# Patient Record
Sex: FEMALE | Race: WHITE | NOT HISPANIC OR LATINO | Employment: OTHER
[De-identification: names, ages, dates, MRNs, and addresses within clinical notes are randomized per-mention and may not be internally consistent; named-entity substitution may affect disease eponyms.]

---

## 2017-09-27 ENCOUNTER — MYAURORA ACCOUNT LINK (OUTPATIENT)
Dept: OTHER | Age: 72
End: 2017-09-27

## 2018-05-02 ENCOUNTER — CHARTING TRANS (OUTPATIENT)
Dept: OTHER | Age: 73
End: 2018-05-02

## 2018-12-02 VITALS
DIASTOLIC BLOOD PRESSURE: 56 MMHG | SYSTOLIC BLOOD PRESSURE: 102 MMHG | HEART RATE: 80 BPM | HEIGHT: 62 IN | BODY MASS INDEX: 27.6 KG/M2 | WEIGHT: 150 LBS

## 2019-03-19 RX ORDER — ATORVASTATIN CALCIUM 10 MG/1
TABLET, FILM COATED ORAL
Qty: 90 TABLET | Refills: 2 | OUTPATIENT
Start: 2019-03-19

## 2019-03-19 RX ORDER — ATORVASTATIN CALCIUM 20 MG/1
TABLET, FILM COATED ORAL DAILY
COMMUNITY
Start: 2018-05-02 | End: 2019-03-19 | Stop reason: SDUPTHER

## 2019-03-19 RX ORDER — ATORVASTATIN CALCIUM 20 MG/1
20 TABLET, FILM COATED ORAL DAILY
Qty: 90 TABLET | Refills: 0 | Status: SHIPPED | OUTPATIENT
Start: 2019-03-19 | End: 2019-08-05 | Stop reason: SDUPTHER

## 2019-05-13 RX ORDER — LISINOPRIL 10 MG/1
TABLET ORAL
Qty: 30 TABLET | Refills: 1 | Status: SHIPPED | OUTPATIENT
Start: 2019-05-13 | End: 2020-07-20 | Stop reason: ALTCHOICE

## 2019-06-17 RX ORDER — ATORVASTATIN CALCIUM 20 MG/1
TABLET, FILM COATED ORAL
Qty: 90 TABLET | Refills: 0 | OUTPATIENT
Start: 2019-06-17

## 2019-07-22 ENCOUNTER — OFFICE VISIT (OUTPATIENT)
Dept: CARDIOLOGY | Age: 74
End: 2019-07-22

## 2019-07-22 ENCOUNTER — ANCILLARY PROCEDURE (OUTPATIENT)
Dept: CARDIOLOGY | Age: 74
End: 2019-07-22
Attending: INTERNAL MEDICINE

## 2019-07-22 VITALS
HEIGHT: 62 IN | DIASTOLIC BLOOD PRESSURE: 56 MMHG | HEART RATE: 64 BPM | SYSTOLIC BLOOD PRESSURE: 130 MMHG | BODY MASS INDEX: 28.52 KG/M2 | WEIGHT: 155 LBS

## 2019-07-22 DIAGNOSIS — I35.1 NONRHEUMATIC AORTIC VALVE INSUFFICIENCY: ICD-10-CM

## 2019-07-22 DIAGNOSIS — I50.32 HYPERTENSIVE HEART DISEASE WITH CHRONIC DIASTOLIC CONGESTIVE HEART FAILURE (CMD): ICD-10-CM

## 2019-07-22 DIAGNOSIS — I83.10 VARICOSE VEINS WITH INFLAMMATION: ICD-10-CM

## 2019-07-22 DIAGNOSIS — I35.0 NONRHEUMATIC AORTIC VALVE STENOSIS: ICD-10-CM

## 2019-07-22 DIAGNOSIS — R00.2 PALPITATIONS: ICD-10-CM

## 2019-07-22 DIAGNOSIS — I11.0 HYPERTENSIVE HEART DISEASE WITH CHRONIC DIASTOLIC CONGESTIVE HEART FAILURE (CMD): ICD-10-CM

## 2019-07-22 DIAGNOSIS — I50.32 CHRONIC DIASTOLIC (CONGESTIVE) HEART FAILURE (CMD): Primary | ICD-10-CM

## 2019-07-22 PROCEDURE — 99214 OFFICE O/P EST MOD 30 MIN: CPT | Performed by: INTERNAL MEDICINE

## 2019-07-22 PROCEDURE — 93306 TTE W/DOPPLER COMPLETE: CPT | Performed by: INTERNAL MEDICINE

## 2019-07-22 RX ORDER — PREDNISONE 2.5 MG/1
2.5 TABLET ORAL DAILY
COMMUNITY
End: 2021-03-18 | Stop reason: DRUGHIGH

## 2019-07-22 RX ORDER — MULTIVITAMIN,THER AND MINERALS
1 TABLET ORAL DAILY
COMMUNITY

## 2019-07-22 RX ORDER — ACETAMINOPHEN 325 MG/1
650 TABLET ORAL EVERY 4 HOURS PRN
COMMUNITY
End: 2020-11-23 | Stop reason: ALTCHOICE

## 2019-07-22 RX ORDER — FOLIC ACID 1 MG/1
1 TABLET ORAL DAILY
COMMUNITY
End: 2020-11-23 | Stop reason: SDUPTHER

## 2019-07-22 RX ORDER — OMEPRAZOLE 20 MG/1
20 CAPSULE, DELAYED RELEASE ORAL DAILY
COMMUNITY

## 2019-07-22 RX ORDER — METOPROLOL SUCCINATE 50 MG/1
50 TABLET, EXTENDED RELEASE ORAL DAILY
COMMUNITY
End: 2019-08-27 | Stop reason: SDUPTHER

## 2019-07-26 ENCOUNTER — TELEPHONE (OUTPATIENT)
Dept: CARDIOLOGY | Age: 74
End: 2019-07-26

## 2019-08-05 DIAGNOSIS — I50.32 CHRONIC DIASTOLIC (CONGESTIVE) HEART FAILURE (CMD): Primary | ICD-10-CM

## 2019-08-06 RX ORDER — ATORVASTATIN CALCIUM 20 MG/1
TABLET, FILM COATED ORAL
Qty: 90 TABLET | Refills: 3 | Status: SHIPPED | OUTPATIENT
Start: 2019-08-06 | End: 2020-10-08

## 2019-08-27 RX ORDER — METOPROLOL SUCCINATE 50 MG/1
TABLET, EXTENDED RELEASE ORAL
Qty: 90 TABLET | Refills: 3 | Status: SHIPPED | OUTPATIENT
Start: 2019-08-27 | End: 2020-07-20 | Stop reason: SDUPTHER

## 2019-09-04 ENCOUNTER — ANCILLARY PROCEDURE (OUTPATIENT)
Dept: CARDIOLOGY | Age: 74
End: 2019-09-04
Attending: INTERNAL MEDICINE

## 2019-09-04 DIAGNOSIS — I83.10 VARICOSE VEINS WITH INFLAMMATION: ICD-10-CM

## 2019-09-04 PROCEDURE — 93970 EXTREMITY STUDY: CPT | Performed by: INTERNAL MEDICINE

## 2019-09-11 ENCOUNTER — HOSPITAL (OUTPATIENT)
Dept: OTHER | Age: 74
End: 2019-09-11

## 2019-09-11 ENCOUNTER — HOSPITAL (OUTPATIENT)
Dept: OTHER | Age: 74
End: 2019-09-11
Attending: SPECIALIST

## 2019-09-11 LAB — SURG SPECIMEN: NORMAL

## 2019-09-27 ENCOUNTER — TELEPHONE (OUTPATIENT)
Dept: CARDIOLOGY | Age: 74
End: 2019-09-27

## 2020-03-20 ENCOUNTER — TELEPHONE (OUTPATIENT)
Dept: CARDIOLOGY | Age: 75
End: 2020-03-20

## 2020-05-29 ENCOUNTER — TELEPHONE (OUTPATIENT)
Dept: CARDIOLOGY | Age: 75
End: 2020-05-29

## 2020-06-01 ENCOUNTER — APPOINTMENT (OUTPATIENT)
Dept: CARDIOLOGY | Age: 75
End: 2020-06-01

## 2020-07-20 ENCOUNTER — TELEPHONE (OUTPATIENT)
Dept: CARDIOLOGY | Age: 75
End: 2020-07-20

## 2020-07-20 ENCOUNTER — OFFICE VISIT (OUTPATIENT)
Dept: CARDIOLOGY | Age: 75
End: 2020-07-20

## 2020-07-20 ENCOUNTER — ANCILLARY PROCEDURE (OUTPATIENT)
Dept: CARDIOLOGY | Age: 75
End: 2020-07-20
Attending: INTERNAL MEDICINE

## 2020-07-20 VITALS
BODY MASS INDEX: 28.52 KG/M2 | HEIGHT: 62 IN | WEIGHT: 155 LBS | DIASTOLIC BLOOD PRESSURE: 60 MMHG | SYSTOLIC BLOOD PRESSURE: 130 MMHG | HEART RATE: 60 BPM

## 2020-07-20 DIAGNOSIS — I83.10 VARICOSE VEINS WITH INFLAMMATION: Primary | ICD-10-CM

## 2020-07-20 DIAGNOSIS — E78.2 MIXED HYPERLIPIDEMIA: ICD-10-CM

## 2020-07-20 DIAGNOSIS — I83.10 VARICOSE VEINS WITH INFLAMMATION: ICD-10-CM

## 2020-07-20 DIAGNOSIS — I50.32 CHRONIC DIASTOLIC (CONGESTIVE) HEART FAILURE (CMD): ICD-10-CM

## 2020-07-20 DIAGNOSIS — I35.1 NONRHEUMATIC AORTIC VALVE INSUFFICIENCY: ICD-10-CM

## 2020-07-20 DIAGNOSIS — I50.32 HYPERTENSIVE HEART DISEASE WITH CHRONIC DIASTOLIC CONGESTIVE HEART FAILURE (CMD): ICD-10-CM

## 2020-07-20 DIAGNOSIS — I35.0 NONRHEUMATIC AORTIC VALVE STENOSIS: ICD-10-CM

## 2020-07-20 DIAGNOSIS — I11.0 HYPERTENSIVE HEART DISEASE WITH CHRONIC DIASTOLIC CONGESTIVE HEART FAILURE (CMD): ICD-10-CM

## 2020-07-20 DIAGNOSIS — I35.0 NONRHEUMATIC AORTIC VALVE STENOSIS: Primary | ICD-10-CM

## 2020-07-20 PROCEDURE — 93306 TTE W/DOPPLER COMPLETE: CPT | Performed by: INTERNAL MEDICINE

## 2020-07-20 PROCEDURE — 99214 OFFICE O/P EST MOD 30 MIN: CPT | Performed by: INTERNAL MEDICINE

## 2020-07-20 RX ORDER — LISINOPRIL AND HYDROCHLOROTHIAZIDE 25; 20 MG/1; MG/1
1 TABLET ORAL DAILY
Qty: 90 TABLET | Refills: 3 | Status: SHIPPED | OUTPATIENT
Start: 2020-07-20 | End: 2021-09-13 | Stop reason: ALTCHOICE

## 2020-07-20 RX ORDER — METOPROLOL SUCCINATE 50 MG/1
50 TABLET, EXTENDED RELEASE ORAL DAILY
Qty: 90 TABLET | Refills: 3 | Status: SHIPPED | OUTPATIENT
Start: 2020-07-20 | End: 2021-03-18 | Stop reason: DRUGHIGH

## 2020-07-20 ASSESSMENT — PATIENT HEALTH QUESTIONNAIRE - PHQ9
SUM OF ALL RESPONSES TO PHQ9 QUESTIONS 1 AND 2: 0
CLINICAL INTERPRETATION OF PHQ2 SCORE: NO FURTHER SCREENING NEEDED
1. LITTLE INTEREST OR PLEASURE IN DOING THINGS: NOT AT ALL
2. FEELING DOWN, DEPRESSED OR HOPELESS: NOT AT ALL
CLINICAL INTERPRETATION OF PHQ9 SCORE: NO FURTHER SCREENING NEEDED
SUM OF ALL RESPONSES TO PHQ9 QUESTIONS 1 AND 2: 0

## 2020-07-22 ENCOUNTER — TELEPHONE (OUTPATIENT)
Dept: CARDIOLOGY | Age: 75
End: 2020-07-22

## 2020-07-27 ENCOUNTER — TELEPHONE (OUTPATIENT)
Dept: CARDIOLOGY | Age: 75
End: 2020-07-27

## 2020-07-27 RX ORDER — TRAMADOL HYDROCHLORIDE 50 MG/1
TABLET ORAL
Qty: 10 TABLET | Refills: 0 | Status: SHIPPED | OUTPATIENT
Start: 2020-07-27 | End: 2020-07-27 | Stop reason: SDUPTHER

## 2020-07-27 RX ORDER — TRAMADOL HYDROCHLORIDE 50 MG/1
TABLET ORAL
Qty: 10 TABLET | Refills: 0 | Status: SHIPPED | OUTPATIENT
Start: 2020-07-27 | End: 2020-11-23 | Stop reason: ALTCHOICE

## 2020-07-28 ENCOUNTER — ANCILLARY PROCEDURE (OUTPATIENT)
Dept: CARDIOLOGY | Age: 75
End: 2020-07-28
Attending: INTERNAL MEDICINE

## 2020-07-28 VITALS — HEART RATE: 80 BPM

## 2020-07-28 DIAGNOSIS — I83.10 VARICOSE VEINS WITH INFLAMMATION: ICD-10-CM

## 2020-07-28 PROCEDURE — 36475 ENDOVENOUS RF 1ST VEIN: CPT | Performed by: INTERNAL MEDICINE

## 2020-07-28 RX ORDER — TRAMADOL HYDROCHLORIDE 50 MG/1
TABLET ORAL
Qty: 10 TABLET | Refills: 0 | OUTPATIENT
Start: 2020-07-28

## 2020-08-03 ENCOUNTER — TELEPHONE (OUTPATIENT)
Dept: CARDIOLOGY | Age: 75
End: 2020-08-03

## 2020-09-15 ENCOUNTER — ANCILLARY PROCEDURE (OUTPATIENT)
Dept: CARDIOLOGY | Age: 75
End: 2020-09-15
Attending: INTERNAL MEDICINE

## 2020-09-15 ENCOUNTER — TELEPHONE (OUTPATIENT)
Dept: CARDIOLOGY | Age: 75
End: 2020-09-15

## 2020-09-15 VITALS — HEART RATE: 82 BPM

## 2020-09-15 DIAGNOSIS — I83.10 VARICOSE VEINS WITH INFLAMMATION: ICD-10-CM

## 2020-09-15 PROCEDURE — 36471 NJX SCLRSNT MLT INCMPTNT VN: CPT | Performed by: INTERNAL MEDICINE

## 2020-10-07 ENCOUNTER — TELEPHONE (OUTPATIENT)
Dept: CARDIOLOGY | Age: 75
End: 2020-10-07

## 2020-10-07 DIAGNOSIS — I50.32 CHRONIC DIASTOLIC (CONGESTIVE) HEART FAILURE (CMD): ICD-10-CM

## 2020-10-08 RX ORDER — ATORVASTATIN CALCIUM 20 MG/1
TABLET, FILM COATED ORAL
Qty: 90 TABLET | Refills: 0 | Status: SHIPPED | OUTPATIENT
Start: 2020-10-08 | End: 2020-11-23 | Stop reason: SDUPTHER

## 2020-10-16 ENCOUNTER — TELEPHONE (OUTPATIENT)
Dept: CARDIOLOGY | Age: 75
End: 2020-10-16

## 2020-10-27 ENCOUNTER — APPOINTMENT (OUTPATIENT)
Dept: CARDIOLOGY | Age: 75
End: 2020-10-27
Attending: INTERNAL MEDICINE

## 2020-10-29 ENCOUNTER — ANCILLARY PROCEDURE (OUTPATIENT)
Dept: CARDIOLOGY | Age: 75
End: 2020-10-29
Attending: INTERNAL MEDICINE

## 2020-10-29 VITALS — HEART RATE: 78 BPM

## 2020-10-29 DIAGNOSIS — I83.10 VARICOSE VEINS WITH INFLAMMATION: ICD-10-CM

## 2020-10-29 PROCEDURE — 36475 ENDOVENOUS RF 1ST VEIN: CPT | Performed by: INTERNAL MEDICINE

## 2020-11-09 ENCOUNTER — TELEPHONE (OUTPATIENT)
Dept: CARDIOLOGY | Age: 75
End: 2020-11-09

## 2020-11-10 ENCOUNTER — ANCILLARY PROCEDURE (OUTPATIENT)
Dept: CARDIOLOGY | Age: 75
End: 2020-11-10
Attending: INTERNAL MEDICINE

## 2020-11-10 VITALS — SYSTOLIC BLOOD PRESSURE: 152 MMHG | DIASTOLIC BLOOD PRESSURE: 66 MMHG

## 2020-11-10 DIAGNOSIS — I83.10 VARICOSE VEINS WITH INFLAMMATION: ICD-10-CM

## 2020-11-10 PROCEDURE — 36475 ENDOVENOUS RF 1ST VEIN: CPT | Performed by: INTERNAL MEDICINE

## 2020-11-23 ENCOUNTER — OFFICE VISIT (OUTPATIENT)
Dept: CARDIOLOGY | Age: 75
End: 2020-11-23

## 2020-11-23 ENCOUNTER — ANCILLARY PROCEDURE (OUTPATIENT)
Dept: CARDIOLOGY | Age: 75
End: 2020-11-23
Attending: INTERNAL MEDICINE

## 2020-11-23 VITALS
HEIGHT: 62 IN | SYSTOLIC BLOOD PRESSURE: 166 MMHG | DIASTOLIC BLOOD PRESSURE: 58 MMHG | RESPIRATION RATE: 16 BRPM | WEIGHT: 150 LBS | HEART RATE: 72 BPM | BODY MASS INDEX: 27.6 KG/M2

## 2020-11-23 DIAGNOSIS — I10 ESSENTIAL HYPERTENSION: ICD-10-CM

## 2020-11-23 DIAGNOSIS — E78.2 MIXED HYPERLIPIDEMIA: ICD-10-CM

## 2020-11-23 DIAGNOSIS — I35.0 NONRHEUMATIC AORTIC VALVE STENOSIS: ICD-10-CM

## 2020-11-23 DIAGNOSIS — R06.09 DYSPNEA ON EXERTION: Primary | ICD-10-CM

## 2020-11-23 DIAGNOSIS — R09.89 BILATERAL CAROTID BRUITS: ICD-10-CM

## 2020-11-23 DIAGNOSIS — I50.32 CHRONIC DIASTOLIC (CONGESTIVE) HEART FAILURE (CMD): ICD-10-CM

## 2020-11-23 DIAGNOSIS — I11.0 HYPERTENSIVE HEART DISEASE WITH CHRONIC DIASTOLIC CONGESTIVE HEART FAILURE (CMD): ICD-10-CM

## 2020-11-23 DIAGNOSIS — I83.10 VARICOSE VEINS WITH INFLAMMATION: ICD-10-CM

## 2020-11-23 DIAGNOSIS — I50.32 HYPERTENSIVE HEART DISEASE WITH CHRONIC DIASTOLIC CONGESTIVE HEART FAILURE (CMD): ICD-10-CM

## 2020-11-23 PROCEDURE — 93880 EXTRACRANIAL BILAT STUDY: CPT | Performed by: INTERNAL MEDICINE

## 2020-11-23 PROCEDURE — 99214 OFFICE O/P EST MOD 30 MIN: CPT | Performed by: INTERNAL MEDICINE

## 2020-11-23 RX ORDER — FOLIC ACID 1 MG/1
1 TABLET ORAL DAILY
Qty: 90 TABLET | Refills: 3 | Status: SHIPPED | OUTPATIENT
Start: 2020-11-23 | End: 2021-11-22

## 2020-11-23 RX ORDER — ATORVASTATIN CALCIUM 20 MG/1
20 TABLET, FILM COATED ORAL DAILY
Qty: 90 TABLET | Refills: 3 | Status: SHIPPED | OUTPATIENT
Start: 2020-11-23 | End: 2021-04-05 | Stop reason: DRUGHIGH

## 2020-11-23 RX ORDER — AMLODIPINE BESYLATE 2.5 MG/1
2.5 TABLET ORAL DAILY
Qty: 90 TABLET | Refills: 3 | Status: SHIPPED | OUTPATIENT
Start: 2020-11-23 | End: 2021-04-05 | Stop reason: ALTCHOICE

## 2020-11-23 RX ORDER — IBUPROFEN 400 MG/1
400 TABLET ORAL EVERY 6 HOURS PRN
COMMUNITY

## 2020-11-23 ASSESSMENT — PATIENT HEALTH QUESTIONNAIRE - PHQ9
CLINICAL INTERPRETATION OF PHQ9 SCORE: NO FURTHER SCREENING NEEDED
CLINICAL INTERPRETATION OF PHQ2 SCORE: NO FURTHER SCREENING NEEDED
2. FEELING DOWN, DEPRESSED OR HOPELESS: NOT AT ALL
SUM OF ALL RESPONSES TO PHQ9 QUESTIONS 1 AND 2: 0
1. LITTLE INTEREST OR PLEASURE IN DOING THINGS: NOT AT ALL
SUM OF ALL RESPONSES TO PHQ9 QUESTIONS 1 AND 2: 0

## 2020-11-24 ENCOUNTER — TELEPHONE (OUTPATIENT)
Dept: CARDIOLOGY | Age: 75
End: 2020-11-24

## 2020-11-25 ENCOUNTER — OFFICE VISIT (OUTPATIENT)
Dept: FAMILY MEDICINE CLINIC | Facility: CLINIC | Age: 75
End: 2020-11-25
Payer: MEDICARE

## 2020-11-25 VITALS
DIASTOLIC BLOOD PRESSURE: 78 MMHG | HEIGHT: 62 IN | SYSTOLIC BLOOD PRESSURE: 145 MMHG | HEART RATE: 77 BPM | BODY MASS INDEX: 28.89 KG/M2 | WEIGHT: 157 LBS

## 2020-11-25 DIAGNOSIS — H91.90 HEARING LOSS, UNSPECIFIED HEARING LOSS TYPE, UNSPECIFIED LATERALITY: ICD-10-CM

## 2020-11-25 DIAGNOSIS — I35.0 NONRHEUMATIC AORTIC VALVE STENOSIS: Primary | ICD-10-CM

## 2020-11-25 DIAGNOSIS — I10 ESSENTIAL HYPERTENSION: ICD-10-CM

## 2020-11-25 DIAGNOSIS — G89.29 CHRONIC RIGHT SHOULDER PAIN: ICD-10-CM

## 2020-11-25 DIAGNOSIS — Z12.31 SCREENING MAMMOGRAM, ENCOUNTER FOR: ICD-10-CM

## 2020-11-25 DIAGNOSIS — R00.2 PALPITATIONS: ICD-10-CM

## 2020-11-25 DIAGNOSIS — M25.511 CHRONIC RIGHT SHOULDER PAIN: ICD-10-CM

## 2020-11-25 DIAGNOSIS — I50.9 CONGESTIVE HEART FAILURE, UNSPECIFIED HF CHRONICITY, UNSPECIFIED HEART FAILURE TYPE (HCC): ICD-10-CM

## 2020-11-25 DIAGNOSIS — Z23 INFLUENZA VACCINE NEEDED: ICD-10-CM

## 2020-11-25 DIAGNOSIS — I83.10 VARICOSE VEINS WITH INFLAMMATION: ICD-10-CM

## 2020-11-25 DIAGNOSIS — Z00.00 WELL ADULT EXAM: ICD-10-CM

## 2020-11-25 DIAGNOSIS — M81.0 AGE-RELATED OSTEOPOROSIS WITHOUT CURRENT PATHOLOGICAL FRACTURE: ICD-10-CM

## 2020-11-25 DIAGNOSIS — E78.2 MIXED HYPERLIPIDEMIA: ICD-10-CM

## 2020-11-25 DIAGNOSIS — K21.9 GASTROESOPHAGEAL REFLUX DISEASE WITHOUT ESOPHAGITIS: ICD-10-CM

## 2020-11-25 DIAGNOSIS — M25.562 ACUTE PAIN OF LEFT KNEE: ICD-10-CM

## 2020-11-25 PROBLEM — I11.0 HYPERTENSIVE HEART DISEASE WITH CHRONIC DIASTOLIC CONGESTIVE HEART FAILURE (HCC): Status: ACTIVE | Noted: 2020-11-25

## 2020-11-25 PROBLEM — I50.32 HYPERTENSIVE HEART DISEASE WITH CHRONIC DIASTOLIC CONGESTIVE HEART FAILURE (HCC): Status: ACTIVE | Noted: 2020-11-25

## 2020-11-25 PROCEDURE — G0463 HOSPITAL OUTPT CLINIC VISIT: HCPCS | Performed by: NURSE PRACTITIONER

## 2020-11-25 PROCEDURE — 99205 OFFICE O/P NEW HI 60 MIN: CPT | Performed by: NURSE PRACTITIONER

## 2020-11-25 RX ORDER — DENOSUMAB 60 MG/ML
60 INJECTION SUBCUTANEOUS ONCE
Qty: 1 ML | Refills: 0 | Status: SHIPPED | OUTPATIENT
Start: 2020-11-25 | End: 2020-11-25

## 2020-11-25 RX ORDER — LISINOPRIL AND HYDROCHLOROTHIAZIDE 25; 20 MG/1; MG/1
1 TABLET ORAL DAILY
COMMUNITY
Start: 2020-07-20 | End: 2020-11-25

## 2020-11-25 RX ORDER — LISINOPRIL AND HYDROCHLOROTHIAZIDE 25; 20 MG/1; MG/1
1 TABLET ORAL DAILY
Qty: 30 TABLET | Refills: 0 | Status: SHIPPED | OUTPATIENT
Start: 2020-11-25 | End: 2020-12-28

## 2020-11-25 RX ORDER — OMEPRAZOLE 20 MG/1
20 CAPSULE, DELAYED RELEASE ORAL
Qty: 30 CAPSULE | Refills: 0 | COMMUNITY
Start: 2020-11-25 | End: 2022-01-27

## 2020-11-25 RX ORDER — DENOSUMAB 60 MG/ML
60 INJECTION SUBCUTANEOUS ONCE
COMMUNITY
End: 2020-11-25

## 2020-11-25 RX ORDER — METOPROLOL SUCCINATE 50 MG/1
50 TABLET, EXTENDED RELEASE ORAL DAILY
Qty: 30 TABLET | Refills: 0 | Status: SHIPPED | OUTPATIENT
Start: 2020-11-25 | End: 2020-12-28

## 2020-11-25 RX ORDER — FOLIC ACID 1 MG/1
TABLET ORAL DAILY
COMMUNITY
End: 2020-11-25

## 2020-11-25 RX ORDER — TRAMADOL HYDROCHLORIDE 50 MG/1
TABLET ORAL
COMMUNITY
Start: 2020-07-28 | End: 2020-11-25

## 2020-11-25 RX ORDER — MELATONIN
1000 DAILY
COMMUNITY
End: 2020-11-25

## 2020-11-25 RX ORDER — MELATONIN
1000 DAILY
Qty: 30 TABLET | Refills: 0 | COMMUNITY
Start: 2020-11-25

## 2020-11-25 RX ORDER — FOLIC ACID 1 MG/1
1 TABLET ORAL DAILY
Qty: 30 TABLET | Refills: 0 | COMMUNITY
Start: 2020-11-25 | End: 2020-12-02

## 2020-11-25 RX ORDER — ATORVASTATIN CALCIUM 20 MG/1
20 TABLET, FILM COATED ORAL NIGHTLY
Qty: 30 TABLET | Refills: 0 | COMMUNITY
Start: 2020-11-25 | End: 2020-12-02

## 2020-11-25 RX ORDER — ATORVASTATIN CALCIUM 20 MG/1
20 TABLET, FILM COATED ORAL NIGHTLY
COMMUNITY
End: 2020-11-25

## 2020-11-25 RX ORDER — CHLORAL HYDRATE 500 MG
1000 CAPSULE ORAL DAILY
Qty: 30 CAPSULE | Refills: 0 | COMMUNITY
Start: 2020-11-25

## 2020-11-25 RX ORDER — OMEPRAZOLE 20 MG/1
20 CAPSULE, DELAYED RELEASE ORAL
COMMUNITY
End: 2020-11-25

## 2020-11-25 RX ORDER — METOPROLOL SUCCINATE 50 MG/1
50 TABLET, EXTENDED RELEASE ORAL DAILY
COMMUNITY
Start: 2020-07-20 | End: 2020-11-25

## 2020-11-25 NOTE — PROGRESS NOTES
HPI  Pt presents to establish care. Recently moved from [place]; she presents to office today with her daughter. Pt has numerous health conditions and daughter would like to establish all care at [place] for continuity.    REquested medical records but Pneumococcal Vaccine: 65+ Years(1 of 1 - PPSV23) due on 12/22/2008  Influenza Vaccine(1) due on 10/01/2020    No LMP recorded. Past Medical History:   Diagnosis Date   • Rheumatic arteritis        . History reviewed. No pertinent surgical history.     His • Denosumab (PROLIA) 60 MG/ML Subcutaneous Solution Prefilled Syringe Inject 1 mL (60 mg total) into the skin one time for 1 dose. 1 mL 0   • Diclofenac Sodium 1 % External Gel Apply 2 g topically 4 (four) times daily.  339 g 3   • folic acid 1 MG Oral Tab Cardiovascular    Varicose veins with inflammation    Palpitations    Mixed hyperlipidemia    Relevant Medications    atorvastatin 20 MG Oral Tab    Omega-3 1000 MG Oral Cap    Congestive heart failure (CHF) ([address]    Relevant Orders    CARDIO - INTERNAL Discussed plan of care with pt and pt is in agreement. All questions answered. Pt to call with questions or concerns. Encouraged to sign up for My Chart if not already registered.

## 2020-11-26 NOTE — ASSESSMENT & PLAN NOTE
Refer ortho  Diclofenac gel qid-due to GERD does not tolerate oral nsaids  Pt does not tolerate tramadol  Advised to ice 20 min 4-6 times per day  Tylenol arthritis

## 2020-12-02 ENCOUNTER — HOSPITAL ENCOUNTER (OUTPATIENT)
Dept: GENERAL RADIOLOGY | Age: 75
Discharge: HOME | End: 2020-12-02
Attending: PHYSICIAN ASSISTANT
Payer: MEDICARE

## 2020-12-02 ENCOUNTER — OFFICE VISIT (OUTPATIENT)
Dept: ORTHOPEDICS CLINIC | Facility: CLINIC | Age: 75
End: 2020-12-02
Payer: MEDICARE

## 2020-12-02 DIAGNOSIS — M17.12 PRIMARY OSTEOARTHRITIS OF LEFT KNEE: ICD-10-CM

## 2020-12-02 DIAGNOSIS — M25.552 LEFT HIP PAIN: ICD-10-CM

## 2020-12-02 DIAGNOSIS — M70.62 TROCHANTERIC BURSITIS, LEFT HIP: ICD-10-CM

## 2020-12-02 DIAGNOSIS — M25.562 LEFT KNEE PAIN, UNSPECIFIED CHRONICITY: Primary | ICD-10-CM

## 2020-12-02 DIAGNOSIS — M25.562 LEFT KNEE PAIN, UNSPECIFIED CHRONICITY: ICD-10-CM

## 2020-12-02 PROCEDURE — 73564 X-RAY EXAM KNEE 4 OR MORE: CPT | Performed by: PHYSICIAN ASSISTANT

## 2020-12-02 PROCEDURE — 20610 DRAIN/INJ JOINT/BURSA W/O US: CPT | Performed by: PHYSICIAN ASSISTANT

## 2020-12-02 PROCEDURE — 99203 OFFICE O/P NEW LOW 30 MIN: CPT | Performed by: PHYSICIAN ASSISTANT

## 2020-12-02 PROCEDURE — 73502 X-RAY EXAM HIP UNI 2-3 VIEWS: CPT | Performed by: PHYSICIAN ASSISTANT

## 2020-12-02 RX ORDER — MULTIVITAMIN,THER AND MINERALS
1 TABLET ORAL DAILY
COMMUNITY

## 2020-12-02 RX ORDER — AMLODIPINE BESYLATE 2.5 MG/1
2.5 TABLET ORAL DAILY
COMMUNITY
Start: 2020-11-23 | End: 2021-06-24

## 2020-12-02 RX ORDER — TRIAMCINOLONE ACETONIDE 40 MG/ML
40 INJECTION, SUSPENSION INTRA-ARTICULAR; INTRAMUSCULAR ONCE
Status: COMPLETED | OUTPATIENT
Start: 2020-12-02 | End: 2020-12-02

## 2020-12-02 RX ORDER — DENOSUMAB 60 MG/ML
60 INJECTION SUBCUTANEOUS ONCE
COMMUNITY
End: 2021-06-24

## 2020-12-02 RX ORDER — PROPRANOLOL/HYDROCHLOROTHIAZID 40 MG-25MG
TABLET ORAL
COMMUNITY

## 2020-12-02 RX ORDER — ATORVASTATIN CALCIUM 20 MG/1
20 TABLET, FILM COATED ORAL DAILY
COMMUNITY
Start: 2020-11-23 | End: 2021-04-22 | Stop reason: ALTCHOICE

## 2020-12-02 RX ADMIN — TRIAMCINOLONE ACETONIDE 40 MG: 40 INJECTION, SUSPENSION INTRA-ARTICULAR; INTRAMUSCULAR at 17:29:00

## 2020-12-02 NOTE — PROGRESS NOTES
[facility] New Patient Note    CC:   1. Left knee pain  2. Left leg pain    HPI: This 66year old female presents today with her daughter with complaints of left knee and left leg pain.   Onset of symptoms occurred 3 weeks ago which time she was going Apply 2 g topically 4 (four) times daily. 100 g 3   • Lisinopril-hydroCHLOROthiazide 20-25 MG Oral Tab Take 1 tablet by mouth daily. 30 tablet 0   • Metoprolol Succinate ER 50 MG Oral Tablet 24 Hr Take 1 tablet (50 mg total) by mouth daily.  30 tablet 0   • sign.        Imagin views of the left knee and 3 views of the left hip were personally viewed, independently interpreted and radiology report read. Moderate medial and patellofemoral joint space narrowing with marginal osteophytes.   The left hip shows benefits, and alternatives to the cortisone injection including but limited to risk of needle infection, steroid flareup, or failure to improve along with increase in blood sugars were discussed.   She would like to proceed and under meticulous sterile tech

## 2020-12-09 ENCOUNTER — OFFICE VISIT (OUTPATIENT)
Dept: RHEUMATOLOGY | Facility: CLINIC | Age: 75
End: 2020-12-09
Payer: MEDICARE

## 2020-12-09 VITALS
HEIGHT: 62 IN | BODY MASS INDEX: 28.89 KG/M2 | DIASTOLIC BLOOD PRESSURE: 72 MMHG | WEIGHT: 157 LBS | RESPIRATION RATE: 16 BRPM | SYSTOLIC BLOOD PRESSURE: 138 MMHG | HEART RATE: 66 BPM

## 2020-12-09 DIAGNOSIS — M79.10 MYALGIA: ICD-10-CM

## 2020-12-09 DIAGNOSIS — M15.9 PRIMARY OSTEOARTHRITIS INVOLVING MULTIPLE JOINTS: ICD-10-CM

## 2020-12-09 DIAGNOSIS — M13.0 POLYARTHRITIS: Primary | ICD-10-CM

## 2020-12-09 PROCEDURE — 99204 OFFICE O/P NEW MOD 45 MIN: CPT | Performed by: INTERNAL MEDICINE

## 2020-12-09 PROCEDURE — G0463 HOSPITAL OUTPT CLINIC VISIT: HCPCS | Performed by: INTERNAL MEDICINE

## 2020-12-09 NOTE — PROGRESS NOTES
Sheba Berg is a 66year old female who presents for Patient presents with:  Consult: RA, previous doctor Jag Jacobs MD    Hip Pain: Left, Trochantroric bursitis  Leg Pain: Left, onset x3 weeks  Knee Pain: Left  .    HPI:     I had the pleasure of skin one time. • Diclofenac Sodium 1 % External Gel Apply 2 g topically 4 (four) times daily. 100 g 3   • Lisinopril-hydroCHLOROthiazide 20-25 MG Oral Tab Take 1 tablet by mouth daily.  30 tablet 0   • Metoprolol Succinate ER 50 MG Oral Tablet 24 Hr Elder HPI,   All other ROS are negative.      EXAM:   /72   Pulse 66   Resp 16   Ht 5' 2\" (1.575 m)   Wt 157 lb (71.2 kg)   BMI 28.72 kg/m²   HEENT: Clear oropharynx, no oral ulcers, EOM intact, clear sclear, PERRLA, pleasant, no acute distress, no CAD, no Alize Zhou MD  12/9/2020  9:38 AM

## 2020-12-11 ENCOUNTER — LAB ENCOUNTER (OUTPATIENT)
Dept: LAB | Age: 75
End: 2020-12-11
Attending: INTERNAL MEDICINE
Payer: MEDICARE

## 2020-12-11 DIAGNOSIS — M79.10 MYALGIA: ICD-10-CM

## 2020-12-11 DIAGNOSIS — M13.0 POLYARTHRITIS: ICD-10-CM

## 2020-12-11 DIAGNOSIS — Z00.00 WELL ADULT EXAM: ICD-10-CM

## 2020-12-11 PROCEDURE — 36415 COLL VENOUS BLD VENIPUNCTURE: CPT

## 2020-12-11 PROCEDURE — 83036 HEMOGLOBIN GLYCOSYLATED A1C: CPT

## 2020-12-11 PROCEDURE — 80053 COMPREHEN METABOLIC PANEL: CPT

## 2020-12-11 PROCEDURE — 86200 CCP ANTIBODY: CPT

## 2020-12-11 PROCEDURE — 82607 VITAMIN B-12: CPT

## 2020-12-11 PROCEDURE — 80061 LIPID PANEL: CPT

## 2020-12-11 PROCEDURE — 85652 RBC SED RATE AUTOMATED: CPT

## 2020-12-11 PROCEDURE — 82085 ASSAY OF ALDOLASE: CPT

## 2020-12-11 PROCEDURE — 85027 COMPLETE CBC AUTOMATED: CPT

## 2020-12-11 PROCEDURE — 84550 ASSAY OF BLOOD/URIC ACID: CPT

## 2020-12-11 PROCEDURE — 86431 RHEUMATOID FACTOR QUANT: CPT

## 2020-12-11 PROCEDURE — 86140 C-REACTIVE PROTEIN: CPT

## 2020-12-11 PROCEDURE — 84443 ASSAY THYROID STIM HORMONE: CPT

## 2020-12-11 PROCEDURE — 86038 ANTINUCLEAR ANTIBODIES: CPT

## 2020-12-17 DIAGNOSIS — D72.829 LEUKOCYTOSIS, UNSPECIFIED TYPE: Primary | ICD-10-CM

## 2020-12-23 ENCOUNTER — OFFICE VISIT (OUTPATIENT)
Dept: RHEUMATOLOGY | Facility: CLINIC | Age: 75
End: 2020-12-23
Payer: MEDICARE

## 2020-12-23 VITALS
BODY MASS INDEX: 28.89 KG/M2 | HEART RATE: 81 BPM | HEIGHT: 62 IN | RESPIRATION RATE: 16 BRPM | SYSTOLIC BLOOD PRESSURE: 158 MMHG | DIASTOLIC BLOOD PRESSURE: 77 MMHG | WEIGHT: 157 LBS

## 2020-12-23 DIAGNOSIS — M05.80 POLYARTHRITIS WITH POSITIVE RHEUMATOID FACTOR (HCC): Primary | ICD-10-CM

## 2020-12-23 DIAGNOSIS — M81.0 OSTEOPOROSIS, UNSPECIFIED OSTEOPOROSIS TYPE, UNSPECIFIED PATHOLOGICAL FRACTURE PRESENCE: ICD-10-CM

## 2020-12-23 PROCEDURE — G0463 HOSPITAL OUTPT CLINIC VISIT: HCPCS | Performed by: INTERNAL MEDICINE

## 2020-12-23 PROCEDURE — 99214 OFFICE O/P EST MOD 30 MIN: CPT | Performed by: INTERNAL MEDICINE

## 2020-12-23 RX ORDER — HYDROXYCHLOROQUINE SULFATE 200 MG/1
200 TABLET, FILM COATED ORAL 2 TIMES DAILY
Qty: 60 TABLET | Refills: 1 | Status: SHIPPED | OUTPATIENT
Start: 2020-12-23 | End: 2021-06-24

## 2020-12-23 NOTE — PROGRESS NOTES
Hazel Machado is a 66year old female who presents for Patient presents with:  Lab Results  Ankle Pain: B/L  Toe Pain: B/L  .   HPI:     I had the pleasure of seeing Hazel Machado on 12/9/2020 for evaluation. She just moved her in the last year.  She off   Better in he rknees about 50% better. She still has left knee . She had the injectiosn 4 weekss ago. About 2 weeks later she had so much pain and she couldn't bend her knee. She has onoging ankle pain at times.      Wt Readings from Last 2 Encou weight or appetitie  Derm: No rashes, no oral ulcers, no alopecia, no photosensitivity, no psoriasis  HEENT: No dry eyes, no dry mouth, no Raynaud's, no nasal ulcers, no parotid swelling, no neck pain, no jaw pain, no temple pain  Eyes: No visual changes, mg/dL  10.2 (H)   CALCULATED OSMOLALITY      275 - 295 mOsm/kg  307 (H)   eGFR NON-AFR.  AMERICAN      >=60  61   eGFR       >=60  70   ALT (SGPT)      13 - 56 U/L  21   AST (SGOT)      15 - 37 U/L  13 (L)   ALKALINE PHOSPHATASE      55 - 14 gm/cm2   T-SCORE  %REF   Z-SCORE %AGE MATCH  BODY PART  10/15/2018      0.724     -1.10 85.0%     +0.90     117.0%  left femur neck  08/16/2016      0.720     -1.20 85.0%     +0.80       0.0%  left femur neck  02/10/2014      0.724     -1.10 85.0%     +0. 7 her orthopedics     2. Chondrocalcinosis  - slightly elevated calcium - check PTH   3. osteoporsosis - was on prolia - due for DEXA in 10/2020 - repeat DEXA   4. Used to get numbness - had emg in the past taht was ok    Summary:  1.  Try hydroxychlroquine 2

## 2020-12-23 NOTE — PATIENT INSTRUCTIONS
1. Try hydroxychlroquine 200mg twice a day    2. Cont. Tylenol 650mg and diclofenac gel 1 %   3. Return to clinic in 6-8 weeks   4. Will get records -   5. DEXA scan -   Hydroxychloroquine tablets  Brand Names: Plaquenil, Quineprox  What is this medicine? pain; unusually weak or tired; yellowing of the eyes or skin  · signs and symptoms of low blood sugar such as feeling anxious; confusion; dizziness; increased hunger; unusually weak or tired; sweating; shakiness; cold; irritable; headache; blurred vision; disease  · porphyria  · psoriasis  · an unusual or allergic reaction to chloroquine, hydroxychloroquine, other medicines, foods, dyes, or preservatives  · pregnant or trying to get pregnant  · breast-feeding  What should I watch for while using this medici

## 2020-12-25 DIAGNOSIS — I10 ESSENTIAL HYPERTENSION: ICD-10-CM

## 2020-12-28 ENCOUNTER — MED REC SCAN ONLY (OUTPATIENT)
Dept: RHEUMATOLOGY | Facility: CLINIC | Age: 75
End: 2020-12-28

## 2020-12-28 RX ORDER — LISINOPRIL AND HYDROCHLOROTHIAZIDE 25; 20 MG/1; MG/1
TABLET ORAL
Qty: 30 TABLET | Refills: 0 | Status: SHIPPED | OUTPATIENT
Start: 2020-12-28 | End: 2021-04-18

## 2020-12-28 RX ORDER — METOPROLOL SUCCINATE 50 MG/1
50 TABLET, EXTENDED RELEASE ORAL DAILY
Qty: 30 TABLET | Refills: 0 | Status: SHIPPED | OUTPATIENT
Start: 2020-12-28 | End: 2021-03-03

## 2021-02-12 ENCOUNTER — TELEMEDICINE (OUTPATIENT)
Dept: FAMILY MEDICINE CLINIC | Facility: CLINIC | Age: 76
End: 2021-02-12
Payer: MEDICARE

## 2021-02-12 ENCOUNTER — TELEPHONE (OUTPATIENT)
Dept: FAMILY MEDICINE CLINIC | Facility: CLINIC | Age: 76
End: 2021-02-12

## 2021-02-12 DIAGNOSIS — I10 ESSENTIAL HYPERTENSION: ICD-10-CM

## 2021-02-12 DIAGNOSIS — R00.2 PALPITATIONS: ICD-10-CM

## 2021-02-12 DIAGNOSIS — I35.0 NONRHEUMATIC AORTIC VALVE STENOSIS: ICD-10-CM

## 2021-02-12 DIAGNOSIS — R07.89 OTHER CHEST PAIN: Primary | ICD-10-CM

## 2021-02-12 DIAGNOSIS — I50.9 CONGESTIVE HEART FAILURE, UNSPECIFIED HF CHRONICITY, UNSPECIFIED HEART FAILURE TYPE (HCC): ICD-10-CM

## 2021-02-12 PROCEDURE — 99213 OFFICE O/P EST LOW 20 MIN: CPT | Performed by: NURSE PRACTITIONER

## 2021-02-12 NOTE — TELEPHONE ENCOUNTER
DGN#[phone] no answer with mailbox full and unable to leave a message. Pts' appt in [place] needs to be triaged further. Tel#[phone] no answer with no voice mailbox to leave a message. Message left at pts' [employer] encounter.       Please reply

## 2021-02-12 NOTE — ASSESSMENT & PLAN NOTE
Discussed w pt's h/o cardiac issues and that women can present different with heart problems then men and pt should be evaluated for symptoms that occurred 3 times last night. Pt states that she really does not want to go to ER if she does not have to.  Ag

## 2021-02-12 NOTE — PROGRESS NOTES
HPI  Pt presents for video visit w daughter Gianluca Alaniz. Had gel injection in knees yesterday. Had shortness of breath, nausea,sweating, elevated bp and upper back pain about 8 pm last night. [place] like her heart was racing.   Symptoms happened again at 10 pm Smokeless tobacco: Never Used    Substance and Sexual Activity      Alcohol use: Not Currently      Drug use: Not Currently      Sexual activity: Not on file    Lifestyle      Physical activity        Days per week: Not on file        Minutes per session: mouth every morning before breakfast. 30 capsule 0   • Vitamin D3 25 MCG (1000 UT) Oral Tab Take 1 tablet (1,000 Units total) by mouth daily. 30 tablet 0   • folic acid (FOLVITE) 1 MG Oral Tab Take by mouth daily.          Allergies:    Codeine of care with pt and pt is in agreement. All questions answered. Pt to call with questions or concerns. Encouraged to sign up for My Chart if not already registered.

## 2021-02-12 NOTE — TELEPHONE ENCOUNTER
Patient reports chest pain.     Appointment For: Jorge A Lee ([id number])   Visit Type: [facility] SERVICES VIDEO VISIT ([id number])      2/12/2021    2:30 PM  15 mins. SANIA Looney [facility] MED      Patient Comments:   Chest pains

## 2021-02-13 DIAGNOSIS — Z23 NEED FOR VACCINATION: ICD-10-CM

## 2021-02-15 ENCOUNTER — HOSPITAL ENCOUNTER (OUTPATIENT)
Dept: BONE DENSITY | Age: 76
Discharge: HOME | End: 2021-02-15
Attending: INTERNAL MEDICINE
Payer: MEDICARE

## 2021-02-15 DIAGNOSIS — M81.0 OSTEOPOROSIS, UNSPECIFIED OSTEOPOROSIS TYPE, UNSPECIFIED PATHOLOGICAL FRACTURE PRESENCE: ICD-10-CM

## 2021-02-15 PROCEDURE — 77080 DXA BONE DENSITY AXIAL: CPT | Performed by: INTERNAL MEDICINE

## 2021-02-17 ENCOUNTER — PATIENT MESSAGE (OUTPATIENT)
Dept: FAMILY MEDICINE CLINIC | Facility: CLINIC | Age: 76
End: 2021-02-17

## 2021-02-17 DIAGNOSIS — M79.673 PAIN OF FOOT, UNSPECIFIED LATERALITY: Primary | ICD-10-CM

## 2021-02-18 ENCOUNTER — OFFICE VISIT (OUTPATIENT)
Dept: RHEUMATOLOGY | Facility: CLINIC | Age: 76
End: 2021-02-18
Payer: MEDICARE

## 2021-02-18 VITALS
SYSTOLIC BLOOD PRESSURE: 132 MMHG | DIASTOLIC BLOOD PRESSURE: 70 MMHG | WEIGHT: 159 LBS | RESPIRATION RATE: 16 BRPM | HEIGHT: 62 IN | HEART RATE: 76 BPM | BODY MASS INDEX: 29.26 KG/M2

## 2021-02-18 DIAGNOSIS — M05.80 POLYARTHRITIS WITH POSITIVE RHEUMATOID FACTOR (HCC): Primary | ICD-10-CM

## 2021-02-18 DIAGNOSIS — M15.9 PRIMARY OSTEOARTHRITIS INVOLVING MULTIPLE JOINTS: ICD-10-CM

## 2021-02-18 DIAGNOSIS — Z51.81 THERAPEUTIC DRUG MONITORING: ICD-10-CM

## 2021-02-18 PROCEDURE — 99214 OFFICE O/P EST MOD 30 MIN: CPT | Performed by: INTERNAL MEDICINE

## 2021-02-18 RX ORDER — PREDNISONE 1 MG/1
5 TABLET ORAL DAILY
Qty: 30 TABLET | Refills: 3 | Status: SHIPPED | OUTPATIENT
Start: 2021-02-18 | End: 2021-06-22

## 2021-02-18 RX ORDER — LEFLUNOMIDE 10 MG/1
10 TABLET ORAL DAILY
Qty: 30 TABLET | Refills: 0 | Status: SHIPPED | OUTPATIENT
Start: 2021-02-18 | End: 2021-03-20

## 2021-02-18 NOTE — PROGRESS NOTES
Valeria Merritt is a 66year old female who presents for Patient presents with:  Medication Follow-Up  Results: Dexa  . HPI:     I had the pleasure of seeing Valeria Merritt on 12/9/2020 for evaluation. She just moved her in the last year.  She was move Better in he rknees about 50% better. She still has left knee . She had the injectiosn 4 weekss ago. About 2 weeks later she had so much pain and she couldn't bend her knee. She has onoging ankle pain at times.      2/18/2021  Some days every part o Cap Take 1,000 mg by mouth daily.  30 capsule 0   • omeprazole 20 MG Oral Capsule Delayed Release Take 1 capsule (20 mg total) by mouth every morning before breakfast. 30 capsule 0   • Vitamin D3 25 MCG (1000 UT) Oral Tab Take 1 tablet (1,000 Units total) b tender in shoulders.    Mild Tender dips in 1-5th fingers o hands - heberdone nodes   Can close her hands   Tender in b/l trochanteirc bursitis right more than left   Tender in left knee with +1=2 swleling -   Tender in left ankle   Not tender in 1-5th mtps 0.358 - 3.740 mIU/mL  0.979   Vitamin B12      193 - 986 pg/mL  468   ALDOLASE, SERUM      1.5 - 8.1 U/L  3.1   C-REACTIVE PROTEIN      <0.30 mg/dL  <0.29   SED RATE      0 - 30 mm/Hr  15   JAMES SCREEN WITH REFLEX (S)      Negative  Negative   C-Citrullinat SCORE:         -1.1       Z SCORE:         1.1     LEFT TOTAL HIP               BMD:    0.891 gm/sq. cm.            T SCORE:         -0.4       Z SCORE:         1.5     PA LUMBAR SPINE (L1 - L4)               BMD:    0.946 gm/sq.  cm.            T SCORE: ok    Summary:  1. Try prednisone 5mg ad ay -   2. Try leflunomide 10mg a day  3. Follow up with getting hyaluronic acid injections -   4. Cont. Tylenol 650mg and diclofenac gel 1 %  5. Return to clinic in 3-4 weeks. 6. Ok to stop hydroxychlroquine  7.

## 2021-02-18 NOTE — TELEPHONE ENCOUNTER
Pastor Espinal, see patient message below regarding the cyst on the foot  Please reply to pool: ANKUR RN TRIAGE      From: Prem Guzman  To: SANIA Ortiz  Sent: 2/17/2021  8:55 PM CST  Subject: Visit Follow-up Question    Hi Pastor Espinal, can you please provide

## 2021-02-18 NOTE — PATIENT INSTRUCTIONS
1. Try prednisone 5mg ad ay -   2. Try leflunomide 10mg a day  3. Follow up with getting hyaluronic acid injections -   4. Cont. Tylenol 650mg and diclofenac gel 1 %  5. Return to clinic in 3-4 weeks. 6. Ok to stop hydroxychlroquine  7.  Schedule covid v flu-like symptoms; light-colored stools; loss of appetite; nausea; right upper belly pain; unusually weak or tired; yellowing of the eyes or skin  · trouble passing urine or change in the amount of urine  · vomiting  Side effects that usually do not requir scheduled to receive a vaccine  · receiving treatment for cancer  · skin conditions or sensitivity  · tingling of the fingers or toes, or other nerve disorder  · tuberculosis  · an unusual or allergic reaction to leflunomide, teriflunomide, other medicines

## 2021-03-01 ENCOUNTER — TELEPHONE (OUTPATIENT)
Dept: RHEUMATOLOGY | Facility: CLINIC | Age: 76
End: 2021-03-01

## 2021-03-01 DIAGNOSIS — I10 ESSENTIAL HYPERTENSION: ICD-10-CM

## 2021-03-03 RX ORDER — METOPROLOL SUCCINATE 50 MG/1
50 TABLET, EXTENDED RELEASE ORAL DAILY
Qty: 90 TABLET | Refills: 0 | Status: SHIPPED | OUTPATIENT
Start: 2021-03-03 | End: 2021-10-05

## 2021-03-18 ENCOUNTER — OFFICE VISIT (OUTPATIENT)
Dept: CARDIOLOGY | Age: 76
End: 2021-03-18

## 2021-03-18 ENCOUNTER — TELEPHONE (OUTPATIENT)
Dept: CARDIOLOGY | Age: 76
End: 2021-03-18

## 2021-03-18 ENCOUNTER — ANCILLARY PROCEDURE (OUTPATIENT)
Dept: CARDIOLOGY | Age: 76
End: 2021-03-18
Attending: INTERNAL MEDICINE

## 2021-03-18 ENCOUNTER — PATIENT MESSAGE (OUTPATIENT)
Dept: CARDIOLOGY CLINIC | Facility: CLINIC | Age: 76
End: 2021-03-18

## 2021-03-18 VITALS
SYSTOLIC BLOOD PRESSURE: 156 MMHG | HEART RATE: 88 BPM | BODY MASS INDEX: 28.52 KG/M2 | RESPIRATION RATE: 16 BRPM | DIASTOLIC BLOOD PRESSURE: 56 MMHG | WEIGHT: 155 LBS | HEIGHT: 62 IN

## 2021-03-18 DIAGNOSIS — I35.1 NONRHEUMATIC AORTIC VALVE INSUFFICIENCY: ICD-10-CM

## 2021-03-18 DIAGNOSIS — I50.32 HYPERTENSIVE HEART DISEASE WITH CHRONIC DIASTOLIC CONGESTIVE HEART FAILURE (CMD): ICD-10-CM

## 2021-03-18 DIAGNOSIS — R06.09 DYSPNEA ON EXERTION: Primary | ICD-10-CM

## 2021-03-18 DIAGNOSIS — R00.2 PALPITATIONS: ICD-10-CM

## 2021-03-18 DIAGNOSIS — I50.33 ACUTE ON CHRONIC DIASTOLIC CHF (CONGESTIVE HEART FAILURE) (CMD): ICD-10-CM

## 2021-03-18 DIAGNOSIS — I11.0 HYPERTENSIVE HEART DISEASE WITH CHRONIC DIASTOLIC CONGESTIVE HEART FAILURE (CMD): ICD-10-CM

## 2021-03-18 DIAGNOSIS — I35.0 NONRHEUMATIC AORTIC VALVE STENOSIS: ICD-10-CM

## 2021-03-18 PROCEDURE — 99215 OFFICE O/P EST HI 40 MIN: CPT | Performed by: INTERNAL MEDICINE

## 2021-03-18 PROCEDURE — 93000 ELECTROCARDIOGRAM COMPLETE: CPT | Performed by: INTERNAL MEDICINE

## 2021-03-18 RX ORDER — LEFLUNOMIDE 10 MG/1
10 TABLET ORAL DAILY
COMMUNITY
Start: 2021-02-18 | End: 2021-09-13

## 2021-03-18 RX ORDER — METOPROLOL SUCCINATE 50 MG/1
50 TABLET, EXTENDED RELEASE ORAL 2 TIMES DAILY
Qty: 90 TABLET | Refills: 3 | Status: SHIPPED | OUTPATIENT
Start: 2021-03-18 | End: 2021-11-11

## 2021-03-18 RX ORDER — FUROSEMIDE 20 MG/1
20 TABLET ORAL 2 TIMES DAILY
Qty: 30 TABLET | Refills: 0 | Status: SHIPPED | OUTPATIENT
Start: 2021-03-18 | End: 2021-04-01

## 2021-03-18 RX ORDER — PREDNISONE 5 MG/1
5 TABLET ORAL DAILY
COMMUNITY
Start: 2021-02-18

## 2021-03-18 ASSESSMENT — PATIENT HEALTH QUESTIONNAIRE - PHQ9
1. LITTLE INTEREST OR PLEASURE IN DOING THINGS: NOT AT ALL
SUM OF ALL RESPONSES TO PHQ9 QUESTIONS 1 AND 2: 0
CLINICAL INTERPRETATION OF PHQ2 SCORE: NO FURTHER SCREENING NEEDED
2. FEELING DOWN, DEPRESSED OR HOPELESS: NOT AT ALL
CLINICAL INTERPRETATION OF PHQ9 SCORE: NO FURTHER SCREENING NEEDED
SUM OF ALL RESPONSES TO PHQ9 QUESTIONS 1 AND 2: 0

## 2021-03-18 NOTE — TELEPHONE ENCOUNTER
From: Karena Le  To: Светлана Nguyen MD  Sent: 3/18/2021 10:34 AM CDT  Subject: Other    Good morning - Karena Dockeryosman has an appointment with Dr. Dash Pyle on Monday March 22nd. This will be her first visit with him.     However she is having shortness of br

## 2021-03-18 NOTE — TELEPHONE ENCOUNTER
S/w her daughter, and she states she wrote message. That her Mom had some chest pains last night, short period of time, none now. Discussed that if she has active chest pains she should go to the ER.  Otherwise I am able to find echo in care everywhere, but

## 2021-03-19 ENCOUNTER — APPOINTMENT (OUTPATIENT)
Dept: CARDIOLOGY | Age: 76
End: 2021-03-19
Attending: INTERNAL MEDICINE

## 2021-03-19 ENCOUNTER — TELEPHONE (OUTPATIENT)
Dept: CARDIOLOGY | Age: 76
End: 2021-03-19

## 2021-03-19 DIAGNOSIS — M05.80 POLYARTHRITIS WITH POSITIVE RHEUMATOID FACTOR (HCC): Primary | ICD-10-CM

## 2021-03-19 DIAGNOSIS — Z51.81 THERAPEUTIC DRUG MONITORING: ICD-10-CM

## 2021-03-20 RX ORDER — LEFLUNOMIDE 10 MG/1
10 TABLET ORAL DAILY
Qty: 30 TABLET | Refills: 0 | Status: SHIPPED | OUTPATIENT
Start: 2021-03-20 | End: 2021-04-19

## 2021-03-20 NOTE — TELEPHONE ENCOUNTER
Requested Prescriptions     Pending Prescriptions Disp Refills   • LEFLUNOMIDE 10 MG Oral Tab [Pharmacy Med Name: Leflunomide 10 Mg Tab Lupi] 30 tablet 0     Sig: Take 1 tablet (10 mg total) by mouth daily.      LF: 2/18/21 #30 TAB W/ 0 RF  LOV: 2/18/21   N    Jamal Faye MD  2/18/2021   2:40 PM  - [place] [facility] [id number]

## 2021-03-22 NOTE — TELEPHONE ENCOUNTER
Follow up scheduled for 4/1/21 patient aware to have labs completed prior to appointment. Standing lab order generated in Epic.

## 2021-03-23 ENCOUNTER — TELEPHONE (OUTPATIENT)
Dept: FAMILY MEDICINE CLINIC | Facility: CLINIC | Age: 76
End: 2021-03-23

## 2021-03-23 RX ORDER — FUROSEMIDE 20 MG/1
20 TABLET ORAL 2 TIMES DAILY
COMMUNITY
Start: 2021-03-18 | End: 2021-07-26 | Stop reason: ALTCHOICE

## 2021-03-23 NOTE — TELEPHONE ENCOUNTER
Patient wants to know if she has enough potassium to be able to take Furosemide. Patient states was prescribed this medication, but pharmacist advised patient to make sure potassium levels are good because medication could cause severe leg cramps.  Please a

## 2021-03-23 NOTE — TELEPHONE ENCOUNTER
Advised patient of [place]'s note. Patient verbalized understanding. She states that she saw a heart specialist, Dr. Kevin Mckinney. She was prescribed Furosemide 20 mg BID. Medication list was updated to reflect Furosemide.

## 2021-03-23 NOTE — TELEPHONE ENCOUNTER
There is no listing of lasix on pt's medications list. Who prescribed? Last potassium was 4.4 so safe to take lasix.

## 2021-03-29 PROCEDURE — 93227 XTRNL ECG REC<48 HR R&I: CPT | Performed by: INTERNAL MEDICINE

## 2021-03-30 ENCOUNTER — TELEPHONE (OUTPATIENT)
Dept: CARDIOLOGY | Age: 76
End: 2021-03-30

## 2021-03-31 ENCOUNTER — TELEPHONE (OUTPATIENT)
Dept: CARDIOLOGY | Age: 76
End: 2021-03-31

## 2021-04-05 ENCOUNTER — OFFICE VISIT (OUTPATIENT)
Dept: CARDIOLOGY | Age: 76
End: 2021-04-05

## 2021-04-05 VITALS
BODY MASS INDEX: 29.27 KG/M2 | HEIGHT: 61 IN | SYSTOLIC BLOOD PRESSURE: 144 MMHG | WEIGHT: 155 LBS | HEART RATE: 86 BPM | DIASTOLIC BLOOD PRESSURE: 60 MMHG

## 2021-04-05 DIAGNOSIS — I50.32 CHRONIC DIASTOLIC (CONGESTIVE) HEART FAILURE (CMD): Primary | ICD-10-CM

## 2021-04-05 DIAGNOSIS — I83.10 VARICOSE VEINS WITH INFLAMMATION: ICD-10-CM

## 2021-04-05 DIAGNOSIS — I35.1 NONRHEUMATIC AORTIC VALVE INSUFFICIENCY: ICD-10-CM

## 2021-04-05 DIAGNOSIS — I35.0 NONRHEUMATIC AORTIC VALVE STENOSIS: ICD-10-CM

## 2021-04-05 DIAGNOSIS — R25.2 LEG CRAMPS: ICD-10-CM

## 2021-04-05 DIAGNOSIS — R09.89 BILATERAL CAROTID BRUITS: ICD-10-CM

## 2021-04-05 DIAGNOSIS — R00.2 PALPITATIONS: ICD-10-CM

## 2021-04-05 DIAGNOSIS — I10 ESSENTIAL HYPERTENSION: ICD-10-CM

## 2021-04-05 DIAGNOSIS — E78.2 MIXED HYPERLIPIDEMIA: ICD-10-CM

## 2021-04-05 DIAGNOSIS — I50.32 HYPERTENSIVE HEART DISEASE WITH CHRONIC DIASTOLIC CONGESTIVE HEART FAILURE (CMD): ICD-10-CM

## 2021-04-05 DIAGNOSIS — I11.0 HYPERTENSIVE HEART DISEASE WITH CHRONIC DIASTOLIC CONGESTIVE HEART FAILURE (CMD): ICD-10-CM

## 2021-04-05 PROCEDURE — 99214 OFFICE O/P EST MOD 30 MIN: CPT | Performed by: INTERNAL MEDICINE

## 2021-04-05 RX ORDER — PRAVASTATIN SODIUM 40 MG
40 TABLET ORAL DAILY
Qty: 90 TABLET | Refills: 3 | Status: SHIPPED | OUTPATIENT
Start: 2021-04-05

## 2021-04-05 RX ORDER — HYALURONATE SODIUM 10 MG/ML
25 SYRINGE (ML) INTRAARTICULAR ONCE
COMMUNITY

## 2021-04-05 RX ORDER — VIT C/B6/B5/MAGNESIUM/HERB 173 50-5-6-5MG
CAPSULE ORAL
COMMUNITY

## 2021-04-05 RX ORDER — DICLOFENAC SODIUM 1 MG/ML
1 SOLUTION/ DROPS OPHTHALMIC 4 TIMES DAILY
COMMUNITY

## 2021-04-05 ASSESSMENT — PATIENT HEALTH QUESTIONNAIRE - PHQ9
CLINICAL INTERPRETATION OF PHQ2 SCORE: NO FURTHER SCREENING NEEDED
CLINICAL INTERPRETATION OF PHQ9 SCORE: NO FURTHER SCREENING NEEDED
2. FEELING DOWN, DEPRESSED OR HOPELESS: NOT AT ALL
SUM OF ALL RESPONSES TO PHQ9 QUESTIONS 1 AND 2: 0
1. LITTLE INTEREST OR PLEASURE IN DOING THINGS: NOT AT ALL
SUM OF ALL RESPONSES TO PHQ9 QUESTIONS 1 AND 2: 0

## 2021-04-12 ENCOUNTER — APPOINTMENT (OUTPATIENT)
Dept: CARDIOLOGY | Age: 76
End: 2021-04-12

## 2021-04-18 DIAGNOSIS — I10 ESSENTIAL HYPERTENSION: ICD-10-CM

## 2021-04-18 RX ORDER — LISINOPRIL AND HYDROCHLOROTHIAZIDE 25; 20 MG/1; MG/1
TABLET ORAL
Qty: 30 TABLET | Refills: 0 | Status: SHIPPED | OUTPATIENT
Start: 2021-04-18 | End: 2021-05-19

## 2021-04-19 RX ORDER — LEFLUNOMIDE 10 MG/1
10 TABLET ORAL DAILY
Qty: 30 TABLET | Refills: 0 | Status: SHIPPED | OUTPATIENT
Start: 2021-04-19 | End: 2021-04-22

## 2021-04-19 NOTE — TELEPHONE ENCOUNTER
Last filled: 3/20/21 #30 tab with 0 refills   LOV: 2/18/21  Future Appointments   Date Time Provider César Estrada   4/22/2021  4:10 PM Marlon Stein MD [facility] [place]     Labs:  12/11/20     Please advise.

## 2021-04-20 NOTE — TELEPHONE ENCOUNTER
Records received from [address] [place] [address]. Purpose for information was patient moved and need doctor closer to home. Below noted appointment with Dr. Julito Valentino on 3/22 had been canceled.  Mantara message to patient advising receipt of records and [facility]

## 2021-04-21 RX ORDER — LEFLUNOMIDE 10 MG/1
10 TABLET ORAL DAILY
Qty: 30 TABLET | Refills: 0 | OUTPATIENT
Start: 2021-04-21

## 2021-04-21 NOTE — TELEPHONE ENCOUNTER
LOV 2/18/21  F/U scheduled tomorrow. Last fill 4/19/21 to [facility] [place] x 30 day supply  Duplicate request.   I called the pharmacy and confirmed this is a duplicate request and they do not need I re ordered. OK to refuse as duplicate.

## 2021-04-22 ENCOUNTER — OFFICE VISIT (OUTPATIENT)
Dept: RHEUMATOLOGY | Facility: CLINIC | Age: 76
End: 2021-04-22
Payer: MEDICARE

## 2021-04-22 VITALS
BODY MASS INDEX: 29.26 KG/M2 | DIASTOLIC BLOOD PRESSURE: 65 MMHG | SYSTOLIC BLOOD PRESSURE: 147 MMHG | WEIGHT: 159 LBS | RESPIRATION RATE: 16 BRPM | HEART RATE: 83 BPM | HEIGHT: 62 IN

## 2021-04-22 DIAGNOSIS — M05.80 POLYARTHRITIS WITH POSITIVE RHEUMATOID FACTOR (HCC): Primary | ICD-10-CM

## 2021-04-22 DIAGNOSIS — Z51.81 THERAPEUTIC DRUG MONITORING: ICD-10-CM

## 2021-04-22 PROCEDURE — 99214 OFFICE O/P EST MOD 30 MIN: CPT | Performed by: INTERNAL MEDICINE

## 2021-04-22 RX ORDER — PRAVASTATIN SODIUM 40 MG
40 TABLET ORAL DAILY
COMMUNITY
Start: 2021-04-05 | End: 2022-01-27

## 2021-04-22 RX ORDER — LEFLUNOMIDE 20 MG/1
20 TABLET ORAL DAILY
Qty: 30 TABLET | Refills: 1 | Status: SHIPPED | OUTPATIENT
Start: 2021-04-22 | End: 2021-06-17

## 2021-04-22 NOTE — PROGRESS NOTES
Micha Calix is a 66year old female who presents for Patient presents with: Follow - Up  Medication Follow-Up  . HPI:     I had the pleasure of seeing Micha Calix on 12/9/2020 for evaluation. She just moved her in the last year.  She was moved in he rknees about 50% better. She still has left knee . She had the injectiosn 4 weekss ago. About 2 weeks later she had so much pain and she couldn't bend her knee. She has onoging ankle pain at times.      2/18/2021  Some days every part of her bod tablet (5 mg total) by mouth daily. 30 tablet 3   • Hydroxychloroquine Sulfate 200 MG Oral Tab Take 1 tablet (200 mg total) by mouth 2 (two) times daily. 60 tablet 1   • Vitamins/Minerals Oral Tab Take 1 tablet by mouth daily.      • Turmeric (QC TUMERIC CO swelling, no neck pain, no jaw pain, no temple pain  Eyes: No visual changes,   CVS: No chest pain, no heart disease  RS: No SOB, no Cough, No Pleurtic pain,   GI: No nausea, no vomiiting, no abominal pain, no hx of ulcer, no gastritis, no heartburn, no dy 56   Total Bilirubin      0.1 - 2.0 mg/dL  0.5   TOTAL PROTEIN      6.4 - 8.2 g/dL  7.5   Albumin      3.4 - 5.0 g/dL  3.7   Globulin      2.8 - 4.4 g/dL  3.8   A/G Ratio      1.0 - 2.0  1.0   Patient Fasting?        Yes Yes   WBC      4.0 - 11.0 x10(3) uL 0.0%  left femur neck  4.7 years      0.0% change in BMD for left femur neck    10/15/2018      0.862     -0.70 92.0%     +1.10     119.0%  left hip  08/16/2016      0.834     -0.90 89.0%     +0.80       0.0%  left hip  02/10/2014      0.851     -0.70 90. 0 get covid vaccine       In the past:  Hx of RA but not on medications for this   Chondrocalcinosis on left knee xray -   Reviewed records from dr. Tia Umaña-    2. Osteoarthritis - generalized - samara in knees.    - will see why she was not on nsaids - was told

## 2021-04-22 NOTE — PATIENT INSTRUCTIONS
1. Cont. prednisone 5mg ad ay -   2. increase leflunomide from 10mg to 20mg a day  3. Ok to take glucosamine /chondroitin   4. Cont. Tylenol 650mg and diclofenac gel 1 %  5. Return to clinic in 4-6 weeks. 6. Info on cimzia   7.  Check labs today     [facility] tingling in any part of your body  · signs and symptoms of infection like fever or chills; cough; sore throat; pain or trouble passing urine  · signs and symptoms of liver injury like dark yellow or brown urine; general ill feeling or flu-like symptoms; li these conditions:  · cancer  · diabetes  · Guillian-Teague syndrome  · heart failure  · hepatitis B or history of hepatitis B infection  · immune system problems  · infection or history of infections  · low blood counts, like low white cell, platelet, or re

## 2021-04-23 ENCOUNTER — HOSPITAL ENCOUNTER (OUTPATIENT)
Dept: GENERAL RADIOLOGY | Facility: HOSPITAL | Age: 76
Discharge: HOME | End: 2021-04-23
Attending: INTERNAL MEDICINE
Payer: MEDICARE

## 2021-04-23 DIAGNOSIS — R07.9 CHEST PAIN, UNSPECIFIED TYPE: ICD-10-CM

## 2021-04-23 DIAGNOSIS — M54.9 BACK PAIN, UNSPECIFIED BACK LOCATION, UNSPECIFIED BACK PAIN LATERALITY, UNSPECIFIED CHRONICITY: ICD-10-CM

## 2021-04-23 PROCEDURE — 71046 X-RAY EXAM CHEST 2 VIEWS: CPT | Performed by: INTERNAL MEDICINE

## 2021-05-19 DIAGNOSIS — I10 ESSENTIAL HYPERTENSION: ICD-10-CM

## 2021-05-19 RX ORDER — LISINOPRIL AND HYDROCHLOROTHIAZIDE 25; 20 MG/1; MG/1
TABLET ORAL
Qty: 30 TABLET | Refills: 0 | Status: SHIPPED | OUTPATIENT
Start: 2021-05-19 | End: 2021-05-20

## 2021-05-20 RX ORDER — LISINOPRIL AND HYDROCHLOROTHIAZIDE 25; 20 MG/1; MG/1
TABLET ORAL
Qty: 30 TABLET | Refills: 0 | Status: SHIPPED | OUTPATIENT
Start: 2021-05-20 | End: 2021-06-27

## 2021-06-16 ENCOUNTER — OFFICE VISIT (OUTPATIENT)
Dept: ORTHOPEDICS CLINIC | Facility: CLINIC | Age: 76
End: 2021-06-16
Payer: MEDICARE

## 2021-06-16 ENCOUNTER — HOSPITAL ENCOUNTER (OUTPATIENT)
Dept: GENERAL RADIOLOGY | Age: 76
Discharge: HOME | End: 2021-06-16
Attending: PHYSICIAN ASSISTANT
Payer: MEDICARE

## 2021-06-16 VITALS — BODY MASS INDEX: 28.17 KG/M2 | HEIGHT: 64 IN | WEIGHT: 165 LBS

## 2021-06-16 DIAGNOSIS — M79.672 LEFT FOOT PAIN: Primary | ICD-10-CM

## 2021-06-16 DIAGNOSIS — M79.672 LEFT FOOT PAIN: ICD-10-CM

## 2021-06-16 DIAGNOSIS — M25.472 BILATERAL SWELLING OF FEET AND ANKLES: ICD-10-CM

## 2021-06-16 DIAGNOSIS — M25.475 BILATERAL SWELLING OF FEET AND ANKLES: ICD-10-CM

## 2021-06-16 DIAGNOSIS — M25.474 BILATERAL SWELLING OF FEET AND ANKLES: ICD-10-CM

## 2021-06-16 DIAGNOSIS — M25.471 BILATERAL SWELLING OF FEET AND ANKLES: ICD-10-CM

## 2021-06-16 PROCEDURE — 99213 OFFICE O/P EST LOW 20 MIN: CPT | Performed by: PHYSICIAN ASSISTANT

## 2021-06-16 PROCEDURE — 3008F BODY MASS INDEX DOCD: CPT | Performed by: PHYSICIAN ASSISTANT

## 2021-06-16 PROCEDURE — 73630 X-RAY EXAM OF FOOT: CPT | Performed by: PHYSICIAN ASSISTANT

## 2021-06-16 NOTE — PROGRESS NOTES
[facility] New Problem Note    CC:   1. Left toe injury  2. Bilateral ankle pain and swelling     HPI: This 66year old female presents today with complaints of left toe injury and bilateral ankle pain and swelling.   She states that 6 weeks ago she mg by mouth daily. • leflunomide 20 MG Oral Tab Take 1 tablet (20 mg total) by mouth daily. 30 tablet 1   • furosemide 20 MG Oral Tab Take 20 mg by mouth 2 (two) times daily.        • Metoprolol Succinate ER 50 MG Oral Tablet 24 Hr Take 1 tablet (50 mg feet and ankles reveals that the overlying skin is intact. She has moderate swelling of both ankles and pain with dorsiflexion and plantarflexion. No significant restriction in motion.   Exam of the left second and third toe which are currently pratik wrap

## 2021-06-17 NOTE — TELEPHONE ENCOUNTER
LOV: 4/22/21  Future Appointments   Date Time Provider César Estrada   7/1/2021  8:40 AM Lynae Rover, MD [facility] [place]    LABS:  Component      Latest Ref Rng & Units 12/11/2020   ALDOLASE, SERUM      1.5 - 8.1 U/L 3.1   C-REACTIVE PROTEIN      <0.30 mg/dL <0.29   SED RATE      0 - 30 mm/Hr 15   JAMES SCREEN WITH REFLEX (S)      Negative Negative   C-Citrullinated Peptide IgG AB      0.0 - 6.9 U/mL 0.5   RHEUMATOID FACTOR      <15 IU/mL 54 (H)   URIC ACID      2.6 - 6.0 mg/dL 7.6 (H)     \"Summary:  1. Cont. prednisone 5mg ad ay -   2. increase leflunomide from 10mg to 20mg a day  3.ok to take glucosamine /chondroitin  4. Cont. Tylenol 650mg and diclofenac gel 1 %  5. Return to clinic in 4-6 weeks. 6. Info on cimzia   7.  Check labs today\"

## 2021-06-19 RX ORDER — LEFLUNOMIDE 20 MG/1
20 TABLET ORAL DAILY
Qty: 30 TABLET | Refills: 0 | Status: SHIPPED | OUTPATIENT
Start: 2021-06-19 | End: 2021-06-21

## 2021-06-21 RX ORDER — LEFLUNOMIDE 20 MG/1
20 TABLET ORAL DAILY
Qty: 30 TABLET | Refills: 0 | Status: SHIPPED | OUTPATIENT
Start: 2021-06-21 | End: 2021-09-03

## 2021-06-21 NOTE — TELEPHONE ENCOUNTER
LOV: 04/22/2021  Future Appointments   Date Time Provider César Estrada   6/24/2021  9:45 AM Deborah Phillips DPM [facility] [place]   [facility]2021  [facility]   7/1/2021  8:40 AM [facility]

## 2021-06-22 ENCOUNTER — TELEPHONE (OUTPATIENT)
Dept: RHEUMATOLOGY | Facility: CLINIC | Age: 76
End: 2021-06-22

## 2021-06-22 RX ORDER — PREDNISONE 1 MG/1
5 TABLET ORAL DAILY
Qty: 30 TABLET | Refills: 3 | Status: SHIPPED | OUTPATIENT
Start: 2021-06-22 | End: 2021-11-11

## 2021-06-22 NOTE — TELEPHONE ENCOUNTER
Called and spoke with patient, name and  was verified. Informed per Dr. Keating Or she is due for labs and complete before appointment. Patient verbalized understanding and will get them done.

## 2021-06-23 ENCOUNTER — LAB ENCOUNTER (OUTPATIENT)
Dept: LAB | Age: 76
End: 2021-06-23
Attending: INTERNAL MEDICINE
Payer: MEDICARE

## 2021-06-23 DIAGNOSIS — Z51.81 THERAPEUTIC DRUG MONITORING: ICD-10-CM

## 2021-06-23 DIAGNOSIS — M05.80 POLYARTHRITIS WITH POSITIVE RHEUMATOID FACTOR (HCC): ICD-10-CM

## 2021-06-23 PROCEDURE — 85025 COMPLETE CBC W/AUTO DIFF WBC: CPT

## 2021-06-23 PROCEDURE — 82565 ASSAY OF CREATININE: CPT

## 2021-06-23 PROCEDURE — 82040 ASSAY OF SERUM ALBUMIN: CPT

## 2021-06-23 PROCEDURE — 36415 COLL VENOUS BLD VENIPUNCTURE: CPT

## 2021-06-23 PROCEDURE — 86140 C-REACTIVE PROTEIN: CPT

## 2021-06-23 PROCEDURE — 84460 ALANINE AMINO (ALT) (SGPT): CPT

## 2021-06-23 PROCEDURE — 85652 RBC SED RATE AUTOMATED: CPT

## 2021-06-23 PROCEDURE — 84450 TRANSFERASE (AST) (SGOT): CPT

## 2021-06-24 ENCOUNTER — HOSPITAL ENCOUNTER (OUTPATIENT)
Dept: GENERAL RADIOLOGY | Age: 76
Discharge: HOME | End: 2021-06-24
Attending: PODIATRIST
Payer: MEDICARE

## 2021-06-24 ENCOUNTER — OFFICE VISIT (OUTPATIENT)
Dept: FAMILY MEDICINE CLINIC | Facility: CLINIC | Age: 76
End: 2021-06-24
Payer: MEDICARE

## 2021-06-24 ENCOUNTER — OFFICE VISIT (OUTPATIENT)
Dept: ORTHOPEDICS CLINIC | Facility: CLINIC | Age: 76
End: 2021-06-24
Payer: MEDICARE

## 2021-06-24 VITALS
DIASTOLIC BLOOD PRESSURE: 60 MMHG | WEIGHT: 158 LBS | BODY MASS INDEX: 31.02 KG/M2 | HEIGHT: 60 IN | HEART RATE: 79 BPM | SYSTOLIC BLOOD PRESSURE: 130 MMHG

## 2021-06-24 VITALS — WEIGHT: 155 LBS | HEIGHT: 60 IN | BODY MASS INDEX: 30.43 KG/M2

## 2021-06-24 DIAGNOSIS — M25.572 CHRONIC PAIN OF BOTH ANKLES: Primary | ICD-10-CM

## 2021-06-24 DIAGNOSIS — M06.9 RHEUMATOID ARTHRITIS, INVOLVING UNSPECIFIED SITE, UNSPECIFIED WHETHER RHEUMATOID FACTOR PRESENT (HCC): ICD-10-CM

## 2021-06-24 DIAGNOSIS — M25.572 CHRONIC PAIN OF BOTH ANKLES: ICD-10-CM

## 2021-06-24 DIAGNOSIS — S92.535D CLOSED NONDISPLACED FRACTURE OF DISTAL PHALANX OF LESSER TOE OF LEFT FOOT WITH ROUTINE HEALING, SUBSEQUENT ENCOUNTER: ICD-10-CM

## 2021-06-24 DIAGNOSIS — G89.29 CHRONIC PAIN OF BOTH ANKLES: ICD-10-CM

## 2021-06-24 DIAGNOSIS — G89.29 CHRONIC PAIN OF BOTH ANKLES: Primary | ICD-10-CM

## 2021-06-24 DIAGNOSIS — M54.32 LEFT SIDED SCIATICA: ICD-10-CM

## 2021-06-24 DIAGNOSIS — M25.571 CHRONIC PAIN OF BOTH ANKLES: ICD-10-CM

## 2021-06-24 DIAGNOSIS — D64.9 ANEMIA, UNSPECIFIED TYPE: Primary | ICD-10-CM

## 2021-06-24 DIAGNOSIS — M25.571 CHRONIC PAIN OF BOTH ANKLES: Primary | ICD-10-CM

## 2021-06-24 PROCEDURE — 73610 X-RAY EXAM OF ANKLE: CPT | Performed by: PODIATRIST

## 2021-06-24 PROCEDURE — 3008F BODY MASS INDEX DOCD: CPT | Performed by: PODIATRIST

## 2021-06-24 PROCEDURE — 99214 OFFICE O/P EST MOD 30 MIN: CPT | Performed by: NURSE PRACTITIONER

## 2021-06-24 PROCEDURE — 99213 OFFICE O/P EST LOW 20 MIN: CPT | Performed by: PODIATRIST

## 2021-06-24 NOTE — PATIENT INSTRUCTIONS
ICE PACK    In large ziplock bag, combine:    4 cups tap water  1 cup isopropyl (rubbing) alcohol    Seal bag and place in another ziplock bag. Freeze until slushy.   Do not apply to bare skin-use a cold, wet wash cloth to injured area and then place ic are.  Home care  Follow these tips when caring for yourself at home:  · As soon as possible, start sitting up or walking. This will help you prevent problems that come from staying in bed for long periods.   · When in bed, try to find a position that is com educational content on 9/1/2019  © 8264-5592 [employer]. All rights reserved. This information is not intended as a substitute for professional medical care. Always follow your healthcare professional's instructions.

## 2021-06-24 NOTE — PROGRESS NOTES
[facility] Progress Note    Subjective: Patient is here with her daughter for fracture of the second digit left foot, bed post injury. That has been about 3 weeks ago and is pretty nontender.   Secondly she had vascular sx for veins about a month

## 2021-06-24 NOTE — PROGRESS NOTES
HPI  Pt here for lab results as ordered by Dr David Purdy. Was started on leflunomide-increased to 20 mg. Has helped pain mildly.      Had palpitations in April and was seen by cardiology- cont toprol but switched atorvastatin for pravastatin due to leg cram Merged History Encounter **           Social Determinants of Health  Financial Resource Strain:       Difficulty of Paying Living Expenses:   Food Insecurity:       Worried About Running Out of Food in the Last Year:       Ran Out of Food in the Last Year: total) by mouth every morning before breakfast. 30 capsule 0   • Vitamin D3 25 MCG (1000 UT) Oral Tab Take 1 tablet (1,000 Units total) by mouth daily. 30 tablet 0   • folic acid (FOLVITE) 1 MG Oral Tab Take by mouth daily.      • Albuterol Sulfate  Basophils %      % 0.8   Immature Granulocyte %      % 0.2   CREATININE      0.55 - 1.02 mg/dL 1.03 (H)   eGFR NON-AFR. AMERICAN      >=60 53 (L)   eGFR       >=60 61   SED RATE      0 - 30 mm/Hr 15   C-REACTIVE PROTEIN      <0.30 mg/dL <0.

## 2021-06-26 DIAGNOSIS — I10 ESSENTIAL HYPERTENSION: ICD-10-CM

## 2021-06-27 RX ORDER — LISINOPRIL AND HYDROCHLOROTHIAZIDE 25; 20 MG/1; MG/1
TABLET ORAL
Qty: 30 TABLET | Refills: 0 | Status: SHIPPED | OUTPATIENT
Start: 2021-06-27 | End: 2021-07-26

## 2021-06-27 NOTE — ASSESSMENT & PLAN NOTE
Ice 20 min 4-6 times per day  may need physiatry referral  Please call if symptoms worsen or are not resolving.

## 2021-07-01 ENCOUNTER — OFFICE VISIT (OUTPATIENT)
Dept: RHEUMATOLOGY | Facility: CLINIC | Age: 76
End: 2021-07-01
Payer: MEDICARE

## 2021-07-01 VITALS
HEART RATE: 91 BPM | HEIGHT: 60 IN | SYSTOLIC BLOOD PRESSURE: 146 MMHG | DIASTOLIC BLOOD PRESSURE: 65 MMHG | BODY MASS INDEX: 31.02 KG/M2 | WEIGHT: 158 LBS | RESPIRATION RATE: 16 BRPM

## 2021-07-01 DIAGNOSIS — M06.9 RHEUMATOID ARTHRITIS, INVOLVING UNSPECIFIED SITE, UNSPECIFIED WHETHER RHEUMATOID FACTOR PRESENT (HCC): Primary | ICD-10-CM

## 2021-07-01 DIAGNOSIS — Z51.81 THERAPEUTIC DRUG MONITORING: ICD-10-CM

## 2021-07-01 DIAGNOSIS — M54.31 SCIATICA OF RIGHT SIDE: ICD-10-CM

## 2021-07-01 PROCEDURE — 99214 OFFICE O/P EST MOD 30 MIN: CPT | Performed by: INTERNAL MEDICINE

## 2021-07-01 RX ORDER — TRAMADOL HYDROCHLORIDE 50 MG/1
50 TABLET ORAL DAILY PRN
Qty: 30 TABLET | Refills: 0 | Status: ON HOLD | OUTPATIENT
Start: 2021-07-01 | End: 2022-02-05

## 2021-07-01 RX ORDER — METHYLPREDNISOLONE 4 MG/1
TABLET ORAL
Qty: 1 EACH | Refills: 0 | Status: SHIPPED | OUTPATIENT
Start: 2021-07-01 | End: 2022-01-27

## 2021-07-01 NOTE — PROGRESS NOTES
Madeleine Ruiz is a 66year old female who presents for Patient presents with: Follow - Up  Lab Results: and xrays  Medication Follow-Up  Back Pain: sciatica Left leg, onset x1 week plus  .    HPI:     I had the pleasure of seeing Madeleine Ruiz on 12/9/20 hand pain. Now it's her hips pain. Her knees are on and off   Better in he rknees about 50% better. She still has left knee . She had the injectiosn 4 weekss ago. About 2 weeks later she had so much pain and she couldn't bend her knee.      She has only helps a little bit. She bumped her toe on a post - had a fragility fracture - her DEXA in 2/2021 and is osteopenia -  fragility fracture on 4/2021 /   Reviewed the xray of foot on 6/16/ and it showed no fracture.    She feels she has plantar fascit Take by mouth daily. Past Medical History:   Diagnosis Date   • Rheumatic arteritis       History reviewed. No pertinent surgical history. History reviewed. No pertinent family history.    Mom had back pain     Social History:  Social History    To edema    Component      Latest Ref Rng & Units 12/11/2020 12/11/2020          11:17 AM 11:17 AM   Glucose      70 - 99 mg/dL  98   Sodium      136 - 145 mmol/L  143   Potassium      3.5 - 5.1 mmol/L  4.4   Chloride      98 - 112 mmol/L  111   Carbon Dioxid Peptide IgG AB      0.0 - 6.9 U/mL  0.5   RHEUMATOID FACTOR      <15 IU/mL  54 (H)   URIC ACID      2.6 - 6.0 mg/dL  7.6 (H)     Component      Latest Ref Rng & Units 6/23/2021   WBC      4.0 - 11.0 x10(3) uL 9.2   RBC      3.80 - 5.30 x10(6)uL 3.58 (L)   0.0%  left femur neck  02/10/2014      0.724     -1.10 85.0%     +0.70       0.0%  left femur neck  4.7 years      0.0% change in BMD for left femur neck    10/15/2018      0.862     -0.70 92.0%     +1.10     119.0%  left hip  08/16/2016      0.834     - that is what will help now.  - will revaluation on there next visit   Will try sciatica first     didn't feel better on hcq 200mg bid - ok to stop   seh remberes taking methotrexate - and getting sick   - rtc in 3-4 weeks   - ok to get covid vaccine       I Labs in 2-3 months.        Buzz Garcia MD  4/22/2021   4:31 PM  - [place]ed [place]  through Epic

## 2021-07-01 NOTE — PATIENT INSTRUCTIONS
1. Cont. prednisone 5mg ad ay -   2. Cont. leflunomide 20mg a day  3.ok to take glucosamine /chondroitin  4. Cont. Tylenol 650mg and diclofenac gel 1 %  5. Try tramadol 50mg a day for pain   6. Physical therapy for lower back and right leg   7.  Lumbar xr

## 2021-07-25 DIAGNOSIS — I10 ESSENTIAL HYPERTENSION: ICD-10-CM

## 2021-07-26 ENCOUNTER — OFFICE VISIT (OUTPATIENT)
Dept: FAMILY MEDICINE CLINIC | Facility: CLINIC | Age: 76
End: 2021-07-26
Payer: MEDICARE

## 2021-07-26 ENCOUNTER — LAB ENCOUNTER (OUTPATIENT)
Dept: LAB | Age: 76
End: 2021-07-26
Attending: NURSE PRACTITIONER
Payer: MEDICARE

## 2021-07-26 VITALS
BODY MASS INDEX: 29.45 KG/M2 | HEIGHT: 60 IN | SYSTOLIC BLOOD PRESSURE: 163 MMHG | DIASTOLIC BLOOD PRESSURE: 71 MMHG | HEART RATE: 93 BPM | WEIGHT: 150 LBS

## 2021-07-26 DIAGNOSIS — K64.3 GRADE IV HEMORRHOIDS: Primary | ICD-10-CM

## 2021-07-26 DIAGNOSIS — R19.7 DIARRHEA, UNSPECIFIED TYPE: ICD-10-CM

## 2021-07-26 DIAGNOSIS — R19.7 DIARRHEA: Primary | ICD-10-CM

## 2021-07-26 PROCEDURE — 87427 SHIGA-LIKE TOXIN AG IA: CPT

## 2021-07-26 PROCEDURE — 99214 OFFICE O/P EST MOD 30 MIN: CPT | Performed by: NURSE PRACTITIONER

## 2021-07-26 PROCEDURE — 89055 LEUKOCYTE ASSESSMENT FECAL: CPT

## 2021-07-26 PROCEDURE — 87046 STOOL CULTR AEROBIC BACT EA: CPT

## 2021-07-26 PROCEDURE — 87493 C DIFF AMPLIFIED PROBE: CPT

## 2021-07-26 PROCEDURE — 87045 FECES CULTURE AEROBIC BACT: CPT

## 2021-07-26 RX ORDER — LISINOPRIL AND HYDROCHLOROTHIAZIDE 25; 20 MG/1; MG/1
TABLET ORAL
Qty: 30 TABLET | Refills: 0 | Status: SHIPPED | OUTPATIENT
Start: 2021-07-26 | End: 2021-08-21

## 2021-07-26 NOTE — PROGRESS NOTES
HPI  Pt here for diarrhea for the past week. Started in the middle of the night after being at a wedding. Diarrhea has improved but still having liquid stools samara after eating. Has triggered hemorrhoids-bleeding when wiping only; No blood in stools. Social Determinants of Health  Financial Resource Strain:       Difficulty of Paying Living Expenses:   Food Insecurity:       Worried About Running Out of Food in the Last Year:       Ran Out of Food in the Last Year:   Transportation Needs:       [place] [address]   • Omega-3 1000 MG Oral Cap Take 1,000 mg by mouth daily.  30 capsule 0   • omeprazole 20 MG Oral Capsule Delayed Release Take 1 capsule (20 mg total) by mouth every morning before breakfast. 30 capsule 0   • Vitamin D3 25 MCG (1000 UT) Oral Tab Take 1 t plan of care with pt and pt is in agreement. All questions answered. Pt to call with questions or concerns. Encouraged to sign up for My Chart if not already registered.

## 2021-07-26 NOTE — PATIENT INSTRUCTIONS
Diarrhea with Uncertain Cause (Adult)    Diarrhea is when stools are loose and watery.  This can be caused by:  · Viral infections  · Bacterial infections  · Food poisoning  · Parasites  · Irritable bowel syndrome (IBS)  · Inflammatory bowel diseases such disease, or ever had a stomach ulcer or gastrointestinal bleeding. Don't use NSAID medicines if you are already taking one for another condition (like arthritis) or are on daily aspirin therapy (such as for heart disease or after a stroke).  Talk with your or drink milk if you have diarrhea. These can make diarrhea worse. During the first 24 hours you can try:  · Oral rehydration solutions.  Sports drinks may be used if you are not too dehydrated and are otherwise healthy.   · Soft drinks without caffeine  · weakness, or dizziness  · Drowsiness  · New rash  · You don’t get better in 2 to 3 days  · Fever of 100.4°F (38°C) or higher, or as directed by your healthcare provider  Ayaz last reviewed this educational content on 6/1/2018  © 0945-4774 The Ayaz applied to the hemorrhoid. Use these exactly as directed. Tips to help prevent hemorrhoids   · Eat more fiber. Fiber adds bulk to stool and absorbs water as it moves through your colon. This makes stool softer and easier to pass.   ? Increase the fiber in instructions. Treating Hemorrhoids: Self-Care  Follow your healthcare provider’s advice about caring for your hemorrhoids at home. Some treatments help relieve symptoms right away. Others involve making changes in your diet and exercise habits.  Thes fiber:   · Insoluble fiber is the main ingredient in bulking agents. It’s also found in foods such as wheat bran, whole-grain breads, fresh fruits, and vegetables. · Soluble fiber is found in foods such as oat bran.  Although soluble fiber is good for you, whole-grain breads instead of white bread for sandwiches. · Eat fruits for treats. Try an apple and some raisins instead of a candy bar. Ayaz last reviewed this educational content on 6/1/2019 © 2000-2021 The [employer].  All rights reserv

## 2021-07-27 PROBLEM — R19.7 DIARRHEA: Status: ACTIVE | Noted: 2021-07-27

## 2021-07-27 PROBLEM — K64.3 GRADE IV HEMORRHOIDS: Status: ACTIVE | Noted: 2021-07-27

## 2021-07-28 LAB — C DIFF TOX B STL QL: NEGATIVE

## 2021-07-29 ENCOUNTER — TELEPHONE (OUTPATIENT)
Dept: FAMILY MEDICINE CLINIC | Facility: CLINIC | Age: 76
End: 2021-07-29

## 2021-07-29 NOTE — TELEPHONE ENCOUNTER
Preethi Liu, APRN   7/29/2021  8:44 AM CDT Back to Top      Stool culture negative. Is pt still having diarrhea? Have hemorrhoids improved at all?      Patient with daughter Sandrine Meléndez on line state patient's hemorrhoids have not improved, is still bl

## 2021-07-30 NOTE — TELEPHONE ENCOUNTER
Patient was left a message to call back. Transfer to triage dept [id number]  See also result note comments.  NII Curry spoke to patient yesterday 5:10pm      SANIA Bishop   7/30/2021 12:18 PM CDT Back to Top      If having significant abd pain

## 2021-07-30 NOTE — TELEPHONE ENCOUNTER
Pt was also given surgical consult order for further evaluation of hemorrhoids-pt should make appointment for that as well

## 2021-07-31 NOTE — TELEPHONE ENCOUNTER
Advised patient's daughter (Cayden Soriano) of SANIA Rivera  note. Patient's daughter verbalized understanding  Daughter  given contact information for .    Patient also scheduled to see SANIA Rivera on 8/3/21

## 2021-08-02 ENCOUNTER — OFFICE VISIT (OUTPATIENT)
Dept: SURGERY | Facility: CLINIC | Age: 76
End: 2021-08-02
Payer: MEDICARE

## 2021-08-02 VITALS — BODY MASS INDEX: 29.45 KG/M2 | WEIGHT: 150 LBS | HEIGHT: 60 IN

## 2021-08-02 DIAGNOSIS — K64.8 HEMORRHOIDS, COMPLICATED: Primary | ICD-10-CM

## 2021-08-02 PROCEDURE — 99204 OFFICE O/P NEW MOD 45 MIN: CPT | Performed by: SURGERY

## 2021-08-03 ENCOUNTER — OFFICE VISIT (OUTPATIENT)
Dept: FAMILY MEDICINE CLINIC | Facility: CLINIC | Age: 76
End: 2021-08-03
Payer: MEDICARE

## 2021-08-03 VITALS
DIASTOLIC BLOOD PRESSURE: 62 MMHG | HEIGHT: 60 IN | HEART RATE: 73 BPM | WEIGHT: 149.81 LBS | TEMPERATURE: 97 F | BODY MASS INDEX: 29.41 KG/M2 | SYSTOLIC BLOOD PRESSURE: 134 MMHG

## 2021-08-03 DIAGNOSIS — I83.813 VARICOSE VEINS OF BOTH LOWER EXTREMITIES WITH PAIN: ICD-10-CM

## 2021-08-03 DIAGNOSIS — K64.3 GRADE IV HEMORRHOIDS: ICD-10-CM

## 2021-08-03 DIAGNOSIS — I83.10 VARICOSE VEINS WITH INFLAMMATION: ICD-10-CM

## 2021-08-03 DIAGNOSIS — K21.9 GASTROESOPHAGEAL REFLUX DISEASE, UNSPECIFIED WHETHER ESOPHAGITIS PRESENT: ICD-10-CM

## 2021-08-03 DIAGNOSIS — M06.9 RHEUMATOID ARTHRITIS, INVOLVING UNSPECIFIED SITE, UNSPECIFIED WHETHER RHEUMATOID FACTOR PRESENT (HCC): ICD-10-CM

## 2021-08-03 DIAGNOSIS — K64.2 GRADE III HEMORRHOIDS: Primary | ICD-10-CM

## 2021-08-03 PROCEDURE — 99214 OFFICE O/P EST MOD 30 MIN: CPT | Performed by: NURSE PRACTITIONER

## 2021-08-03 NOTE — PROGRESS NOTES
HPI  Pt here for follow up hemorrhoids-seen by gen surgery yesterday. Surgery not recommended  Was offered option to offered tying off with or without anesthesia. Has follow up appointment with gen surg in 2 weeks.      Has been doing sitz baths, tyelnol an Number of children: Not on file      Years of education: Not on file      Highest education level: Not on file    Occupational History      Not on file    Tobacco Use      Smoking status: Never Smoker      Smokeless tobacco: Never Used    Vaping Use      V rectally 2 (two) times daily as needed for Hemorrhoids. 24 suppository 2   • predniSONE 5 MG Oral Tab Take 1 tablet (5 mg total) by mouth daily. 30 tablet 3   • leflunomide 20 MG Oral Tab Take 1 tablet (20 mg total) by mouth daily.  30 tablet 0   • Albutero sounds: Normal heart sounds. Pulmonary:      Effort: Pulmonary effort is normal. No respiratory distress. Breath sounds: Normal breath sounds. Abdominal:      General: Abdomen is flat. Bowel sounds are normal.      Palpations: Abdomen is soft.

## 2021-08-04 NOTE — ASSESSMENT & PLAN NOTE
May add lidocaine4% to volteran gel  Refer rheum for follow up  Elevate right leg when sitting or recumbent

## 2021-08-05 NOTE — H&P
Chief complaint: Patient presents with:  Hemorrhoids: Referred by SANIA Navarrete for grade IV hemorrhoids. HPI: Chela Sesay has had prolapsing hemorrhoids for years, they have been worsening.  She has some pain, irritation and itching, occasional ble by mouth daily. 90 tablet 0   • Vitamins/Minerals Oral Tab Take 1 tablet by mouth daily. • Turmeric (QC TUMERIC COMPLEX) 500 MG Oral Cap Take by mouth. • Diclofenac Sodium 1 % External Gel Apply 2 g topically 4 (four) times daily.  100 g 3   • Omega responsive no acute distress noted  Head/Face: normocephalic, atraumatic.   Eyes: Anicteric, PERRL  Nose/Mouth/Throat: nose and throat are clear,  no discharge, mucous membranes are moist, no oral lesions are noted  Neck/Thyroid: neck is supple without alanna

## 2021-08-21 DIAGNOSIS — I10 ESSENTIAL HYPERTENSION: ICD-10-CM

## 2021-08-21 RX ORDER — LISINOPRIL AND HYDROCHLOROTHIAZIDE 25; 20 MG/1; MG/1
TABLET ORAL
Qty: 30 TABLET | Refills: 0 | Status: SHIPPED | OUTPATIENT
Start: 2021-08-21 | End: 2021-10-05

## 2021-09-03 NOTE — TELEPHONE ENCOUNTER
LOV 7/1/21  No future appt  LABS:  Component      Latest Ref Rng & Units 6/23/2021   WBC      4.0 - 11.0 x10(3) uL 9.2   RBC      3.80 - 5.30 x10(6)uL 3.58 (L)   Hemoglobin      12.0 - 16.0 g/dL 10.9 (L)   Hematocrit      35.0 - 48.0 % 33.4 (L)   MCV

## 2021-09-04 RX ORDER — LEFLUNOMIDE 20 MG/1
20 TABLET ORAL DAILY
Qty: 30 TABLET | Refills: 2 | Status: SHIPPED | OUTPATIENT
Start: 2021-09-04 | End: 2021-10-05 | Stop reason: SINTOL

## 2021-09-13 ENCOUNTER — OFFICE VISIT (OUTPATIENT)
Dept: CARDIOLOGY | Age: 76
End: 2021-09-13

## 2021-09-13 VITALS
HEART RATE: 84 BPM | DIASTOLIC BLOOD PRESSURE: 64 MMHG | SYSTOLIC BLOOD PRESSURE: 118 MMHG | BODY MASS INDEX: 27.59 KG/M2 | WEIGHT: 146 LBS

## 2021-09-13 DIAGNOSIS — I35.1 NONRHEUMATIC AORTIC VALVE INSUFFICIENCY: ICD-10-CM

## 2021-09-13 DIAGNOSIS — R60.0 BILATERAL LOWER EXTREMITY EDEMA: ICD-10-CM

## 2021-09-13 DIAGNOSIS — I50.32 HYPERTENSIVE HEART DISEASE WITH CHRONIC DIASTOLIC CONGESTIVE HEART FAILURE (CMD): ICD-10-CM

## 2021-09-13 DIAGNOSIS — I50.43 ACUTE ON CHRONIC COMBINED SYSTOLIC AND DIASTOLIC CONGESTIVE HEART FAILURE (CMD): Primary | ICD-10-CM

## 2021-09-13 DIAGNOSIS — I83.10 VARICOSE VEINS WITH INFLAMMATION: ICD-10-CM

## 2021-09-13 DIAGNOSIS — I11.0 HYPERTENSIVE HEART DISEASE WITH CHRONIC DIASTOLIC CONGESTIVE HEART FAILURE (CMD): ICD-10-CM

## 2021-09-13 DIAGNOSIS — E78.2 MIXED HYPERLIPIDEMIA: ICD-10-CM

## 2021-09-13 DIAGNOSIS — I35.0 NONRHEUMATIC AORTIC VALVE STENOSIS: ICD-10-CM

## 2021-09-13 DIAGNOSIS — I10 ESSENTIAL HYPERTENSION: ICD-10-CM

## 2021-09-13 PROCEDURE — 99215 OFFICE O/P EST HI 40 MIN: CPT | Performed by: INTERNAL MEDICINE

## 2021-09-13 RX ORDER — LOSARTAN POTASSIUM 50 MG/1
50 TABLET ORAL DAILY
Qty: 90 TABLET | Refills: 3 | Status: SHIPPED | OUTPATIENT
Start: 2021-09-13

## 2021-09-13 RX ORDER — FUROSEMIDE 40 MG/1
40 TABLET ORAL DAILY
Qty: 60 TABLET | Refills: 3 | Status: SHIPPED | OUTPATIENT
Start: 2021-09-13

## 2021-09-13 ASSESSMENT — PATIENT HEALTH QUESTIONNAIRE - PHQ9
1. LITTLE INTEREST OR PLEASURE IN DOING THINGS: NOT AT ALL
CLINICAL INTERPRETATION OF PHQ9 SCORE: NO FURTHER SCREENING NEEDED
SUM OF ALL RESPONSES TO PHQ9 QUESTIONS 1 AND 2: 0
CLINICAL INTERPRETATION OF PHQ2 SCORE: NO FURTHER SCREENING NEEDED
SUM OF ALL RESPONSES TO PHQ9 QUESTIONS 1 AND 2: 0
2. FEELING DOWN, DEPRESSED OR HOPELESS: NOT AT ALL

## 2021-09-20 NOTE — H&P
[facility] - Gastroenterology                                                                                                               Reason for consult: diarrhea, hemorrhoids    Requesting physician or provider: Nj Mortensen comparison.     NSAIDS: no  Tobacco: no  Alcohol: no  Illicit drugs: no    Brother had colon ca  No fhx ibd, celiac    No history of adverse reaction to sedation  No JENNYFER  No anticoagulants  No pacemaker/defibrillator  No pain medications and/or sleep aides 1 Inhaler 3   • Pravastatin Sodium 40 MG Oral Tab Take 40 mg by mouth daily. • Metoprolol Succinate ER 50 MG Oral Tablet 24 Hr Take 1 tablet (50 mg total) by mouth daily. 90 tablet 0   • Vitamins/Minerals Oral Tab Take 1 tablet by mouth daily.      • Tu trachea midline  CV: RRR, the extremities are warm and well perfused   LUNGS: No increased work of breathing  ABDOMEN: No scars, normal bowel sounds, soft, non-tender, non-distended no rebound or guarding, no masses, no hepatomegaly  MSK: No redness, no wa trilyte)    3. Hold lisinopril day of procedure    4. Read all bowel prep instructions carefully    5. AVOID seeds, nuts, popcorn, raw fruits and vegetables (cooked is okay) for 2-3 days before procedure    6.  You will need to be tested for COVID within 72

## 2021-09-21 ENCOUNTER — ANCILLARY PROCEDURE (OUTPATIENT)
Dept: CARDIOLOGY | Age: 76
End: 2021-09-21
Attending: INTERNAL MEDICINE

## 2021-09-21 DIAGNOSIS — I35.0 NONRHEUMATIC AORTIC VALVE STENOSIS: ICD-10-CM

## 2021-09-21 LAB
AORTIC VALVE AREA: 1.07
AV MEAN GRADIENT (AVMG): 33
AV MEAN VELOCITY (AVMV): 2.74
AV PEAK GRADIENT (AVPG): 56
AV PEAK VELOCITY (AVPV): 3.75
AV STENOSIS SEVERITY TEXT: NORMAL
LV EF: NORMAL %

## 2021-09-21 PROCEDURE — 93306 TTE W/DOPPLER COMPLETE: CPT | Performed by: INTERNAL MEDICINE

## 2021-09-22 ENCOUNTER — TELEPHONE (OUTPATIENT)
Dept: GASTROENTEROLOGY | Facility: CLINIC | Age: 76
End: 2021-09-22

## 2021-09-22 ENCOUNTER — OFFICE VISIT (OUTPATIENT)
Dept: GASTROENTEROLOGY | Facility: CLINIC | Age: 76
End: 2021-09-22
Payer: MEDICARE

## 2021-09-22 VITALS
WEIGHT: 150.38 LBS | SYSTOLIC BLOOD PRESSURE: 162 MMHG | DIASTOLIC BLOOD PRESSURE: 80 MMHG | BODY MASS INDEX: 29.52 KG/M2 | HEIGHT: 60 IN | HEART RATE: 80 BPM

## 2021-09-22 DIAGNOSIS — Z80.0 FAMILY HISTORY OF COLON CANCER: ICD-10-CM

## 2021-09-22 DIAGNOSIS — R19.7 DIARRHEA, UNSPECIFIED TYPE: Primary | ICD-10-CM

## 2021-09-22 DIAGNOSIS — R10.9 ABDOMINAL CRAMPING: ICD-10-CM

## 2021-09-22 DIAGNOSIS — K92.1 HEMATOCHEZIA: ICD-10-CM

## 2021-09-22 PROCEDURE — 99214 OFFICE O/P EST MOD 30 MIN: CPT | Performed by: NURSE PRACTITIONER

## 2021-09-22 RX ORDER — POLYETHYLENE GLYCOL 3350, SODIUM CHLORIDE, SODIUM BICARBONATE, POTASSIUM CHLORIDE 420; 11.2; 5.72; 1.48 G/4L; G/4L; G/4L; G/4L
POWDER, FOR SOLUTION ORAL
Qty: 4000 ML | Refills: 0 | Status: SHIPPED | OUTPATIENT
Start: 2021-09-22 | End: 2022-01-27

## 2021-09-22 RX ORDER — FUROSEMIDE 40 MG/1
40 TABLET ORAL DAILY
COMMUNITY
Start: 2021-09-13 | End: 2021-10-05 | Stop reason: SDUPTHER

## 2021-09-22 RX ORDER — FUROSEMIDE 40 MG/1
40 TABLET ORAL DAILY
COMMUNITY
Start: 2021-09-13 | End: 2022-01-27

## 2021-09-22 NOTE — TELEPHONE ENCOUNTER
Scheduled for:  Colonoscopy 59375   Provider Name:  Gerardo Hendricks  Date:  10/12/21  Location:  [facility]  Sedation:  Mac   Time:  11:00 Am (pt is aware that [facility] will call the day before to confirm arrival time)      Prep:  Trilyte prep instructions were given to pt

## 2021-09-22 NOTE — PATIENT INSTRUCTIONS
-sign release for previous cln  -labs  -avoid dairy,wheat  -ibgard over the counter according to packaging  -er if condition decline    1. Schedule colonoscopy with MARTHA w/ Dr. Kathie Scott [Diagnosis: diarrhea, weight loss, hematochezia, fhx colon ca]    2.  China Daniels

## 2021-09-23 ENCOUNTER — LAB ENCOUNTER (OUTPATIENT)
Dept: LAB | Age: 76
End: 2021-09-23
Attending: NURSE PRACTITIONER
Payer: MEDICARE

## 2021-09-23 DIAGNOSIS — R19.7 DIARRHEA, UNSPECIFIED TYPE: ICD-10-CM

## 2021-09-23 DIAGNOSIS — R10.9 ABDOMINAL CRAMPING: ICD-10-CM

## 2021-09-23 DIAGNOSIS — K92.1 HEMATOCHEZIA: ICD-10-CM

## 2021-09-23 DIAGNOSIS — Z80.0 FAMILY HISTORY OF COLON CANCER: ICD-10-CM

## 2021-09-23 LAB
ALBUMIN SERPL-MCNC: 3.1 G/DL (ref 3.4–5)
ALBUMIN/GLOB SERPL: 0.9 {RATIO} (ref 1–2)
ALP LIVER SERPL-CCNC: 50 U/L
ALT SERPL-CCNC: 16 U/L
ANION GAP SERPL CALC-SCNC: 7 MMOL/L (ref 0–18)
AST SERPL-CCNC: 16 U/L (ref 15–37)
BASOPHILS # BLD AUTO: 0.06 X10(3) UL (ref 0–0.2)
BASOPHILS NFR BLD AUTO: 0.9 %
BILIRUB SERPL-MCNC: 0.6 MG/DL (ref 0.1–2)
BUN BLD-MCNC: 25 MG/DL (ref 7–18)
BUN/CREAT SERPL: 25.5 (ref 10–20)
CALCIUM BLD-MCNC: 9.5 MG/DL (ref 8.5–10.1)
CHLORIDE SERPL-SCNC: 112 MMOL/L (ref 98–112)
CO2 SERPL-SCNC: 24 MMOL/L (ref 21–32)
CREAT BLD-MCNC: 0.98 MG/DL
CRP SERPL-MCNC: 0.41 MG/DL (ref ?–0.3)
DEPRECATED RDW RBC AUTO: 41.9 FL (ref 35.1–46.3)
EOSINOPHIL # BLD AUTO: 0.4 X10(3) UL (ref 0–0.7)
EOSINOPHIL NFR BLD AUTO: 5.8 %
ERYTHROCYTE [DISTWIDTH] IN BLOOD BY AUTOMATED COUNT: 12.8 % (ref 11–15)
GLOBULIN PLAS-MCNC: 3.3 G/DL (ref 2.8–4.4)
GLUCOSE BLD-MCNC: 107 MG/DL (ref 70–99)
HCT VFR BLD AUTO: 29.2 %
HGB BLD-MCNC: 9.5 G/DL
IGA SERPL-MCNC: 90 MG/DL (ref 70–312)
IMM GRANULOCYTES # BLD AUTO: 0.02 X10(3) UL (ref 0–1)
IMM GRANULOCYTES NFR BLD: 0.3 %
LYMPHOCYTES # BLD AUTO: 2.23 X10(3) UL (ref 1–4)
LYMPHOCYTES NFR BLD AUTO: 32.4 %
MCH RBC QN AUTO: 29.5 PG (ref 26–34)
MCHC RBC AUTO-ENTMCNC: 32.5 G/DL (ref 31–37)
MCV RBC AUTO: 90.7 FL
MONOCYTES # BLD AUTO: 0.83 X10(3) UL (ref 0.1–1)
MONOCYTES NFR BLD AUTO: 12.1 %
NEUTROPHILS # BLD AUTO: 3.34 X10 (3) UL (ref 1.5–7.7)
NEUTROPHILS # BLD AUTO: 3.34 X10(3) UL (ref 1.5–7.7)
NEUTROPHILS NFR BLD AUTO: 48.5 %
OSMOLALITY SERPL CALC.SUM OF ELEC: 301 MOSM/KG (ref 275–295)
PATIENT FASTING Y/N/NP: YES
PLATELET # BLD AUTO: 189 10(3)UL (ref 150–450)
POTASSIUM SERPL-SCNC: 4 MMOL/L (ref 3.5–5.1)
PROT SERPL-MCNC: 6.4 G/DL (ref 6.4–8.2)
RBC # BLD AUTO: 3.22 X10(6)UL
SODIUM SERPL-SCNC: 143 MMOL/L (ref 136–145)
TSI SER-ACNC: 0.76 MIU/ML (ref 0.36–3.74)
VIT B12 SERPL-MCNC: 477 PG/ML (ref 193–986)
WBC # BLD AUTO: 6.9 X10(3) UL (ref 4–11)

## 2021-09-23 PROCEDURE — 83516 IMMUNOASSAY NONANTIBODY: CPT

## 2021-09-23 PROCEDURE — 84443 ASSAY THYROID STIM HORMONE: CPT

## 2021-09-23 PROCEDURE — 86140 C-REACTIVE PROTEIN: CPT

## 2021-09-23 PROCEDURE — 85025 COMPLETE CBC W/AUTO DIFF WBC: CPT

## 2021-09-23 PROCEDURE — 80053 COMPREHEN METABOLIC PANEL: CPT

## 2021-09-23 PROCEDURE — 36415 COLL VENOUS BLD VENIPUNCTURE: CPT

## 2021-09-23 PROCEDURE — 82784 ASSAY IGA/IGD/IGG/IGM EACH: CPT

## 2021-09-23 PROCEDURE — 82607 VITAMIN B-12: CPT

## 2021-09-24 LAB — TTG IGA SER-ACNC: 0.2 U/ML (ref ?–7)

## 2021-09-27 ENCOUNTER — TELEPHONE (OUTPATIENT)
Dept: GASTROENTEROLOGY | Facility: CLINIC | Age: 76
End: 2021-09-27

## 2021-09-27 NOTE — TELEPHONE ENCOUNTER
Labs notable for mild elevation in crp-h/o ra    Celiac testing, b12, tsh unremarkable  Elevated glucose-was fasting. Consider if further w/u/intervention needed with pcp  hgb down from comparison 3 mos ago.  She has hematochezia on toilet paper mary bran

## 2021-10-02 ENCOUNTER — TELEPHONE (OUTPATIENT)
Dept: FAMILY MEDICINE CLINIC | Facility: CLINIC | Age: 76
End: 2021-10-02

## 2021-10-02 NOTE — TELEPHONE ENCOUNTER
Action Requested: Summary for Provider     []  Critical Lab, Recommendations Needed  [] Need Additional Advice  []   FYI    []   Need Orders  [] Need Medications Sent to Pharmacy  []  Other     SUMMARY:   Daughter Yari Do contacted office, on FYI.   Daughter

## 2021-10-04 ENCOUNTER — APPOINTMENT (OUTPATIENT)
Dept: CARDIOLOGY | Age: 76
End: 2021-10-04

## 2021-10-05 ENCOUNTER — OFFICE VISIT (OUTPATIENT)
Dept: FAMILY MEDICINE CLINIC | Facility: CLINIC | Age: 76
End: 2021-10-05
Payer: MEDICARE

## 2021-10-05 ENCOUNTER — LAB ENCOUNTER (OUTPATIENT)
Dept: LAB | Age: 76
End: 2021-10-05
Attending: NURSE PRACTITIONER
Payer: MEDICARE

## 2021-10-05 VITALS
WEIGHT: 150 LBS | DIASTOLIC BLOOD PRESSURE: 68 MMHG | HEART RATE: 89 BPM | HEIGHT: 60 IN | SYSTOLIC BLOOD PRESSURE: 158 MMHG | BODY MASS INDEX: 29.45 KG/M2

## 2021-10-05 DIAGNOSIS — D64.9 ANEMIA, UNSPECIFIED TYPE: ICD-10-CM

## 2021-10-05 DIAGNOSIS — I10 ESSENTIAL HYPERTENSION: ICD-10-CM

## 2021-10-05 DIAGNOSIS — M06.9 RHEUMATOID ARTHRITIS, INVOLVING UNSPECIFIED SITE, UNSPECIFIED WHETHER RHEUMATOID FACTOR PRESENT (HCC): ICD-10-CM

## 2021-10-05 DIAGNOSIS — H61.21 IMPACTED CERUMEN OF RIGHT EAR: ICD-10-CM

## 2021-10-05 DIAGNOSIS — L65.9 HAIR LOSS: Primary | ICD-10-CM

## 2021-10-05 DIAGNOSIS — J30.1 SEASONAL ALLERGIC RHINITIS DUE TO POLLEN: ICD-10-CM

## 2021-10-05 DIAGNOSIS — Z79.899 OTHER LONG TERM (CURRENT) DRUG THERAPY: ICD-10-CM

## 2021-10-05 PROBLEM — J06.0 ACUTE LARYNGOPHARYNGITIS: Status: ACTIVE | Noted: 2021-10-05

## 2021-10-05 PROCEDURE — 85027 COMPLETE CBC AUTOMATED: CPT

## 2021-10-05 PROCEDURE — 99214 OFFICE O/P EST MOD 30 MIN: CPT | Performed by: NURSE PRACTITIONER

## 2021-10-05 PROCEDURE — 36415 COLL VENOUS BLD VENIPUNCTURE: CPT

## 2021-10-05 RX ORDER — METOPROLOL SUCCINATE 50 MG/1
50 TABLET, EXTENDED RELEASE ORAL 2 TIMES DAILY
Qty: 90 TABLET | Refills: 0 | COMMUNITY
Start: 2021-10-05 | End: 2022-01-27

## 2021-10-05 RX ORDER — LOSARTAN POTASSIUM 50 MG/1
TABLET ORAL DAILY
COMMUNITY
End: 2022-01-27

## 2021-10-05 RX ORDER — FLUTICASONE PROPIONATE 50 MCG
2 SPRAY, SUSPENSION (ML) NASAL DAILY
Qty: 3 EACH | Refills: 1 | Status: SHIPPED | OUTPATIENT
Start: 2021-10-05 | End: 2022-01-27

## 2021-10-05 RX ORDER — LEVOCETIRIZINE DIHYDROCHLORIDE 5 MG/1
5 TABLET, FILM COATED ORAL EVERY EVENING
Qty: 90 TABLET | Refills: 1 | Status: SHIPPED | OUTPATIENT
Start: 2021-10-05

## 2021-10-05 NOTE — PROGRESS NOTES
HPI  Pt here for continue cough. Has colonoscopy next week. Has some chest congestion. Nose running a lot, eyes watery. Cough is productive for light yellow. Nasal discharge is on one side of nose only-yellow colored. Sneezing a lot.  Symptoms started last Alcohol use: Never      Drug use: Never      Sexual activity: Not on file    Other Topics      Concerns:        Caffeine Concern: No        Exercise: Not Asked        Seat Belt: Yes        Special Diet: No        Stress Concern: Not Asked        Weight Con • metoprolol succinate 50 MG Oral Tablet 24 Hr Take 1 tablet (50 mg total) by mouth 2 (two) times a day. 90 tablet 0   • PEG 3350-KCl-Na Bicarb-NaCl (TRILYTE) 420 g Oral Recon Soln Take prep as directed by gastro office.  May substitute with Trilyte/gener 3/31/21    Physical Exam  Vitals and nursing note reviewed. Constitutional:       Appearance: Normal appearance. HENT:      Head: Normocephalic. Right Ear: External ear normal. There is impacted cerumen.       Left Ear: Tympanic membrane, ear canal Discussed plan of care with pt and pt is in agreement. All questions answered. Pt to call with questions or concerns. Encouraged to sign up for My Chart if not already registered.

## 2021-10-06 ENCOUNTER — TELEPHONE (OUTPATIENT)
Dept: CARDIOLOGY | Age: 76
End: 2021-10-06

## 2021-10-06 PROBLEM — J06.0 ACUTE LARYNGOPHARYNGITIS: Status: RESOLVED | Noted: 2021-10-05 | Resolved: 2021-10-06

## 2021-10-06 PROBLEM — Z79.899 OTHER LONG TERM (CURRENT) DRUG THERAPY: Status: ACTIVE | Noted: 2021-10-06

## 2021-10-06 PROBLEM — H61.21 IMPACTED CERUMEN OF RIGHT EAR: Status: ACTIVE | Noted: 2021-10-06

## 2021-10-06 NOTE — ASSESSMENT & PLAN NOTE
Pt advised to check bp bid for one week and call in results  Follow up 2 weeks for bp check after colonoscopy

## 2021-10-07 ENCOUNTER — TELEPHONE (OUTPATIENT)
Dept: CARDIOLOGY | Age: 76
End: 2021-10-07

## 2021-10-09 ENCOUNTER — LAB REQUISITION (OUTPATIENT)
Dept: SURGERY | Age: 76
End: 2021-10-09
Payer: MEDICARE

## 2021-10-09 DIAGNOSIS — Z20.822 ENCOUNTER FOR PREPROCEDURE SCREENING LABORATORY TESTING FOR COVID-19: ICD-10-CM

## 2021-10-09 DIAGNOSIS — Z01.812 ENCOUNTER FOR PREPROCEDURE SCREENING LABORATORY TESTING FOR COVID-19: ICD-10-CM

## 2021-10-12 ENCOUNTER — LAB REQUISITION (OUTPATIENT)
Dept: SURGERY | Age: 76
End: 2021-10-12
Payer: MEDICARE

## 2021-10-12 ENCOUNTER — SURGERY CENTER DOCUMENTATION (OUTPATIENT)
Dept: SURGERY | Age: 76
End: 2021-10-12

## 2021-10-12 DIAGNOSIS — R10.9 ABDOMINAL PAIN OF UNKNOWN ETIOLOGY: ICD-10-CM

## 2021-10-12 DIAGNOSIS — K92.1 COMPLAINT OF MELENA: ICD-10-CM

## 2021-10-12 DIAGNOSIS — Z80.0 FAMILY HISTORY OF COLON CANCER: ICD-10-CM

## 2021-10-12 PROCEDURE — 45380 COLONOSCOPY AND BIOPSY: CPT | Performed by: INTERNAL MEDICINE

## 2021-10-12 PROCEDURE — 88305 TISSUE EXAM BY PATHOLOGIST: CPT | Performed by: INTERNAL MEDICINE

## 2021-10-12 NOTE — PROCEDURES
COLONOSCOPY REPORT    Sheba Morena     1945 Age 66year old   PCP SANIA Martin Endoscopist Haley Gross MD     Date of procedure: 10/12/21    Procedure: Colonoscopy w/ cold biopsy    Pre-operative diagnosis: diarrhea    Post-operative nonbleeding    6. SAMUEL: normal rectal tone, no masses palpated. Impression:   · No obvious colitis but biopsies taken    Recommend:  · Await pathology. The interval for the next colonoscopy will be determined after reviewing pathology.  If new signs or s

## 2021-10-25 ENCOUNTER — APPOINTMENT (OUTPATIENT)
Dept: CARDIOLOGY | Age: 76
End: 2021-10-25

## 2021-11-03 ENCOUNTER — TELEPHONE (OUTPATIENT)
Dept: GASTROENTEROLOGY | Facility: CLINIC | Age: 76
End: 2021-11-03

## 2021-11-03 NOTE — TELEPHONE ENCOUNTER
----- Message from Ryanne Linton MD sent at 11/3/2021 12:42 PM CDT -----  GI staff: please place recall in for colonoscopy in 10 years

## 2021-11-03 NOTE — TELEPHONE ENCOUNTER
Entered into Epic. Recall CLN in 10 years per Dr. Mavis Najera. Last CLN done 10/12/2021. Recall entered into Patient Outreach for 10/12/2031. HM updated.

## 2021-11-11 DIAGNOSIS — I50.32 HYPERTENSIVE HEART DISEASE WITH CHRONIC DIASTOLIC CONGESTIVE HEART FAILURE (CMD): ICD-10-CM

## 2021-11-11 DIAGNOSIS — I11.0 HYPERTENSIVE HEART DISEASE WITH CHRONIC DIASTOLIC CONGESTIVE HEART FAILURE (CMD): ICD-10-CM

## 2021-11-11 DIAGNOSIS — R00.2 PALPITATIONS: ICD-10-CM

## 2021-11-11 RX ORDER — METOPROLOL SUCCINATE 50 MG/1
50 TABLET, EXTENDED RELEASE ORAL 2 TIMES DAILY
Qty: 180 TABLET | Refills: 1 | Status: SHIPPED | OUTPATIENT
Start: 2021-11-11

## 2021-11-11 RX ORDER — PREDNISONE 5 MG/1
5 TABLET ORAL DAILY
Qty: 30 TABLET | Refills: 3 | Status: SHIPPED | OUTPATIENT
Start: 2021-11-11 | End: 2022-03-28

## 2021-11-11 NOTE — TELEPHONE ENCOUNTER
LOV: 7/1/21  No future appointments. LABS:  Component      Latest Ref Rng & Units 6/23/2021   WBC      4.0 - 11.0 x10(3) uL 9.2   RBC      3.80 - 5.30 x10(6)uL 3.58 (L)   Hemoglobin      12.0 - 16.0 g/dL 10.9 (L)   Hematocrit      35.0 - 48.0 % 33.4 (L)   MCV      80.0 - 100.0 fL 93.3   MCH      26.0 - 34.0 pg 30.4   MCHC      31.0 - 37.0 g/dL 32.6   RDW-SD      35.1 - 46.3 fL 42.7   RDW      11.0 - 15.0 % 12.5   Platelet Count      084.1 - 450.0 10(3)uL 209.0   Prelim Neutrophil Abs      1.50 - 7.70 x10 (3) uL 4.76   Neutrophils Absolute      1.50 - 7.70 x10(3) uL 4.76   Lymphocytes Absolute      1.00 - 4.00 x10(3) uL 2.98   Monocytes Absolute      0.10 - 1.00 x10(3) uL 0.89   Eosinophils Absolute      0.00 - 0.70 x10(3) uL 0.44   Basophils Absolute      0.00 - 0.20 x10(3) uL 0.07   Immature Granulocyte Absolute      0.00 - 1.00 x10(3) uL 0.02   Neutrophils %      % 52.0   Lymphocytes %      % 32.5   Monocytes %      % 9.7   Eosinophils %      % 4.8   Basophils %      % 0.8   Immature Granulocyte %      % 0.2   CREATININE      0.55 - 1.02 mg/dL 1.03 (H)   eGFR NON-AFR. AMERICAN      >=60 53 (L)   eGFR       >=60 61   SED RATE      0 - 30 mm/Hr 15   C-REACTIVE PROTEIN      <0.30 mg/dL <0.29   AST (SGOT)      15 - 37 U/L 19   ALT (SGPT)      13 - 56 U/L 24   Albumin      3.4 - 5.0 g/dL 3.3 (L)     Summary:  1. Cont. prednisone 5mg ad ay -   2. Cont. leflunomide 20mg a day  3.ok to take glucosamine /chondroitin  4. Cont. Tylenol 650mg and diclofenac gel 1 %  5. Try tramadol 50mg a day for pain   6. Physical therapy for lower back and right leg   7. Lumbar xray   8. Can try medrol phylicia for pain as well  9 Return to clinic in 2-3 months. 10. Labs in 2-3 months.

## 2021-11-15 ENCOUNTER — OFFICE VISIT (OUTPATIENT)
Dept: CARDIOLOGY | Age: 76
End: 2021-11-15

## 2021-11-15 VITALS
DIASTOLIC BLOOD PRESSURE: 58 MMHG | HEART RATE: 76 BPM | SYSTOLIC BLOOD PRESSURE: 138 MMHG | WEIGHT: 148 LBS | BODY MASS INDEX: 27.96 KG/M2

## 2021-11-15 DIAGNOSIS — R06.09 DYSPNEA ON EXERTION: ICD-10-CM

## 2021-11-15 DIAGNOSIS — I50.42 CHRONIC COMBINED SYSTOLIC AND DIASTOLIC CONGESTIVE HEART FAILURE (CMD): ICD-10-CM

## 2021-11-15 DIAGNOSIS — I11.0 HYPERTENSIVE HEART DISEASE WITH CHRONIC DIASTOLIC CONGESTIVE HEART FAILURE (CMD): ICD-10-CM

## 2021-11-15 DIAGNOSIS — I50.32 HYPERTENSIVE HEART DISEASE WITH CHRONIC DIASTOLIC CONGESTIVE HEART FAILURE (CMD): ICD-10-CM

## 2021-11-15 DIAGNOSIS — I10 ESSENTIAL HYPERTENSION: ICD-10-CM

## 2021-11-15 DIAGNOSIS — I35.0 NONRHEUMATIC AORTIC VALVE STENOSIS: Primary | ICD-10-CM

## 2021-11-15 DIAGNOSIS — E78.2 MIXED HYPERLIPIDEMIA: ICD-10-CM

## 2021-11-15 PROBLEM — M05.771 RHEUMATOID ARTHRITIS INVOLVING BOTH ANKLES WITH POSITIVE RHEUMATOID FACTOR (CMD): Status: ACTIVE | Noted: 2021-11-15

## 2021-11-15 PROBLEM — M05.772 RHEUMATOID ARTHRITIS INVOLVING BOTH ANKLES WITH POSITIVE RHEUMATOID FACTOR (CMD): Status: ACTIVE | Noted: 2021-11-15

## 2021-11-15 PROCEDURE — 99214 OFFICE O/P EST MOD 30 MIN: CPT | Performed by: INTERNAL MEDICINE

## 2021-11-22 RX ORDER — FOLIC ACID 1 MG/1
1 TABLET ORAL DAILY
Qty: 90 TABLET | Refills: 0 | Status: SHIPPED | OUTPATIENT
Start: 2021-11-22 | End: 2023-03-02

## 2021-11-26 ENCOUNTER — TELEPHONE (OUTPATIENT)
Dept: GASTROENTEROLOGY | Facility: CLINIC | Age: 76
End: 2021-11-26

## 2021-11-29 ENCOUNTER — TELEPHONE (OUTPATIENT)
Dept: GASTROENTEROLOGY | Facility: CLINIC | Age: 76
End: 2021-11-29

## 2021-11-29 DIAGNOSIS — A04.8 HELICOBACTER PYLORI (H. PYLORI) INFECTION: Primary | ICD-10-CM

## 2021-11-29 NOTE — TELEPHONE ENCOUNTER
Path from cln/egd 9/2019 reviewed. Gastric bx (+) for h,.pylori. no eradication testing on file. ta in distal transverse cln. Recommendation for eradication testing-recommendations reviewed with pts daughter who verbalizes understanding.     Order

## 2021-12-10 ENCOUNTER — TELEPHONE (OUTPATIENT)
Dept: CARDIOLOGY | Age: 76
End: 2021-12-10

## 2021-12-22 ENCOUNTER — OFFICE VISIT (OUTPATIENT)
Dept: FAMILY MEDICINE CLINIC | Facility: CLINIC | Age: 76
End: 2021-12-22
Payer: MEDICARE

## 2021-12-22 VITALS
HEIGHT: 60 IN | WEIGHT: 148 LBS | DIASTOLIC BLOOD PRESSURE: 73 MMHG | SYSTOLIC BLOOD PRESSURE: 184 MMHG | BODY MASS INDEX: 29.06 KG/M2 | HEART RATE: 89 BPM

## 2021-12-22 DIAGNOSIS — B34.9 VIRAL SYNDROME: Primary | ICD-10-CM

## 2021-12-22 DIAGNOSIS — M25.511 ACUTE PAIN OF RIGHT SHOULDER: ICD-10-CM

## 2021-12-22 DIAGNOSIS — Z91.81 STATUS POST FALL: ICD-10-CM

## 2021-12-22 DIAGNOSIS — M79.642 LEFT HAND PAIN: ICD-10-CM

## 2021-12-22 PROCEDURE — 99214 OFFICE O/P EST MOD 30 MIN: CPT | Performed by: NURSE PRACTITIONER

## 2021-12-22 NOTE — PROGRESS NOTES
HPI    Patient presents for sore throat, nasal congestion and left ear ringing x 2 days. Daughter was exposed to covid 23. Also with a recent fall. With right shoulder pain and left hand pain. Has been taking ibuprofen as needed.       Review of Sys Insecurity: Not on file  Transportation Needs: Not on file  Physical Activity: Not on file  Stress: Not on file  Social Connections: Not on file  Intimate Partner Violence: Not on file  Housing Stability: Not on file    Current Outpatient Medications   Med 1 MG Oral Tab Take by mouth daily. • methylPREDNISolone 4 MG Oral Tablet Therapy Pack Take as directed on package. (Patient not taking: No sig reported) 1 each 0   • Chondroitin Sulfate A (CHONDROITIN SULFATE OR) Take by mouth.  (Patient not taking: No (CPT=73030)    Left hand pain        Relevant Orders    XR HAND (MIN 3 VIEWS), LEFT (CPT=73130)    Acute pain of right shoulder        Relevant Orders    XR SHOULDER, COMPLETE (MIN 2 VIEWS), RIGHT (CPT=73030)         Covid 19 testing, xray today.   Patient

## 2021-12-23 ENCOUNTER — LAB ENCOUNTER (OUTPATIENT)
Dept: LAB | Age: 76
End: 2021-12-23
Attending: NURSE PRACTITIONER
Payer: MEDICARE

## 2021-12-23 ENCOUNTER — HOSPITAL ENCOUNTER (OUTPATIENT)
Dept: GENERAL RADIOLOGY | Age: 76
Discharge: HOME | End: 2021-12-23
Attending: NURSE PRACTITIONER
Payer: MEDICARE

## 2021-12-23 DIAGNOSIS — B34.9 VIRAL SYNDROME: ICD-10-CM

## 2021-12-23 DIAGNOSIS — M79.642 LEFT HAND PAIN: ICD-10-CM

## 2021-12-23 DIAGNOSIS — Z91.81 STATUS POST FALL: ICD-10-CM

## 2021-12-23 DIAGNOSIS — M25.511 ACUTE PAIN OF RIGHT SHOULDER: ICD-10-CM

## 2021-12-23 PROCEDURE — 73130 X-RAY EXAM OF HAND: CPT | Performed by: NURSE PRACTITIONER

## 2021-12-23 PROCEDURE — 73030 X-RAY EXAM OF SHOULDER: CPT | Performed by: NURSE PRACTITIONER

## 2021-12-30 ENCOUNTER — TELEPHONE (OUTPATIENT)
Dept: GASTROENTEROLOGY | Facility: CLINIC | Age: 76
End: 2021-12-30

## 2022-01-17 ENCOUNTER — LAB ENCOUNTER (OUTPATIENT)
Dept: LAB | Age: 77
End: 2022-01-17
Attending: NURSE PRACTITIONER
Payer: MEDICARE

## 2022-01-17 DIAGNOSIS — A04.8 HELICOBACTER PYLORI (H. PYLORI) INFECTION: ICD-10-CM

## 2022-01-17 PROCEDURE — 87338 HPYLORI STOOL AG IA: CPT

## 2022-01-19 LAB — HELICOBACTER PYLORI AG, FECAL: NEGATIVE

## 2022-01-27 ENCOUNTER — OFFICE VISIT (OUTPATIENT)
Dept: INTERNAL MEDICINE CLINIC | Facility: CLINIC | Age: 77
End: 2022-01-27
Payer: MEDICARE

## 2022-01-27 ENCOUNTER — LAB ENCOUNTER (OUTPATIENT)
Dept: LAB | Age: 77
End: 2022-01-27
Attending: INTERNAL MEDICINE
Payer: MEDICARE

## 2022-01-27 VITALS
BODY MASS INDEX: 27.19 KG/M2 | SYSTOLIC BLOOD PRESSURE: 146 MMHG | HEART RATE: 80 BPM | WEIGHT: 144 LBS | DIASTOLIC BLOOD PRESSURE: 65 MMHG | HEIGHT: 61 IN

## 2022-01-27 DIAGNOSIS — K21.9 GASTROESOPHAGEAL REFLUX DISEASE WITHOUT ESOPHAGITIS: ICD-10-CM

## 2022-01-27 DIAGNOSIS — E78.2 MIXED HYPERLIPIDEMIA: ICD-10-CM

## 2022-01-27 DIAGNOSIS — I10 ESSENTIAL HYPERTENSION: ICD-10-CM

## 2022-01-27 DIAGNOSIS — J30.1 SEASONAL ALLERGIC RHINITIS DUE TO POLLEN: ICD-10-CM

## 2022-01-27 DIAGNOSIS — I35.0 NONRHEUMATIC AORTIC VALVE STENOSIS: ICD-10-CM

## 2022-01-27 DIAGNOSIS — Z23 NEED FOR SHINGLES VACCINE: ICD-10-CM

## 2022-01-27 DIAGNOSIS — Z00.00 ENCOUNTER FOR ANNUAL HEALTH EXAMINATION: ICD-10-CM

## 2022-01-27 DIAGNOSIS — M06.9 RHEUMATOID ARTHRITIS, INVOLVING UNSPECIFIED SITE, UNSPECIFIED WHETHER RHEUMATOID FACTOR PRESENT (HCC): ICD-10-CM

## 2022-01-27 DIAGNOSIS — Z00.00 ENCOUNTER FOR ANNUAL HEALTH EXAMINATION: Primary | ICD-10-CM

## 2022-01-27 DIAGNOSIS — I83.813 VARICOSE VEINS OF BOTH LOWER EXTREMITIES WITH PAIN: ICD-10-CM

## 2022-01-27 DIAGNOSIS — Z79.899 OTHER LONG TERM (CURRENT) DRUG THERAPY: ICD-10-CM

## 2022-01-27 DIAGNOSIS — M25.511 CHRONIC RIGHT SHOULDER PAIN: ICD-10-CM

## 2022-01-27 DIAGNOSIS — I50.9 CONGESTIVE HEART FAILURE, UNSPECIFIED HF CHRONICITY, UNSPECIFIED HEART FAILURE TYPE (HCC): ICD-10-CM

## 2022-01-27 DIAGNOSIS — H91.90 HEARING LOSS, UNSPECIFIED HEARING LOSS TYPE, UNSPECIFIED LATERALITY: ICD-10-CM

## 2022-01-27 DIAGNOSIS — M81.0 AGE-RELATED OSTEOPOROSIS WITHOUT CURRENT PATHOLOGICAL FRACTURE: ICD-10-CM

## 2022-01-27 DIAGNOSIS — I35.0 AORTIC STENOSIS, SEVERE: ICD-10-CM

## 2022-01-27 DIAGNOSIS — G89.29 CHRONIC RIGHT SHOULDER PAIN: ICD-10-CM

## 2022-01-27 PROBLEM — Z12.31 SCREENING MAMMOGRAM, ENCOUNTER FOR: Status: RESOLVED | Noted: 2020-11-25 | Resolved: 2022-01-27

## 2022-01-27 PROBLEM — D64.9 ANEMIA: Status: RESOLVED | Noted: 2021-06-24 | Resolved: 2022-01-27

## 2022-01-27 PROBLEM — L65.9 HAIR LOSS: Status: RESOLVED | Noted: 2021-10-05 | Resolved: 2022-01-27

## 2022-01-27 PROBLEM — M54.32 LEFT SIDED SCIATICA: Status: RESOLVED | Noted: 2021-06-24 | Resolved: 2022-01-27

## 2022-01-27 PROBLEM — I83.10 VARICOSE VEINS WITH INFLAMMATION: Status: RESOLVED | Noted: 2020-11-25 | Resolved: 2022-01-27

## 2022-01-27 PROBLEM — K64.3 GRADE IV HEMORRHOIDS: Status: RESOLVED | Noted: 2021-07-27 | Resolved: 2022-01-27

## 2022-01-27 PROBLEM — R07.89 OTHER CHEST PAIN: Status: RESOLVED | Noted: 2021-02-12 | Resolved: 2022-01-27

## 2022-01-27 PROBLEM — M25.562 ACUTE PAIN OF LEFT KNEE: Status: RESOLVED | Noted: 2020-11-25 | Resolved: 2022-01-27

## 2022-01-27 PROBLEM — D72.829 ELEVATED WBC COUNT: Status: RESOLVED | Noted: 2020-12-17 | Resolved: 2022-01-27

## 2022-01-27 PROBLEM — R00.2 PALPITATIONS: Status: RESOLVED | Noted: 2020-11-25 | Resolved: 2022-01-27

## 2022-01-27 PROBLEM — R19.7 DIARRHEA: Status: RESOLVED | Noted: 2021-07-27 | Resolved: 2022-01-27

## 2022-01-27 PROBLEM — H61.21 IMPACTED CERUMEN OF RIGHT EAR: Status: RESOLVED | Noted: 2021-10-06 | Resolved: 2022-01-27

## 2022-01-27 LAB
ALBUMIN SERPL-MCNC: 3.6 G/DL (ref 3.4–5)
ALBUMIN/GLOB SERPL: 1.2 {RATIO} (ref 1–2)
ALP LIVER SERPL-CCNC: 58 U/L
ALT SERPL-CCNC: 21 U/L
ANION GAP SERPL CALC-SCNC: 3 MMOL/L (ref 0–18)
AST SERPL-CCNC: 15 U/L (ref 15–37)
BILIRUB SERPL-MCNC: 0.6 MG/DL (ref 0.1–2)
BUN BLD-MCNC: 29 MG/DL (ref 7–18)
BUN/CREAT SERPL: 26.4 (ref 10–20)
CALCIUM BLD-MCNC: 10.1 MG/DL (ref 8.5–10.1)
CHLORIDE SERPL-SCNC: 101 MMOL/L (ref 98–112)
CHOLEST SERPL-MCNC: 293 MG/DL (ref ?–200)
CO2 SERPL-SCNC: 32 MMOL/L (ref 21–32)
CREAT BLD-MCNC: 1.1 MG/DL
DEPRECATED RDW RBC AUTO: 42.6 FL (ref 35.1–46.3)
ERYTHROCYTE [DISTWIDTH] IN BLOOD BY AUTOMATED COUNT: 13.1 % (ref 11–15)
EST. AVERAGE GLUCOSE BLD GHB EST-MCNC: 134 MG/DL (ref 68–126)
FASTING PATIENT LIPID ANSWER: NO
FASTING STATUS PATIENT QL REPORTED: NO
GLOBULIN PLAS-MCNC: 3.1 G/DL (ref 2.8–4.4)
GLUCOSE BLD-MCNC: 108 MG/DL (ref 70–99)
HBA1C MFR BLD: 6.3 % (ref ?–5.7)
HCT VFR BLD AUTO: 35.2 %
HDLC SERPL-MCNC: 47 MG/DL (ref 40–59)
HGB BLD-MCNC: 11.4 G/DL
LDLC SERPL CALC-MCNC: 165 MG/DL (ref ?–100)
MCH RBC QN AUTO: 28.8 PG (ref 26–34)
MCHC RBC AUTO-ENTMCNC: 32.4 G/DL (ref 31–37)
MCV RBC AUTO: 88.9 FL
NONHDLC SERPL-MCNC: 246 MG/DL (ref ?–130)
OSMOLALITY SERPL CALC.SUM OF ELEC: 288 MOSM/KG (ref 275–295)
PLATELET # BLD AUTO: 253 10(3)UL (ref 150–450)
POTASSIUM SERPL-SCNC: 4.1 MMOL/L (ref 3.5–5.1)
PROT SERPL-MCNC: 6.7 G/DL (ref 6.4–8.2)
RBC # BLD AUTO: 3.96 X10(6)UL
SODIUM SERPL-SCNC: 136 MMOL/L (ref 136–145)
TRIGL SERPL-MCNC: 417 MG/DL (ref 30–149)
TSI SER-ACNC: 0.77 MIU/ML (ref 0.36–3.74)
VLDLC SERPL CALC-MCNC: 86 MG/DL (ref 0–30)
WBC # BLD AUTO: 12.9 X10(3) UL (ref 4–11)

## 2022-01-27 PROCEDURE — 36415 COLL VENOUS BLD VENIPUNCTURE: CPT

## 2022-01-27 PROCEDURE — 84443 ASSAY THYROID STIM HORMONE: CPT

## 2022-01-27 PROCEDURE — 83036 HEMOGLOBIN GLYCOSYLATED A1C: CPT

## 2022-01-27 PROCEDURE — 85027 COMPLETE CBC AUTOMATED: CPT

## 2022-01-27 PROCEDURE — 80053 COMPREHEN METABOLIC PANEL: CPT

## 2022-01-27 PROCEDURE — 80061 LIPID PANEL: CPT

## 2022-01-27 PROCEDURE — G0439 PPPS, SUBSEQ VISIT: HCPCS | Performed by: INTERNAL MEDICINE

## 2022-01-27 RX ORDER — PRAVASTATIN SODIUM 40 MG
40 TABLET ORAL NIGHTLY
Qty: 90 TABLET | Refills: 3 | Status: SHIPPED | OUTPATIENT
Start: 2022-01-27 | End: 2022-02-05

## 2022-01-27 RX ORDER — LOSARTAN POTASSIUM 50 MG/1
50 TABLET ORAL 2 TIMES DAILY
Qty: 180 TABLET | Refills: 3 | Status: SHIPPED | OUTPATIENT
Start: 2022-01-27 | End: 2022-02-05

## 2022-01-27 RX ORDER — METOPROLOL SUCCINATE 50 MG/1
50 TABLET, EXTENDED RELEASE ORAL 2 TIMES DAILY
Qty: 180 TABLET | Refills: 3 | Status: SHIPPED | OUTPATIENT
Start: 2022-01-27 | End: 2022-02-05

## 2022-01-27 RX ORDER — OMEPRAZOLE 20 MG/1
20 CAPSULE, DELAYED RELEASE ORAL
Qty: 90 CAPSULE | Refills: 3 | Status: SHIPPED | OUTPATIENT
Start: 2022-01-27

## 2022-01-27 RX ORDER — FUROSEMIDE 40 MG/1
40 TABLET ORAL EVERY OTHER DAY
Qty: 90 TABLET | Refills: 3 | Status: SHIPPED | OUTPATIENT
Start: 2022-01-27 | End: 2022-02-05

## 2022-01-27 RX ORDER — PREGABALIN 25 MG/1
25 CAPSULE ORAL 2 TIMES DAILY
Qty: 180 CAPSULE | Refills: 1 | Status: SHIPPED | OUTPATIENT
Start: 2022-01-27 | End: 2022-04-27

## 2022-01-27 RX ORDER — FLUTICASONE PROPIONATE 50 MCG
2 SPRAY, SUSPENSION (ML) NASAL DAILY
Qty: 3 EACH | Refills: 3 | Status: SHIPPED | OUTPATIENT
Start: 2022-01-27 | End: 2023-01-22

## 2022-01-27 NOTE — PATIENT INSTRUCTIONS
Armida Hobbs's SCREENING SCHEDULE   Tests on this list are recommended by your physician but may not be covered, or covered at this frequency, by your insurer. Please check with your insurance carrier before scheduling to verify coverage.    KOBE 02/16/2021      No recommendations at this time   Pap and Pelvic    Pap   Covered every 2 years for women at normal risk;  Annually if at high risk -  No recommendations at this time    Chlamydia Annually if high risk -  No recommendations at this time   Sc Suly. This site has a lot of good information including definitions of the different types of Advance Directives.  It also has the State forms available on it's website for anyone to review and print using their home computer and p

## 2022-01-27 NOTE — PROGRESS NOTES
HPI:   Pauly Robles is a 64year old female who presents for a Medicare Subsequent Annual Wellness visit (Pt already had Initial Annual Wellness).     Her last annual assessment has been over 1 year: Annual Physical Never done    Here with daughter who as ([facility] [address]     Varicose veins of both lower extremities with pain     Essential hypertension     Aortic stenosis, severe    Wt Readings from Last 3 Encounters:  01/27/22 : 144 lb (65.3 kg)  12/22/21 : 148 lb (67.1 kg)  10/05/21 : 150 lb (68 kg)     Last Cholest total) by mouth daily as needed for Pain. Albuterol Sulfate  (90 Base) MCG/ACT Inhalation Aero Soln, Inhale 2 puffs into the lungs every 4 (four) hours as needed for Wheezing. Vitamins/Minerals Oral Tab, Take 1 tablet by mouth daily.   Turmeric 500 Uncorrected Right Eye Chart Acuity: 20/30   Left Eye Visual Acuity: Uncorrected Left Eye Chart Acuity: 20/30   Both Eyes Visual Acuity: Uncorrected Both Eyes Chart Acuity: 20/25   Able To Tolerate Visual Acuity: Yes      Physical Exam  Vitals and nursing n CHOLESTEROL. Plan  Chronic, well controlled on current medications, denies adverse effects, continue with present management.      Congestive heart failure, unspecified HF chronicity, unspecified heart failure type (HCC)  -     metoprolol succinate 50 MG O pain  -     pregabalin 25 MG Oral Cap; Take 1 capsule (25 mg total) by mouth 2 (two) times daily. FOR PAIN. Rheumatoid arthritis, involving unspecified site, unspecified whether rheumatoid factor present (HCC)  -     pregabalin 25 MG Oral Cap;  Take 1 caps your insurer. Please check with your insurance carrier before scheduling to verify coverage.    PREVENTATIVE SERVICES FREQUENCY &  COVERAGE DETAILS LAST COMPLETION DATE   Diabetes Screening    Fasting Blood Sugar /  Glucose    One screening every 12 month recommendations at this time    Chlamydia Annually if high risk -  No recommendations at this time   Screening Mammogram    Mammogram     Recommend annually for all female patients aged 36 and older    One baseline mammogram covered for patients aged 32-38

## 2022-02-01 ENCOUNTER — APPOINTMENT (OUTPATIENT)
Dept: GENERAL RADIOLOGY | Facility: HOSPITAL | Age: 77
DRG: 246 | End: 2022-02-01
Attending: EMERGENCY MEDICINE
Payer: MEDICARE

## 2022-02-01 ENCOUNTER — HOSPITAL ENCOUNTER (INPATIENT)
Facility: HOSPITAL | Age: 77
LOS: 4 days | Discharge: HOME | DRG: 246 | End: 2022-02-05
Attending: EMERGENCY MEDICINE | Admitting: HOSPITALIST
Payer: MEDICARE

## 2022-02-01 ENCOUNTER — HOSPITAL ENCOUNTER (OUTPATIENT)
Age: 77
Discharge: ANOTHER HOSPITAL | DRG: 246 | End: 2022-02-01
Payer: MEDICARE

## 2022-02-01 VITALS
RESPIRATION RATE: 17 BRPM | DIASTOLIC BLOOD PRESSURE: 79 MMHG | SYSTOLIC BLOOD PRESSURE: 148 MMHG | HEART RATE: 124 BPM | OXYGEN SATURATION: 96 % | TEMPERATURE: 99 F

## 2022-02-01 DIAGNOSIS — R03.0 ELEVATED BLOOD PRESSURE READING: ICD-10-CM

## 2022-02-01 DIAGNOSIS — I35.0 AORTIC VALVE STENOSIS, ETIOLOGY OF CARDIAC VALVE DISEASE UNSPECIFIED: ICD-10-CM

## 2022-02-01 DIAGNOSIS — I48.91 ATRIAL FIBRILLATION WITH RAPID VENTRICULAR RESPONSE (HCC): Primary | ICD-10-CM

## 2022-02-01 DIAGNOSIS — R94.31 ABNORMAL ECG: ICD-10-CM

## 2022-02-01 DIAGNOSIS — R07.9 CHEST PAIN OF UNCERTAIN ETIOLOGY: Primary | ICD-10-CM

## 2022-02-01 DIAGNOSIS — I49.9 IRREGULAR HEART RATE: ICD-10-CM

## 2022-02-01 DIAGNOSIS — R77.8 ELEVATED TROPONIN: ICD-10-CM

## 2022-02-01 DIAGNOSIS — I50.9 ACUTE ON CHRONIC CONGESTIVE HEART FAILURE, UNSPECIFIED HEART FAILURE TYPE (HCC): ICD-10-CM

## 2022-02-01 PROBLEM — R79.89 ELEVATED TROPONIN: Status: ACTIVE | Noted: 2022-02-01

## 2022-02-01 LAB
ALBUMIN SERPL-MCNC: 3.6 G/DL (ref 3.4–5)
ALP LIVER SERPL-CCNC: 60 U/L
ALT SERPL-CCNC: 44 U/L
ANION GAP SERPL CALC-SCNC: 9 MMOL/L (ref 0–18)
APTT PPP: 20.9 SECONDS (ref 23.3–35.6)
APTT PPP: 55.4 SECONDS (ref 23.3–35.6)
AST SERPL-CCNC: 48 U/L (ref 15–37)
BASOPHILS # BLD AUTO: 0.03 X10(3) UL (ref 0–0.2)
BASOPHILS NFR BLD AUTO: 0.2 %
BILIRUB DIRECT SERPL-MCNC: 0.1 MG/DL (ref 0–0.2)
BILIRUB SERPL-MCNC: 0.4 MG/DL (ref 0.1–2)
BUN BLD-MCNC: 33 MG/DL (ref 7–18)
BUN/CREAT SERPL: 30.6 (ref 10–20)
CHLORIDE SERPL-SCNC: 105 MMOL/L (ref 98–112)
CO2 SERPL-SCNC: 23 MMOL/L (ref 21–32)
CREAT BLD-MCNC: 1.08 MG/DL
D DIMER PPP FEU-MCNC: 0.71 UG/ML FEU (ref ?–0.78)
DEPRECATED RDW RBC AUTO: 41.9 FL (ref 35.1–46.3)
EOSINOPHIL # BLD AUTO: 0 X10(3) UL (ref 0–0.7)
EOSINOPHIL NFR BLD AUTO: 0 %
ERYTHROCYTE [DISTWIDTH] IN BLOOD BY AUTOMATED COUNT: 13.3 % (ref 11–15)
GLUCOSE BLD-MCNC: 138 MG/DL (ref 70–99)
HCT VFR BLD AUTO: 33.1 %
HGB BLD-MCNC: 11.1 G/DL
IMM GRANULOCYTES # BLD AUTO: 0.08 X10(3) UL (ref 0–1)
IMM GRANULOCYTES NFR BLD: 0.5 %
LIPASE SERPL-CCNC: 66 U/L (ref 73–393)
LYMPHOCYTES # BLD AUTO: 2.44 X10(3) UL (ref 1–4)
LYMPHOCYTES NFR BLD AUTO: 15 %
MCH RBC QN AUTO: 28.9 PG (ref 26–34)
MCHC RBC AUTO-ENTMCNC: 33.5 G/DL (ref 31–37)
MCV RBC AUTO: 86.2 FL
MONOCYTES # BLD AUTO: 1.11 X10(3) UL (ref 0.1–1)
MONOCYTES NFR BLD AUTO: 6.8 %
NEUTROPHILS # BLD AUTO: 12.61 X10 (3) UL (ref 1.5–7.7)
NEUTROPHILS # BLD AUTO: 12.61 X10(3) UL (ref 1.5–7.7)
NEUTROPHILS NFR BLD AUTO: 77.5 %
NT-PROBNP SERPL-MCNC: 3685 PG/ML (ref ?–450)
OSMOLALITY SERPL CALC.SUM OF ELEC: 293 MOSM/KG (ref 275–295)
PLATELET # BLD AUTO: 240 10(3)UL (ref 150–450)
POTASSIUM SERPL-SCNC: 3.5 MMOL/L (ref 3.5–5.1)
PROT SERPL-MCNC: 7.8 G/DL (ref 6.4–8.2)
RBC # BLD AUTO: 3.84 X10(6)UL
SARS-COV-2 RNA RESP QL NAA+PROBE: NOT DETECTED
SODIUM SERPL-SCNC: 137 MMOL/L (ref 136–145)
TROPONIN I HIGH SENSITIVITY: 1919 NG/L
TROPONIN I HIGH SENSITIVITY: 47 NG/L
TROPONIN I HIGH SENSITIVITY: 591 NG/L
WBC # BLD AUTO: 16.3 X10(3) UL (ref 4–11)

## 2022-02-01 PROCEDURE — 99223 1ST HOSP IP/OBS HIGH 75: CPT | Performed by: HOSPITALIST

## 2022-02-01 PROCEDURE — 93000 ELECTROCARDIOGRAM COMPLETE: CPT | Performed by: EMERGENCY MEDICINE

## 2022-02-01 PROCEDURE — 71045 X-RAY EXAM CHEST 1 VIEW: CPT | Performed by: EMERGENCY MEDICINE

## 2022-02-01 PROCEDURE — 99214 OFFICE O/P EST MOD 30 MIN: CPT | Performed by: PHYSICIAN ASSISTANT

## 2022-02-01 RX ORDER — DILTIAZEM HYDROCHLORIDE 5 MG/ML
10 INJECTION INTRAVENOUS ONCE
Status: DISCONTINUED | OUTPATIENT
Start: 2022-02-01 | End: 2022-02-02

## 2022-02-01 RX ORDER — HEPARIN SODIUM 1000 [USP'U]/ML
60 INJECTION, SOLUTION INTRAVENOUS; SUBCUTANEOUS ONCE
Status: COMPLETED | OUTPATIENT
Start: 2022-02-01 | End: 2022-02-01

## 2022-02-01 RX ORDER — HEPARIN SODIUM AND DEXTROSE 10000; 5 [USP'U]/100ML; G/100ML
12 INJECTION INTRAVENOUS ONCE
Status: COMPLETED | OUTPATIENT
Start: 2022-02-01 | End: 2022-02-01

## 2022-02-01 RX ORDER — METOCLOPRAMIDE HYDROCHLORIDE 5 MG/ML
5 INJECTION INTRAMUSCULAR; INTRAVENOUS EVERY 8 HOURS PRN
Status: DISCONTINUED | OUTPATIENT
Start: 2022-02-01 | End: 2022-02-05

## 2022-02-01 RX ORDER — FUROSEMIDE 10 MG/ML
20 INJECTION INTRAMUSCULAR; INTRAVENOUS
Status: CANCELLED | OUTPATIENT
Start: 2022-02-01

## 2022-02-01 RX ORDER — ACETAMINOPHEN 325 MG/1
650 TABLET ORAL EVERY 6 HOURS PRN
Status: DISCONTINUED | OUTPATIENT
Start: 2022-02-01 | End: 2022-02-05

## 2022-02-01 RX ORDER — NITROGLYCERIN 0.4 MG/1
0.4 TABLET SUBLINGUAL EVERY 5 MIN PRN
Status: CANCELLED | OUTPATIENT
Start: 2022-02-01

## 2022-02-01 RX ORDER — ONDANSETRON 2 MG/ML
4 INJECTION INTRAMUSCULAR; INTRAVENOUS EVERY 6 HOURS PRN
Status: DISCONTINUED | OUTPATIENT
Start: 2022-02-01 | End: 2022-02-05

## 2022-02-01 RX ORDER — DILTIAZEM HYDROCHLORIDE 5 MG/ML
20 INJECTION INTRAVENOUS ONCE
Status: COMPLETED | OUTPATIENT
Start: 2022-02-01 | End: 2022-02-01

## 2022-02-01 RX ORDER — HEPARIN SODIUM 5000 [USP'U]/ML
5000 INJECTION, SOLUTION INTRAVENOUS; SUBCUTANEOUS EVERY 12 HOURS SCHEDULED
Status: DISCONTINUED | OUTPATIENT
Start: 2022-02-01 | End: 2022-02-01

## 2022-02-01 RX ORDER — HEPARIN SODIUM AND DEXTROSE 10000; 5 [USP'U]/100ML; G/100ML
INJECTION INTRAVENOUS CONTINUOUS
Status: DISCONTINUED | OUTPATIENT
Start: 2022-02-01 | End: 2022-02-03

## 2022-02-01 RX ORDER — ASPIRIN 81 MG/1
324 TABLET, CHEWABLE ORAL ONCE
Status: COMPLETED | OUTPATIENT
Start: 2022-02-01 | End: 2022-02-01

## 2022-02-01 RX ORDER — HEPARIN SODIUM AND DEXTROSE 10000; 5 [USP'U]/100ML; G/100ML
INJECTION INTRAVENOUS CONTINUOUS
Status: DISCONTINUED | OUTPATIENT
Start: 2022-02-01 | End: 2022-02-01

## 2022-02-01 RX ORDER — HEPARIN SODIUM AND DEXTROSE 10000; 5 [USP'U]/100ML; G/100ML
12 INJECTION INTRAVENOUS ONCE
Status: DISCONTINUED | OUTPATIENT
Start: 2022-02-01 | End: 2022-02-05

## 2022-02-01 RX ORDER — TEMAZEPAM 15 MG/1
15 CAPSULE ORAL NIGHTLY PRN
Status: DISCONTINUED | OUTPATIENT
Start: 2022-02-01 | End: 2022-02-05

## 2022-02-01 RX ORDER — HEPARIN SODIUM 1000 [USP'U]/ML
60 INJECTION, SOLUTION INTRAVENOUS; SUBCUTANEOUS ONCE
Status: DISCONTINUED | OUTPATIENT
Start: 2022-02-01 | End: 2022-02-01

## 2022-02-01 RX ORDER — FUROSEMIDE 10 MG/ML
20 INJECTION INTRAMUSCULAR; INTRAVENOUS
Status: DISCONTINUED | OUTPATIENT
Start: 2022-02-01 | End: 2022-02-05

## 2022-02-01 NOTE — ED INITIAL ASSESSMENT (HPI)
Pt came in for chest pain for the past 2 days. Pt has easy non labored respirations. Pt is fully verbal and ambulatory. Pt had ekg done, and resulted abnormal, pt examined by provider and will be sending pt to ER via ems. Pt informed and understood.

## 2022-02-01 NOTE — ED QUICK NOTES
Orders for admission, patient is aware of plan and ready to go upstairs.  Any questions, please call ED RN Michelle Franco at extension [phone]    Patient Covid vaccination status: Fully vaccinated     COVID Test Ordered in ED: Rapid SARS-CoV-2 by PCR    COVID Suspicion at Admission: Low clinical suspicion for COVID    Running Infusions:  None    Mental Status/LOC at time of transport: aox4    Other pertinent information: Pt from home with , on room air, independent with care  CIWA score: N/A   NIH score:  N/A

## 2022-02-01 NOTE — ED INITIAL ASSESSMENT (HPI)
Pt arrive in ER per [facility] with c/o CP started last night, pt was seen at [address] [place] [address] and noted with new onset afib, pt then transferred to ER for evaluation

## 2022-02-02 ENCOUNTER — APPOINTMENT (OUTPATIENT)
Dept: CV DIAGNOSTICS | Facility: HOSPITAL | Age: 77
DRG: 246 | End: 2022-02-02
Attending: INTERNAL MEDICINE
Payer: MEDICARE

## 2022-02-02 ENCOUNTER — APPOINTMENT (OUTPATIENT)
Dept: INTERVENTIONAL RADIOLOGY/VASCULAR | Facility: HOSPITAL | Age: 77
DRG: 246 | End: 2022-02-02
Payer: MEDICARE

## 2022-02-02 LAB
ANION GAP SERPL CALC-SCNC: 7 MMOL/L (ref 0–18)
APTT PPP: 29.5 SECONDS (ref 23.3–35.6)
APTT PPP: 60 SECONDS (ref 23.3–35.6)
BASOPHILS # BLD AUTO: 0.05 X10(3) UL (ref 0–0.2)
BASOPHILS NFR BLD AUTO: 0.4 %
BUN BLD-MCNC: 38 MG/DL (ref 7–18)
BUN/CREAT SERPL: 35.5 (ref 10–20)
CALCIUM BLD-MCNC: 9.3 MG/DL (ref 8.5–10.1)
CHLORIDE SERPL-SCNC: 108 MMOL/L (ref 98–112)
CO2 SERPL-SCNC: 28 MMOL/L (ref 21–32)
CREAT BLD-MCNC: 1.07 MG/DL
DEPRECATED RDW RBC AUTO: 44.6 FL (ref 35.1–46.3)
EOSINOPHIL # BLD AUTO: 0.09 X10(3) UL (ref 0–0.7)
EOSINOPHIL NFR BLD AUTO: 0.8 %
ERYTHROCYTE [DISTWIDTH] IN BLOOD BY AUTOMATED COUNT: 14 % (ref 11–15)
GLUCOSE BLD-MCNC: 92 MG/DL (ref 70–99)
HCT VFR BLD AUTO: 30.5 %
HGB BLD-MCNC: 9.9 G/DL
IMM GRANULOCYTES # BLD AUTO: 0.04 X10(3) UL (ref 0–1)
IMM GRANULOCYTES NFR BLD: 0.4 %
IRON SATN MFR SERPL: 47 %
IRON SERPL-MCNC: 129 UG/DL
LYMPHOCYTES # BLD AUTO: 4.17 X10(3) UL (ref 1–4)
LYMPHOCYTES NFR BLD AUTO: 37.4 %
MCH RBC QN AUTO: 28.5 PG (ref 26–34)
MCV RBC AUTO: 87.9 FL
MONOCYTES # BLD AUTO: 0.95 X10(3) UL (ref 0.1–1)
MONOCYTES NFR BLD AUTO: 8.5 %
NEUTROPHILS # BLD AUTO: 5.84 X10 (3) UL (ref 1.5–7.7)
NEUTROPHILS # BLD AUTO: 5.84 X10(3) UL (ref 1.5–7.7)
NEUTROPHILS NFR BLD AUTO: 52.5 %
OSMOLALITY SERPL CALC.SUM OF ELEC: 305 MOSM/KG (ref 275–295)
PLATELET # BLD AUTO: 223 10(3)UL (ref 150–450)
POTASSIUM SERPL-SCNC: 3.2 MMOL/L (ref 3.5–5.1)
RBC # BLD AUTO: 3.47 X10(6)UL
SODIUM SERPL-SCNC: 143 MMOL/L (ref 136–145)
TIBC SERPL-MCNC: 274 UG/DL (ref 240–450)
TRANSFERRIN SERPL-MCNC: 184 MG/DL (ref 200–360)
TROPONIN I HIGH SENSITIVITY: 3006 NG/L
WBC # BLD AUTO: 11.1 X10(3) UL (ref 4–11)

## 2022-02-02 PROCEDURE — 99233 SBSQ HOSP IP/OBS HIGH 50: CPT | Performed by: HOSPITALIST

## 2022-02-02 PROCEDURE — 4A023N8 MEASUREMENT OF CARDIAC SAMPLING AND PRESSURE, BILATERAL, PERCUTANEOUS APPROACH: ICD-10-PCS | Performed by: INTERNAL MEDICINE

## 2022-02-02 PROCEDURE — B2111ZZ FLUOROSCOPY OF MULTIPLE CORONARY ARTERIES USING LOW OSMOLAR CONTRAST: ICD-10-PCS | Performed by: INTERNAL MEDICINE

## 2022-02-02 PROCEDURE — 93306 TTE W/DOPPLER COMPLETE: CPT | Performed by: INTERNAL MEDICINE

## 2022-02-02 RX ORDER — ONDANSETRON 2 MG/ML
INJECTION INTRAMUSCULAR; INTRAVENOUS
Status: COMPLETED
Start: 2022-02-02 | End: 2022-02-02

## 2022-02-02 RX ORDER — ASPIRIN 81 MG/1
81 TABLET, CHEWABLE ORAL DAILY
Status: DISCONTINUED | OUTPATIENT
Start: 2022-02-03 | End: 2022-02-03

## 2022-02-02 RX ORDER — HYDRALAZINE HYDROCHLORIDE 20 MG/ML
INJECTION INTRAMUSCULAR; INTRAVENOUS
Status: COMPLETED
Start: 2022-02-02 | End: 2022-02-02

## 2022-02-02 RX ORDER — SODIUM CHLORIDE 9 MG/ML
INJECTION, SOLUTION INTRAVENOUS
Status: COMPLETED | OUTPATIENT
Start: 2022-02-03 | End: 2022-02-03

## 2022-02-02 RX ORDER — CLOPIDOGREL BISULFATE 75 MG/1
300 TABLET ORAL ONCE
Status: COMPLETED | OUTPATIENT
Start: 2022-02-02 | End: 2022-02-02

## 2022-02-02 RX ORDER — ASPIRIN 81 MG/1
324 TABLET, CHEWABLE ORAL ONCE
Status: COMPLETED | OUTPATIENT
Start: 2022-02-03 | End: 2022-02-03

## 2022-02-02 RX ORDER — SODIUM CHLORIDE 9 MG/ML
INJECTION, SOLUTION INTRAVENOUS
Status: ACTIVE | OUTPATIENT
Start: 2022-02-03 | End: 2022-02-03

## 2022-02-02 RX ORDER — HEPARIN SODIUM 1000 [USP'U]/ML
30 INJECTION, SOLUTION INTRAVENOUS; SUBCUTANEOUS ONCE
Status: COMPLETED | OUTPATIENT
Start: 2022-02-02 | End: 2022-02-02

## 2022-02-02 RX ORDER — POTASSIUM CHLORIDE 20 MEQ/1
40 TABLET, EXTENDED RELEASE ORAL ONCE
Status: COMPLETED | OUTPATIENT
Start: 2022-02-02 | End: 2022-02-02

## 2022-02-02 RX ORDER — NITROGLYCERIN 20 MG/100ML
INJECTION INTRAVENOUS
Status: DISCONTINUED
Start: 2022-02-02 | End: 2022-02-02 | Stop reason: WASHOUT

## 2022-02-02 RX ORDER — ASPIRIN 81 MG/1
81 TABLET, CHEWABLE ORAL DAILY
Status: DISCONTINUED | OUTPATIENT
Start: 2022-02-02 | End: 2022-02-02

## 2022-02-02 RX ORDER — POTASSIUM CHLORIDE 20 MEQ/1
40 TABLET, EXTENDED RELEASE ORAL ONCE
Status: CANCELLED | OUTPATIENT
Start: 2022-02-02 | End: 2022-02-02

## 2022-02-02 RX ORDER — ASPIRIN 325 MG
325 TABLET, DELAYED RELEASE (ENTERIC COATED) ORAL ONCE
Status: COMPLETED | OUTPATIENT
Start: 2022-02-02 | End: 2022-02-02

## 2022-02-02 RX ORDER — NITROGLYCERIN 20 MG/100ML
INJECTION INTRAVENOUS
Status: COMPLETED
Start: 2022-02-02 | End: 2022-02-02

## 2022-02-02 RX ORDER — HEPARIN SODIUM 1000 [USP'U]/ML
INJECTION, SOLUTION INTRAVENOUS; SUBCUTANEOUS
Status: COMPLETED
Start: 2022-02-02 | End: 2022-02-02

## 2022-02-02 RX ORDER — CLOPIDOGREL BISULFATE 75 MG/1
75 TABLET ORAL DAILY
Status: DISCONTINUED | OUTPATIENT
Start: 2022-02-03 | End: 2022-02-03

## 2022-02-02 RX ORDER — CHLORHEXIDINE GLUCONATE 4 G/100ML
30 SOLUTION TOPICAL
Status: COMPLETED | OUTPATIENT
Start: 2022-02-03 | End: 2022-02-03

## 2022-02-02 RX ORDER — LIDOCAINE HYDROCHLORIDE 20 MG/ML
INJECTION, SOLUTION EPIDURAL; INFILTRATION; INTRACAUDAL; PERINEURAL
Status: COMPLETED
Start: 2022-02-02 | End: 2022-02-02

## 2022-02-02 RX ORDER — MIDAZOLAM HYDROCHLORIDE 1 MG/ML
INJECTION INTRAMUSCULAR; INTRAVENOUS
Status: COMPLETED
Start: 2022-02-02 | End: 2022-02-02

## 2022-02-02 RX ORDER — HEPARIN SODIUM AND DEXTROSE 10000; 5 [USP'U]/100ML; G/100ML
INJECTION INTRAVENOUS
Status: DISPENSED
Start: 2022-02-02 | End: 2022-02-03

## 2022-02-02 RX ORDER — NITROGLYCERIN 20 MG/100ML
10 INJECTION INTRAVENOUS CONTINUOUS
Status: DISCONTINUED | OUTPATIENT
Start: 2022-02-02 | End: 2022-02-03

## 2022-02-02 RX ORDER — ATORVASTATIN CALCIUM 40 MG/1
40 TABLET, FILM COATED ORAL NIGHTLY
Status: DISCONTINUED | OUTPATIENT
Start: 2022-02-02 | End: 2022-02-04

## 2022-02-02 NOTE — PROCEDURES
Preop: Non-STEMI, moderate aortic stenosis. Postop: Same. CAD. Procedure: Right and left heart cath; coronary angiogram.  Celt closure to RFA. Findings: Mean RA pressure 8, PA 39/15, mean wedge 16, LVEDP 19. Coronaries: Tandem 60 and 80% stenoses mid LAD. 80% ostial in diagonal 1 and diagonal 2  90% focal eccentric stenosis mid circumflex  Mild ostial right coronary stenosis    Sedation: Versed and fentanyl, monitored for 47 minutes  EBL: 5 mL  Specimen: None  Complication: None  Condition: Stable    Full report to follow    Plan: CV surgery consult. Will discuss options of CABG and AVR now, versus multivessel PCI now and TAVR in future. Addendum:  The patient developed left anterior chest pain described as severe while still in the Cath Lab. EKG showed ST depression in the anterolateral leads. The patient was reprepped and a restudy angiogram was performed using the right femoral artery. There was no change in the coronary anatomy, and nothing to explain acute ischemia. Nothing further was done at this time. Her pain is minimal at this time and may be related to her nausea and vomiting earlier in the procedure.

## 2022-02-02 NOTE — PLAN OF CARE
Plan for cardiac cath today Skin prepared. Asprin given per order. Potassium replaced. Pt transferred for ccu for closer monitoring due to report of nausea, vomiting, and chest pain. Mahi Snow RN called Antonio Botello RN  for report. Antonio Botello stated she would call when able to take report and extension given. No call received.

## 2022-02-02 NOTE — PROCEDURES
[facility]    PATIENT'S NAME: Merrill Duran   ATTENDING PHYSICIAN: Giovana Ramos MD   OPERATING PHYSICIAN: Juan Luis Falcon. Simon Osorio MD   PATIENT ACCOUNT#:   [id number]    LOCATION:  [address]  MEDICAL RECORD #:   [id number]       YOB: 1945  ADMISSION DATE:       02/01/2022      OPERATION DATE:  02/02/2022    CARDIAC PROCEDURE TRANSCRIPTION    (Corrected report, 02/02/2022, addendum added)    CARDIAC CATHETERIZATION  PREOPERATIVE DIAGNOSIS:    POSTOPERATIVE DIAGNOSIS:    PROCEDURE PERFORMED:      CLINICAL HISTORY:  A 68-year-old female with known moderate aortic stenosis who presented with chest pain and a non-STEMI secondary to rapid atrial fibrillation. Recommended for cardiac catheterization. DESCRIPTION OF PROCEDURE:  Right and left heart catheterization from the right femoral approach. The right femoral artery was entered percutaneously, and a 6-Brazilian sheath was placed for arterial access. The right femoral vein was entered percutaneously, and a 7-Brazilian sheath was placed for venous access. Right heart pressures were measured using a 7-Brazilian monitoring [place]-Amisha catheter. Left heart pressures were measured using a 6-Brazilian pigtail catheter. Coronary angiogram was performed using 4 cm 6-Brazilian right and left Augustin catheters. At the end of the case, a limited right iliofemoral angiogram was performed followed by placement of a Celt closure. Manual pressure was applied to the vein. Hemostasis was achieved. There were no complications and the patient tolerated the procedure well. HEMODYNAMICS:  Mean RA 8, RV 39/0, PA 39/15, mean wedge 16, LVEDP 19. No mitral valve gradient. Peak-to-peak aortic valve gradient is about 20. FLUOROSCOPY:  Calcification is seen in the coronary vessels and the cardiac valves. Aorta is also calcified. LEFT CORONARY ANGIOGRAPHY:  There are adjacent separate ostia for the LAD and circumflex.     The left anterior descending artery has mild narrowing in its proximal portion. There is a tubular area of moderate stenosis in the midvessel. There is a hazy area focally with an 80% stenosis in the interventricular segment. The LAD reaches and wraps around the cardiac apex. It is a large distribution vessel. The LAD gives off 2 small to moderate-sized diagonal branches from its proximal portion. Both of these have 80% to 90% ostial stenoses. The circumflex artery is anatomically nondominant. It gives off a small first obtuse marginal and then there is an eccentric 90% stenosis. There is mild disease in the midvessel beyond the tight stenosis. The circumflex terminates in a moderate-sized posterolateral branch which is free of disease. RIGHT CORONARY ANGIOGRAPHY:  The right coronary artery is anatomically dominant. There is mild ostial tapering, but the vessel is otherwise free of disease. Distally it gives off a moderate-sized PDA and 2 small LV branches which are free of disease. Injection via the right femoral sheath shows patent vessels, good catheter position. SUMMARY:    1.   Two-vessel coronary artery disease. A 90% stenosis in the mid circumflex, 60% and 80% stenoses in the mid LAD, 90% stenoses in the first two diagonal branches. Also, noted is mild ostial right coronary disease. 2.   Mild pulmonary hypertension. 3.   By history, moderate aortic stenosis. ADDENDUM (Job [id number]): While still in the catheterization lab and on the table, the patient reported left anterior chest pain rated at 8 or 9 on a scale of 1 to 10. She initially said that she did not have this upstairs before coming down but then reported that she did, but it was not this bad. Of note, during the case, the patient had nausea and vomiting on several occasions and required Zofran. The patient was re-prepped, and we re-entered the right femoral artery with a 6-Solomon Islander sheath percutaneously.   Restudy angiogram revealed no change in the coronary anatomy compared to the just completed cardiac cath. There was no source for acute ischemia. At this time, the sheath was removed, and manual pressure was applied. Hemostasis was achieved. There were no complications. Patient's chest pain was minimal at the end of the case. Dictated By Iliana Collier.  Katherine Junior MD  d: 02/02/2022 12:49:31  t: 02/02/2022 13:24:33  Job [id number]  NRV/    cc: Emilia Dhillon MD

## 2022-02-02 NOTE — IVS NOTE
Bedside handoff to Christiano Fisher RN  Pt denies any pain or discomfort  Right groin procedural sites dry and intact, no bleeding or swelling noted. Dressing in place.   Pt denies any pain or discomfort at this time  Activity restriction and bedrest status reviewed  Pt transferred with family and belongings   Nitro gtt infusing at 10mcg/min  Heparin gtt to restart at 1530; repeat PTT at 1730

## 2022-02-02 NOTE — PROGRESS NOTES
Primary RN paged regarding cardizem. States pt's HR is 67 at this time. Advised primary RN to obtain EKG. Pt now in SR with HR of 67. No acute ischemic changes noted. Advised primary RN to hold cardizem at this time. 2 hour troponin elevated at 591 up from 47 earlier today likely related to rapid afib. Primary RN states pt still reporting CP but states pain has improved from earlier. Nitroglycerin paste ordered. Pt currently on heparin gtt. Troponin ordered for am. Advised primary RN to repage if pt's chest pain changes or worsens.

## 2022-02-02 NOTE — PLAN OF CARE
Pt received from ED. Says she has slight pain in her chest and pressure in her shoulder and states \"it was not bad compared to this morning\". APN aware. Pt converted to sinus and HR controlled. APN Aware and EKG ordered. IV lasix given. Call light within reach. Fall precautions in place. Problem: Patient Centered Care  Goal: Patient preferences are identified and integrated in the patient's plan of care  Description: Interventions:  - What would you like us to know as we care for you?  I have family that cares for me   - Provide timely, complete, and accurate information to patient/family  - Incorporate patient and family knowledge, values, beliefs, and cultural backgrounds into the planning and delivery of care  - Encourage patient/family to participate in care and decision-making at the level they choose  - Honor patient and family perspectives and choices  Outcome: Progressing

## 2022-02-02 NOTE — H&P
[facility]    PATIENT'S NAME: KAYLI GARZA   ATTENDING PHYSICIAN: Pipe De La Garza MD   PATIENT ACCOUNT#:   [id number]    LOCATION:  [place] [address]  MEDICAL RECORD #:   [id number]       YOB: 1945  ADMISSION DATE:       02/01/2022    HISTORY AND PHYSICAL EXAMINATION    CHIEF COMPLAINT:  Atrial fibrillation, rapid ventricular response, chest pain, and possible acute on chronic diastolic heart failure. HISTORY OF PRESENT ILLNESS:  The patient is a 33-year-old  female with history of moderate to severe aortic stenosis, came in today to the emergency department for evaluation of increased leg swelling and chest discomfort with palpitations. Upon arrival to the emergency room, noted to be in atrial fibrillation and rapid ventricular response, rate of 130. CBC shows white blood cell count of 16.3 with left shift, hemoglobin 11.1 which is at baseline, liver function tests and chemistry unremarkable. GFR is 49 at baseline. ProBNP 3700. Lipase and troponin were negative. Chest x-rays still pending. The patient was started on IV Cardizem drip. D-dimer still pending to rule out PE. She had a recent TSH which was negative. Patient will be admitted to the hospital for further management. PAST MEDICAL HISTORY:  Left ventricular diastolic dysfunction. Moderate aortic stenosis, according to an echocardiogram done in September 2021; her mean gradient is 33 mmHg. Hypertension, hyperlipidemia, chronic kidney disease stage 2 to 3, generalized osteoarthritis, rheumatoid arthritis, gastroesophageal reflux disease. PAST SURGICAL HISTORY:  None. MEDICATIONS:  Please see medication reconciliation list.     ALLERGIES:  Sulfa. FAMILY HISTORY:  Positive for hypertension. SOCIAL HISTORY:  No tobacco, alcohol, or drug use. Lives with her family. Independent for basic activities of daily living.     REVIEW OF SYSTEMS:  The patient overall is a poor historian but she noted lately that she has been having on and off chest discomfort described as \"sharp pain\" that wraps around from precordial area to the back. Also, she has been feeling heart is racing on and off. She has been feeling more tired and fatigued than usual.  Denies any specific dyspnea on exertion; but when I asked her more, she feels low energy. No fever or chills. No cough. Other 12-point review of systems is negative. PHYSICAL EXAMINATION:  GENERAL:  Alert, oriented to time, place, and person. Anxious. Mild to moderate distress. VITAL SIGNS:  Temperature 98.4, pulse 137, respiratory rate 27, blood pressure 119/94, pulse ox 96% on room air. HEENT:  Atraumatic. Oropharynx clear. Dry mucous membranes. Ears, nose:  Normal.  Eyes:  Anicteric sclerae. NECK:  Supple. No lymphadenopathy. Trachea midline. Full range of motion. Positive jugular venous distension. LUNGS:  Diminished breathing sounds both lung bases. HEART:  Irregularly irregular rhythm. S1 and S2 auscultated. Systolic murmur best heard at the apex and right second intercostal space. ABDOMEN:  Soft, nondistended. No tenderness. Positive bowel sounds. EXTREMITIES:  Nonpitting edema noted on both legs. NEUROLOGIC:  Motor and sensory intact. ASSESSMENT AND PLAN:  1. Atrial fibrillation with rapid ventricular response. 2.   Chest pain, rule out pulmonary embolism, rule out acute coronary syndrome. 3.   Moderate to severe aortic stenosis. 4.   Chronic kidney disease stage 2 to 3. The patient will admitted to telemetry floor. Followup on D-dimer results. Start her on IV heparin, IV Cardizem. Obtain 2D echocardiogram with Doppler, also use IV Lasix. Monitor hemodynamic status closely. Hold if blood pressure drops below 100. Cardiology consult was notified. Further recommendations to follow.     Dictated By Luigi Mcdonnell MD  d: 02/01/2022 14:42:19  t: 02/01/2022 17:10:13  Job [id number]  FB/

## 2022-02-02 NOTE — PLAN OF CARE
Pt is aox4. Pt states no chest pain; sore Left shoulder, states would not consider it as pain. Pt remains on heparin gtt at 8ml, next lab draw with morning labs. Critical troponin 1,919; cards APRN aware. Plan for ECHO and cath today. Pt NPO after midnight. Fall precautions in place. Call light and belongings within reach. Problem: Patient Centered Care  Goal: Patient preferences are identified and integrated in the patient's plan of care  Description: Interventions:  - What would you like us to know as we care for you?  I have family that cares for me   - Provide timely, complete, and accurate information to patient/family  - Incorporate patient and family knowledge, values, beliefs, and cultural backgrounds into the planning and delivery of care  - Encourage patient/family to participate in care and decision-making at the level they choose  - Honor patient and family perspectives and choices  Outcome: Progressing     Problem: Patient/Family Goals  Goal: Patient/Family Long Term Goal  Description: Patient's Long Term Goal: go home    Interventions:  - follow MD's orders  - meds per mar  - See additional Care Plan goals for specific interventions  Outcome: Progressing  Goal: Patient/Family Short Term Goal  Description: Patient's Short Term Goal: relieve pain    Interventions:   - meds per mar  - heparin gtt  - See additional Care Plan goals for specific interventions  Outcome: Progressing

## 2022-02-03 ENCOUNTER — APPOINTMENT (OUTPATIENT)
Dept: PICC SERVICES | Facility: HOSPITAL | Age: 77
DRG: 246 | End: 2022-02-03
Attending: HOSPITALIST
Payer: MEDICARE

## 2022-02-03 ENCOUNTER — APPOINTMENT (OUTPATIENT)
Dept: INTERVENTIONAL RADIOLOGY/VASCULAR | Facility: HOSPITAL | Age: 77
DRG: 246 | End: 2022-02-03
Attending: INTERNAL MEDICINE
Payer: MEDICARE

## 2022-02-03 LAB
ALBUMIN SERPL-MCNC: 3 G/DL (ref 3.4–5)
ANION GAP SERPL CALC-SCNC: 7 MMOL/L (ref 0–18)
APTT PPP: 58 SECONDS (ref 23.3–35.6)
BASOPHILS # BLD AUTO: 0.04 X10(3) UL (ref 0–0.2)
BASOPHILS NFR BLD AUTO: 0.3 %
BUN BLD-MCNC: 35 MG/DL (ref 7–18)
BUN/CREAT SERPL: 30.7 (ref 10–20)
CALCIUM BLD-MCNC: 9.1 MG/DL (ref 8.5–10.1)
CHLORIDE SERPL-SCNC: 106 MMOL/L (ref 98–112)
CO2 SERPL-SCNC: 28 MMOL/L (ref 21–32)
CREAT BLD-MCNC: 1.14 MG/DL
DEPRECATED RDW RBC AUTO: 45 FL (ref 35.1–46.3)
EOSINOPHIL # BLD AUTO: 0.17 X10(3) UL (ref 0–0.7)
EOSINOPHIL NFR BLD AUTO: 1.5 %
ERYTHROCYTE [DISTWIDTH] IN BLOOD BY AUTOMATED COUNT: 14 % (ref 11–15)
GLUCOSE BLD-MCNC: 89 MG/DL (ref 70–99)
HCT VFR BLD AUTO: 31 %
HGB BLD-MCNC: 10.1 G/DL
IMM GRANULOCYTES # BLD AUTO: 0.04 X10(3) UL (ref 0–1)
IMM GRANULOCYTES NFR BLD: 0.3 %
ISTAT ACTIVATED CLOTTING TIME: 219 SECONDS (ref 125–137)
LYMPHOCYTES # BLD AUTO: 3.18 X10(3) UL (ref 1–4)
LYMPHOCYTES NFR BLD AUTO: 27.6 %
MAGNESIUM SERPL-MCNC: 2 MG/DL (ref 1.7–2.8)
MCH RBC QN AUTO: 28.6 PG (ref 26–34)
MCHC RBC AUTO-ENTMCNC: 32.6 G/DL (ref 31–37)
MCV RBC AUTO: 87.8 FL
MONOCYTES # BLD AUTO: 1.03 X10(3) UL (ref 0.1–1)
MONOCYTES NFR BLD AUTO: 8.9 %
NEUTROPHILS # BLD AUTO: 7.08 X10 (3) UL (ref 1.5–7.7)
NEUTROPHILS # BLD AUTO: 7.08 X10(3) UL (ref 1.5–7.7)
NEUTROPHILS NFR BLD AUTO: 61.4 %
OSMOLALITY SERPL CALC.SUM OF ELEC: 299 MOSM/KG (ref 275–295)
PHOSPHATE SERPL-MCNC: 3.9 MG/DL (ref 2.5–4.9)
PLATELET # BLD AUTO: 226 10(3)UL (ref 150–450)
POTASSIUM SERPL-SCNC: 3.5 MMOL/L (ref 3.5–5.1)
POTASSIUM SERPL-SCNC: 3.5 MMOL/L (ref 3.5–5.1)
POTASSIUM SERPL-SCNC: 4.4 MMOL/L (ref 3.5–5.1)
RBC # BLD AUTO: 3.53 X10(6)UL
SODIUM SERPL-SCNC: 141 MMOL/L (ref 136–145)
WBC # BLD AUTO: 11.5 X10(3) UL (ref 4–11)

## 2022-02-03 PROCEDURE — 027237Z DILATION OF CORONARY ARTERY, THREE ARTERIES WITH FOUR OR MORE DRUG-ELUTING INTRALUMINAL DEVICES, PERCUTANEOUS APPROACH: ICD-10-PCS | Performed by: INTERNAL MEDICINE

## 2022-02-03 PROCEDURE — 99233 SBSQ HOSP IP/OBS HIGH 50: CPT | Performed by: HOSPITALIST

## 2022-02-03 RX ORDER — SODIUM CHLORIDE 9 MG/ML
INJECTION, SOLUTION INTRAVENOUS CONTINUOUS
Status: ACTIVE | OUTPATIENT
Start: 2022-02-03 | End: 2022-02-03

## 2022-02-03 RX ORDER — ASPIRIN 81 MG/1
81 TABLET ORAL DAILY
Status: DISCONTINUED | OUTPATIENT
Start: 2022-02-04 | End: 2022-02-05

## 2022-02-03 RX ORDER — HEPARIN SODIUM 1000 [USP'U]/ML
INJECTION, SOLUTION INTRAVENOUS; SUBCUTANEOUS
Status: COMPLETED
Start: 2022-02-03 | End: 2022-02-03

## 2022-02-03 RX ORDER — DIPHENHYDRAMINE HYDROCHLORIDE 50 MG/ML
INJECTION INTRAMUSCULAR; INTRAVENOUS
Status: COMPLETED
Start: 2022-02-03 | End: 2022-02-03

## 2022-02-03 RX ORDER — LIDOCAINE HYDROCHLORIDE 20 MG/ML
INJECTION, SOLUTION EPIDURAL; INFILTRATION; INTRACAUDAL; PERINEURAL
Status: COMPLETED
Start: 2022-02-03 | End: 2022-02-03

## 2022-02-03 RX ORDER — LABETALOL HYDROCHLORIDE 5 MG/ML
INJECTION, SOLUTION INTRAVENOUS
Status: COMPLETED
Start: 2022-02-03 | End: 2022-02-03

## 2022-02-03 RX ORDER — MIDAZOLAM HYDROCHLORIDE 1 MG/ML
INJECTION INTRAMUSCULAR; INTRAVENOUS
Status: DISCONTINUED
Start: 2022-02-03 | End: 2022-02-03 | Stop reason: WASHOUT

## 2022-02-03 RX ORDER — NITROGLYCERIN 20 MG/100ML
INJECTION INTRAVENOUS
Status: COMPLETED
Start: 2022-02-03 | End: 2022-02-03

## 2022-02-03 RX ORDER — CLOPIDOGREL BISULFATE 75 MG/1
75 TABLET ORAL DAILY
Status: DISCONTINUED | OUTPATIENT
Start: 2022-02-04 | End: 2022-02-05

## 2022-02-03 RX ORDER — POTASSIUM CHLORIDE 20 MEQ/1
40 TABLET, EXTENDED RELEASE ORAL EVERY 4 HOURS
Status: COMPLETED | OUTPATIENT
Start: 2022-02-03 | End: 2022-02-03

## 2022-02-04 LAB
ANION GAP SERPL CALC-SCNC: 6 MMOL/L (ref 0–18)
APTT PPP: 24.6 SECONDS (ref 23.3–35.6)
BILIRUB UR QL: NEGATIVE
BUN BLD-MCNC: 25 MG/DL (ref 7–18)
BUN/CREAT SERPL: 23.8 (ref 10–20)
CALCIUM BLD-MCNC: 8.7 MG/DL (ref 8.5–10.1)
CHLORIDE SERPL-SCNC: 109 MMOL/L (ref 98–112)
CLARITY UR: CLEAR
CO2 SERPL-SCNC: 25 MMOL/L (ref 21–32)
COLOR UR: YELLOW
CREAT BLD-MCNC: 1.05 MG/DL
DEPRECATED RDW RBC AUTO: 46.6 FL (ref 35.1–46.3)
ERYTHROCYTE [DISTWIDTH] IN BLOOD BY AUTOMATED COUNT: 14.2 % (ref 11–15)
GLUCOSE BLD-MCNC: 98 MG/DL (ref 70–99)
GLUCOSE UR-MCNC: NEGATIVE MG/DL
HCT VFR BLD AUTO: 30.2 %
HGB BLD-MCNC: 9.6 G/DL
KETONES UR-MCNC: NEGATIVE MG/DL
MCH RBC QN AUTO: 28.7 PG (ref 26–34)
MCHC RBC AUTO-ENTMCNC: 31.8 G/DL (ref 31–37)
NITRITE UR QL STRIP.AUTO: NEGATIVE
OSMOLALITY SERPL CALC.SUM OF ELEC: 294 MOSM/KG (ref 275–295)
PH UR: 6 [PH] (ref 5–8)
PLATELET # BLD AUTO: 188 10(3)UL (ref 150–450)
POTASSIUM SERPL-SCNC: 4.3 MMOL/L (ref 3.5–5.1)
PROT UR-MCNC: NEGATIVE MG/DL
RBC # BLD AUTO: 3.34 X10(6)UL
SODIUM SERPL-SCNC: 140 MMOL/L (ref 136–145)
SP GR UR STRIP: 1.01 (ref 1–1.03)
UROBILINOGEN UR STRIP-ACNC: <2
WBC # BLD AUTO: 10.6 X10(3) UL (ref 4–11)

## 2022-02-04 PROCEDURE — 99233 SBSQ HOSP IP/OBS HIGH 50: CPT | Performed by: HOSPITALIST

## 2022-02-04 RX ORDER — LISINOPRIL 5 MG/1
5 TABLET ORAL DAILY
Status: DISCONTINUED | OUTPATIENT
Start: 2022-02-04 | End: 2022-02-05

## 2022-02-04 RX ORDER — ATORVASTATIN CALCIUM 40 MG/1
80 TABLET, FILM COATED ORAL NIGHTLY
Status: DISCONTINUED | OUTPATIENT
Start: 2022-02-04 | End: 2022-02-05

## 2022-02-04 NOTE — CARDIAC REHAB
Cardiac Rehab Phase I    Activity:  Distance   Assistance needed   Patient tolerated activity . Education:  Handouts provided and reviewed: [address] Diet: Healthy Cardiac diet reviewed. Disease Process: Disease process reviewed. Reviewed the following:       RISK FACTORS: Reviewed      SMOKING CESSATION: Reviewed      HOME EXERCISE ACTIVITY: Reviewed      OUTPATIENT CARDIAC REHAB: Referred to Cardiac Rehabilitation Phase 2.

## 2022-02-04 NOTE — DIETARY NOTE
NUTRITION EDUCATION NOTE    Received consult for nutrition education per cardiac rehab order set. Appropriate education and handout(s) provided. See education section of Epic for specifics.     Starr Cheung RDN, LDN  Clinical Nutrition  Ext [phone]

## 2022-02-04 NOTE — CM/SW NOTE
BPCI-Advanced Medicare Program Note:  Plan of care reviewed for care coordination and discharge planning. Noted patient falls under  BPCI/Medicare program, with  for acute MI. GRANT tool was used to help determine next care setting. Thus, [address] recommendations is for home pending patient progress. Case Management team is following for care coordination and discharge planning needs.

## 2022-02-05 ENCOUNTER — APPOINTMENT (OUTPATIENT)
Dept: GENERAL RADIOLOGY | Facility: HOSPITAL | Age: 77
DRG: 246 | End: 2022-02-05
Attending: HOSPITALIST
Payer: MEDICARE

## 2022-02-05 VITALS
RESPIRATION RATE: 18 BRPM | HEART RATE: 87 BPM | BODY MASS INDEX: 27.34 KG/M2 | WEIGHT: 144.81 LBS | OXYGEN SATURATION: 97 % | HEIGHT: 61 IN | SYSTOLIC BLOOD PRESSURE: 129 MMHG | TEMPERATURE: 98 F | DIASTOLIC BLOOD PRESSURE: 59 MMHG

## 2022-02-05 LAB
ANION GAP SERPL CALC-SCNC: 7 MMOL/L (ref 0–18)
BUN BLD-MCNC: 16 MG/DL (ref 7–18)
BUN/CREAT SERPL: 16.8 (ref 10–20)
CALCIUM BLD-MCNC: 8.8 MG/DL (ref 8.5–10.1)
CHLORIDE SERPL-SCNC: 107 MMOL/L (ref 98–112)
CO2 SERPL-SCNC: 26 MMOL/L (ref 21–32)
CREAT BLD-MCNC: 0.95 MG/DL
GLUCOSE BLD-MCNC: 109 MG/DL (ref 70–99)
MAGNESIUM SERPL-MCNC: 1.9 MG/DL (ref 1.7–2.8)
OSMOLALITY SERPL CALC.SUM OF ELEC: 292 MOSM/KG (ref 275–295)
POTASSIUM SERPL-SCNC: 4 MMOL/L (ref 3.5–5.1)
SODIUM SERPL-SCNC: 140 MMOL/L (ref 136–145)

## 2022-02-05 PROCEDURE — 73610 X-RAY EXAM OF ANKLE: CPT | Performed by: HOSPITALIST

## 2022-02-05 PROCEDURE — 99239 HOSP IP/OBS DSCHRG MGMT >30: CPT | Performed by: HOSPITALIST

## 2022-02-05 RX ORDER — TRAMADOL HYDROCHLORIDE 50 MG/1
50 TABLET ORAL DAILY PRN
Qty: 15 TABLET | Refills: 0 | Status: SHIPPED | OUTPATIENT
Start: 2022-02-05 | End: 2022-02-05

## 2022-02-05 RX ORDER — CLOPIDOGREL BISULFATE 75 MG/1
75 TABLET ORAL DAILY
Qty: 90 TABLET | Refills: 0 | Status: SHIPPED | OUTPATIENT
Start: 2022-02-06 | End: 2022-02-10

## 2022-02-05 RX ORDER — PREDNISONE 10 MG/1
5 TABLET ORAL
Status: DISCONTINUED | OUTPATIENT
Start: 2022-02-05 | End: 2022-02-05

## 2022-02-05 RX ORDER — LISINOPRIL 5 MG/1
5 TABLET ORAL DAILY
Qty: 30 TABLET | Refills: 0 | Status: SHIPPED | OUTPATIENT
Start: 2022-02-06 | End: 2022-02-10

## 2022-02-05 RX ORDER — ASPIRIN 81 MG/1
81 TABLET ORAL DAILY
Qty: 30 TABLET | Refills: 0 | Status: SHIPPED | OUTPATIENT
Start: 2022-02-06 | End: 2022-02-10

## 2022-02-05 RX ORDER — ATORVASTATIN CALCIUM 80 MG/1
80 TABLET, FILM COATED ORAL NIGHTLY
Qty: 30 TABLET | Refills: 0 | Status: SHIPPED | OUTPATIENT
Start: 2022-02-05 | End: 2022-02-10

## 2022-02-05 RX ORDER — TRAMADOL HYDROCHLORIDE 50 MG/1
50 TABLET ORAL EVERY 6 HOURS PRN
Status: DISCONTINUED | OUTPATIENT
Start: 2022-02-05 | End: 2022-02-05

## 2022-02-05 NOTE — PLAN OF CARE
Double RN skin check done prior to transfer off Unit. Skin check performed by this RN and Sally RN. Wounds are as follows: Rt groin - small bruise ; incision site. No hematoma. Will remain available for any further questions or concerns. Report given to receiving RN. Pt transferred to [facility] [place] [place] [facility] wo issues.

## 2022-02-07 ENCOUNTER — PATIENT OUTREACH (OUTPATIENT)
Dept: CASE MANAGEMENT | Age: 77
End: 2022-02-07

## 2022-02-07 NOTE — PROGRESS NOTES
Attempted to reach the patient to complete a [facility]Hospital FU call. Left a message for the pt to call the [facility] back at, [phone].

## 2022-02-08 ENCOUNTER — TELEPHONE (OUTPATIENT)
Dept: FAMILY MEDICINE CLINIC | Facility: CLINIC | Age: 77
End: 2022-02-08

## 2022-02-08 NOTE — TELEPHONE ENCOUNTER
Pt discharged 2-5-22, atrial fibrillation with rapid ventricular response. Pt has appt with PCP on 2-28-22 which is outside TCM timeframe. NCM called twice and left 2 messages. TCM/HFU appt recommended by 2-12-22 as pt is a high risk for readmission. Please discuss with PCP and contact pt accordingly for sooner appt. Thank you!     BOOK BY DATE (last date for TCM): 2-19-22

## 2022-02-08 NOTE — PROGRESS NOTES
LMTCB for post hospital follow up. NCM contact information provided. NCM sent TE to PCP office re: assistance in scheduling sooner TCM HFU appt due to high risk for readmission.

## 2022-02-08 NOTE — CDS QUERY
Dear Doctor: To answer this query:  1st Click \"Edit\" button on the toolbar. 2nd Type an \"X\" in the bracket for diagnosis(s) that apply. (You may also add additional clinical details as you feel necessary to substantiate your response.)  3. Click on \"SIGN\" TAB  to complete response. Thank you. Dolores Brand    Dear Doctor Juan Luis Maxwell,  02/01/22 Admit 64 yr old female history of moderate to severe aortic stenosis, left ventricular diastolic dysfunction, HTN, hyperlipemia, CKD stage 2-3, generalized OA, RA, GERD came in today to the ED  for evaluation of increased leg swelling and chest discomfort with palpitations. Upon arrival to the emergency room, noted to be in atrial fibrillation and rapid ventricular response, rate of 130. Troponin were negative  02/02/22 Cardiologist NP Progress note NSTEMI admitted with dyspnea/chest pain initial troponin negative, topoisomerase overnight peak today 3006. New on IV Heparin and NTP now pain free no dyspnea. On ASA/BB. Afib RVR on admit converted to SR/SB. No record of Afib care everywhere that see and not on [facility] so presumed new onset duration unknown,  HFpEF euvolemic after IV Lasix  Moderate-severe aortic stenosis follows with Dr. Buddy Hager - patient was anticipating [facility] near future  02/02/22 Cath Procedure note:  presented with chest pain and a non-STEMI secondary to rapid atrial fibrillation. Recommended for cardiac catheterization. SUMMARY:  1.Two-vessel coronary artery disease. A 90% stenosis in the mid circumflex, 60% and 80% stenoses in the mid LAD, 90% stenoses in the first two diagonal branches. Also, noted is mild ostial right coronary disease. 2. Mild pulmonary hypertension. 3. By history, moderate aortic stenosis.(please see full Cath Procedure report for 2/2/22)   02/022022: [facility] Consult: Underwent cath today which demonstrated multivessel coronary artery disease.   02/03/22 2nd Procedure Note:   Indication: Non-ST relation MI, turndown for CABG . Procedure Performed:    PCI of mid circumflex/PCI of diagonal 1/PCI of mid LAD/PCI of mid to distal LAD (Please see full Procedure report for 2/3/22)   02/04/22 Cardiologist Progress Note Assessment:  NSTEMI turned down for bypass. Underwent multivessel PCI diagonal, LAD and left circumflex with great results. Continue combination of aspirin and Plavix  75 mg daily, Afib RVR on admit converted to SR/SB. No record of Afib care everywhere that  I see and not on AC so presumed new onset duration unknown, HFpEF euvolemic on exam today. Moderate-severe aortic stenosis   02/05/22 Cardiologist Progress Note Assessment:  Severe coronary artery disease status post multivessel PCI  Moderate aortic stenosis Atrial fibrillation Normal LVEF  02/04/22 Your Progress Note A&P: Chest pain, NEGRON P/w afib w/rvr, HFpEF With hx of mod aortic stenosis, , Type II NSTEMI Cardiology consulted,  Underwent cardiac cath, please see report for details, Found to have multivessel CAD, CABG deferred, Subsequently underwent cardiac cath with PCI x 4 yesterday with excellent Results, cont ASA, plavix, statin. 02/05/22 Your [address] Discharge Diagnoses:CAD s/p PCI. In Hospital Course Section of DCS:  Chest pain, NEGRON P/w afib w/rvr, HFpEF With hx of mod aortic stenosis, Type II NSTEMI,  Cardiology consulted, Underwent cardiac cath, please see report for details, Found to have multivessel CAD, CABG deferred Subsequently underwent cardiac cath with PCI x 4 yesterday with excellent Results, cont ASA, plavix, statin.       PLEASE CLARIFY THE APPROPRIATE DIAGNOSIS(S)  by Placing an \"X\" in the appropriate bracket(s)     (  X)  Coronary Artery Disease     (  )  NSTEMI    (  ) Type 2 NSTEMI and please specify the underlying cause of Type 2  NSTEMI:_________    (  )  Other Explanation,  (please specify): ________________________________    (  )  Unable to Determine (please comment):______________________________        :   If you have any questions, please contact Clinical :  Marychuy Christianson RN at [phone]. Thank You.     THIS FORM IS A PERMANENT PART OF THE MEDICAL RECORD

## 2022-02-10 ENCOUNTER — OFFICE VISIT (OUTPATIENT)
Dept: OTOLARYNGOLOGY | Facility: CLINIC | Age: 77
End: 2022-02-10
Payer: MEDICARE

## 2022-02-10 ENCOUNTER — OFFICE VISIT (OUTPATIENT)
Dept: INTERNAL MEDICINE CLINIC | Facility: CLINIC | Age: 77
End: 2022-02-10
Payer: MEDICARE

## 2022-02-10 VITALS
BODY MASS INDEX: 27.56 KG/M2 | WEIGHT: 146 LBS | HEIGHT: 61 IN | HEART RATE: 81 BPM | SYSTOLIC BLOOD PRESSURE: 134 MMHG | DIASTOLIC BLOOD PRESSURE: 64 MMHG

## 2022-02-10 VITALS — HEIGHT: 61 IN | WEIGHT: 146 LBS | BODY MASS INDEX: 27.56 KG/M2

## 2022-02-10 DIAGNOSIS — E78.2 MIXED HYPERLIPIDEMIA: ICD-10-CM

## 2022-02-10 DIAGNOSIS — I35.0 MODERATE AORTIC STENOSIS BY PRIOR ECHOCARDIOGRAM: ICD-10-CM

## 2022-02-10 DIAGNOSIS — I10 HYPERTENSION GOAL BP (BLOOD PRESSURE) < 130/80: ICD-10-CM

## 2022-02-10 DIAGNOSIS — Z95.5 S/P DRUG ELUTING CORONARY STENT PLACEMENT: ICD-10-CM

## 2022-02-10 DIAGNOSIS — Z09 HOSPITAL DISCHARGE FOLLOW-UP: Primary | ICD-10-CM

## 2022-02-10 DIAGNOSIS — N18.31 STAGE 3A CHRONIC KIDNEY DISEASE (HCC): ICD-10-CM

## 2022-02-10 DIAGNOSIS — I25.10 CORONARY ARTERY DISEASE INVOLVING NATIVE CORONARY ARTERY OF NATIVE HEART WITHOUT ANGINA PECTORIS: ICD-10-CM

## 2022-02-10 DIAGNOSIS — M25.511 CHRONIC RIGHT SHOULDER PAIN: ICD-10-CM

## 2022-02-10 DIAGNOSIS — M06.9 RHEUMATOID ARTHRITIS, INVOLVING UNSPECIFIED SITE, UNSPECIFIED WHETHER RHEUMATOID FACTOR PRESENT (HCC): ICD-10-CM

## 2022-02-10 DIAGNOSIS — G89.29 CHRONIC RIGHT SHOULDER PAIN: ICD-10-CM

## 2022-02-10 DIAGNOSIS — I50.33 ACUTE ON CHRONIC DIASTOLIC (CONGESTIVE) HEART FAILURE (HCC): ICD-10-CM

## 2022-02-10 DIAGNOSIS — I48.91 ATRIAL FIBRILLATION WITH RVR (HCC): ICD-10-CM

## 2022-02-10 DIAGNOSIS — H91.93 DECREASED HEARING OF BOTH EARS: Primary | ICD-10-CM

## 2022-02-10 PROBLEM — I50.9 CONGESTIVE HEART FAILURE (CHF) (HCC): Status: RESOLVED | Noted: 2020-11-25 | Resolved: 2022-02-10

## 2022-02-10 PROBLEM — I50.9 ACUTE ON CHRONIC CONGESTIVE HEART FAILURE, UNSPECIFIED HEART FAILURE TYPE (HCC): Status: RESOLVED | Noted: 2022-02-01 | Resolved: 2022-02-10

## 2022-02-10 PROBLEM — R77.8 ELEVATED TROPONIN: Status: RESOLVED | Noted: 2022-02-01 | Resolved: 2022-02-10

## 2022-02-10 PROBLEM — R79.89 ELEVATED TROPONIN: Status: RESOLVED | Noted: 2022-02-01 | Resolved: 2022-02-10

## 2022-02-10 PROCEDURE — 99496 TRANSJ CARE MGMT HIGH F2F 7D: CPT | Performed by: INTERNAL MEDICINE

## 2022-02-10 PROCEDURE — 1111F DSCHRG MED/CURRENT MED MERGE: CPT | Performed by: SPECIALIST

## 2022-02-10 PROCEDURE — 1111F DSCHRG MED/CURRENT MED MERGE: CPT | Performed by: INTERNAL MEDICINE

## 2022-02-10 PROCEDURE — 99213 OFFICE O/P EST LOW 20 MIN: CPT | Performed by: SPECIALIST

## 2022-02-10 RX ORDER — PREGABALIN 25 MG/1
25 CAPSULE ORAL 2 TIMES DAILY
Qty: 180 CAPSULE | Refills: 3 | Status: SHIPPED | OUTPATIENT
Start: 2022-02-10 | End: 2022-05-11

## 2022-02-10 RX ORDER — ASPIRIN 81 MG/1
81 TABLET ORAL DAILY
Qty: 90 TABLET | Refills: 3 | Status: SHIPPED | OUTPATIENT
Start: 2022-02-10

## 2022-02-10 RX ORDER — CLOPIDOGREL BISULFATE 75 MG/1
75 TABLET ORAL DAILY
Qty: 90 TABLET | Refills: 3 | Status: SHIPPED | OUTPATIENT
Start: 2022-02-10

## 2022-02-10 RX ORDER — ATORVASTATIN CALCIUM 80 MG/1
80 TABLET, FILM COATED ORAL NIGHTLY
Qty: 90 TABLET | Refills: 3 | Status: SHIPPED | OUTPATIENT
Start: 2022-02-10

## 2022-02-10 RX ORDER — LISINOPRIL 10 MG/1
10 TABLET ORAL DAILY
Qty: 90 TABLET | Refills: 3 | Status: SHIPPED | OUTPATIENT
Start: 2022-02-10

## 2022-02-10 NOTE — PATIENT INSTRUCTIONS
You have decreased hearing with a normal otologic exam.  I ordered an audiogram Huan Rojas with word discrimination.

## 2022-02-21 NOTE — PROGRESS NOTES
Multiple attempts to reach pt and messages left with no return call. Pt went in for HFU appt with PCP on 2/10/22. Encounter closing.

## 2022-03-07 DIAGNOSIS — I25.10 CORONARY ARTERY DISEASE INVOLVING NATIVE CORONARY ARTERY OF NATIVE HEART WITHOUT ANGINA PECTORIS: ICD-10-CM

## 2022-03-07 DIAGNOSIS — I50.33 ACUTE ON CHRONIC DIASTOLIC (CONGESTIVE) HEART FAILURE (HCC): ICD-10-CM

## 2022-03-07 DIAGNOSIS — I48.91 ATRIAL FIBRILLATION WITH RVR (HCC): ICD-10-CM

## 2022-03-07 DIAGNOSIS — I35.0 MODERATE AORTIC STENOSIS BY PRIOR ECHOCARDIOGRAM: ICD-10-CM

## 2022-03-07 DIAGNOSIS — Z95.5 S/P DRUG ELUTING CORONARY STENT PLACEMENT: ICD-10-CM

## 2022-03-23 ENCOUNTER — LAB SERVICES (OUTPATIENT)
Dept: LAB | Age: 77
End: 2022-03-23

## 2022-03-23 DIAGNOSIS — I25.118 ATHEROSCLEROTIC HEART DISEASE OF NATIVE CORONARY ARTERY WITH OTHER FORMS OF ANGINA PECTORIS (CMD): ICD-10-CM

## 2022-03-23 DIAGNOSIS — R07.89 ATYPICAL CHEST PAIN: Primary | ICD-10-CM

## 2022-03-23 DIAGNOSIS — R07.89 OTHER CHEST PAIN: Primary | ICD-10-CM

## 2022-03-23 DIAGNOSIS — R07.89 OTHER CHEST PAIN: ICD-10-CM

## 2022-03-23 PROCEDURE — 84484 ASSAY OF TROPONIN QUANT: CPT | Performed by: CLINICAL MEDICAL LABORATORY

## 2022-03-23 PROCEDURE — 36415 COLL VENOUS BLD VENIPUNCTURE: CPT | Performed by: CLINICAL MEDICAL LABORATORY

## 2022-03-24 LAB — TROPONIN I SERPL DL<=0.01 NG/ML-MCNC: 12 NG/L

## 2022-03-28 NOTE — TELEPHONE ENCOUNTER
LOV: 7/1/21  Last Refilled:#30, 3rfs 11/11/21    Future Appointments   Date Time Provider César Estrada   3/29/2022  1:40 PM Livan Cota MD [facility] ADO           Summary:  1. Cont. prednisone 5mg ad ay -   2. Cont. leflunomide 20mg a day  3.ok to take glucosamine /chondroitin  4. Cont. Tylenol 650mg and diclofenac gel 1 %  5. Try tramadol 50mg a day for pain   6. Physical therapy for lower back and right leg   7. Lumbar xray   8. Can try medrol phylicia for pain as well  9 Return to clinic in 2-3 months. 10. Labs in 2-3 months. Angelica Berrios MD  4/22/2021         Please advise.

## 2022-03-29 ENCOUNTER — OFFICE VISIT (OUTPATIENT)
Dept: FAMILY MEDICINE CLINIC | Facility: CLINIC | Age: 77
End: 2022-03-29
Payer: MEDICARE

## 2022-03-29 VITALS
HEART RATE: 76 BPM | DIASTOLIC BLOOD PRESSURE: 71 MMHG | SYSTOLIC BLOOD PRESSURE: 170 MMHG | WEIGHT: 149 LBS | HEIGHT: 61 IN | BODY MASS INDEX: 28.13 KG/M2

## 2022-03-29 DIAGNOSIS — I35.0 MODERATE AORTIC STENOSIS BY PRIOR ECHOCARDIOGRAM: ICD-10-CM

## 2022-03-29 DIAGNOSIS — M25.511 CHRONIC RIGHT SHOULDER PAIN: ICD-10-CM

## 2022-03-29 DIAGNOSIS — G89.29 CHRONIC RIGHT SHOULDER PAIN: ICD-10-CM

## 2022-03-29 DIAGNOSIS — M54.2 NECK PAIN: ICD-10-CM

## 2022-03-29 DIAGNOSIS — J30.1 SEASONAL ALLERGIC RHINITIS DUE TO POLLEN: Primary | ICD-10-CM

## 2022-03-29 PROCEDURE — 99214 OFFICE O/P EST MOD 30 MIN: CPT | Performed by: NURSE PRACTITIONER

## 2022-03-29 RX ORDER — MONTELUKAST SODIUM 10 MG/1
10 TABLET ORAL DAILY
Qty: 30 TABLET | Refills: 3 | Status: SHIPPED | OUTPATIENT
Start: 2022-03-29 | End: 2023-03-24

## 2022-03-29 NOTE — ASSESSMENT & PLAN NOTE
-otc non-drowsy antihistamine (generic claritin, zyrtec or allegra)  -add steroidal nasal spray (flonase, rhinocort -generic works well)  -add singulair 10 mg at hs  -supportive care discussed  -Please call if symptoms worsen or are not resolving.

## 2022-03-29 NOTE — ASSESSMENT & PLAN NOTE
Ice 20 min 4-6 times per day  May take tylenol.   Please call if symptoms worsen or are not resolving-may need PT referral

## 2022-03-31 ENCOUNTER — APPOINTMENT (OUTPATIENT)
Dept: CARDIOLOGY | Age: 77
End: 2022-03-31
Attending: INTERNAL MEDICINE

## 2022-03-31 ENCOUNTER — HOSPITAL ENCOUNTER (OUTPATIENT)
Dept: CARDIOLOGY | Age: 77
Discharge: HOME | End: 2022-03-31
Attending: INTERNAL MEDICINE

## 2022-03-31 ENCOUNTER — HOSPITAL ENCOUNTER (OUTPATIENT)
Dept: NUCLEAR MEDICINE | Age: 77
Discharge: HOME | End: 2022-03-31
Attending: INTERNAL MEDICINE

## 2022-03-31 ENCOUNTER — APPOINTMENT (OUTPATIENT)
Dept: NUCLEAR MEDICINE | Age: 77
End: 2022-03-31
Attending: INTERNAL MEDICINE

## 2022-03-31 VITALS — HEART RATE: 64 BPM | SYSTOLIC BLOOD PRESSURE: 192 MMHG | DIASTOLIC BLOOD PRESSURE: 62 MMHG

## 2022-03-31 DIAGNOSIS — R07.89 ATYPICAL CHEST PAIN: ICD-10-CM

## 2022-03-31 PROCEDURE — 78452 HT MUSCLE IMAGE SPECT MULT: CPT

## 2022-03-31 PROCEDURE — A9502 TC99M TETROFOSMIN: HCPCS | Performed by: INTERNAL MEDICINE

## 2022-03-31 PROCEDURE — 10002800 HB RX 250 W HCPCS: Performed by: INTERNAL MEDICINE

## 2022-03-31 PROCEDURE — 10006150 HB RX 343: Performed by: INTERNAL MEDICINE

## 2022-03-31 PROCEDURE — 93017 CV STRESS TEST TRACING ONLY: CPT

## 2022-03-31 RX ORDER — REGADENOSON 0.08 MG/ML
0.4 INJECTION, SOLUTION INTRAVENOUS ONCE
Status: COMPLETED | OUTPATIENT
Start: 2022-03-31 | End: 2022-03-31

## 2022-03-31 RX ADMIN — TETROFOSMIN 21.2 MILLICURIE: 1.38 INJECTION, POWDER, LYOPHILIZED, FOR SOLUTION INTRAVENOUS at 14:20

## 2022-03-31 RX ADMIN — REGADENOSON 0.4 MG: 0.08 INJECTION, SOLUTION INTRAVENOUS at 14:20

## 2022-03-31 RX ADMIN — TETROFOSMIN 7.1 MILLICURIE: 1.38 INJECTION, POWDER, LYOPHILIZED, FOR SOLUTION INTRAVENOUS at 13:15

## 2022-04-01 LAB — STRESS TARGET HR: 142 BPM

## 2022-04-01 RX ORDER — PREDNISONE 1 MG/1
TABLET ORAL
Qty: 30 TABLET | Refills: 3 | Status: SHIPPED | OUTPATIENT
Start: 2022-04-01

## 2022-04-01 NOTE — TELEPHONE ENCOUNTER
Olimpia with [address] calling in regards to check on the status for the following medication.        predniSONE 5MG

## 2022-04-06 ENCOUNTER — HOSPITAL ENCOUNTER (EMERGENCY)
Facility: HOSPITAL | Age: 77
Discharge: HOME | End: 2022-04-06
Attending: EMERGENCY MEDICINE
Payer: MEDICARE

## 2022-04-06 ENCOUNTER — OFFICE VISIT (OUTPATIENT)
Dept: INTERNAL MEDICINE CLINIC | Facility: CLINIC | Age: 77
End: 2022-04-06
Payer: MEDICARE

## 2022-04-06 ENCOUNTER — APPOINTMENT (OUTPATIENT)
Dept: MRI IMAGING | Facility: HOSPITAL | Age: 77
End: 2022-04-06
Attending: EMERGENCY MEDICINE
Payer: MEDICARE

## 2022-04-06 ENCOUNTER — APPOINTMENT (OUTPATIENT)
Dept: ULTRASOUND IMAGING | Facility: HOSPITAL | Age: 77
End: 2022-04-06
Attending: EMERGENCY MEDICINE
Payer: MEDICARE

## 2022-04-06 ENCOUNTER — TELEPHONE (OUTPATIENT)
Dept: INTERNAL MEDICINE CLINIC | Facility: CLINIC | Age: 77
End: 2022-04-06

## 2022-04-06 ENCOUNTER — APPOINTMENT (OUTPATIENT)
Dept: GENERAL RADIOLOGY | Facility: HOSPITAL | Age: 77
End: 2022-04-06
Attending: EMERGENCY MEDICINE
Payer: MEDICARE

## 2022-04-06 VITALS
HEIGHT: 64 IN | OXYGEN SATURATION: 96 % | BODY MASS INDEX: 26.12 KG/M2 | WEIGHT: 153 LBS | HEART RATE: 72 BPM | SYSTOLIC BLOOD PRESSURE: 155 MMHG | DIASTOLIC BLOOD PRESSURE: 68 MMHG | TEMPERATURE: 100 F | RESPIRATION RATE: 16 BRPM

## 2022-04-06 VITALS
WEIGHT: 153 LBS | HEIGHT: 61 IN | DIASTOLIC BLOOD PRESSURE: 64 MMHG | SYSTOLIC BLOOD PRESSURE: 196 MMHG | BODY MASS INDEX: 28.89 KG/M2 | HEART RATE: 70 BPM

## 2022-04-06 DIAGNOSIS — R20.0 LEFT LEG NUMBNESS: ICD-10-CM

## 2022-04-06 DIAGNOSIS — M25.552 LEFT HIP PAIN: ICD-10-CM

## 2022-04-06 DIAGNOSIS — M79.89 LEFT LEG SWELLING: ICD-10-CM

## 2022-04-06 DIAGNOSIS — I25.10 CORONARY ARTERY DISEASE INVOLVING NATIVE CORONARY ARTERY OF NATIVE HEART WITHOUT ANGINA PECTORIS: ICD-10-CM

## 2022-04-06 DIAGNOSIS — R53.1 LEFT-SIDED WEAKNESS: Primary | ICD-10-CM

## 2022-04-06 DIAGNOSIS — I48.0 PAROXYSMAL ATRIAL FIBRILLATION (HCC): ICD-10-CM

## 2022-04-06 LAB
ALBUMIN SERPL-MCNC: 3.5 G/DL (ref 3.4–5)
ALP LIVER SERPL-CCNC: 56 U/L
ALT SERPL-CCNC: 22 U/L
ANION GAP SERPL CALC-SCNC: 5 MMOL/L (ref 0–18)
AST SERPL-CCNC: 19 U/L (ref 15–37)
BASOPHILS # BLD AUTO: 0.05 X10(3) UL (ref 0–0.2)
BASOPHILS NFR BLD AUTO: 0.5 %
BILIRUB DIRECT SERPL-MCNC: 0.1 MG/DL (ref 0–0.2)
BILIRUB SERPL-MCNC: 0.4 MG/DL (ref 0.1–2)
BUN BLD-MCNC: 24 MG/DL (ref 7–18)
BUN/CREAT SERPL: 22.4 (ref 10–20)
CALCIUM BLD-MCNC: 9.6 MG/DL (ref 8.5–10.1)
CHLORIDE SERPL-SCNC: 104 MMOL/L (ref 98–112)
CO2 SERPL-SCNC: 27 MMOL/L (ref 21–32)
CREAT BLD-MCNC: 1.07 MG/DL
DEPRECATED RDW RBC AUTO: 46.1 FL (ref 35.1–46.3)
EOSINOPHIL # BLD AUTO: 0.23 X10(3) UL (ref 0–0.7)
EOSINOPHIL NFR BLD AUTO: 2.2 %
ERYTHROCYTE [DISTWIDTH] IN BLOOD BY AUTOMATED COUNT: 14 % (ref 11–15)
GLUCOSE BLD-MCNC: 113 MG/DL (ref 70–99)
HCT VFR BLD AUTO: 35.1 %
HGB BLD-MCNC: 11.3 G/DL
IMM GRANULOCYTES # BLD AUTO: 0.04 X10(3) UL (ref 0–1)
IMM GRANULOCYTES NFR BLD: 0.4 %
LYMPHOCYTES # BLD AUTO: 2.14 X10(3) UL (ref 1–4)
LYMPHOCYTES NFR BLD AUTO: 20.9 %
MCH RBC QN AUTO: 28.8 PG (ref 26–34)
MCHC RBC AUTO-ENTMCNC: 32.2 G/DL (ref 31–37)
MCV RBC AUTO: 89.3 FL
MONOCYTES # BLD AUTO: 0.71 X10(3) UL (ref 0.1–1)
MONOCYTES NFR BLD AUTO: 6.9 %
NEUTROPHILS # BLD AUTO: 7.06 X10 (3) UL (ref 1.5–7.7)
NEUTROPHILS # BLD AUTO: 7.06 X10(3) UL (ref 1.5–7.7)
NEUTROPHILS NFR BLD AUTO: 69.1 %
OSMOLALITY SERPL CALC.SUM OF ELEC: 287 MOSM/KG (ref 275–295)
PLATELET # BLD AUTO: 233 10(3)UL (ref 150–450)
POTASSIUM SERPL-SCNC: 3.7 MMOL/L (ref 3.5–5.1)
PROT SERPL-MCNC: 6.9 G/DL (ref 6.4–8.2)
RBC # BLD AUTO: 3.93 X10(6)UL
SODIUM SERPL-SCNC: 136 MMOL/L (ref 136–145)
WBC # BLD AUTO: 10.2 X10(3) UL (ref 4–11)

## 2022-04-06 PROCEDURE — 99214 OFFICE O/P EST MOD 30 MIN: CPT | Performed by: INTERNAL MEDICINE

## 2022-04-06 PROCEDURE — 36415 COLL VENOUS BLD VENIPUNCTURE: CPT

## 2022-04-06 PROCEDURE — 99284 EMERGENCY DEPT VISIT MOD MDM: CPT

## 2022-04-06 PROCEDURE — 80048 BASIC METABOLIC PNL TOTAL CA: CPT | Performed by: EMERGENCY MEDICINE

## 2022-04-06 PROCEDURE — 70551 MRI BRAIN STEM W/O DYE: CPT | Performed by: EMERGENCY MEDICINE

## 2022-04-06 PROCEDURE — 93971 EXTREMITY STUDY: CPT | Performed by: EMERGENCY MEDICINE

## 2022-04-06 PROCEDURE — 80076 HEPATIC FUNCTION PANEL: CPT | Performed by: EMERGENCY MEDICINE

## 2022-04-06 PROCEDURE — 73502 X-RAY EXAM HIP UNI 2-3 VIEWS: CPT | Performed by: EMERGENCY MEDICINE

## 2022-04-06 PROCEDURE — 85025 COMPLETE CBC W/AUTO DIFF WBC: CPT | Performed by: EMERGENCY MEDICINE

## 2022-04-06 NOTE — CM/SW NOTE
Spoke to the pt's daughter and explained that if her mom needs HHC after being at home to contact the pt's PCP to order [address] for the pt. Voices understanding.

## 2022-04-06 NOTE — ED INITIAL ASSESSMENT (HPI)
Pt to ED with family from PCP office with c/o left leg pain/numbness that started upon awakening this am. Pt denies fall or trauma. Pt s/o right groin cardiac cath 2 months ago. Pt states unable to ambulate due to pain. Family concerned for possible stroke. Pt denies headache or visual changes. Pt upper extremities strong and equal. Speech clear. Face symmetrical. Tongue midline. Pt denies fever.

## 2022-04-06 NOTE — PATIENT INSTRUCTIONS
Please go to [facility] for evaluation. Patient initially had left sided neck pain attributed to MSK, now with complaints of left leg numbness/sensation changes, neck pain, hip pain, possibly right facial droop but hard to say. Just had cardiac cath 2 months ago via right groin. Pt advised this may be related to arthritis but given the new neurological changes would like to r/o stroke/TIA.    -jared

## 2022-04-06 NOTE — TELEPHONE ENCOUNTER
Patient's daughter, Tata Todd, states left ankle swelling with numbness for 4 days. Also states pain is coming from hip to groin, down to ankle. Takes Tylenol for pain which she states does not help. Denies problems with urination, walking, or inability to wear a shoe on left foot.     Scheduled with Dr. Rose Marie Avila today at 10:40 am.

## 2022-04-06 NOTE — CM/SW NOTE
Spoke to the pt's family at the bedside. They want to see how the pt is at home for the next week before deciding on [facility] at home. Provided them the [facility] [employer] number to call for any questions.

## 2022-04-07 PROBLEM — I48.0 PAROXYSMAL ATRIAL FIBRILLATION (HCC): Status: ACTIVE | Noted: 2022-02-01

## 2022-04-09 NOTE — TELEPHONE ENCOUNTER
Patients daughter requesting refill of:  pregabalin 25 MG Oral Cap  Patient is out of medication.  Please send to [facility] [address], [place]

## 2022-04-11 RX ORDER — PREGABALIN 25 MG/1
25 CAPSULE ORAL 2 TIMES DAILY
Qty: 180 CAPSULE | Refills: 2 | Status: SHIPPED | OUTPATIENT
Start: 2022-04-11 | End: 2022-07-10

## 2022-04-28 ENCOUNTER — OFFICE VISIT (OUTPATIENT)
Dept: ORTHOPEDICS CLINIC | Facility: CLINIC | Age: 77
End: 2022-04-28
Payer: MEDICARE

## 2022-04-28 DIAGNOSIS — M54.16 LUMBAR RADICULOPATHY: Primary | ICD-10-CM

## 2022-04-28 DIAGNOSIS — M06.9 RHEUMATOID ARTHRITIS, INVOLVING UNSPECIFIED SITE, UNSPECIFIED WHETHER RHEUMATOID FACTOR PRESENT (HCC): ICD-10-CM

## 2022-04-28 PROCEDURE — 99204 OFFICE O/P NEW MOD 45 MIN: CPT | Performed by: ORTHOPAEDIC SURGERY

## 2022-05-11 ENCOUNTER — TELEPHONE (OUTPATIENT)
Dept: NEUROLOGY | Facility: CLINIC | Age: 77
End: 2022-05-11

## 2022-05-11 ENCOUNTER — OFFICE VISIT (OUTPATIENT)
Dept: NEUROLOGY | Facility: CLINIC | Age: 77
End: 2022-05-11
Payer: MEDICARE

## 2022-05-11 VITALS
HEIGHT: 64 IN | SYSTOLIC BLOOD PRESSURE: 146 MMHG | DIASTOLIC BLOOD PRESSURE: 60 MMHG | HEART RATE: 70 BPM | WEIGHT: 153 LBS | BODY MASS INDEX: 26.12 KG/M2

## 2022-05-11 DIAGNOSIS — M54.16 LUMBAR RADICULOPATHY: Primary | ICD-10-CM

## 2022-05-11 PROCEDURE — 99204 OFFICE O/P NEW MOD 45 MIN: CPT | Performed by: OTHER

## 2022-05-11 NOTE — TELEPHONE ENCOUNTER
Per Medicare guidelines authorization Is not required for MRI SPINE LUMBAR (CPT=72148). L/m on Olivia's v/m advising of above.

## 2022-05-12 ENCOUNTER — OFFICE VISIT (OUTPATIENT)
Dept: INTERNAL MEDICINE CLINIC | Facility: CLINIC | Age: 77
End: 2022-05-12
Payer: MEDICARE

## 2022-05-12 ENCOUNTER — TELEPHONE (OUTPATIENT)
Dept: INTERNAL MEDICINE CLINIC | Facility: CLINIC | Age: 77
End: 2022-05-12

## 2022-05-12 ENCOUNTER — APPOINTMENT (OUTPATIENT)
Dept: GENERAL RADIOLOGY | Facility: HOSPITAL | Age: 77
End: 2022-05-12
Attending: EMERGENCY MEDICINE
Payer: MEDICARE

## 2022-05-12 ENCOUNTER — HOSPITAL ENCOUNTER (EMERGENCY)
Facility: HOSPITAL | Age: 77
Discharge: HOME | End: 2022-05-12
Attending: EMERGENCY MEDICINE
Payer: MEDICARE

## 2022-05-12 VITALS
OXYGEN SATURATION: 96 % | WEIGHT: 150 LBS | RESPIRATION RATE: 18 BRPM | DIASTOLIC BLOOD PRESSURE: 64 MMHG | SYSTOLIC BLOOD PRESSURE: 155 MMHG | BODY MASS INDEX: 26 KG/M2 | HEART RATE: 64 BPM | TEMPERATURE: 99 F

## 2022-05-12 VITALS
HEART RATE: 71 BPM | HEIGHT: 64 IN | WEIGHT: 153 LBS | DIASTOLIC BLOOD PRESSURE: 65 MMHG | SYSTOLIC BLOOD PRESSURE: 172 MMHG | BODY MASS INDEX: 26.12 KG/M2

## 2022-05-12 DIAGNOSIS — R60.0 BILATERAL LEG EDEMA: Primary | ICD-10-CM

## 2022-05-12 DIAGNOSIS — M79.604 PAIN IN BOTH LOWER EXTREMITIES: ICD-10-CM

## 2022-05-12 DIAGNOSIS — Z95.5 S/P DRUG ELUTING CORONARY STENT PLACEMENT: ICD-10-CM

## 2022-05-12 DIAGNOSIS — I50.9 CONGESTIVE HEART FAILURE, UNSPECIFIED HF CHRONICITY, UNSPECIFIED HEART FAILURE TYPE (HCC): ICD-10-CM

## 2022-05-12 DIAGNOSIS — R09.89 DECREASED DORSALIS PEDIS PULSE: ICD-10-CM

## 2022-05-12 DIAGNOSIS — M79.605 PAIN IN BOTH LOWER EXTREMITIES: ICD-10-CM

## 2022-05-12 DIAGNOSIS — I50.33 ACUTE ON CHRONIC DIASTOLIC (CONGESTIVE) HEART FAILURE (HCC): Primary | ICD-10-CM

## 2022-05-12 LAB
ANION GAP SERPL CALC-SCNC: 6 MMOL/L (ref 0–18)
BASOPHILS # BLD AUTO: 0.04 X10(3) UL (ref 0–0.2)
BASOPHILS NFR BLD AUTO: 0.4 %
BUN BLD-MCNC: 27 MG/DL (ref 7–18)
BUN/CREAT SERPL: 22.5 (ref 10–20)
CALCIUM BLD-MCNC: 9.4 MG/DL (ref 8.5–10.1)
CHLORIDE SERPL-SCNC: 105 MMOL/L (ref 98–112)
CO2 SERPL-SCNC: 27 MMOL/L (ref 21–32)
CREAT BLD-MCNC: 1.2 MG/DL
DEPRECATED RDW RBC AUTO: 45.9 FL (ref 35.1–46.3)
EOSINOPHIL # BLD AUTO: 0.26 X10(3) UL (ref 0–0.7)
EOSINOPHIL NFR BLD AUTO: 2.7 %
ERYTHROCYTE [DISTWIDTH] IN BLOOD BY AUTOMATED COUNT: 14.1 % (ref 11–15)
GLUCOSE BLD-MCNC: 103 MG/DL (ref 70–99)
HCT VFR BLD AUTO: 34.5 %
HGB BLD-MCNC: 11.3 G/DL
IMM GRANULOCYTES # BLD AUTO: 0.04 X10(3) UL (ref 0–1)
IMM GRANULOCYTES NFR BLD: 0.4 %
LYMPHOCYTES # BLD AUTO: 2.53 X10(3) UL (ref 1–4)
LYMPHOCYTES NFR BLD AUTO: 26.6 %
MCH RBC QN AUTO: 29.2 PG (ref 26–34)
MCHC RBC AUTO-ENTMCNC: 32.8 G/DL (ref 31–37)
MCV RBC AUTO: 89.1 FL
MONOCYTES # BLD AUTO: 0.68 X10(3) UL (ref 0.1–1)
MONOCYTES NFR BLD AUTO: 7.2 %
NEUTROPHILS # BLD AUTO: 5.96 X10 (3) UL (ref 1.5–7.7)
NEUTROPHILS # BLD AUTO: 5.96 X10(3) UL (ref 1.5–7.7)
NEUTROPHILS NFR BLD AUTO: 62.7 %
OSMOLALITY SERPL CALC.SUM OF ELEC: 291 MOSM/KG (ref 275–295)
PLATELET # BLD AUTO: 209 10(3)UL (ref 150–450)
POTASSIUM SERPL-SCNC: 3.8 MMOL/L (ref 3.5–5.1)
RBC # BLD AUTO: 3.87 X10(6)UL
SODIUM SERPL-SCNC: 138 MMOL/L (ref 136–145)
TROPONIN I HIGH SENSITIVITY: 13 NG/L
WBC # BLD AUTO: 9.5 X10(3) UL (ref 4–11)

## 2022-05-12 PROCEDURE — 85025 COMPLETE CBC W/AUTO DIFF WBC: CPT

## 2022-05-12 PROCEDURE — 71046 X-RAY EXAM CHEST 2 VIEWS: CPT | Performed by: EMERGENCY MEDICINE

## 2022-05-12 PROCEDURE — 99214 OFFICE O/P EST MOD 30 MIN: CPT | Performed by: INTERNAL MEDICINE

## 2022-05-12 PROCEDURE — 99284 EMERGENCY DEPT VISIT MOD MDM: CPT

## 2022-05-12 PROCEDURE — 84484 ASSAY OF TROPONIN QUANT: CPT | Performed by: EMERGENCY MEDICINE

## 2022-05-12 PROCEDURE — 84484 ASSAY OF TROPONIN QUANT: CPT

## 2022-05-12 PROCEDURE — 85025 COMPLETE CBC W/AUTO DIFF WBC: CPT | Performed by: EMERGENCY MEDICINE

## 2022-05-12 PROCEDURE — 80048 BASIC METABOLIC PNL TOTAL CA: CPT

## 2022-05-12 PROCEDURE — 93005 ELECTROCARDIOGRAM TRACING: CPT

## 2022-05-12 PROCEDURE — 93010 ELECTROCARDIOGRAM REPORT: CPT | Performed by: EMERGENCY MEDICINE

## 2022-05-12 PROCEDURE — 80048 BASIC METABOLIC PNL TOTAL CA: CPT | Performed by: EMERGENCY MEDICINE

## 2022-05-12 PROCEDURE — 96374 THER/PROPH/DIAG INJ IV PUSH: CPT

## 2022-05-12 RX ORDER — FUROSEMIDE 40 MG/1
40 TABLET ORAL DAILY
Qty: 5 TABLET | Refills: 0 | Status: SHIPPED | OUTPATIENT
Start: 2022-05-12 | End: 2022-05-17

## 2022-05-12 RX ORDER — FUROSEMIDE 10 MG/ML
40 INJECTION INTRAMUSCULAR; INTRAVENOUS ONCE
Status: COMPLETED | OUTPATIENT
Start: 2022-05-12 | End: 2022-05-12

## 2022-05-12 RX ORDER — FUROSEMIDE 20 MG/1
20 TABLET ORAL DAILY
Qty: 21 TABLET | Refills: 0 | Status: SHIPPED | OUTPATIENT
Start: 2022-05-12 | End: 2022-06-02

## 2022-05-12 NOTE — TELEPHONE ENCOUNTER
Got call from grace from [address], pt has not shown up yet. Advised reading report dr Shon Churchill at [facility] [place] [facility]. Patient would need to show up at 3pm today since the test takes about an hour long. Called daughter Mars Gunter on LUCIANO. Josh Sandoval no answer left detailed message.  Explained if unable to get there we will have to get done tomorrow/another day

## 2022-05-12 NOTE — TELEPHONE ENCOUNTER
Both ankles pain ,but left is worse, both legs swollen, saw neurology yesterday and was instructed to call PCP ,states that it is getting worse, worse than last office visit on 4/9/22, bone both sides ankle swollen, painful to walk, no weight gain, no sob, no wheezes, taking furosemide and ASA at home. Pain from left leg towards her left knee. Advised ED for worsening symptoms like shortness of breath, severe pain, cannot walk, chest pain, stated understanding. Patient is not sure about the medications that she is taking at home, instructed to bring all her medication bottles and we will check it in  the office. Last office visit on 4/9/22;   Follow Up: As needed/if symptoms worsen       Future Appointments   Date Time Provider César Estrada   5/12/2022  1:00 PM MD ANDREW [place]

## 2022-05-12 NOTE — ED INITIAL ASSESSMENT (HPI)
Patient arrives with daughter following visit with pcp who referred her to ED for worsening bilateral lower leg swelling x 1 week associated with exertional shortness of breath. Hx CHF. Denies chest pain. Stating PCP wanted to rule out blood clots.

## 2022-05-12 NOTE — PATIENT INSTRUCTIONS
1.  Please get your labs done today before you leave, you do not need to fast.  2.  I would like for you to take furosemide which is your water pill 40 mg daily for the next 5 days then let me know how you feel. You can use the tablets that you currently have but I have represcribed it to your pharmacy. If you notice an improvement in your pain and leg swelling please let your cardiologist know as they will need to readjust your medications. 3.  If you do not notice any improvement with the water pill and if your blood work shows that this might be a flare of rheumatoid arthritis then I will send you additional steroids. You should notice an improvement with the water pill within a couple hours or within a day. I am trying to avoid steroids 1 because of your heart and to because it can cause further leg swelling. If steroids are needed we will have the rheumatologist discuss given that you just had heart stents placed. 4.  Please call your cardiologist today and let them know that you have had some leg swelling, you are not having any shortness of breath or weight gain, your PCP Dr. Mariel De Souza has started you on furosemide 40 mg daily for the next 5 days under the assumption this is due to edema. If your cardiologist has other recommendations please follow those recommendations. Please also let them know I have ordered an arterial ultrasound to evaluate your pulses as I was unable to assess them in the office with Doppler. Please let me know how your symptoms are doing over the next few days, if it does not improve or worsens then I recommend ER evaluation so they can do this imaging right away.

## 2022-05-13 ENCOUNTER — TELEPHONE (OUTPATIENT)
Dept: INTERNAL MEDICINE CLINIC | Facility: CLINIC | Age: 77
End: 2022-05-13

## 2022-05-13 NOTE — TELEPHONE ENCOUNTER
She should take what I prescribed, 40mg daily x 5 days, as this is the dose given by her cardiologist,. fyi she was supposed to get a stat arterial ultrasound not ER visit, ok to cancel ultrasound given ER visit.

## 2022-05-13 NOTE — TELEPHONE ENCOUNTER
Patient states she was seen in ER yesterday and prescribed lasix 20mg in addition to 40mg already prescribed but patient doesn't know if she should take both at the same time or space them out?

## 2022-05-13 NOTE — TELEPHONE ENCOUNTER
Patient's daughter, Heather Mac calling (name and , LUCIANO verified) informed of provider message below regarding lasix and stat ultrasound. Patient did not understand the PCP and went to the ER instead. Patient did not have an ultrasound in the ER per family and chart review. Family provided with central scheduling number to find locations that will do the ultrasound.

## 2022-05-26 ENCOUNTER — TELEPHONE (OUTPATIENT)
Dept: NEUROLOGY | Facility: CLINIC | Age: 77
End: 2022-05-26

## 2022-05-26 ENCOUNTER — HOSPITAL ENCOUNTER (OUTPATIENT)
Dept: MRI IMAGING | Age: 77
Discharge: HOME | End: 2022-05-26
Attending: Other
Payer: MEDICARE

## 2022-05-26 DIAGNOSIS — M54.16 LUMBAR RADICULOPATHY: ICD-10-CM

## 2022-05-26 PROCEDURE — 72148 MRI LUMBAR SPINE W/O DYE: CPT | Performed by: OTHER

## 2022-05-26 NOTE — TELEPHONE ENCOUNTER
Please call the patient tell her I reviewed the MRI of the lumbar spine. No abnormality to account for her symptoms. Please asked her to follow-up with orthopedic physician regarding her left hip, left knee pain. After seeing orthopedic physician please have her call the office.

## 2022-06-07 ENCOUNTER — OFFICE VISIT (OUTPATIENT)
Dept: INTERNAL MEDICINE CLINIC | Facility: CLINIC | Age: 77
End: 2022-06-07
Payer: MEDICARE

## 2022-06-07 ENCOUNTER — LAB ENCOUNTER (OUTPATIENT)
Dept: LAB | Age: 77
End: 2022-06-07
Attending: INTERNAL MEDICINE
Payer: MEDICARE

## 2022-06-07 VITALS
HEIGHT: 64 IN | HEART RATE: 72 BPM | SYSTOLIC BLOOD PRESSURE: 191 MMHG | BODY MASS INDEX: 25.27 KG/M2 | WEIGHT: 148 LBS | DIASTOLIC BLOOD PRESSURE: 70 MMHG

## 2022-06-07 DIAGNOSIS — Z79.02 ANTIPLATELET OR ANTITHROMBOTIC LONG-TERM USE: ICD-10-CM

## 2022-06-07 DIAGNOSIS — K21.00 GASTROESOPHAGEAL REFLUX DISEASE WITH ESOPHAGITIS WITHOUT HEMORRHAGE: ICD-10-CM

## 2022-06-07 DIAGNOSIS — D69.2 SENILE PURPURA (HCC): ICD-10-CM

## 2022-06-07 DIAGNOSIS — D69.2 SENILE PURPURA (HCC): Primary | ICD-10-CM

## 2022-06-07 DIAGNOSIS — M99.79 FORAMINAL STENOSIS DUE TO INTERVERTEBRAL DISC DISEASE: ICD-10-CM

## 2022-06-07 DIAGNOSIS — M51.9 FORAMINAL STENOSIS DUE TO INTERVERTEBRAL DISC DISEASE: ICD-10-CM

## 2022-06-07 PROBLEM — D70.9 NEUTROPENIA, UNSPECIFIED (HCC): Status: ACTIVE | Noted: 2022-06-07

## 2022-06-07 PROBLEM — D70.9 NEUTROPENIA, UNSPECIFIED: Status: ACTIVE | Noted: 2022-06-07

## 2022-06-07 LAB
BASOPHILS # BLD AUTO: 0.04 X10(3) UL (ref 0–0.2)
BASOPHILS NFR BLD AUTO: 0.3 %
DEPRECATED RDW RBC AUTO: 46.3 FL (ref 35.1–46.3)
EOSINOPHIL # BLD AUTO: 0.34 X10(3) UL (ref 0–0.7)
EOSINOPHIL NFR BLD AUTO: 2.7 %
ERYTHROCYTE [DISTWIDTH] IN BLOOD BY AUTOMATED COUNT: 14.2 % (ref 11–15)
HCT VFR BLD AUTO: 36.9 %
HGB BLD-MCNC: 11.9 G/DL
IMM GRANULOCYTES # BLD AUTO: 0.03 X10(3) UL (ref 0–1)
IMM GRANULOCYTES NFR BLD: 0.2 %
LYMPHOCYTES # BLD AUTO: 3.47 X10(3) UL (ref 1–4)
LYMPHOCYTES NFR BLD AUTO: 27.8 %
MCH RBC QN AUTO: 29.1 PG (ref 26–34)
MCHC RBC AUTO-ENTMCNC: 32.2 G/DL (ref 31–37)
MCV RBC AUTO: 90.2 FL
MONOCYTES # BLD AUTO: 0.73 X10(3) UL (ref 0.1–1)
MONOCYTES NFR BLD AUTO: 5.8 %
NEUTROPHILS # BLD AUTO: 7.88 X10 (3) UL (ref 1.5–7.7)
NEUTROPHILS # BLD AUTO: 7.88 X10(3) UL (ref 1.5–7.7)
NEUTROPHILS NFR BLD AUTO: 63.2 %
PLATELET # BLD AUTO: 250 10(3)UL (ref 150–450)
RBC # BLD AUTO: 4.09 X10(6)UL
WBC # BLD AUTO: 12.5 X10(3) UL (ref 4–11)

## 2022-06-07 PROCEDURE — 36415 COLL VENOUS BLD VENIPUNCTURE: CPT

## 2022-06-07 PROCEDURE — 85025 COMPLETE CBC W/AUTO DIFF WBC: CPT

## 2022-06-07 PROCEDURE — 99214 OFFICE O/P EST MOD 30 MIN: CPT | Performed by: INTERNAL MEDICINE

## 2022-06-07 RX ORDER — PANTOPRAZOLE SODIUM 20 MG/1
20 TABLET, DELAYED RELEASE ORAL
Qty: 90 TABLET | Refills: 3 | Status: SHIPPED | OUTPATIENT
Start: 2022-06-07

## 2022-06-23 ENCOUNTER — TELEMEDICINE (OUTPATIENT)
Dept: INTERNAL MEDICINE CLINIC | Facility: CLINIC | Age: 77
End: 2022-06-23

## 2022-06-23 DIAGNOSIS — Z20.822 EXPOSURE TO CONFIRMED CASE OF COVID-19: Primary | ICD-10-CM

## 2022-06-23 PROCEDURE — 99213 OFFICE O/P EST LOW 20 MIN: CPT | Performed by: NURSE PRACTITIONER

## 2022-06-29 ENCOUNTER — NURSE TRIAGE (OUTPATIENT)
Dept: INTERNAL MEDICINE CLINIC | Facility: CLINIC | Age: 77
End: 2022-06-29

## 2022-06-29 ENCOUNTER — HOSPITAL ENCOUNTER (OUTPATIENT)
Age: 77
Discharge: HOME | End: 2022-06-29
Payer: MEDICARE

## 2022-06-29 ENCOUNTER — APPOINTMENT (OUTPATIENT)
Dept: GENERAL RADIOLOGY | Age: 77
End: 2022-06-29
Attending: NURSE PRACTITIONER
Payer: MEDICARE

## 2022-06-29 VITALS
BODY MASS INDEX: 27.23 KG/M2 | TEMPERATURE: 98 F | HEART RATE: 62 BPM | SYSTOLIC BLOOD PRESSURE: 132 MMHG | RESPIRATION RATE: 18 BRPM | HEIGHT: 62 IN | OXYGEN SATURATION: 99 % | WEIGHT: 148 LBS | DIASTOLIC BLOOD PRESSURE: 56 MMHG

## 2022-06-29 DIAGNOSIS — U07.1 COVID: ICD-10-CM

## 2022-06-29 DIAGNOSIS — J84.10 LUNG GRANULOMA (HCC): ICD-10-CM

## 2022-06-29 DIAGNOSIS — R05.9 COUGH: Primary | ICD-10-CM

## 2022-06-29 LAB
#MXD IC: 1 X10ˆ3/UL (ref 0.1–1)
BILIRUB UR QL STRIP: NEGATIVE
BUN BLD-MCNC: 36 MG/DL (ref 7–18)
CHLORIDE BLD-SCNC: 104 MMOL/L (ref 98–112)
CLARITY UR: CLEAR
CO2 BLD-SCNC: 24 MMOL/L (ref 21–32)
COLOR UR: YELLOW
CREAT BLD-MCNC: 1.4 MG/DL
GLUCOSE BLD-MCNC: 127 MG/DL (ref 70–99)
GLUCOSE UR STRIP-MCNC: NEGATIVE MG/DL
HCT VFR BLD AUTO: 35 %
HCT VFR BLD CALC: 37 %
HGB BLD-MCNC: 11.9 G/DL
HGB UR QL STRIP: NEGATIVE
ISTAT IONIZED CALCIUM FOR CHEM 8: 1.33 MMOL/L (ref 1.12–1.32)
KETONES UR STRIP-MCNC: NEGATIVE MG/DL
LYMPHOCYTES # BLD AUTO: 2.8 X10ˆ3/UL (ref 1–4)
LYMPHOCYTES NFR BLD AUTO: 32 %
MCH RBC QN AUTO: 29.3 PG (ref 26–34)
MCHC RBC AUTO-ENTMCNC: 34 G/DL (ref 31–37)
MCV RBC AUTO: 86.2 FL (ref 80–100)
MIXED CELL %: 11.9 %
NEUTROPHILS # BLD AUTO: 4.8 X10ˆ3/UL (ref 1.5–7.7)
NEUTROPHILS NFR BLD AUTO: 56.1 %
NITRITE UR QL STRIP: NEGATIVE
PH UR STRIP: 5 [PH]
PLATELET # BLD AUTO: 223 X10ˆ3/UL (ref 150–450)
POTASSIUM BLD-SCNC: 3.7 MMOL/L (ref 3.6–5.1)
PROT UR STRIP-MCNC: NEGATIVE MG/DL
RBC # BLD AUTO: 4.06 X10ˆ6/UL
SARS-COV-2 RNA RESP QL NAA+PROBE: DETECTED
SODIUM BLD-SCNC: 138 MMOL/L (ref 136–145)
SP GR UR STRIP: 1.01
TROPONIN I BLD-MCNC: <0.02 NG/ML
UROBILINOGEN UR STRIP-ACNC: <2 MG/DL
WBC # BLD AUTO: 8.6 X10ˆ3/UL (ref 4–11)

## 2022-06-29 PROCEDURE — 93000 ELECTROCARDIOGRAM COMPLETE: CPT | Performed by: NURSE PRACTITIONER

## 2022-06-29 PROCEDURE — 84484 ASSAY OF TROPONIN QUANT: CPT | Performed by: NURSE PRACTITIONER

## 2022-06-29 PROCEDURE — U0002 COVID-19 LAB TEST NON-CDC: HCPCS | Performed by: NURSE PRACTITIONER

## 2022-06-29 PROCEDURE — 85025 COMPLETE CBC W/AUTO DIFF WBC: CPT | Performed by: NURSE PRACTITIONER

## 2022-06-29 PROCEDURE — 81002 URINALYSIS NONAUTO W/O SCOPE: CPT | Performed by: NURSE PRACTITIONER

## 2022-06-29 PROCEDURE — 99214 OFFICE O/P EST MOD 30 MIN: CPT | Performed by: NURSE PRACTITIONER

## 2022-06-29 PROCEDURE — M0222 INTRAVENOUS INJECTION, BEBTELOVIMAB, INCLUDES INJECTION AND POST ADMINISTRATIVE MONITORING: HCPCS | Performed by: NURSE PRACTITIONER

## 2022-06-29 PROCEDURE — 71046 X-RAY EXAM CHEST 2 VIEWS: CPT | Performed by: NURSE PRACTITIONER

## 2022-06-29 PROCEDURE — 80047 BASIC METABLC PNL IONIZED CA: CPT | Performed by: NURSE PRACTITIONER

## 2022-06-29 RX ORDER — BEBTELOVIMAB 87.5 MG/ML
175 INJECTION, SOLUTION INTRAVENOUS ONCE
Status: COMPLETED | OUTPATIENT
Start: 2022-06-29 | End: 2022-06-29

## 2022-06-29 RX ORDER — BENZONATATE 100 MG/1
100 CAPSULE ORAL 3 TIMES DAILY PRN
Qty: 10 CAPSULE | Refills: 0 | Status: SHIPPED | OUTPATIENT
Start: 2022-06-29

## 2022-06-29 NOTE — ED INITIAL ASSESSMENT (HPI)
Pt states she did a covid test on 6/23/22 and it was negative. Pt has weakness, dizziness, cough for the last 3 days.

## 2022-06-30 ENCOUNTER — TELEPHONE (OUTPATIENT)
Dept: CASE MANAGEMENT | Age: 77
End: 2022-06-30

## 2022-06-30 NOTE — TELEPHONE ENCOUNTER
Pt received MAB infusion at [facility] on 6/29/22 for COVID-19. Please follow-up with pt for post-infusion assessment and home monitoring if needed. Thank you.

## 2022-07-06 ENCOUNTER — APPOINTMENT (OUTPATIENT)
Dept: GENERAL RADIOLOGY | Facility: HOSPITAL | Age: 77
End: 2022-07-06
Payer: MEDICARE

## 2022-07-06 ENCOUNTER — HOSPITAL ENCOUNTER (OUTPATIENT)
Age: 77
Discharge: ANOTHER HOSPITAL | End: 2022-07-06
Payer: MEDICARE

## 2022-07-06 ENCOUNTER — NURSE TRIAGE (OUTPATIENT)
Dept: INTERNAL MEDICINE CLINIC | Facility: CLINIC | Age: 77
End: 2022-07-06

## 2022-07-06 ENCOUNTER — HOSPITAL ENCOUNTER (INPATIENT)
Facility: HOSPITAL | Age: 77
LOS: 4 days | Discharge: HOME | End: 2022-07-10
Attending: EMERGENCY MEDICINE | Admitting: INTERNAL MEDICINE
Payer: MEDICARE

## 2022-07-06 VITALS
HEIGHT: 64 IN | SYSTOLIC BLOOD PRESSURE: 164 MMHG | HEART RATE: 67 BPM | RESPIRATION RATE: 18 BRPM | WEIGHT: 152 LBS | TEMPERATURE: 96 F | BODY MASS INDEX: 25.95 KG/M2 | DIASTOLIC BLOOD PRESSURE: 52 MMHG | OXYGEN SATURATION: 100 %

## 2022-07-06 DIAGNOSIS — I50.9 ACUTE ON CHRONIC CONGESTIVE HEART FAILURE, UNSPECIFIED HEART FAILURE TYPE (HCC): Primary | ICD-10-CM

## 2022-07-06 DIAGNOSIS — R03.0 ELEVATED BLOOD PRESSURE READING: ICD-10-CM

## 2022-07-06 DIAGNOSIS — R06.00 DYSPNEA, UNSPECIFIED TYPE: ICD-10-CM

## 2022-07-06 DIAGNOSIS — R00.2 PALPITATIONS: Primary | ICD-10-CM

## 2022-07-06 LAB
ANION GAP SERPL CALC-SCNC: 9 MMOL/L (ref 0–18)
BASOPHILS # BLD AUTO: 0.02 X10(3) UL (ref 0–0.2)
BASOPHILS NFR BLD AUTO: 0.2 %
BUN BLD-MCNC: 26 MG/DL (ref 7–18)
BUN/CREAT SERPL: 25.5 (ref 10–20)
CALCIUM BLD-MCNC: 9.6 MG/DL (ref 8.5–10.1)
CHLORIDE SERPL-SCNC: 103 MMOL/L (ref 98–112)
CO2 SERPL-SCNC: 24 MMOL/L (ref 21–32)
CREAT BLD-MCNC: 1.02 MG/DL
D DIMER PPP FEU-MCNC: 0.45 UG/ML FEU (ref ?–0.78)
DEPRECATED RDW RBC AUTO: 42.1 FL (ref 35.1–46.3)
EOSINOPHIL # BLD AUTO: 0.21 X10(3) UL (ref 0–0.7)
EOSINOPHIL NFR BLD AUTO: 2.3 %
ERYTHROCYTE [DISTWIDTH] IN BLOOD BY AUTOMATED COUNT: 13.2 % (ref 11–15)
GLUCOSE BLD-MCNC: 104 MG/DL (ref 70–99)
GLUCOSE BLDC GLUCOMTR-MCNC: 126 MG/DL (ref 70–99)
HCT VFR BLD AUTO: 30.8 %
HGB BLD-MCNC: 10.4 G/DL
IMM GRANULOCYTES # BLD AUTO: 0.03 X10(3) UL (ref 0–1)
IMM GRANULOCYTES NFR BLD: 0.3 %
LYMPHOCYTES # BLD AUTO: 2.6 X10(3) UL (ref 1–4)
LYMPHOCYTES NFR BLD AUTO: 28.4 %
MAGNESIUM SERPL-MCNC: 1.8 MG/DL (ref 1.6–2.6)
MCH RBC QN AUTO: 29.2 PG (ref 26–34)
MCHC RBC AUTO-ENTMCNC: 33.8 G/DL (ref 31–37)
MCV RBC AUTO: 86.5 FL
MONOCYTES # BLD AUTO: 0.8 X10(3) UL (ref 0.1–1)
MONOCYTES NFR BLD AUTO: 8.8 %
NEUTROPHILS # BLD AUTO: 5.48 X10 (3) UL (ref 1.5–7.7)
NEUTROPHILS # BLD AUTO: 5.48 X10(3) UL (ref 1.5–7.7)
NEUTROPHILS NFR BLD AUTO: 60 %
NT-PROBNP SERPL-MCNC: 737 PG/ML (ref ?–450)
OSMOLALITY SERPL CALC.SUM OF ELEC: 287 MOSM/KG (ref 275–295)
PLATELET # BLD AUTO: 205 10(3)UL (ref 150–450)
POTASSIUM SERPL-SCNC: 3.8 MMOL/L (ref 3.5–5.1)
RBC # BLD AUTO: 3.56 X10(6)UL
SODIUM SERPL-SCNC: 136 MMOL/L (ref 136–145)
TROPONIN I HIGH SENSITIVITY: 21 NG/L
WBC # BLD AUTO: 9.1 X10(3) UL (ref 4–11)

## 2022-07-06 PROCEDURE — 93000 ELECTROCARDIOGRAM COMPLETE: CPT | Performed by: PHYSICIAN ASSISTANT

## 2022-07-06 PROCEDURE — 71045 X-RAY EXAM CHEST 1 VIEW: CPT | Performed by: EMERGENCY MEDICINE

## 2022-07-06 PROCEDURE — 99223 1ST HOSP IP/OBS HIGH 75: CPT | Performed by: INTERNAL MEDICINE

## 2022-07-06 PROCEDURE — 99205 OFFICE O/P NEW HI 60 MIN: CPT | Performed by: PHYSICIAN ASSISTANT

## 2022-07-06 RX ORDER — FOLIC ACID 1 MG/1
1 TABLET ORAL DAILY
Status: DISCONTINUED | OUTPATIENT
Start: 2022-07-07 | End: 2022-07-10

## 2022-07-06 RX ORDER — PANTOPRAZOLE SODIUM 20 MG/1
20 TABLET, DELAYED RELEASE ORAL
Status: DISCONTINUED | OUTPATIENT
Start: 2022-07-07 | End: 2022-07-10

## 2022-07-06 RX ORDER — ATORVASTATIN CALCIUM 80 MG/1
80 TABLET, FILM COATED ORAL NIGHTLY
Status: DISCONTINUED | OUTPATIENT
Start: 2022-07-06 | End: 2022-07-08

## 2022-07-06 RX ORDER — FUROSEMIDE 10 MG/ML
40 INJECTION INTRAMUSCULAR; INTRAVENOUS
Status: DISCONTINUED | OUTPATIENT
Start: 2022-07-06 | End: 2022-07-09

## 2022-07-06 RX ORDER — ACETAMINOPHEN 500 MG
500 TABLET ORAL EVERY 4 HOURS PRN
Status: DISCONTINUED | OUTPATIENT
Start: 2022-07-06 | End: 2022-07-10

## 2022-07-06 RX ORDER — FLUTICASONE PROPIONATE 50 MCG
2 SPRAY, SUSPENSION (ML) NASAL DAILY
Status: DISCONTINUED | OUTPATIENT
Start: 2022-07-06 | End: 2022-07-10

## 2022-07-06 RX ORDER — CLOPIDOGREL BISULFATE 75 MG/1
75 TABLET ORAL DAILY
Status: DISCONTINUED | OUTPATIENT
Start: 2022-07-07 | End: 2022-07-10

## 2022-07-06 RX ORDER — POTASSIUM CHLORIDE 20 MEQ/1
40 TABLET, EXTENDED RELEASE ORAL ONCE
Status: COMPLETED | OUTPATIENT
Start: 2022-07-06 | End: 2022-07-06

## 2022-07-06 RX ORDER — NITROGLYCERIN 0.4 MG/1
0.4 TABLET SUBLINGUAL EVERY 5 MIN PRN
Status: DISCONTINUED | OUTPATIENT
Start: 2022-07-06 | End: 2022-07-10

## 2022-07-06 RX ORDER — FUROSEMIDE 10 MG/ML
40 INJECTION INTRAMUSCULAR; INTRAVENOUS ONCE
Status: COMPLETED | OUTPATIENT
Start: 2022-07-06 | End: 2022-07-06

## 2022-07-06 RX ORDER — ASPIRIN 81 MG/1
81 TABLET ORAL DAILY
Status: DISCONTINUED | OUTPATIENT
Start: 2022-07-07 | End: 2022-07-10

## 2022-07-06 RX ORDER — PREDNISONE 1 MG/1
5 TABLET ORAL DAILY
Status: DISCONTINUED | OUTPATIENT
Start: 2022-07-06 | End: 2022-07-10

## 2022-07-06 RX ORDER — MAGNESIUM OXIDE 400 MG/1
400 TABLET ORAL ONCE
Status: COMPLETED | OUTPATIENT
Start: 2022-07-06 | End: 2022-07-06

## 2022-07-06 RX ORDER — MELATONIN
1000 DAILY
Status: DISCONTINUED | OUTPATIENT
Start: 2022-07-07 | End: 2022-07-10

## 2022-07-06 RX ORDER — ALBUTEROL SULFATE 90 UG/1
2 AEROSOL, METERED RESPIRATORY (INHALATION) EVERY 4 HOURS PRN
Status: DISCONTINUED | OUTPATIENT
Start: 2022-07-06 | End: 2022-07-10

## 2022-07-06 RX ORDER — LISINOPRIL 10 MG/1
10 TABLET ORAL DAILY
Status: DISCONTINUED | OUTPATIENT
Start: 2022-07-07 | End: 2022-07-08

## 2022-07-06 RX ORDER — HEPARIN SODIUM 5000 [USP'U]/ML
5000 INJECTION, SOLUTION INTRAVENOUS; SUBCUTANEOUS EVERY 8 HOURS SCHEDULED
Status: DISCONTINUED | OUTPATIENT
Start: 2022-07-06 | End: 2022-07-10

## 2022-07-06 RX ORDER — MONTELUKAST SODIUM 10 MG/1
10 TABLET ORAL DAILY
Status: DISCONTINUED | OUTPATIENT
Start: 2022-07-07 | End: 2022-07-10

## 2022-07-06 RX ORDER — FOLIC ACID 1 MG/1
1 TABLET ORAL DAILY
COMMUNITY
Start: 2022-06-02

## 2022-07-06 NOTE — ED INITIAL ASSESSMENT (HPI)
Pt presents to the IC with c/o shortness of breath and \"feeling shaky\". States this began last night. Pt speaking in complete sentences and in no apparent distress.

## 2022-07-06 NOTE — ED INITIAL ASSESSMENT (HPI)
Pt c/o of palpitations that started yesterday. Denies chest pain. Pt also c/o bilateral leg swelling. Hx of CHF.

## 2022-07-06 NOTE — ED QUICK NOTES
Orders for admission, patient is aware of plan and ready to go upstairs. Any questions, please call ED RN [place] at extension [phone].      Patient Covid vaccination status: Fully vaccinated     COVID Test Ordered in ED: None  COVID + on 6/29/2022  COVID Suspicion at Admission: N/A  COVID + on 6/29/2022  Running Infusions:  None    Mental Status/LOC at time of transport: ax4    Other pertinent information:   CIWA score: N/A   NIH score:  N/A

## 2022-07-07 ENCOUNTER — APPOINTMENT (OUTPATIENT)
Dept: ULTRASOUND IMAGING | Facility: HOSPITAL | Age: 77
End: 2022-07-07
Attending: INTERNAL MEDICINE
Payer: MEDICARE

## 2022-07-07 ENCOUNTER — APPOINTMENT (OUTPATIENT)
Dept: CV DIAGNOSTICS | Facility: HOSPITAL | Age: 77
End: 2022-07-07
Attending: INTERNAL MEDICINE
Payer: MEDICARE

## 2022-07-07 LAB
ALBUMIN SERPL-MCNC: 3.1 G/DL (ref 3.4–5)
ALBUMIN/GLOB SERPL: 1.1 {RATIO} (ref 1–2)
ALP LIVER SERPL-CCNC: 53 U/L
ALT SERPL-CCNC: 18 U/L
ANION GAP SERPL CALC-SCNC: 10 MMOL/L (ref 0–18)
AST SERPL-CCNC: 16 U/L (ref 15–37)
BASOPHILS # BLD AUTO: 0.02 X10(3) UL (ref 0–0.2)
BASOPHILS NFR BLD AUTO: 0.3 %
BILIRUB SERPL-MCNC: 1.1 MG/DL (ref 0.1–2)
BUN BLD-MCNC: 28 MG/DL (ref 7–18)
BUN/CREAT SERPL: 24.3 (ref 10–20)
CALCIUM BLD-MCNC: 9.4 MG/DL (ref 8.5–10.1)
CHLORIDE SERPL-SCNC: 107 MMOL/L (ref 98–112)
CHOLEST SERPL-MCNC: 211 MG/DL (ref ?–200)
CO2 SERPL-SCNC: 23 MMOL/L (ref 21–32)
CREAT BLD-MCNC: 1.15 MG/DL
DEPRECATED RDW RBC AUTO: 42.6 FL (ref 35.1–46.3)
EOSINOPHIL # BLD AUTO: 0.26 X10(3) UL (ref 0–0.7)
EOSINOPHIL NFR BLD AUTO: 3.9 %
ERYTHROCYTE [DISTWIDTH] IN BLOOD BY AUTOMATED COUNT: 13.3 % (ref 11–15)
GLOBULIN PLAS-MCNC: 2.9 G/DL (ref 2.8–4.4)
GLUCOSE BLD-MCNC: 96 MG/DL (ref 70–99)
GLUCOSE BLDC GLUCOMTR-MCNC: 107 MG/DL (ref 70–99)
GLUCOSE BLDC GLUCOMTR-MCNC: 129 MG/DL (ref 70–99)
GLUCOSE BLDC GLUCOMTR-MCNC: 144 MG/DL (ref 70–99)
GLUCOSE BLDC GLUCOMTR-MCNC: 96 MG/DL (ref 70–99)
HCT VFR BLD AUTO: 28.6 %
HDLC SERPL-MCNC: 34 MG/DL (ref 40–59)
HGB BLD-MCNC: 9.9 G/DL
IMM GRANULOCYTES # BLD AUTO: 0.02 X10(3) UL (ref 0–1)
IMM GRANULOCYTES NFR BLD: 0.3 %
LDLC SERPL CALC-MCNC: 106 MG/DL (ref ?–100)
LYMPHOCYTES # BLD AUTO: 2.24 X10(3) UL (ref 1–4)
LYMPHOCYTES NFR BLD AUTO: 33.4 %
MAGNESIUM SERPL-MCNC: 2 MG/DL (ref 1.6–2.6)
MCH RBC QN AUTO: 30.3 PG (ref 26–34)
MCHC RBC AUTO-ENTMCNC: 34.6 G/DL (ref 31–37)
MCV RBC AUTO: 87.5 FL
MONOCYTES # BLD AUTO: 0.69 X10(3) UL (ref 0.1–1)
MONOCYTES NFR BLD AUTO: 10.3 %
NEUTROPHILS # BLD AUTO: 3.47 X10 (3) UL (ref 1.5–7.7)
NEUTROPHILS # BLD AUTO: 3.47 X10(3) UL (ref 1.5–7.7)
NEUTROPHILS NFR BLD AUTO: 51.8 %
NONHDLC SERPL-MCNC: 177 MG/DL (ref ?–130)
OSMOLALITY SERPL CALC.SUM OF ELEC: 295 MOSM/KG (ref 275–295)
PLATELET # BLD AUTO: 189 10(3)UL (ref 150–450)
POTASSIUM SERPL-SCNC: 4.2 MMOL/L (ref 3.5–5.1)
POTASSIUM SERPL-SCNC: 4.2 MMOL/L (ref 3.5–5.1)
PROT SERPL-MCNC: 6 G/DL (ref 6.4–8.2)
RBC # BLD AUTO: 3.27 X10(6)UL
SODIUM SERPL-SCNC: 140 MMOL/L (ref 136–145)
TRIGL SERPL-MCNC: 412 MG/DL (ref 30–149)
VLDLC SERPL CALC-MCNC: 72 MG/DL (ref 0–30)
WBC # BLD AUTO: 6.7 X10(3) UL (ref 4–11)

## 2022-07-07 PROCEDURE — 93306 TTE W/DOPPLER COMPLETE: CPT | Performed by: INTERNAL MEDICINE

## 2022-07-07 PROCEDURE — 93971 EXTREMITY STUDY: CPT | Performed by: INTERNAL MEDICINE

## 2022-07-07 PROCEDURE — 99233 SBSQ HOSP IP/OBS HIGH 50: CPT | Performed by: HOSPITALIST

## 2022-07-07 NOTE — PLAN OF CARE
Problem: Patient Centered Care  Goal: Patient preferences are identified and integrated in the patient's plan of care  Description: Interventions:  - What would you like us to know as we care for you? I was just here in June. - Provide timely, complete, and accurate information to patient/family  - Incorporate patient and family knowledge, values, beliefs, and cultural backgrounds into the planning and delivery of care  - Encourage patient/family to participate in care and decision-making at the level they choose  - Honor patient and family perspectives and choices  Outcome: Progressing     Problem: Patient/Family Goals  Goal: Patient/Family Long Term Goal  Description: Patient's Long Term Goal: Go home    Interventions:  - Take medications as directed  - Education on medications and what they are for  - Develop routine  - F/U with PCP  - See additional Care Plan goals for specific interventions  Outcome: Progressing  Goal: Patient/Family Short Term Goal  Description: Patient's Short Term Goal: Breathe better    Interventions:   - IV Lasix  - Ambulate TID  - Restrict fluid intake  - Daily weights  - See additional Care Plan goals for specific interventions  Outcome: Progressing     Problem: CARDIOVASCULAR - ADULT  Goal: Maintains optimal cardiac output and hemodynamic stability  Description: INTERVENTIONS:  - Monitor vital signs, rhythm, and trends  - Monitor for bleeding, hypotension and signs of decreased cardiac output  - Evaluate effectiveness of vasoactive medications to optimize hemodynamic stability  - Monitor arterial and/or venous puncture sites for bleeding and/or hematoma  - Assess quality of pulses, skin color and temperature  - Assess for signs of decreased coronary artery perfusion - ex.  Angina  - Evaluate fluid balance, assess for edema, trend weights  Outcome: Progressing  Goal: Absence of cardiac arrhythmias or at baseline  Description: INTERVENTIONS:  - Continuous cardiac monitoring, monitor vital signs, obtain 12 lead EKG if indicated  - Evaluate effectiveness of antiarrhythmic and heart rate control medications as ordered  - Initiate emergency measures for life threatening arrhythmias  - Monitor electrolytes and administer replacement therapy as ordered  Outcome: Progressing     Problem: METABOLIC/FLUID AND ELECTROLYTES - ADULT  Goal: Electrolytes maintained within normal limits  Description: INTERVENTIONS:  - Monitor labs and rhythm and assess patient for signs and symptoms of electrolyte imbalances  - Administer electrolyte replacement as ordered  - Monitor response to electrolyte replacements, including rhythm and repeat lab results as appropriate  - Fluid restriction as ordered  - Instruct patient on fluid and nutrition restrictions as appropriate  Outcome: Progressing  Goal: Hemodynamic stability and optimal renal function maintained  Description: INTERVENTIONS:  - Monitor labs and assess for signs and symptoms of volume excess or deficit  - Monitor intake, output and patient weight  - Monitor urine specific gravity, serum osmolarity and serum sodium as indicated or ordered  - Monitor response to interventions for patient's volume status, including labs, urine output, blood pressure (other measures as available)  - Encourage oral intake as appropriate  - Instruct patient on fluid and nutrition restrictions as appropriate  Outcome: Progressing   Pt on RA. Isolation maintained. Plan for 2de and us LE today. Iv lasix as ordered. Plan for dc in couple days.

## 2022-07-07 NOTE — PLAN OF CARE
Blaise Talbot is alert and oriented, but forgetful. On RA. Admission completed, med rec ordered. No pain complaints overnight. Voiding freely. Potassium and Magnesium replaced per protocol. IV Lasix BID. Plan for 2D echo. Discharge home once medically cleared. Problem: Patient Centered Care  Goal: Patient preferences are identified and integrated in the patient's plan of care  Description: Interventions:  - What would you like us to know as we care for you? I was just here in June.   - Provide timely, complete, and accurate information to patient/family  - Incorporate patient and family knowledge, values, beliefs, and cultural backgrounds into the planning and delivery of care  - Encourage patient/family to participate in care and decision-making at the level they choose  - Honor patient and family perspectives and choices  Outcome: Progressing     Problem: Patient/Family Goals  Goal: Patient/Family Long Term Goal  Description: Patient's Long Term Goal: Go home    Interventions:  - Take medications as directed  - Education on medications and what they are for  - Develop routine  - F/U with PCP  - See additional Care Plan goals for specific interventions  Outcome: Progressing  Goal: Patient/Family Short Term Goal  Description: Patient's Short Term Goal: Breathe better    Interventions:   - IV Lasix  - Ambulate TID  - Restrict fluid intake  - Daily weights  - See additional Care Plan goals for specific interventions  Outcome: Progressing     Problem: CARDIOVASCULAR - ADULT  Goal: Maintains optimal cardiac output and hemodynamic stability  Description: INTERVENTIONS:  - Monitor vital signs, rhythm, and trends  - Monitor for bleeding, hypotension and signs of decreased cardiac output  - Evaluate effectiveness of vasoactive medications to optimize hemodynamic stability  - Monitor arterial and/or venous puncture sites for bleeding and/or hematoma  - Assess quality of pulses, skin color and temperature  - Assess for signs of decreased coronary artery perfusion - ex.  Angina  - Evaluate fluid balance, assess for edema, trend weights  Outcome: Progressing  Goal: Absence of cardiac arrhythmias or at baseline  Description: INTERVENTIONS:  - Continuous cardiac monitoring, monitor vital signs, obtain 12 lead EKG if indicated  - Evaluate effectiveness of antiarrhythmic and heart rate control medications as ordered  - Initiate emergency measures for life threatening arrhythmias  - Monitor electrolytes and administer replacement therapy as ordered  Outcome: Progressing     Problem: METABOLIC/FLUID AND ELECTROLYTES - ADULT  Goal: Electrolytes maintained within normal limits  Description: INTERVENTIONS:  - Monitor labs and rhythm and assess patient for signs and symptoms of electrolyte imbalances  - Administer electrolyte replacement as ordered  - Monitor response to electrolyte replacements, including rhythm and repeat lab results as appropriate  - Fluid restriction as ordered  - Instruct patient on fluid and nutrition restrictions as appropriate  Outcome: Progressing  Goal: Hemodynamic stability and optimal renal function maintained  Description: INTERVENTIONS:  - Monitor labs and assess for signs and symptoms of volume excess or deficit  - Monitor intake, output and patient weight  - Monitor urine specific gravity, serum osmolarity and serum sodium as indicated or ordered  - Monitor response to interventions for patient's volume status, including labs, urine output, blood pressure (other measures as available)  - Encourage oral intake as appropriate  - Instruct patient on fluid and nutrition restrictions as appropriate  Outcome: Progressing

## 2022-07-07 NOTE — OCCUPATIONAL THERAPY NOTE
Orders received, chart reviewed for occupational therapy evaluation. Pt awaiting US venous doppler of left lower extremity to r/o DVT. Spoke with RN --will hold therapy pending results.

## 2022-07-07 NOTE — TELEPHONE ENCOUNTER
Patient was seen in [address] [place] [address] then sent to ED where she was admitted.       Acute on chronic congestive heart failure (HCC)     Acute on chronic congestive heart failure, unspecified heart failure type ([facility] [address]

## 2022-07-07 NOTE — CDS QUERY
How to Answer this Query    1.) Click \"Edit Button\" on the toolbar  2.) Type an \"X\" in the bracket for the diagnosis that applies. (You may also add additional clinical details as you feel necessary to substantiate your response). 3.) Finally click \"Sign\" to complete response. Thank you     Covid-19 Status Clarification  CLINICAL DOCUMENTATION CLARIFICATION FORM    Dear Nicky John:  Clinical information (provided below) includes documentation of a Covid-19 Infection. For accurate ICD-10-CM code assignment to reflect severity of illness and risk of mortality,  BASED ON YOUR CLINICAL JUDGEMENT, PLEASE SELECT   THE MOST APPROPRIATE DIAGNOSIS:     (    ) Covid-19 is a current & active infection     (   x ) Covid-19 is not a current infection, but the ( Acute on chronic heart failure with preserved ejection fraction ) are a residual effects/sequelae of the Covid-19       (    ) Covid-19 is not an active infection (personal history of Covid-19 infection only), and the current condition is unrelated to the prior Covid-19 infection     (    ) Clinically unable to determine  ____________________________________________________________________________                                                    Documentation from the Medical Record:  ED provider:  77-year-old female presents for evaluation for shortness of breath racing heart and palpitations. Symptoms have been present for the past several days. She does have a history of CHF and is currently recovering from [facility]. She does have a cough with shortness of breath worse with lying down.     Clinical Impression:  Acute on chronic congestive heart failure    H&P:   Acute on chronic heart failure with preserved ejection fraction  Generalized fatigue  -Likely related to recent COVID-19 infection    6-   Rapid SARS-CoV-2 by PCR    Detected Abnormal      Meds include Lasix 40mg IVP BID, Heparin 5,000 units q 8 hours sq    For questions regarding this query, please contact Clinical Documentation  John Zayas  [address]      [phone]                Thank you!                                                            THIS FORM IS A PERMANENT PART OF THE MEDICAL RECORD

## 2022-07-08 ENCOUNTER — APPOINTMENT (OUTPATIENT)
Dept: GENERAL RADIOLOGY | Facility: HOSPITAL | Age: 77
End: 2022-07-08
Attending: HOSPITALIST
Payer: MEDICARE

## 2022-07-08 LAB
ALBUMIN SERPL-MCNC: 3.3 G/DL (ref 3.4–5)
ANION GAP SERPL CALC-SCNC: 10 MMOL/L (ref 0–18)
BASOPHILS # BLD AUTO: 0.02 X10(3) UL (ref 0–0.2)
BASOPHILS NFR BLD AUTO: 0.2 %
BUN BLD-MCNC: 31 MG/DL (ref 7–18)
BUN/CREAT SERPL: 24.8 (ref 10–20)
CALCIUM BLD-MCNC: 9.3 MG/DL (ref 8.5–10.1)
CHLORIDE SERPL-SCNC: 104 MMOL/L (ref 98–112)
CO2 SERPL-SCNC: 24 MMOL/L (ref 21–32)
CREAT BLD-MCNC: 1.25 MG/DL
DEPRECATED RDW RBC AUTO: 43.2 FL (ref 35.1–46.3)
EOSINOPHIL # BLD AUTO: 0.18 X10(3) UL (ref 0–0.7)
EOSINOPHIL NFR BLD AUTO: 1.9 %
ERYTHROCYTE [DISTWIDTH] IN BLOOD BY AUTOMATED COUNT: 13.4 % (ref 11–15)
GLUCOSE BLD-MCNC: 100 MG/DL (ref 70–99)
GLUCOSE BLDC GLUCOMTR-MCNC: 104 MG/DL (ref 70–99)
GLUCOSE BLDC GLUCOMTR-MCNC: 105 MG/DL (ref 70–99)
GLUCOSE BLDC GLUCOMTR-MCNC: 111 MG/DL (ref 70–99)
GLUCOSE BLDC GLUCOMTR-MCNC: 123 MG/DL (ref 70–99)
HCT VFR BLD AUTO: 30.9 %
HGB BLD-MCNC: 10.4 G/DL
IMM GRANULOCYTES # BLD AUTO: 0.04 X10(3) UL (ref 0–1)
IMM GRANULOCYTES NFR BLD: 0.4 %
LYMPHOCYTES # BLD AUTO: 2.69 X10(3) UL (ref 1–4)
LYMPHOCYTES NFR BLD AUTO: 28 %
MCH RBC QN AUTO: 29.6 PG (ref 26–34)
MCHC RBC AUTO-ENTMCNC: 33.7 G/DL (ref 31–37)
MCV RBC AUTO: 88 FL
MONOCYTES # BLD AUTO: 0.89 X10(3) UL (ref 0.1–1)
MONOCYTES NFR BLD AUTO: 9.3 %
NEUTROPHILS # BLD AUTO: 5.78 X10 (3) UL (ref 1.5–7.7)
NEUTROPHILS # BLD AUTO: 5.78 X10(3) UL (ref 1.5–7.7)
NEUTROPHILS NFR BLD AUTO: 60.2 %
OSMOLALITY SERPL CALC.SUM OF ELEC: 293 MOSM/KG (ref 275–295)
PHOSPHATE SERPL-MCNC: 3.1 MG/DL (ref 2.5–4.9)
PLATELET # BLD AUTO: 207 10(3)UL (ref 150–450)
POTASSIUM SERPL-SCNC: 3.7 MMOL/L (ref 3.5–5.1)
RBC # BLD AUTO: 3.51 X10(6)UL
SODIUM SERPL-SCNC: 138 MMOL/L (ref 136–145)
WBC # BLD AUTO: 9.6 X10(3) UL (ref 4–11)

## 2022-07-08 PROCEDURE — 73610 X-RAY EXAM OF ANKLE: CPT | Performed by: HOSPITALIST

## 2022-07-08 PROCEDURE — 99232 SBSQ HOSP IP/OBS MODERATE 35: CPT | Performed by: HOSPITALIST

## 2022-07-08 RX ORDER — POTASSIUM CHLORIDE 20 MEQ/1
40 TABLET, EXTENDED RELEASE ORAL ONCE
Status: DISCONTINUED | OUTPATIENT
Start: 2022-07-08 | End: 2022-07-10

## 2022-07-08 RX ORDER — LISINOPRIL 20 MG/1
20 TABLET ORAL DAILY
Status: DISCONTINUED | OUTPATIENT
Start: 2022-07-09 | End: 2022-07-10

## 2022-07-08 RX ORDER — ROSUVASTATIN CALCIUM 20 MG/1
20 TABLET, COATED ORAL NIGHTLY
Status: DISCONTINUED | OUTPATIENT
Start: 2022-07-08 | End: 2022-07-10

## 2022-07-08 RX ORDER — ROSUVASTATIN CALCIUM 20 MG/1
40 TABLET, COATED ORAL NIGHTLY
Status: DISCONTINUED | OUTPATIENT
Start: 2022-07-08 | End: 2022-07-08

## 2022-07-08 RX ORDER — METOPROLOL SUCCINATE 50 MG/1
50 TABLET, EXTENDED RELEASE ORAL
Status: DISCONTINUED | OUTPATIENT
Start: 2022-07-09 | End: 2022-07-08

## 2022-07-08 NOTE — PLAN OF CARE
Problem: CARDIOVASCULAR - ADULT  Goal: Maintains optimal cardiac output and hemodynamic stability  Description: INTERVENTIONS:  - Monitor vital signs, rhythm, and trends  - Monitor for bleeding, hypotension and signs of decreased cardiac output  - Evaluate effectiveness of vasoactive medications to optimize hemodynamic stability  - Monitor arterial and/or venous puncture sites for bleeding and/or hematoma  - Assess quality of pulses, skin color and temperature  - Assess for signs of decreased coronary artery perfusion - ex. Angina  - Evaluate fluid balance, assess for edema, trend weights  Outcome: Progressing     Problem: PAIN - ADULT  Goal: Verbalizes/displays adequate comfort level or patient's stated pain goal  Description: INTERVENTIONS:  - Encourage pt to monitor pain and request assistance  - Assess pain using appropriate pain scale  - Administer analgesics based on type and severity of pain and evaluate response  - Implement non-pharmacological measures as appropriate and evaluate response  - Consider cultural and social influences on pain and pain management  - Manage/alleviate anxiety  - Utilize distraction and/or relaxation techniques  - Monitor for opioid side effects  - Notify MD/LIP if interventions unsuccessful or patient reports new pain  - Anticipate increased pain with activity and pre-medicate as appropriate  Outcome: Progressing     Problem: SAFETY ADULT - FALL  Goal: Free from fall injury  Description: INTERVENTIONS:  - Assess pt frequently for physical needs  - Identify cognitive and physical deficits and behaviors that affect risk of falls.   - [place] fall precautions as indicated by assessment.  - Educate pt/family on patient safety including physical limitations  - Instruct pt to call for assistance with activity based on assessment  - Modify environment to reduce risk of injury  - Provide assistive devices as appropriate  - Consider OT/PT consult to assist with strengthening/mobility  Outcome: Progressing     Problem: SKIN/TISSUE INTEGRITY - ADULT  Goal: Skin integrity remains intact  Description: INTERVENTIONS  - Assess and document risk factors for pressure ulcer development  - Assess and document skin integrity  - Monitor for areas of redness and/or skin breakdown  - Initiate interventions, skin care algorithm/standards of care as needed  Outcome: Progressing   Pt c/o coughing and congestion, robitussin and albuterol inhaler given. Sx's improved.

## 2022-07-08 NOTE — CM/SW NOTE
BPCI Bundled Advanced Medicare Program    Plan of care reviewed for care coordination and discharge planning. Noted pt falls under  BPCI/Medicare program, with  for Congestive Heart Failure     NSOC GRANT Proposal:  Home pending progress    / to remain available for support and/or discharge planning.      Esther Buckley MBA BSN RN [address]  RN Case Manager  [phone]

## 2022-07-08 NOTE — PLAN OF CARE
Problem: Patient Centered Care  Goal: Patient preferences are identified and integrated in the patient's plan of care  Description: Interventions:  - What would you like us to know as we care for you? I was just here in June. - Provide timely, complete, and accurate information to patient/family  - Incorporate patient and family knowledge, values, beliefs, and cultural backgrounds into the planning and delivery of care  - Encourage patient/family to participate in care and decision-making at the level they choose  - Honor patient and family perspectives and choices  Outcome: Progressing     Problem: Patient/Family Goals  Goal: Patient/Family Long Term Goal  Description: Patient's Long Term Goal: Go home    Interventions:  - Take medications as directed  - Education on medications and what they are for  - Develop routine  - F/U with PCP  - See additional Care Plan goals for specific interventions  Outcome: Progressing  Goal: Patient/Family Short Term Goal  Description: Patient's Short Term Goal: Breathe better    Interventions:   - IV Lasix  - Ambulate TID  - Restrict fluid intake  - Daily weights  - See additional Care Plan goals for specific interventions  Outcome: Progressing     Problem: CARDIOVASCULAR - ADULT  Goal: Maintains optimal cardiac output and hemodynamic stability  Description: INTERVENTIONS:  - Monitor vital signs, rhythm, and trends  - Monitor for bleeding, hypotension and signs of decreased cardiac output  - Evaluate effectiveness of vasoactive medications to optimize hemodynamic stability  - Monitor arterial and/or venous puncture sites for bleeding and/or hematoma  - Assess quality of pulses, skin color and temperature  - Assess for signs of decreased coronary artery perfusion - ex.  Angina  - Evaluate fluid balance, assess for edema, trend weights  Outcome: Progressing  Goal: Absence of cardiac arrhythmias or at baseline  Description: INTERVENTIONS:  - Continuous cardiac monitoring, monitor vital signs, obtain 12 lead EKG if indicated  - Evaluate effectiveness of antiarrhythmic and heart rate control medications as ordered  - Initiate emergency measures for life threatening arrhythmias  - Monitor electrolytes and administer replacement therapy as ordered  Outcome: Progressing     Problem: METABOLIC/FLUID AND ELECTROLYTES - ADULT  Goal: Electrolytes maintained within normal limits  Description: INTERVENTIONS:  - Monitor labs and rhythm and assess patient for signs and symptoms of electrolyte imbalances  - Administer electrolyte replacement as ordered  - Monitor response to electrolyte replacements, including rhythm and repeat lab results as appropriate  - Fluid restriction as ordered  - Instruct patient on fluid and nutrition restrictions as appropriate  Outcome: Progressing  Goal: Hemodynamic stability and optimal renal function maintained  Description: INTERVENTIONS:  - Monitor labs and assess for signs and symptoms of volume excess or deficit  - Monitor intake, output and patient weight  - Monitor urine specific gravity, serum osmolarity and serum sodium as indicated or ordered  - Monitor response to interventions for patient's volume status, including labs, urine output, blood pressure (other measures as available)  - Encourage oral intake as appropriate  - Instruct patient on fluid and nutrition restrictions as appropriate  Outcome: Progressing     Problem: SKIN/TISSUE INTEGRITY - ADULT  Goal: Skin integrity remains intact  Description: INTERVENTIONS  - Assess and document risk factors for pressure ulcer development  - Assess and document skin integrity  - Monitor for areas of redness and/or skin breakdown  - Initiate interventions, skin care algorithm/standards of care as needed  Outcome: Progressing     Problem: PAIN - ADULT  Goal: Verbalizes/displays adequate comfort level or patient's stated pain goal  Description: INTERVENTIONS:  - Encourage pt to monitor pain and request assistance  - Assess pain using appropriate pain scale  - Administer analgesics based on type and severity of pain and evaluate response  - Implement non-pharmacological measures as appropriate and evaluate response  - Consider cultural and social influences on pain and pain management  - Manage/alleviate anxiety  - Utilize distraction and/or relaxation techniques  - Monitor for opioid side effects  - Notify MD/LIP if interventions unsuccessful or patient reports new pain  - Anticipate increased pain with activity and pre-medicate as appropriate  Outcome: Progressing     Problem: SAFETY ADULT - FALL  Goal: Free from fall injury  Description: INTERVENTIONS:  - Assess pt frequently for physical needs  - Identify cognitive and physical deficits and behaviors that affect risk of falls. - [place] fall precautions as indicated by assessment.  - Educate pt/family on patient safety including physical limitations  - Instruct pt to call for assistance with activity based on assessment  - Modify environment to reduce risk of injury  - Provide assistive devices as appropriate  - Consider OT/PT consult to assist with strengthening/mobility  - Encourage toileting schedule  Outcome: Progressing   PT on RA. IV lasix BID. Isolation maintained for covid. Medication adjustments. Left ankle pain, testing negative. Plan for dc in 1-2 days.

## 2022-07-09 LAB
ALBUMIN SERPL-MCNC: 3.6 G/DL (ref 3.4–5)
ANION GAP SERPL CALC-SCNC: 11 MMOL/L (ref 0–18)
BASOPHILS # BLD AUTO: 0.02 X10(3) UL (ref 0–0.2)
BASOPHILS NFR BLD AUTO: 0.2 %
BUN BLD-MCNC: 43 MG/DL (ref 7–18)
BUN/CREAT SERPL: 30.1 (ref 10–20)
CALCIUM BLD-MCNC: 9.9 MG/DL (ref 8.5–10.1)
CHLORIDE SERPL-SCNC: 101 MMOL/L (ref 98–112)
CO2 SERPL-SCNC: 25 MMOL/L (ref 21–32)
CREAT BLD-MCNC: 1.43 MG/DL
DEPRECATED RDW RBC AUTO: 42.2 FL (ref 35.1–46.3)
EOSINOPHIL # BLD AUTO: 0.21 X10(3) UL (ref 0–0.7)
EOSINOPHIL NFR BLD AUTO: 2.4 %
ERYTHROCYTE [DISTWIDTH] IN BLOOD BY AUTOMATED COUNT: 13.3 % (ref 11–15)
GLUCOSE BLD-MCNC: 92 MG/DL (ref 70–99)
GLUCOSE BLDC GLUCOMTR-MCNC: 121 MG/DL (ref 70–99)
GLUCOSE BLDC GLUCOMTR-MCNC: 130 MG/DL (ref 70–99)
GLUCOSE BLDC GLUCOMTR-MCNC: 136 MG/DL (ref 70–99)
GLUCOSE BLDC GLUCOMTR-MCNC: 93 MG/DL (ref 70–99)
HCT VFR BLD AUTO: 30.8 %
HGB BLD-MCNC: 10.5 G/DL
IMM GRANULOCYTES # BLD AUTO: 0.03 X10(3) UL (ref 0–1)
IMM GRANULOCYTES NFR BLD: 0.3 %
LYMPHOCYTES # BLD AUTO: 2.89 X10(3) UL (ref 1–4)
LYMPHOCYTES NFR BLD AUTO: 33.2 %
MCH RBC QN AUTO: 29.9 PG (ref 26–34)
MCHC RBC AUTO-ENTMCNC: 34.1 G/DL (ref 31–37)
MCV RBC AUTO: 87.7 FL
MONOCYTES # BLD AUTO: 0.77 X10(3) UL (ref 0.1–1)
MONOCYTES NFR BLD AUTO: 8.8 %
NEUTROPHILS # BLD AUTO: 4.79 X10 (3) UL (ref 1.5–7.7)
NEUTROPHILS # BLD AUTO: 4.79 X10(3) UL (ref 1.5–7.7)
NEUTROPHILS NFR BLD AUTO: 55.1 %
OSMOLALITY SERPL CALC.SUM OF ELEC: 294 MOSM/KG (ref 275–295)
PHOSPHATE SERPL-MCNC: 3.5 MG/DL (ref 2.5–4.9)
PLATELET # BLD AUTO: 218 10(3)UL (ref 150–450)
POTASSIUM SERPL-SCNC: 3.8 MMOL/L (ref 3.5–5.1)
POTASSIUM SERPL-SCNC: 3.8 MMOL/L (ref 3.5–5.1)
RBC # BLD AUTO: 3.51 X10(6)UL
SODIUM SERPL-SCNC: 137 MMOL/L (ref 136–145)
WBC # BLD AUTO: 8.7 X10(3) UL (ref 4–11)

## 2022-07-09 PROCEDURE — 99232 SBSQ HOSP IP/OBS MODERATE 35: CPT | Performed by: HOSPITALIST

## 2022-07-09 RX ORDER — CALCIUM CARBONATE 200(500)MG
500 TABLET,CHEWABLE ORAL 3 TIMES DAILY PRN
Status: DISCONTINUED | OUTPATIENT
Start: 2022-07-09 | End: 2022-07-10

## 2022-07-09 RX ORDER — ROSUVASTATIN CALCIUM 20 MG/1
20 TABLET, COATED ORAL NIGHTLY
Qty: 30 TABLET | Refills: 0 | Status: SHIPPED | OUTPATIENT
Start: 2022-07-09 | End: 2022-07-12

## 2022-07-09 RX ORDER — TRAMADOL HYDROCHLORIDE 50 MG/1
50 TABLET ORAL EVERY 12 HOURS PRN
Status: DISCONTINUED | OUTPATIENT
Start: 2022-07-09 | End: 2022-07-10

## 2022-07-09 RX ORDER — LISINOPRIL 20 MG/1
20 TABLET ORAL DAILY
Qty: 30 TABLET | Refills: 0 | Status: SHIPPED | OUTPATIENT
Start: 2022-07-10 | End: 2022-07-12

## 2022-07-09 NOTE — PLAN OF CARE
A&Ox4, RA. Patient complains of GI upset, dizziness, and shakiness. Tums ordered TID PRN. MD notified and bolus started. Plan to DC tomorrow, if pt. Feels better. Orthostatic BP negative. Problem: Patient Centered Care  Goal: Patient preferences are identified and integrated in the patient's plan of care  Description: Interventions:  - What would you like us to know as we care for you? I was just here in June. - Provide timely, complete, and accurate information to patient/family  - Incorporate patient and family knowledge, values, beliefs, and cultural backgrounds into the planning and delivery of care  - Encourage patient/family to participate in care and decision-making at the level they choose  - Honor patient and family perspectives and choices  Outcome: Progressing     Problem: Patient/Family Goals  Goal: Patient/Family Long Term Goal  Description: Patient's Long Term Goal: Go home    Interventions:  - Take medications as directed  - Education on medications and what they are for  - Develop routine  - F/U with PCP  - See additional Care Plan goals for specific interventions  Outcome: Progressing  Goal: Patient/Family Short Term Goal  Description: Patient's Short Term Goal: Breathe better    Interventions:   - IV Lasix  - Ambulate TID  - Restrict fluid intake  - Daily weights  - See additional Care Plan goals for specific interventions  Outcome: Progressing     Problem: CARDIOVASCULAR - ADULT  Goal: Maintains optimal cardiac output and hemodynamic stability  Description: INTERVENTIONS:  - Monitor vital signs, rhythm, and trends  - Monitor for bleeding, hypotension and signs of decreased cardiac output  - Evaluate effectiveness of vasoactive medications to optimize hemodynamic stability  - Monitor arterial and/or venous puncture sites for bleeding and/or hematoma  - Assess quality of pulses, skin color and temperature  - Assess for signs of decreased coronary artery perfusion - ex.  Angina  - Evaluate fluid balance, assess for edema, trend weights  Outcome: Progressing  Goal: Absence of cardiac arrhythmias or at baseline  Description: INTERVENTIONS:  - Continuous cardiac monitoring, monitor vital signs, obtain 12 lead EKG if indicated  - Evaluate effectiveness of antiarrhythmic and heart rate control medications as ordered  - Initiate emergency measures for life threatening arrhythmias  - Monitor electrolytes and administer replacement therapy as ordered  Outcome: Progressing     Problem: METABOLIC/FLUID AND ELECTROLYTES - ADULT  Goal: Electrolytes maintained within normal limits  Description: INTERVENTIONS:  - Monitor labs and rhythm and assess patient for signs and symptoms of electrolyte imbalances  - Administer electrolyte replacement as ordered  - Monitor response to electrolyte replacements, including rhythm and repeat lab results as appropriate  - Fluid restriction as ordered  - Instruct patient on fluid and nutrition restrictions as appropriate  Outcome: Progressing  Goal: Hemodynamic stability and optimal renal function maintained  Description: INTERVENTIONS:  - Monitor labs and assess for signs and symptoms of volume excess or deficit  - Monitor intake, output and patient weight  - Monitor urine specific gravity, serum osmolarity and serum sodium as indicated or ordered  - Monitor response to interventions for patient's volume status, including labs, urine output, blood pressure (other measures as available)  - Encourage oral intake as appropriate  - Instruct patient on fluid and nutrition restrictions as appropriate  Outcome: Progressing     Problem: PAIN - ADULT  Goal: Verbalizes/displays adequate comfort level or patient's stated pain goal  Description: INTERVENTIONS:  - Encourage pt to monitor pain and request assistance  - Assess pain using appropriate pain scale  - Administer analgesics based on type and severity of pain and evaluate response  - Implement non-pharmacological measures as appropriate and evaluate response  - Consider cultural and social influences on pain and pain management  - Manage/alleviate anxiety  - Utilize distraction and/or relaxation techniques  - Monitor for opioid side effects  - Notify MD/LIP if interventions unsuccessful or patient reports new pain  - Anticipate increased pain with activity and pre-medicate as appropriate  Outcome: Progressing     Problem: SAFETY ADULT - FALL  Goal: Free from fall injury  Description: INTERVENTIONS:  - Assess pt frequently for physical needs  - Identify cognitive and physical deficits and behaviors that affect risk of falls.   - [place] fall precautions as indicated by assessment.  - Educate pt/family on patient safety including physical limitations  - Instruct pt to call for assistance with activity based on assessment  - Modify environment to reduce risk of injury  - Provide assistive devices as appropriate  - Consider OT/PT consult to assist with strengthening/mobility  - Encourage toileting schedule  Outcome: Progressing     Problem: SKIN/TISSUE INTEGRITY - ADULT  Goal: Skin integrity remains intact  Description: INTERVENTIONS  - Assess and document risk factors for pressure ulcer development  - Assess and document skin integrity  - Monitor for areas of redness and/or skin breakdown  - Initiate interventions, skin care algorithm/standards of care as needed  Outcome: Progressing

## 2022-07-09 NOTE — PLAN OF CARE
Problem: Patient Centered Care  Goal: Patient preferences are identified and integrated in the patient's plan of care  Description: Interventions:  - What would you like us to know as we care for you? I was just here in June. - Provide timely, complete, and accurate information to patient/family  - Incorporate patient and family knowledge, values, beliefs, and cultural backgrounds into the planning and delivery of care  - Encourage patient/family to participate in care and decision-making at the level they choose  - Honor patient and family perspectives and choices  Outcome: Progressing     Problem: Patient/Family Goals  Goal: Patient/Family Long Term Goal  Description: Patient's Long Term Goal: Go home    Interventions:  - Take medications as directed  - Education on medications and what they are for  - Develop routine  - F/U with PCP  - See additional Care Plan goals for specific interventions  Outcome: Progressing  Goal: Patient/Family Short Term Goal  Description: Patient's Short Term Goal: Breathe better    Interventions:   - IV Lasix  - Ambulate TID  - Restrict fluid intake  - Daily weights  - See additional Care Plan goals for specific interventions  Outcome: Progressing     Problem: CARDIOVASCULAR - ADULT  Goal: Maintains optimal cardiac output and hemodynamic stability  Description: INTERVENTIONS:  - Monitor vital signs, rhythm, and trends  - Monitor for bleeding, hypotension and signs of decreased cardiac output  - Evaluate effectiveness of vasoactive medications to optimize hemodynamic stability  - Monitor arterial and/or venous puncture sites for bleeding and/or hematoma  - Assess quality of pulses, skin color and temperature  - Assess for signs of decreased coronary artery perfusion - ex.  Angina  - Evaluate fluid balance, assess for edema, trend weights  Outcome: Progressing  Goal: Absence of cardiac arrhythmias or at baseline  Description: INTERVENTIONS:  - Continuous cardiac monitoring, monitor vital signs, obtain 12 lead EKG if indicated  - Evaluate effectiveness of antiarrhythmic and heart rate control medications as ordered  - Initiate emergency measures for life threatening arrhythmias  - Monitor electrolytes and administer replacement therapy as ordered  Outcome: Progressing     Problem: METABOLIC/FLUID AND ELECTROLYTES - ADULT  Goal: Electrolytes maintained within normal limits  Description: INTERVENTIONS:  - Monitor labs and rhythm and assess patient for signs and symptoms of electrolyte imbalances  - Administer electrolyte replacement as ordered  - Monitor response to electrolyte replacements, including rhythm and repeat lab results as appropriate  - Fluid restriction as ordered  - Instruct patient on fluid and nutrition restrictions as appropriate  Outcome: Progressing  Goal: Hemodynamic stability and optimal renal function maintained  Description: INTERVENTIONS:  - Monitor labs and assess for signs and symptoms of volume excess or deficit  - Monitor intake, output and patient weight  - Monitor urine specific gravity, serum osmolarity and serum sodium as indicated or ordered  - Monitor response to interventions for patient's volume status, including labs, urine output, blood pressure (other measures as available)  - Encourage oral intake as appropriate  - Instruct patient on fluid and nutrition restrictions as appropriate  Outcome: Progressing     Problem: PAIN - ADULT  Goal: Verbalizes/displays adequate comfort level or patient's stated pain goal  Description: INTERVENTIONS:  - Encourage pt to monitor pain and request assistance  - Assess pain using appropriate pain scale  - Administer analgesics based on type and severity of pain and evaluate response  - Implement non-pharmacological measures as appropriate and evaluate response  - Consider cultural and social influences on pain and pain management  - Manage/alleviate anxiety  - Utilize distraction and/or relaxation techniques  - Monitor for opioid side effects  - Notify MD/LIP if interventions unsuccessful or patient reports new pain  - Anticipate increased pain with activity and pre-medicate as appropriate  Outcome: Progressing     Problem: SAFETY ADULT - FALL  Goal: Free from fall injury  Description: INTERVENTIONS:  - Assess pt frequently for physical needs  - Identify cognitive and physical deficits and behaviors that affect risk of falls. - [place] fall precautions as indicated by assessment.  - Educate pt/family on patient safety including physical limitations  - Instruct pt to call for assistance with activity based on assessment  - Modify environment to reduce risk of injury  - Provide assistive devices as appropriate  - Consider OT/PT consult to assist with strengthening/mobility  - Encourage toileting schedule  Outcome: Progressing     Problem: SKIN/TISSUE INTEGRITY - ADULT  Goal: Skin integrity remains intact  Description: INTERVENTIONS  - Assess and document risk factors for pressure ulcer development  - Assess and document skin integrity  - Monitor for areas of redness and/or skin breakdown  - Initiate interventions, skin care algorithm/standards of care as needed  Outcome: Progressing   Patient alert and oriented x4. Fluid restriction followed. Tylenol given for back pain. Plan to continue diuresis.

## 2022-07-10 VITALS
OXYGEN SATURATION: 96 % | BODY MASS INDEX: 26.39 KG/M2 | DIASTOLIC BLOOD PRESSURE: 63 MMHG | HEART RATE: 68 BPM | RESPIRATION RATE: 16 BRPM | TEMPERATURE: 98 F | WEIGHT: 143.38 LBS | HEIGHT: 62 IN | SYSTOLIC BLOOD PRESSURE: 104 MMHG

## 2022-07-10 LAB — GLUCOSE BLDC GLUCOMTR-MCNC: 92 MG/DL (ref 70–99)

## 2022-07-10 PROCEDURE — 99239 HOSP IP/OBS DSCHRG MGMT >30: CPT | Performed by: HOSPITALIST

## 2022-07-10 RX ORDER — FUROSEMIDE 40 MG/1
40 TABLET ORAL DAILY
Qty: 30 TABLET | Refills: 0 | Status: SHIPPED | OUTPATIENT
Start: 2022-07-10 | End: 2022-07-12

## 2022-07-10 RX ORDER — TRAMADOL HYDROCHLORIDE 50 MG/1
50 TABLET ORAL EVERY 12 HOURS PRN
Qty: 20 TABLET | Refills: 0 | Status: SHIPPED | OUTPATIENT
Start: 2022-07-10 | End: 2022-07-10

## 2022-07-10 NOTE — PLAN OF CARE
Stable vital signs. No more shakiness nor dizziness noted. Plan: home today. Problem: Patient Centered Care  Goal: Patient preferences are identified and integrated in the patient's plan of care  Description: Interventions:  - What would you like us to know as we care for you? I was just here in June. - Provide timely, complete, and accurate information to patient/family  - Incorporate patient and family knowledge, values, beliefs, and cultural backgrounds into the planning and delivery of care  - Encourage patient/family to participate in care and decision-making at the level they choose  - Honor patient and family perspectives and choices  Outcome: Progressing     Problem: Patient/Family Goals  Goal: Patient/Family Long Term Goal  Description: Patient's Long Term Goal: Go home    Interventions:  - Take medications as directed  - Education on medications and what they are for  - Develop routine  - F/U with PCP  - See additional Care Plan goals for specific interventions  Outcome: Progressing  Goal: Patient/Family Short Term Goal  Description: Patient's Short Term Goal: Breathe better    Interventions:   - IV Lasix  - Ambulate TID  - Restrict fluid intake  - Daily weights  - See additional Care Plan goals for specific interventions  Outcome: Progressing     Problem: CARDIOVASCULAR - ADULT  Goal: Maintains optimal cardiac output and hemodynamic stability  Description: INTERVENTIONS:  - Monitor vital signs, rhythm, and trends  - Monitor for bleeding, hypotension and signs of decreased cardiac output  - Evaluate effectiveness of vasoactive medications to optimize hemodynamic stability  - Monitor arterial and/or venous puncture sites for bleeding and/or hematoma  - Assess quality of pulses, skin color and temperature  - Assess for signs of decreased coronary artery perfusion - ex.  Angina  - Evaluate fluid balance, assess for edema, trend weights  Outcome: Progressing  Goal: Absence of cardiac arrhythmias or at baseline  Description: INTERVENTIONS:  - Continuous cardiac monitoring, monitor vital signs, obtain 12 lead EKG if indicated  - Evaluate effectiveness of antiarrhythmic and heart rate control medications as ordered  - Initiate emergency measures for life threatening arrhythmias  - Monitor electrolytes and administer replacement therapy as ordered  Outcome: Progressing     Problem: METABOLIC/FLUID AND ELECTROLYTES - ADULT  Goal: Electrolytes maintained within normal limits  Description: INTERVENTIONS:  - Monitor labs and rhythm and assess patient for signs and symptoms of electrolyte imbalances  - Administer electrolyte replacement as ordered  - Monitor response to electrolyte replacements, including rhythm and repeat lab results as appropriate  - Fluid restriction as ordered  - Instruct patient on fluid and nutrition restrictions as appropriate  Outcome: Progressing  Goal: Hemodynamic stability and optimal renal function maintained  Description: INTERVENTIONS:  - Monitor labs and assess for signs and symptoms of volume excess or deficit  - Monitor intake, output and patient weight  - Monitor urine specific gravity, serum osmolarity and serum sodium as indicated or ordered  - Monitor response to interventions for patient's volume status, including labs, urine output, blood pressure (other measures as available)  - Encourage oral intake as appropriate  - Instruct patient on fluid and nutrition restrictions as appropriate  Outcome: Progressing     Problem: PAIN - ADULT  Goal: Verbalizes/displays adequate comfort level or patient's stated pain goal  Description: INTERVENTIONS:  - Encourage pt to monitor pain and request assistance  - Assess pain using appropriate pain scale  - Administer analgesics based on type and severity of pain and evaluate response  - Implement non-pharmacological measures as appropriate and evaluate response  - Consider cultural and social influences on pain and pain management  - Manage/alleviate anxiety  - Utilize distraction and/or relaxation techniques  - Monitor for opioid side effects  - Notify MD/LIP if interventions unsuccessful or patient reports new pain  - Anticipate increased pain with activity and pre-medicate as appropriate  Outcome: Progressing     Problem: SAFETY ADULT - FALL  Goal: Free from fall injury  Description: INTERVENTIONS:  - Assess pt frequently for physical needs  - Identify cognitive and physical deficits and behaviors that affect risk of falls.   - [place] fall precautions as indicated by assessment.  - Educate pt/family on patient safety including physical limitations  - Instruct pt to call for assistance with activity based on assessment  - Modify environment to reduce risk of injury  - Provide assistive devices as appropriate  - Consider OT/PT consult to assist with strengthening/mobility  - Encourage toileting schedule  Outcome: Progressing     Problem: SKIN/TISSUE INTEGRITY - ADULT  Goal: Skin integrity remains intact  Description: INTERVENTIONS  - Assess and document risk factors for pressure ulcer development  - Assess and document skin integrity  - Monitor for areas of redness and/or skin breakdown  - Initiate interventions, skin care algorithm/standards of care as needed  Outcome: Progressing

## 2022-07-10 NOTE — PLAN OF CARE
Patient is alert and oriented. No complaints of SOB. Patient was educated on discharge instructions. All questions answered. IV removed. Tele removed. Patient packed personal belongings. Problem: Patient Centered Care  Goal: Patient preferences are identified and integrated in the patient's plan of care  Description: Interventions:  - What would you like us to know as we care for you? I was just here in June. - Provide timely, complete, and accurate information to patient/family  - Incorporate patient and family knowledge, values, beliefs, and cultural backgrounds into the planning and delivery of care  - Encourage patient/family to participate in care and decision-making at the level they choose  - Honor patient and family perspectives and choices  Outcome: Completed     Problem: Patient/Family Goals  Goal: Patient/Family Long Term Goal  Description: Patient's Long Term Goal: Go home    Interventions:  - Take medications as directed  - Education on medications and what they are for  - Develop routine  - F/U with PCP  - See additional Care Plan goals for specific interventions  Outcome: Completed  Goal: Patient/Family Short Term Goal  Description: Patient's Short Term Goal: Breathe better    Interventions:   - IV Lasix  - Ambulate TID  - Restrict fluid intake  - Daily weights  - See additional Care Plan goals for specific interventions  Outcome: Completed     Problem: CARDIOVASCULAR - ADULT  Goal: Maintains optimal cardiac output and hemodynamic stability  Description: INTERVENTIONS:  - Monitor vital signs, rhythm, and trends  - Monitor for bleeding, hypotension and signs of decreased cardiac output  - Evaluate effectiveness of vasoactive medications to optimize hemodynamic stability  - Monitor arterial and/or venous puncture sites for bleeding and/or hematoma  - Assess quality of pulses, skin color and temperature  - Assess for signs of decreased coronary artery perfusion - ex.  Angina  - Evaluate fluid balance, assess for edema, trend weights  Outcome: Completed  Goal: Absence of cardiac arrhythmias or at baseline  Description: INTERVENTIONS:  - Continuous cardiac monitoring, monitor vital signs, obtain 12 lead EKG if indicated  - Evaluate effectiveness of antiarrhythmic and heart rate control medications as ordered  - Initiate emergency measures for life threatening arrhythmias  - Monitor electrolytes and administer replacement therapy as ordered  Outcome: Completed     Problem: METABOLIC/FLUID AND ELECTROLYTES - ADULT  Goal: Electrolytes maintained within normal limits  Description: INTERVENTIONS:  - Monitor labs and rhythm and assess patient for signs and symptoms of electrolyte imbalances  - Administer electrolyte replacement as ordered  - Monitor response to electrolyte replacements, including rhythm and repeat lab results as appropriate  - Fluid restriction as ordered  - Instruct patient on fluid and nutrition restrictions as appropriate  Outcome: Completed  Goal: Hemodynamic stability and optimal renal function maintained  Description: INTERVENTIONS:  - Monitor labs and assess for signs and symptoms of volume excess or deficit  - Monitor intake, output and patient weight  - Monitor urine specific gravity, serum osmolarity and serum sodium as indicated or ordered  - Monitor response to interventions for patient's volume status, including labs, urine output, blood pressure (other measures as available)  - Encourage oral intake as appropriate  - Instruct patient on fluid and nutrition restrictions as appropriate  Outcome: Completed     Problem: PAIN - ADULT  Goal: Verbalizes/displays adequate comfort level or patient's stated pain goal  Description: INTERVENTIONS:  - Encourage pt to monitor pain and request assistance  - Assess pain using appropriate pain scale  - Administer analgesics based on type and severity of pain and evaluate response  - Implement non-pharmacological measures as appropriate and evaluate response  - Consider cultural and social influences on pain and pain management  - Manage/alleviate anxiety  - Utilize distraction and/or relaxation techniques  - Monitor for opioid side effects  - Notify MD/LIP if interventions unsuccessful or patient reports new pain  - Anticipate increased pain with activity and pre-medicate as appropriate  Outcome: Completed     Problem: SAFETY ADULT - FALL  Goal: Free from fall injury  Description: INTERVENTIONS:  - Assess pt frequently for physical needs  - Identify cognitive and physical deficits and behaviors that affect risk of falls.   - [place] fall precautions as indicated by assessment.  - Educate pt/family on patient safety including physical limitations  - Instruct pt to call for assistance with activity based on assessment  - Modify environment to reduce risk of injury  - Provide assistive devices as appropriate  - Consider OT/PT consult to assist with strengthening/mobility  - Encourage toileting schedule  Outcome: Completed     Problem: SKIN/TISSUE INTEGRITY - ADULT  Goal: Skin integrity remains intact  Description: INTERVENTIONS  - Assess and document risk factors for pressure ulcer development  - Assess and document skin integrity  - Monitor for areas of redness and/or skin breakdown  - Initiate interventions, skin care algorithm/standards of care as needed  Outcome: Completed

## 2022-07-12 ENCOUNTER — PATIENT OUTREACH (OUTPATIENT)
Dept: CASE MANAGEMENT | Age: 77
End: 2022-07-12

## 2022-07-12 ENCOUNTER — OFFICE VISIT (OUTPATIENT)
Dept: PHYSICAL MEDICINE AND REHAB | Facility: CLINIC | Age: 77
End: 2022-07-12
Payer: MEDICARE

## 2022-07-12 ENCOUNTER — TELEMEDICINE (OUTPATIENT)
Dept: INTERNAL MEDICINE CLINIC | Facility: CLINIC | Age: 77
End: 2022-07-12

## 2022-07-12 ENCOUNTER — HOSPITAL ENCOUNTER (OUTPATIENT)
Dept: GENERAL RADIOLOGY | Age: 77
Discharge: HOME | End: 2022-07-12
Attending: PHYSICAL MEDICINE & REHABILITATION
Payer: MEDICARE

## 2022-07-12 VITALS
DIASTOLIC BLOOD PRESSURE: 62 MMHG | BODY MASS INDEX: 27.03 KG/M2 | WEIGHT: 146.88 LBS | HEIGHT: 62 IN | HEART RATE: 73 BPM | SYSTOLIC BLOOD PRESSURE: 128 MMHG | OXYGEN SATURATION: 99 %

## 2022-07-12 DIAGNOSIS — G89.29 CHRONIC PAIN OF BOTH SHOULDERS: ICD-10-CM

## 2022-07-12 DIAGNOSIS — M54.2 NECK PAIN: ICD-10-CM

## 2022-07-12 DIAGNOSIS — M06.9 RHEUMATOID ARTHRITIS, INVOLVING UNSPECIFIED SITE, UNSPECIFIED WHETHER RHEUMATOID FACTOR PRESENT (HCC): ICD-10-CM

## 2022-07-12 DIAGNOSIS — I50.33 ACUTE ON CHRONIC DIASTOLIC (CONGESTIVE) HEART FAILURE (HCC): ICD-10-CM

## 2022-07-12 DIAGNOSIS — I10 HYPERTENSION GOAL BP (BLOOD PRESSURE) < 130/80: ICD-10-CM

## 2022-07-12 DIAGNOSIS — I25.10 CORONARY ARTERY DISEASE INVOLVING NATIVE CORONARY ARTERY OF NATIVE HEART WITHOUT ANGINA PECTORIS: ICD-10-CM

## 2022-07-12 DIAGNOSIS — I35.0 MODERATE AORTIC STENOSIS BY PRIOR ECHOCARDIOGRAM: ICD-10-CM

## 2022-07-12 DIAGNOSIS — M25.78 DEGENERATION OF INTERVERTEBRAL DISC OF CERVICAL REGION WITH OSTEOPHYTE OF CERVICAL VERTEBRA: ICD-10-CM

## 2022-07-12 DIAGNOSIS — M54.2 TRIGGER POINT OF NECK: ICD-10-CM

## 2022-07-12 DIAGNOSIS — I48.0 PAROXYSMAL ATRIAL FIBRILLATION (HCC): ICD-10-CM

## 2022-07-12 DIAGNOSIS — M47.812 CERVICAL FACET SYNDROME: ICD-10-CM

## 2022-07-12 DIAGNOSIS — Z95.5 S/P DRUG ELUTING CORONARY STENT PLACEMENT: ICD-10-CM

## 2022-07-12 DIAGNOSIS — M54.12 CERVICAL RADICULOPATHY: ICD-10-CM

## 2022-07-12 DIAGNOSIS — M25.512 CHRONIC PAIN OF BOTH SHOULDERS: ICD-10-CM

## 2022-07-12 DIAGNOSIS — M25.562 ACUTE PAIN OF LEFT KNEE: ICD-10-CM

## 2022-07-12 DIAGNOSIS — M25.511 CHRONIC PAIN OF BOTH SHOULDERS: ICD-10-CM

## 2022-07-12 DIAGNOSIS — M48.02 FORAMINAL STENOSIS OF CERVICAL REGION: ICD-10-CM

## 2022-07-12 DIAGNOSIS — E78.2 MIXED HYPERLIPIDEMIA: ICD-10-CM

## 2022-07-12 DIAGNOSIS — M50.30 DEGENERATION OF INTERVERTEBRAL DISC OF CERVICAL REGION WITH OSTEOPHYTE OF CERVICAL VERTEBRA: ICD-10-CM

## 2022-07-12 DIAGNOSIS — M50.30 DDD (DEGENERATIVE DISC DISEASE), CERVICAL: Primary | ICD-10-CM

## 2022-07-12 DIAGNOSIS — Z09 HOSPITAL DISCHARGE FOLLOW-UP: Primary | ICD-10-CM

## 2022-07-12 DIAGNOSIS — M50.30 DDD (DEGENERATIVE DISC DISEASE), CERVICAL: ICD-10-CM

## 2022-07-12 PROBLEM — I50.9 ACUTE ON CHRONIC CONGESTIVE HEART FAILURE, UNSPECIFIED HEART FAILURE TYPE (HCC): Status: RESOLVED | Noted: 2022-07-06 | Resolved: 2022-07-12

## 2022-07-12 PROBLEM — I50.9 ACUTE ON CHRONIC CONGESTIVE HEART FAILURE (HCC): Status: RESOLVED | Noted: 2022-07-06 | Resolved: 2022-07-12

## 2022-07-12 PROCEDURE — 72050 X-RAY EXAM NECK SPINE 4/5VWS: CPT | Performed by: PHYSICAL MEDICINE & REHABILITATION

## 2022-07-12 PROCEDURE — 99204 OFFICE O/P NEW MOD 45 MIN: CPT | Performed by: PHYSICAL MEDICINE & REHABILITATION

## 2022-07-12 PROCEDURE — 99496 TRANSJ CARE MGMT HIGH F2F 7D: CPT | Performed by: INTERNAL MEDICINE

## 2022-07-12 RX ORDER — FUROSEMIDE 40 MG/1
40 TABLET ORAL DAILY
Qty: 90 TABLET | Refills: 0 | Status: SHIPPED | OUTPATIENT
Start: 2022-07-12

## 2022-07-12 RX ORDER — ROSUVASTATIN CALCIUM 20 MG/1
20 TABLET, COATED ORAL NIGHTLY
Qty: 90 TABLET | Refills: 3 | Status: SHIPPED | OUTPATIENT
Start: 2022-07-12

## 2022-07-12 RX ORDER — ASPIRIN 81 MG/1
81 TABLET ORAL DAILY
Qty: 90 TABLET | Refills: 3 | Status: SHIPPED | OUTPATIENT
Start: 2022-07-12

## 2022-07-12 RX ORDER — LISINOPRIL 20 MG/1
20 TABLET ORAL DAILY
Qty: 90 TABLET | Refills: 3 | Status: SHIPPED | OUTPATIENT
Start: 2022-07-12

## 2022-07-12 NOTE — PATIENT INSTRUCTIONS
1) Please get X-rays of the cervical spine today on your way out. 2) Please begin physical therapy as soon as possible. 3) Tylenol 500-1000 mg every 6-8 hours as needed for pain. No more than 3000 mg daily. 4) No NSAIDS as you are on Plavix  5) Please check and see if you are on Pregabalin (Lyrica)  6) Follow up with me in about 8 weeks for the neck. If no improvement,allie guy would recommend MRI cervical spine. 7) I want to see you sooner for the low back and leg/hip issues. Please schedule a separate appointment for this.

## 2022-07-12 NOTE — PROGRESS NOTES
Patient has a HFU video visit scheduled for today 7/12/2022, NCM changed visit type to TCM video visit.

## 2022-07-19 NOTE — TELEPHONE ENCOUNTER
1st attempt - [employer] message sent for patient to contact the office to schedule an appointment; see notes below.

## 2022-07-25 ENCOUNTER — OFFICE VISIT (OUTPATIENT)
Dept: INTERNAL MEDICINE CLINIC | Facility: CLINIC | Age: 77
End: 2022-07-25
Payer: MEDICARE

## 2022-07-25 VITALS
HEIGHT: 62 IN | WEIGHT: 146 LBS | HEART RATE: 76 BPM | SYSTOLIC BLOOD PRESSURE: 136 MMHG | BODY MASS INDEX: 26.87 KG/M2 | DIASTOLIC BLOOD PRESSURE: 61 MMHG

## 2022-07-25 DIAGNOSIS — I50.32 CHRONIC DIASTOLIC CONGESTIVE HEART FAILURE (HCC): ICD-10-CM

## 2022-07-25 DIAGNOSIS — M79.89 LEG SWELLING: ICD-10-CM

## 2022-07-25 DIAGNOSIS — H91.93 BILATERAL HEARING LOSS, UNSPECIFIED HEARING LOSS TYPE: Primary | ICD-10-CM

## 2022-07-25 DIAGNOSIS — I10 HYPERTENSION GOAL BP (BLOOD PRESSURE) < 130/80: ICD-10-CM

## 2022-07-25 DIAGNOSIS — I83.813 VARICOSE VEINS OF BOTH LOWER EXTREMITIES WITH PAIN: ICD-10-CM

## 2022-07-25 PROCEDURE — 1111F DSCHRG MED/CURRENT MED MERGE: CPT | Performed by: INTERNAL MEDICINE

## 2022-07-25 PROCEDURE — 99214 OFFICE O/P EST MOD 30 MIN: CPT | Performed by: INTERNAL MEDICINE

## 2022-07-25 RX ORDER — PREDNISONE 1 MG/1
5 TABLET ORAL DAILY
Qty: 30 TABLET | Refills: 3 | OUTPATIENT
Start: 2022-07-25

## 2022-07-25 RX ORDER — BENZONATATE 100 MG/1
100 CAPSULE ORAL 3 TIMES DAILY PRN
Qty: 60 CAPSULE | Refills: 1 | Status: SHIPPED | OUTPATIENT
Start: 2022-07-25

## 2022-07-25 RX ORDER — BUMETANIDE 1 MG/1
1 TABLET ORAL DAILY
Qty: 90 TABLET | Refills: 3 | Status: SHIPPED | OUTPATIENT
Start: 2022-07-25

## 2022-07-25 RX ORDER — PREDNISONE 1 MG/1
5 TABLET ORAL DAILY
Qty: 30 TABLET | Refills: 0 | Status: SHIPPED | OUTPATIENT
Start: 2022-07-25 | End: 2022-07-26

## 2022-07-25 NOTE — TELEPHONE ENCOUNTER
Future Appointments   Date Time Provider César Estrada   7/26/2022  1:00 PM Dana Zamarripa MD [facility]

## 2022-07-25 NOTE — TELEPHONE ENCOUNTER
Called and spoke with daughter Coleen Mcfarlane, name and  was verified of pt. Kathye Phalen Dr. Stacie Naegeli declined to fill medication since LOV was 21 and need appointment. Coleen Mcfarlane verbalized understanding but request to be seen sooner than  with Dr. Shanice Lerner. She states Yakelin Pandey is in a lot of pain and can't wait that long for prednisone medication to be refilled. Patient was schedule with Dr. Anabel Johansen for 22 at 1pm. Please advise.

## 2022-07-26 ENCOUNTER — OFFICE VISIT (OUTPATIENT)
Dept: RHEUMATOLOGY | Facility: CLINIC | Age: 77
End: 2022-07-26
Payer: MEDICARE

## 2022-07-26 VITALS
HEART RATE: 76 BPM | WEIGHT: 146 LBS | SYSTOLIC BLOOD PRESSURE: 123 MMHG | HEIGHT: 62 IN | DIASTOLIC BLOOD PRESSURE: 54 MMHG | BODY MASS INDEX: 26.87 KG/M2

## 2022-07-26 DIAGNOSIS — Z51.81 THERAPEUTIC DRUG MONITORING: ICD-10-CM

## 2022-07-26 DIAGNOSIS — M06.9 RHEUMATOID ARTHRITIS, INVOLVING UNSPECIFIED SITE, UNSPECIFIED WHETHER RHEUMATOID FACTOR PRESENT (HCC): Primary | ICD-10-CM

## 2022-07-26 DIAGNOSIS — M15.9 PRIMARY OSTEOARTHRITIS INVOLVING MULTIPLE JOINTS: ICD-10-CM

## 2022-07-26 PROCEDURE — 1111F DSCHRG MED/CURRENT MED MERGE: CPT | Performed by: INTERNAL MEDICINE

## 2022-07-26 PROCEDURE — 99214 OFFICE O/P EST MOD 30 MIN: CPT | Performed by: INTERNAL MEDICINE

## 2022-07-26 RX ORDER — PREDNISONE 1 MG/1
5 TABLET ORAL DAILY
Qty: 90 TABLET | Refills: 0 | Status: SHIPPED | OUTPATIENT
Start: 2022-07-26

## 2022-07-26 NOTE — PATIENT INSTRUCTIONS
You were seen today for rheumatoid arthritis  Continue prednisone 5 mg daily  See Dr. Tracie Edward in November

## 2022-08-04 ENCOUNTER — HOSPITAL ENCOUNTER (OUTPATIENT)
Age: 77
Discharge: ANOTHER HOSPITAL | End: 2022-08-04
Payer: MEDICARE

## 2022-08-04 VITALS
SYSTOLIC BLOOD PRESSURE: 133 MMHG | TEMPERATURE: 98 F | RESPIRATION RATE: 18 BRPM | DIASTOLIC BLOOD PRESSURE: 47 MMHG | OXYGEN SATURATION: 100 % | HEART RATE: 81 BPM | WEIGHT: 145 LBS | HEIGHT: 60 IN | BODY MASS INDEX: 28.47 KG/M2

## 2022-08-04 DIAGNOSIS — R53.1 WEAKNESS: Primary | ICD-10-CM

## 2022-08-04 DIAGNOSIS — R42 DIZZINESS: ICD-10-CM

## 2022-08-04 LAB
BILIRUB UR QL STRIP: NEGATIVE
COLOR UR: YELLOW
GLUCOSE UR STRIP-MCNC: NEGATIVE MG/DL
KETONES UR STRIP-MCNC: NEGATIVE MG/DL
LEUKOCYTE ESTERASE UR QL STRIP: NEGATIVE
NITRITE UR QL STRIP: NEGATIVE
PH UR STRIP: 5.5 [PH]
PROT UR STRIP-MCNC: NEGATIVE MG/DL
SP GR UR STRIP: <=1.005
UROBILINOGEN UR STRIP-ACNC: <2 MG/DL

## 2022-08-04 PROCEDURE — 81002 URINALYSIS NONAUTO W/O SCOPE: CPT

## 2022-08-04 PROCEDURE — 99214 OFFICE O/P EST MOD 30 MIN: CPT

## 2022-08-04 NOTE — ED INITIAL ASSESSMENT (HPI)
Reports bilateral hip pain and pain to the shoulders and neck for the last week. Used tylenol for pain, denies fall or significant incident. Urinary frequency beginning early this week despite not using her prescribed diuretic medication.

## 2022-08-19 ENCOUNTER — TELEPHONE (OUTPATIENT)
Dept: INTERNAL MEDICINE CLINIC | Facility: CLINIC | Age: 77
End: 2022-08-19

## 2022-08-19 DIAGNOSIS — M25.562 ARTHRALGIA OF BOTH KNEES: Primary | ICD-10-CM

## 2022-08-19 DIAGNOSIS — M25.561 ARTHRALGIA OF BOTH KNEES: Primary | ICD-10-CM

## 2022-08-19 NOTE — TELEPHONE ENCOUNTER
Daughter requesting an open referral for physical therapy for patient. Daughter states patient has been having a lot of pain on joint and this was addressed on her last office visit. Please addvise.

## 2022-08-22 NOTE — TELEPHONE ENCOUNTER
Spoke with daughter ranulfo elias. Requesting referrral for outside source Dr. Lizzy Worthington. Paz for physical therapy at [address] [place] [address].  Referral pended please add dx as needed

## 2022-08-22 NOTE — TELEPHONE ENCOUNTER
Pt already has physical therapy referral from dr. Sarthak Mathias from 7/2022. Does patient need another referral for her joint pain?

## 2022-08-22 NOTE — TELEPHONE ENCOUNTER
Referral faxed to Dr Nae Doyle at [phone]  [facility] [address] [address] [place] [address]  [phone]  [email or web address]

## 2022-10-03 ENCOUNTER — TELEPHONE (OUTPATIENT)
Dept: CARDIOLOGY CLINIC | Facility: HOSPITAL | Age: 77
End: 2022-10-03

## 2022-10-13 ENCOUNTER — HOSPITAL ENCOUNTER (OUTPATIENT)
Dept: CARDIOLOGY CLINIC | Facility: HOSPITAL | Age: 77
Discharge: HOME | End: 2022-10-13
Attending: INTERNAL MEDICINE
Payer: MEDICARE

## 2022-10-13 ENCOUNTER — HOSPITAL ENCOUNTER (OUTPATIENT)
Dept: CV DIAGNOSTICS | Facility: HOSPITAL | Age: 77
Discharge: HOME | End: 2022-10-13
Attending: INTERNAL MEDICINE
Payer: MEDICARE

## 2022-10-13 ENCOUNTER — HOSPITAL ENCOUNTER (OUTPATIENT)
Dept: ULTRASOUND IMAGING | Facility: HOSPITAL | Age: 77
Discharge: HOME | End: 2022-10-13
Attending: INTERNAL MEDICINE
Payer: MEDICARE

## 2022-10-13 ENCOUNTER — HOSPITAL ENCOUNTER (OUTPATIENT)
Dept: CT IMAGING | Facility: HOSPITAL | Age: 77
Discharge: HOME | End: 2022-10-13
Attending: INTERNAL MEDICINE
Payer: MEDICARE

## 2022-10-13 ENCOUNTER — HOSPITAL ENCOUNTER (OUTPATIENT)
Dept: LAB | Facility: HOSPITAL | Age: 77
Discharge: HOME | End: 2022-10-13
Attending: INTERNAL MEDICINE
Payer: MEDICARE

## 2022-10-13 VITALS — HEART RATE: 70 BPM | SYSTOLIC BLOOD PRESSURE: 141 MMHG | DIASTOLIC BLOOD PRESSURE: 70 MMHG

## 2022-10-13 VITALS
RESPIRATION RATE: 16 BRPM | OXYGEN SATURATION: 99 % | SYSTOLIC BLOOD PRESSURE: 119 MMHG | DIASTOLIC BLOOD PRESSURE: 66 MMHG | HEART RATE: 70 BPM

## 2022-10-13 DIAGNOSIS — I10 HYPERTENSION: ICD-10-CM

## 2022-10-13 DIAGNOSIS — I50.9 CHF (CONGESTIVE HEART FAILURE) (HCC): ICD-10-CM

## 2022-10-13 DIAGNOSIS — I48.0 PAF (PAROXYSMAL ATRIAL FIBRILLATION) (HCC): ICD-10-CM

## 2022-10-13 DIAGNOSIS — I25.10 CAD (CORONARY ARTERY DISEASE): ICD-10-CM

## 2022-10-13 DIAGNOSIS — E78.5 HYPERLIPIDEMIA: ICD-10-CM

## 2022-10-13 DIAGNOSIS — I35.0 AORTIC STENOSIS: ICD-10-CM

## 2022-10-13 LAB
ANION GAP SERPL CALC-SCNC: 6 MMOL/L (ref 0–18)
BUN BLD-MCNC: 48 MG/DL (ref 7–18)
CALCIUM BLD-MCNC: 9.4 MG/DL (ref 8.5–10.1)
CHLORIDE SERPL-SCNC: 109 MMOL/L (ref 98–112)
CO2 SERPL-SCNC: 26 MMOL/L (ref 21–32)
CREAT BLD-MCNC: 1.44 MG/DL
FASTING STATUS PATIENT QL REPORTED: NO
GFR SERPLBLD BASED ON 1.73 SQ M-ARVRAT: 37 ML/MIN/1.73M2 (ref 60–?)
GLUCOSE BLD-MCNC: 106 MG/DL (ref 70–99)
OSMOLALITY SERPL CALC.SUM OF ELEC: 305 MOSM/KG (ref 275–295)
POTASSIUM SERPL-SCNC: 3.8 MMOL/L (ref 3.5–5.1)
SODIUM SERPL-SCNC: 141 MMOL/L (ref 136–145)

## 2022-10-13 PROCEDURE — 80048 BASIC METABOLIC PNL TOTAL CA: CPT | Performed by: INTERNAL MEDICINE

## 2022-10-13 PROCEDURE — 71275 CT ANGIOGRAPHY CHEST: CPT | Performed by: INTERNAL MEDICINE

## 2022-10-13 PROCEDURE — 93880 EXTRACRANIAL BILAT STUDY: CPT | Performed by: INTERNAL MEDICINE

## 2022-10-13 PROCEDURE — 36415 COLL VENOUS BLD VENIPUNCTURE: CPT | Performed by: INTERNAL MEDICINE

## 2022-10-13 PROCEDURE — 99212 OFFICE O/P EST SF 10 MIN: CPT

## 2022-10-13 PROCEDURE — 74174 CTA ABD&PLVS W/CONTRAST: CPT | Performed by: INTERNAL MEDICINE

## 2022-10-13 NOTE — CONSULTS
[facility]    PATIENT'S NAME: Aidan Marlow   ATTENDING PHYSICIAN: Roxana Cruz M.D.   CONSULTING PHYSICIAN: Roxana Cruz M.D. PATIENT ACCOUNT#:   [de-identified]    LOCATION:  [facility]  MEDICAL RECORD #:   [id number]       YOB: 1945  ADMISSION DATE:       10/13/2022      CONSULT DATE:  10/13/2022    REPORT OF CONSULTATION    HISTORY OF PRESENT ILLNESS:  This is the first office visit for the patient with me. I am seeing her today in the [address] for consideration of percutaneous transcatheter aortic valve replacement. The patient originally had lived in [place] and not long ago moved to this area. She has known about a murmur for many years, yet this has been followed clinically and she has done well. In February 2022, she was admitted to [facility] with difficulty breathing. She was in rapid atrial fibrillation, and a subsequent evaluation revealed normal LV function with moderate to severe aortic stenosis and multivessel coronary disease. Surgical consultation was performed by Dr. Sam Kamara. The different options were reviewed, and the patient opted against surgical intervention. Dr. Marisol Betancourt moved forward at that time with multivessel coronary intervention. She had angioplasty and stenting of the left anterior descending coronary artery, diagonal vessel, and the left circumflex coronary artery. She did feel that this initially helped her feel a bit better, yet she was admitted to [facility] again in July with progressive shortness of breath that was felt to represent congestive heart failure. She was treated medically and has now been referred to the [address] for consideration of a percutaneous approach to her valve disease. Her last echocardiogram in July revealed preserved LV function. Peak velocity across the aortic valve was 4.1 m/second. Mean gradient 36 mmHg.     In general, she is feeling okay at the moment at rest, but she has noticed a definite falloff in endurance. It does not appear that she has had recurrent sustained atrial fibrillation, but she does on occasion feel that she has some intermittent irregularity to her heartbeat. PHYSICAL EXAMINATION:  Resting today, she looks quite comfortable. Her lungs are clear. She is in sinus rhythm with an obvious aortic stenotic murmur. No significant aortic insufficiency. The abdomen is soft, and she has no significant edema. Electrocardiogram reveals normal sinus rhythm with a nonspecific interventricular conduction delay. ASSESSMENT AND PLAN:  I had a nice discussion with the patient and her daughter regarding her aortic valve disease. Her STS score is 3.0% and certainly, if technically feasible, we would consider proceeding with an attempt at percutaneous valve therapy. She will move forward with her testing today, and I will present her at large to the [place] [id number].. We reviewed the risks, benefits, and alternatives (including no intervention) with this approach. I think she and her daughter have a good understanding and seem comfortable with the above approach.     Dictated By Parmjit Santa M.D.  d: 10/13/2022 12:19:21  t: 10/13/2022 12:58:37  [facility] [id number]  WS/

## 2022-10-13 NOTE — IMAGING NOTE
Received Kely Carmona to [address] working with [address]. IV access with 20G angiocath to right antecubital area. O2 applied via nasal cannula @ 2LPM.  Positioned pt on table. Procedure explained and questions answered. Vital signs monitored and noted in Flowsheet. GFR = 37  Contrast = 95ml  0.9 NS flush = 68ml  Average HR = 79 BPM  Contrast injected followed by saline flush at 11:47am    Patient tolerated the procedure without complication. Denies any contrast reaction. Discontinued IV saline lock.  Escorted pt to [facility] and discharged in stable condition accompanied by Efrem Valladares

## 2022-10-13 NOTE — PROGRESS NOTES
TAVR procedure and process explained to patient and daughter. Educational folder provided. Verbalized understanding.

## 2022-10-20 ENCOUNTER — TELEPHONE (OUTPATIENT)
Dept: CARDIOLOGY CLINIC | Facility: HOSPITAL | Age: 77
End: 2022-10-20

## 2022-10-20 NOTE — TELEPHONE ENCOUNTER
Testing reviewed with MD's from 10/13 TAVR clinic, will move forward with TAVR via femoral artery. Date of 11/3 given. Will call 1 week prior with further instructions.

## 2022-10-27 ENCOUNTER — TELEPHONE (OUTPATIENT)
Dept: CARDIOLOGY CLINIC | Facility: HOSPITAL | Age: 77
End: 2022-10-27

## 2022-10-27 DIAGNOSIS — I35.0 AORTIC STENOSIS: Primary | ICD-10-CM

## 2022-10-31 NOTE — DISCHARGE INSTRUCTIONS
Home Care Instructions following  Transcatheter Aortic Valve Replacement (TAVR)    ACTIVITY:   Do not drive until your physician gives his/her clearance. Do not lift anything heavier than 5lbs for 3 weeks. Walk at least 3 times per day for 5-10 minutes at a time. Increase your activity gradually. Keep legs elevated while sitting. No tight fitting underwear. NO strenuous exercise or activity for 30 days after TAVR procedure. HYGIENE/WOUND CARE:  Wash incisions with mild soap and water daily. Showering is allowed. No tub baths until incisions are completely healed. DO NOT apply lotions, ointments or creams to the incisions. NO tight fitting underwear. Avoid constipation or straining to have a bowel movement. If necessary use Milk of Magnesia, Metamucil or other supplements to relieve straining. WOUND HEALING:  If you notice minor bleeding from the groin puncture wound, please do the following things, (f it does not stop with these measures, please notify your doctor immediately):  Immediately lie flat  Apply firm pressure just above the puncture site and hold firm pressure for 15 minutes. You may use a clean cloth to apply pressure. If possible, have another person apply the pressure. After 15 minutes remove pressure. The wound should be dry and flat, without bleeding. You should continue to lie flat for about 1 hour before getting up and walking. Cover the wound with a Band-Aid. NOTIFY YOUR PHYSICIAN IF:  Temperature is greater than 101 degrees, have chills, sweating or fever for more than one day. Drainage, tenderness, redness, swelling, persistent pain from the incisions, or new numbness in the legs or feet. Persistent chest discomfort or pain (angina) that radiates to the neck, jaw, or arm. Nausea or profuse sweating. Increasing shortness of breath with activity or at rest.  If your pain medication is not relieving your pain.     IMPORTANT:   Always inform other doctors about your heart valve replacement before any medical or dental procedure. Before undergoing MRI (magnetic resonance imaging), always notify the doctor (or medical technician) that you have an implanted heart valve. Failure to do so may result in damage to the valve that could result in death. FOLLOW-UP CARE:  You will need a follow up visit to check your incisions and remove any staples if used. You should see your cardiologist 2 weeks after discharge. You will be seen in the Valve Clinic 1 month after discharge; if not given an appointment please call [phone] to make an appointment. You will need an echocardiogram at 1 month, 6 months, 12 months, and then yearly. CONTACT NUMBERS FOR YOUR REFERENCE:  [facility]):  [address] [facility] [phone]    Get order for Cardiac rehab from cardiologist :call desired location to schedule an initial appointment   [facility] [phone]  [facility] [phone]     Sometimes managing your health at home requires assistance. The [facility] team has recognized your preference to use [facility]. They can be reached by phone at [phone]. The fax number for your reference is [phone]. A representative from the home health agency will contact you or your family to schedule your first visit.

## 2022-11-01 ENCOUNTER — ANESTHESIA EVENT (OUTPATIENT)
Dept: CARDIAC SURGERY | Facility: HOSPITAL | Age: 77
End: 2022-11-01
Payer: MEDICARE

## 2022-11-02 ENCOUNTER — HOSPITAL ENCOUNTER (INPATIENT)
Facility: HOSPITAL | Age: 77
LOS: 5 days | Discharge: HOME HEALTH | End: 2022-11-07
Attending: INTERNAL MEDICINE | Admitting: INTERNAL MEDICINE
Payer: MEDICARE

## 2022-11-02 ENCOUNTER — APPOINTMENT (OUTPATIENT)
Dept: GENERAL RADIOLOGY | Facility: HOSPITAL | Age: 77
End: 2022-11-02
Attending: NURSE PRACTITIONER
Payer: MEDICARE

## 2022-11-02 DIAGNOSIS — I44.7 LBBB (LEFT BUNDLE BRANCH BLOCK): Primary | ICD-10-CM

## 2022-11-02 PROBLEM — I35.0 SEVERE AORTIC STENOSIS: Status: ACTIVE | Noted: 2022-02-10

## 2022-11-02 LAB
ALBUMIN SERPL-MCNC: 3.5 G/DL (ref 3.4–5)
ALBUMIN/GLOB SERPL: 0.8 {RATIO} (ref 1–2)
ALP LIVER SERPL-CCNC: 51 U/L
ALT SERPL-CCNC: 20 U/L
ANION GAP SERPL CALC-SCNC: 8 MMOL/L (ref 0–18)
ANTIBODY SCREEN: NEGATIVE
AST SERPL-CCNC: 15 U/L (ref 15–37)
ATRIAL RATE: 72 BPM
BILIRUB SERPL-MCNC: 0.4 MG/DL (ref 0.1–2)
BUN BLD-MCNC: 37 MG/DL (ref 7–18)
CALCIUM BLD-MCNC: 9.8 MG/DL (ref 8.5–10.1)
CHLORIDE SERPL-SCNC: 105 MMOL/L (ref 98–112)
CO2 SERPL-SCNC: 24 MMOL/L (ref 21–32)
CREAT BLD-MCNC: 1.56 MG/DL
ERYTHROCYTE [DISTWIDTH] IN BLOOD BY AUTOMATED COUNT: 12.1 %
EST. AVERAGE GLUCOSE BLD GHB EST-MCNC: 128 MG/DL (ref 68–126)
GFR SERPLBLD BASED ON 1.73 SQ M-ARVRAT: 34 ML/MIN/1.73M2 (ref 60–?)
GLOBULIN PLAS-MCNC: 4.3 G/DL (ref 2.8–4.4)
GLUCOSE BLD-MCNC: 113 MG/DL (ref 70–99)
HBA1C MFR BLD: 6.1 % (ref ?–5.7)
HCT VFR BLD AUTO: 28.9 %
HGB BLD-MCNC: 9.3 G/DL
INR BLD: 1.23 (ref 0.85–1.16)
MAGNESIUM SERPL-MCNC: 2.2 MG/DL (ref 1.6–2.6)
MCH RBC QN AUTO: 30.4 PG (ref 26–34)
MCHC RBC AUTO-ENTMCNC: 32.2 G/DL (ref 31–37)
MCV RBC AUTO: 94.4 FL
NT-PROBNP SERPL-MCNC: 1197 PG/ML (ref ?–450)
OSMOLALITY SERPL CALC.SUM OF ELEC: 293 MOSM/KG (ref 275–295)
P AXIS: -29 DEGREES
P-R INTERVAL: 144 MS
PLATELET # BLD AUTO: 290 10(3)UL (ref 150–450)
POTASSIUM SERPL-SCNC: 4.1 MMOL/L (ref 3.5–5.1)
PROT SERPL-MCNC: 7.8 G/DL (ref 6.4–8.2)
PROTHROMBIN TIME: 15.4 SECONDS (ref 11.6–14.8)
Q-T INTERVAL: 408 MS
QRS DURATION: 84 MS
QTC CALCULATION (BEZET): 446 MS
R AXIS: 31 DEGREES
RBC # BLD AUTO: 3.06 X10(6)UL
RH BLOOD TYPE: POSITIVE
SARS-COV-2 RNA RESP QL NAA+PROBE: NOT DETECTED
SODIUM SERPL-SCNC: 137 MMOL/L (ref 136–145)
T AXIS: 13 DEGREES
VENTRICULAR RATE: 72 BPM
WBC # BLD AUTO: 13 X10(3) UL (ref 4–11)

## 2022-11-02 PROCEDURE — 93010 ELECTROCARDIOGRAM REPORT: CPT | Performed by: INTERNAL MEDICINE

## 2022-11-02 PROCEDURE — 85610 PROTHROMBIN TIME: CPT | Performed by: NURSE PRACTITIONER

## 2022-11-02 PROCEDURE — 85027 COMPLETE CBC AUTOMATED: CPT | Performed by: NURSE PRACTITIONER

## 2022-11-02 PROCEDURE — 86900 BLOOD TYPING SEROLOGIC ABO: CPT | Performed by: NURSE PRACTITIONER

## 2022-11-02 PROCEDURE — 86901 BLOOD TYPING SEROLOGIC RH(D): CPT | Performed by: NURSE PRACTITIONER

## 2022-11-02 PROCEDURE — 83880 ASSAY OF NATRIURETIC PEPTIDE: CPT | Performed by: NURSE PRACTITIONER

## 2022-11-02 PROCEDURE — 36410 VNPNXR 3YR/> PHY/QHP DX/THER: CPT

## 2022-11-02 PROCEDURE — 86920 COMPATIBILITY TEST SPIN: CPT

## 2022-11-02 PROCEDURE — 71045 X-RAY EXAM CHEST 1 VIEW: CPT | Performed by: NURSE PRACTITIONER

## 2022-11-02 PROCEDURE — 93005 ELECTROCARDIOGRAM TRACING: CPT

## 2022-11-02 PROCEDURE — 83735 ASSAY OF MAGNESIUM: CPT | Performed by: NURSE PRACTITIONER

## 2022-11-02 PROCEDURE — 83036 HEMOGLOBIN GLYCOSYLATED A1C: CPT | Performed by: NURSE PRACTITIONER

## 2022-11-02 PROCEDURE — 80053 COMPREHEN METABOLIC PANEL: CPT | Performed by: NURSE PRACTITIONER

## 2022-11-02 PROCEDURE — 86850 RBC ANTIBODY SCREEN: CPT | Performed by: NURSE PRACTITIONER

## 2022-11-02 RX ORDER — PANTOPRAZOLE SODIUM 20 MG/1
20 TABLET, DELAYED RELEASE ORAL
Status: DISCONTINUED | OUTPATIENT
Start: 2022-11-03 | End: 2022-11-03

## 2022-11-02 RX ORDER — FLUTICASONE PROPIONATE 50 MCG
2 SPRAY, SUSPENSION (ML) NASAL DAILY
Status: DISCONTINUED | OUTPATIENT
Start: 2022-11-03 | End: 2022-11-07

## 2022-11-02 RX ORDER — SODIUM CHLORIDE 9 MG/ML
INJECTION, SOLUTION INTRAVENOUS CONTINUOUS
Status: DISCONTINUED | OUTPATIENT
Start: 2022-11-02 | End: 2022-11-07

## 2022-11-02 RX ORDER — ROSUVASTATIN CALCIUM 20 MG/1
20 TABLET, COATED ORAL NIGHTLY
Status: DISCONTINUED | OUTPATIENT
Start: 2022-11-02 | End: 2022-11-07

## 2022-11-02 RX ORDER — ALBUTEROL SULFATE 90 UG/1
2 AEROSOL, METERED RESPIRATORY (INHALATION) EVERY 4 HOURS PRN
Status: DISCONTINUED | OUTPATIENT
Start: 2022-11-02 | End: 2022-11-07

## 2022-11-02 RX ORDER — MONTELUKAST SODIUM 10 MG/1
10 TABLET ORAL DAILY
Status: DISCONTINUED | OUTPATIENT
Start: 2022-11-03 | End: 2022-11-07

## 2022-11-02 RX ORDER — CHLORHEXIDINE GLUCONATE 4 G/100ML
30 SOLUTION TOPICAL ONCE
Status: COMPLETED | OUTPATIENT
Start: 2022-11-02 | End: 2022-11-02

## 2022-11-02 RX ORDER — PREDNISONE 1 MG/1
5 TABLET ORAL DAILY
Status: DISCONTINUED | OUTPATIENT
Start: 2022-11-03 | End: 2022-11-07

## 2022-11-02 NOTE — PLAN OF CARE
NURSING ADMISSION NOTE      Patient admitted via Cart  Oriented to room. Safety precautions initiated. Bed in low position. Call light in reach. Patient alert and oriented x4. On RA. Denies pain.  [place]. EKG completed. Cardiac diet. NPO at midnight. Admission navigator completed with daughter at bedside. 1600:  KCCQ and 5meter walk completed.

## 2022-11-03 ENCOUNTER — ANESTHESIA (OUTPATIENT)
Dept: CARDIAC SURGERY | Facility: HOSPITAL | Age: 77
End: 2022-11-03
Payer: MEDICARE

## 2022-11-03 ENCOUNTER — APPOINTMENT (OUTPATIENT)
Dept: CV DIAGNOSTICS | Facility: HOSPITAL | Age: 77
End: 2022-11-03
Attending: NURSE PRACTITIONER
Payer: MEDICARE

## 2022-11-03 ENCOUNTER — APPOINTMENT (OUTPATIENT)
Dept: GENERAL RADIOLOGY | Facility: HOSPITAL | Age: 77
End: 2022-11-03
Attending: INTERNAL MEDICINE
Payer: MEDICARE

## 2022-11-03 LAB
ALBUMIN SERPL-MCNC: 2.8 G/DL (ref 3.4–5)
ALBUMIN/GLOB SERPL: 0.8 {RATIO} (ref 1–2)
ALP LIVER SERPL-CCNC: 46 U/L
ALT SERPL-CCNC: 14 U/L
ANION GAP SERPL CALC-SCNC: 4 MMOL/L (ref 0–18)
APTT PPP: 65.3 SECONDS (ref 23.3–35.6)
AST SERPL-CCNC: 13 U/L (ref 15–37)
ATRIAL RATE: 80 BPM
ATRIAL RATE: 84 BPM
BASE EXCESS BLD CALC-SCNC: -1 MMOL/L
BILIRUB SERPL-MCNC: 0.3 MG/DL (ref 0.1–2)
BUN BLD-MCNC: 37 MG/DL (ref 7–18)
CA-I BLD-SCNC: 1.19 MMOL/L (ref 1.12–1.32)
CALCIUM BLD-MCNC: 8.3 MG/DL (ref 8.5–10.1)
CHLORIDE SERPL-SCNC: 113 MMOL/L (ref 98–112)
CO2 BLD-SCNC: 24 MMOL/L (ref 22–32)
CO2 SERPL-SCNC: 24 MMOL/L (ref 21–32)
CREAT BLD-MCNC: 1.47 MG/DL
ERYTHROCYTE [DISTWIDTH] IN BLOOD BY AUTOMATED COUNT: 12 %
GFR SERPLBLD BASED ON 1.73 SQ M-ARVRAT: 36 ML/MIN/1.73M2 (ref 60–?)
GLOBULIN PLAS-MCNC: 3.3 G/DL (ref 2.8–4.4)
GLUCOSE BLD-MCNC: 113 MG/DL (ref 70–99)
GLUCOSE BLD-MCNC: 119 MG/DL (ref 70–99)
GLUCOSE BLD-MCNC: 89 MG/DL (ref 70–99)
HCO3 BLD-SCNC: 23 MEQ/L
HCT VFR BLD AUTO: 25.3 %
HCT VFR BLD CALC: 21 %
HGB BLD-MCNC: 8.2 G/DL
INR BLD: 1.4 (ref 0.85–1.16)
ISTAT ACTIVATED CLOTTING TIME: 138 SECONDS (ref 74–137)
ISTAT ACTIVATED CLOTTING TIME: 271 SECONDS (ref 74–137)
MAGNESIUM SERPL-MCNC: 2 MG/DL (ref 1.6–2.6)
MCH RBC QN AUTO: 30.1 PG (ref 26–34)
MCHC RBC AUTO-ENTMCNC: 32.4 G/DL (ref 31–37)
MCV RBC AUTO: 93 FL
OSMOLALITY SERPL CALC.SUM OF ELEC: 302 MOSM/KG (ref 275–295)
P AXIS: 73 DEGREES
P AXIS: 76 DEGREES
P-R INTERVAL: 148 MS
P-R INTERVAL: 150 MS
PCO2 BLD: 32.4 MMHG
PH BLD: 7.46 [PH]
PLATELET # BLD AUTO: 237 10(3)UL (ref 150–450)
PO2 BLD: 398 MMHG
POTASSIUM BLD-SCNC: 3.4 MMOL/L (ref 3.6–5.1)
POTASSIUM SERPL-SCNC: 3.2 MMOL/L (ref 3.5–5.1)
POTASSIUM SERPL-SCNC: 4.3 MMOL/L (ref 3.5–5.1)
PROT SERPL-MCNC: 6.1 G/DL (ref 6.4–8.2)
PROTHROMBIN TIME: 17.1 SECONDS (ref 11.6–14.8)
Q-T INTERVAL: 414 MS
Q-T INTERVAL: 460 MS
QRS DURATION: 144 MS
QRS DURATION: 96 MS
QTC CALCULATION (BEZET): 477 MS
QTC CALCULATION (BEZET): 543 MS
R AXIS: -38 DEGREES
R AXIS: 11 DEGREES
RBC # BLD AUTO: 2.72 X10(6)UL
SAO2 % BLD: 100 %
SODIUM BLD-SCNC: 142 MMOL/L (ref 136–145)
SODIUM SERPL-SCNC: 141 MMOL/L (ref 136–145)
T AXIS: 87 DEGREES
T AXIS: 97 DEGREES
VENTRICULAR RATE: 80 BPM
VENTRICULAR RATE: 84 BPM
WBC # BLD AUTO: 9.9 X10(3) UL (ref 4–11)

## 2022-11-03 PROCEDURE — 85014 HEMATOCRIT: CPT

## 2022-11-03 PROCEDURE — 93010 ELECTROCARDIOGRAM REPORT: CPT | Performed by: INTERNAL MEDICINE

## 2022-11-03 PROCEDURE — 93355 ECHO TRANSESOPHAGEAL (TEE): CPT | Performed by: NURSE PRACTITIONER

## 2022-11-03 PROCEDURE — 93312 ECHO TRANSESOPHAGEAL: CPT | Performed by: ANESTHESIOLOGY

## 2022-11-03 PROCEDURE — 82962 GLUCOSE BLOOD TEST: CPT

## 2022-11-03 PROCEDURE — 84132 ASSAY OF SERUM POTASSIUM: CPT | Performed by: INTERNAL MEDICINE

## 2022-11-03 PROCEDURE — P9047 ALBUMIN (HUMAN), 25%, 50ML: HCPCS | Performed by: INTERNAL MEDICINE

## 2022-11-03 PROCEDURE — 93005 ELECTROCARDIOGRAM TRACING: CPT

## 2022-11-03 PROCEDURE — 83735 ASSAY OF MAGNESIUM: CPT | Performed by: INTERNAL MEDICINE

## 2022-11-03 PROCEDURE — 84295 ASSAY OF SERUM SODIUM: CPT

## 2022-11-03 PROCEDURE — 02RF38Z REPLACEMENT OF AORTIC VALVE WITH ZOOPLASTIC TISSUE, PERCUTANEOUS APPROACH: ICD-10-PCS | Performed by: INTERNAL MEDICINE

## 2022-11-03 PROCEDURE — P9047 ALBUMIN (HUMAN), 25%, 50ML: HCPCS

## 2022-11-03 PROCEDURE — 71045 X-RAY EXAM CHEST 1 VIEW: CPT | Performed by: INTERNAL MEDICINE

## 2022-11-03 PROCEDURE — 85610 PROTHROMBIN TIME: CPT | Performed by: INTERNAL MEDICINE

## 2022-11-03 PROCEDURE — 85730 THROMBOPLASTIN TIME PARTIAL: CPT | Performed by: INTERNAL MEDICINE

## 2022-11-03 PROCEDURE — 82330 ASSAY OF CALCIUM: CPT

## 2022-11-03 PROCEDURE — 85347 COAGULATION TIME ACTIVATED: CPT

## 2022-11-03 PROCEDURE — 80053 COMPREHEN METABOLIC PANEL: CPT | Performed by: INTERNAL MEDICINE

## 2022-11-03 PROCEDURE — 85027 COMPLETE CBC AUTOMATED: CPT | Performed by: INTERNAL MEDICINE

## 2022-11-03 PROCEDURE — B24BZZ4 ULTRASONOGRAPHY OF HEART WITH AORTA, TRANSESOPHAGEAL: ICD-10-PCS | Performed by: INTERNAL MEDICINE

## 2022-11-03 PROCEDURE — 84132 ASSAY OF SERUM POTASSIUM: CPT

## 2022-11-03 PROCEDURE — 82803 BLOOD GASES ANY COMBINATION: CPT

## 2022-11-03 PROCEDURE — P9045 ALBUMIN (HUMAN), 5%, 250 ML: HCPCS

## 2022-11-03 DEVICE — EDWARDS SAPIEN 3 ULTRA TRANSCATHETER HEART VALVE
Type: IMPLANTABLE DEVICE | Site: HEART | Status: FUNCTIONAL
Brand: EDWARDS SAPIEN 3 ULTRA TRANSCATHETER HEART VALVE

## 2022-11-03 RX ORDER — ONDANSETRON 2 MG/ML
4 INJECTION INTRAMUSCULAR; INTRAVENOUS EVERY 6 HOURS PRN
Status: DISCONTINUED | OUTPATIENT
Start: 2022-11-03 | End: 2022-11-07

## 2022-11-03 RX ORDER — ALBUMIN (HUMAN) 12.5 G/50ML
SOLUTION INTRAVENOUS
Status: DISPENSED
Start: 2022-11-03 | End: 2022-11-04

## 2022-11-03 RX ORDER — POTASSIUM CHLORIDE 20 MEQ/1
40 TABLET, EXTENDED RELEASE ORAL EVERY 4 HOURS
Status: COMPLETED | OUTPATIENT
Start: 2022-11-03 | End: 2022-11-03

## 2022-11-03 RX ORDER — SENNOSIDES 8.6 MG
17.2 TABLET ORAL NIGHTLY PRN
Status: DISCONTINUED | OUTPATIENT
Start: 2022-11-03 | End: 2022-11-07

## 2022-11-03 RX ORDER — HEPARIN SODIUM 1000 [USP'U]/ML
INJECTION, SOLUTION INTRAVENOUS; SUBCUTANEOUS AS NEEDED
Status: DISCONTINUED | OUTPATIENT
Start: 2022-11-03 | End: 2022-11-03 | Stop reason: HOSPADM

## 2022-11-03 RX ORDER — CEFAZOLIN SODIUM/WATER 2 G/20 ML
2 SYRINGE (ML) INTRAVENOUS EVERY 12 HOURS SCHEDULED
Status: COMPLETED | OUTPATIENT
Start: 2022-11-03 | End: 2022-11-04

## 2022-11-03 RX ORDER — IODIXANOL 320 MG/ML
100 INJECTION, SOLUTION INTRAVASCULAR
Status: COMPLETED | OUTPATIENT
Start: 2022-11-03 | End: 2022-11-03

## 2022-11-03 RX ORDER — ACETAMINOPHEN 500 MG
500 TABLET ORAL EVERY 6 HOURS PRN
Status: DISCONTINUED | OUTPATIENT
Start: 2022-11-03 | End: 2022-11-07

## 2022-11-03 RX ORDER — CEFAZOLIN SODIUM/WATER 2 G/20 ML
SYRINGE (ML) INTRAVENOUS AS NEEDED
Status: DISCONTINUED | OUTPATIENT
Start: 2022-11-03 | End: 2022-11-03 | Stop reason: HOSPADM

## 2022-11-03 RX ORDER — FAMOTIDINE 20 MG/1
20 TABLET, FILM COATED ORAL 2 TIMES DAILY
Status: DISCONTINUED | OUTPATIENT
Start: 2022-11-03 | End: 2022-11-03

## 2022-11-03 RX ORDER — POLYETHYLENE GLYCOL 3350 17 G/17G
17 POWDER, FOR SOLUTION ORAL DAILY PRN
Status: DISCONTINUED | OUTPATIENT
Start: 2022-11-03 | End: 2022-11-07

## 2022-11-03 RX ORDER — BISACODYL 10 MG
10 SUPPOSITORY, RECTAL RECTAL
Status: DISCONTINUED | OUTPATIENT
Start: 2022-11-03 | End: 2022-11-07

## 2022-11-03 RX ORDER — NEOSTIGMINE METHYLSULFATE 1 MG/ML
INJECTION, SOLUTION INTRAVENOUS AS NEEDED
Status: DISCONTINUED | OUTPATIENT
Start: 2022-11-03 | End: 2022-11-03 | Stop reason: SURG

## 2022-11-03 RX ORDER — NITROGLYCERIN 20 MG/100ML
INJECTION INTRAVENOUS CONTINUOUS PRN
Status: DISCONTINUED | OUTPATIENT
Start: 2022-11-03 | End: 2022-11-07

## 2022-11-03 RX ORDER — FAMOTIDINE 10 MG/ML
20 INJECTION, SOLUTION INTRAVENOUS 2 TIMES DAILY
Status: DISCONTINUED | OUTPATIENT
Start: 2022-11-03 | End: 2022-11-03

## 2022-11-03 RX ORDER — ROCURONIUM BROMIDE 10 MG/ML
INJECTION, SOLUTION INTRAVENOUS AS NEEDED
Status: DISCONTINUED | OUTPATIENT
Start: 2022-11-03 | End: 2022-11-03 | Stop reason: HOSPADM

## 2022-11-03 RX ORDER — IODIXANOL 320 MG/ML
100 INJECTION, SOLUTION INTRAVASCULAR
Status: DISCONTINUED | OUTPATIENT
Start: 2022-11-03 | End: 2022-11-03 | Stop reason: ALTCHOICE

## 2022-11-03 RX ORDER — ALBUMIN, HUMAN INJ 5% 5 %
SOLUTION INTRAVENOUS
Status: DISCONTINUED
Start: 2022-11-03 | End: 2022-11-03 | Stop reason: WASHOUT

## 2022-11-03 RX ORDER — PANTOPRAZOLE SODIUM 20 MG/1
20 TABLET, DELAYED RELEASE ORAL
Status: DISCONTINUED | OUTPATIENT
Start: 2022-11-04 | End: 2022-11-07

## 2022-11-03 RX ORDER — HEPARIN SODIUM 5000 [USP'U]/ML
INJECTION, SOLUTION INTRAVENOUS; SUBCUTANEOUS AS NEEDED
Status: DISCONTINUED | OUTPATIENT
Start: 2022-11-03 | End: 2022-11-03 | Stop reason: HOSPADM

## 2022-11-03 RX ORDER — ACETAMINOPHEN 500 MG
1000 TABLET ORAL EVERY 6 HOURS PRN
Status: DISCONTINUED | OUTPATIENT
Start: 2022-11-03 | End: 2022-11-07

## 2022-11-03 RX ORDER — GLYCOPYRROLATE 0.2 MG/ML
INJECTION, SOLUTION INTRAMUSCULAR; INTRAVENOUS AS NEEDED
Status: DISCONTINUED | OUTPATIENT
Start: 2022-11-03 | End: 2022-11-03 | Stop reason: SURG

## 2022-11-03 RX ORDER — METOCLOPRAMIDE HYDROCHLORIDE 5 MG/ML
5 INJECTION INTRAMUSCULAR; INTRAVENOUS EVERY 8 HOURS PRN
Status: DISCONTINUED | OUTPATIENT
Start: 2022-11-03 | End: 2022-11-07

## 2022-11-03 RX ORDER — HYDRALAZINE HYDROCHLORIDE 20 MG/ML
10 INJECTION INTRAMUSCULAR; INTRAVENOUS EVERY 2 HOUR PRN
Status: DISCONTINUED | OUTPATIENT
Start: 2022-11-03 | End: 2022-11-07

## 2022-11-03 RX ORDER — LIDOCAINE HYDROCHLORIDE 10 MG/ML
INJECTION, SOLUTION EPIDURAL; INFILTRATION; INTRACAUDAL; PERINEURAL AS NEEDED
Status: DISCONTINUED | OUTPATIENT
Start: 2022-11-03 | End: 2022-11-03 | Stop reason: HOSPADM

## 2022-11-03 RX ADMIN — GLYCOPYRROLATE 0.8 MG: 0.2 INJECTION, SOLUTION INTRAMUSCULAR; INTRAVENOUS at 10:37:00

## 2022-11-03 RX ADMIN — LIDOCAINE HYDROCHLORIDE 50 MG: 10 INJECTION, SOLUTION EPIDURAL; INFILTRATION; INTRACAUDAL; PERINEURAL at 09:27:00

## 2022-11-03 RX ADMIN — HEPARIN SODIUM 2000 UNITS: 5000 INJECTION, SOLUTION INTRAVENOUS; SUBCUTANEOUS at 10:29:00

## 2022-11-03 RX ADMIN — CEFAZOLIN SODIUM/WATER 2 G: 2 G/20 ML SYRINGE (ML) INTRAVENOUS at 09:44:00

## 2022-11-03 RX ADMIN — ROCURONIUM BROMIDE 50 MG: 10 INJECTION, SOLUTION INTRAVENOUS at 09:27:00

## 2022-11-03 RX ADMIN — NEOSTIGMINE METHYLSULFATE 5 MG: 1 INJECTION, SOLUTION INTRAVENOUS at 10:37:00

## 2022-11-03 RX ADMIN — HEPARIN SODIUM 14000 UNITS: 1000 INJECTION, SOLUTION INTRAVENOUS; SUBCUTANEOUS at 10:15:00

## 2022-11-03 NOTE — PROGRESS NOTES
[facility] CARDIOLOGY  SHAWNA Note    Mitchell Valdez Location:      MRN [id number]   Admission Date 11/2/2022 Operation Date 11/3/2022   Attending Physician Michael Walton MD Operating Physician Dasia Jasso MD     Pre-Operative Diagnosis: Severe AS, TF TAVR. Post-Operative Diagnosis: Same as above    Procedure Performed: SHAWNA intra-op with TAVR. Summary of Case: Calcified AV with severe AS and mild-moderate AI. 23 mm S3 TAVR, nominal balloon volume. Excellent result, trivial AI, mean AVG 44 mmHg -> 7 mmHg post-procedure. No immediate complications. Full report to follow.     Dasia Jasso MD  11/3/2022  10:52 AM

## 2022-11-03 NOTE — CM/SW NOTE
11/03/22 1600   CM/SW Referral Data   Referral Source Physician   Reason for Referral Discharge planning   Informant Daughter   Patient Info   Patient's Current Mental Status at Time of Assessment Alert;Oriented   Patient's [address]   Number of Levels in Home 3   Patient lives with Spouse/Significant other   Patient Status Prior to Admission   Services in place prior to admission DME/Supplies at home   Type of DME/Supplies Wheeled Walker   Discharge Needs   Anticipated D/C needs To be determined     Received order for sw to see due to TAVR. Sw met with pt and her daughter. Pt had TAVR today. Pt lives with her  in a [place] home. Pt has good family support. Daughter reports that pt had low activity endurance due to her heart issues. 2 daughters live close to them and one of their sons will be staying with them at [place]. Daughter states they purchased  a walker for pt  but she does no use it. Await input from PT/OT tomorrow.     Maikel Sandoval LCSW  /Discharge Planner

## 2022-11-03 NOTE — PROCEDURES
[facility]    PATIENT'S NAME: Janina Presley   ATTENDING PHYSICIAN: Marcus De Anda M.D. OPERATING PHYSICIAN: Arleth Crowe M.D. PATIENT ACCOUNT#:   [de-identified]    LOCATION:  [address] [place] [address]  MEDICAL RECORD #:   [id number]       YOB: 1945  ADMISSION DATE:       11/02/2022      OPERATION DATE:      CARDIAC PROCEDURE TRANSCRIPTION      PERCUTANEOUS TRANSCATHETER AORTIC VALVE REPLACEMENT:      PREOPERATIVE DIAGNOSIS:    POSTOPERATIVE DIAGNOSIS:    PROCEDURE PERFORMED:  Percutaneous transcatheter aortic valve replacement. HISTORY:  The patient is a 68-year-old woman referred for an attempt at percutaneous transcatheter aortic valve replacement due to severe symptomatic calcific aortic stenosis. PROCEDURE:  After obtaining informed consent, the patient was brought to the cardiac catheterization laboratory, and using ultrasound guidance and a micropuncture technique a 6-Bruneian sheath was placed in the right common femoral artery and a 6-Bruneian sheath in the left common femoral artery. A 5-Bruneian sheath was placed in the left common femoral vein. We upsized the left-sided sheath to the 14-Bruneian delivery sheath. A pigtail catheter was placed in the ascending aorta. This case was performed using the new Savvy pacing wire. A #23 Doug Ultra aortic valve was placed. The device sat beautifully. There was no evidence of aortic disruption or significant aortic insufficiency. Mean gradient across the valve at the end of the procedure was approximately 4 mmHg. The patient tolerated the procedure well and was without apparent acute complications. At the end of the procedure, hemostasis was achieved by placing Perclose devices in the left femoral vessels. An Angio-Seal was placed on the right. The patient had received heparin for systemic anticoagulation. I updated the patient's daughter, Ashleigh Douglas, on her mother's condition immediately after the procedure.     Dr. Tejinder Cherry and Dr. Kp Williamson were cooperators in this case, and Dr. Uriel Michel performed the transesophageal echocardiographic images. A separate dictation will detail those findings.     Dictated By Ari Merritt M.D.  d: 11/03/2022 11:08:35  t: 11/03/2022 16:32:53  [facility] [id number]  AN/

## 2022-11-03 NOTE — ANESTHESIA PROCEDURE NOTES
Procedure Performed: SHAWNA       Start Time:        End Time:      Preanesthesia Checklist:  Patient identified, IV assessed, risks and benefits discussed, monitors and equipment assessed, procedure being performed at surgeon's request and anesthesia consent obtained.     General Procedure Information  Diagnostic Indications for Echo:  assessment of ascending aorta  Physician Requesting Echo: Sim Vincent MD  Location performed:  OR  Intubated  Bite block placed  Heart visualized  Probe Insertion:  Easy  Probe Type:  Multiplane  Modalities:  2D only, color flow mapping, pulse wave Doppler and continuous wave Doppler        Anesthesia Information  Performed Personally  Anesthesiologist:  Claire Gonzalez MD      Post  Post Intervention Follow-up Study:See addtional report                       Complications:None

## 2022-11-03 NOTE — PROGRESS NOTES
Prelim    #23 Doug Ultra aortic valve via left CFA with use of Savvy wire    Excellent result    Perclose left femoral vessels - angioseal right    Updated patient's daughter post procedure    Cody/Liza/Vick

## 2022-11-03 NOTE — ANESTHESIA PROCEDURE NOTES
Airway  Date/Time: 11/3/2022 9:31 AM  Urgency: elective      General Information and Staff    Patient location during procedure: OR  Anesthesiologist: Elias Mason MD  Performed: anesthesiologist     Indications and Patient Condition  Indications for airway management: anesthesia  Sedation level: deep  Preoxygenated: yes  Patient position: sniffing  Mask difficulty assessment: 1 - vent by mask    Final Airway Details  Final airway type: endotracheal airway      Successful airway: ETT  Cuffed: yes   Successful intubation technique: direct laryngoscopy  Endotracheal tube insertion site: oral  Blade: Elizabet  Blade size: #3  ETT size (mm): 7.5    Cormack-Lehane Classification: grade I - full view of glottis  Placement verified by: chest auscultation and capnometry   Measured from: lips  ETT to lips (cm): 21  Number of attempts at approach: 1

## 2022-11-03 NOTE — PROGRESS NOTES
Pharmacy Note:  Renal Adjustment for cefazolin (ANCEF)         Antoinette Sanchez is a 64year old female who has been prescribed cefazolin 2g q8hr x2 doses postop. Est CrCl: ~22 mL/min    Est CrCl < 30mL/min, there fore the dose has been adjusted to cefazolin 2g q12hr x2 doses postop per hospital renal dose adjustment protocol.       Thank you,      Cheryl BuenrostroD, BCPS  11/3/2022  11:22 AM

## 2022-11-03 NOTE — PLAN OF CARE
Assumed care at [address]. Alert and oriented x4. IVF started and infusing. No acute events overnight. Plan for TAVR. Call light in reach at bedside. Will continue to monitor.

## 2022-11-03 NOTE — ANESTHESIA PROCEDURE NOTES
Arterial Line  Performed by: Voncile Pallas, MD  Authorized by: Voncile Pallas, MD     General Information and Staff    Procedure Start:   Anesthesiologist:  Voncile Pallas, MD  Performed By:  Anesthesiologist  Patient Location:  OR  Indication: continuous blood pressure monitoring and blood sampling needed    Site Identification: surface landmarks    Preanesthetic Checklist: 2 patient identifiers, IV checked, risks and benefits discussed, monitors and equipment checked, pre-op evaluation, timeout performed, anesthesia consent and sterile technique used    Procedure Details    Catheter Size:  20 G  Catheter Length:  1 and 3/4 inch  Catheter Type:  Arrow  Seldinger Technique?: Yes    Site:  Radial artery  Site Prep: chlorhexidine    Line Secured:  Tape and Tegaderm    Assessment    Events: patient tolerated procedure well with no complications      Medications      Additional Comments

## 2022-11-03 NOTE — OPERATIVE REPORT
Cardiovascular Surgery Operative Note  TAVR      DATE OF PROCEDURE: 11/3/2022     INDICATIONS:         64year old female with severe symptomatic Aortic Stenosis and moderate AI. The risks and benefits of TAVR vs SAVR were discussed with patient. The patient chose to proceed with TAVR. PRE-OPERATIVE DIAGNOSIS: Symptomatic severe aortic stenosis and moderate AI    POST-OPERATIVE DIAGNOSIS: same    PROCEDURE PERFORMED:   Transcatheter aortic valve replacement with 23 Doug S3   aortic valve  Left  transfemoral approach percutaneously    CARDIOLOGISTS: Keyur Hamilton and Melanie Castañeda  TRANSESOPHAGEAL ECHOCARDIOGRAPHER: Dr Brooklyn Blackburn:  The procedure was performed by Keyur Hamilton and Melanie Castañeda  See additional operative notes from the cardiology team.  The SHAWNA probe was placed by anesthesia  The pt was prepped and draped in the usual sterile fashion and a time out performed. The bilateral femoral arteries were accessed and the deployment side was perclosed. Heparin was given and the ACT was appropriate. The valve was then crossed and an exchange catheter used to place a wire in the left ventricle. A second time out was then performed. A Savvy wire was then advanced into the left ventricle and pacing tested. The 23 Doug S3 was then advanced and deployed with rapid ventricular pacing. The valve seated well. SHAWNA evaluation did not reveal any significant effusion, there was trivial paravalvular leak. There were no complications evident. There was minimal blood loss. The pt was then transferred to the CVICU in stable condition.       Bo Villa MD

## 2022-11-03 NOTE — H&P
[facility]    PATIENT'S NAME: Eryn Price   ATTENDING PHYSICIAN: Concepción Goldman M.D. PATIENT ACCOUNT#:   [de-identified]    LOCATION:  [address] [place] [address]  MEDICAL RECORD #:   [id number]       YOB: 1945  ADMISSION DATE:       11/02/2022    HISTORY AND PHYSICAL EXAMINATION    HISTORY OF PRESENT ILLNESS:  A 78-year-old female with severe aortic stenosis, peak velocity greater than 4. Patient has had prior stenting of her LAD, diagonal, and circumflex. Patient has had exertional dyspnea. No hypertension or dyslipidemia. PAST MEDICAL HISTORY:  Stenting of the LAD, diagonal, and circumflex; history of congestive heart failure; aortic stenosis; rheumatoid arthritis. MEDICATIONS:  As listed. SOCIAL HISTORY:  Patient never smoked. FAMILY HISTORY:  Noncontributory. REVIEW OF SYSTEMS:  Denies hemoptysis, denies hematemesis, denies hematuria. Rest of review of systems normal except as in HPI. PHYSICAL EXAMINATION:    VITAL SIGNS:  Blood pressure 130/70, pulse 70. HEENT:  Pupils equal and reactive to light and accommodation. NECK:  No JVD at 30 degrees. No thyromegaly. LUNGS:  Clear to auscultation and percussion. HEART:  PMI nondisplaced. S1, S2. No S3. There is a late peaking systolic ejection murmur. ABDOMEN:  Soft. EXTREMITIES:  No clubbing, cyanosis, or edema. BACK:  No kyphosis. IMPRESSION:    1. Severe aortic stenosis, for transcatheter aortic valve replacement in the morning. Patient has good left ventricular function. Risks and benefits were explained. 2.   Status post stenting to the left anterior descending, circumflex, and diagonal.  3.   Hypertension. 4.   Rheumatoid arthritis.     Dictated By Concepción Goldman M.D.  d: 11/02/2022 17:18:53  t: 11/02/2022 18:41:49  Job [id number]  JNY/

## 2022-11-03 NOTE — PLAN OF CARE
Patient care assumed at approximately 1100, when patient arrived from CVOR hybrid. Patient remained supine for 4hr recovery period. Patient recovered and assessments completed per protocol. A&Ox4. [place] in place. NSR on tele, confirmed by EKG. PRN hydralazine administered as indicated to keep SBP within goal range. Throat dry. Bilateral groin sites, soft, no hematoma, C/D/I. Pulses palpable. Lung sounds clear, on room air. Pt had two episodes of clear emesis, small amount. Purewick in place. K replacement initiated. Will recheck later this evening. Daughter at bedside. Will continue to monitor.

## 2022-11-04 ENCOUNTER — APPOINTMENT (OUTPATIENT)
Dept: GENERAL RADIOLOGY | Facility: HOSPITAL | Age: 77
End: 2022-11-04
Attending: INTERNAL MEDICINE
Payer: MEDICARE

## 2022-11-04 ENCOUNTER — APPOINTMENT (OUTPATIENT)
Dept: CV DIAGNOSTICS | Facility: HOSPITAL | Age: 77
End: 2022-11-04
Attending: INTERNAL MEDICINE
Payer: MEDICARE

## 2022-11-04 LAB
ANION GAP SERPL CALC-SCNC: 3 MMOL/L (ref 0–18)
ATRIAL RATE: 93 BPM
BILIRUB UR QL STRIP.AUTO: NEGATIVE
BUN BLD-MCNC: 26 MG/DL (ref 7–18)
CALCIUM BLD-MCNC: 9.1 MG/DL (ref 8.5–10.1)
CHLORIDE SERPL-SCNC: 113 MMOL/L (ref 98–112)
CLARITY UR REFRACT.AUTO: CLEAR
CO2 SERPL-SCNC: 25 MMOL/L (ref 21–32)
COLOR UR AUTO: COLORLESS
CREAT BLD-MCNC: 1.48 MG/DL
ERYTHROCYTE [DISTWIDTH] IN BLOOD BY AUTOMATED COUNT: 11.9 %
GFR SERPLBLD BASED ON 1.73 SQ M-ARVRAT: 36 ML/MIN/1.73M2 (ref 60–?)
GLUCOSE BLD-MCNC: 102 MG/DL (ref 70–99)
GLUCOSE UR STRIP.AUTO-MCNC: NEGATIVE MG/DL
HCT VFR BLD AUTO: 26.8 %
HGB BLD-MCNC: 8.7 G/DL
HYALINE CASTS #/AREA URNS AUTO: PRESENT /LPF
INR BLD: 1.35 (ref 0.85–1.16)
KETONES UR STRIP.AUTO-MCNC: NEGATIVE MG/DL
LEUKOCYTE ESTERASE UR QL STRIP.AUTO: NEGATIVE
MAGNESIUM SERPL-MCNC: 2.1 MG/DL (ref 1.6–2.6)
MCH RBC QN AUTO: 30.1 PG (ref 26–34)
MCHC RBC AUTO-ENTMCNC: 32.5 G/DL (ref 31–37)
MCV RBC AUTO: 92.7 FL
NITRITE UR QL STRIP.AUTO: NEGATIVE
OSMOLALITY SERPL CALC.SUM OF ELEC: 297 MOSM/KG (ref 275–295)
P AXIS: 76 DEGREES
P-R INTERVAL: 142 MS
PH UR STRIP.AUTO: 5 [PH] (ref 5–8)
PLATELET # BLD AUTO: 245 10(3)UL (ref 150–450)
POTASSIUM SERPL-SCNC: 4.1 MMOL/L (ref 3.5–5.1)
PROT UR STRIP.AUTO-MCNC: NEGATIVE MG/DL
PROTHROMBIN TIME: 16.6 SECONDS (ref 11.6–14.8)
Q-T INTERVAL: 398 MS
QRS DURATION: 136 MS
QTC CALCULATION (BEZET): 494 MS
R AXIS: -42 DEGREES
RBC # BLD AUTO: 2.89 X10(6)UL
SODIUM SERPL-SCNC: 141 MMOL/L (ref 136–145)
SP GR UR STRIP.AUTO: 1 (ref 1–1.03)
T AXIS: 86 DEGREES
UROBILINOGEN UR STRIP.AUTO-MCNC: <2 MG/DL
VENTRICULAR RATE: 93 BPM
WBC # BLD AUTO: 11.9 X10(3) UL (ref 4–11)

## 2022-11-04 PROCEDURE — 93306 TTE W/DOPPLER COMPLETE: CPT | Performed by: INTERNAL MEDICINE

## 2022-11-04 PROCEDURE — 97116 GAIT TRAINING THERAPY: CPT

## 2022-11-04 PROCEDURE — 80048 BASIC METABOLIC PNL TOTAL CA: CPT | Performed by: INTERNAL MEDICINE

## 2022-11-04 PROCEDURE — 97535 SELF CARE MNGMENT TRAINING: CPT

## 2022-11-04 PROCEDURE — 99211 OFF/OP EST MAY X REQ PHY/QHP: CPT

## 2022-11-04 PROCEDURE — 93005 ELECTROCARDIOGRAM TRACING: CPT

## 2022-11-04 PROCEDURE — 83735 ASSAY OF MAGNESIUM: CPT | Performed by: INTERNAL MEDICINE

## 2022-11-04 PROCEDURE — 87040 BLOOD CULTURE FOR BACTERIA: CPT | Performed by: NURSE PRACTITIONER

## 2022-11-04 PROCEDURE — 85610 PROTHROMBIN TIME: CPT | Performed by: INTERNAL MEDICINE

## 2022-11-04 PROCEDURE — 71045 X-RAY EXAM CHEST 1 VIEW: CPT | Performed by: INTERNAL MEDICINE

## 2022-11-04 PROCEDURE — 97161 PT EVAL LOW COMPLEX 20 MIN: CPT

## 2022-11-04 PROCEDURE — 97166 OT EVAL MOD COMPLEX 45 MIN: CPT

## 2022-11-04 PROCEDURE — 81001 URINALYSIS AUTO W/SCOPE: CPT | Performed by: NURSE PRACTITIONER

## 2022-11-04 PROCEDURE — 93010 ELECTROCARDIOGRAM REPORT: CPT | Performed by: INTERNAL MEDICINE

## 2022-11-04 PROCEDURE — 85027 COMPLETE CBC AUTOMATED: CPT | Performed by: INTERNAL MEDICINE

## 2022-11-04 RX ORDER — ASPIRIN 81 MG/1
81 TABLET ORAL DAILY
Status: DISCONTINUED | OUTPATIENT
Start: 2022-11-04 | End: 2022-11-07

## 2022-11-04 RX ORDER — CLOPIDOGREL BISULFATE 75 MG/1
75 TABLET ORAL DAILY
Status: DISCONTINUED | OUTPATIENT
Start: 2022-11-04 | End: 2022-11-07

## 2022-11-04 RX ORDER — BUMETANIDE 1 MG/1
1 TABLET ORAL DAILY
Status: DISCONTINUED | OUTPATIENT
Start: 2022-11-04 | End: 2022-11-07

## 2022-11-04 NOTE — CM/SW NOTE
PT recs C. Sw met with pt and daughter. Daughter in agreement- pt reluctantly agreeing to [facility]. Referral for [facility] in aidin- await responses.     Sai Reddy LCSW  /Discharge Planner

## 2022-11-04 NOTE — PLAN OF CARE
Up in chair, had echo done. Ambulating halls. Urine/blood cultures sent. SR with BBB on monitors. SBP stable. Groins soft without hematoma. Pulses palpable. Transfer orders received, awaiting bed placement.

## 2022-11-04 NOTE — PROCEDURES
[facility]    PATIENT'S NAME: Corky Bowen   ATTENDING PHYSICIAN: Marky Feldman M.D. OPERATING PHYSICIAN: Jacky Her M.D. PATIENT ACCOUNT#:   [de-identified]    LOCATION:  [address] [place] [address]  MEDICAL RECORD #:   [id number]       YOB: 1945  ADMISSION DATE:       11/02/2022      OPERATION DATE:  11/03/2022    CARDIAC PROCEDURE TRANSCRIPTION      TRANSESOPHAGEAL ECHOCARDIOGRAM INTRAOPERATIVE STRUCTURAL PROCEDURE    PREOPERATIVE DIAGNOSIS:  Severe symptomatic aortic stenosis. POSTOPERATIVE DIAGNOSIS:  Successful 23 mm transfemoral transcatheter aortic valve replacement. PROCEDURE PERFORMED:  Intraoperative transesophageal echocardiography with 2- and 3-dimensional imaging, Doppler and color flow mapping. INDICATION FOR PROCEDURE:   SHAWNA anatomic assessment of aortic valve anatomy and function. SHAWNA imaging assistance for transfemoral TAVR. PROCEDURAL COMMENTS:  Complete Omniplane transesophageal echocardiography with 2- and 3-dimensional imaging as well as Doppler and color flow mapping was performed in the hybrid operating room at [facility].  The SHAWNA was done in conjunction with a TAVR performed by Dr. Vidhya Marks, Dr. Gibran Mahmood and Dr. Bay Mckinney. The study was performed under general endotracheal tube anesthesia, the SHAWNA probe placed by Dr. Katty Pinon. Views were obtained from the standard imaging windows and were of excellent quality. FINDINGS:  The rhythm is normal sinus. The left atrium is large. There is concentric left ventricular hypertrophy. Left ventricular systolic function appears normal, with a visually estimated LVEF of greater than 50%. Right atrial size appears normal.  Right ventricular size and systolic function appear within normal limits. No pericardial effusion. The mitral annulus is densely calcified posteriorly. The mitral valve leaflets are nonspecifically thickened.   The tricuspid and pulmonic valve leaflets appear anatomically normal and have normal motion. The aortic valve is trileaflet. The leaflets are calcified and thickened, with diminished systolic separation noted. In addition, the leaflets do not coapt normally in diastole. The aortic valve annulus measures 2.2 to 2.3 cm in diameter. Subannular, there is a prominent left ventricular outflow tract septal knob. Supravalvular, calcification and mild narrowing of the sinotubular junction is noted, as well as calcifications on the anterior surface of the ascending thoracic aorta. From the deep gastric views, maximum continuous wave velocities approach 4.4 cm/sec, giving rise to a peak instantaneous gradient of 75 mmHg and a mean gradient of 44 mmHg. Mild to moderate (1 to 2+) aortic regurgitation is also noted. Based on the anatomic appearance of the aortic valve, the above echo derived measurements and preprocedural testing, the decision was made to proceed with attempted placement of a 23 mm Doug S3 valve via transfemoral approach, without balloon valvuloplasty. The aortic valve was crossed in a retrograde fashion and a marking pigtail placed with SHAWNA imaging assistance in the right coronary cusp. Over the guidewire, a 23 mm TAVR balloon delivery system was advanced and positioned with fluoroscopic and SHAWNA imaging guidance. Once appropriate positioning was achieved, the valve was deployed under rapid ventricular pacing. A single inflation was performed with nominal balloon volume. There appeared to be full and complete expansion of the valve stent, in excellent position, with approximately 30% to 40% of the valve stent on the aortic side of the annulus. Complete interrogation was then performed post deployment. Trivial perivalvular regurgitation was noted. From the deep gastric views, the mean transvalvular gradient was 7 mmHg.   Post valve deployment, there was no evidence for pericardial effusion, impingement of coronary blood flow, annular disruption, or aortic dissection. CONCLUSIONS:  Successful 23 mm Doug S3 TAVR for severe calcific aortic stenosis with mild regurgitation as described above.     Dictated By Dudley Jeter M.D.  d: 11/03/2022 16:35:06  t: 11/03/2022 20:49:10  [facility] [id number]  SX/

## 2022-11-04 NOTE — PLAN OF CARE
Pt with temps overnight. TMax 103.5. Cardiology APN notified and orders received for tylenol and confirm that CBC ordered for AM. IS to 750. Encouraged coughing and deep breathing. CXR done this am. Bilateral groins soft, no hematoma or bleeding noted. +2 PP. ECHO scheduled this am.Tita removed this am before getting up to chair. PT/OT to see. Daughter currently sleeping at bedside.

## 2022-11-04 NOTE — HOME CARE LIAISON
Received referral via [facility] for Home Health services. Spoke w/ patients daughter and provided with list of [facility] providers from [place], choice is [facility]. Agency reserved in [place] and contact information placed on AVS.Financial interest disclosure provided. Notified SLY.

## 2022-11-04 NOTE — CARDIAC REHAB
CAD teaching reinforced with Pt and daughter Pt had stent in Feb referred to Phase 2 outpatient Cardiac Rehab. Now Post TAVR Cardiaca Rehab offered and referred.  Wanted information on [facility] in addition to [facility] may be closer for them Numbers On AVS I

## 2022-11-05 LAB
ANION GAP SERPL CALC-SCNC: 9 MMOL/L (ref 0–18)
BASOPHILS # BLD AUTO: 0.03 X10(3) UL (ref 0–0.2)
BASOPHILS NFR BLD AUTO: 0.2 %
BLOOD TYPE BARCODE: 6200
BUN BLD-MCNC: 39 MG/DL (ref 7–18)
CALCIUM BLD-MCNC: 9.5 MG/DL (ref 8.5–10.1)
CHLORIDE SERPL-SCNC: 106 MMOL/L (ref 98–112)
CO2 SERPL-SCNC: 25 MMOL/L (ref 21–32)
CREAT BLD-MCNC: 1.44 MG/DL
EOSINOPHIL # BLD AUTO: 0.3 X10(3) UL (ref 0–0.7)
EOSINOPHIL NFR BLD AUTO: 2.4 %
ERYTHROCYTE [DISTWIDTH] IN BLOOD BY AUTOMATED COUNT: 11.9 %
GFR SERPLBLD BASED ON 1.73 SQ M-ARVRAT: 37 ML/MIN/1.73M2 (ref 60–?)
GLUCOSE BLD-MCNC: 137 MG/DL (ref 70–99)
HCT VFR BLD AUTO: 27.2 %
HGB BLD-MCNC: 8.9 G/DL
IMM GRANULOCYTES # BLD AUTO: 0.05 X10(3) UL (ref 0–1)
IMM GRANULOCYTES NFR BLD: 0.4 %
LYMPHOCYTES # BLD AUTO: 2.5 X10(3) UL (ref 1–4)
LYMPHOCYTES NFR BLD AUTO: 20 %
MCH RBC QN AUTO: 30.3 PG (ref 26–34)
MCHC RBC AUTO-ENTMCNC: 32.7 G/DL (ref 31–37)
MCV RBC AUTO: 92.5 FL
MONOCYTES # BLD AUTO: 1.18 X10(3) UL (ref 0.1–1)
MONOCYTES NFR BLD AUTO: 9.4 %
NEUTROPHILS # BLD AUTO: 8.44 X10 (3) UL (ref 1.5–7.7)
NEUTROPHILS # BLD AUTO: 8.44 X10(3) UL (ref 1.5–7.7)
NEUTROPHILS NFR BLD AUTO: 67.6 %
OSMOLALITY SERPL CALC.SUM OF ELEC: 302 MOSM/KG (ref 275–295)
PLATELET # BLD AUTO: 243 10(3)UL (ref 150–450)
POTASSIUM SERPL-SCNC: 4 MMOL/L (ref 3.5–5.1)
RBC # BLD AUTO: 2.94 X10(6)UL
SODIUM SERPL-SCNC: 140 MMOL/L (ref 136–145)
WBC # BLD AUTO: 12.5 X10(3) UL (ref 4–11)

## 2022-11-05 PROCEDURE — 85025 COMPLETE CBC W/AUTO DIFF WBC: CPT | Performed by: NURSE PRACTITIONER

## 2022-11-05 PROCEDURE — 97530 THERAPEUTIC ACTIVITIES: CPT

## 2022-11-05 PROCEDURE — 97116 GAIT TRAINING THERAPY: CPT

## 2022-11-05 PROCEDURE — 80048 BASIC METABOLIC PNL TOTAL CA: CPT | Performed by: NURSE PRACTITIONER

## 2022-11-05 NOTE — PLAN OF CARE
Patient received AOx4. NSR w/ LBBB on monitor. Groins remain soft and free of hematoma, pedal pulses palpable. CTU orders pending bed avaliability. Good urine output during shift.

## 2022-11-05 NOTE — PHYSICAL THERAPY NOTE
PHYSICAL THERAPY TREATMENT NOTE - INPATIENT    Room Number: 9975/5018-G     Session: 1     Number of Visits to Meet Established Goals: 2    History related to current admission: Patient is a 64year old female admitted on 11/2/2022 from home for elective TAVR due to moderate to severe aortic stenosis. Presenting Problem: severe aortic stenosis s/p TAVR  Co-Morbidities : HTN, RA, HLD, coronary intervention - PTCA/ELIZABETH of LAD, diagonal vessel and L circumflex  ASSESSMENT     Pt continues to progress toward functional goals and requires some encouragement to progress to stair training. Pt able to demonstrate stair negotiation c CGA/supervision via side step. Pt is somewhat limited by c/o pain in L foot, however, demos functional mobility c RW and supervision. The AM-PAC '6-Clicks' Inpatient Basic Mobility Short Form was completed and this patient is demonstrating a 29% degree of impairment in mobility. Research supports that patients with this level of impairment may benefit from home at d/c .      DISCHARGE RECOMMENDATIONS  PT Discharge Recommendations: Home with home health PT (pt. reports she does not feel she needs HHPT)     PLAN  PT Treatment Plan: Bed mobility; Endurance; Energy conservation;Patient education; Family education;Gait training;Strengthening;Stair training;Transfer training;Balance training  Rehab Potential : Good  Frequency (Obs): 3x/week    CURRENT GOALS        Goal #1 Patient is able to demonstrate supine - sit EOB @ level: independent      Goal #2 Patient is able to demonstrate transfers Sit to/from Stand at assistance level: independent      Goal #3 Patient is able to ambulate 150 feet with assist device: none at assistance level: supervision      Goal #4 Patient will be able to ascend/descend 6 steps with handrail and supervision.    Goal #5     Goal #6     Goal Comments: Goals established on 11/4/2022 11/5/2022 all goals ongoing      SUBJECTIVE  \"I like to cook, but I have to slow down. I'll be 78 this year around [place] time! \"    OBJECTIVE  Precautions: Bed/chair alarm    WEIGHT BEARING RESTRICTION  Weight Bearing Restriction: None                PAIN ASSESSMENT   Rating: Unable to rate  Location: pt states \"sore\" in groin  Management Techniques: Activity promotion; Body mechanics;Breathing techniques;Repositioning;Relaxation    BALANCE                                                                                                                       Static Sitting: Good  Dynamic Sitting: Good           Static Standing: Fair -  Dynamic Standing: Fair -    ACTIVITY TOLERANCE                         O2 WALK         AM-PAC '6-Clicks' INPATIENT SHORT FORM - BASIC MOBILITY  How much difficulty does the patient currently have. .. Patient Difficulty: Turning over in bed (including adjusting bedclothes, sheets and blankets)?: None   Patient Difficulty: Sitting down on and standing up from a chair with arms (e.g., wheelchair, bedside commode, etc.): A Little   Patient Difficulty: Moving from lying on back to sitting on the side of the bed?: None   How much help from another person does the patient currently need. .. Help from Another: Moving to and from a bed to a chair (including a wheelchair)?: None   Help from Another: Need to walk in hospital room?: A Little   Help from Another: Climbing 3-5 steps with a railing?: A Little       AM-PAC Score:  Raw Score: 21   Approx Degree of Impairment: 28.97%   Standardized Score (AM-PAC Scale): 50.25   CMS Modifier (G-Code): CJ    FUNCTIONAL ABILITY STATUS  Gait Assessment   Functional Mobility/Gait Assessment  Gait Assistance: Supervision  Distance (ft): 200  Assistive Device: Rolling walker  Pattern: Within Functional Limits (decreased julissa)  Stairs: Stairs  How Many Stairs: 5  Device: 1 Rail  Assist: Contact guard assist  Pattern: Ascend and Descend  Ascend and Descend : Step to    Skilled Therapy Provided  Pt presents seated in BS chair.  Daughter present. Pt educated on role of PT and goals for session. Toilet t/f supervision to mod I.supervision at sink for hand hygiene. Pt noted to reach for walls and furniture while gait training in room. Provided RW, pt progresses from CGA to supervision c AD. Pt performed stair training c encouragement as pt reports PTA stairs were difficult d/t \"palpitations\" and SOB. Educated pt on hospital course and valve replacement . Pt agreeable and negotiated stairs c CGA to supervision . Pt left in chair, needs met, and encouraged pt to amb c nsg staff . Bed Mobility:  Rolling: nt   Supine<>Sit: nt   Sit<>Supine: nt     Transfer Mobility:  Sit<>Stand: supervision    Stand<>Sit: supervision    Gait: supervision     Therapist's Comments: VSS throughout           Patient End of Session: Up in chair;Needs met;Call light within reach;RN aware of session/findings; Family present

## 2022-11-06 LAB
ATRIAL RATE: 88 BPM
P AXIS: 84 DEGREES
P-R INTERVAL: 210 MS
Q-T INTERVAL: 412 MS
QRS DURATION: 138 MS
QTC CALCULATION (BEZET): 498 MS
R AXIS: -47 DEGREES
T AXIS: 101 DEGREES
VENTRICULAR RATE: 88 BPM

## 2022-11-06 PROCEDURE — 93005 ELECTROCARDIOGRAM TRACING: CPT

## 2022-11-06 PROCEDURE — 93010 ELECTROCARDIOGRAM REPORT: CPT | Performed by: INTERNAL MEDICINE

## 2022-11-06 NOTE — PLAN OF CARE
Assumed care of pt @ 0730. Pt is A/Ox 4. On RA, VSS, SR/ST on tele. IV saline locked, flushed. Tolerating diet. PT/OT recommending-home  Pt states pain is mild to moderate to foot with ambulation otherwise has no pain. Up as tolerated with SBA and walker for long distance/to ambulate in the noel. Intake/outputs WNL. Pt with good UO, had large BM today after miralax,. Plan tx to tele floor today if bed available, dc home tomorrow per cards. Updated POC with patient and family. Will continue to monitor. Problem: PAIN - ADULT  Goal: Verbalizes/displays adequate comfort level or patient's stated pain goal  Description: INTERVENTIONS:  - Encourage pt to monitor pain and request assistance  - Assess pain using appropriate pain scale  - Administer analgesics based on type and severity of pain and evaluate response  - Implement non-pharmacological measures as appropriate and evaluate response  - Consider cultural and social influences on pain and pain management  - Manage/alleviate anxiety  - Utilize distraction and/or relaxation techniques  - Monitor for opioid side effects  - Notify MD/LIP if interventions unsuccessful or patient reports new pain  - Anticipate increased pain with activity and pre-medicate as appropriate  Outcome: Progressing     Problem: RISK FOR INFECTION - ADULT  Goal: Absence of fever/infection during anticipated neutropenic period  Description: INTERVENTIONS  - Monitor WBC  - Administer growth factors as ordered  - Implement neutropenic guidelines  Outcome: Progressing     Problem: SAFETY ADULT - FALL  Goal: Free from fall injury  Description: INTERVENTIONS:  - Assess pt frequently for physical needs  - Identify cognitive and physical deficits and behaviors that affect risk of falls.   - Denio fall precautions as indicated by assessment.  - Educate pt/family on patient safety including physical limitations  - Instruct pt to call for assistance with activity based on assessment  - Modify environment to reduce risk of injury  - Provide assistive devices as appropriate  - Consider OT/PT consult to assist with strengthening/mobility  - Encourage toileting schedule  Outcome: Progressing     Problem: DISCHARGE PLANNING  Goal: Discharge to home or other facility with appropriate resources  Description: INTERVENTIONS:  - Identify barriers to discharge w/pt and caregiver  - Include patient/family/discharge partner in discharge planning  - Arrange for needed discharge resources and transportation as appropriate  - Identify discharge learning needs (meds, wound care, etc)  - Arrange for interpreters to assist at discharge as needed  - Consider post-discharge preferences of patient/family/discharge partner  - Complete POLST form as appropriate  - Assess patient's ability to be responsible for managing their own health  - Refer to Case Management Department for coordinating discharge planning if the patient needs post-hospital services based on physician/LIP order or complex needs related to functional status, cognitive ability or social support system  Outcome: Progressing

## 2022-11-06 NOTE — PLAN OF CARE
Resumed care at 0730. Aox4, vitals stable. L finger and L toe pain/swelling/redness, notified cardiology. Low grade temp, will watch overnight. OOB to bathroom, hallway with walker.

## 2022-11-06 NOTE — PLAN OF CARE
Received care of pt at [facility]. Pt tele status. Tmax 99.9 overnight. Pt mildly confused but reorients easily. SR-ST with BBB and rate 90's-120's. -150's. +BM x 1 on nights. Incontinent x 1 overnight, otherwise voiding into toliet. Walked halls x 1 overnight. No family present.  Plan for discharge today per cardiology

## 2022-11-07 VITALS
RESPIRATION RATE: 16 BRPM | OXYGEN SATURATION: 98 % | SYSTOLIC BLOOD PRESSURE: 109 MMHG | HEART RATE: 99 BPM | TEMPERATURE: 98 F | WEIGHT: 148.56 LBS | DIASTOLIC BLOOD PRESSURE: 66 MMHG | BODY MASS INDEX: 29 KG/M2

## 2022-11-07 PROBLEM — I50.33 ACUTE ON CHRONIC DIASTOLIC HEART FAILURE (HCC): Status: ACTIVE | Noted: 2022-11-07

## 2022-11-07 LAB
ALBUMIN SERPL-MCNC: 3.2 G/DL (ref 3.4–5)
ALBUMIN/GLOB SERPL: 0.8 {RATIO} (ref 1–2)
ALP LIVER SERPL-CCNC: 56 U/L
ALT SERPL-CCNC: 16 U/L
ANION GAP SERPL CALC-SCNC: 9 MMOL/L (ref 0–18)
AST SERPL-CCNC: 13 U/L (ref 15–37)
BASOPHILS # BLD AUTO: 0.04 X10(3) UL (ref 0–0.2)
BASOPHILS NFR BLD AUTO: 0.2 %
BILIRUB SERPL-MCNC: 0.4 MG/DL (ref 0.1–2)
BUN BLD-MCNC: 45 MG/DL (ref 7–18)
CALCIUM BLD-MCNC: 9.2 MG/DL (ref 8.5–10.1)
CHLORIDE SERPL-SCNC: 100 MMOL/L (ref 98–112)
CO2 SERPL-SCNC: 26 MMOL/L (ref 21–32)
CREAT BLD-MCNC: 1.52 MG/DL
EOSINOPHIL # BLD AUTO: 0.39 X10(3) UL (ref 0–0.7)
EOSINOPHIL NFR BLD AUTO: 2.4 %
ERYTHROCYTE [DISTWIDTH] IN BLOOD BY AUTOMATED COUNT: 11.8 %
ERYTHROCYTE [SEDIMENTATION RATE] IN BLOOD: 76 MM/HR
GFR SERPLBLD BASED ON 1.73 SQ M-ARVRAT: 35 ML/MIN/1.73M2 (ref 60–?)
GLOBULIN PLAS-MCNC: 3.8 G/DL (ref 2.8–4.4)
GLUCOSE BLD-MCNC: 175 MG/DL (ref 70–99)
HCT VFR BLD AUTO: 30.5 %
HGB BLD-MCNC: 10.1 G/DL
IMM GRANULOCYTES # BLD AUTO: 0.08 X10(3) UL (ref 0–1)
IMM GRANULOCYTES NFR BLD: 0.5 %
LYMPHOCYTES # BLD AUTO: 1.5 X10(3) UL (ref 1–4)
LYMPHOCYTES NFR BLD AUTO: 9.3 %
MCH RBC QN AUTO: 30.3 PG (ref 26–34)
MCHC RBC AUTO-ENTMCNC: 33.1 G/DL (ref 31–37)
MCV RBC AUTO: 91.6 FL
MONOCYTES # BLD AUTO: 1.02 X10(3) UL (ref 0.1–1)
MONOCYTES NFR BLD AUTO: 6.3 %
NEUTROPHILS # BLD AUTO: 13.18 X10 (3) UL (ref 1.5–7.7)
NEUTROPHILS # BLD AUTO: 13.18 X10(3) UL (ref 1.5–7.7)
NEUTROPHILS NFR BLD AUTO: 81.3 %
OSMOLALITY SERPL CALC.SUM OF ELEC: 296 MOSM/KG (ref 275–295)
PLATELET # BLD AUTO: 273 10(3)UL (ref 150–450)
POTASSIUM SERPL-SCNC: 4 MMOL/L (ref 3.5–5.1)
PROCALCITONIN SERPL-MCNC: 0.14 NG/ML (ref ?–0.16)
PROT SERPL-MCNC: 7 G/DL (ref 6.4–8.2)
RBC # BLD AUTO: 3.33 X10(6)UL
SODIUM SERPL-SCNC: 135 MMOL/L (ref 136–145)
WBC # BLD AUTO: 16.2 X10(3) UL (ref 4–11)

## 2022-11-07 PROCEDURE — 84145 PROCALCITONIN (PCT): CPT | Performed by: NURSE PRACTITIONER

## 2022-11-07 PROCEDURE — 85025 COMPLETE CBC W/AUTO DIFF WBC: CPT | Performed by: INTERNAL MEDICINE

## 2022-11-07 PROCEDURE — 85652 RBC SED RATE AUTOMATED: CPT | Performed by: NURSE PRACTITIONER

## 2022-11-07 PROCEDURE — 90471 IMMUNIZATION ADMIN: CPT

## 2022-11-07 PROCEDURE — C9113 INJ PANTOPRAZOLE SODIUM, VIA: HCPCS | Performed by: NURSE PRACTITIONER

## 2022-11-07 PROCEDURE — 80053 COMPREHEN METABOLIC PANEL: CPT | Performed by: INTERNAL MEDICINE

## 2022-11-07 RX ORDER — PREDNISONE 1 MG/1
5 TABLET ORAL DAILY
Qty: 90 TABLET | Refills: 0 | Status: SHIPPED | COMMUNITY
Start: 2022-11-07

## 2022-11-07 RX ORDER — PREDNISONE 20 MG/1
TABLET ORAL
Qty: 15 TABLET | Refills: 0 | Status: SHIPPED
Start: 2022-11-07 | End: 2022-11-17

## 2022-11-07 NOTE — PLAN OF CARE
Pt tele status. Afebrile since 0000. VSS. IS to 750. Voiding in toilet. C/O pain to left foot and left finger. Walking halls with walker. PT/OT seeing pt. No family present during shift. Plan for discharge today.

## 2022-11-07 NOTE — PLAN OF CARE
Assumed care of the pt at 0730, neuro intact, pt very [place], does not wear hearing aids. Remains on room air, VSS, afebrile. See flowsheet for further assessment, no family present at this time, walked in halls, went over new meds, talked about ambulation at home, home health set up when pt is at home, lives alone. Groins intact, soft. No complaints of pain at this time, left ring finger at knuckle slightly swollen and red, pt states she does not know why, as well as reddened area on top of left foot. Pulses all intact, will have MD look at those areas when he comes by. Pt states she has not been diagnoses with gout but uses a heavy steriodal cream on her joints at home 4x a day for her RA that she has not used since she has been here and that may be why. PO intake adequate, ECG done in AM per order, washed up in bathroom by herself and had BM. After walking and using I/S, pt coughing up phlegm, stating if feels like I'm coughing up my food, even when I drink my water I feel like I want to cough up my water.   VSS, pt remains afebrile        Problem: PAIN - ADULT  Goal: Verbalizes/displays adequate comfort level or patient's stated pain goal  Description: INTERVENTIONS:  - Encourage pt to monitor pain and request assistance  - Assess pain using appropriate pain scale  - Administer analgesics based on type and severity of pain and evaluate response  - Implement non-pharmacological measures as appropriate and evaluate response  - Consider cultural and social influences on pain and pain management  - Manage/alleviate anxiety  - Utilize distraction and/or relaxation techniques  - Monitor for opioid side effects  - Notify MD/LIP if interventions unsuccessful or patient reports new pain  - Anticipate increased pain with activity and pre-medicate as appropriate  Outcome: Progressing     Problem: RISK FOR INFECTION - ADULT  Goal: Absence of fever/infection during anticipated neutropenic period  Description: INTERVENTIONS  - Monitor WBC  - Administer growth factors as ordered  - Implement neutropenic guidelines  Outcome: Progressing     Problem: SAFETY ADULT - FALL  Goal: Free from fall injury  Description: INTERVENTIONS:  - Assess pt frequently for physical needs  - Identify cognitive and physical deficits and behaviors that affect risk of falls.   - Ray fall precautions as indicated by assessment.  - Educate pt/family on patient safety including physical limitations  - Instruct pt to call for assistance with activity based on assessment  - Modify environment to reduce risk of injury  - Provide assistive devices as appropriate  - Consider OT/PT consult to assist with strengthening/mobility  - Encourage toileting schedule  Outcome: Progressing     Problem: DISCHARGE PLANNING  Goal: Discharge to home or other facility with appropriate resources  Description: INTERVENTIONS:  - Identify barriers to discharge w/pt and caregiver  - Include patient/family/discharge partner in discharge planning  - Arrange for needed discharge resources and transportation as appropriate  - Identify discharge learning needs (meds, wound care, etc)  - Arrange for interpreters to assist at discharge as needed  - Consider post-discharge preferences of patient/family/discharge partner  - Complete POLST form as appropriate  - Assess patient's ability to be responsible for managing their own health  - Refer to Case Management Department for coordinating discharge planning if the patient needs post-hospital services based on physician/LIP order or complex needs related to functional status, cognitive ability or social support system  Outcome: Progressing

## 2022-11-07 NOTE — PLAN OF CARE
Discharge instructions went over with daughter Gricel Timmons and clarified with her if mom, the patient, had had a flu shot, since it said on [facility] it was due. Pt was unsure, and daughter did clarify that she did not have one this year. It was given to her before she left, as well as the info sheet in the discharge paperwork. Discharge instructions gone over with patient as well, after helping her get dressed and talking out IV, etc. Temp stent card given with instructions, all in a folder for patient to take home. Stated she had questions about follow up appt, lifting restrictions, etc. Paper perscriptions given with discharge instructions for prednisone and metoprolol.

## 2022-11-08 ENCOUNTER — PATIENT OUTREACH (OUTPATIENT)
Dept: CASE MANAGEMENT | Age: 77
End: 2022-11-08

## 2022-11-08 ENCOUNTER — TELEPHONE (OUTPATIENT)
Dept: INTERNAL MEDICINE CLINIC | Facility: CLINIC | Age: 77
End: 2022-11-08

## 2022-11-08 DIAGNOSIS — Z02.9 ENCOUNTERS FOR UNSPECIFIED ADMINISTRATIVE PURPOSE: ICD-10-CM

## 2022-11-08 DIAGNOSIS — I35.0 SEVERE AORTIC STENOSIS: ICD-10-CM

## 2022-11-08 PROCEDURE — 1111F DSCHRG MED/CURRENT MED MERGE: CPT

## 2022-11-09 NOTE — OPERATIVE REPORT
[facility]    PATIENT'S NAME: Carolina Chaudhary   ATTENDING PHYSICIAN: Melanie Gabriel M.D. OPERATING PHYSICIAN: Parish Mckeon M.D. PATIENT ACCOUNT#:   [de-identified]    LOCATION:  [address] [place] [address]  MEDICAL RECORD #:   [id number]       YOB: 1945  ADMISSION DATE:       11/02/2022      OPERATION DATE:  11/03/2022    OPERATIVE REPORT    (Corrected report 11/09/2022:  Operation Date, Work type)    PREOPERATIVE DIAGNOSIS:   POSTOPERATIVE DIAGNOSIS:   PROCEDURE PERFORMED:  Percutaneous transcatheter aortic valve replacement. HISTORY:  The patient is a 73-year-old woman referred for an attempt at percutaneous transcatheter aortic valve replacement due to severe symptomatic calcific aortic stenosis. PROCEDURE:  After obtaining informed consent, the patient was brought to the cardiac catheterization laboratory, and using ultrasound guidance and a micropuncture technique a 6-Uzbek sheath was placed in the right common femoral artery and a 6-Uzbek sheath in the left common femoral artery. A 5-Uzbek sheath was placed in the left common femoral vein. We upsized the left-sided sheath to the 14-Uzbek delivery sheath. A pigtail catheter was placed in the ascending aorta. This case was performed using the new Savvy pacing wire. A #23 Doug Ultra aortic valve was placed. The device sat beautifully. There was no evidence of aortic disruption or significant aortic insufficiency. Mean gradient across the valve at the end of the procedure was approximately 4 mmHg. The patient tolerated the procedure well and was without apparent acute complications. At the end of the procedure, hemostasis was achieved by placing Perclose devices in the left femoral vessels. An Angio-Seal was placed on the right. The patient had received heparin for systemic anticoagulation. I updated the patient's daughter, Geni Dimas, on her mother's condition immediately after the procedure.     Dr. Lars Hamman and Dr. Norman Fuchs were cooperators in this case, and Dr. Patti Clark performed the transesophageal echocardiographic images. A separate dictation will detail those findings.     Dictated By Florencio Rodríguez M.D.  d: 11/03/2022 11:08:35  t: 11/09/2022 14:06:35  Job [id number]  WS/

## 2022-11-14 ENCOUNTER — OFFICE VISIT (OUTPATIENT)
Dept: RHEUMATOLOGY | Facility: CLINIC | Age: 77
End: 2022-11-14
Payer: MEDICARE

## 2022-11-14 VITALS — WEIGHT: 148 LBS | BODY MASS INDEX: 29 KG/M2

## 2022-11-14 DIAGNOSIS — M15.9 PRIMARY OSTEOARTHRITIS INVOLVING MULTIPLE JOINTS: ICD-10-CM

## 2022-11-14 DIAGNOSIS — M06.9 RHEUMATOID ARTHRITIS, INVOLVING UNSPECIFIED SITE, UNSPECIFIED WHETHER RHEUMATOID FACTOR PRESENT (HCC): Primary | ICD-10-CM

## 2022-11-14 PROCEDURE — 99214 OFFICE O/P EST MOD 30 MIN: CPT | Performed by: INTERNAL MEDICINE

## 2022-11-14 PROCEDURE — 1111F DSCHRG MED/CURRENT MED MERGE: CPT | Performed by: INTERNAL MEDICINE

## 2022-11-14 RX ORDER — LEFLUNOMIDE 10 MG/1
10 TABLET ORAL DAILY
Qty: 90 TABLET | Refills: 0 | Status: SHIPPED | OUTPATIENT
Start: 2022-11-14

## 2022-11-14 RX ORDER — PREDNISONE 10 MG/1
TABLET ORAL
Qty: 20 TABLET | Refills: 0 | Status: SHIPPED | OUTPATIENT
Start: 2022-11-14

## 2022-11-14 NOTE — PATIENT INSTRUCTIONS
You were seen today for rheumatoid arthritis  Plan to start prednisone 30 mg daily for 3 days then 20 mg daily for 3 days then 10 mg daily for 3 days and then stop  Start leflunomide 10 mg daily, this will be her treatment for rheumatoid arthritis.   Do not stop this medication  Seeing Dr. Day Andrade in 2 or 3 months

## 2022-11-15 ENCOUNTER — MED REC SCAN ONLY (OUTPATIENT)
Dept: INTERNAL MEDICINE CLINIC | Facility: CLINIC | Age: 77
End: 2022-11-15

## 2022-11-15 ENCOUNTER — TELEPHONE (OUTPATIENT)
Dept: INTERNAL MEDICINE CLINIC | Facility: CLINIC | Age: 77
End: 2022-11-15

## 2022-11-17 ENCOUNTER — TELEPHONE (OUTPATIENT)
Dept: INTERNAL MEDICINE CLINIC | Facility: CLINIC | Age: 77
End: 2022-11-17

## 2022-11-17 NOTE — TELEPHONE ENCOUNTER
Spoke with Govind Baez  PT  (  Name and  verified ) informed of 's   instructions below    Govind Baez is also trying to reach out to Cardiology, she will speak to patient's family

## 2022-11-17 NOTE — TELEPHONE ENCOUNTER
Spoke to Luis Knapp, Physical Therapist (PT) from [place] [id number] (name and  of patient verified). PT states patient is s/p percutaneous transcatheter aortic valve replacement 22. She is calling to report change in patient's weight:  22   151.6 lbs   (dry weight)  22   155.4 lbs   (small amount of food)  PT reports 1+ edema in BLE, with minimal/no change from yesterday and states patient is more winded today compared to her baseline. Vital signs today: BP-142/70; HR-87 SpO2-99% Temp-97.6    Luis Knapp PT states it ok to call her back, or patient's daughter Tata Todd who is staying with patient (phone: [phone]). Dr. Connor, please see weight change and vital signs and advise any recommendations. Thank you.

## 2022-11-17 NOTE — TELEPHONE ENCOUNTER
Recommend they discuss this with cardiology as I have not evaluated her after the procedure and her hospital stay. Overall if she is doing well then continue with current, if any acute changes may need to be seen in the ER.

## 2022-11-22 ENCOUNTER — TELEPHONE (OUTPATIENT)
Dept: INTERNAL MEDICINE CLINIC | Facility: CLINIC | Age: 77
End: 2022-11-22

## 2022-11-22 NOTE — TELEPHONE ENCOUNTER
Mary Flynn with [facility] called regarding a possible drug interaction between two medications, leflunomide 10 MG Oral Tab & rosuvastatin 20 MG Oral Tab

## 2022-11-25 NOTE — TELEPHONE ENCOUNTER
Spoke with pharmacist Maribel at [place], [facility] leflunomide Increases the effect of rosuvastatin    Watch for added aches or pains, increased chance of rhabdomyolysis

## 2022-11-27 NOTE — TELEPHONE ENCOUNTER
Ideally this should be addressed by physician that prescribed leflunomide, however given the interaction recommend she reduce her rosuvastatin by 50% while taking leflunomide.

## 2022-11-29 NOTE — TELEPHONE ENCOUNTER
Spoke to DAVID Donaldson and provided Dr Dana Maciel response.    She will also check with Dr Kaycee Plummer, Rheumatologist who prescribed leflunomide 10mg

## 2022-11-30 ENCOUNTER — TELEPHONE (OUTPATIENT)
Dept: RHEUMATOLOGY | Facility: CLINIC | Age: 77
End: 2022-11-30

## 2022-11-30 DIAGNOSIS — E78.2 MIXED HYPERLIPIDEMIA: ICD-10-CM

## 2022-11-30 NOTE — TELEPHONE ENCOUNTER
Message reviewed. If there is an interaction would recommend to decrease the cholesterol medication.   If the PCP can decrease it to an appropriate dose like rosuvastatin 10 mg daily

## 2022-11-30 NOTE — TELEPHONE ENCOUNTER
Irma Jacobson the nurse contacted and given provider's directions below. She will contact PCP office.

## 2022-12-01 RX ORDER — ROSUVASTATIN CALCIUM 10 MG/1
10 TABLET, COATED ORAL NIGHTLY
Qty: 90 TABLET | Refills: 9 | Status: SHIPPED | OUTPATIENT
Start: 2022-12-01

## 2022-12-06 ENCOUNTER — HOSPITAL ENCOUNTER (OUTPATIENT)
Dept: CARDIOLOGY CLINIC | Facility: HOSPITAL | Age: 77
Discharge: HOME | End: 2022-12-06
Attending: INTERNAL MEDICINE
Payer: MEDICARE

## 2022-12-06 ENCOUNTER — HOSPITAL ENCOUNTER (OUTPATIENT)
Dept: LAB | Facility: HOSPITAL | Age: 77
Discharge: HOME | End: 2022-12-06
Attending: INTERNAL MEDICINE
Payer: MEDICARE

## 2022-12-06 VITALS
HEART RATE: 86 BPM | OXYGEN SATURATION: 97 % | RESPIRATION RATE: 16 BRPM | SYSTOLIC BLOOD PRESSURE: 154 MMHG | DIASTOLIC BLOOD PRESSURE: 66 MMHG

## 2022-12-06 DIAGNOSIS — N18.30 STAGE 3 CHRONIC KIDNEY DISEASE, UNSPECIFIED WHETHER STAGE 3A OR 3B CKD (HCC): ICD-10-CM

## 2022-12-06 DIAGNOSIS — I50.32 CHRONIC DIASTOLIC CONGESTIVE HEART FAILURE (HCC): ICD-10-CM

## 2022-12-06 DIAGNOSIS — Z95.2 S/P TAVR (TRANSCATHETER AORTIC VALVE REPLACEMENT): ICD-10-CM

## 2022-12-06 DIAGNOSIS — N18.30 STAGE 3 CHRONIC KIDNEY DISEASE, UNSPECIFIED WHETHER STAGE 3A OR 3B CKD (HCC): Primary | ICD-10-CM

## 2022-12-06 LAB
ANION GAP SERPL CALC-SCNC: 4 MMOL/L (ref 0–18)
BUN BLD-MCNC: 15 MG/DL (ref 7–18)
CALCIUM BLD-MCNC: 9.7 MG/DL (ref 8.5–10.1)
CHLORIDE SERPL-SCNC: 99 MMOL/L (ref 98–112)
CO2 SERPL-SCNC: 33 MMOL/L (ref 21–32)
CREAT BLD-MCNC: 1.46 MG/DL
FASTING STATUS PATIENT QL REPORTED: NO
GFR SERPLBLD BASED ON 1.73 SQ M-ARVRAT: 37 ML/MIN/1.73M2 (ref 60–?)
GLUCOSE BLD-MCNC: 117 MG/DL (ref 70–99)
NT-PROBNP SERPL-MCNC: 1557 PG/ML (ref ?–450)
OSMOLALITY SERPL CALC.SUM OF ELEC: 284 MOSM/KG (ref 275–295)
POTASSIUM SERPL-SCNC: 2.5 MMOL/L (ref 3.5–5.1)
SODIUM SERPL-SCNC: 136 MMOL/L (ref 136–145)

## 2022-12-06 PROCEDURE — 99212 OFFICE O/P EST SF 10 MIN: CPT

## 2022-12-06 PROCEDURE — 99214 OFFICE O/P EST MOD 30 MIN: CPT | Performed by: NURSE PRACTITIONER

## 2022-12-06 PROCEDURE — 80048 BASIC METABOLIC PNL TOTAL CA: CPT | Performed by: NURSE PRACTITIONER

## 2022-12-06 NOTE — ADDENDUM NOTE
Encounter addended by:  SANIA Wolff on: 12/6/2022 4:05 PM   Actions taken: Results reviewed in IB, Clinical Note Signed

## 2022-12-09 ENCOUNTER — LAB ENCOUNTER (OUTPATIENT)
Dept: LAB | Age: 77
End: 2022-12-09
Attending: NURSE PRACTITIONER
Payer: MEDICARE

## 2022-12-09 DIAGNOSIS — M79.605 PAIN IN BOTH LOWER EXTREMITIES: ICD-10-CM

## 2022-12-09 DIAGNOSIS — E87.6 HYPOKALEMIA: ICD-10-CM

## 2022-12-09 DIAGNOSIS — Z00.00 ENCOUNTER FOR ANNUAL HEALTH EXAMINATION: ICD-10-CM

## 2022-12-09 DIAGNOSIS — M79.604 PAIN IN BOTH LOWER EXTREMITIES: ICD-10-CM

## 2022-12-09 DIAGNOSIS — R60.0 BILATERAL LEG EDEMA: ICD-10-CM

## 2022-12-09 LAB
ALBUMIN SERPL-MCNC: 3.5 G/DL (ref 3.4–5)
ALBUMIN/GLOB SERPL: 1.1 {RATIO} (ref 1–2)
ALP LIVER SERPL-CCNC: 77 U/L
ALT SERPL-CCNC: 15 U/L
ANION GAP SERPL CALC-SCNC: 10 MMOL/L (ref 0–18)
AST SERPL-CCNC: 16 U/L (ref 15–37)
BILIRUB SERPL-MCNC: 0.8 MG/DL (ref 0.1–2)
BUN BLD-MCNC: 18 MG/DL (ref 7–18)
BUN/CREAT SERPL: 12.8 (ref 10–20)
CALCIUM BLD-MCNC: 9.7 MG/DL (ref 8.5–10.1)
CHLORIDE SERPL-SCNC: 98 MMOL/L (ref 98–112)
CO2 SERPL-SCNC: 29 MMOL/L (ref 21–32)
CREAT BLD-MCNC: 1.41 MG/DL
CRP SERPL-MCNC: 10.3 MG/DL (ref ?–0.3)
EST. AVERAGE GLUCOSE BLD GHB EST-MCNC: 117 MG/DL (ref 68–126)
FASTING STATUS PATIENT QL REPORTED: YES
GFR SERPLBLD BASED ON 1.73 SQ M-ARVRAT: 38 ML/MIN/1.73M2 (ref 60–?)
GLOBULIN PLAS-MCNC: 3.1 G/DL (ref 2.8–4.4)
GLUCOSE BLD-MCNC: 156 MG/DL (ref 70–99)
HBA1C MFR BLD: 5.7 % (ref ?–5.7)
OSMOLALITY SERPL CALC.SUM OF ELEC: 289 MOSM/KG (ref 275–295)
POTASSIUM SERPL-SCNC: 3.3 MMOL/L (ref 3.5–5.1)
PROT SERPL-MCNC: 6.6 G/DL (ref 6.4–8.2)
SODIUM SERPL-SCNC: 137 MMOL/L (ref 136–145)
URATE SERPL-MCNC: 7.5 MG/DL

## 2022-12-09 PROCEDURE — 84550 ASSAY OF BLOOD/URIC ACID: CPT

## 2022-12-09 PROCEDURE — 83036 HEMOGLOBIN GLYCOSYLATED A1C: CPT

## 2022-12-09 PROCEDURE — 36415 COLL VENOUS BLD VENIPUNCTURE: CPT

## 2022-12-09 PROCEDURE — 80053 COMPREHEN METABOLIC PANEL: CPT

## 2022-12-09 PROCEDURE — 86140 C-REACTIVE PROTEIN: CPT

## 2022-12-11 ENCOUNTER — HOSPITAL ENCOUNTER (INPATIENT)
Facility: HOSPITAL | Age: 77
LOS: 9 days | Discharge: HOME HEALTH | End: 2022-12-20
Attending: EMERGENCY MEDICINE | Admitting: HOSPITALIST
Payer: MEDICARE

## 2022-12-11 ENCOUNTER — APPOINTMENT (OUTPATIENT)
Dept: GENERAL RADIOLOGY | Facility: HOSPITAL | Age: 77
End: 2022-12-11
Attending: EMERGENCY MEDICINE
Payer: MEDICARE

## 2022-12-11 DIAGNOSIS — J18.9 COMMUNITY ACQUIRED PNEUMONIA OF LEFT LOWER LOBE OF LUNG: Primary | ICD-10-CM

## 2022-12-11 DIAGNOSIS — E87.6 HYPOKALEMIA: ICD-10-CM

## 2022-12-11 DIAGNOSIS — I48.0 PAROXYSMAL ATRIAL FIBRILLATION (HCC): ICD-10-CM

## 2022-12-11 LAB
ANION GAP SERPL CALC-SCNC: 7 MMOL/L (ref 0–18)
ATRIAL RATE: 114 BPM
BASOPHILS # BLD AUTO: 0.05 X10(3) UL (ref 0–0.2)
BASOPHILS NFR BLD AUTO: 0.4 %
BILIRUB UR QL: NEGATIVE
BUN BLD-MCNC: 23 MG/DL (ref 7–18)
BUN/CREAT SERPL: 15.6 (ref 10–20)
CALCIUM BLD-MCNC: 9.3 MG/DL (ref 8.5–10.1)
CHLORIDE SERPL-SCNC: 96 MMOL/L (ref 98–112)
CLARITY UR: CLEAR
CO2 SERPL-SCNC: 29 MMOL/L (ref 21–32)
COLOR UR: YELLOW
CREAT BLD-MCNC: 1.47 MG/DL
DEPRECATED RDW RBC AUTO: 38.8 FL (ref 35.1–46.3)
EOSINOPHIL # BLD AUTO: 0.17 X10(3) UL (ref 0–0.7)
EOSINOPHIL NFR BLD AUTO: 1.3 %
ERYTHROCYTE [DISTWIDTH] IN BLOOD BY AUTOMATED COUNT: 12.8 % (ref 11–15)
FLUAV + FLUBV RNA SPEC NAA+PROBE: NEGATIVE
FLUAV + FLUBV RNA SPEC NAA+PROBE: NEGATIVE
GFR SERPLBLD BASED ON 1.73 SQ M-ARVRAT: 36 ML/MIN/1.73M2 (ref 60–?)
GLUCOSE BLD-MCNC: 176 MG/DL (ref 70–99)
GLUCOSE UR-MCNC: NEGATIVE MG/DL
HCT VFR BLD AUTO: 32.1 %
HGB BLD-MCNC: 10.7 G/DL
IMM GRANULOCYTES # BLD AUTO: 0.05 X10(3) UL (ref 0–1)
IMM GRANULOCYTES NFR BLD: 0.4 %
KETONES UR-MCNC: NEGATIVE MG/DL
LACTATE SERPL-SCNC: 1.3 MMOL/L (ref 0.4–2)
LYMPHOCYTES # BLD AUTO: 2.6 X10(3) UL (ref 1–4)
LYMPHOCYTES NFR BLD AUTO: 19.7 %
MCH RBC QN AUTO: 28.2 PG (ref 26–34)
MCHC RBC AUTO-ENTMCNC: 33.3 G/DL (ref 31–37)
MCV RBC AUTO: 84.5 FL
MONOCYTES # BLD AUTO: 1.01 X10(3) UL (ref 0.1–1)
MONOCYTES NFR BLD AUTO: 7.7 %
NEUTROPHILS # BLD AUTO: 9.29 X10 (3) UL (ref 1.5–7.7)
NEUTROPHILS # BLD AUTO: 9.29 X10(3) UL (ref 1.5–7.7)
NEUTROPHILS NFR BLD AUTO: 70.5 %
NITRITE UR QL STRIP.AUTO: NEGATIVE
OSMOLALITY SERPL CALC.SUM OF ELEC: 282 MOSM/KG (ref 275–295)
P AXIS: 62 DEGREES
P-R INTERVAL: 152 MS
PH UR: 5.5 [PH] (ref 5–8)
PLATELET # BLD AUTO: 263 10(3)UL (ref 150–450)
POTASSIUM SERPL-SCNC: 3 MMOL/L (ref 3.5–5.1)
PROCALCITONIN SERPL-MCNC: 0.82 NG/ML (ref ?–0.16)
PROT UR-MCNC: NEGATIVE MG/DL
Q-T INTERVAL: 374 MS
QRS DURATION: 138 MS
QTC CALCULATION (BEZET): 515 MS
R AXIS: -22 DEGREES
RBC # BLD AUTO: 3.8 X10(6)UL
RSV RNA SPEC NAA+PROBE: NEGATIVE
SARS-COV-2 RNA RESP QL NAA+PROBE: NOT DETECTED
SODIUM SERPL-SCNC: 132 MMOL/L (ref 136–145)
SP GR UR STRIP: <=1.005 (ref 1–1.03)
T AXIS: 102 DEGREES
TROPONIN I HIGH SENSITIVITY: 44 NG/L
UROBILINOGEN UR STRIP-ACNC: 0.2
VENTRICULAR RATE: 114 BPM
WBC # BLD AUTO: 13.2 X10(3) UL (ref 4–11)

## 2022-12-11 PROCEDURE — 99223 1ST HOSP IP/OBS HIGH 75: CPT | Performed by: HOSPITALIST

## 2022-12-11 PROCEDURE — 71045 X-RAY EXAM CHEST 1 VIEW: CPT | Performed by: EMERGENCY MEDICINE

## 2022-12-11 RX ORDER — BISACODYL 10 MG
10 SUPPOSITORY, RECTAL RECTAL
Status: DISCONTINUED | OUTPATIENT
Start: 2022-12-11 | End: 2022-12-20

## 2022-12-11 RX ORDER — CLOPIDOGREL BISULFATE 75 MG/1
75 TABLET ORAL DAILY
Status: DISCONTINUED | OUTPATIENT
Start: 2022-12-12 | End: 2022-12-20

## 2022-12-11 RX ORDER — PANTOPRAZOLE SODIUM 20 MG/1
20 TABLET, DELAYED RELEASE ORAL
Status: DISCONTINUED | OUTPATIENT
Start: 2022-12-12 | End: 2022-12-20

## 2022-12-11 RX ORDER — POTASSIUM CHLORIDE 1.5 G/1.77G
40 POWDER, FOR SOLUTION ORAL ONCE
Status: COMPLETED | OUTPATIENT
Start: 2022-12-11 | End: 2022-12-11

## 2022-12-11 RX ORDER — SODIUM CHLORIDE 9 MG/ML
INJECTION, SOLUTION INTRAVENOUS CONTINUOUS
Status: DISCONTINUED | OUTPATIENT
Start: 2022-12-11 | End: 2022-12-12

## 2022-12-11 RX ORDER — FOLIC ACID 1 MG/1
1 TABLET ORAL DAILY
Status: DISCONTINUED | OUTPATIENT
Start: 2022-12-12 | End: 2022-12-20

## 2022-12-11 RX ORDER — GUAIFENESIN 600 MG/1
600 TABLET, EXTENDED RELEASE ORAL 2 TIMES DAILY
Status: DISCONTINUED | OUTPATIENT
Start: 2022-12-11 | End: 2022-12-20

## 2022-12-11 RX ORDER — CETIRIZINE HYDROCHLORIDE 10 MG/1
10 TABLET ORAL DAILY
Status: DISCONTINUED | OUTPATIENT
Start: 2022-12-11 | End: 2022-12-16

## 2022-12-11 RX ORDER — POLYETHYLENE GLYCOL 3350 17 G/17G
17 POWDER, FOR SOLUTION ORAL DAILY PRN
Status: DISCONTINUED | OUTPATIENT
Start: 2022-12-11 | End: 2022-12-20

## 2022-12-11 RX ORDER — FLUTICASONE PROPIONATE 50 MCG
2 SPRAY, SUSPENSION (ML) NASAL DAILY
Status: DISCONTINUED | OUTPATIENT
Start: 2022-12-12 | End: 2022-12-20

## 2022-12-11 RX ORDER — ONDANSETRON 2 MG/ML
4 INJECTION INTRAMUSCULAR; INTRAVENOUS EVERY 6 HOURS PRN
Status: DISCONTINUED | OUTPATIENT
Start: 2022-12-11 | End: 2022-12-20

## 2022-12-11 RX ORDER — SENNOSIDES 8.6 MG
17.2 TABLET ORAL NIGHTLY PRN
Status: DISCONTINUED | OUTPATIENT
Start: 2022-12-11 | End: 2022-12-20

## 2022-12-11 RX ORDER — ACETAMINOPHEN 500 MG
500 TABLET ORAL EVERY 4 HOURS PRN
Status: DISCONTINUED | OUTPATIENT
Start: 2022-12-11 | End: 2022-12-13

## 2022-12-11 RX ORDER — HEPARIN SODIUM 5000 [USP'U]/ML
5000 INJECTION, SOLUTION INTRAVENOUS; SUBCUTANEOUS EVERY 8 HOURS SCHEDULED
Status: DISCONTINUED | OUTPATIENT
Start: 2022-12-11 | End: 2022-12-20

## 2022-12-11 RX ORDER — ROSUVASTATIN CALCIUM 10 MG/1
10 TABLET, COATED ORAL NIGHTLY
Status: DISCONTINUED | OUTPATIENT
Start: 2022-12-11 | End: 2022-12-20

## 2022-12-11 RX ORDER — AZITHROMYCIN 250 MG/1
500 TABLET, FILM COATED ORAL
Status: DISCONTINUED | OUTPATIENT
Start: 2022-12-12 | End: 2022-12-13

## 2022-12-11 RX ORDER — LEFLUNOMIDE 10 MG/1
10 TABLET ORAL DAILY
Status: DISCONTINUED | OUTPATIENT
Start: 2022-12-12 | End: 2022-12-20

## 2022-12-11 RX ORDER — ASPIRIN 81 MG/1
81 TABLET ORAL DAILY
Status: DISCONTINUED | OUTPATIENT
Start: 2022-12-12 | End: 2022-12-20

## 2022-12-11 RX ORDER — METOCLOPRAMIDE HYDROCHLORIDE 5 MG/ML
5 INJECTION INTRAMUSCULAR; INTRAVENOUS EVERY 8 HOURS PRN
Status: DISCONTINUED | OUTPATIENT
Start: 2022-12-11 | End: 2022-12-20

## 2022-12-11 RX ORDER — AZITHROMYCIN 250 MG/1
500 TABLET, FILM COATED ORAL
Status: DISCONTINUED | OUTPATIENT
Start: 2022-12-12 | End: 2022-12-11

## 2022-12-11 RX ORDER — AZITHROMYCIN 250 MG/1
500 TABLET, FILM COATED ORAL ONCE
Status: COMPLETED | OUTPATIENT
Start: 2022-12-11 | End: 2022-12-11

## 2022-12-11 RX ORDER — ACETAMINOPHEN 500 MG
1000 TABLET ORAL ONCE
Status: COMPLETED | OUTPATIENT
Start: 2022-12-11 | End: 2022-12-11

## 2022-12-11 NOTE — ED QUICK NOTES
Orders for admission, patient is aware of plan and ready to go upstairs. Any questions, please call ED RN [facility], [address] at [address] [place] [address].      Patient Covid vaccination status: Fully vaccinated     COVID Test Ordered in ED: SARS-CoV-2/Flu A and B/RSV by PCR (GeneXpert)    COVID Suspicion at Admission: N/A    Running Infusions:  None    Mental Status/LOC at time of transport: A&O*4    Other pertinent information:   CIWA score: N/A   NIH score:  N/A

## 2022-12-12 LAB
ANION GAP SERPL CALC-SCNC: 6 MMOL/L (ref 0–18)
BASOPHILS # BLD AUTO: 0.06 X10(3) UL (ref 0–0.2)
BASOPHILS NFR BLD AUTO: 0.6 %
BUN BLD-MCNC: 25 MG/DL (ref 7–18)
BUN/CREAT SERPL: 16.9 (ref 10–20)
CALCIUM BLD-MCNC: 9.5 MG/DL (ref 8.5–10.1)
CHLORIDE SERPL-SCNC: 100 MMOL/L (ref 98–112)
CO2 SERPL-SCNC: 28 MMOL/L (ref 21–32)
CREAT BLD-MCNC: 1.48 MG/DL
DEPRECATED RDW RBC AUTO: 42.4 FL (ref 35.1–46.3)
EOSINOPHIL # BLD AUTO: 0.74 X10(3) UL (ref 0–0.7)
EOSINOPHIL NFR BLD AUTO: 6.8 %
ERYTHROCYTE [DISTWIDTH] IN BLOOD BY AUTOMATED COUNT: 13.2 % (ref 11–15)
GFR SERPLBLD BASED ON 1.73 SQ M-ARVRAT: 36 ML/MIN/1.73M2 (ref 60–?)
GLUCOSE BLD-MCNC: 114 MG/DL (ref 70–99)
HCT VFR BLD AUTO: 31.2 %
HGB BLD-MCNC: 9.9 G/DL
IMM GRANULOCYTES # BLD AUTO: 0.04 X10(3) UL (ref 0–1)
IMM GRANULOCYTES NFR BLD: 0.4 %
L PNEUMO AG UR QL: NEGATIVE
LACTATE SERPL-SCNC: 1.1 MMOL/L (ref 0.4–2)
LYMPHOCYTES # BLD AUTO: 2.86 X10(3) UL (ref 1–4)
LYMPHOCYTES NFR BLD AUTO: 26.4 %
MCH RBC QN AUTO: 27.9 PG (ref 26–34)
MCHC RBC AUTO-ENTMCNC: 31.7 G/DL (ref 31–37)
MCV RBC AUTO: 87.9 FL
MONOCYTES # BLD AUTO: 1 X10(3) UL (ref 0.1–1)
MONOCYTES NFR BLD AUTO: 9.2 %
NEUTROPHILS # BLD AUTO: 6.13 X10 (3) UL (ref 1.5–7.7)
NEUTROPHILS # BLD AUTO: 6.13 X10(3) UL (ref 1.5–7.7)
NEUTROPHILS NFR BLD AUTO: 56.6 %
NT-PROBNP SERPL-MCNC: 2211 PG/ML (ref ?–450)
OSMOLALITY SERPL CALC.SUM OF ELEC: 283 MOSM/KG (ref 275–295)
PLATELET # BLD AUTO: 269 10(3)UL (ref 150–450)
POTASSIUM SERPL-SCNC: 3.6 MMOL/L (ref 3.5–5.1)
RBC # BLD AUTO: 3.55 X10(6)UL
SODIUM SERPL-SCNC: 134 MMOL/L (ref 136–145)
STREP PNEUMO ANTIGEN, URINE: NEGATIVE
WBC # BLD AUTO: 10.8 X10(3) UL (ref 4–11)

## 2022-12-12 PROCEDURE — 99233 SBSQ HOSP IP/OBS HIGH 50: CPT | Performed by: HOSPITALIST

## 2022-12-12 RX ORDER — BUMETANIDE 1 MG/1
1 TABLET ORAL DAILY
Status: DISCONTINUED | OUTPATIENT
Start: 2022-12-13 | End: 2022-12-12

## 2022-12-12 RX ORDER — BUMETANIDE 1 MG/1
1 TABLET ORAL DAILY
Status: DISCONTINUED | OUTPATIENT
Start: 2022-12-12 | End: 2022-12-20

## 2022-12-12 RX ORDER — IPRATROPIUM BROMIDE AND ALBUTEROL SULFATE 2.5; .5 MG/3ML; MG/3ML
3 SOLUTION RESPIRATORY (INHALATION)
Status: DISCONTINUED | OUTPATIENT
Start: 2022-12-12 | End: 2022-12-20

## 2022-12-12 NOTE — HOME CARE LIAISON
This patient is current with [facility]. Please enter STEF order for RN/PT services.

## 2022-12-12 NOTE — PLAN OF CARE
Patient alert and oriented x4. Patient on room air. Patient is resting on bed. Denies pain at this time. Patient uses walker for ambulation. Needs are met. Hourly rounding maintained. Call light within reach. BPA sepsis fired earlier, lactic acid/500cc bolus/tylenol orders via phone per attending. Strict I/O's, daily weight, bumex continued. Will continue to  monitor        Problem: Patient Centered Care  Goal: Patient preferences are identified and integrated in the patient's plan of care  Description: Interventions:  - What would you like us to know as we care for you?  I live at home with my .  - Provide timely, complete, and accurate information to patient/family  - Incorporate patient and family knowledge, values, beliefs, and cultural backgrounds into the planning and delivery of care  - Encourage patient/family to participate in care and decision-making at the level they choose  - Honor patient and family perspectives and choices  Outcome: Progressing     Problem: METABOLIC/FLUID AND ELECTROLYTES - ADULT  Goal: Electrolytes maintained within normal limits  Description: INTERVENTIONS:  - Monitor labs and rhythm and assess patient for signs and symptoms of electrolyte imbalances  - Administer electrolyte replacement as ordered  - Monitor response to electrolyte replacements, including rhythm and repeat lab results as appropriate  - Fluid restriction as ordered  - Instruct patient on fluid and nutrition restrictions as appropriate  Outcome: Progressing     Problem: SKIN/TISSUE INTEGRITY - ADULT  Goal: Skin integrity remains intact  Description: INTERVENTIONS  - Assess and document risk factors for pressure ulcer development  - Assess and document skin integrity  - Monitor for areas of redness and/or skin breakdown  - Initiate interventions, skin care algorithm/standards of care as needed  Outcome: Progressing     Problem: SAFETY ADULT - FALL  Goal: Free from fall injury  Description: INTERVENTIONS:  - Assess pt frequently for physical needs  - Identify cognitive and physical deficits and behaviors that affect risk of falls.   - Darragh fall precautions as indicated by assessment.  - Educate pt/family on patient safety including physical limitations  - Instruct pt to call for assistance with activity based on assessment  - Modify environment to reduce risk of injury  - Provide assistive devices as appropriate  - Consider OT/PT consult to assist with strengthening/mobility  - Encourage toileting schedule  Outcome: Progressing     Problem: PAIN - ADULT  Goal: Verbalizes/displays adequate comfort level or patient's stated pain goal  Description: INTERVENTIONS:  - Encourage pt to monitor pain and request assistance  - Assess pain using appropriate pain scale  - Administer analgesics based on type and severity of pain and evaluate response  - Implement non-pharmacological measures as appropriate and evaluate response  - Consider cultural and social influences on pain and pain management  - Manage/alleviate anxiety  - Utilize distraction and/or relaxation techniques  - Monitor for opioid side effects  - Notify MD/LIP if interventions unsuccessful or patient reports new pain  - Anticipate increased pain with activity and pre-medicate as appropriate  Outcome: Progressing     Problem: DISCHARGE PLANNING  Goal: Discharge to home or other facility with appropriate resources  Description: INTERVENTIONS:  - Identify barriers to discharge w/pt and caregiver  - Include patient/family/discharge partner in discharge planning  - Arrange for needed discharge resources and transportation as appropriate  - Identify discharge learning needs (meds, wound care, etc)  - Arrange for interpreters to assist at discharge as needed  - Consider post-discharge preferences of patient/family/discharge partner  - Complete POLST form as appropriate  - Assess patient's ability to be responsible for managing their own health  - Refer to Case Management Department for coordinating discharge planning if the patient needs post-hospital services based on physician/LIP order or complex needs related to functional status, cognitive ability or social support system  Outcome: Progressing     Problem: RESPIRATORY - ADULT  Goal: Achieves optimal ventilation and oxygenation  Description: INTERVENTIONS:  - Assess for changes in respiratory status  - Assess for changes in mentation and behavior  - Position to facilitate oxygenation and minimize respiratory effort  - Oxygen supplementation based on oxygen saturation or ABGs  - Provide Smoking Cessation handout, if applicable  - Encourage broncho-pulmonary hygiene including cough, deep breathe, Incentive Spirometry  - Assess the need for suctioning and perform as needed  - Assess and instruct to report SOB or any respiratory difficulty  - Respiratory Therapy support as indicated  - Manage/alleviate anxiety  - Monitor for signs/symptoms of CO2 retention  Outcome: Progressing

## 2022-12-12 NOTE — PLAN OF CARE
Received patient to the unit from the ER. She was accompanied by her daughter Vickie Garcia. Admission informations gathered by relief RN Delialevon Garcia from both the patient and her daughter . Dr. Joseph Franco came to the bedside shortly after the patient's arrival to the unit. Patient reports ribcage pain and discomfort d/t frequent coughing. Tylenol given and effective for pain. Patient has remained on room air, no c/o shortness of breath. Patient reports that she sleeps better in the hospital vs home when she is ill because she can actually sleep vs trying to maintain her home. Patient reports a recent aortic valve replacement surgery at [facility]. Daughter is concerned about the patient being diagnosed with PNA vs a CHF exacerbation. Patient has no prior history of falls, but maintained on bed alarm for SBA to and from the bathroom. Problem: Patient Centered Care  Goal: Patient preferences are identified and integrated in the patient's plan of care  Description: Interventions:  - What would you like us to know as we care for you?  I live at home with my .  - Provide timely, complete, and accurate information to patient/family  - Incorporate patient and family knowledge, values, beliefs, and cultural backgrounds into the planning and delivery of care  - Encourage patient/family to participate in care and decision-making at the level they choose  - Honor patient and family perspectives and choices  Outcome: Progressing     Problem: METABOLIC/FLUID AND ELECTROLYTES - ADULT  Goal: Electrolytes maintained within normal limits  Description: INTERVENTIONS:  - Monitor labs and rhythm and assess patient for signs and symptoms of electrolyte imbalances  - Administer electrolyte replacement as ordered  - Monitor response to electrolyte replacements, including rhythm and repeat lab results as appropriate  - Fluid restriction as ordered  - Instruct patient on fluid and nutrition restrictions as appropriate  Outcome: Progressing Problem: SKIN/TISSUE INTEGRITY - ADULT  Goal: Skin integrity remains intact  Description: INTERVENTIONS  - Assess and document risk factors for pressure ulcer development  - Assess and document skin integrity  - Monitor for areas of redness and/or skin breakdown  - Initiate interventions, skin care algorithm/standards of care as needed  Outcome: Progressing     Problem: SAFETY ADULT - FALL  Goal: Free from fall injury  Description: INTERVENTIONS:  - Assess pt frequently for physical needs  - Identify cognitive and physical deficits and behaviors that affect risk of falls.   - [place] fall precautions as indicated by assessment.  - Educate pt/family on patient safety including physical limitations  - Instruct pt to call for assistance with activity based on assessment  - Modify environment to reduce risk of injury  - Provide assistive devices as appropriate  - Consider OT/PT consult to assist with strengthening/mobility  - Encourage toileting schedule  Outcome: Progressing     Problem: PAIN - ADULT  Goal: Verbalizes/displays adequate comfort level or patient's stated pain goal  Description: INTERVENTIONS:  - Encourage pt to monitor pain and request assistance  - Assess pain using appropriate pain scale  - Administer analgesics based on type and severity of pain and evaluate response  - Implement non-pharmacological measures as appropriate and evaluate response  - Consider cultural and social influences on pain and pain management  - Manage/alleviate anxiety  - Utilize distraction and/or relaxation techniques  - Monitor for opioid side effects  - Notify MD/LIP if interventions unsuccessful or patient reports new pain  - Anticipate increased pain with activity and pre-medicate as appropriate  Outcome: Progressing     Problem: DISCHARGE PLANNING  Goal: Discharge to home or other facility with appropriate resources  Description: INTERVENTIONS:  - Identify barriers to discharge w/pt and caregiver  - Include patient/family/discharge partner in discharge planning  - Arrange for needed discharge resources and transportation as appropriate  - Identify discharge learning needs (meds, wound care, etc)  - Arrange for interpreters to assist at discharge as needed  - Consider post-discharge preferences of patient/family/discharge partner  - Complete POLST form as appropriate  - Assess patient's ability to be responsible for managing their own health  - Refer to Case Management Department for coordinating discharge planning if the patient needs post-hospital services based on physician/LIP order or complex needs related to functional status, cognitive ability or social support system  Outcome: Progressing     Problem: RESPIRATORY - ADULT  Goal: Achieves optimal ventilation and oxygenation  Description: INTERVENTIONS:  - Assess for changes in respiratory status  - Assess for changes in mentation and behavior  - Position to facilitate oxygenation and minimize respiratory effort  - Oxygen supplementation based on oxygen saturation or ABGs  - Provide Smoking Cessation handout, if applicable  - Encourage broncho-pulmonary hygiene including cough, deep breathe, Incentive Spirometry  - Assess the need for suctioning and perform as needed  - Assess and instruct to report SOB or any respiratory difficulty  - Respiratory Therapy support as indicated  - Manage/alleviate anxiety  - Monitor for signs/symptoms of CO2 retention  Outcome: Progressing

## 2022-12-12 NOTE — CM/SW NOTE
12/12/22 1300   CM/SW Referral Data   Referral Source Physician   Reason for Referral Discharge planning   Informant Patient   Pertinent Medical Hx   Does patient have an established PCP? Yes  Ced Álvarez)   Patient Info   Patient's Current Mental Status at Time of Assessment Alert;Oriented   Patient's [address]   Patient lives with Spouse/Significant other   Patient Status Prior to Admission   Independent with ADLs and Mobility Yes   Services in place prior to admission [facility] Provider [address]  (RN and PT)   Discharge Needs   Anticipated D/C needs Home health care   Choice of Post-Acute Provider   Informed patient of right to choose their preferred provider Yes   Patient/family choice [facility]  (RN and PT)     Pt discussed during nursing rounds. DX CAP. From home w/spouse, independent at baseline. Pt is current w/[facility] for RN and PT services. Pt is requesting to resume services at [facility], liakash Pérez notified, STEF entered per her request. PT eval is pending at this time. Plan: Home w/spouse with [facility] pending medical clearance. / to remain available for support and/or discharge planning.      RYAN Saucedo    [phone]

## 2022-12-13 ENCOUNTER — APPOINTMENT (OUTPATIENT)
Dept: GENERAL RADIOLOGY | Facility: HOSPITAL | Age: 77
End: 2022-12-13
Attending: INTERNAL MEDICINE
Payer: MEDICARE

## 2022-12-13 LAB
ANION GAP SERPL CALC-SCNC: 8 MMOL/L (ref 0–18)
BASOPHILS # BLD AUTO: 0.06 X10(3) UL (ref 0–0.2)
BASOPHILS NFR BLD AUTO: 0.5 %
BUN BLD-MCNC: 21 MG/DL (ref 7–18)
BUN/CREAT SERPL: 16.3 (ref 10–20)
CALCIUM BLD-MCNC: 9.2 MG/DL (ref 8.5–10.1)
CHLORIDE SERPL-SCNC: 99 MMOL/L (ref 98–112)
CO2 SERPL-SCNC: 28 MMOL/L (ref 21–32)
CREAT BLD-MCNC: 1.29 MG/DL
DEPRECATED RDW RBC AUTO: 42.5 FL (ref 35.1–46.3)
EOSINOPHIL # BLD AUTO: 0.32 X10(3) UL (ref 0–0.7)
EOSINOPHIL NFR BLD AUTO: 2.6 %
ERYTHROCYTE [DISTWIDTH] IN BLOOD BY AUTOMATED COUNT: 13.2 % (ref 11–15)
GFR SERPLBLD BASED ON 1.73 SQ M-ARVRAT: 42 ML/MIN/1.73M2 (ref 60–?)
GLUCOSE BLD-MCNC: 121 MG/DL (ref 70–99)
HCT VFR BLD AUTO: 28.9 %
HGB BLD-MCNC: 9.3 G/DL
IMM GRANULOCYTES # BLD AUTO: 0.04 X10(3) UL (ref 0–1)
IMM GRANULOCYTES NFR BLD: 0.3 %
LYMPHOCYTES # BLD AUTO: 2.95 X10(3) UL (ref 1–4)
LYMPHOCYTES NFR BLD AUTO: 24.1 %
MCH RBC QN AUTO: 28 PG (ref 26–34)
MCHC RBC AUTO-ENTMCNC: 32.2 G/DL (ref 31–37)
MCV RBC AUTO: 87 FL
MONOCYTES # BLD AUTO: 1.11 X10(3) UL (ref 0.1–1)
MONOCYTES NFR BLD AUTO: 9.1 %
NEUTROPHILS # BLD AUTO: 7.78 X10 (3) UL (ref 1.5–7.7)
NEUTROPHILS # BLD AUTO: 7.78 X10(3) UL (ref 1.5–7.7)
NEUTROPHILS NFR BLD AUTO: 63.4 %
OSMOLALITY SERPL CALC.SUM OF ELEC: 284 MOSM/KG (ref 275–295)
PLATELET # BLD AUTO: 286 10(3)UL (ref 150–450)
POTASSIUM SERPL-SCNC: 3.3 MMOL/L (ref 3.5–5.1)
POTASSIUM SERPL-SCNC: 3.3 MMOL/L (ref 3.5–5.1)
POTASSIUM SERPL-SCNC: 4.5 MMOL/L (ref 3.5–5.1)
RBC # BLD AUTO: 3.32 X10(6)UL
SODIUM SERPL-SCNC: 135 MMOL/L (ref 136–145)
WBC # BLD AUTO: 12.3 X10(3) UL (ref 4–11)

## 2022-12-13 PROCEDURE — 71045 X-RAY EXAM CHEST 1 VIEW: CPT | Performed by: INTERNAL MEDICINE

## 2022-12-13 PROCEDURE — 99233 SBSQ HOSP IP/OBS HIGH 50: CPT | Performed by: HOSPITALIST

## 2022-12-13 PROCEDURE — 99223 1ST HOSP IP/OBS HIGH 75: CPT | Performed by: INTERNAL MEDICINE

## 2022-12-13 RX ORDER — ACETAMINOPHEN 500 MG
500 TABLET ORAL ONCE
Status: COMPLETED | OUTPATIENT
Start: 2022-12-13 | End: 2022-12-13

## 2022-12-13 RX ORDER — POTASSIUM CHLORIDE 1.5 G/1.77G
40 POWDER, FOR SOLUTION ORAL EVERY 4 HOURS
Status: COMPLETED | OUTPATIENT
Start: 2022-12-13 | End: 2022-12-13

## 2022-12-13 RX ORDER — ACETAMINOPHEN 500 MG
1000 TABLET ORAL EVERY 8 HOURS PRN
Status: DISCONTINUED | OUTPATIENT
Start: 2022-12-13 | End: 2022-12-20

## 2022-12-13 NOTE — PLAN OF CARE
Problem: Patient Centered Care  Goal: Patient preferences are identified and integrated in the patient's plan of care  Description: Interventions:  - What would you like us to know as we care for you? I live at home with my .  - Provide timely, complete, and accurate information to patient/family  - Incorporate patient and family knowledge, values, beliefs, and cultural backgrounds into the planning and delivery of care  - Encourage patient/family to participate in care and decision-making at the level they choose  - Honor patient and family perspectives and choices  Outcome: Progressing     Problem: METABOLIC/FLUID AND ELECTROLYTES - ADULT  Goal: Electrolytes maintained within normal limits  Description: INTERVENTIONS:  - Monitor labs and rhythm and assess patient for signs and symptoms of electrolyte imbalances  - Administer electrolyte replacement as ordered  - Monitor response to electrolyte replacements, including rhythm and repeat lab results as appropriate  - Fluid restriction as ordered  - Instruct patient on fluid and nutrition restrictions as appropriate  Outcome: Progressing     Problem: SKIN/TISSUE INTEGRITY - ADULT  Goal: Skin integrity remains intact  Description: INTERVENTIONS  - Assess and document risk factors for pressure ulcer development  - Assess and document skin integrity  - Monitor for areas of redness and/or skin breakdown  - Initiate interventions, skin care algorithm/standards of care as needed  Outcome: Progressing     Problem: SAFETY ADULT - FALL  Goal: Free from fall injury  Description: INTERVENTIONS:  - Assess pt frequently for physical needs  - Identify cognitive and physical deficits and behaviors that affect risk of falls.   - [place] fall precautions as indicated by assessment.  - Educate pt/family on patient safety including physical limitations  - Instruct pt to call for assistance with activity based on assessment  - Modify environment to reduce risk of injury  - Provide assistive devices as appropriate  - Consider OT/PT consult to assist with strengthening/mobility  - Encourage toileting schedule  Outcome: Progressing     Problem: PAIN - ADULT  Goal: Verbalizes/displays adequate comfort level or patient's stated pain goal  Description: INTERVENTIONS:  - Encourage pt to monitor pain and request assistance  - Assess pain using appropriate pain scale  - Administer analgesics based on type and severity of pain and evaluate response  - Implement non-pharmacological measures as appropriate and evaluate response  - Consider cultural and social influences on pain and pain management  - Manage/alleviate anxiety  - Utilize distraction and/or relaxation techniques  - Monitor for opioid side effects  - Notify MD/LIP if interventions unsuccessful or patient reports new pain  - Anticipate increased pain with activity and pre-medicate as appropriate  Outcome: Progressing     Problem: DISCHARGE PLANNING  Goal: Discharge to home or other facility with appropriate resources  Description: INTERVENTIONS:  - Identify barriers to discharge w/pt and caregiver  - Include patient/family/discharge partner in discharge planning  - Arrange for needed discharge resources and transportation as appropriate  - Identify discharge learning needs (meds, wound care, etc)  - Arrange for interpreters to assist at discharge as needed  - Consider post-discharge preferences of patient/family/discharge partner  - Complete POLST form as appropriate  - Assess patient's ability to be responsible for managing their own health  - Refer to Case Management Department for coordinating discharge planning if the patient needs post-hospital services based on physician/LIP order or complex needs related to functional status, cognitive ability or social support system  Outcome: Progressing     Problem: RESPIRATORY - ADULT  Goal: Achieves optimal ventilation and oxygenation  Description: INTERVENTIONS:  - Assess for changes in respiratory status  - Assess for changes in mentation and behavior  - Position to facilitate oxygenation and minimize respiratory effort  - Oxygen supplementation based on oxygen saturation or ABGs  - Provide Smoking Cessation handout, if applicable  - Encourage broncho-pulmonary hygiene including cough, deep breathe, Incentive Spirometry  - Assess the need for suctioning and perform as needed  - Assess and instruct to report SOB or any respiratory difficulty  - Respiratory Therapy support as indicated  - Manage/alleviate anxiety  - Monitor for signs/symptoms of CO2 retention  Outcome: Progressing   Patient currently stable at this time. Vitals stable. Denies any pain or discomfort. Fall and safety precautions in place. Bed at lowest position, bed alarm in place. Call light and personal belongings within reach. Frequent nursing rounds completed.

## 2022-12-14 LAB
ANION GAP SERPL CALC-SCNC: 7 MMOL/L (ref 0–18)
BASOPHILS # BLD AUTO: 0.04 X10(3) UL (ref 0–0.2)
BASOPHILS NFR BLD AUTO: 0.4 %
BUN BLD-MCNC: 25 MG/DL (ref 7–18)
BUN/CREAT SERPL: 17 (ref 10–20)
CALCIUM BLD-MCNC: 9.4 MG/DL (ref 8.5–10.1)
CHLORIDE SERPL-SCNC: 98 MMOL/L (ref 98–112)
CO2 SERPL-SCNC: 29 MMOL/L (ref 21–32)
CREAT BLD-MCNC: 1.47 MG/DL
DEPRECATED RDW RBC AUTO: 43.6 FL (ref 35.1–46.3)
EOSINOPHIL # BLD AUTO: 0.6 X10(3) UL (ref 0–0.7)
EOSINOPHIL NFR BLD AUTO: 5.3 %
ERYTHROCYTE [DISTWIDTH] IN BLOOD BY AUTOMATED COUNT: 13.4 % (ref 11–15)
GFR SERPLBLD BASED ON 1.73 SQ M-ARVRAT: 36 ML/MIN/1.73M2 (ref 60–?)
GLUCOSE BLD-MCNC: 129 MG/DL (ref 70–99)
HCT VFR BLD AUTO: 28.1 %
HGB BLD-MCNC: 9 G/DL
IMM GRANULOCYTES # BLD AUTO: 0.05 X10(3) UL (ref 0–1)
IMM GRANULOCYTES NFR BLD: 0.4 %
LYMPHOCYTES # BLD AUTO: 2.11 X10(3) UL (ref 1–4)
LYMPHOCYTES NFR BLD AUTO: 18.5 %
MCH RBC QN AUTO: 28.1 PG (ref 26–34)
MCHC RBC AUTO-ENTMCNC: 32 G/DL (ref 31–37)
MCV RBC AUTO: 87.8 FL
MONOCYTES # BLD AUTO: 1.02 X10(3) UL (ref 0.1–1)
MONOCYTES NFR BLD AUTO: 8.9 %
NEUTROPHILS # BLD AUTO: 7.6 X10 (3) UL (ref 1.5–7.7)
NEUTROPHILS # BLD AUTO: 7.6 X10(3) UL (ref 1.5–7.7)
NEUTROPHILS NFR BLD AUTO: 66.5 %
OSMOLALITY SERPL CALC.SUM OF ELEC: 284 MOSM/KG (ref 275–295)
PLATELET # BLD AUTO: 290 10(3)UL (ref 150–450)
POTASSIUM SERPL-SCNC: 4.2 MMOL/L (ref 3.5–5.1)
RBC # BLD AUTO: 3.2 X10(6)UL
SODIUM SERPL-SCNC: 134 MMOL/L (ref 136–145)
WBC # BLD AUTO: 11.4 X10(3) UL (ref 4–11)

## 2022-12-14 PROCEDURE — 99232 SBSQ HOSP IP/OBS MODERATE 35: CPT | Performed by: INTERNAL MEDICINE

## 2022-12-14 PROCEDURE — 99233 SBSQ HOSP IP/OBS HIGH 50: CPT | Performed by: HOSPITALIST

## 2022-12-14 RX ORDER — VANCOMYCIN HYDROCHLORIDE 125 MG/1
125 CAPSULE ORAL DAILY
Status: DISCONTINUED | OUTPATIENT
Start: 2022-12-14 | End: 2022-12-20

## 2022-12-14 RX ORDER — BENZONATATE 100 MG/1
200 CAPSULE ORAL 3 TIMES DAILY PRN
Status: DISCONTINUED | OUTPATIENT
Start: 2022-12-14 | End: 2022-12-20

## 2022-12-14 NOTE — PLAN OF CARE
Pt is aox3, resting in bed, up with assistance. Pt receiving iv abx  Problem: Patient Centered Care  Goal: Patient preferences are identified and integrated in the patient's plan of care  Description: Interventions:  - What would you like us to know as we care for you? I live at home with my .  - Provide timely, complete, and accurate information to patient/family  - Incorporate patient and family knowledge, values, beliefs, and cultural backgrounds into the planning and delivery of care  - Encourage patient/family to participate in care and decision-making at the level they choose  - Honor patient and family perspectives and choices  Outcome: Progressing     Problem: METABOLIC/FLUID AND ELECTROLYTES - ADULT  Goal: Electrolytes maintained within normal limits  Description: INTERVENTIONS:  - Monitor labs and rhythm and assess patient for signs and symptoms of electrolyte imbalances  - Administer electrolyte replacement as ordered  - Monitor response to electrolyte replacements, including rhythm and repeat lab results as appropriate  - Fluid restriction as ordered  - Instruct patient on fluid and nutrition restrictions as appropriate  Outcome: Progressing     Problem: SKIN/TISSUE INTEGRITY - ADULT  Goal: Skin integrity remains intact  Description: INTERVENTIONS  - Assess and document risk factors for pressure ulcer development  - Assess and document skin integrity  - Monitor for areas of redness and/or skin breakdown  - Initiate interventions, skin care algorithm/standards of care as needed  Outcome: Progressing     Problem: SAFETY ADULT - FALL  Goal: Free from fall injury  Description: INTERVENTIONS:  - Assess pt frequently for physical needs  - Identify cognitive and physical deficits and behaviors that affect risk of falls.   - [place] fall precautions as indicated by assessment.  - Educate pt/family on patient safety including physical limitations  - Instruct pt to call for assistance with activity based on assessment  - Modify environment to reduce risk of injury  - Provide assistive devices as appropriate  - Consider OT/PT consult to assist with strengthening/mobility  - Encourage toileting schedule  Outcome: Progressing     Problem: PAIN - ADULT  Goal: Verbalizes/displays adequate comfort level or patient's stated pain goal  Description: INTERVENTIONS:  - Encourage pt to monitor pain and request assistance  - Assess pain using appropriate pain scale  - Administer analgesics based on type and severity of pain and evaluate response  - Implement non-pharmacological measures as appropriate and evaluate response  - Consider cultural and social influences on pain and pain management  - Manage/alleviate anxiety  - Utilize distraction and/or relaxation techniques  - Monitor for opioid side effects  - Notify MD/LIP if interventions unsuccessful or patient reports new pain  - Anticipate increased pain with activity and pre-medicate as appropriate  Outcome: Progressing     Problem: DISCHARGE PLANNING  Goal: Discharge to home or other facility with appropriate resources  Description: INTERVENTIONS:  - Identify barriers to discharge w/pt and caregiver  - Include patient/family/discharge partner in discharge planning  - Arrange for needed discharge resources and transportation as appropriate  - Identify discharge learning needs (meds, wound care, etc)  - Arrange for interpreters to assist at discharge as needed  - Consider post-discharge preferences of patient/family/discharge partner  - Complete POLST form as appropriate  - Assess patient's ability to be responsible for managing their own health  - Refer to Case Management Department for coordinating discharge planning if the patient needs post-hospital services based on physician/LIP order or complex needs related to functional status, cognitive ability or social support system  Outcome: Progressing     Problem: RESPIRATORY - ADULT  Goal: Achieves optimal ventilation and oxygenation  Description: INTERVENTIONS:  - Assess for changes in respiratory status  - Assess for changes in mentation and behavior  - Position to facilitate oxygenation and minimize respiratory effort  - Oxygen supplementation based on oxygen saturation or ABGs  - Provide Smoking Cessation handout, if applicable  - Encourage broncho-pulmonary hygiene including cough, deep breathe, Incentive Spirometry  - Assess the need for suctioning and perform as needed  - Assess and instruct to report SOB or any respiratory difficulty  - Respiratory Therapy support as indicated  - Manage/alleviate anxiety  - Monitor for signs/symptoms of CO2 retention  Outcome: Progressing

## 2022-12-14 NOTE — PLAN OF CARE
Problem: Patient Centered Care  Goal: Patient preferences are identified and integrated in the patient's plan of care  Description: Interventions:  - What would you like us to know as we care for you? I live at home with my .  - Provide timely, complete, and accurate information to patient/family  - Incorporate patient and family knowledge, values, beliefs, and cultural backgrounds into the planning and delivery of care  - Encourage patient/family to participate in care and decision-making at the level they choose  - Honor patient and family perspectives and choices  Outcome: Progressing     Problem: METABOLIC/FLUID AND ELECTROLYTES - ADULT  Goal: Electrolytes maintained within normal limits  Description: INTERVENTIONS:  - Monitor labs and rhythm and assess patient for signs and symptoms of electrolyte imbalances  - Administer electrolyte replacement as ordered  - Monitor response to electrolyte replacements, including rhythm and repeat lab results as appropriate  - Fluid restriction as ordered  - Instruct patient on fluid and nutrition restrictions as appropriate  Outcome: Progressing     Problem: SKIN/TISSUE INTEGRITY - ADULT  Goal: Skin integrity remains intact  Description: INTERVENTIONS  - Assess and document risk factors for pressure ulcer development  - Assess and document skin integrity  - Monitor for areas of redness and/or skin breakdown  - Initiate interventions, skin care algorithm/standards of care as needed  Outcome: Progressing     Problem: SAFETY ADULT - FALL  Goal: Free from fall injury  Description: INTERVENTIONS:  - Assess pt frequently for physical needs  - Identify cognitive and physical deficits and behaviors that affect risk of falls.   - [place] fall precautions as indicated by assessment.  - Educate pt/family on patient safety including physical limitations  - Instruct pt to call for assistance with activity based on assessment  - Modify environment to reduce risk of injury  - Provide assistive devices as appropriate  - Consider OT/PT consult to assist with strengthening/mobility  - Encourage toileting schedule  Outcome: Progressing     Problem: PAIN - ADULT  Goal: Verbalizes/displays adequate comfort level or patient's stated pain goal  Description: INTERVENTIONS:  - Encourage pt to monitor pain and request assistance  - Assess pain using appropriate pain scale  - Administer analgesics based on type and severity of pain and evaluate response  - Implement non-pharmacological measures as appropriate and evaluate response  - Consider cultural and social influences on pain and pain management  - Manage/alleviate anxiety  - Utilize distraction and/or relaxation techniques  - Monitor for opioid side effects  - Notify MD/LIP if interventions unsuccessful or patient reports new pain  - Anticipate increased pain with activity and pre-medicate as appropriate  Outcome: Progressing     Problem: DISCHARGE PLANNING  Goal: Discharge to home or other facility with appropriate resources  Description: INTERVENTIONS:  - Identify barriers to discharge w/pt and caregiver  - Include patient/family/discharge partner in discharge planning  - Arrange for needed discharge resources and transportation as appropriate  - Identify discharge learning needs (meds, wound care, etc)  - Arrange for interpreters to assist at discharge as needed  - Consider post-discharge preferences of patient/family/discharge partner  - Complete POLST form as appropriate  - Assess patient's ability to be responsible for managing their own health  - Refer to Case Management Department for coordinating discharge planning if the patient needs post-hospital services based on physician/LIP order or complex needs related to functional status, cognitive ability or social support system  Outcome: Progressing     Problem: RESPIRATORY - ADULT  Goal: Achieves optimal ventilation and oxygenation  Description: INTERVENTIONS:  - Assess for changes in respiratory status  - Assess for changes in mentation and behavior  - Position to facilitate oxygenation and minimize respiratory effort  - Oxygen supplementation based on oxygen saturation or ABGs  - Provide Smoking Cessation handout, if applicable  - Encourage broncho-pulmonary hygiene including cough, deep breathe, Incentive Spirometry  - Assess the need for suctioning and perform as needed  - Assess and instruct to report SOB or any respiratory difficulty  - Respiratory Therapy support as indicated  - Manage/alleviate anxiety  - Monitor for signs/symptoms of CO2 retention  Outcome: Progressing    Patient currently stable at this time. Vitals stable. Complains of pain, as needed ain medication given, tolerated well. Fall and safety precautions in place. Bed at lowest position, bed alarm in place. Call light and personal belongings within reach. Frequent nursing rounds completed.

## 2022-12-14 NOTE — PHYSICAL THERAPY NOTE
PHYSICAL THERAPY TREATMENT NOTE - INPATIENT     Room Number: 948/438-N       Presenting Problem: pain with inspiration, community acquired pneumonia of left lower lobe of lung    Problem List  Principal Problem:    Community acquired pneumonia of left lower lobe of lung      PHYSICAL THERAPY ASSESSMENT   Chart reviewed. RN approved participation in physical therapy. PPE worn by therapist: mask, gloves and goggles. Patient was wearing a mask during session. Patient presented in bed with 3/10 pain. Patient with fair  progress towards goals during this session. Education provided on Physical therapy plan of care, physiological benefits of out of bed mobility and activity pacing. Patient with fair carryover. Patient has good strength and balance but is limited with her endurance. Bed mobility: Supervision  Transfers: Supervision     ACTIVITY TOLERANCE  Pulse: 119  Oxygen Therapy  SPO2% Ambulation on Room Air: 93  Gait Assistance: Supervision  Distance (ft): 20 (pt limited gait distance secondary to chest pain) patient assisted to from bathroom. patient able to perform her own ADLs  Assistive Device: Rolling walker  Pattern: Shuffle (decreased julissa speed)  Patient has a chief complaint of chest pain with mobility. she feels that her heart is pounding when she gets up and ambulates in room. She reports it takes about 10 minutes to calm down after getting back in bed. Patient reports Tylenol does not help her pain. Patient was left in bed at end of session with all needs in reach. The patient's Approx Degree of Impairment: 46.58% has been calculated based on documentation in the [facility] '6 clicks' Inpatient Basic Mobility Short Form. Research supports that patients with this level of impairment may benefit from Home with home health PT. RN aware of patient status post session. DISCHARGE RECOMMENDATIONS  PT Discharge Recommendations: Home with home health PT     PLAN  PT Treatment Plan: Bed mobility; Family education;Gait training;Transfer training    SUBJECTIVE  \"I feel bad today\"    OBJECTIVE  Precautions: Bed/chair alarm    WEIGHT BEARING RESTRICTION  Weight Bearing Restriction: None                PAIN ASSESSMENT   Rating: Unable to rate  Location: left back of neck  Management Techniques: Activity promotion; Body mechanics;Repositioning    BALANCE                                                                                                                       Static Sitting: Good  Dynamic Sitting: Good           Static Standing: Fair -  Dynamic Standin [facility] '6-Clicks' INPATIENT SHORT FORM - BASIC MOBILITY  How much difficulty does the patient currently have. .. Patient Difficulty: Turning over in bed (including adjusting bedclothes, sheets and blankets)?: A Little   Patient Difficulty: Sitting down on and standing up from a chair with arms (e.g., wheelchair, bedside commode, etc.): A Little   Patient Difficulty: Moving from lying on back to sitting on the side of the bed?: A Little   How much help from another person does the patient currently need. ..    Help from Another: Moving to and from a bed to a chair (including a wheelchair)?: A Little   Help from Another: Need to walk in hospital room?: A Little   Help from Another: Climbing 3-5 steps with a railing?: A Little     AM-PAC Score:  Raw Score: 18   Approx Degree of Impairment: 46.58%   Standardized Score (AM-PAC Scale): 43.63   CMS Modifier (G-Code): CK    Patient End of Session: In bed    CURRENT GOALS   Goals to be met by: 22  Patient Goal Patient's self-stated goal is: go home   Goal #1 Patient is able to demonstrate supine - sit EOB @ level: modified independent     Goal #1   Current Status Goal met   Goal #2 Patient is able to demonstrate transfers Sit to/from Stand at assistance level: modified independent with LRAD     Goal #2  Current Status Goal met   Goal #3 Patient is able to ambulate 300 feet with assist device: LRAD at assistance level: modified independent   Goal #3   Current Status Goal in progress, not met   Goal #4 Patient will negotiate 6 stairs/one curb w/ assistive device and supervision   Goal #4   Current Status NT   Goal #5 Patient to demonstrate independence with home activity/exercise instructions provided to patient in preparation for discharge.    Goal #5   Current Status

## 2022-12-15 LAB
ANION GAP SERPL CALC-SCNC: 7 MMOL/L (ref 0–18)
BUN BLD-MCNC: 16 MG/DL (ref 7–18)
BUN/CREAT SERPL: 11.5 (ref 10–20)
CALCIUM BLD-MCNC: 9 MG/DL (ref 8.5–10.1)
CHLORIDE SERPL-SCNC: 98 MMOL/L (ref 98–112)
CO2 SERPL-SCNC: 27 MMOL/L (ref 21–32)
CREAT BLD-MCNC: 1.39 MG/DL
DEPRECATED RDW RBC AUTO: 43.8 FL (ref 35.1–46.3)
ERYTHROCYTE [DISTWIDTH] IN BLOOD BY AUTOMATED COUNT: 13.8 % (ref 11–15)
GFR SERPLBLD BASED ON 1.73 SQ M-ARVRAT: 39 ML/MIN/1.73M2 (ref 60–?)
GLUCOSE BLD-MCNC: 137 MG/DL (ref 70–99)
HCT VFR BLD AUTO: 27.3 %
HGB BLD-MCNC: 8.8 G/DL
MCH RBC QN AUTO: 28.1 PG (ref 26–34)
MCHC RBC AUTO-ENTMCNC: 32.2 G/DL (ref 31–37)
MCV RBC AUTO: 87.2 FL
OSMOLALITY SERPL CALC.SUM OF ELEC: 277 MOSM/KG (ref 275–295)
PLATELET # BLD AUTO: 299 10(3)UL (ref 150–450)
POTASSIUM SERPL-SCNC: 3.6 MMOL/L (ref 3.5–5.1)
RBC # BLD AUTO: 3.13 X10(6)UL
SODIUM SERPL-SCNC: 132 MMOL/L (ref 136–145)
WBC # BLD AUTO: 10.6 X10(3) UL (ref 4–11)

## 2022-12-15 PROCEDURE — 99233 SBSQ HOSP IP/OBS HIGH 50: CPT | Performed by: HOSPITALIST

## 2022-12-15 PROCEDURE — 99232 SBSQ HOSP IP/OBS MODERATE 35: CPT | Performed by: INTERNAL MEDICINE

## 2022-12-15 RX ORDER — CODEINE PHOSPHATE AND GUAIFENESIN 10; 100 MG/5ML; MG/5ML
5 SOLUTION ORAL EVERY 4 HOURS PRN
Status: DISCONTINUED | OUTPATIENT
Start: 2022-12-15 | End: 2022-12-20

## 2022-12-15 NOTE — PLAN OF CARE
Pt is aox3, resting in chair, up with assistance, p receiving iv abx  Problem: Patient Centered Care  Goal: Patient preferences are identified and integrated in the patient's plan of care  Description: Interventions:  - What would you like us to know as we care for you? I live at home with my .  - Provide timely, complete, and accurate information to patient/family  - Incorporate patient and family knowledge, values, beliefs, and cultural backgrounds into the planning and delivery of care  - Encourage patient/family to participate in care and decision-making at the level they choose  - Honor patient and family perspectives and choices  Outcome: Progressing     Problem: METABOLIC/FLUID AND ELECTROLYTES - ADULT  Goal: Electrolytes maintained within normal limits  Description: INTERVENTIONS:  - Monitor labs and rhythm and assess patient for signs and symptoms of electrolyte imbalances  - Administer electrolyte replacement as ordered  - Monitor response to electrolyte replacements, including rhythm and repeat lab results as appropriate  - Fluid restriction as ordered  - Instruct patient on fluid and nutrition restrictions as appropriate  Outcome: Progressing     Problem: SKIN/TISSUE INTEGRITY - ADULT  Goal: Skin integrity remains intact  Description: INTERVENTIONS  - Assess and document risk factors for pressure ulcer development  - Assess and document skin integrity  - Monitor for areas of redness and/or skin breakdown  - Initiate interventions, skin care algorithm/standards of care as needed  Outcome: Progressing     Problem: SAFETY ADULT - FALL  Goal: Free from fall injury  Description: INTERVENTIONS:  - Assess pt frequently for physical needs  - Identify cognitive and physical deficits and behaviors that affect risk of falls.   - [place] fall precautions as indicated by assessment.  - Educate pt/family on patient safety including physical limitations  - Instruct pt to call for assistance with activity based on assessment  - Modify environment to reduce risk of injury  - Provide assistive devices as appropriate  - Consider OT/PT consult to assist with strengthening/mobility  - Encourage toileting schedule  Outcome: Progressing     Problem: PAIN - ADULT  Goal: Verbalizes/displays adequate comfort level or patient's stated pain goal  Description: INTERVENTIONS:  - Encourage pt to monitor pain and request assistance  - Assess pain using appropriate pain scale  - Administer analgesics based on type and severity of pain and evaluate response  - Implement non-pharmacological measures as appropriate and evaluate response  - Consider cultural and social influences on pain and pain management  - Manage/alleviate anxiety  - Utilize distraction and/or relaxation techniques  - Monitor for opioid side effects  - Notify MD/LIP if interventions unsuccessful or patient reports new pain  - Anticipate increased pain with activity and pre-medicate as appropriate  Outcome: Progressing     Problem: DISCHARGE PLANNING  Goal: Discharge to home or other facility with appropriate resources  Description: INTERVENTIONS:  - Identify barriers to discharge w/pt and caregiver  - Include patient/family/discharge partner in discharge planning  - Arrange for needed discharge resources and transportation as appropriate  - Identify discharge learning needs (meds, wound care, etc)  - Arrange for interpreters to assist at discharge as needed  - Consider post-discharge preferences of patient/family/discharge partner  - Complete POLST form as appropriate  - Assess patient's ability to be responsible for managing their own health  - Refer to Case Management Department for coordinating discharge planning if the patient needs post-hospital services based on physician/LIP order or complex needs related to functional status, cognitive ability or social support system  Outcome: Progressing     Problem: RESPIRATORY - ADULT  Goal: Achieves optimal ventilation and oxygenation  Description: INTERVENTIONS:  - Assess for changes in respiratory status  - Assess for changes in mentation and behavior  - Position to facilitate oxygenation and minimize respiratory effort  - Oxygen supplementation based on oxygen saturation or ABGs  - Provide Smoking Cessation handout, if applicable  - Encourage broncho-pulmonary hygiene including cough, deep breathe, Incentive Spirometry  - Assess the need for suctioning and perform as needed  - Assess and instruct to report SOB or any respiratory difficulty  - Respiratory Therapy support as indicated  - Manage/alleviate anxiety  - Monitor for signs/symptoms of CO2 retention  Outcome: Progressing

## 2022-12-15 NOTE — PLAN OF CARE
Problem: Patient Centered Care  Goal: Patient preferences are identified and integrated in the patient's plan of care  Description: Interventions:  - What would you like us to know as we care for you? I live at home with my .  - Provide timely, complete, and accurate information to patient/family  - Incorporate patient and family knowledge, values, beliefs, and cultural backgrounds into the planning and delivery of care  - Encourage patient/family to participate in care and decision-making at the level they choose  - Honor patient and family perspectives and choices  Outcome: Progressing     Problem: METABOLIC/FLUID AND ELECTROLYTES - ADULT  Goal: Electrolytes maintained within normal limits  Description: INTERVENTIONS:  - Monitor labs and rhythm and assess patient for signs and symptoms of electrolyte imbalances  - Administer electrolyte replacement as ordered  - Monitor response to electrolyte replacements, including rhythm and repeat lab results as appropriate  - Fluid restriction as ordered  - Instruct patient on fluid and nutrition restrictions as appropriate  Outcome: Progressing     Problem: SKIN/TISSUE INTEGRITY - ADULT  Goal: Skin integrity remains intact  Description: INTERVENTIONS  - Assess and document risk factors for pressure ulcer development  - Assess and document skin integrity  - Monitor for areas of redness and/or skin breakdown  - Initiate interventions, skin care algorithm/standards of care as needed  Outcome: Progressing     Problem: SAFETY ADULT - FALL  Goal: Free from fall injury  Description: INTERVENTIONS:  - Assess pt frequently for physical needs  - Identify cognitive and physical deficits and behaviors that affect risk of falls.   - [employer] fall precautions as indicated by assessment.  - Educate pt/family on patient safety including physical limitations  - Instruct pt to call for assistance with activity based on assessment  - Modify environment to reduce risk of injury  - Provide assistive devices as appropriate  - Consider OT/PT consult to assist with strengthening/mobility  - Encourage toileting schedule  Outcome: Progressing     Problem: PAIN - ADULT  Goal: Verbalizes/displays adequate comfort level or patient's stated pain goal  Description: INTERVENTIONS:  - Encourage pt to monitor pain and request assistance  - Assess pain using appropriate pain scale  - Administer analgesics based on type and severity of pain and evaluate response  - Implement non-pharmacological measures as appropriate and evaluate response  - Consider cultural and social influences on pain and pain management  - Manage/alleviate anxiety  - Utilize distraction and/or relaxation techniques  - Monitor for opioid side effects  - Notify MD/LIP if interventions unsuccessful or patient reports new pain  - Anticipate increased pain with activity and pre-medicate as appropriate  Outcome: Progressing     Problem: DISCHARGE PLANNING  Goal: Discharge to home or other facility with appropriate resources  Description: INTERVENTIONS:  - Identify barriers to discharge w/pt and caregiver  - Include patient/family/discharge partner in discharge planning  - Arrange for needed discharge resources and transportation as appropriate  - Identify discharge learning needs (meds, wound care, etc)  - Arrange for interpreters to assist at discharge as needed  - Consider post-discharge preferences of patient/family/discharge partner  - Complete POLST form as appropriate  - Assess patient's ability to be responsible for managing their own health  - Refer to Case Management Department for coordinating discharge planning if the patient needs post-hospital services based on physician/LIP order or complex needs related to functional status, cognitive ability or social support system  Outcome: Progressing     Problem: RESPIRATORY - ADULT  Goal: Achieves optimal ventilation and oxygenation  Description: INTERVENTIONS:  - Assess for changes in respiratory status  - Assess for changes in mentation and behavior  - Position to facilitate oxygenation and minimize respiratory effort  - Oxygen supplementation based on oxygen saturation or ABGs  - Provide Smoking Cessation handout, if applicable  - Encourage broncho-pulmonary hygiene including cough, deep breathe, Incentive Spirometry  - Assess the need for suctioning and perform as needed  - Assess and instruct to report SOB or any respiratory difficulty  - Respiratory Therapy support as indicated  - Manage/alleviate anxiety  - Monitor for signs/symptoms of CO2 retention  Outcome: Progressing     Patient currently stable at this time. Vitals stable. Complains of pain, as needed Pain medication given, tolerated well. Cardiology aware of 4.43secPSVT, no new orders. Fall and safety precautions in place. Bed at lowest position, bed alarm in place. Call light and personal belongings within reach. Frequent nursing rounds completed.

## 2022-12-15 NOTE — PHYSICAL THERAPY NOTE
PHYSICAL THERAPY TREATMENT NOTE - INPATIENT     Room Number: 424/313-U       Presenting Problem: pain with inspiration, community acquired pneumonia of left lower lobe of lung    Problem List  Principal Problem:    Community acquired pneumonia of left lower lobe of lung      PHYSICAL THERAPY ASSESSMENT   Chart reviewed. RN approved participation in physical therapy. PPE worn by therapist: mask, gloves and goggles. Patient was wearing a mask during session. Patient presented in bed with 3/10 pain. Patient with fair  progress towards goals during this session. Education provided on Physical therapy plan of care and physiological benefits of out of bed mobility. Patient with fair carryover. Less chest pain. Improved activity tolerance. Bed mobility: Min assist  Transfers: Min assist  Gait Assistance: Contact guard assist  Distance (ft):  (250)  Assistive Device: Rolling walker  Pattern: Shuffle    Patient was left in bedside chair at end of session with all needs in reach. The patient's Approx Degree of Impairment: 46.58% has been calculated based on documentation in the [facility] '6 clicks' Inpatient Basic Mobility Short Form. Research supports that patients with this level of impairment may benefit from Subacute Rehab. RN aware of patient status post session. DISCHARGE RECOMMENDATIONS  PT Discharge Recommendations: Home with home health PT     PLAN  PT Treatment Plan: Bed mobility;Gait training;Transfer training    SUBJECTIVE  \"my shoulders hurt\"    OBJECTIVE  Precautions: Bed/chair alarm    WEIGHT BEARING RESTRICTION  Weight Bearing Restriction: None                PAIN ASSESSMENT   Rating: Unable to rate  Location: left back of neck  Management Techniques: Activity promotion; Body mechanics;Repositioning    BALANCE                                                                                                                       Static Sitting: Fair  Dynamic Sitting: Fair           Static Standin [address] -  Dynamic Standin [place] [address] '6-Clicks' INPATIENT SHORT FORM - BASIC MOBILITY  How much difficulty does the patient currently have. .. Patient Difficulty: Turning over in bed (including adjusting bedclothes, sheets and blankets)?: A Little   Patient Difficulty: Sitting down on and standing up from a chair with arms (e.g., wheelchair, bedside commode, etc.): A Little   Patient Difficulty: Moving from lying on back to sitting on the side of the bed?: A Little   How much help from another person does the patient currently need. .. Help from Another: Moving to and from a bed to a chair (including a wheelchair)?: A Little   Help from Another: Need to walk in hospital room?: A Little   Help from Another: Climbing 3-5 steps with a railing?: A Little     AM-PAC Score:  Raw Score: 18   Approx Degree of Impairment: 46.58%   Standardized Score (AM-PAC Scale): 43.63   CMS Modifier (G-Code): CK  Patient End of Session: Up in chair    CURRENT GOALS   Goals to be met by: 22  Patient Goal Patient's self-stated goal is: go home   Goal #1 Patient is able to demonstrate supine - sit EOB @ level: modified independent     Goal #1   Current Status Goal met   Goal #2 Patient is able to demonstrate transfers Sit to/from Stand at assistance level: modified independent with LRAD     Goal #2  Current Status Goal met   Goal #3 Patient is able to ambulate 300 feet with assist device: LRAD at assistance level: modified independent   Goal #3   Current Status Goal in progress, not met   Goal #4 Patient will negotiate 6 stairs/one curb w/ assistive device and supervision   Goal #4   Current Status NT   Goal #5 Patient to demonstrate independence with home activity/exercise instructions provided to patient in preparation for discharge.    Goal #5   Current Status

## 2022-12-15 NOTE — PLAN OF CARE
Vital signs stable, no acute changes overnight. Tylenol given for pain. Continues on IV antibiotics. Call light within reach, bed alarm on, non-skid socks on, frequent rounding done. Problem: Patient Centered Care  Goal: Patient preferences are identified and integrated in the patient's plan of care  Description: Interventions:  - What would you like us to know as we care for you? I live at home with my .  - Provide timely, complete, and accurate information to patient/family  - Incorporate patient and family knowledge, values, beliefs, and cultural backgrounds into the planning and delivery of care  - Encourage patient/family to participate in care and decision-making at the level they choose  - Honor patient and family perspectives and choices  Outcome: Progressing     Problem: METABOLIC/FLUID AND ELECTROLYTES - ADULT  Goal: Electrolytes maintained within normal limits  Description: INTERVENTIONS:  - Monitor labs and rhythm and assess patient for signs and symptoms of electrolyte imbalances  - Administer electrolyte replacement as ordered  - Monitor response to electrolyte replacements, including rhythm and repeat lab results as appropriate  - Fluid restriction as ordered  - Instruct patient on fluid and nutrition restrictions as appropriate  Outcome: Progressing     Problem: SKIN/TISSUE INTEGRITY - ADULT  Goal: Skin integrity remains intact  Description: INTERVENTIONS  - Assess and document risk factors for pressure ulcer development  - Assess and document skin integrity  - Monitor for areas of redness and/or skin breakdown  - Initiate interventions, skin care algorithm/standards of care as needed  Outcome: Progressing     Problem: SAFETY ADULT - FALL  Goal: Free from fall injury  Description: INTERVENTIONS:  - Assess pt frequently for physical needs  - Identify cognitive and physical deficits and behaviors that affect risk of falls.   - [place] fall precautions as indicated by assessment.  - Educate pt/family on patient safety including physical limitations  - Instruct pt to call for assistance with activity based on assessment  - Modify environment to reduce risk of injury  - Provide assistive devices as appropriate  - Consider OT/PT consult to assist with strengthening/mobility  - Encourage toileting schedule  Outcome: Progressing     Problem: PAIN - ADULT  Goal: Verbalizes/displays adequate comfort level or patient's stated pain goal  Description: INTERVENTIONS:  - Encourage pt to monitor pain and request assistance  - Assess pain using appropriate pain scale  - Administer analgesics based on type and severity of pain and evaluate response  - Implement non-pharmacological measures as appropriate and evaluate response  - Consider cultural and social influences on pain and pain management  - Manage/alleviate anxiety  - Utilize distraction and/or relaxation techniques  - Monitor for opioid side effects  - Notify MD/LIP if interventions unsuccessful or patient reports new pain  - Anticipate increased pain with activity and pre-medicate as appropriate  Outcome: Progressing     Problem: DISCHARGE PLANNING  Goal: Discharge to home or other facility with appropriate resources  Description: INTERVENTIONS:  - Identify barriers to discharge w/pt and caregiver  - Include patient/family/discharge partner in discharge planning  - Arrange for needed discharge resources and transportation as appropriate  - Identify discharge learning needs (meds, wound care, etc)  - Arrange for interpreters to assist at discharge as needed  - Consider post-discharge preferences of patient/family/discharge partner  - Complete POLST form as appropriate  - Assess patient's ability to be responsible for managing their own health  - Refer to Case Management Department for coordinating discharge planning if the patient needs post-hospital services based on physician/LIP order or complex needs related to functional status, cognitive ability or social support system  Outcome: Progressing     Problem: RESPIRATORY - ADULT  Goal: Achieves optimal ventilation and oxygenation  Description: INTERVENTIONS:  - Assess for changes in respiratory status  - Assess for changes in mentation and behavior  - Position to facilitate oxygenation and minimize respiratory effort  - Oxygen supplementation based on oxygen saturation or ABGs  - Provide Smoking Cessation handout, if applicable  - Encourage broncho-pulmonary hygiene including cough, deep breathe, Incentive Spirometry  - Assess the need for suctioning and perform as needed  - Assess and instruct to report SOB or any respiratory difficulty  - Respiratory Therapy support as indicated  - Manage/alleviate anxiety  - Monitor for signs/symptoms of CO2 retention  Outcome: Progressing

## 2022-12-15 NOTE — CDS QUERY
Dr. Daniela Jane: . To answer this query: 1st click on \"Edit\" button on toolbar. 2nd type an \"X\" in the bracket for the diagnosis(s) that apply. (You may add/type in any additional clinical details you feel necessary to substantiate your response). 3rd Click \"SIGN\" TAB to complete your response. Thank you. .Pneumonia Specificity  CLINICAL DOCUMENTATION CLARIFICATION FORM    Dear Doctor Daniela Jane:   Clinical information (provided below) indicates pneumonia. For accurate ICD-10-CM code assignment to reflect severity of illness and risk of mortality,   PLEASE (X)  DIAGNOSIS THAT APPLIES TO THE PNEUMONIA     SELECTION BY PROVIDER ONLY  Please specify the associated etiology:  (  ) Gram Positive Pneumonia  (  ) Gram Negative Pneumonia  (  ) Other microbe or cause, please specify: ____________________               (  ) Other  Explanation, please specify: _____________________              ( x) Unable to Determine      Documentation from the Medical Record     12/11/22 ED Admit: PCXR: Small left pleural effusion. Mild left lung base airspace opacities consistent with atelectasis or pneumonia. PROCALCITONIN 0.82     12/11/22 H&P: HPI: 64year old female who has a pmh of CHF, RA, who was admitted for acute shortness of breath, pleuritic chest pain and cough that has not been getting better for the past week. A/P: Community acquired pneumonia of left lower lobe of lung, Continue on IV ceftriaxone and azithromycin, Pulm consult, IV fluids, mucinex for cough, incentive spirometer, tylenol for fever    12/12/22:  LEGIONELLA ANTIGEN, URINE and STREP PNEUMO ANTIGEN, URINE both neg.     12/13/22 Pulmonary Consult: . Left lower lobe pneumonia. Fevers Continue broad-spectrum IV antibiotic therapy with Rocephin and azithromycin at this time, obtain a repeat cxr.     12/13/22 Repeat PCXR: No change from the prior. Small left pleural effusion and left lung base opacity due to atelectasis or pneumonia, unchanged.     12/14/22 & 12/15/22 Your Progress Note: Community acquired pneumonia of left lower lobe of lung, Fever - last fever early yesterday AM, Mild wheeze - resolved, Continued chest and neck pain with inspiration, ceftriaxone and azithromycin was -> zosyn IV, Pulm on consult,  nebs bid, mucinex for cough, incentive spirometer, tylenol for fever prn, CXRs reviewed    Treatment:  Rocephin and Zithromax were started, then dc'd on 12/14, 12/14 Zosyn IV Q8H started, Pulmonary Consulted, mucinex, incentive spirometer, nebs bid, CXR's reviewed, monitoring lung sounds, resp status, vital signs      ________________________________________________________________________________________________________      If you have any questions, please contact Clinical :  Selin Brandt RN at [phone]   Thank You    THIS Jasper Matos

## 2022-12-16 LAB
ANION GAP SERPL CALC-SCNC: 6 MMOL/L (ref 0–18)
BUN BLD-MCNC: 24 MG/DL (ref 7–18)
BUN/CREAT SERPL: 16.4 (ref 10–20)
CALCIUM BLD-MCNC: 9.1 MG/DL (ref 8.5–10.1)
CHLORIDE SERPL-SCNC: 98 MMOL/L (ref 98–112)
CO2 SERPL-SCNC: 29 MMOL/L (ref 21–32)
CREAT BLD-MCNC: 1.46 MG/DL
GFR SERPLBLD BASED ON 1.73 SQ M-ARVRAT: 37 ML/MIN/1.73M2 (ref 60–?)
GLUCOSE BLD-MCNC: 141 MG/DL (ref 70–99)
OSMOLALITY SERPL CALC.SUM OF ELEC: 282 MOSM/KG (ref 275–295)
POTASSIUM SERPL-SCNC: 2.9 MMOL/L (ref 3.5–5.1)
POTASSIUM SERPL-SCNC: 4.1 MMOL/L (ref 3.5–5.1)
SODIUM SERPL-SCNC: 133 MMOL/L (ref 136–145)

## 2022-12-16 PROCEDURE — 99232 SBSQ HOSP IP/OBS MODERATE 35: CPT | Performed by: PHYSICIAN ASSISTANT

## 2022-12-16 PROCEDURE — 99233 SBSQ HOSP IP/OBS HIGH 50: CPT | Performed by: HOSPITALIST

## 2022-12-16 RX ORDER — AMOXICILLIN AND CLAVULANATE POTASSIUM 500; 125 MG/1; MG/1
1 TABLET, FILM COATED ORAL 2 TIMES DAILY
Qty: 6 TABLET | Refills: 0 | Status: SHIPPED | OUTPATIENT
Start: 2022-12-16 | End: 2022-12-19

## 2022-12-16 RX ORDER — POTASSIUM CHLORIDE 20 MEQ/1
40 TABLET, EXTENDED RELEASE ORAL EVERY 4 HOURS
Status: COMPLETED | OUTPATIENT
Start: 2022-12-16 | End: 2022-12-16

## 2022-12-16 RX ORDER — CETIRIZINE HYDROCHLORIDE 5 MG/1
5 TABLET ORAL DAILY
Status: DISCONTINUED | OUTPATIENT
Start: 2022-12-17 | End: 2022-12-20

## 2022-12-16 RX ORDER — AMOXICILLIN AND CLAVULANATE POTASSIUM 875; 125 MG/1; MG/1
1 TABLET, FILM COATED ORAL 2 TIMES DAILY
Qty: 6 TABLET | Refills: 0 | Status: SHIPPED | OUTPATIENT
Start: 2022-12-16 | End: 2022-12-16

## 2022-12-16 NOTE — PROGRESS NOTES
[facility] Pharmacy Note:  Renal Dose Adjustment for Cetirizine (ZYRTEC)    Kely Carmona has been prescribed Cetirizine (Zyrtec) 10 mg orally daily. Estimated Creatinine Clearance: 22.8 mL/min (A) (based on SCr of 1.46 mg/dL (H)). The dose has been changed to 5 mg orally daily per P&T approved protocol. Pharmacy will follow and resume original order if renal function improves.       Thank you,  Axel Coreas, PharmD  12/16/2022  1:32 PM  [address]: [phone]

## 2022-12-16 NOTE — PLAN OF CARE
Problem: Patient Centered Care  Goal: Patient preferences are identified and integrated in the patient's plan of care  Description: Interventions:  - What would you like us to know as we care for you? I live at home with my .  - Provide timely, complete, and accurate information to patient/family  - Incorporate patient and family knowledge, values, beliefs, and cultural backgrounds into the planning and delivery of care  - Encourage patient/family to participate in care and decision-making at the level they choose  - Honor patient and family perspectives and choices  Outcome: Progressing     Problem: METABOLIC/FLUID AND ELECTROLYTES - ADULT  Goal: Electrolytes maintained within normal limits  Description: INTERVENTIONS:  - Monitor labs and rhythm and assess patient for signs and symptoms of electrolyte imbalances  - Administer electrolyte replacement as ordered  - Monitor response to electrolyte replacements, including rhythm and repeat lab results as appropriate  - Fluid restriction as ordered  - Instruct patient on fluid and nutrition restrictions as appropriate  Outcome: Progressing     Problem: SKIN/TISSUE INTEGRITY - ADULT  Goal: Skin integrity remains intact  Description: INTERVENTIONS  - Assess and document risk factors for pressure ulcer development  - Assess and document skin integrity  - Monitor for areas of redness and/or skin breakdown  - Initiate interventions, skin care algorithm/standards of care as needed  Outcome: Progressing     Problem: SAFETY ADULT - FALL  Goal: Free from fall injury  Description: INTERVENTIONS:  - Assess pt frequently for physical needs  - Identify cognitive and physical deficits and behaviors that affect risk of falls.   - [place] fall precautions as indicated by assessment.  - Educate pt/family on patient safety including physical limitations  - Instruct pt to call for assistance with activity based on assessment  - Modify environment to reduce risk of injury  - Provide assistive devices as appropriate  - Consider OT/PT consult to assist with strengthening/mobility  - Encourage toileting schedule  Outcome: Progressing     Problem: PAIN - ADULT  Goal: Verbalizes/displays adequate comfort level or patient's stated pain goal  Description: INTERVENTIONS:  - Encourage pt to monitor pain and request assistance  - Assess pain using appropriate pain scale  - Administer analgesics based on type and severity of pain and evaluate response  - Implement non-pharmacological measures as appropriate and evaluate response  - Consider cultural and social influences on pain and pain management  - Manage/alleviate anxiety  - Utilize distraction and/or relaxation techniques  - Monitor for opioid side effects  - Notify MD/LIP if interventions unsuccessful or patient reports new pain  - Anticipate increased pain with activity and pre-medicate as appropriate  Outcome: Progressing     Problem: DISCHARGE PLANNING  Goal: Discharge to home or other facility with appropriate resources  Description: INTERVENTIONS:  - Identify barriers to discharge w/pt and caregiver  - Include patient/family/discharge partner in discharge planning  - Arrange for needed discharge resources and transportation as appropriate  - Identify discharge learning needs (meds, wound care, etc)  - Arrange for interpreters to assist at discharge as needed  - Consider post-discharge preferences of patient/family/discharge partner  - Complete POLST form as appropriate  - Assess patient's ability to be responsible for managing their own health  - Refer to Case Management Department for coordinating discharge planning if the patient needs post-hospital services based on physician/LIP order or complex needs related to functional status, cognitive ability or social support system  Outcome: Progressing     Problem: RESPIRATORY - ADULT  Goal: Achieves optimal ventilation and oxygenation  Description: INTERVENTIONS:  - Assess for changes in respiratory status  - Assess for changes in mentation and behavior  - Position to facilitate oxygenation and minimize respiratory effort  - Oxygen supplementation based on oxygen saturation or ABGs  - Provide Smoking Cessation handout, if applicable  - Encourage broncho-pulmonary hygiene including cough, deep breathe, Incentive Spirometry  - Assess the need for suctioning and perform as needed  - Assess and instruct to report SOB or any respiratory difficulty  - Respiratory Therapy support as indicated  - Manage/alleviate anxiety  - Monitor for signs/symptoms of CO2 retention  Outcome: Progressing     Vital signs stable at this time. No acute changes noted at this moment. Patient experiencing increased cough before bed, medication administered. Fall precautions in place, bed locked in lowest position. Call light within reach. Frequent rounding by nursing staff.

## 2022-12-16 NOTE — DIETARY NOTE
Brief Nutrition Note    Pt screened for LOS. Pt noted with good PO intake, % since admit. No weight loss noted. Skin intact. Pt not currently at nutrition risk. Will re-screen per protocol.     Kandis Ward MS, [address] [place] [address], [address] [place] [address]  Clinical Nutrition  [phone]

## 2022-12-16 NOTE — PHYSICAL THERAPY NOTE
PHYSICAL THERAPY TREATMENT NOTE - INPATIENT     Room Number: 366/588-D       Presenting Problem: pain with inspiration, community acquired pneumonia of left lower lobe of lung       Problem List  Principal Problem:    Community acquired pneumonia of left lower lobe of lung      PHYSICAL THERAPY ASSESSMENT     PPE donned this session to include: gloves, face mask. Patient received sitting up in chair, agreeable to PT treatment. Pt demonstrates good tolerance for ambulation in room, declined to navigate into hallway at this time. She did c/o mild dyspnea at end of ambulation bout however SpO2 at 96% on RA. Encouraged frequent mobilization with staff and ankle pumps while seated as pt reports mild edema to LEs from sitting. She was able to complete toileting tasks with supervision. Needs occasional cues for safety with use of walker. Recommended use of walker upon DC vs cane to improve stability/safety. Bed Mobility - NT  Transfers - Sit <> stand from chair and toilet with RW and CGA - cues for hand placement. Gait - Pt ambulates multiple laps in room for a total of 150 ft with RW and CGA - cues to maintain safe distance to walker especially when turning. The patient's Approx Degree of Impairment: 46.58% has been calculated based on documentation in the [facility] '6 clicks' Inpatient Basic Mobility Short Form. Research supports that patients with this level of impairment may benefit [facility] - rec 24 hour supervision to insure safety. DISCHARGE RECOMMENDATIONS  PT Discharge Recommendations: Home with home health PT;24 hour care/supervision     PLAN  PT Treatment Plan: Body mechanics; Endurance; Energy conservation;Patient education; Family education;Gait training;Range of motion;Strengthening;Stair training;Transfer training;Balance training  Frequency (Obs): 5x/week    SUBJECTIVE  \"I think I'm getting out of here tomorrow, I'm just worried about this cough. \"    OBJECTIVE  Precautions: Bed/chair alarm    WEIGHT BEARING RESTRICTION                PAIN ASSESSMENT   Rating:  (Not rated)  Location: Upper back/neck/shoulders  Management Techniques: Activity promotion; Body mechanics; Relaxation;Repositioning    BALANCE  Static Sitting: Fair +  Dynamic Sitting: Fair  Static Standing: Fair -  Dynamic Standing: Fair -    ACTIVITY TOLERANCE                          O2 WALK  Oxygen Therapy  SPO2% Ambulation on Room Air: 96    AM-PAC '6-Clicks' INPATIENT SHORT FORM - BASIC MOBILITY  How much difficulty does the patient currently have. .. Patient Difficulty: Turning over in bed (including adjusting bedclothes, sheets and blankets)?: A Little   Patient Difficulty: Sitting down on and standing up from a chair with arms (e.g., wheelchair, bedside commode, etc.): A Little   Patient Difficulty: Moving from lying on back to sitting on the side of the bed?: A Little   How much help from another person does the patient currently need. .. Help from Another: Moving to and from a bed to a chair (including a wheelchair)?: A Little   Help from Another: Need to walk in hospital room?: A Little   Help from Another: Climbing 3-5 steps with a railing?: A Little     AM-PAC Score:  Raw Score: 18   Approx Degree of Impairment: 46.58%   Standardized Score (AM-PAC Scale): 43.63   CMS Modifier (G-Code): CK    FUNCTIONAL ABILITY STATUS  Functional Mobility/Gait Assessment  Gait Assistance: Contact guard assist  Distance (ft): 150 ft  Assistive Device: Rolling walker  Pattern:  (Slow julissa, forward flexed posture)        THERAPEUTIC EXERCISES  Lower Extremity Ankle pumps     Position Sitting and Supine       Patient End of Session: Up in chair;Needs met;Call light within reach;RN aware of session/findings; All patient questions and concerns addressed; Alarm set    CURRENT GOALS   Goals to be met by: 12/19/22  Patient Goal Patient's self-stated goal is: go home   Goal #1 Patient is able to demonstrate supine - sit EOB @ level: modified independent     Goal #1   Current Status Goal met (previous session)   Goal #2 Patient is able to demonstrate transfers Sit to/from Stand at assistance level: modified independent with LRAD     Goal #2  Current Status CGA with RW   Goal #3 Patient is able to ambulate 300 feet with assist device: LRAD at assistance level: modified independent   Goal #3   Current Status 150 ft with RW and CGA   Goal #4 Patient will negotiate 6 stairs/one curb w/ assistive device and supervision   Goal #4   Current Status NT   Goal #5 Patient to demonstrate independence with home activity/exercise instructions provided to patient in preparation for discharge.    Goal #5   Current Status Ongoing

## 2022-12-17 LAB
ALBUMIN SERPL-MCNC: 2 G/DL (ref 3.4–5)
ANION GAP SERPL CALC-SCNC: 6 MMOL/L (ref 0–18)
ANION GAP SERPL CALC-SCNC: 6 MMOL/L (ref 0–18)
BASOPHILS # BLD AUTO: 0.05 X10(3) UL (ref 0–0.2)
BASOPHILS NFR BLD AUTO: 0.5 %
BUN BLD-MCNC: 20 MG/DL (ref 7–18)
BUN BLD-MCNC: 20 MG/DL (ref 7–18)
BUN/CREAT SERPL: 13.5 (ref 10–20)
BUN/CREAT SERPL: 13.5 (ref 10–20)
CALCIUM BLD-MCNC: 9.3 MG/DL (ref 8.5–10.1)
CALCIUM BLD-MCNC: 9.3 MG/DL (ref 8.5–10.1)
CHLORIDE SERPL-SCNC: 103 MMOL/L (ref 98–112)
CHLORIDE SERPL-SCNC: 103 MMOL/L (ref 98–112)
CO2 SERPL-SCNC: 29 MMOL/L (ref 21–32)
CO2 SERPL-SCNC: 29 MMOL/L (ref 21–32)
CREAT BLD-MCNC: 1.48 MG/DL
CREAT BLD-MCNC: 1.48 MG/DL
DEPRECATED RDW RBC AUTO: 44 FL (ref 35.1–46.3)
EOSINOPHIL # BLD AUTO: 0.42 X10(3) UL (ref 0–0.7)
EOSINOPHIL NFR BLD AUTO: 4.2 %
ERYTHROCYTE [DISTWIDTH] IN BLOOD BY AUTOMATED COUNT: 14 % (ref 11–15)
GFR SERPLBLD BASED ON 1.73 SQ M-ARVRAT: 36 ML/MIN/1.73M2 (ref 60–?)
GFR SERPLBLD BASED ON 1.73 SQ M-ARVRAT: 36 ML/MIN/1.73M2 (ref 60–?)
GLUCOSE BLD-MCNC: 110 MG/DL (ref 70–99)
GLUCOSE BLD-MCNC: 110 MG/DL (ref 70–99)
HCT VFR BLD AUTO: 26.4 %
HGB BLD-MCNC: 8.5 G/DL
IMM GRANULOCYTES # BLD AUTO: 0.05 X10(3) UL (ref 0–1)
IMM GRANULOCYTES NFR BLD: 0.5 %
LYMPHOCYTES # BLD AUTO: 2.21 X10(3) UL (ref 1–4)
LYMPHOCYTES NFR BLD AUTO: 22.1 %
MCH RBC QN AUTO: 28 PG (ref 26–34)
MCHC RBC AUTO-ENTMCNC: 32.2 G/DL (ref 31–37)
MCV RBC AUTO: 86.8 FL
MONOCYTES # BLD AUTO: 0.88 X10(3) UL (ref 0.1–1)
MONOCYTES NFR BLD AUTO: 8.8 %
NEUTROPHILS # BLD AUTO: 6.38 X10 (3) UL (ref 1.5–7.7)
NEUTROPHILS # BLD AUTO: 6.38 X10(3) UL (ref 1.5–7.7)
NEUTROPHILS NFR BLD AUTO: 63.9 %
OSMOLALITY SERPL CALC.SUM OF ELEC: 289 MOSM/KG (ref 275–295)
OSMOLALITY SERPL CALC.SUM OF ELEC: 289 MOSM/KG (ref 275–295)
PHOSPHATE SERPL-MCNC: 2.9 MG/DL (ref 2.5–4.9)
PLATELET # BLD AUTO: 368 10(3)UL (ref 150–450)
POTASSIUM SERPL-SCNC: 3.8 MMOL/L (ref 3.5–5.1)
POTASSIUM SERPL-SCNC: 3.8 MMOL/L (ref 3.5–5.1)
RBC # BLD AUTO: 3.04 X10(6)UL
SODIUM SERPL-SCNC: 138 MMOL/L (ref 136–145)
SODIUM SERPL-SCNC: 138 MMOL/L (ref 136–145)
WBC # BLD AUTO: 10 X10(3) UL (ref 4–11)

## 2022-12-17 PROCEDURE — 99233 SBSQ HOSP IP/OBS HIGH 50: CPT | Performed by: HOSPITALIST

## 2022-12-17 PROCEDURE — 99233 SBSQ HOSP IP/OBS HIGH 50: CPT | Performed by: INTERNAL MEDICINE

## 2022-12-17 RX ORDER — POTASSIUM CHLORIDE 20 MEQ/1
40 TABLET, EXTENDED RELEASE ORAL ONCE
Status: COMPLETED | OUTPATIENT
Start: 2022-12-17 | End: 2022-12-17

## 2022-12-17 NOTE — PLAN OF CARE
Problem: Patient Centered Care  Goal: Patient preferences are identified and integrated in the patient's plan of care  Description: Interventions:  - What would you like us to know as we care for you? I live at home with my .  - Provide timely, complete, and accurate information to patient/family  - Incorporate patient and family knowledge, values, beliefs, and cultural backgrounds into the planning and delivery of care  - Encourage patient/family to participate in care and decision-making at the level they choose  - Honor patient and family perspectives and choices  Outcome: Progressing     Problem: METABOLIC/FLUID AND ELECTROLYTES - ADULT  Goal: Electrolytes maintained within normal limits  Description: INTERVENTIONS:  - Monitor labs and rhythm and assess patient for signs and symptoms of electrolyte imbalances  - Administer electrolyte replacement as ordered  - Monitor response to electrolyte replacements, including rhythm and repeat lab results as appropriate  - Fluid restriction as ordered  - Instruct patient on fluid and nutrition restrictions as appropriate  Outcome: Progressing     Problem: SKIN/TISSUE INTEGRITY - ADULT  Goal: Skin integrity remains intact  Description: INTERVENTIONS  - Assess and document risk factors for pressure ulcer development  - Assess and document skin integrity  - Monitor for areas of redness and/or skin breakdown  - Initiate interventions, skin care algorithm/standards of care as needed  Outcome: Progressing     Problem: SAFETY ADULT - FALL  Goal: Free from fall injury  Description: INTERVENTIONS:  - Assess pt frequently for physical needs  - Identify cognitive and physical deficits and behaviors that affect risk of falls.   - [place] fall precautions as indicated by assessment.  - Educate pt/family on patient safety including physical limitations  - Instruct pt to call for assistance with activity based on assessment  - Modify environment to reduce risk of injury  - Provide assistive devices as appropriate  - Consider OT/PT consult to assist with strengthening/mobility  - Encourage toileting schedule  Outcome: Progressing     Problem: PAIN - ADULT  Goal: Verbalizes/displays adequate comfort level or patient's stated pain goal  Description: INTERVENTIONS:  - Encourage pt to monitor pain and request assistance  - Assess pain using appropriate pain scale  - Administer analgesics based on type and severity of pain and evaluate response  - Implement non-pharmacological measures as appropriate and evaluate response  - Consider cultural and social influences on pain and pain management  - Manage/alleviate anxiety  - Utilize distraction and/or relaxation techniques  - Monitor for opioid side effects  - Notify MD/LIP if interventions unsuccessful or patient reports new pain  - Anticipate increased pain with activity and pre-medicate as appropriate  Outcome: Progressing     Problem: DISCHARGE PLANNING  Goal: Discharge to home or other facility with appropriate resources  Description: INTERVENTIONS:  - Identify barriers to discharge w/pt and caregiver  - Include patient/family/discharge partner in discharge planning  - Arrange for needed discharge resources and transportation as appropriate  - Identify discharge learning needs (meds, wound care, etc)  - Arrange for interpreters to assist at discharge as needed  - Consider post-discharge preferences of patient/family/discharge partner  - Complete POLST form as appropriate  - Assess patient's ability to be responsible for managing their own health  - Refer to Case Management Department for coordinating discharge planning if the patient needs post-hospital services based on physician/LIP order or complex needs related to functional status, cognitive ability or social support system  Outcome: Progressing     Problem: RESPIRATORY - ADULT  Goal: Achieves optimal ventilation and oxygenation  Description: INTERVENTIONS:  - Assess for changes in respiratory status  - Assess for changes in mentation and behavior  - Position to facilitate oxygenation and minimize respiratory effort  - Oxygen supplementation based on oxygen saturation or ABGs  - Provide Smoking Cessation handout, if applicable  - Encourage broncho-pulmonary hygiene including cough, deep breathe, Incentive Spirometry  - Assess the need for suctioning and perform as needed  - Assess and instruct to report SOB or any respiratory difficulty  - Respiratory Therapy support as indicated  - Manage/alleviate anxiety  - Monitor for signs/symptoms of CO2 retention  Outcome: Progressing 12/17/22 ambulated in [place] (150 ft) per P.T. note, karishma. Diet, spo2 r/a sats>90's, reports anle swelling left>r, md aware on water pills, I&o, up to br with walker.   O2 sats >90s'

## 2022-12-17 NOTE — PLAN OF CARE
Problem: Patient Centered Care  Goal: Patient preferences are identified and integrated in the patient's plan of care  Description: Interventions:  - What would you like us to know as we care for you? I live at home with my .  - Provide timely, complete, and accurate information to patient/family  - Incorporate patient and family knowledge, values, beliefs, and cultural backgrounds into the planning and delivery of care  - Encourage patient/family to participate in care and decision-making at the level they choose  - Honor patient and family perspectives and choices  Outcome: Adequate for Discharge     Problem: METABOLIC/FLUID AND ELECTROLYTES - ADULT  Goal: Electrolytes maintained within normal limits  Description: INTERVENTIONS:  - Monitor labs and rhythm and assess patient for signs and symptoms of electrolyte imbalances  - Administer electrolyte replacement as ordered  - Monitor response to electrolyte replacements, including rhythm and repeat lab results as appropriate  - Fluid restriction as ordered  - Instruct patient on fluid and nutrition restrictions as appropriate  Outcome: Adequate for Discharge     Problem: SKIN/TISSUE INTEGRITY - ADULT  Goal: Skin integrity remains intact  Description: INTERVENTIONS  - Assess and document risk factors for pressure ulcer development  - Assess and document skin integrity  - Monitor for areas of redness and/or skin breakdown  - Initiate interventions, skin care algorithm/standards of care as needed  Outcome: Adequate for Discharge     Problem: SAFETY ADULT - FALL  Goal: Free from fall injury  Description: INTERVENTIONS:  - Assess pt frequently for physical needs  - Identify cognitive and physical deficits and behaviors that affect risk of falls.   - [place] fall precautions as indicated by assessment.  - Educate pt/family on patient safety including physical limitations  - Instruct pt to call for assistance with activity based on assessment  - Modify environment to reduce risk of injury  - Provide assistive devices as appropriate  - Consider OT/PT consult to assist with strengthening/mobility  - Encourage toileting schedule  Outcome: Adequate for Discharge     Problem: PAIN - ADULT  Goal: Verbalizes/displays adequate comfort level or patient's stated pain goal  Description: INTERVENTIONS:  - Encourage pt to monitor pain and request assistance  - Assess pain using appropriate pain scale  - Administer analgesics based on type and severity of pain and evaluate response  - Implement non-pharmacological measures as appropriate and evaluate response  - Consider cultural and social influences on pain and pain management  - Manage/alleviate anxiety  - Utilize distraction and/or relaxation techniques  - Monitor for opioid side effects  - Notify MD/LIP if interventions unsuccessful or patient reports new pain  - Anticipate increased pain with activity and pre-medicate as appropriate  Outcome: Adequate for Discharge     Problem: DISCHARGE PLANNING  Goal: Discharge to home or other facility with appropriate resources  Description: INTERVENTIONS:  - Identify barriers to discharge w/pt and caregiver  - Include patient/family/discharge partner in discharge planning  - Arrange for needed discharge resources and transportation as appropriate  - Identify discharge learning needs (meds, wound care, etc)  - Arrange for interpreters to assist at discharge as needed  - Consider post-discharge preferences of patient/family/discharge partner  - Complete POLST form as appropriate  - Assess patient's ability to be responsible for managing their own health  - Refer to Case Management Department for coordinating discharge planning if the patient needs post-hospital services based on physician/LIP order or complex needs related to functional status, cognitive ability or social support system  Outcome: Adequate for Discharge     Problem: RESPIRATORY - ADULT  Goal: Achieves optimal ventilation and oxygenation  Description: INTERVENTIONS:  - Assess for changes in respiratory status  - Assess for changes in mentation and behavior  - Position to facilitate oxygenation and minimize respiratory effort  - Oxygen supplementation based on oxygen saturation or ABGs  - Provide Smoking Cessation handout, if applicable  - Encourage broncho-pulmonary hygiene including cough, deep breathe, Incentive Spirometry  - Assess the need for suctioning and perform as needed  - Assess and instruct to report SOB or any respiratory difficulty  - Respiratory Therapy support as indicated  - Manage/alleviate anxiety  - Monitor for signs/symptoms of CO2 retention  Outcome: Adequate for Discharge   Patient with probable discharge tomorrow. Patient up to the chair and ambulating in the room. IV abx continued. Robitussin for cough.

## 2022-12-17 NOTE — PLAN OF CARE
A/O x4, PRN Tylenol given for temp 100.7 and pain. No other changes overnight. Continues on IV Zosyn. Call light within reach, bed alarm on, non-skid socks on, frequent rounding done. Possible discharge today. Problem: Patient Centered Care  Goal: Patient preferences are identified and integrated in the patient's plan of care  Description: Interventions:  - What would you like us to know as we care for you? I live at home with my .  - Provide timely, complete, and accurate information to patient/family  - Incorporate patient and family knowledge, values, beliefs, and cultural backgrounds into the planning and delivery of care  - Encourage patient/family to participate in care and decision-making at the level they choose  - Honor patient and family perspectives and choices  Outcome: Progressing     Problem: METABOLIC/FLUID AND ELECTROLYTES - ADULT  Goal: Electrolytes maintained within normal limits  Description: INTERVENTIONS:  - Monitor labs and rhythm and assess patient for signs and symptoms of electrolyte imbalances  - Administer electrolyte replacement as ordered  - Monitor response to electrolyte replacements, including rhythm and repeat lab results as appropriate  - Fluid restriction as ordered  - Instruct patient on fluid and nutrition restrictions as appropriate  Outcome: Progressing     Problem: SKIN/TISSUE INTEGRITY - ADULT  Goal: Skin integrity remains intact  Description: INTERVENTIONS  - Assess and document risk factors for pressure ulcer development  - Assess and document skin integrity  - Monitor for areas of redness and/or skin breakdown  - Initiate interventions, skin care algorithm/standards of care as needed  Outcome: Progressing     Problem: SAFETY ADULT - FALL  Goal: Free from fall injury  Description: INTERVENTIONS:  - Assess pt frequently for physical needs  - Identify cognitive and physical deficits and behaviors that affect risk of falls.   - [place] fall precautions as indicated by assessment.  - Educate pt/family on patient safety including physical limitations  - Instruct pt to call for assistance with activity based on assessment  - Modify environment to reduce risk of injury  - Provide assistive devices as appropriate  - Consider OT/PT consult to assist with strengthening/mobility  - Encourage toileting schedule  Outcome: Progressing     Problem: PAIN - ADULT  Goal: Verbalizes/displays adequate comfort level or patient's stated pain goal  Description: INTERVENTIONS:  - Encourage pt to monitor pain and request assistance  - Assess pain using appropriate pain scale  - Administer analgesics based on type and severity of pain and evaluate response  - Implement non-pharmacological measures as appropriate and evaluate response  - Consider cultural and social influences on pain and pain management  - Manage/alleviate anxiety  - Utilize distraction and/or relaxation techniques  - Monitor for opioid side effects  - Notify MD/LIP if interventions unsuccessful or patient reports new pain  - Anticipate increased pain with activity and pre-medicate as appropriate  Outcome: Progressing     Problem: DISCHARGE PLANNING  Goal: Discharge to home or other facility with appropriate resources  Description: INTERVENTIONS:  - Identify barriers to discharge w/pt and caregiver  - Include patient/family/discharge partner in discharge planning  - Arrange for needed discharge resources and transportation as appropriate  - Identify discharge learning needs (meds, wound care, etc)  - Arrange for interpreters to assist at discharge as needed  - Consider post-discharge preferences of patient/family/discharge partner  - Complete POLST form as appropriate  - Assess patient's ability to be responsible for managing their own health  - Refer to Case Management Department for coordinating discharge planning if the patient needs post-hospital services based on physician/LIP order or complex needs related to functional status, cognitive ability or social support system  Outcome: Progressing     Problem: RESPIRATORY - ADULT  Goal: Achieves optimal ventilation and oxygenation  Description: INTERVENTIONS:  - Assess for changes in respiratory status  - Assess for changes in mentation and behavior  - Position to facilitate oxygenation and minimize respiratory effort  - Oxygen supplementation based on oxygen saturation or ABGs  - Provide Smoking Cessation handout, if applicable  - Encourage broncho-pulmonary hygiene including cough, deep breathe, Incentive Spirometry  - Assess the need for suctioning and perform as needed  - Assess and instruct to report SOB or any respiratory difficulty  - Respiratory Therapy support as indicated  - Manage/alleviate anxiety  - Monitor for signs/symptoms of CO2 retention  Outcome: Progressing

## 2022-12-18 ENCOUNTER — APPOINTMENT (OUTPATIENT)
Dept: GENERAL RADIOLOGY | Facility: HOSPITAL | Age: 77
End: 2022-12-18
Attending: HOSPITALIST
Payer: MEDICARE

## 2022-12-18 LAB
ANION GAP SERPL CALC-SCNC: 6 MMOL/L (ref 0–18)
BASOPHILS # BLD AUTO: 0.07 X10(3) UL (ref 0–0.2)
BASOPHILS NFR BLD AUTO: 0.6 %
BUN BLD-MCNC: 16 MG/DL (ref 7–18)
BUN/CREAT SERPL: 11.4 (ref 10–20)
CALCIUM BLD-MCNC: 9.4 MG/DL (ref 8.5–10.1)
CHLORIDE SERPL-SCNC: 102 MMOL/L (ref 98–112)
CO2 SERPL-SCNC: 29 MMOL/L (ref 21–32)
CREAT BLD-MCNC: 1.4 MG/DL
DEPRECATED RDW RBC AUTO: 44 FL (ref 35.1–46.3)
EOSINOPHIL # BLD AUTO: 0.47 X10(3) UL (ref 0–0.7)
EOSINOPHIL NFR BLD AUTO: 4.3 %
ERYTHROCYTE [DISTWIDTH] IN BLOOD BY AUTOMATED COUNT: 13.9 % (ref 11–15)
GFR SERPLBLD BASED ON 1.73 SQ M-ARVRAT: 39 ML/MIN/1.73M2 (ref 60–?)
GLUCOSE BLD-MCNC: 114 MG/DL (ref 70–99)
HCT VFR BLD AUTO: 26 %
HGB BLD-MCNC: 8.5 G/DL
IMM GRANULOCYTES # BLD AUTO: 0.07 X10(3) UL (ref 0–1)
IMM GRANULOCYTES NFR BLD: 0.6 %
LYMPHOCYTES # BLD AUTO: 2.36 X10(3) UL (ref 1–4)
LYMPHOCYTES NFR BLD AUTO: 21.4 %
MCH RBC QN AUTO: 28.2 PG (ref 26–34)
MCHC RBC AUTO-ENTMCNC: 32.7 G/DL (ref 31–37)
MCV RBC AUTO: 86.4 FL
MONOCYTES # BLD AUTO: 1.19 X10(3) UL (ref 0.1–1)
MONOCYTES NFR BLD AUTO: 10.8 %
NEUTROPHILS # BLD AUTO: 6.86 X10 (3) UL (ref 1.5–7.7)
NEUTROPHILS # BLD AUTO: 6.86 X10(3) UL (ref 1.5–7.7)
NEUTROPHILS NFR BLD AUTO: 62.3 %
OSMOLALITY SERPL CALC.SUM OF ELEC: 286 MOSM/KG (ref 275–295)
PLATELET # BLD AUTO: 369 10(3)UL (ref 150–450)
POTASSIUM SERPL-SCNC: 3.5 MMOL/L (ref 3.5–5.1)
POTASSIUM SERPL-SCNC: 3.5 MMOL/L (ref 3.5–5.1)
POTASSIUM SERPL-SCNC: 3.9 MMOL/L (ref 3.5–5.1)
RBC # BLD AUTO: 3.01 X10(6)UL
SODIUM SERPL-SCNC: 137 MMOL/L (ref 136–145)
WBC # BLD AUTO: 11 X10(3) UL (ref 4–11)

## 2022-12-18 PROCEDURE — 99233 SBSQ HOSP IP/OBS HIGH 50: CPT | Performed by: INTERNAL MEDICINE

## 2022-12-18 PROCEDURE — 71045 X-RAY EXAM CHEST 1 VIEW: CPT | Performed by: HOSPITALIST

## 2022-12-18 PROCEDURE — 99233 SBSQ HOSP IP/OBS HIGH 50: CPT | Performed by: HOSPITALIST

## 2022-12-18 RX ORDER — POTASSIUM CHLORIDE 20 MEQ/1
40 TABLET, EXTENDED RELEASE ORAL EVERY 4 HOURS
Status: COMPLETED | OUTPATIENT
Start: 2022-12-18 | End: 2022-12-18

## 2022-12-18 NOTE — PLAN OF CARE
Patient has safety precautions in place. Bed in lowest position, alarm activated and call light within reach at all times. Problem: Patient Centered Care  Goal: Patient preferences are identified and integrated in the patient's plan of care  Description: Interventions:  - What would you like us to know as we care for you? I live at home with my .  - Provide timely, complete, and accurate information to patient/family  - Incorporate patient and family knowledge, values, beliefs, and cultural backgrounds into the planning and delivery of care  - Encourage patient/family to participate in care and decision-making at the level they choose  - Honor patient and family perspectives and choices  Outcome: Progressing     Problem: METABOLIC/FLUID AND ELECTROLYTES - ADULT  Goal: Electrolytes maintained within normal limits  Description: INTERVENTIONS:  - Monitor labs and rhythm and assess patient for signs and symptoms of electrolyte imbalances  - Administer electrolyte replacement as ordered  - Monitor response to electrolyte replacements, including rhythm and repeat lab results as appropriate  - Fluid restriction as ordered  - Instruct patient on fluid and nutrition restrictions as appropriate  Outcome: Progressing     Problem: SKIN/TISSUE INTEGRITY - ADULT  Goal: Skin integrity remains intact  Description: INTERVENTIONS  - Assess and document risk factors for pressure ulcer development  - Assess and document skin integrity  - Monitor for areas of redness and/or skin breakdown  - Initiate interventions, skin care algorithm/standards of care as needed  Outcome: Progressing     Problem: SAFETY ADULT - FALL  Goal: Free from fall injury  Description: INTERVENTIONS:  - Assess pt frequently for physical needs  - Identify cognitive and physical deficits and behaviors that affect risk of falls.   - [place] fall precautions as indicated by assessment.  - Educate pt/family on patient safety including physical limitations  - Instruct pt to call for assistance with activity based on assessment  - Modify environment to reduce risk of injury  - Provide assistive devices as appropriate  - Consider OT/PT consult to assist with strengthening/mobility  - Encourage toileting schedule  Outcome: Progressing     Problem: PAIN - ADULT  Goal: Verbalizes/displays adequate comfort level or patient's stated pain goal  Description: INTERVENTIONS:  - Encourage pt to monitor pain and request assistance  - Assess pain using appropriate pain scale  - Administer analgesics based on type and severity of pain and evaluate response  - Implement non-pharmacological measures as appropriate and evaluate response  - Consider cultural and social influences on pain and pain management  - Manage/alleviate anxiety  - Utilize distraction and/or relaxation techniques  - Monitor for opioid side effects  - Notify MD/LIP if interventions unsuccessful or patient reports new pain  - Anticipate increased pain with activity and pre-medicate as appropriate  Outcome: Progressing     Problem: DISCHARGE PLANNING  Goal: Discharge to home or other facility with appropriate resources  Description: INTERVENTIONS:  - Identify barriers to discharge w/pt and caregiver  - Include patient/family/discharge partner in discharge planning  - Arrange for needed discharge resources and transportation as appropriate  - Identify discharge learning needs (meds, wound care, etc)  - Arrange for interpreters to assist at discharge as needed  - Consider post-discharge preferences of patient/family/discharge partner  - Complete POLST form as appropriate  - Assess patient's ability to be responsible for managing their own health  - Refer to Case Management Department for coordinating discharge planning if the patient needs post-hospital services based on physician/LIP order or complex needs related to functional status, cognitive ability or social support system  Outcome: Progressing     Problem: RESPIRATORY - ADULT  Goal: Achieves optimal ventilation and oxygenation  Description: INTERVENTIONS:  - Assess for changes in respiratory status  - Assess for changes in mentation and behavior  - Position to facilitate oxygenation and minimize respiratory effort  - Oxygen supplementation based on oxygen saturation or ABGs  - Provide Smoking Cessation handout, if applicable  - Encourage broncho-pulmonary hygiene including cough, deep breathe, Incentive Spirometry  - Assess the need for suctioning and perform as needed  - Assess and instruct to report SOB or any respiratory difficulty  - Respiratory Therapy support as indicated  - Manage/alleviate anxiety  - Monitor for signs/symptoms of CO2 retention  Outcome: Progressing

## 2022-12-19 ENCOUNTER — APPOINTMENT (OUTPATIENT)
Dept: CT IMAGING | Facility: HOSPITAL | Age: 77
End: 2022-12-19
Attending: INTERNAL MEDICINE
Payer: MEDICARE

## 2022-12-19 LAB
ANION GAP SERPL CALC-SCNC: 7 MMOL/L (ref 0–18)
BUN BLD-MCNC: 18 MG/DL (ref 7–18)
BUN/CREAT SERPL: 12.8 (ref 10–20)
CALCIUM BLD-MCNC: 9.6 MG/DL (ref 8.5–10.1)
CHLORIDE SERPL-SCNC: 103 MMOL/L (ref 98–112)
CO2 SERPL-SCNC: 27 MMOL/L (ref 21–32)
CREAT BLD-MCNC: 1.41 MG/DL
GFR SERPLBLD BASED ON 1.73 SQ M-ARVRAT: 38 ML/MIN/1.73M2 (ref 60–?)
GLUCOSE BLD-MCNC: 121 MG/DL (ref 70–99)
OSMOLALITY SERPL CALC.SUM OF ELEC: 287 MOSM/KG (ref 275–295)
POTASSIUM SERPL-SCNC: 4 MMOL/L (ref 3.5–5.1)
PROCALCITONIN SERPL-MCNC: 0.27 NG/ML (ref ?–0.16)
SODIUM SERPL-SCNC: 137 MMOL/L (ref 136–145)

## 2022-12-19 PROCEDURE — 99232 SBSQ HOSP IP/OBS MODERATE 35: CPT | Performed by: PHYSICIAN ASSISTANT

## 2022-12-19 PROCEDURE — 71250 CT THORAX DX C-: CPT | Performed by: INTERNAL MEDICINE

## 2022-12-19 PROCEDURE — 99233 SBSQ HOSP IP/OBS HIGH 50: CPT | Performed by: HOSPITALIST

## 2022-12-19 RX ORDER — METHYLPREDNISOLONE SODIUM SUCCINATE 125 MG/2ML
60 INJECTION, POWDER, LYOPHILIZED, FOR SOLUTION INTRAMUSCULAR; INTRAVENOUS ONCE
Status: COMPLETED | OUTPATIENT
Start: 2022-12-19 | End: 2022-12-19

## 2022-12-19 NOTE — PLAN OF CARE
Patient has safety precautions in place. Bed in lowest position, bed alarm activated, and call light within reach at all times. Problem: Patient Centered Care  Goal: Patient preferences are identified and integrated in the patient's plan of care  Description: Interventions:  - What would you like us to know as we care for you? I live at home with my .  - Provide timely, complete, and accurate information to patient/family  - Incorporate patient and family knowledge, values, beliefs, and cultural backgrounds into the planning and delivery of care  - Encourage patient/family to participate in care and decision-making at the level they choose  - Honor patient and family perspectives and choices  Outcome: Progressing     Problem: METABOLIC/FLUID AND ELECTROLYTES - ADULT  Goal: Electrolytes maintained within normal limits  Description: INTERVENTIONS:  - Monitor labs and rhythm and assess patient for signs and symptoms of electrolyte imbalances  - Administer electrolyte replacement as ordered  - Monitor response to electrolyte replacements, including rhythm and repeat lab results as appropriate  - Fluid restriction as ordered  - Instruct patient on fluid and nutrition restrictions as appropriate  Outcome: Progressing     Problem: SKIN/TISSUE INTEGRITY - ADULT  Goal: Skin integrity remains intact  Description: INTERVENTIONS  - Assess and document risk factors for pressure ulcer development  - Assess and document skin integrity  - Monitor for areas of redness and/or skin breakdown  - Initiate interventions, skin care algorithm/standards of care as needed  Outcome: Progressing     Problem: SAFETY ADULT - FALL  Goal: Free from fall injury  Description: INTERVENTIONS:  - Assess pt frequently for physical needs  - Identify cognitive and physical deficits and behaviors that affect risk of falls.   - [place] fall precautions as indicated by assessment.  - Educate pt/family on patient safety including physical limitations  - Instruct pt to call for assistance with activity based on assessment  - Modify environment to reduce risk of injury  - Provide assistive devices as appropriate  - Consider OT/PT consult to assist with strengthening/mobility  - Encourage toileting schedule  Outcome: Progressing     Problem: PAIN - ADULT  Goal: Verbalizes/displays adequate comfort level or patient's stated pain goal  Description: INTERVENTIONS:  - Encourage pt to monitor pain and request assistance  - Assess pain using appropriate pain scale  - Administer analgesics based on type and severity of pain and evaluate response  - Implement non-pharmacological measures as appropriate and evaluate response  - Consider cultural and social influences on pain and pain management  - Manage/alleviate anxiety  - Utilize distraction and/or relaxation techniques  - Monitor for opioid side effects  - Notify MD/LIP if interventions unsuccessful or patient reports new pain  - Anticipate increased pain with activity and pre-medicate as appropriate  Outcome: Progressing     Problem: DISCHARGE PLANNING  Goal: Discharge to home or other facility with appropriate resources  Description: INTERVENTIONS:  - Identify barriers to discharge w/pt and caregiver  - Include patient/family/discharge partner in discharge planning  - Arrange for needed discharge resources and transportation as appropriate  - Identify discharge learning needs (meds, wound care, etc)  - Arrange for interpreters to assist at discharge as needed  - Consider post-discharge preferences of patient/family/discharge partner  - Complete POLST form as appropriate  - Assess patient's ability to be responsible for managing their own health  - Refer to Case Management Department for coordinating discharge planning if the patient needs post-hospital services based on physician/LIP order or complex needs related to functional status, cognitive ability or social support system  Outcome: Progressing Problem: RESPIRATORY - ADULT  Goal: Achieves optimal ventilation and oxygenation  Description: INTERVENTIONS:  - Assess for changes in respiratory status  - Assess for changes in mentation and behavior  - Position to facilitate oxygenation and minimize respiratory effort  - Oxygen supplementation based on oxygen saturation or ABGs  - Provide Smoking Cessation handout, if applicable  - Encourage broncho-pulmonary hygiene including cough, deep breathe, Incentive Spirometry  - Assess the need for suctioning and perform as needed  - Assess and instruct to report SOB or any respiratory difficulty  - Respiratory Therapy support as indicated  - Manage/alleviate anxiety  - Monitor for signs/symptoms of CO2 retention  Outcome: Progressing

## 2022-12-19 NOTE — PLAN OF CARE
Problem: Patient Centered Care  Goal: Patient preferences are identified and integrated in the patient's plan of care  Description: Interventions:  - What would you like us to know as we care for you? I live at home with my .  - Provide timely, complete, and accurate information to patient/family  - Incorporate patient and family knowledge, values, beliefs, and cultural backgrounds into the planning and delivery of care  - Encourage patient/family to participate in care and decision-making at the level they choose  - Honor patient and family perspectives and choices  Outcome: Progressing     Problem: METABOLIC/FLUID AND ELECTROLYTES - ADULT  Goal: Electrolytes maintained within normal limits  Description: INTERVENTIONS:  - Monitor labs and rhythm and assess patient for signs and symptoms of electrolyte imbalances  - Administer electrolyte replacement as ordered  - Monitor response to electrolyte replacements, including rhythm and repeat lab results as appropriate  - Fluid restriction as ordered  - Instruct patient on fluid and nutrition restrictions as appropriate  Outcome: Progressing     Problem: SKIN/TISSUE INTEGRITY - ADULT  Goal: Skin integrity remains intact  Description: INTERVENTIONS  - Assess and document risk factors for pressure ulcer development  - Assess and document skin integrity  - Monitor for areas of redness and/or skin breakdown  - Initiate interventions, skin care algorithm/standards of care as needed  Outcome: Progressing     Problem: SAFETY ADULT - FALL  Goal: Free from fall injury  Description: INTERVENTIONS:  - Assess pt frequently for physical needs  - Identify cognitive and physical deficits and behaviors that affect risk of falls.   - [place] fall precautions as indicated by assessment.  - Educate pt/family on patient safety including physical limitations  - Instruct pt to call for assistance with activity based on assessment  - Modify environment to reduce risk of injury  - Provide assistive devices as appropriate  - Consider OT/PT consult to assist with strengthening/mobility  - Encourage toileting schedule  Outcome: Progressing     Problem: PAIN - ADULT  Goal: Verbalizes/displays adequate comfort level or patient's stated pain goal  Description: INTERVENTIONS:  - Encourage pt to monitor pain and request assistance  - Assess pain using appropriate pain scale  - Administer analgesics based on type and severity of pain and evaluate response  - Implement non-pharmacological measures as appropriate and evaluate response  - Consider cultural and social influences on pain and pain management  - Manage/alleviate anxiety  - Utilize distraction and/or relaxation techniques  - Monitor for opioid side effects  - Notify MD/LIP if interventions unsuccessful or patient reports new pain  - Anticipate increased pain with activity and pre-medicate as appropriate  Outcome: Progressing     Problem: DISCHARGE PLANNING  Goal: Discharge to home or other facility with appropriate resources  Description: INTERVENTIONS:  - Identify barriers to discharge w/pt and caregiver  - Include patient/family/discharge partner in discharge planning  - Arrange for needed discharge resources and transportation as appropriate  - Identify discharge learning needs (meds, wound care, etc)  - Arrange for interpreters to assist at discharge as needed  - Consider post-discharge preferences of patient/family/discharge partner  - Complete POLST form as appropriate  - Assess patient's ability to be responsible for managing their own health  - Refer to Case Management Department for coordinating discharge planning if the patient needs post-hospital services based on physician/LIP order or complex needs related to functional status, cognitive ability or social support system  Outcome: Progressing     Problem: RESPIRATORY - ADULT  Goal: Achieves optimal ventilation and oxygenation  Description: INTERVENTIONS:  - Assess for changes in respiratory status  - Assess for changes in mentation and behavior  - Position to facilitate oxygenation and minimize respiratory effort  - Oxygen supplementation based on oxygen saturation or ABGs  - Provide Smoking Cessation handout, if applicable  - Encourage broncho-pulmonary hygiene including cough, deep breathe, Incentive Spirometry  - Assess the need for suctioning and perform as needed  - Assess and instruct to report SOB or any respiratory difficulty  - Respiratory Therapy support as indicated  - Manage/alleviate anxiety  - Monitor for signs/symptoms of CO2 retention  Outcome: Progressing  Patient a/ox4; denies pain; VSS; maintaining >90% on room air; had repeat CXR today; up to chair for meals; bed/chair alarms active/audible; call light within reach.

## 2022-12-20 VITALS
DIASTOLIC BLOOD PRESSURE: 62 MMHG | OXYGEN SATURATION: 97 % | SYSTOLIC BLOOD PRESSURE: 139 MMHG | BODY MASS INDEX: 28.43 KG/M2 | RESPIRATION RATE: 18 BRPM | HEART RATE: 85 BPM | WEIGHT: 144.81 LBS | HEIGHT: 60 IN | TEMPERATURE: 98 F

## 2022-12-20 LAB
ANION GAP SERPL CALC-SCNC: 11 MMOL/L (ref 0–18)
ANION GAP SERPL CALC-SCNC: 13 MMOL/L (ref 0–18)
ATRIAL RATE: 89 BPM
BASOPHILS # BLD AUTO: 0.02 X10(3) UL (ref 0–0.2)
BASOPHILS NFR BLD AUTO: 0.1 %
BUN BLD-MCNC: 21 MG/DL (ref 7–18)
BUN BLD-MCNC: 21 MG/DL (ref 7–18)
BUN/CREAT SERPL: 12.8 (ref 10–20)
BUN/CREAT SERPL: 15.1 (ref 10–20)
CALCIUM BLD-MCNC: 9.4 MG/DL (ref 8.5–10.1)
CALCIUM BLD-MCNC: 9.7 MG/DL (ref 8.5–10.1)
CHLORIDE SERPL-SCNC: 100 MMOL/L (ref 98–112)
CHLORIDE SERPL-SCNC: 98 MMOL/L (ref 98–112)
CO2 SERPL-SCNC: 24 MMOL/L (ref 21–32)
CO2 SERPL-SCNC: 24 MMOL/L (ref 21–32)
CREAT BLD-MCNC: 1.39 MG/DL
CREAT BLD-MCNC: 1.64 MG/DL
DEPRECATED RDW RBC AUTO: 43.2 FL (ref 35.1–46.3)
EOSINOPHIL # BLD AUTO: 0 X10(3) UL (ref 0–0.7)
EOSINOPHIL NFR BLD AUTO: 0 %
ERYTHROCYTE [DISTWIDTH] IN BLOOD BY AUTOMATED COUNT: 13.8 % (ref 11–15)
GFR SERPLBLD BASED ON 1.73 SQ M-ARVRAT: 32 ML/MIN/1.73M2 (ref 60–?)
GFR SERPLBLD BASED ON 1.73 SQ M-ARVRAT: 39 ML/MIN/1.73M2 (ref 60–?)
GLUCOSE BLD-MCNC: 205 MG/DL (ref 70–99)
GLUCOSE BLD-MCNC: 343 MG/DL (ref 70–99)
HCT VFR BLD AUTO: 28.1 %
HGB BLD-MCNC: 9 G/DL
IMM GRANULOCYTES # BLD AUTO: 0.13 X10(3) UL (ref 0–1)
IMM GRANULOCYTES NFR BLD: 0.7 %
LYMPHOCYTES # BLD AUTO: 1.67 X10(3) UL (ref 1–4)
LYMPHOCYTES NFR BLD AUTO: 9.3 %
MAGNESIUM SERPL-MCNC: 2.3 MG/DL (ref 1.6–2.6)
MCH RBC QN AUTO: 27.3 PG (ref 26–34)
MCHC RBC AUTO-ENTMCNC: 32 G/DL (ref 31–37)
MCV RBC AUTO: 85.2 FL
MONOCYTES # BLD AUTO: 0.83 X10(3) UL (ref 0.1–1)
MONOCYTES NFR BLD AUTO: 4.6 %
NEUTROPHILS # BLD AUTO: 15.39 X10 (3) UL (ref 1.5–7.7)
NEUTROPHILS # BLD AUTO: 15.39 X10(3) UL (ref 1.5–7.7)
NEUTROPHILS NFR BLD AUTO: 85.3 %
OSMOLALITY SERPL CALC.SUM OF ELEC: 289 MOSM/KG (ref 275–295)
OSMOLALITY SERPL CALC.SUM OF ELEC: 297 MOSM/KG (ref 275–295)
P AXIS: 65 DEGREES
P-R INTERVAL: 168 MS
PLATELET # BLD AUTO: 472 10(3)UL (ref 150–450)
POTASSIUM SERPL-SCNC: 3.5 MMOL/L (ref 3.5–5.1)
POTASSIUM SERPL-SCNC: 3.6 MMOL/L (ref 3.5–5.1)
POTASSIUM SERPL-SCNC: 3.9 MMOL/L (ref 3.5–5.1)
Q-T INTERVAL: 448 MS
QRS DURATION: 152 MS
QTC CALCULATION (BEZET): 545 MS
R AXIS: -38 DEGREES
RBC # BLD AUTO: 3.3 X10(6)UL
SODIUM SERPL-SCNC: 135 MMOL/L (ref 136–145)
SODIUM SERPL-SCNC: 135 MMOL/L (ref 136–145)
T AXIS: 122 DEGREES
TROPONIN I HIGH SENSITIVITY: 16 NG/L
TROPONIN I HIGH SENSITIVITY: 17 NG/L
VENTRICULAR RATE: 89 BPM
WBC # BLD AUTO: 18 X10(3) UL (ref 4–11)

## 2022-12-20 PROCEDURE — 99239 HOSP IP/OBS DSCHRG MGMT >30: CPT | Performed by: HOSPITALIST

## 2022-12-20 PROCEDURE — 99232 SBSQ HOSP IP/OBS MODERATE 35: CPT | Performed by: INTERNAL MEDICINE

## 2022-12-20 RX ORDER — POTASSIUM CHLORIDE 1500 MG/1
20 TABLET, FILM COATED, EXTENDED RELEASE ORAL 2 TIMES DAILY
Status: ON HOLD | COMMUNITY
Start: 2022-12-20

## 2022-12-20 RX ORDER — BENZONATATE 200 MG/1
200 CAPSULE ORAL 3 TIMES DAILY PRN
Qty: 30 CAPSULE | Refills: 0 | Status: ON HOLD | OUTPATIENT
Start: 2022-12-20

## 2022-12-20 RX ORDER — PREDNISONE 10 MG/1
TABLET ORAL
Qty: 21 TABLET | Refills: 0 | Status: ON HOLD | OUTPATIENT
Start: 2022-12-20

## 2022-12-20 RX ORDER — POTASSIUM CHLORIDE 20 MEQ/1
40 TABLET, EXTENDED RELEASE ORAL EVERY 4 HOURS
Status: COMPLETED | OUTPATIENT
Start: 2022-12-20 | End: 2022-12-20

## 2022-12-20 RX ORDER — METHYLPREDNISOLONE SODIUM SUCCINATE 125 MG/2ML
60 INJECTION, POWDER, LYOPHILIZED, FOR SOLUTION INTRAMUSCULAR; INTRAVENOUS ONCE
Status: COMPLETED | OUTPATIENT
Start: 2022-12-20 | End: 2022-12-20

## 2022-12-20 RX ORDER — METOPROLOL TARTRATE 37.5 MG/1
37.5 TABLET, FILM COATED ORAL
Qty: 90 TABLET | Refills: 0 | Status: ON HOLD | OUTPATIENT
Start: 2022-12-20

## 2022-12-20 NOTE — PLAN OF CARE
Problem: Patient Centered Care  Goal: Patient preferences are identified and integrated in the patient's plan of care  Description: Interventions:  - What would you like us to know as we care for you? I live at home with my .  - Provide timely, complete, and accurate information to patient/family  - Incorporate patient and family knowledge, values, beliefs, and cultural backgrounds into the planning and delivery of care  - Encourage patient/family to participate in care and decision-making at the level they choose  - Honor patient and family perspectives and choices  Outcome: Progressing     Problem: METABOLIC/FLUID AND ELECTROLYTES - ADULT  Goal: Electrolytes maintained within normal limits  Description: INTERVENTIONS:  - Monitor labs and rhythm and assess patient for signs and symptoms of electrolyte imbalances  - Administer electrolyte replacement as ordered  - Monitor response to electrolyte replacements, including rhythm and repeat lab results as appropriate  - Fluid restriction as ordered  - Instruct patient on fluid and nutrition restrictions as appropriate  Outcome: Progressing     Problem: SKIN/TISSUE INTEGRITY - ADULT  Goal: Skin integrity remains intact  Description: INTERVENTIONS  - Assess and document risk factors for pressure ulcer development  - Assess and document skin integrity  - Monitor for areas of redness and/or skin breakdown  - Initiate interventions, skin care algorithm/standards of care as needed  Outcome: Progressing     Problem: SAFETY ADULT - FALL  Goal: Free from fall injury  Description: INTERVENTIONS:  - Assess pt frequently for physical needs  - Identify cognitive and physical deficits and behaviors that affect risk of falls.   - [place] fall precautions as indicated by assessment.  - Educate pt/family on patient safety including physical limitations  - Instruct pt to call for assistance with activity based on assessment  - Modify environment to reduce risk of injury  - Provide assistive devices as appropriate  - Consider OT/PT consult to assist with strengthening/mobility  - Encourage toileting schedule  Outcome: Progressing     Problem: PAIN - ADULT  Goal: Verbalizes/displays adequate comfort level or patient's stated pain goal  Description: INTERVENTIONS:  - Encourage pt to monitor pain and request assistance  - Assess pain using appropriate pain scale  - Administer analgesics based on type and severity of pain and evaluate response  - Implement non-pharmacological measures as appropriate and evaluate response  - Consider cultural and social influences on pain and pain management  - Manage/alleviate anxiety  - Utilize distraction and/or relaxation techniques  - Monitor for opioid side effects  - Notify MD/LIP if interventions unsuccessful or patient reports new pain  - Anticipate increased pain with activity and pre-medicate as appropriate  Outcome: Progressing     Problem: DISCHARGE PLANNING  Goal: Discharge to home or other facility with appropriate resources  Description: INTERVENTIONS:  - Identify barriers to discharge w/pt and caregiver  - Include patient/family/discharge partner in discharge planning  - Arrange for needed discharge resources and transportation as appropriate  - Identify discharge learning needs (meds, wound care, etc)  - Arrange for interpreters to assist at discharge as needed  - Consider post-discharge preferences of patient/family/discharge partner  - Complete POLST form as appropriate  - Assess patient's ability to be responsible for managing their own health  - Refer to Case Management Department for coordinating discharge planning if the patient needs post-hospital services based on physician/LIP order or complex needs related to functional status, cognitive ability or social support system  Outcome: Progressing     Problem: RESPIRATORY - ADULT  Goal: Achieves optimal ventilation and oxygenation  Description: INTERVENTIONS:  - Assess for changes in respiratory status  - Assess for changes in mentation and behavior  - Position to facilitate oxygenation and minimize respiratory effort  - Oxygen supplementation based on oxygen saturation or ABGs  - Provide Smoking Cessation handout, if applicable  - Encourage broncho-pulmonary hygiene including cough, deep breathe, Incentive Spirometry  - Assess the need for suctioning and perform as needed  - Assess and instruct to report SOB or any respiratory difficulty  - Respiratory Therapy support as indicated  - Manage/alleviate anxiety  - Monitor for signs/symptoms of CO2 retention  Outcome: Progressing     Ran 21 beats of v-tach, MD was paged, cardio put on consult, vital signs being monitored, frequent rounding by nursing staff, safety precautions in place, call light within reach

## 2022-12-20 NOTE — PLAN OF CARE
Patient discharged home with daughter at the bedside. PIV removed, discharge instructions provided and all questions answered. Problem: Patient Centered Care  Goal: Patient preferences are identified and integrated in the patient's plan of care  Description: Interventions:  - What would you like us to know as we care for you? I live at home with my .  - Provide timely, complete, and accurate information to patient/family  - Incorporate patient and family knowledge, values, beliefs, and cultural backgrounds into the planning and delivery of care  - Encourage patient/family to participate in care and decision-making at the level they choose  - Honor patient and family perspectives and choices  Outcome: Adequate for Discharge     Problem: METABOLIC/FLUID AND ELECTROLYTES - ADULT  Goal: Electrolytes maintained within normal limits  Description: INTERVENTIONS:  - Monitor labs and rhythm and assess patient for signs and symptoms of electrolyte imbalances  - Administer electrolyte replacement as ordered  - Monitor response to electrolyte replacements, including rhythm and repeat lab results as appropriate  - Fluid restriction as ordered  - Instruct patient on fluid and nutrition restrictions as appropriate  Outcome: Adequate for Discharge     Problem: SKIN/TISSUE INTEGRITY - ADULT  Goal: Skin integrity remains intact  Description: INTERVENTIONS  - Assess and document risk factors for pressure ulcer development  - Assess and document skin integrity  - Monitor for areas of redness and/or skin breakdown  - Initiate interventions, skin care algorithm/standards of care as needed  Outcome: Adequate for Discharge     Problem: SAFETY ADULT - FALL  Goal: Free from fall injury  Description: INTERVENTIONS:  - Assess pt frequently for physical needs  - Identify cognitive and physical deficits and behaviors that affect risk of falls.   - [place] fall precautions as indicated by assessment.  - Educate pt/family on patient safety including physical limitations  - Instruct pt to call for assistance with activity based on assessment  - Modify environment to reduce risk of injury  - Provide assistive devices as appropriate  - Consider OT/PT consult to assist with strengthening/mobility  - Encourage toileting schedule  Outcome: Adequate for Discharge     Problem: PAIN - ADULT  Goal: Verbalizes/displays adequate comfort level or patient's stated pain goal  Description: INTERVENTIONS:  - Encourage pt to monitor pain and request assistance  - Assess pain using appropriate pain scale  - Administer analgesics based on type and severity of pain and evaluate response  - Implement non-pharmacological measures as appropriate and evaluate response  - Consider cultural and social influences on pain and pain management  - Manage/alleviate anxiety  - Utilize distraction and/or relaxation techniques  - Monitor for opioid side effects  - Notify MD/LIP if interventions unsuccessful or patient reports new pain  - Anticipate increased pain with activity and pre-medicate as appropriate  Outcome: Adequate for Discharge     Problem: DISCHARGE PLANNING  Goal: Discharge to home or other facility with appropriate resources  Description: INTERVENTIONS:  - Identify barriers to discharge w/pt and caregiver  - Include patient/family/discharge partner in discharge planning  - Arrange for needed discharge resources and transportation as appropriate  - Identify discharge learning needs (meds, wound care, etc)  - Arrange for interpreters to assist at discharge as needed  - Consider post-discharge preferences of patient/family/discharge partner  - Complete POLST form as appropriate  - Assess patient's ability to be responsible for managing their own health  - Refer to Case Management Department for coordinating discharge planning if the patient needs post-hospital services based on physician/LIP order or complex needs related to functional status, cognitive ability or social support system  Outcome: Adequate for Discharge     Problem: RESPIRATORY - ADULT  Goal: Achieves optimal ventilation and oxygenation  Description: INTERVENTIONS:  - Assess for changes in respiratory status  - Assess for changes in mentation and behavior  - Position to facilitate oxygenation and minimize respiratory effort  - Oxygen supplementation based on oxygen saturation or ABGs  - Provide Smoking Cessation handout, if applicable  - Encourage broncho-pulmonary hygiene including cough, deep breathe, Incentive Spirometry  - Assess the need for suctioning and perform as needed  - Assess and instruct to report SOB or any respiratory difficulty  - Respiratory Therapy support as indicated  - Manage/alleviate anxiety  - Monitor for signs/symptoms of CO2 retention  Outcome: Adequate for Discharge

## 2022-12-20 NOTE — DISCHARGE PLANNING
MDO for discharge today. Patient chart reviewed for discharge: Medication Reconciliation completed, Specialist/PCP follow up listed, and disease specific Instructions/Education included in After Visit Summary. Discharge RN notified patient's RN of AVS completion and verified all consultants have signed off. Patient's RN to notify DC RN if discharge status changes.       Gunjan Mendoza RN, Discharge Leader

## 2022-12-20 NOTE — CM/SW NOTE
12/20/22 1300   Discharge disposition   Expected discharge disposition Home-Health   Post Acute Care Provider [facility]   Discharge transportation Private car     Pt discussed during nursing rounds. Pt is stable for dc today. MD dc order entered. [facility] will resume RN and PT services at [place], liaison [place] notified of patient's dc home today. Pt's daughter will provide transport at [place]. Plan: Home w/spouse with [facility] today. / to remain available for support and/or discharge planning.      RYAN Saucedo    [phone]

## 2022-12-21 ENCOUNTER — PATIENT OUTREACH (OUTPATIENT)
Dept: CASE MANAGEMENT | Age: 77
End: 2022-12-21

## 2022-12-21 ENCOUNTER — TELEPHONE (OUTPATIENT)
Dept: PULMONOLOGY | Facility: CLINIC | Age: 77
End: 2022-12-21

## 2022-12-21 ENCOUNTER — TELEPHONE (OUTPATIENT)
Dept: CARDIOLOGY CLINIC | Facility: HOSPITAL | Age: 77
End: 2022-12-21

## 2022-12-21 NOTE — PROGRESS NOTES
NCM placed TCM call with the assistance of an [facility] : Doreen Galdamez # [id number], ENDY requesting a call back to [phone] with a condition update.

## 2022-12-21 NOTE — PROGRESS NOTES
Spk to pts daughter Vanessa Doing who sts all HFU appts have been scheduled for cardiology, pulmonology, PCP and [facility]    Closing encounter

## 2022-12-21 NOTE — PROGRESS NOTES
NCM placed TCM call with the assistance of an [facility]  [place] # [id number], LM requesting a call back to [phone] with a condition update.

## 2022-12-21 NOTE — TELEPHONE ENCOUNTER
Brenda Bowen requesting patient be seen sooner than next avail for hospital follow up regarding pneumonia. Please call. Thank you.

## 2022-12-21 NOTE — PROGRESS NOTES
VM received; pt daughter, Karina Ty requesting assistance w/scheduling apt (dc 12/20)    [facility]  Cardiology  [facility]   Follow up 1 week (if they want to schedule, Allison or Ivette can schedule)    Dr Shalonda Delvalle  [address] [place] [address] [place] [facility]  [phone]  Follow up 1 week    Dr Parmjit Pérez, Cardiology, CARDIOLOGY  [facility]  [place] [address] [place] [address] [address]  [address] [phone]  Follow up 2 weeks    Dr Barak [place]ike [address] [place] [address]  [phone]  Follow up 3 weeks

## 2022-12-21 NOTE — TELEPHONE ENCOUNTER
Daughter called in that her mom had been in the hospital and was calling for an appt. Assisted to schedule for next Tuesday.

## 2022-12-22 NOTE — TELEPHONE ENCOUNTER
Spoke with patient's daughter, does not have patient's schedule with her. She will call pulmo office back.

## 2022-12-27 ENCOUNTER — OFFICE VISIT (OUTPATIENT)
Dept: CARDIOLOGY CLINIC | Facility: HOSPITAL | Age: 77
End: 2022-12-27
Attending: NURSE PRACTITIONER
Payer: MEDICARE

## 2022-12-27 VITALS
BODY MASS INDEX: 28 KG/M2 | WEIGHT: 143.88 LBS | HEART RATE: 86 BPM | TEMPERATURE: 98 F | DIASTOLIC BLOOD PRESSURE: 73 MMHG | OXYGEN SATURATION: 98 % | SYSTOLIC BLOOD PRESSURE: 130 MMHG

## 2022-12-27 DIAGNOSIS — I48.0 PAROXYSMAL ATRIAL FIBRILLATION (HCC): ICD-10-CM

## 2022-12-27 DIAGNOSIS — I25.10 CORONARY ARTERY DISEASE INVOLVING NATIVE CORONARY ARTERY OF NATIVE HEART WITHOUT ANGINA PECTORIS: ICD-10-CM

## 2022-12-27 DIAGNOSIS — J18.9 COMMUNITY ACQUIRED PNEUMONIA OF LEFT LOWER LOBE OF LUNG: Primary | ICD-10-CM

## 2022-12-27 DIAGNOSIS — M06.9 RHEUMATOID ARTHRITIS, INVOLVING UNSPECIFIED SITE, UNSPECIFIED WHETHER RHEUMATOID FACTOR PRESENT (HCC): ICD-10-CM

## 2022-12-27 DIAGNOSIS — I50.32 CHRONIC DIASTOLIC CONGESTIVE HEART FAILURE (HCC): ICD-10-CM

## 2022-12-27 PROCEDURE — 99213 OFFICE O/P EST LOW 20 MIN: CPT | Performed by: NURSE PRACTITIONER

## 2022-12-27 NOTE — PATIENT INSTRUCTIONS
Use your incentive spirometer 4 sets of 10 daily    Have influenza vaccine if appropriate     Continue all your same medications     Call if having any dizziness, lightheadedness, heart racing, palpitations, chest pain, shortness of breath, coughing, swelling, weight gain, fever, chills or worsening symptoms. Weigh yourself daily in the morning before breakfast, call if gaining 3 lbs or more overnight or more than 5 lbs in one week.      Follow 2000 mg sodium restricted , heart healthy diet, limit daily fluids to 48-64 ounces per day      Follow up with the specialty care clinic as needed or as directed

## 2022-12-27 NOTE — PROGRESS NOTES
Torin Hennessy is here with her daughter, Denies lightheadedness or dizziness, she reports very little shortness of breath with stairs otherwise no shortness of breath. She feels occasional palpitations when going up stairs. She denies chest pain, reports some swelling in toes. Unable to do 6 minute walk. Ambulated without mathew and standby assist approximately 150 feed. Dry cough after. Highest heart rate 103, lowest oxygen 95%. Concerned about inflammation returning after prednisone completed.

## 2022-12-30 ENCOUNTER — TELEPHONE (OUTPATIENT)
Dept: INTERNAL MEDICINE CLINIC | Facility: CLINIC | Age: 77
End: 2022-12-30

## 2022-12-30 NOTE — TELEPHONE ENCOUNTER
Gibran Lyons, physical therapy as [facility] calling to request verbal order for re-certification of extension of physical therapy 1x a week for 3 weeks.      PH: [phone], ok to leave voicemail

## 2022-12-30 NOTE — TELEPHONE ENCOUNTER
Unable to reach patient's daughter to schedule follow up appointment via phone. [email or web address] message sent.

## 2023-01-03 ENCOUNTER — TELEPHONE (OUTPATIENT)
Dept: INTERNAL MEDICINE CLINIC | Facility: CLINIC | Age: 78
End: 2023-01-03

## 2023-01-03 NOTE — TELEPHONE ENCOUNTER
Home health RN reports that she visited the pt yesterday. States that vital signs are good. O2 sats are 95%. .  There are some diminished breath sounds (pt is getting HH) after her hospitalization for pneumonia). Pt is confused now but is complaining of chest pain and back pain. I then called the pt's daughter. She states that she is monitoring the pt, pt has a PT appt tomorrow and [facility] Friday and then a f/u appt with Dr. Vasile Stinson on 01/09. Does not want to make an additional appt at this time. Will call back if pt's O2 sats drop.

## 2023-01-04 ENCOUNTER — TELEPHONE (OUTPATIENT)
Dept: INTERNAL MEDICINE CLINIC | Facility: CLINIC | Age: 78
End: 2023-01-04

## 2023-01-05 NOTE — TELEPHONE ENCOUNTER
Left voicemail for daughter to call office back if needed, updated that meds can be discussed at upcoming appt      Future Appointments   Date Time Provider César Estrada   1/9/2023  2:40 PM MD ANDREW ShermanO

## 2023-01-05 NOTE — TELEPHONE ENCOUNTER
I have not seen patient in 6 months/no hospital follow up, needs some sort of follow up/med reconcilliation to know what medications to stop/reduce. BP is low and likely cause of symptoms.

## 2023-01-06 ENCOUNTER — APPOINTMENT (OUTPATIENT)
Dept: GENERAL RADIOLOGY | Facility: HOSPITAL | Age: 78
End: 2023-01-06
Attending: EMERGENCY MEDICINE
Payer: MEDICARE

## 2023-01-06 ENCOUNTER — HOSPITAL ENCOUNTER (INPATIENT)
Facility: HOSPITAL | Age: 78
LOS: 7 days | Discharge: HOME HEALTH | End: 2023-01-13
Attending: EMERGENCY MEDICINE | Admitting: HOSPITALIST
Payer: MEDICARE

## 2023-01-06 ENCOUNTER — APPOINTMENT (OUTPATIENT)
Dept: CT IMAGING | Facility: HOSPITAL | Age: 78
End: 2023-01-06
Attending: EMERGENCY MEDICINE
Payer: MEDICARE

## 2023-01-06 ENCOUNTER — TELEPHONE (OUTPATIENT)
Dept: INTERNAL MEDICINE CLINIC | Facility: CLINIC | Age: 78
End: 2023-01-06

## 2023-01-06 DIAGNOSIS — I50.33 ACUTE ON CHRONIC DIASTOLIC (CONGESTIVE) HEART FAILURE (HCC): ICD-10-CM

## 2023-01-06 DIAGNOSIS — I95.9 HYPOTENSION, UNSPECIFIED HYPOTENSION TYPE: Primary | ICD-10-CM

## 2023-01-06 DIAGNOSIS — I10 HYPERTENSION GOAL BP (BLOOD PRESSURE) < 130/80: ICD-10-CM

## 2023-01-06 DIAGNOSIS — I35.0 MODERATE AORTIC STENOSIS BY PRIOR ECHOCARDIOGRAM: ICD-10-CM

## 2023-01-06 DIAGNOSIS — R53.1 WEAKNESS GENERALIZED: ICD-10-CM

## 2023-01-06 DIAGNOSIS — I50.32 CHRONIC DIASTOLIC CONGESTIVE HEART FAILURE (HCC): ICD-10-CM

## 2023-01-06 DIAGNOSIS — N28.9 RENAL INSUFFICIENCY: ICD-10-CM

## 2023-01-06 DIAGNOSIS — M79.89 LEG SWELLING: ICD-10-CM

## 2023-01-06 LAB
ALBUMIN SERPL-MCNC: 2.6 G/DL (ref 3.4–5)
ALP LIVER SERPL-CCNC: 65 U/L
ALT SERPL-CCNC: 17 U/L
ANION GAP SERPL CALC-SCNC: 11 MMOL/L (ref 0–18)
ANTIBODY SCREEN: NEGATIVE
AST SERPL-CCNC: 22 U/L (ref 15–37)
BASOPHILS # BLD AUTO: 0.03 X10(3) UL (ref 0–0.2)
BASOPHILS NFR BLD AUTO: 0.2 %
BILIRUB DIRECT SERPL-MCNC: 0.2 MG/DL (ref 0–0.2)
BILIRUB SERPL-MCNC: 0.5 MG/DL (ref 0.1–2)
BILIRUB UR QL: NEGATIVE
BUN BLD-MCNC: 62 MG/DL (ref 7–18)
BUN/CREAT SERPL: 20.3 (ref 10–20)
CALCIUM BLD-MCNC: 9.2 MG/DL (ref 8.5–10.1)
CHLORIDE SERPL-SCNC: 102 MMOL/L (ref 98–112)
CLARITY UR: CLEAR
CO2 SERPL-SCNC: 21 MMOL/L (ref 21–32)
COLOR UR: YELLOW
CORTIS SERPL-MCNC: 20.4 UG/DL
CREAT BLD-MCNC: 3.06 MG/DL
DEPRECATED RDW RBC AUTO: 50.8 FL (ref 35.1–46.3)
EOSINOPHIL # BLD AUTO: 0.27 X10(3) UL (ref 0–0.7)
EOSINOPHIL NFR BLD AUTO: 2.1 %
ERYTHROCYTE [DISTWIDTH] IN BLOOD BY AUTOMATED COUNT: 16.3 % (ref 11–15)
FLUAV + FLUBV RNA SPEC NAA+PROBE: NEGATIVE
FLUAV + FLUBV RNA SPEC NAA+PROBE: NEGATIVE
GFR SERPLBLD BASED ON 1.73 SQ M-ARVRAT: 15 ML/MIN/1.73M2 (ref 60–?)
GLUCOSE BLD-MCNC: 133 MG/DL (ref 70–99)
GLUCOSE BLDC GLUCOMTR-MCNC: 141 MG/DL (ref 70–99)
GLUCOSE UR-MCNC: NEGATIVE MG/DL
HCT VFR BLD AUTO: 29.8 %
HGB BLD-MCNC: 9.6 G/DL
HYALINE CASTS #/AREA URNS AUTO: PRESENT /LPF
IMM GRANULOCYTES # BLD AUTO: 0.06 X10(3) UL (ref 0–1)
IMM GRANULOCYTES NFR BLD: 0.5 %
INR BLD: 1.39 (ref 0.85–1.16)
KETONES UR-MCNC: NEGATIVE MG/DL
LEUKOCYTE ESTERASE UR QL STRIP.AUTO: NEGATIVE
LIPASE SERPL-CCNC: 149 U/L (ref 73–393)
LYMPHOCYTES # BLD AUTO: 2.35 X10(3) UL (ref 1–4)
LYMPHOCYTES NFR BLD AUTO: 18 %
MCH RBC QN AUTO: 27.5 PG (ref 26–34)
MCHC RBC AUTO-ENTMCNC: 32.2 G/DL (ref 31–37)
MCV RBC AUTO: 85.4 FL
MONOCYTES # BLD AUTO: 0.86 X10(3) UL (ref 0.1–1)
MONOCYTES NFR BLD AUTO: 6.6 %
NEUTROPHILS # BLD AUTO: 9.51 X10 (3) UL (ref 1.5–7.7)
NEUTROPHILS # BLD AUTO: 9.51 X10(3) UL (ref 1.5–7.7)
NEUTROPHILS NFR BLD AUTO: 72.6 %
NITRITE UR QL STRIP.AUTO: NEGATIVE
NT-PROBNP SERPL-MCNC: 2067 PG/ML (ref ?–450)
OSMOLALITY SERPL CALC.SUM OF ELEC: 298 MOSM/KG (ref 275–295)
PH UR: 5 [PH] (ref 5–8)
PLATELET # BLD AUTO: 148 10(3)UL (ref 150–450)
POTASSIUM SERPL-SCNC: 4.4 MMOL/L (ref 3.5–5.1)
PROT SERPL-MCNC: 7.5 G/DL (ref 6.4–8.2)
PROTHROMBIN TIME: 16.8 SECONDS (ref 11.6–14.8)
RBC # BLD AUTO: 3.49 X10(6)UL
RH BLOOD TYPE: POSITIVE
RSV RNA SPEC NAA+PROBE: NEGATIVE
SARS-COV-2 RNA RESP QL NAA+PROBE: NOT DETECTED
SODIUM SERPL-SCNC: 134 MMOL/L (ref 136–145)
SP GR UR STRIP: 1.01 (ref 1–1.03)
TROPONIN I HIGH SENSITIVITY: 52 NG/L
UROBILINOGEN UR STRIP-ACNC: 0.2
WBC # BLD AUTO: 13.1 X10(3) UL (ref 4–11)

## 2023-01-06 PROCEDURE — 70450 CT HEAD/BRAIN W/O DYE: CPT | Performed by: EMERGENCY MEDICINE

## 2023-01-06 PROCEDURE — 99223 1ST HOSP IP/OBS HIGH 75: CPT | Performed by: HOSPITALIST

## 2023-01-06 PROCEDURE — 71045 X-RAY EXAM CHEST 1 VIEW: CPT | Performed by: EMERGENCY MEDICINE

## 2023-01-06 RX ORDER — SODIUM CHLORIDE 9 MG/ML
INJECTION, SOLUTION INTRAVENOUS CONTINUOUS
Status: DISCONTINUED | OUTPATIENT
Start: 2023-01-06 | End: 2023-01-08

## 2023-01-06 RX ORDER — ONDANSETRON 2 MG/ML
4 INJECTION INTRAMUSCULAR; INTRAVENOUS EVERY 6 HOURS PRN
Status: DISCONTINUED | OUTPATIENT
Start: 2023-01-06 | End: 2023-01-13

## 2023-01-06 RX ORDER — METOCLOPRAMIDE HYDROCHLORIDE 5 MG/ML
5 INJECTION INTRAMUSCULAR; INTRAVENOUS EVERY 8 HOURS PRN
Status: DISCONTINUED | OUTPATIENT
Start: 2023-01-06 | End: 2023-01-13

## 2023-01-06 RX ORDER — ACETAMINOPHEN 500 MG
500 TABLET ORAL EVERY 4 HOURS PRN
Status: DISCONTINUED | OUTPATIENT
Start: 2023-01-06 | End: 2023-01-13

## 2023-01-06 RX ORDER — HEPARIN SODIUM 5000 [USP'U]/ML
5000 INJECTION, SOLUTION INTRAVENOUS; SUBCUTANEOUS EVERY 12 HOURS SCHEDULED
Status: DISCONTINUED | OUTPATIENT
Start: 2023-01-06 | End: 2023-01-13

## 2023-01-06 NOTE — ED QUICK NOTES
Orders for admission, patient is aware of plan and ready to go upstairs. Any questions, please call ED RN Lina Reardon at [address].      Patient Covid vaccination status: Fully vaccinated     COVID Test Ordered in ED: SARS-CoV-2/Flu A and B/RSV by PCR (GeneXpert)    COVID Suspicion at Admission: N/A    Running Infusions:  None    Mental Status/LOC at time of transport: AOx4    Other pertinent information:   CIWA score: N/A   NIH score:  N/A

## 2023-01-06 NOTE — H&P
[facility]    PATIENT'S NAME: Niall Maloney   ATTENDING PHYSICIAN: Carmelo Dooley MD   PATIENT ACCOUNT#:   [id number]    LOCATION:  [place] [address]  MEDICAL RECORD #:   [id number]       YOB: 1945  ADMISSION DATE:       01/06/2023    HISTORY AND PHYSICAL EXAMINATION    CHIEF COMPLAINT:  Acute renal failure, hypovolemia. HISTORY OF PRESENT ILLNESS:  Patient is a 57-year-old  female who was hospitalized mid December 2022 for 10 days for left lower lobe pneumonia, discharged home, and per the family, patient has not been feeling herself since she was discharged home. Looking at the record, it appeared that she was discharged on tapered dose of prednisone, but patient is not sure about her medication dose. It appears on the list that she is supposed to be on 10 mg of prednisone currently. Also reviewing the record, it is not clear if the patient was on prednisone prior to admission. From rheumatology records, patient was supposed to be on prednisone 5 mg chronically for rheumatoid arthritis. Today in the emergency room, her blood pressure upon arrival was in the mid 70s. CBC showed white blood cell count of 13.1 with left shift, hemoglobin 9.7. Chest x-ray showed no acute findings. CT scan of the brain still pending. Urinalysis showed no acute finding. No urinary tract infection. GFR is 15, which is way below her baseline; BUN and creatinine of 62 and 3.06; sodium 134. PAST MEDICAL HISTORY:  Patient has a history coronary artery disease, status post PCI stent to the circumflex, diagonal and LAD; aortic stenosis, status post TAVR procedure; paroxysmal atrial fibrillation. Latest was post TAVR procedure, and she wore a cardiac monitor for 14 days after recent hospital discharge, which is still to be delivered to cardiology department for further analysis. EKG today showed normal sinus rhythm; left bundle-branch block, which is chronic.   She also has a history of left ventricular diastolic dysfunction, hypertension, hyperlipidemia, osteoarthritis, chronic kidney disease stage 3, anemia of chronic kidney disease, rheumatoid arthritis, and gastroesophageal reflux disease. PAST SURGICAL HISTORY:  TAVR procedure. MEDICATIONS:  Please see medication reconciliation list.     ALLERGIES:  Sulfa. FAMILY HISTORY:  Positive for heart disease and hypertension. SOCIAL HISTORY:  No tobacco, alcohol, or drug use. Lives with her family. At baseline, independent for basic activities of daily living though she has been requiring assistance in her basic activities lately. REVIEW OF SYSTEMS:  Decreased energy and poor appetite. She continued to take her Bumex and other blood pressure medications including lisinopril. No fever or chills. No cough. Other 12-point review of systems is negative. PHYSICAL EXAMINATION:    GENERAL:  Alert and oriented to time, place and person. Fatigued. No acute distress. VITAL SIGNS:  Temperature 97.6, pulse 95, respiratory rate 24, blood pressure 86/49, pulse ox 98% on room air. HEENT:  Atraumatic. Oropharynx clear. Dry mucous membranes. Normal hard and soft palate. Eyes:  Anicteric sclerae. NECK:  Supple. No lymphadenopathy. Trachea midline. Full range of motion. LUNGS:  Clear to auscultation bilaterally. Normal respiratory effort. HEART:  Regular rate and rhythm. S1 and S2 auscultated. No murmur. ABDOMEN:  Soft, nondistended. No tenderness. Positive bowel sounds. EXTREMITIES:  Nonpitting edema, both legs. No clubbing or cyanosis. NEUROLOGIC:  Motor and sensory intact. ASSESSMENT:  Hypovolemia and hypotension with acute renal insufficiency, possibly related to overdiuresis and polypharmacy. Rule out underlying adrenal insufficiency. Also rule out gastrointestinal blood loss. Hemoglobin stable. PLAN:  Patient will be admitted to telemetry floor. Continue to monitor her rhythm. Fall precautions. IV fluids. IV Solu-Cortef. Obtain random cortisol level. Obtain endocrinology and cardiology consults. Hold blood pressure medications and diuretics. Further recommendations to follow.      Dictated By Sita Delaney MD  d: 01/06/2023 16:41:06  t: 01/06/2023 16:57:09  [facility] [id number]  NM/    cc: Sophia Duckworth MD

## 2023-01-06 NOTE — ED INITIAL ASSESSMENT (HPI)
PT TO ER STATES SHE HAD A HEART VALVE REPLACEMENT 2 MONTHS AGO AND STATES SHE DISCHARGED FROM [address] [place] [address] ON 12/20 AFTER BEING TREATED FOR PNEUMONIA. PT DENIES CHEST PAIN, STATES SHE IS SOB AND FEELS VERY WEAK, DENIES DIZZINESS, BP IS 76/50.

## 2023-01-06 NOTE — TELEPHONE ENCOUNTER
On-call page received and returned. Home health nurse calling stating patient's blood pressure is low and she has mental status changes. Patient recently discharged from the hospital due to pneumonia. Advised home health nurse to send patient back to the hospital.  She agreed and will arrange for transportation.

## 2023-01-07 ENCOUNTER — APPOINTMENT (OUTPATIENT)
Dept: ULTRASOUND IMAGING | Facility: HOSPITAL | Age: 78
End: 2023-01-07
Attending: HOSPITALIST
Payer: MEDICARE

## 2023-01-07 LAB
ANION GAP SERPL CALC-SCNC: 9 MMOL/L (ref 0–18)
ATRIAL RATE: 100 BPM
BASOPHILS # BLD AUTO: 0.02 X10(3) UL (ref 0–0.2)
BASOPHILS NFR BLD AUTO: 0.2 %
BUN BLD-MCNC: 50 MG/DL (ref 7–18)
BUN/CREAT SERPL: 22.1 (ref 10–20)
CALCIUM BLD-MCNC: 8.7 MG/DL (ref 8.5–10.1)
CHLORIDE SERPL-SCNC: 110 MMOL/L (ref 98–112)
CO2 SERPL-SCNC: 20 MMOL/L (ref 21–32)
CREAT BLD-MCNC: 2.26 MG/DL
DEPRECATED RDW RBC AUTO: 49.8 FL (ref 35.1–46.3)
EOSINOPHIL # BLD AUTO: 0.04 X10(3) UL (ref 0–0.7)
EOSINOPHIL NFR BLD AUTO: 0.5 %
ERYTHROCYTE [DISTWIDTH] IN BLOOD BY AUTOMATED COUNT: 15.9 % (ref 11–15)
GFR SERPLBLD BASED ON 1.73 SQ M-ARVRAT: 22 ML/MIN/1.73M2 (ref 60–?)
GLUCOSE BLD-MCNC: 162 MG/DL (ref 70–99)
HCT VFR BLD AUTO: 27 %
HGB BLD-MCNC: 8.6 G/DL
IMM GRANULOCYTES # BLD AUTO: 0.02 X10(3) UL (ref 0–1)
IMM GRANULOCYTES NFR BLD: 0.2 %
LYMPHOCYTES # BLD AUTO: 0.98 X10(3) UL (ref 1–4)
LYMPHOCYTES NFR BLD AUTO: 12.1 %
MCH RBC QN AUTO: 27.1 PG (ref 26–34)
MCHC RBC AUTO-ENTMCNC: 31.9 G/DL (ref 31–37)
MCV RBC AUTO: 85.2 FL
MONOCYTES # BLD AUTO: 0.3 X10(3) UL (ref 0.1–1)
MONOCYTES NFR BLD AUTO: 3.7 %
NEUTROPHILS # BLD AUTO: 6.71 X10 (3) UL (ref 1.5–7.7)
NEUTROPHILS # BLD AUTO: 6.71 X10(3) UL (ref 1.5–7.7)
NEUTROPHILS NFR BLD AUTO: 83.3 %
OSMOLALITY SERPL CALC.SUM OF ELEC: 305 MOSM/KG (ref 275–295)
P AXIS: 71 DEGREES
P-R INTERVAL: 142 MS
PLATELET # BLD AUTO: 123 10(3)UL (ref 150–450)
POTASSIUM SERPL-SCNC: 4.5 MMOL/L (ref 3.5–5.1)
Q-T INTERVAL: 384 MS
QRS DURATION: 138 MS
QTC CALCULATION (BEZET): 495 MS
R AXIS: 176 DEGREES
RBC # BLD AUTO: 3.17 X10(6)UL
SODIUM SERPL-SCNC: 139 MMOL/L (ref 136–145)
T AXIS: 21 DEGREES
T4 FREE SERPL-MCNC: 0.8 NG/DL (ref 0.8–1.7)
TSI SER-ACNC: 0.28 MIU/ML (ref 0.36–3.74)
VENTRICULAR RATE: 100 BPM
WBC # BLD AUTO: 8.1 X10(3) UL (ref 4–11)

## 2023-01-07 PROCEDURE — 99233 SBSQ HOSP IP/OBS HIGH 50: CPT | Performed by: HOSPITALIST

## 2023-01-07 PROCEDURE — 76775 US EXAM ABDO BACK WALL LIM: CPT | Performed by: HOSPITALIST

## 2023-01-07 RX ORDER — CLOPIDOGREL BISULFATE 75 MG/1
75 TABLET ORAL DAILY
Status: DISCONTINUED | OUTPATIENT
Start: 2023-01-07 | End: 2023-01-13

## 2023-01-07 RX ORDER — FOLIC ACID 1 MG/1
1 TABLET ORAL DAILY
Status: DISCONTINUED | OUTPATIENT
Start: 2023-01-07 | End: 2023-01-13

## 2023-01-07 RX ORDER — CETIRIZINE HYDROCHLORIDE 10 MG/1
10 TABLET ORAL DAILY
Status: DISCONTINUED | OUTPATIENT
Start: 2023-01-07 | End: 2023-01-08

## 2023-01-07 RX ORDER — ROSUVASTATIN CALCIUM 10 MG/1
10 TABLET, COATED ORAL NIGHTLY
Status: DISCONTINUED | OUTPATIENT
Start: 2023-01-07 | End: 2023-01-13

## 2023-01-07 RX ORDER — LEFLUNOMIDE 10 MG/1
10 TABLET ORAL DAILY
Status: DISCONTINUED | OUTPATIENT
Start: 2023-01-07 | End: 2023-01-13

## 2023-01-07 RX ORDER — PANTOPRAZOLE SODIUM 20 MG/1
20 TABLET, DELAYED RELEASE ORAL
Status: DISCONTINUED | OUTPATIENT
Start: 2023-01-08 | End: 2023-01-13

## 2023-01-07 RX ORDER — ASPIRIN 81 MG/1
81 TABLET ORAL DAILY
Status: DISCONTINUED | OUTPATIENT
Start: 2023-01-07 | End: 2023-01-13

## 2023-01-07 RX ORDER — MELATONIN
1000 DAILY
Status: DISCONTINUED | OUTPATIENT
Start: 2023-01-07 | End: 2023-01-13

## 2023-01-07 RX ORDER — ALBUTEROL SULFATE 90 UG/1
2 AEROSOL, METERED RESPIRATORY (INHALATION) EVERY 4 HOURS PRN
Status: DISCONTINUED | OUTPATIENT
Start: 2023-01-07 | End: 2023-01-13

## 2023-01-07 RX ORDER — BUMETANIDE 1 MG/1
1 TABLET ORAL DAILY
Status: DISCONTINUED | OUTPATIENT
Start: 2023-01-07 | End: 2023-01-07

## 2023-01-07 NOTE — PLAN OF CARE
Patient is alert and oriented on room air with no current complaints of pain. Vitals are stable and call light is within reach. Fluids running at 100. Safety measures implemented and plan is for dr to see in the AM.  Problem: Patient Centered Care  Goal: Patient preferences are identified and integrated in the patient's plan of care  Description: Interventions:  - What would you like us to know as we care for you? From home with   - Provide timely, complete, and accurate information to patient/family  - Incorporate patient and family knowledge, values, beliefs, and cultural backgrounds into the planning and delivery of care  - Encourage patient/family to participate in care and decision-making at the level they choose  - Honor patient and family perspectives and choices  Outcome: Progressing     Problem: CARDIOVASCULAR - ADULT  Goal: Maintains optimal cardiac output and hemodynamic stability  Description: INTERVENTIONS:  - Monitor vital signs, rhythm, and trends  - Monitor for bleeding, hypotension and signs of decreased cardiac output  - Evaluate effectiveness of vasoactive medications to optimize hemodynamic stability  - Monitor arterial and/or venous puncture sites for bleeding and/or hematoma  - Assess quality of pulses, skin color and temperature  - Assess for signs of decreased coronary artery perfusion - ex.  Angina  - Evaluate fluid balance, assess for edema, trend weights  Outcome: Progressing  Goal: Absence of cardiac arrhythmias or at baseline  Description: INTERVENTIONS:  - Continuous cardiac monitoring, monitor vital signs, obtain 12 lead EKG if indicated  - Evaluate effectiveness of antiarrhythmic and heart rate control medications as ordered  - Initiate emergency measures for life threatening arrhythmias  - Monitor electrolytes and administer replacement therapy as ordered  Outcome: Progressing     Problem: RESPIRATORY - ADULT  Goal: Achieves optimal ventilation and oxygenation  Description: INTERVENTIONS:  - Assess for changes in respiratory status  - Assess for changes in mentation and behavior  - Position to facilitate oxygenation and minimize respiratory effort  - Oxygen supplementation based on oxygen saturation or ABGs  - Provide Smoking Cessation handout, if applicable  - Encourage broncho-pulmonary hygiene including cough, deep breathe, Incentive Spirometry  - Assess the need for suctioning and perform as needed  - Assess and instruct to report SOB or any respiratory difficulty  - Respiratory Therapy support as indicated  - Manage/alleviate anxiety  - Monitor for signs/symptoms of CO2 retention  Outcome: Progressing     Problem: MUSCULOSKELETAL - ADULT  Goal: Return mobility to safest level of function  Description: INTERVENTIONS:  - Assess patient stability and activity tolerance for standing, transferring and ambulating w/ or w/o assistive devices  - Assist with transfers and ambulation using safe patient handling equipment as needed  - Ensure adequate protection for wounds/incisions during mobilization  - Obtain PT/OT consults as needed  - Advance activity as appropriate  - Communicate ordered activity level and limitations with patient/family  Outcome: Progressing     Problem: Impaired Functional Mobility  Goal: Achieve highest/safest level of mobility/gait  Description: Interventions:  - Assess patient's functional ability and stability  - Promote increasing activity/tolerance for mobility and gait  - Educate and engage patient/family in tolerated activity level and precautions  Outcome: Progressing     Problem: PAIN - ADULT  Goal: Verbalizes/displays adequate comfort level or patient's stated pain goal  Description: INTERVENTIONS:  - Encourage pt to monitor pain and request assistance  - Assess pain using appropriate pain scale  - Administer analgesics based on type and severity of pain and evaluate response  - Implement non-pharmacological measures as appropriate and evaluate response  - Consider cultural and social influences on pain and pain management  - Manage/alleviate anxiety  - Utilize distraction and/or relaxation techniques  - Monitor for opioid side effects  - Notify MD/LIP if interventions unsuccessful or patient reports new pain  - Anticipate increased pain with activity and pre-medicate as appropriate  Outcome: Progressing     Problem: Patient/Family Goals  Goal: Patient/Family Long Term Goal  Description: Patient's Long Term Goal: Go home    Interventions:  - Comply with medications  - Ask questions related to care  - Ambulate when tolerated  - See additional Care Plan goals for specific interventions  Outcome: Progressing  Goal: Patient/Family Short Term Goal  Description: Patient's Short Term Goal: Regain strength and increase blood pressure    Interventions:   - Notify RN of changes in status  - Ask questions related to care  - IVF  - Ambulate when able  - See additional Care Plan goals for specific interventions  Outcome: Progressing

## 2023-01-08 ENCOUNTER — APPOINTMENT (OUTPATIENT)
Dept: GENERAL RADIOLOGY | Facility: HOSPITAL | Age: 78
End: 2023-01-08
Attending: HOSPITALIST
Payer: MEDICARE

## 2023-01-08 LAB
ANION GAP SERPL CALC-SCNC: 9 MMOL/L (ref 0–18)
BASOPHILS # BLD AUTO: 0.01 X10(3) UL (ref 0–0.2)
BASOPHILS NFR BLD AUTO: 0.1 %
BUN BLD-MCNC: 54 MG/DL (ref 7–18)
BUN/CREAT SERPL: 33.1 (ref 10–20)
CALCIUM BLD-MCNC: 8.5 MG/DL (ref 8.5–10.1)
CHLORIDE SERPL-SCNC: 112 MMOL/L (ref 98–112)
CHLORIDE UR-SCNC: <10 MMOL/L
CK SERPL-CCNC: 49 U/L
CO2 SERPL-SCNC: 18 MMOL/L (ref 21–32)
CREAT BLD-MCNC: 1.63 MG/DL
CREAT UR-SCNC: 41.3 MG/DL
DEPRECATED RDW RBC AUTO: 47.7 FL (ref 35.1–46.3)
EOSINOPHIL # BLD AUTO: 0.01 X10(3) UL (ref 0–0.7)
EOSINOPHIL NFR BLD AUTO: 0.1 %
ERYTHROCYTE [DISTWIDTH] IN BLOOD BY AUTOMATED COUNT: 15.6 % (ref 11–15)
GFR SERPLBLD BASED ON 1.73 SQ M-ARVRAT: 32 ML/MIN/1.73M2 (ref 60–?)
GLUCOSE BLD-MCNC: 160 MG/DL (ref 70–99)
HCT VFR BLD AUTO: 24.2 %
HGB BLD-MCNC: 7.9 G/DL
IMM GRANULOCYTES # BLD AUTO: 0.03 X10(3) UL (ref 0–1)
IMM GRANULOCYTES NFR BLD: 0.4 %
IRON SATN MFR SERPL: 33 %
IRON SERPL-MCNC: 71 UG/DL
LYMPHOCYTES # BLD AUTO: 1.18 X10(3) UL (ref 1–4)
LYMPHOCYTES NFR BLD AUTO: 14.7 %
MAGNESIUM SERPL-MCNC: 1.9 MG/DL (ref 1.6–2.6)
MCH RBC QN AUTO: 27.4 PG (ref 26–34)
MCHC RBC AUTO-ENTMCNC: 32.6 G/DL (ref 31–37)
MCV RBC AUTO: 84 FL
MONOCYTES # BLD AUTO: 0.35 X10(3) UL (ref 0.1–1)
MONOCYTES NFR BLD AUTO: 4.4 %
NEUTROPHILS # BLD AUTO: 6.46 X10 (3) UL (ref 1.5–7.7)
NEUTROPHILS # BLD AUTO: 6.46 X10(3) UL (ref 1.5–7.7)
NEUTROPHILS NFR BLD AUTO: 80.3 %
OSMOLALITY SERPL CALC.SUM OF ELEC: 306 MOSM/KG (ref 275–295)
OSMOLALITY UR: 313 MOSM/KG (ref 300–1100)
PHOSPHATE SERPL-MCNC: 3.3 MG/DL (ref 2.5–4.9)
PLATELET # BLD AUTO: 131 10(3)UL (ref 150–450)
POTASSIUM SERPL-SCNC: 4.3 MMOL/L (ref 3.5–5.1)
POTASSIUM UR-SCNC: 32.9 MMOL/L
RBC # BLD AUTO: 2.88 X10(6)UL
SODIUM SERPL-SCNC: 139 MMOL/L (ref 136–145)
SODIUM SERPL-SCNC: 6 MMOL/L
T3FREE SERPL-MCNC: 1.01 PG/ML (ref 2.4–4.2)
TIBC SERPL-MCNC: 215 UG/DL (ref 240–450)
TRANSFERRIN SERPL-MCNC: 144 MG/DL (ref 200–360)
WBC # BLD AUTO: 8 X10(3) UL (ref 4–11)

## 2023-01-08 PROCEDURE — 71046 X-RAY EXAM CHEST 2 VIEWS: CPT | Performed by: HOSPITALIST

## 2023-01-08 PROCEDURE — 99233 SBSQ HOSP IP/OBS HIGH 50: CPT | Performed by: HOSPITALIST

## 2023-01-08 PROCEDURE — 71100 X-RAY EXAM RIBS UNI 2 VIEWS: CPT | Performed by: HOSPITALIST

## 2023-01-08 RX ORDER — CETIRIZINE HYDROCHLORIDE 5 MG/1
5 TABLET ORAL DAILY
Status: DISCONTINUED | OUTPATIENT
Start: 2023-01-09 | End: 2023-01-13

## 2023-01-08 RX ORDER — PREDNISONE 20 MG/1
60 TABLET ORAL
Status: DISCONTINUED | OUTPATIENT
Start: 2023-01-08 | End: 2023-01-09

## 2023-01-08 NOTE — PLAN OF CARE
Patient is alert and oriented. [place]. RA. Voiding freely, using purewick overnight. Tele in place. Up with standby assistance. IVF infusing. Call light within reach, bed alarm active. Problem: Patient Centered Care  Goal: Patient preferences are identified and integrated in the patient's plan of care  Description: Interventions:  - What would you like us to know as we care for you? From home with   - Provide timely, complete, and accurate information to patient/family  - Incorporate patient and family knowledge, values, beliefs, and cultural backgrounds into the planning and delivery of care  - Encourage patient/family to participate in care and decision-making at the level they choose  - Honor patient and family perspectives and choices  Outcome: Progressing     Problem: CARDIOVASCULAR - ADULT  Goal: Maintains optimal cardiac output and hemodynamic stability  Description: INTERVENTIONS:  - Monitor vital signs, rhythm, and trends  - Monitor for bleeding, hypotension and signs of decreased cardiac output  - Evaluate effectiveness of vasoactive medications to optimize hemodynamic stability  - Monitor arterial and/or venous puncture sites for bleeding and/or hematoma  - Assess quality of pulses, skin color and temperature  - Assess for signs of decreased coronary artery perfusion - ex.  Angina  - Evaluate fluid balance, assess for edema, trend weights  Outcome: Progressing  Goal: Absence of cardiac arrhythmias or at baseline  Description: INTERVENTIONS:  - Continuous cardiac monitoring, monitor vital signs, obtain 12 lead EKG if indicated  - Evaluate effectiveness of antiarrhythmic and heart rate control medications as ordered  - Initiate emergency measures for life threatening arrhythmias  - Monitor electrolytes and administer replacement therapy as ordered  Outcome: Progressing     Problem: RESPIRATORY - ADULT  Goal: Achieves optimal ventilation and oxygenation  Description: INTERVENTIONS:  - Assess for changes in respiratory status  - Assess for changes in mentation and behavior  - Position to facilitate oxygenation and minimize respiratory effort  - Oxygen supplementation based on oxygen saturation or ABGs  - Provide Smoking Cessation handout, if applicable  - Encourage broncho-pulmonary hygiene including cough, deep breathe, Incentive Spirometry  - Assess the need for suctioning and perform as needed  - Assess and instruct to report SOB or any respiratory difficulty  - Respiratory Therapy support as indicated  - Manage/alleviate anxiety  - Monitor for signs/symptoms of CO2 retention  Outcome: Progressing     Problem: MUSCULOSKELETAL - ADULT  Goal: Return mobility to safest level of function  Description: INTERVENTIONS:  - Assess patient stability and activity tolerance for standing, transferring and ambulating w/ or w/o assistive devices  - Assist with transfers and ambulation using safe patient handling equipment as needed  - Ensure adequate protection for wounds/incisions during mobilization  - Obtain PT/OT consults as needed  - Advance activity as appropriate  - Communicate ordered activity level and limitations with patient/family  Outcome: Progressing     Problem: Impaired Functional Mobility  Goal: Achieve highest/safest level of mobility/gait  Description: Interventions:  - Assess patient's functional ability and stability  - Promote increasing activity/tolerance for mobility and gait  - Educate and engage patient/family in tolerated activity level and precautions  Outcome: Progressing     Problem: PAIN - ADULT  Goal: Verbalizes/displays adequate comfort level or patient's stated pain goal  Description: INTERVENTIONS:  - Encourage pt to monitor pain and request assistance  - Assess pain using appropriate pain scale  - Administer analgesics based on type and severity of pain and evaluate response  - Implement non-pharmacological measures as appropriate and evaluate response  - Consider cultural and social influences on pain and pain management  - Manage/alleviate anxiety  - Utilize distraction and/or relaxation techniques  - Monitor for opioid side effects  - Notify MD/LIP if interventions unsuccessful or patient reports new pain  - Anticipate increased pain with activity and pre-medicate as appropriate  Outcome: Progressing     Problem: Patient/Family Goals  Goal: Patient/Family Long Term Goal  Description: Patient's Long Term Goal: Go home    Interventions:  - Comply with medications  - Ask questions related to care  - Ambulate when tolerated  - See additional Care Plan goals for specific interventions  Outcome: Progressing  Goal: Patient/Family Short Term Goal  Description: Patient's Short Term Goal: Regain strength and increase blood pressure    Interventions:   - Notify RN of changes in status  - Ask questions related to care  - IVF  - Ambulate when able  - See additional Care Plan goals for specific interventions  Outcome: Progressing

## 2023-01-08 NOTE — PROGRESS NOTES
[facility] Pharmacy Note:  Renal Dose Adjustment for Cetirizine (ZYRTEC)    Ashia Eisenberg has been prescribed Cetirizine (Zyrtec) 10 mg orally daily. Estimated Creatinine Clearance: 24.2 mL/min (A) (based on SCr of 1.63 mg/dL (H)). The dose has been changed to 5 mg orally daily per P&T approved protocol. Pharmacy will follow and resume original order if renal function improves.       Thank you,  Roz Salazar, PharmD  1/8/2023  11:28 AM  [address]: [phone]

## 2023-01-08 NOTE — PLAN OF CARE
Candace Otto is resting comfortably in bed, alert and oriented x 4, on room air, NSR on tele, heparin subcutaneous for DVT prophylaxis, no complaints of pain, blood pressure improving, 0.9 NS infusing @ 100 mL/hr, IV solu-Cortef administered, ambulating with SBA x walker, safety measures in place, call light within easy reach. Problem: Patient Centered Care  Goal: Patient preferences are identified and integrated in the patient's plan of care  Description: Interventions:  - What would you like us to know as we care for you? From home with   - Provide timely, complete, and accurate information to patient/family  - Incorporate patient and family knowledge, values, beliefs, and cultural backgrounds into the planning and delivery of care  - Encourage patient/family to participate in care and decision-making at the level they choose  - Honor patient and family perspectives and choices  Outcome: Progressing     Problem: CARDIOVASCULAR - ADULT  Goal: Maintains optimal cardiac output and hemodynamic stability  Description: INTERVENTIONS:  - Monitor vital signs, rhythm, and trends  - Monitor for bleeding, hypotension and signs of decreased cardiac output  - Evaluate effectiveness of vasoactive medications to optimize hemodynamic stability  - Monitor arterial and/or venous puncture sites for bleeding and/or hematoma  - Assess quality of pulses, skin color and temperature  - Assess for signs of decreased coronary artery perfusion - ex.  Angina  - Evaluate fluid balance, assess for edema, trend weights  Outcome: Progressing  Goal: Absence of cardiac arrhythmias or at baseline  Description: INTERVENTIONS:  - Continuous cardiac monitoring, monitor vital signs, obtain 12 lead EKG if indicated  - Evaluate effectiveness of antiarrhythmic and heart rate control medications as ordered  - Initiate emergency measures for life threatening arrhythmias  - Monitor electrolytes and administer replacement therapy as ordered  Outcome: Progressing     Problem: RESPIRATORY - ADULT  Goal: Achieves optimal ventilation and oxygenation  Description: INTERVENTIONS:  - Assess for changes in respiratory status  - Assess for changes in mentation and behavior  - Position to facilitate oxygenation and minimize respiratory effort  - Oxygen supplementation based on oxygen saturation or ABGs  - Provide Smoking Cessation handout, if applicable  - Encourage broncho-pulmonary hygiene including cough, deep breathe, Incentive Spirometry  - Assess the need for suctioning and perform as needed  - Assess and instruct to report SOB or any respiratory difficulty  - Respiratory Therapy support as indicated  - Manage/alleviate anxiety  - Monitor for signs/symptoms of CO2 retention  Outcome: Progressing     Problem: MUSCULOSKELETAL - ADULT  Goal: Return mobility to safest level of function  Description: INTERVENTIONS:  - Assess patient stability and activity tolerance for standing, transferring and ambulating w/ or w/o assistive devices  - Assist with transfers and ambulation using safe patient handling equipment as needed  - Ensure adequate protection for wounds/incisions during mobilization  - Obtain PT/OT consults as needed  - Advance activity as appropriate  - Communicate ordered activity level and limitations with patient/family  Outcome: Progressing     Problem: Impaired Functional Mobility  Goal: Achieve highest/safest level of mobility/gait  Description: Interventions:  - Assess patient's functional ability and stability  - Promote increasing activity/tolerance for mobility and gait  - Educate and engage patient/family in tolerated activity level and precautions  - Recommend use of  RW for transfers and ambulation  Outcome: Progressing     Problem: PAIN - ADULT  Goal: Verbalizes/displays adequate comfort level or patient's stated pain goal  Description: INTERVENTIONS:  - Encourage pt to monitor pain and request assistance  - Assess pain using appropriate pain scale  - Administer analgesics based on type and severity of pain and evaluate response  - Implement non-pharmacological measures as appropriate and evaluate response  - Consider cultural and social influences on pain and pain management  - Manage/alleviate anxiety  - Utilize distraction and/or relaxation techniques  - Monitor for opioid side effects  - Notify MD/LIP if interventions unsuccessful or patient reports new pain  - Anticipate increased pain with activity and pre-medicate as appropriate  Outcome: Progressing     Problem: Patient/Family Goals  Goal: Patient/Family Long Term Goal  Description: Patient's Long Term Goal: Go home    Interventions:  - Comply with medications  - Ask questions related to care  - Ambulate when tolerated  - See additional Care Plan goals for specific interventions  Outcome: Progressing  Goal: Patient/Family Short Term Goal  Description: Patient's Short Term Goal: Regain strength and increase blood pressure    Interventions:   - Notify RN of changes in status  - Ask questions related to care  - IVF  - Ambulate when able  - See additional Care Plan goals for specific interventions  Outcome: Progressing

## 2023-01-09 ENCOUNTER — TELEPHONE (OUTPATIENT)
Dept: INTERNAL MEDICINE CLINIC | Facility: CLINIC | Age: 78
End: 2023-01-09

## 2023-01-09 LAB
ANION GAP SERPL CALC-SCNC: 9 MMOL/L (ref 0–18)
BASE EXCESS BLD CALC-SCNC: -10.4 MMOL/L (ref ?–2)
BASE EXCESS BLD CALC-SCNC: -6.6 MMOL/L (ref ?–2)
BASOPHILS # BLD AUTO: 0.01 X10(3) UL (ref 0–0.2)
BASOPHILS NFR BLD AUTO: 0.1 %
BILIRUB UR QL: NEGATIVE
BUN BLD-MCNC: 56 MG/DL (ref 7–18)
BUN/CREAT SERPL: 34.8 (ref 10–20)
CALCIUM BLD-MCNC: 8.9 MG/DL (ref 8.5–10.1)
CHLORIDE SERPL-SCNC: 111 MMOL/L (ref 98–112)
CLARITY UR: CLEAR
CO2 SERPL-SCNC: 19 MMOL/L (ref 21–32)
COLOR UR: YELLOW
CORTIS SERPL-MCNC: 28.4 UG/DL
CREAT BLD-MCNC: 1.61 MG/DL
DEPRECATED HBV CORE AB SER IA-ACNC: 1037 NG/ML
DEPRECATED RDW RBC AUTO: 48.6 FL (ref 35.1–46.3)
EOSINOPHIL # BLD AUTO: 0 X10(3) UL (ref 0–0.7)
EOSINOPHIL NFR BLD AUTO: 0 %
ERYTHROCYTE [DISTWIDTH] IN BLOOD BY AUTOMATED COUNT: 15.6 % (ref 11–15)
ERYTHROCYTE [SEDIMENTATION RATE] IN BLOOD: 130 MM/HR
GFR SERPLBLD BASED ON 1.73 SQ M-ARVRAT: 32 ML/MIN/1.73M2 (ref 60–?)
GLUCOSE BLD-MCNC: 145 MG/DL (ref 70–99)
GLUCOSE UR-MCNC: 150 MG/DL
HCO3 BLDA-SCNC: 16.8 MEQ/L (ref 21–27)
HCO3 BLDV-SCNC: 19.4 MEQ/L (ref 22–26)
HCT VFR BLD AUTO: 27.2 %
HGB BLD-MCNC: 8.6 G/DL
HGB UR QL STRIP.AUTO: NEGATIVE
HYALINE CASTS #/AREA URNS AUTO: PRESENT /LPF
IMM GRANULOCYTES # BLD AUTO: 0.03 X10(3) UL (ref 0–1)
IMM GRANULOCYTES NFR BLD: 0.4 %
KETONES UR-MCNC: NEGATIVE MG/DL
LACTATE SERPL-SCNC: 1.8 MMOL/L (ref 0.4–2)
LYMPHOCYTES # BLD AUTO: 0.94 X10(3) UL (ref 1–4)
LYMPHOCYTES NFR BLD AUTO: 13.1 %
MAGNESIUM SERPL-MCNC: 1.9 MG/DL (ref 1.6–2.6)
MCH RBC QN AUTO: 26.8 PG (ref 26–34)
MCHC RBC AUTO-ENTMCNC: 31.6 G/DL (ref 31–37)
MCV RBC AUTO: 84.7 FL
MODIFIED ALLEN TEST: POSITIVE
MONOCYTES # BLD AUTO: 0.22 X10(3) UL (ref 0.1–1)
MONOCYTES NFR BLD AUTO: 3.1 %
NEUTROPHILS # BLD AUTO: 5.95 X10 (3) UL (ref 1.5–7.7)
NEUTROPHILS # BLD AUTO: 5.95 X10(3) UL (ref 1.5–7.7)
NEUTROPHILS NFR BLD AUTO: 83.3 %
NITRITE UR QL STRIP.AUTO: NEGATIVE
O2 CT BLD-SCNC: 13.3 VOL% (ref 15–23)
O2/TOTAL GAS SETTING VFR VENT: 21 %
OSMOLALITY SERPL CALC.SUM OF ELEC: 306 MOSM/KG (ref 275–295)
PCO2 BLDA: 25 MM HG (ref 35–45)
PCO2 BLDV: 35 MM HG (ref 38–50)
PH BLDA: 7.35 [PH] (ref 7.35–7.45)
PH BLDV: 7.33 [PH] (ref 7.32–7.43)
PH UR: 5 [PH] (ref 5–8)
PHOSPHATE SERPL-MCNC: 3 MG/DL (ref 2.5–4.9)
PLATELET # BLD AUTO: 161 10(3)UL (ref 150–450)
PO2 BLDA: 112 MM HG (ref 80–100)
PO2 BLDV: 54 MM HG (ref 35–40)
POTASSIUM SERPL-SCNC: 4.4 MMOL/L (ref 3.5–5.1)
PROCALCITONIN SERPL-MCNC: 0.25 NG/ML (ref ?–0.16)
PROT UR-MCNC: NEGATIVE MG/DL
PUNCTURE CHARGE: NO
PUNCTURE CHARGE: YES
RBC # BLD AUTO: 3.21 X10(6)UL
SAO2 % BLDA: >99 % (ref 94–100)
SAO2 % BLDV: 90.4 % (ref 60–85)
SODIUM SERPL-SCNC: 139 MMOL/L (ref 136–145)
SP GR UR STRIP: 1.01 (ref 1–1.03)
UROBILINOGEN UR STRIP-ACNC: <2
VIT B12 SERPL-MCNC: 1161 PG/ML (ref 193–986)
VIT C UR-MCNC: NEGATIVE MG/DL
WBC # BLD AUTO: 7.2 X10(3) UL (ref 4–11)

## 2023-01-09 PROCEDURE — 99223 1ST HOSP IP/OBS HIGH 75: CPT | Performed by: INTERNAL MEDICINE

## 2023-01-09 PROCEDURE — 99233 SBSQ HOSP IP/OBS HIGH 50: CPT | Performed by: HOSPITALIST

## 2023-01-09 PROCEDURE — G0316 PROLNG IP/OBS E/M EA ADDL 15 MIN: HCPCS | Performed by: HOSPITALIST

## 2023-01-09 RX ORDER — BUMETANIDE 1 MG/1
1 TABLET ORAL EVERY OTHER DAY
Status: DISCONTINUED | OUTPATIENT
Start: 2023-01-09 | End: 2023-01-10

## 2023-01-09 NOTE — PLAN OF CARE
RA. Afebrile. Patient getting more confused, MD aware- see orders. UA collected. Call light within reach. Safety precautions in place. Daughter at bedside updated on plan of care. Problem: Patient Centered Care  Goal: Patient preferences are identified and integrated in the patient's plan of care  Description: Interventions:  - What would you like us to know as we care for you? From home with   - Provide timely, complete, and accurate information to patient/family  - Incorporate patient and family knowledge, values, beliefs, and cultural backgrounds into the planning and delivery of care  - Encourage patient/family to participate in care and decision-making at the level they choose  - Honor patient and family perspectives and choices  Outcome: Progressing     Problem: CARDIOVASCULAR - ADULT  Goal: Maintains optimal cardiac output and hemodynamic stability  Description: INTERVENTIONS:  - Monitor vital signs, rhythm, and trends  - Monitor for bleeding, hypotension and signs of decreased cardiac output  - Evaluate effectiveness of vasoactive medications to optimize hemodynamic stability  - Monitor arterial and/or venous puncture sites for bleeding and/or hematoma  - Assess quality of pulses, skin color and temperature  - Assess for signs of decreased coronary artery perfusion - ex.  Angina  - Evaluate fluid balance, assess for edema, trend weights  Outcome: Progressing  Goal: Absence of cardiac arrhythmias or at baseline  Description: INTERVENTIONS:  - Continuous cardiac monitoring, monitor vital signs, obtain 12 lead EKG if indicated  - Evaluate effectiveness of antiarrhythmic and heart rate control medications as ordered  - Initiate emergency measures for life threatening arrhythmias  - Monitor electrolytes and administer replacement therapy as ordered  Outcome: Progressing     Problem: RESPIRATORY - ADULT  Goal: Achieves optimal ventilation and oxygenation  Description: INTERVENTIONS:  - Assess for changes in respiratory status  - Assess for changes in mentation and behavior  - Position to facilitate oxygenation and minimize respiratory effort  - Oxygen supplementation based on oxygen saturation or ABGs  - Provide Smoking Cessation handout, if applicable  - Encourage broncho-pulmonary hygiene including cough, deep breathe, Incentive Spirometry  - Assess the need for suctioning and perform as needed  - Assess and instruct to report SOB or any respiratory difficulty  - Respiratory Therapy support as indicated  - Manage/alleviate anxiety  - Monitor for signs/symptoms of CO2 retention  Outcome: Progressing     Problem: MUSCULOSKELETAL - ADULT  Goal: Return mobility to safest level of function  Description: INTERVENTIONS:  - Assess patient stability and activity tolerance for standing, transferring and ambulating w/ or w/o assistive devices  - Assist with transfers and ambulation using safe patient handling equipment as needed  - Ensure adequate protection for wounds/incisions during mobilization  - Obtain PT/OT consults as needed  - Advance activity as appropriate  - Communicate ordered activity level and limitations with patient/family  Outcome: Progressing     Problem: Impaired Functional Mobility  Goal: Achieve highest/safest level of mobility/gait  Description: Interventions:  - Assess patient's functional ability and stability  - Promote increasing activity/tolerance for mobility and gait  - Educate and engage patient/family in tolerated activity level and precautions  Outcome: Progressing     Problem: PAIN - ADULT  Goal: Verbalizes/displays adequate comfort level or patient's stated pain goal  Description: INTERVENTIONS:  - Encourage pt to monitor pain and request assistance  - Assess pain using appropriate pain scale  - Administer analgesics based on type and severity of pain and evaluate response  - Implement non-pharmacological measures as appropriate and evaluate response  - Consider cultural and social influences on pain and pain management  - Manage/alleviate anxiety  - Utilize distraction and/or relaxation techniques  - Monitor for opioid side effects  - Notify MD/LIP if interventions unsuccessful or patient reports new pain  - Anticipate increased pain with activity and pre-medicate as appropriate  Outcome: Progressing     Problem: Patient/Family Goals  Goal: Patient/Family Long Term Goal  Description: Patient's Long Term Goal: Go home    Interventions:  - Comply with medications  - Ask questions related to care  - Ambulate when tolerated  - See additional Care Plan goals for specific interventions  Outcome: Progressing  Goal: Patient/Family Short Term Goal  Description: Patient's Short Term Goal: Regain strength and increase blood pressure    Interventions:   - Notify RN of changes in status  - Ask questions related to care  - IVF  - Ambulate when able  - See additional Care Plan goals for specific interventions  Outcome: Progressing

## 2023-01-09 NOTE — TELEPHONE ENCOUNTER
Patients daughter calling to inform that patient is admitted to [facility] on 1/6/2023 for her blood pressure, it was low. There may need to be a change in patients medications, patient is also confused about where she is.

## 2023-01-09 NOTE — PLAN OF CARE
Patient is alert and oriented. Forgetful at times. [place]. Tele in place. Voiding freely. Call light within reach, bed alarm active. Problem: Patient Centered Care  Goal: Patient preferences are identified and integrated in the patient's plan of care  Description: Interventions:  - What would you like us to know as we care for you? From home with   - Provide timely, complete, and accurate information to patient/family  - Incorporate patient and family knowledge, values, beliefs, and cultural backgrounds into the planning and delivery of care  - Encourage patient/family to participate in care and decision-making at the level they choose  - Honor patient and family perspectives and choices  Outcome: Progressing     Problem: CARDIOVASCULAR - ADULT  Goal: Maintains optimal cardiac output and hemodynamic stability  Description: INTERVENTIONS:  - Monitor vital signs, rhythm, and trends  - Monitor for bleeding, hypotension and signs of decreased cardiac output  - Evaluate effectiveness of vasoactive medications to optimize hemodynamic stability  - Monitor arterial and/or venous puncture sites for bleeding and/or hematoma  - Assess quality of pulses, skin color and temperature  - Assess for signs of decreased coronary artery perfusion - ex.  Angina  - Evaluate fluid balance, assess for edema, trend weights  Outcome: Progressing  Goal: Absence of cardiac arrhythmias or at baseline  Description: INTERVENTIONS:  - Continuous cardiac monitoring, monitor vital signs, obtain 12 lead EKG if indicated  - Evaluate effectiveness of antiarrhythmic and heart rate control medications as ordered  - Initiate emergency measures for life threatening arrhythmias  - Monitor electrolytes and administer replacement therapy as ordered  Outcome: Progressing     Problem: RESPIRATORY - ADULT  Goal: Achieves optimal ventilation and oxygenation  Description: INTERVENTIONS:  - Assess for changes in respiratory status  - Assess for changes in mentation and behavior  - Position to facilitate oxygenation and minimize respiratory effort  - Oxygen supplementation based on oxygen saturation or ABGs  - Provide Smoking Cessation handout, if applicable  - Encourage broncho-pulmonary hygiene including cough, deep breathe, Incentive Spirometry  - Assess the need for suctioning and perform as needed  - Assess and instruct to report SOB or any respiratory difficulty  - Respiratory Therapy support as indicated  - Manage/alleviate anxiety  - Monitor for signs/symptoms of CO2 retention  Outcome: Progressing     Problem: MUSCULOSKELETAL - ADULT  Goal: Return mobility to safest level of function  Description: INTERVENTIONS:  - Assess patient stability and activity tolerance for standing, transferring and ambulating w/ or w/o assistive devices  - Assist with transfers and ambulation using safe patient handling equipment as needed  - Ensure adequate protection for wounds/incisions during mobilization  - Obtain PT/OT consults as needed  - Advance activity as appropriate  - Communicate ordered activity level and limitations with patient/family  Outcome: Progressing     Problem: Impaired Functional Mobility  Goal: Achieve highest/safest level of mobility/gait  Description: Interventions:  - Assess patient's functional ability and stability  - Promote increasing activity/tolerance for mobility and gait  - Educate and engage patient/family in tolerated activity level and precautions  Outcome: Progressing     Problem: PAIN - ADULT  Goal: Verbalizes/displays adequate comfort level or patient's stated pain goal  Description: INTERVENTIONS:  - Encourage pt to monitor pain and request assistance  - Assess pain using appropriate pain scale  - Administer analgesics based on type and severity of pain and evaluate response  - Implement non-pharmacological measures as appropriate and evaluate response  - Consider cultural and social influences on pain and pain management  - Manage/alleviate anxiety  - Utilize distraction and/or relaxation techniques  - Monitor for opioid side effects  - Notify MD/LIP if interventions unsuccessful or patient reports new pain  - Anticipate increased pain with activity and pre-medicate as appropriate  Outcome: Progressing     Problem: Patient/Family Goals  Goal: Patient/Family Long Term Goal  Description: Patient's Long Term Goal: Go home    Interventions:  - Comply with medications  - Ask questions related to care  - Ambulate when tolerated  - See additional Care Plan goals for specific interventions  Outcome: Progressing  Goal: Patient/Family Short Term Goal  Description: Patient's Short Term Goal: Regain strength and increase blood pressure    Interventions:   - Notify RN of changes in status  - Ask questions related to care  - IVF  - Ambulate when able  - See additional Care Plan goals for specific interventions  Outcome: Progressing

## 2023-01-09 NOTE — PLAN OF CARE
Candace Otto is resting in bed, alert and oriented x 3, forgetful at times on room air, vss, blood pressure improved, discontinued IV fluids per MD orders. No complaints of pain. Ambulating with walker and SBA. Safety measures in place, call light within easy reach, calls appropriately. Daughter Donya Mtz and  at bedside updated on POC. Problem: Patient Centered Care  Goal: Patient preferences are identified and integrated in the patient's plan of care  Description: Interventions:  - What would you like us to know as we care for you? From home with   - Provide timely, complete, and accurate information to patient/family  - Incorporate patient and family knowledge, values, beliefs, and cultural backgrounds into the planning and delivery of care  - Encourage patient/family to participate in care and decision-making at the level they choose  - Honor patient and family perspectives and choices  Outcome: Progressing     Problem: CARDIOVASCULAR - ADULT  Goal: Maintains optimal cardiac output and hemodynamic stability  Description: INTERVENTIONS:  - Monitor vital signs, rhythm, and trends  - Monitor for bleeding, hypotension and signs of decreased cardiac output  - Evaluate effectiveness of vasoactive medications to optimize hemodynamic stability  - Monitor arterial and/or venous puncture sites for bleeding and/or hematoma  - Assess quality of pulses, skin color and temperature  - Assess for signs of decreased coronary artery perfusion - ex.  Angina  - Evaluate fluid balance, assess for edema, trend weights  Outcome: Progressing  Goal: Absence of cardiac arrhythmias or at baseline  Description: INTERVENTIONS:  - Continuous cardiac monitoring, monitor vital signs, obtain 12 lead EKG if indicated  - Evaluate effectiveness of antiarrhythmic and heart rate control medications as ordered  - Initiate emergency measures for life threatening arrhythmias  - Monitor electrolytes and administer replacement therapy as ordered  Outcome: Progressing     Problem: RESPIRATORY - ADULT  Goal: Achieves optimal ventilation and oxygenation  Description: INTERVENTIONS:  - Assess for changes in respiratory status  - Assess for changes in mentation and behavior  - Position to facilitate oxygenation and minimize respiratory effort  - Oxygen supplementation based on oxygen saturation or ABGs  - Provide Smoking Cessation handout, if applicable  - Encourage broncho-pulmonary hygiene including cough, deep breathe, Incentive Spirometry  - Assess the need for suctioning and perform as needed  - Assess and instruct to report SOB or any respiratory difficulty  - Respiratory Therapy support as indicated  - Manage/alleviate anxiety  - Monitor for signs/symptoms of CO2 retention  Outcome: Progressing     Problem: MUSCULOSKELETAL - ADULT  Goal: Return mobility to safest level of function  Description: INTERVENTIONS:  - Assess patient stability and activity tolerance for standing, transferring and ambulating w/ or w/o assistive devices  - Assist with transfers and ambulation using safe patient handling equipment as needed  - Ensure adequate protection for wounds/incisions during mobilization  - Obtain PT/OT consults as needed  - Advance activity as appropriate  - Communicate ordered activity level and limitations with patient/family  Outcome: Progressing     Problem: Impaired Functional Mobility  Goal: Achieve highest/safest level of mobility/gait  Description: Interventions:  - Assess patient's functional ability and stability  - Promote increasing activity/tolerance for mobility and gait  - Educate and engage patient/family in tolerated activity level and precautions  - Recommend use of  RW for transfers and ambulation  Outcome: Progressing     Problem: PAIN - ADULT  Goal: Verbalizes/displays adequate comfort level or patient's stated pain goal  Description: INTERVENTIONS:  - Encourage pt to monitor pain and request assistance  - Assess pain using appropriate pain scale  - Administer analgesics based on type and severity of pain and evaluate response  - Implement non-pharmacological measures as appropriate and evaluate response  - Consider cultural and social influences on pain and pain management  - Manage/alleviate anxiety  - Utilize distraction and/or relaxation techniques  - Monitor for opioid side effects  - Notify MD/LIP if interventions unsuccessful or patient reports new pain  - Anticipate increased pain with activity and pre-medicate as appropriate  Outcome: Progressing     Problem: Patient/Family Goals  Goal: Patient/Family Long Term Goal  Description: Patient's Long Term Goal: Go home    Interventions:  - Comply with medications  - Ask questions related to care  - Ambulate when tolerated  - See additional Care Plan goals for specific interventions  Outcome: Progressing  Goal: Patient/Family Short Term Goal  Description: Patient's Short Term Goal: Regain strength and increase blood pressure    Interventions:   - Notify RN of changes in status  - Ask questions related to care  - IVF  - Ambulate when able  - See additional Care Plan goals for specific interventions  Outcome: Progressing

## 2023-01-09 NOTE — TELEPHONE ENCOUNTER
Patient daughter wanting  To know, please see below. Dr Bill Mcdonnell please advise would you like patient to schedule a follow up?

## 2023-01-09 NOTE — CM/SW NOTE
01/09/23 1000   CM/SW Referral Data   Referral Source Social Work (self-referral)   Reason for Referral Discharge planning   Informant EMR   Readmission Assessment   Factors that patient feels contributed to this readmission Acute/Chronic Clinical Presentation   Pt's living situation prior to admission? Home with family   Pt's level of independence at discharge? Some assist (mod)   Was full assessment completed by SW/BHAVIN on prior admission? Yes   Was the recommended discharge plan achieved? Yes   Was pt. discharged w/out services? No   Pertinent Medical Hx   Does patient have an established PCP? Yes  Zahida Fernando   Patient Info   Patient's [address]   Patient lives with Spouse/Significant other   Patient Status Prior to Admission   [facility]     SW self referred pt for [address] as pt is a READMISSION from home w/ her spouse. Per chart review, PT/OT recommend HH at time of DC. Above assessment completed based on chart review. SLY confirmed w/ Neptali Hernandez from [facility] - pt is current / [facility] RN services at this time. SW notified Neptali Hernandez that new F2F entered to re-add PT as well. PLAN: Home w/ [facility] - pending med clear     SW/CM to remain available for support and/or discharge planning.        Nicho Helm, MSW, [address] [facility]

## 2023-01-10 LAB
ANION GAP SERPL CALC-SCNC: 10 MMOL/L (ref 0–18)
BASOPHILS # BLD AUTO: 0 X10(3) UL (ref 0–0.2)
BASOPHILS NFR BLD AUTO: 0 %
BUN BLD-MCNC: 57 MG/DL (ref 7–18)
BUN/CREAT SERPL: 33.5 (ref 10–20)
CALCIUM BLD-MCNC: 8.8 MG/DL (ref 8.5–10.1)
CHLORIDE SERPL-SCNC: 112 MMOL/L (ref 98–112)
CO2 SERPL-SCNC: 20 MMOL/L (ref 21–32)
CREAT BLD-MCNC: 1.7 MG/DL
DEPRECATED RDW RBC AUTO: 48.8 FL (ref 35.1–46.3)
EOSINOPHIL # BLD AUTO: 0 X10(3) UL (ref 0–0.7)
EOSINOPHIL NFR BLD AUTO: 0 %
ERYTHROCYTE [DISTWIDTH] IN BLOOD BY AUTOMATED COUNT: 15.7 % (ref 11–15)
GFR SERPLBLD BASED ON 1.73 SQ M-ARVRAT: 30 ML/MIN/1.73M2 (ref 60–?)
GLUCOSE BLD-MCNC: 119 MG/DL (ref 70–99)
HCT VFR BLD AUTO: 24.9 %
HGB BLD-MCNC: 8 G/DL
IMM GRANULOCYTES # BLD AUTO: 0.03 X10(3) UL (ref 0–1)
IMM GRANULOCYTES NFR BLD: 0.4 %
LYMPHOCYTES # BLD AUTO: 1.28 X10(3) UL (ref 1–4)
LYMPHOCYTES NFR BLD AUTO: 17 %
MAGNESIUM SERPL-MCNC: 1.9 MG/DL (ref 1.6–2.6)
MCH RBC QN AUTO: 27.1 PG (ref 26–34)
MCHC RBC AUTO-ENTMCNC: 32.1 G/DL (ref 31–37)
MCV RBC AUTO: 84.4 FL
MONOCYTES # BLD AUTO: 0.51 X10(3) UL (ref 0.1–1)
MONOCYTES NFR BLD AUTO: 6.8 %
NEUTROPHILS # BLD AUTO: 5.69 X10 (3) UL (ref 1.5–7.7)
NEUTROPHILS # BLD AUTO: 5.69 X10(3) UL (ref 1.5–7.7)
NEUTROPHILS NFR BLD AUTO: 75.8 %
OSMOLALITY SERPL CALC.SUM OF ELEC: 311 MOSM/KG (ref 275–295)
PHOSPHATE SERPL-MCNC: 3.1 MG/DL (ref 2.5–4.9)
PLATELET # BLD AUTO: 166 10(3)UL (ref 150–450)
POTASSIUM SERPL-SCNC: 4 MMOL/L (ref 3.5–5.1)
RBC # BLD AUTO: 2.95 X10(6)UL
SODIUM SERPL-SCNC: 142 MMOL/L (ref 136–145)
WBC # BLD AUTO: 7.5 X10(3) UL (ref 4–11)

## 2023-01-10 PROCEDURE — 99233 SBSQ HOSP IP/OBS HIGH 50: CPT | Performed by: INTERNAL MEDICINE

## 2023-01-10 PROCEDURE — 99233 SBSQ HOSP IP/OBS HIGH 50: CPT | Performed by: HOSPITALIST

## 2023-01-10 RX ORDER — SODIUM BICARBONATE 650 MG/1
650 TABLET ORAL 2 TIMES DAILY
Status: DISCONTINUED | OUTPATIENT
Start: 2023-01-10 | End: 2023-01-11

## 2023-01-10 NOTE — PLAN OF CARE
Pt A&O x 4, but impulsive and forgetful. Pt is easy to redirect. Confusion is slowly improving. Plan to discharge in the next day or two pending blood work and confusion. Family at bedside and update on pt's condition. Problem: Patient Centered Care  Goal: Patient preferences are identified and integrated in the patient's plan of care  Description: Interventions:  - What would you like us to know as we care for you? From home with   - Provide timely, complete, and accurate information to patient/family  - Incorporate patient and family knowledge, values, beliefs, and cultural backgrounds into the planning and delivery of care  - Encourage patient/family to participate in care and decision-making at the level they choose  - Honor patient and family perspectives and choices  Outcome: Progressing     Problem: CARDIOVASCULAR - ADULT  Goal: Maintains optimal cardiac output and hemodynamic stability  Description: INTERVENTIONS:  - Monitor vital signs, rhythm, and trends  - Monitor for bleeding, hypotension and signs of decreased cardiac output  - Evaluate effectiveness of vasoactive medications to optimize hemodynamic stability  - Monitor arterial and/or venous puncture sites for bleeding and/or hematoma  - Assess quality of pulses, skin color and temperature  - Assess for signs of decreased coronary artery perfusion - ex.  Angina  - Evaluate fluid balance, assess for edema, trend weights  Outcome: Progressing  Goal: Absence of cardiac arrhythmias or at baseline  Description: INTERVENTIONS:  - Continuous cardiac monitoring, monitor vital signs, obtain 12 lead EKG if indicated  - Evaluate effectiveness of antiarrhythmic and heart rate control medications as ordered  - Initiate emergency measures for life threatening arrhythmias  - Monitor electrolytes and administer replacement therapy as ordered  Outcome: Progressing     Problem: RESPIRATORY - ADULT  Goal: Achieves optimal ventilation and oxygenation  Description: INTERVENTIONS:  - Assess for changes in respiratory status  - Assess for changes in mentation and behavior  - Position to facilitate oxygenation and minimize respiratory effort  - Oxygen supplementation based on oxygen saturation or ABGs  - Provide Smoking Cessation handout, if applicable  - Encourage broncho-pulmonary hygiene including cough, deep breathe, Incentive Spirometry  - Assess the need for suctioning and perform as needed  - Assess and instruct to report SOB or any respiratory difficulty  - Respiratory Therapy support as indicated  - Manage/alleviate anxiety  - Monitor for signs/symptoms of CO2 retention  Outcome: Progressing     Problem: MUSCULOSKELETAL - ADULT  Goal: Return mobility to safest level of function  Description: INTERVENTIONS:  - Assess patient stability and activity tolerance for standing, transferring and ambulating w/ or w/o assistive devices  - Assist with transfers and ambulation using safe patient handling equipment as needed  - Ensure adequate protection for wounds/incisions during mobilization  - Obtain PT/OT consults as needed  - Advance activity as appropriate  - Communicate ordered activity level and limitations with patient/family  Outcome: Progressing     Problem: Impaired Functional Mobility  Goal: Achieve highest/safest level of mobility/gait  Description: Interventions:  - Assess patient's functional ability and stability  - Promote increasing activity/tolerance for mobility and gait  - Educate and engage patient/family in tolerated activity level and precautions    Outcome: Progressing     Problem: PAIN - ADULT  Goal: Verbalizes/displays adequate comfort level or patient's stated pain goal  Description: INTERVENTIONS:  - Encourage pt to monitor pain and request assistance  - Assess pain using appropriate pain scale  - Administer analgesics based on type and severity of pain and evaluate response  - Implement non-pharmacological measures as appropriate and evaluate response  - Consider cultural and social influences on pain and pain management  - Manage/alleviate anxiety  - Utilize distraction and/or relaxation techniques  - Monitor for opioid side effects  - Notify MD/LIP if interventions unsuccessful or patient reports new pain  - Anticipate increased pain with activity and pre-medicate as appropriate  Outcome: Progressing     Problem: Patient/Family Goals  Goal: Patient/Family Long Term Goal  Description: Patient's Long Term Goal: Go home    Interventions:  - Comply with medications  - Ask questions related to care  - Ambulate when tolerated  - See additional Care Plan goals for specific interventions  Outcome: Progressing  Goal: Patient/Family Short Term Goal  Description: Patient's Short Term Goal: Regain strength and increase blood pressure    Interventions:   - Notify RN of changes in status  - Ask questions related to care  - IVF  - Ambulate when able  - See additional Care Plan goals for specific interventions  Outcome: Progressing

## 2023-01-10 NOTE — TELEPHONE ENCOUNTER
Yes, per Wendie Greenwood RN, patient qualifies and will be able to schedule once she is discharged. Daughter notified.

## 2023-01-10 NOTE — PLAN OF CARE
A/Ox2-3 with confusion, attempting to get out of bed multiple times throughout the night. Reoriented pt. To time and place. VSS, RA, no c/o pain. No acute events overnight. Meds given per [facility], safety measures in place. Pt. Educated on call light use, within reach. Problem: Patient Centered Care  Goal: Patient preferences are identified and integrated in the patient's plan of care  Description: Interventions:  - What would you like us to know as we care for you? From home with   - Provide timely, complete, and accurate information to patient/family  - Incorporate patient and family knowledge, values, beliefs, and cultural backgrounds into the planning and delivery of care  - Encourage patient/family to participate in care and decision-making at the level they choose  - Honor patient and family perspectives and choices  Outcome: Progressing     Problem: CARDIOVASCULAR - ADULT  Goal: Maintains optimal cardiac output and hemodynamic stability  Description: INTERVENTIONS:  - Monitor vital signs, rhythm, and trends  - Monitor for bleeding, hypotension and signs of decreased cardiac output  - Evaluate effectiveness of vasoactive medications to optimize hemodynamic stability  - Monitor arterial and/or venous puncture sites for bleeding and/or hematoma  - Assess quality of pulses, skin color and temperature  - Assess for signs of decreased coronary artery perfusion - ex.  Angina  - Evaluate fluid balance, assess for edema, trend weights  Outcome: Progressing  Goal: Absence of cardiac arrhythmias or at baseline  Description: INTERVENTIONS:  - Continuous cardiac monitoring, monitor vital signs, obtain 12 lead EKG if indicated  - Evaluate effectiveness of antiarrhythmic and heart rate control medications as ordered  - Initiate emergency measures for life threatening arrhythmias  - Monitor electrolytes and administer replacement therapy as ordered  Outcome: Progressing     Problem: RESPIRATORY - ADULT  Goal: Achieves optimal ventilation and oxygenation  Description: INTERVENTIONS:  - Assess for changes in respiratory status  - Assess for changes in mentation and behavior  - Position to facilitate oxygenation and minimize respiratory effort  - Oxygen supplementation based on oxygen saturation or ABGs  - Provide Smoking Cessation handout, if applicable  - Encourage broncho-pulmonary hygiene including cough, deep breathe, Incentive Spirometry  - Assess the need for suctioning and perform as needed  - Assess and instruct to report SOB or any respiratory difficulty  - Respiratory Therapy support as indicated  - Manage/alleviate anxiety  - Monitor for signs/symptoms of CO2 retention  Outcome: Progressing     Problem: MUSCULOSKELETAL - ADULT  Goal: Return mobility to safest level of function  Description: INTERVENTIONS:  - Assess patient stability and activity tolerance for standing, transferring and ambulating w/ or w/o assistive devices  - Assist with transfers and ambulation using safe patient handling equipment as needed  - Ensure adequate protection for wounds/incisions during mobilization  - Obtain PT/OT consults as needed  - Advance activity as appropriate  - Communicate ordered activity level and limitations with patient/family  Outcome: Progressing     Problem: Impaired Functional Mobility  Goal: Achieve highest/safest level of mobility/gait  Description: Interventions:  - Assess patient's functional ability and stability  - Promote increasing activity/tolerance for mobility and gait  - Educate and engage patient/family in tolerated activity level and precautions  - When transferring patient, block weaker knee for safety  - Recommend use of chair position in bed 3 times per day  Outcome: Progressing     Problem: PAIN - ADULT  Goal: Verbalizes/displays adequate comfort level or patient's stated pain goal  Description: INTERVENTIONS:  - Encourage pt to monitor pain and request assistance  - Assess pain using appropriate pain scale  - Administer analgesics based on type and severity of pain and evaluate response  - Implement non-pharmacological measures as appropriate and evaluate response  - Consider cultural and social influences on pain and pain management  - Manage/alleviate anxiety  - Utilize distraction and/or relaxation techniques  - Monitor for opioid side effects  - Notify MD/LIP if interventions unsuccessful or patient reports new pain  - Anticipate increased pain with activity and pre-medicate as appropriate  Outcome: Progressing     Problem: Patient/Family Goals  Goal: Patient/Family Long Term Goal  Description: Patient's Long Term Goal: Go home    Interventions:  - Comply with medications  - Ask questions related to care  - Ambulate when tolerated  - See additional Care Plan goals for specific interventions  Outcome: Progressing  Goal: Patient/Family Short Term Goal  Description: Patient's Short Term Goal: Regain strength and increase blood pressure    Interventions:   - Notify RN of changes in status  - Ask questions related to care  - IVF  - Ambulate when able  - See additional Care Plan goals for specific interventions  Outcome: Progressing

## 2023-01-11 LAB
ALBUMIN SERPL-MCNC: 2.3 G/DL (ref 3.4–5)
ALBUMIN/GLOB SERPL: 0.7 {RATIO} (ref 1–2)
ALP LIVER SERPL-CCNC: 55 U/L
ALT SERPL-CCNC: 34 U/L
ANION GAP SERPL CALC-SCNC: 9 MMOL/L (ref 0–18)
AST SERPL-CCNC: 17 U/L (ref 15–37)
BASOPHILS # BLD AUTO: 0 X10(3) UL (ref 0–0.2)
BASOPHILS NFR BLD AUTO: 0 %
BILIRUB SERPL-MCNC: 0.2 MG/DL (ref 0.1–2)
BUN BLD-MCNC: 52 MG/DL (ref 7–18)
BUN/CREAT SERPL: 33.8 (ref 10–20)
CALCIUM BLD-MCNC: 8.9 MG/DL (ref 8.5–10.1)
CHLORIDE SERPL-SCNC: 111 MMOL/L (ref 98–112)
CO2 SERPL-SCNC: 22 MMOL/L (ref 21–32)
CREAT BLD-MCNC: 1.54 MG/DL
DEPRECATED RDW RBC AUTO: 47.4 FL (ref 35.1–46.3)
EOSINOPHIL # BLD AUTO: 0 X10(3) UL (ref 0–0.7)
EOSINOPHIL NFR BLD AUTO: 0 %
ERYTHROCYTE [DISTWIDTH] IN BLOOD BY AUTOMATED COUNT: 15.7 % (ref 11–15)
GFR SERPLBLD BASED ON 1.73 SQ M-ARVRAT: 34 ML/MIN/1.73M2 (ref 60–?)
GLOBULIN PLAS-MCNC: 3.5 G/DL (ref 2.8–4.4)
GLUCOSE BLD-MCNC: 122 MG/DL (ref 70–99)
HCT VFR BLD AUTO: 24.6 %
HGB BLD-MCNC: 8.1 G/DL
IMM GRANULOCYTES # BLD AUTO: 0.02 X10(3) UL (ref 0–1)
IMM GRANULOCYTES NFR BLD: 0.3 %
LYMPHOCYTES # BLD AUTO: 1.4 X10(3) UL (ref 1–4)
LYMPHOCYTES NFR BLD AUTO: 20.9 %
MAGNESIUM SERPL-MCNC: 1.9 MG/DL (ref 1.6–2.6)
MCH RBC QN AUTO: 27.2 PG (ref 26–34)
MCHC RBC AUTO-ENTMCNC: 32.9 G/DL (ref 31–37)
MCV RBC AUTO: 82.6 FL
MONOCYTES # BLD AUTO: 0.56 X10(3) UL (ref 0.1–1)
MONOCYTES NFR BLD AUTO: 8.4 %
NEUTROPHILS # BLD AUTO: 4.71 X10 (3) UL (ref 1.5–7.7)
NEUTROPHILS # BLD AUTO: 4.71 X10(3) UL (ref 1.5–7.7)
NEUTROPHILS NFR BLD AUTO: 70.4 %
OSMOLALITY SERPL CALC.SUM OF ELEC: 309 MOSM/KG (ref 275–295)
PHOSPHATE SERPL-MCNC: 2.3 MG/DL (ref 2.5–4.9)
PLATELET # BLD AUTO: 163 10(3)UL (ref 150–450)
POTASSIUM SERPL-SCNC: 4 MMOL/L (ref 3.5–5.1)
PROT SERPL-MCNC: 5.8 G/DL (ref 6.4–8.2)
RBC # BLD AUTO: 2.98 X10(6)UL
SODIUM SERPL-SCNC: 142 MMOL/L (ref 136–145)
T PALLIDUM AB SER QL: NEGATIVE
WBC # BLD AUTO: 6.7 X10(3) UL (ref 4–11)

## 2023-01-11 PROCEDURE — 99233 SBSQ HOSP IP/OBS HIGH 50: CPT | Performed by: HOSPITALIST

## 2023-01-11 PROCEDURE — 95816 EEG AWAKE AND DROWSY: CPT | Performed by: OTHER

## 2023-01-11 PROCEDURE — 99233 SBSQ HOSP IP/OBS HIGH 50: CPT | Performed by: INTERNAL MEDICINE

## 2023-01-11 RX ORDER — METOPROLOL TARTRATE 50 MG/1
50 TABLET, FILM COATED ORAL
Status: DISCONTINUED | OUTPATIENT
Start: 2023-01-11 | End: 2023-01-13

## 2023-01-11 RX ORDER — SODIUM BICARBONATE 650 MG/1
650 TABLET ORAL DAILY
Status: DISCONTINUED | OUTPATIENT
Start: 2023-01-12 | End: 2023-01-12

## 2023-01-11 NOTE — PROCEDURES
EEG report    REFERRING PHYSICIAN: Lynette Gilliam MD    PCP and phone number:  Nadia Girard MD  [phone]    TECHNIQUE: 21 channels of EEG, 2 channels of EOG, and 1 channel of EKG were recorded utilizing the [employer] 10/20 System. The recording was performed in a digitized monopolar referential format and playback was reformatted into various referential and bipolar montages utilizing appropriate filter settings. Automatic seizure and spike detection programs were utilized throughout the recording. Video was not recorded during the study    CLINICAL DATA:  Patient is sent for the evaluation of possible seizures. MEDICATION:  Continuous Medications:      Scheduled Medications:  No current outpatient medications on file. PRN Medications:  albuterol, acetaminophen, ondansetron, metoclopramide    ACTIVATION:  Hyperventilation: Not done  Photic stimulation: Not done  Sleep: Normal sleep architecture was seen. BACKGROUND  While the patient was awake, the posterior dominant rhythm consisted of well-regulated 9-10 Hz rhythmic waveforms, symmetrically distributed over both posterior quadrants and was reactive to eye opening. EEG ABNORMALITY  None    IMPRESSION:  This is a normal EEG. No focal, lateralized, or epileptiform features are noted. Clinical correlation required.

## 2023-01-11 NOTE — PLAN OF CARE
pt. A/Ox2-3. Continues to get up without assistance or calling. Pt noncompliant with walker use although unsteady. VSS, RA. Meds given per MAR. No acute events. Safety measures in place. Pt. educated on call light use, within reach. Problem: Patient Centered Care  Goal: Patient preferences are identified and integrated in the patient's plan of care  Description: Interventions:  - What would you like us to know as we care for you? From home with   - Provide timely, complete, and accurate information to patient/family  - Incorporate patient and family knowledge, values, beliefs, and cultural backgrounds into the planning and delivery of care  - Encourage patient/family to participate in care and decision-making at the level they choose  - Honor patient and family perspectives and choices  Outcome: Progressing     Problem: CARDIOVASCULAR - ADULT  Goal: Maintains optimal cardiac output and hemodynamic stability  Description: INTERVENTIONS:  - Monitor vital signs, rhythm, and trends  - Monitor for bleeding, hypotension and signs of decreased cardiac output  - Evaluate effectiveness of vasoactive medications to optimize hemodynamic stability  - Monitor arterial and/or venous puncture sites for bleeding and/or hematoma  - Assess quality of pulses, skin color and temperature  - Assess for signs of decreased coronary artery perfusion - ex.  Angina  - Evaluate fluid balance, assess for edema, trend weights  Outcome: Progressing  Goal: Absence of cardiac arrhythmias or at baseline  Description: INTERVENTIONS:  - Continuous cardiac monitoring, monitor vital signs, obtain 12 lead EKG if indicated  - Evaluate effectiveness of antiarrhythmic and heart rate control medications as ordered  - Initiate emergency measures for life threatening arrhythmias  - Monitor electrolytes and administer replacement therapy as ordered  Outcome: Progressing     Problem: RESPIRATORY - ADULT  Goal: Achieves optimal ventilation and oxygenation  Description: INTERVENTIONS:  - Assess for changes in respiratory status  - Assess for changes in mentation and behavior  - Position to facilitate oxygenation and minimize respiratory effort  - Oxygen supplementation based on oxygen saturation or ABGs  - Provide Smoking Cessation handout, if applicable  - Encourage broncho-pulmonary hygiene including cough, deep breathe, Incentive Spirometry  - Assess the need for suctioning and perform as needed  - Assess and instruct to report SOB or any respiratory difficulty  - Respiratory Therapy support as indicated  - Manage/alleviate anxiety  - Monitor for signs/symptoms of CO2 retention  Outcome: Progressing     Problem: MUSCULOSKELETAL - ADULT  Goal: Return mobility to safest level of function  Description: INTERVENTIONS:  - Assess patient stability and activity tolerance for standing, transferring and ambulating w/ or w/o assistive devices  - Assist with transfers and ambulation using safe patient handling equipment as needed  - Ensure adequate protection for wounds/incisions during mobilization  - Obtain PT/OT consults as needed  - Advance activity as appropriate  - Communicate ordered activity level and limitations with patient/family  Outcome: Progressing     Problem: Impaired Functional Mobility  Goal: Achieve highest/safest level of mobility/gait  Description: Interventions:  - Assess patient's functional ability and stability  - Promote increasing activity/tolerance for mobility and gait  - Educate and engage patient/family in tolerated activity level and precautions  - Recommend patient transfer to bedside chair toward strongest side  Outcome: Progressing     Problem: PAIN - ADULT  Goal: Verbalizes/displays adequate comfort level or patient's stated pain goal  Description: INTERVENTIONS:  - Encourage pt to monitor pain and request assistance  - Assess pain using appropriate pain scale  - Administer analgesics based on type and severity of pain and evaluate response  - Implement non-pharmacological measures as appropriate and evaluate response  - Consider cultural and social influences on pain and pain management  - Manage/alleviate anxiety  - Utilize distraction and/or relaxation techniques  - Monitor for opioid side effects  - Notify MD/LIP if interventions unsuccessful or patient reports new pain  - Anticipate increased pain with activity and pre-medicate as appropriate  Outcome: Progressing     Problem: Patient/Family Goals  Goal: Patient/Family Long Term Goal  Description: Patient's Long Term Goal: Go home    Interventions:  - Comply with medications  - Ask questions related to care  - Ambulate when tolerated  - See additional Care Plan goals for specific interventions  Outcome: Progressing  Goal: Patient/Family Short Term Goal  Description: Patient's Short Term Goal: Regain strength and increase blood pressure    Interventions:   - Notify RN of changes in status  - Ask questions related to care  - IVF  - Ambulate when able  - See additional Care Plan goals for specific interventions  Outcome: Progressing

## 2023-01-11 NOTE — PLAN OF CARE
A&O x4, still confused with tangential speech. Family at bedside and updated on condition. Son requesting dietitian services to give guidance on best diet for pt. Plan to discharge with home health pending clinical improvement. Problem: Patient Centered Care  Goal: Patient preferences are identified and integrated in the patient's plan of care  Description: Interventions:  - What would you like us to know as we care for you? From home with   - Provide timely, complete, and accurate information to patient/family  - Incorporate patient and family knowledge, values, beliefs, and cultural backgrounds into the planning and delivery of care  - Encourage patient/family to participate in care and decision-making at the level they choose  - Honor patient and family perspectives and choices  Outcome: Progressing     Problem: CARDIOVASCULAR - ADULT  Goal: Maintains optimal cardiac output and hemodynamic stability  Description: INTERVENTIONS:  - Monitor vital signs, rhythm, and trends  - Monitor for bleeding, hypotension and signs of decreased cardiac output  - Evaluate effectiveness of vasoactive medications to optimize hemodynamic stability  - Monitor arterial and/or venous puncture sites for bleeding and/or hematoma  - Assess quality of pulses, skin color and temperature  - Assess for signs of decreased coronary artery perfusion - ex.  Angina  - Evaluate fluid balance, assess for edema, trend weights  Outcome: Progressing  Goal: Absence of cardiac arrhythmias or at baseline  Description: INTERVENTIONS:  - Continuous cardiac monitoring, monitor vital signs, obtain 12 lead EKG if indicated  - Evaluate effectiveness of antiarrhythmic and heart rate control medications as ordered  - Initiate emergency measures for life threatening arrhythmias  - Monitor electrolytes and administer replacement therapy as ordered  Outcome: Progressing     Problem: RESPIRATORY - ADULT  Goal: Achieves optimal ventilation and oxygenation  Description: INTERVENTIONS:  - Assess for changes in respiratory status  - Assess for changes in mentation and behavior  - Position to facilitate oxygenation and minimize respiratory effort  - Oxygen supplementation based on oxygen saturation or ABGs  - Provide Smoking Cessation handout, if applicable  - Encourage broncho-pulmonary hygiene including cough, deep breathe, Incentive Spirometry  - Assess the need for suctioning and perform as needed  - Assess and instruct to report SOB or any respiratory difficulty  - Respiratory Therapy support as indicated  - Manage/alleviate anxiety  - Monitor for signs/symptoms of CO2 retention  Outcome: Progressing     Problem: MUSCULOSKELETAL - ADULT  Goal: Return mobility to safest level of function  Description: INTERVENTIONS:  - Assess patient stability and activity tolerance for standing, transferring and ambulating w/ or w/o assistive devices  - Assist with transfers and ambulation using safe patient handling equipment as needed  - Ensure adequate protection for wounds/incisions during mobilization  - Obtain PT/OT consults as needed  - Advance activity as appropriate  - Communicate ordered activity level and limitations with patient/family  Outcome: Progressing     Problem: Impaired Functional Mobility  Goal: Achieve highest/safest level of mobility/gait  Description: Interventions:  - Assess patient's functional ability and stability  - Promote increasing activity/tolerance for mobility and gait  - Educate and engage patient/family in tolerated activity level and precautions    Outcome: Progressing     Problem: PAIN - ADULT  Goal: Verbalizes/displays adequate comfort level or patient's stated pain goal  Description: INTERVENTIONS:  - Encourage pt to monitor pain and request assistance  - Assess pain using appropriate pain scale  - Administer analgesics based on type and severity of pain and evaluate response  - Implement non-pharmacological measures as appropriate and evaluate response  - Consider cultural and social influences on pain and pain management  - Manage/alleviate anxiety  - Utilize distraction and/or relaxation techniques  - Monitor for opioid side effects  - Notify MD/LIP if interventions unsuccessful or patient reports new pain  - Anticipate increased pain with activity and pre-medicate as appropriate  Outcome: Progressing     Problem: Patient/Family Goals  Goal: Patient/Family Long Term Goal  Description: Patient's Long Term Goal: Go home    Interventions:  - Comply with medications  - Ask questions related to care  - Ambulate when tolerated  - See additional Care Plan goals for specific interventions  Outcome: Progressing  Goal: Patient/Family Short Term Goal  Description: Patient's Short Term Goal: Regain strength and increase blood pressure    Interventions:   - Notify RN of changes in status  - Ask questions related to care  - IVF  - Ambulate when able  - See additional Care Plan goals for specific interventions  Outcome: Progressing

## 2023-01-12 ENCOUNTER — TELEPHONE (OUTPATIENT)
Dept: INTERNAL MEDICINE CLINIC | Facility: CLINIC | Age: 78
End: 2023-01-12

## 2023-01-12 LAB
ALBUMIN SERPL-MCNC: 2.3 G/DL (ref 3.4–5)
ANION GAP SERPL CALC-SCNC: 10 MMOL/L (ref 0–18)
BASOPHILS # BLD AUTO: 0.01 X10(3) UL (ref 0–0.2)
BASOPHILS NFR BLD AUTO: 0.1 %
BUN BLD-MCNC: 53 MG/DL (ref 7–18)
BUN/CREAT SERPL: 38.4 (ref 10–20)
CALCIUM BLD-MCNC: 8.7 MG/DL (ref 8.5–10.1)
CHLORIDE SERPL-SCNC: 111 MMOL/L (ref 98–112)
CO2 SERPL-SCNC: 23 MMOL/L (ref 21–32)
CREAT BLD-MCNC: 1.38 MG/DL
DEPRECATED RDW RBC AUTO: 47.4 FL (ref 35.1–46.3)
EOSINOPHIL # BLD AUTO: 0.01 X10(3) UL (ref 0–0.7)
EOSINOPHIL NFR BLD AUTO: 0.1 %
ERYTHROCYTE [DISTWIDTH] IN BLOOD BY AUTOMATED COUNT: 15.6 % (ref 11–15)
GFR SERPLBLD BASED ON 1.73 SQ M-ARVRAT: 39 ML/MIN/1.73M2 (ref 60–?)
GLUCOSE BLD-MCNC: 114 MG/DL (ref 70–99)
HCT VFR BLD AUTO: 24.5 %
HGB BLD-MCNC: 8 G/DL
IMM GRANULOCYTES # BLD AUTO: 0.04 X10(3) UL (ref 0–1)
IMM GRANULOCYTES NFR BLD: 0.5 %
IRON SATN MFR SERPL: 32 %
IRON SERPL-MCNC: 79 UG/DL
LYMPHOCYTES # BLD AUTO: 1.73 X10(3) UL (ref 1–4)
LYMPHOCYTES NFR BLD AUTO: 22.2 %
MAGNESIUM SERPL-MCNC: 2 MG/DL (ref 1.6–2.6)
MCH RBC QN AUTO: 26.9 PG (ref 26–34)
MCHC RBC AUTO-ENTMCNC: 32.7 G/DL (ref 31–37)
MCV RBC AUTO: 82.5 FL
MONOCYTES # BLD AUTO: 0.65 X10(3) UL (ref 0.1–1)
MONOCYTES NFR BLD AUTO: 8.3 %
NEUTROPHILS # BLD AUTO: 5.37 X10 (3) UL (ref 1.5–7.7)
NEUTROPHILS # BLD AUTO: 5.37 X10(3) UL (ref 1.5–7.7)
NEUTROPHILS NFR BLD AUTO: 68.8 %
OSMOLALITY SERPL CALC.SUM OF ELEC: 313 MOSM/KG (ref 275–295)
PHOSPHATE SERPL-MCNC: 2.5 MG/DL (ref 2.5–4.9)
PHOSPHATE SERPL-MCNC: 2.5 MG/DL (ref 2.5–4.9)
PLATELET # BLD AUTO: 187 10(3)UL (ref 150–450)
POTASSIUM SERPL-SCNC: 3.9 MMOL/L (ref 3.5–5.1)
RBC # BLD AUTO: 2.97 X10(6)UL
SODIUM SERPL-SCNC: 144 MMOL/L (ref 136–145)
TIBC SERPL-MCNC: 250 UG/DL (ref 240–450)
TRANSFERRIN SERPL-MCNC: 168 MG/DL (ref 200–360)
WBC # BLD AUTO: 7.8 X10(3) UL (ref 4–11)

## 2023-01-12 PROCEDURE — 99232 SBSQ HOSP IP/OBS MODERATE 35: CPT | Performed by: INTERNAL MEDICINE

## 2023-01-12 PROCEDURE — 99233 SBSQ HOSP IP/OBS HIGH 50: CPT | Performed by: HOSPITALIST

## 2023-01-12 RX ORDER — METOPROLOL TARTRATE 50 MG/1
50 TABLET, FILM COATED ORAL
Qty: 60 TABLET | Refills: 0 | Status: SHIPPED | OUTPATIENT
Start: 2023-01-12

## 2023-01-12 RX ORDER — LISINOPRIL 20 MG/1
10 TABLET ORAL DAILY
Qty: 90 TABLET | Refills: 3 | Status: SHIPPED | OUTPATIENT
Start: 2023-01-12 | End: 2023-01-13

## 2023-01-12 RX ORDER — BUMETANIDE 1 MG/1
1 TABLET ORAL DAILY
Qty: 90 TABLET | Refills: 3 | Status: SHIPPED | OUTPATIENT
Start: 2023-01-12

## 2023-01-12 RX ORDER — PREDNISONE 20 MG/1
TABLET ORAL
Qty: 35 TABLET | Refills: 0 | Status: SHIPPED | OUTPATIENT
Start: 2023-01-13 | End: 2023-02-12

## 2023-01-12 RX ORDER — BUMETANIDE 0.5 MG/1
0.5 TABLET ORAL DAILY
Status: DISCONTINUED | OUTPATIENT
Start: 2023-01-12 | End: 2023-01-13

## 2023-01-12 RX ORDER — LISINOPRIL 10 MG/1
10 TABLET ORAL DAILY
Status: DISCONTINUED | OUTPATIENT
Start: 2023-01-12 | End: 2023-01-13

## 2023-01-12 NOTE — PLAN OF CARE
Pt received in no acute distress, medications adjusted refer to orders. Plan is possible discharge tomorrow. Fall/safety precautions in place, call light within reach, hourly rounding maintained. Problem: Patient Centered Care  Goal: Patient preferences are identified and integrated in the patient's plan of care  Description: Interventions:  - What would you like us to know as we care for you? From home with   - Provide timely, complete, and accurate information to patient/family  - Incorporate patient and family knowledge, values, beliefs, and cultural backgrounds into the planning and delivery of care  - Encourage patient/family to participate in care and decision-making at the level they choose  - Honor patient and family perspectives and choices  Outcome: Progressing     Problem: CARDIOVASCULAR - ADULT  Goal: Maintains optimal cardiac output and hemodynamic stability  Description: INTERVENTIONS:  - Monitor vital signs, rhythm, and trends  - Monitor for bleeding, hypotension and signs of decreased cardiac output  - Evaluate effectiveness of vasoactive medications to optimize hemodynamic stability  - Monitor arterial and/or venous puncture sites for bleeding and/or hematoma  - Assess quality of pulses, skin color and temperature  - Assess for signs of decreased coronary artery perfusion - ex.  Angina  - Evaluate fluid balance, assess for edema, trend weights  Outcome: Progressing  Goal: Absence of cardiac arrhythmias or at baseline  Description: INTERVENTIONS:  - Continuous cardiac monitoring, monitor vital signs, obtain 12 lead EKG if indicated  - Evaluate effectiveness of antiarrhythmic and heart rate control medications as ordered  - Initiate emergency measures for life threatening arrhythmias  - Monitor electrolytes and administer replacement therapy as ordered  Outcome: Progressing     Problem: RESPIRATORY - ADULT  Goal: Achieves optimal ventilation and oxygenation  Description: INTERVENTIONS:  - Assess for changes in respiratory status  - Assess for changes in mentation and behavior  - Position to facilitate oxygenation and minimize respiratory effort  - Oxygen supplementation based on oxygen saturation or ABGs  - Provide Smoking Cessation handout, if applicable  - Encourage broncho-pulmonary hygiene including cough, deep breathe, Incentive Spirometry  - Assess the need for suctioning and perform as needed  - Assess and instruct to report SOB or any respiratory difficulty  - Respiratory Therapy support as indicated  - Manage/alleviate anxiety  - Monitor for signs/symptoms of CO2 retention  Outcome: Progressing     Problem: MUSCULOSKELETAL - ADULT  Goal: Return mobility to safest level of function  Description: INTERVENTIONS:  - Assess patient stability and activity tolerance for standing, transferring and ambulating w/ or w/o assistive devices  - Assist with transfers and ambulation using safe patient handling equipment as needed  - Ensure adequate protection for wounds/incisions during mobilization  - Obtain PT/OT consults as needed  - Advance activity as appropriate  - Communicate ordered activity level and limitations with patient/family  Outcome: Progressing     Problem: Impaired Functional Mobility  Goal: Achieve highest/safest level of mobility/gait  Description: Interventions:  - Assess patient's functional ability and stability  - Promote increasing activity/tolerance for mobility and gait  - Educate and engage patient/family in tolerated activity level and precautions  - Recommend patient transfer to bedside chair toward strongest side  Outcome: Progressing     Problem: PAIN - ADULT  Goal: Verbalizes/displays adequate comfort level or patient's stated pain goal  Description: INTERVENTIONS:  - Encourage pt to monitor pain and request assistance  - Assess pain using appropriate pain scale  - Administer analgesics based on type and severity of pain and evaluate response  - Implement non-pharmacological measures as appropriate and evaluate response  - Consider cultural and social influences on pain and pain management  - Manage/alleviate anxiety  - Utilize distraction and/or relaxation techniques  - Monitor for opioid side effects  - Notify MD/LIP if interventions unsuccessful or patient reports new pain  - Anticipate increased pain with activity and pre-medicate as appropriate  Outcome: Progressing     Problem: Patient/Family Goals  Goal: Patient/Family Long Term Goal  Description: Patient's Long Term Goal: Go home    Interventions:  - Comply with medications  - Ask questions related to care  - Ambulate when tolerated  - See additional Care Plan goals for specific interventions  Outcome: Progressing  Goal: Patient/Family Short Term Goal  Description: Patient's Short Term Goal: Regain strength and increase blood pressure    Interventions:   - Notify RN of changes in status  - Ask questions related to care  - IVF  - Ambulate when able  - See additional Care Plan goals for specific interventions  Outcome: Progressing     Problem: SAFETY ADULT - FALL  Goal: Free from fall injury  Description: INTERVENTIONS:  - Assess pt frequently for physical needs  - Identify cognitive and physical deficits and behaviors that affect risk of falls.   - [place] fall precautions as indicated by assessment.  - Educate pt/family on patient safety including physical limitations  - Instruct pt to call for assistance with activity based on assessment  - Modify environment to reduce risk of injury  - Provide assistive devices as appropriate  - Consider OT/PT consult to assist with strengthening/mobility  - Encourage toileting schedule  Outcome: Progressing     Problem: DISCHARGE PLANNING  Goal: Discharge to home or other facility with appropriate resources  Description: INTERVENTIONS:  - Identify barriers to discharge w/pt and caregiver  - Include patient/family/discharge partner in discharge planning  - Arrange for needed discharge resources and transportation as appropriate  - Identify discharge learning needs (meds, wound care, etc)  - Arrange for interpreters to assist at discharge as needed  - Consider post-discharge preferences of patient/family/discharge partner  - Complete POLST form as appropriate  - Assess patient's ability to be responsible for managing their own health  - Refer to Case Management Department for coordinating discharge planning if the patient needs post-hospital services based on physician/LIP order or complex needs related to functional status, cognitive ability or social support system  Outcome: Progressing

## 2023-01-12 NOTE — TELEPHONE ENCOUNTER
Order # [id number] and #[id number] signed and faxed to [facility].  Confirmation received and sent to scan

## 2023-01-12 NOTE — DIETARY NOTE
NUTRITION NOTE     Pt screened for LOS at no nutrition risk. Good PO intakes, mostly %. No wt loss noted. Pt reports good appetite and eating well. RD also received consult for \"family would like literature on a good diet plan for pt. \" No visitors or family at bedside upon RD visit. Pt accepted general heart healthy diet educational handout from [address] [facility]. No questions or complaints. F/u per protocol.

## 2023-01-12 NOTE — PLAN OF CARE
Forgetful, frequent re-orientation with fair results. Stable vital signs. Plan: home with [facility] AT [facility]. Problem: Patient Centered Care  Goal: Patient preferences are identified and integrated in the patient's plan of care  Description: Interventions:  - What would you like us to know as we care for you? From home with   - Provide timely, complete, and accurate information to patient/family  - Incorporate patient and family knowledge, values, beliefs, and cultural backgrounds into the planning and delivery of care  - Encourage patient/family to participate in care and decision-making at the level they choose  - Honor patient and family perspectives and choices  Outcome: Progressing     Problem: CARDIOVASCULAR - ADULT  Goal: Maintains optimal cardiac output and hemodynamic stability  Description: INTERVENTIONS:  - Monitor vital signs, rhythm, and trends  - Monitor for bleeding, hypotension and signs of decreased cardiac output  - Evaluate effectiveness of vasoactive medications to optimize hemodynamic stability  - Monitor arterial and/or venous puncture sites for bleeding and/or hematoma  - Assess quality of pulses, skin color and temperature  - Assess for signs of decreased coronary artery perfusion - ex.  Angina  - Evaluate fluid balance, assess for edema, trend weights  Outcome: Progressing  Goal: Absence of cardiac arrhythmias or at baseline  Description: INTERVENTIONS:  - Continuous cardiac monitoring, monitor vital signs, obtain 12 lead EKG if indicated  - Evaluate effectiveness of antiarrhythmic and heart rate control medications as ordered  - Initiate emergency measures for life threatening arrhythmias  - Monitor electrolytes and administer replacement therapy as ordered  Outcome: Progressing     Problem: RESPIRATORY - ADULT  Goal: Achieves optimal ventilation and oxygenation  Description: INTERVENTIONS:  - Assess for changes in respiratory status  - Assess for changes in mentation and behavior  - Position to facilitate oxygenation and minimize respiratory effort  - Oxygen supplementation based on oxygen saturation or ABGs  - Provide Smoking Cessation handout, if applicable  - Encourage broncho-pulmonary hygiene including cough, deep breathe, Incentive Spirometry  - Assess the need for suctioning and perform as needed  - Assess and instruct to report SOB or any respiratory difficulty  - Respiratory Therapy support as indicated  - Manage/alleviate anxiety  - Monitor for signs/symptoms of CO2 retention  Outcome: Progressing     Problem: MUSCULOSKELETAL - ADULT  Goal: Return mobility to safest level of function  Description: INTERVENTIONS:  - Assess patient stability and activity tolerance for standing, transferring and ambulating w/ or w/o assistive devices  - Assist with transfers and ambulation using safe patient handling equipment as needed  - Ensure adequate protection for wounds/incisions during mobilization  - Obtain PT/OT consults as needed  - Advance activity as appropriate  - Communicate ordered activity level and limitations with patient/family  Outcome: Progressing     Problem: Impaired Functional Mobility  Goal: Achieve highest/safest level of mobility/gait  Description: Interventions:  - Assess patient's functional ability and stability  - Promote increasing activity/tolerance for mobility and gait  - Educate and engage patient/family in tolerated activity level and precautions    Outcome: Progressing     Problem: PAIN - ADULT  Goal: Verbalizes/displays adequate comfort level or patient's stated pain goal  Description: INTERVENTIONS:  - Encourage pt to monitor pain and request assistance  - Assess pain using appropriate pain scale  - Administer analgesics based on type and severity of pain and evaluate response  - Implement non-pharmacological measures as appropriate and evaluate response  - Consider cultural and social influences on pain and pain management  - Manage/alleviate anxiety  - Utilize distraction and/or relaxation techniques  - Monitor for opioid side effects  - Notify MD/LIP if interventions unsuccessful or patient reports new pain  - Anticipate increased pain with activity and pre-medicate as appropriate  Outcome: Progressing     Problem: SAFETY ADULT - FALL  Goal: Free from fall injury  Description: INTERVENTIONS:  - Assess pt frequently for physical needs  - Identify cognitive and physical deficits and behaviors that affect risk of falls.   - [place] fall precautions as indicated by assessment.  - Educate pt/family on patient safety including physical limitations  - Instruct pt to call for assistance with activity based on assessment  - Modify environment to reduce risk of injury  - Provide assistive devices as appropriate  - Consider OT/PT consult to assist with strengthening/mobility  - Encourage toileting schedule  Outcome: Progressing     Problem: DISCHARGE PLANNING  Goal: Discharge to home or other facility with appropriate resources  Description: INTERVENTIONS:  - Identify barriers to discharge w/pt and caregiver  - Include patient/family/discharge partner in discharge planning  - Arrange for needed discharge resources and transportation as appropriate  - Identify discharge learning needs (meds, wound care, etc)  - Arrange for interpreters to assist at discharge as needed  - Consider post-discharge preferences of patient/family/discharge partner  - Complete POLST form as appropriate  - Assess patient's ability to be responsible for managing their own health  - Refer to Case Management Department for coordinating discharge planning if the patient needs post-hospital services based on physician/LIP order or complex needs related to functional status, cognitive ability or social support system  Outcome: Progressing

## 2023-01-13 VITALS
HEIGHT: 64 IN | WEIGHT: 157.69 LBS | DIASTOLIC BLOOD PRESSURE: 77 MMHG | RESPIRATION RATE: 20 BRPM | BODY MASS INDEX: 26.92 KG/M2 | HEART RATE: 78 BPM | TEMPERATURE: 97 F | SYSTOLIC BLOOD PRESSURE: 159 MMHG | OXYGEN SATURATION: 96 %

## 2023-01-13 LAB
ANION GAP SERPL CALC-SCNC: 5 MMOL/L (ref 0–18)
BASOPHILS # BLD AUTO: 0.01 X10(3) UL (ref 0–0.2)
BASOPHILS NFR BLD AUTO: 0.1 %
BUN BLD-MCNC: 48 MG/DL (ref 7–18)
BUN/CREAT SERPL: 34.8 (ref 10–20)
CALCIUM BLD-MCNC: 8.8 MG/DL (ref 8.5–10.1)
CHLORIDE SERPL-SCNC: 110 MMOL/L (ref 98–112)
CO2 SERPL-SCNC: 27 MMOL/L (ref 21–32)
CREAT BLD-MCNC: 1.38 MG/DL
DEPRECATED RDW RBC AUTO: 48.1 FL (ref 35.1–46.3)
EOSINOPHIL # BLD AUTO: 0.02 X10(3) UL (ref 0–0.7)
EOSINOPHIL NFR BLD AUTO: 0.3 %
ERYTHROCYTE [DISTWIDTH] IN BLOOD BY AUTOMATED COUNT: 15.8 % (ref 11–15)
GFR SERPLBLD BASED ON 1.73 SQ M-ARVRAT: 39 ML/MIN/1.73M2 (ref 60–?)
GLUCOSE BLD-MCNC: 108 MG/DL (ref 70–99)
HCT VFR BLD AUTO: 27.5 %
HGB BLD-MCNC: 8.9 G/DL
IMM GRANULOCYTES # BLD AUTO: 0.05 X10(3) UL (ref 0–1)
IMM GRANULOCYTES NFR BLD: 0.6 %
LYMPHOCYTES # BLD AUTO: 2.22 X10(3) UL (ref 1–4)
LYMPHOCYTES NFR BLD AUTO: 28.5 %
MAGNESIUM SERPL-MCNC: 2.2 MG/DL (ref 1.6–2.6)
MCH RBC QN AUTO: 27.1 PG (ref 26–34)
MCHC RBC AUTO-ENTMCNC: 32.4 G/DL (ref 31–37)
MCV RBC AUTO: 83.6 FL
MONOCYTES # BLD AUTO: 0.66 X10(3) UL (ref 0.1–1)
MONOCYTES NFR BLD AUTO: 8.5 %
NEUTROPHILS # BLD AUTO: 4.82 X10 (3) UL (ref 1.5–7.7)
NEUTROPHILS # BLD AUTO: 4.82 X10(3) UL (ref 1.5–7.7)
NEUTROPHILS NFR BLD AUTO: 62 %
OSMOLALITY SERPL CALC.SUM OF ELEC: 307 MOSM/KG (ref 275–295)
PHOSPHATE SERPL-MCNC: 2.5 MG/DL (ref 2.5–4.9)
PLATELET # BLD AUTO: 227 10(3)UL (ref 150–450)
POTASSIUM SERPL-SCNC: 3.7 MMOL/L (ref 3.5–5.1)
RBC # BLD AUTO: 3.29 X10(6)UL
SODIUM SERPL-SCNC: 142 MMOL/L (ref 136–145)
WBC # BLD AUTO: 7.8 X10(3) UL (ref 4–11)

## 2023-01-13 PROCEDURE — 99233 SBSQ HOSP IP/OBS HIGH 50: CPT | Performed by: INTERNAL MEDICINE

## 2023-01-13 PROCEDURE — 99239 HOSP IP/OBS DSCHRG MGMT >30: CPT | Performed by: HOSPITALIST

## 2023-01-13 RX ORDER — LISINOPRIL 20 MG/1
20 TABLET ORAL DAILY
Qty: 90 TABLET | Refills: 3 | Status: SHIPPED | OUTPATIENT
Start: 2023-01-13

## 2023-01-13 NOTE — DISCHARGE INSTRUCTIONS
Follow-up with PCP  Follow-up with cardiology as instructed  Follow-up with Rheumatology to continue further taper of steroids  Call MD with any concerns  We are setting up home health care  Plan follow-up with heart failure clinic  Monitor your weight daily at home  Plan repeat labs in one week  Blood pressure goal is under 504 systolic and under 80  Call MD if blood pressure is consistently over 966 systolic or 90 diastolic call your cardiologist

## 2023-01-13 NOTE — CM/SW NOTE
01/13/23 1000   Discharge disposition   Expected discharge disposition Home-Health   Post Acute Care Provider [facility]   Discharge transportation Private car     10:20AM  Per chart review, pt has DC order for today. Jefe Fontana w/ [facility] notified of intended DC. Pt is cleared from / stand point. 11:55AM  SW received MDO x2 for [place] services/orders. Pt is already arranged for [place] services via [facility]. Pt is cleared from SW/ stand point.          PLAN: Home w/ [facility] [place]         Brittany Cooley 69, [address] [facility]

## 2023-01-13 NOTE — PROGRESS NOTES
[facility] - [facility]    Progress Note    Prasad Galloway Patient Status:  Inpatient    1945 MRN [id number]   Location [facility] 3W/SW Attending Sadia Fowler MD   Hosp Day # 6 PCP Iesha Mcdaniel MD       Subjective:   Prasad Galloway is a(n) 76year old female     ROS:     Constitutional: Wants to go home  ENMT:  Negative for ear drainage, hearing loss and nasal drainage  Eyes:  Negative for eye discharge and vision loss  Cardiovascular:  Negative for chest pain, sob, irregular heartbeat/palpitations  Respiratory:  Negative for cough, dyspnea and wheezing  Gastrointestinal:  Negative for abdominal pain, constipation, decreased appetite, diarrhea and vomiting  Genitourinary:  Negative for dysuria and hematuria  Endocrine:  Negative for abnormal sleep patterns, increased activity, polydipsia and polyphagia  Hema/Lymph:  Negative for easy bleeding and easy bruising  Integumentary complains of slight edema  Musculoskeletal:  Negative for bone/joint symptoms  Neurological:  Negative for gait disturbance  Psychiatric:  Negative for inappropriate interaction and psychiatric symptoms       23  1702   BP: (!) 172/87   Pulse: 84   Resp: 16   Temp:            PHYSICAL EXAM:   Constitutional: appears well hydrated alert and responsive no acute distress noted  Head/Face: normocephalic  Eyes/Vision: normal extraocular motion is intact  Nose/Mouth/Throat:mucous membranes are moist and no oral lesions are noted  Neck/Thyroid: neck is supple without adenopathy  Lymphatic: no abnormal cervical, supraclavicular adenopathy is noted  Respiratory:  lungs are clear to auscultation bilaterally, normal respiratory effort  Cardiovascular: regular rate and rhythm no murmurs, gallups, or rubs  Abdomen: soft, non-tender, non-distended, BS normal  Vascular: well perfused femoral, and pedal pulses normal  Skin/Hair: no unusual rashes present, no abnormal bruising noted  Back/Spine: no abnormalities noted  Musculoskeletal: full ROM all extremities good strength  no deformities  Extremities: 1+ edema  Neurological:  Grossly normal    Results:     Laboratory Data:  Lab Results   Component Value Date    WBC 7.8 01/12/2023    HGB 8.0 (L) 01/12/2023    HCT 24.5 (L) 01/12/2023    .0 01/12/2023    CREATSERUM 1.38 (H) 01/12/2023    BUN 53 (H) 01/12/2023     01/12/2023    K 3.9 01/12/2023     01/12/2023    CO2 23.0 01/12/2023     (H) 01/12/2023    CA 8.7 01/12/2023    ALB 2.3 (L) 01/12/2023    ALKPHO 55 01/11/2023    BILT 0.2 01/11/2023    TP 5.8 (L) 01/11/2023    AST 17 01/11/2023    ALT 34 01/11/2023    PTT 65.3 (H) 11/03/2022    INR 1.39 (H) 01/06/2023    T4F 0.8 01/07/2023    TSH 0.278 (L) 01/07/2023     01/06/2023    DDIMER 0.45 07/06/2022    ESRML 130 (H) 01/09/2023    CRP 10.30 (H) 12/09/2022    MG 2.0 01/12/2023    PHOS 2.5 01/12/2023    PHOS 2.5 01/12/2023    CK 49 01/08/2023    B12 1,161 (H) 01/09/2023       Imaging:  [unfilled]   No results found. ASSESSMENT/PLAN:   Assessment       #1 hypertension blood pressure not great restart lisinopril 10 mg/day restart Bumex 0.5 mg daily discussed with Dr. Annabelle Hendricks    #2 breathing difficulties on prednisone would wean fairly quickly  #3 FRANKO  Creatinine good at 1.38    #4 anemia hemoglobin of 8  Iron studies okay    Transfuse as needed likely multifactorial    Hopefully home tomorrow DC sodium bicarb bicarb level now 23           1/12/2023  Tom Walker MD

## 2023-01-13 NOTE — PROGRESS NOTES
[facility]    Progress Note    Prasad Galloway Patient Status:  Inpatient    1945 MRN [id number]   Location [facility] 3W/SW Attending No att. providers found   Hosp Day # 7 PCP Iesha Mcdaniel MD       Subjective:   Prasad Galloway is a(n) 76year old female     ROS:     Constitutional:  Negative for decreased activity, fever, irritability and lethargy  ENMT:  Negative for ear drainage, hearing loss and nasal drainage  Eyes:  Negative for eye discharge and vision loss  Cardiovascular:  Negative for chest pain, sob, irregular heartbeat/palpitations  Respiratory:  Negative for cough, dyspnea and wheezing  Gastrointestinal:  Negative for abdominal pain, constipation, decreased appetite, diarrhea and vomiting  Genitourinary:  Negative for dysuria and hematuria  Endocrine:  Negative for abnormal sleep patterns, increased activity, polydipsia and polyphagia  Hema/Lymph:  Negative for easy bleeding and easy bruising  Integumentary:  Negative for pruritus and rash  Musculoskeletal:  Negative for bone/joint symptoms  Neurological:  Negative for gait disturbance  Psychiatric:  Negative for inappropriate interaction and psychiatric symptoms       23  1229   BP: 159/77   Pulse: 78   Resp: 20   Temp:            PHYSICAL EXAM:   Constitutional: appears well hydrated alert and responsive no acute distress noted  Head/Face: normocephalic  Eyes/Vision: normal extraocular motion is intact  Nose/Mouth/Throat:mucous membranes are moist and no oral lesions are noted  Neck/Thyroid: neck is supple without adenopathy  Lymphatic: no abnormal cervical, supraclavicular adenopathy is noted  Respiratory:  lungs are clear to auscultation bilaterally, normal respiratory effort  Cardiovascular: regular rate and rhythm no murmurs, gallups, or rubs  Abdomen: soft, non-tender, non-distended, BS normal  Vascular: well perfused femoral, and pedal pulses normal  Skin/Hair: no unusual rashes present, no abnormal bruising noted  Back/Spine: no abnormalities noted  Musculoskeletal: full ROM all extremities good strength  no deformities  Extremities: Trace edema  Neurological:  Grossly normal    Results:     Laboratory Data:  Lab Results   Component Value Date    WBC 7.8 01/13/2023    HGB 8.9 (L) 01/13/2023    HCT 27.5 (L) 01/13/2023    .0 01/13/2023    CREATSERUM 1.38 (H) 01/13/2023    BUN 48 (H) 01/13/2023     01/13/2023    K 3.7 01/13/2023     01/13/2023    CO2 27.0 01/13/2023     (H) 01/13/2023    CA 8.8 01/13/2023    ALB 2.3 (L) 01/12/2023    ALKPHO 55 01/11/2023    BILT 0.2 01/11/2023    TP 5.8 (L) 01/11/2023    AST 17 01/11/2023    ALT 34 01/11/2023    PTT 65.3 (H) 11/03/2022    INR 1.39 (H) 01/06/2023    T4F 0.8 01/07/2023    TSH 0.278 (L) 01/07/2023     01/06/2023    DDIMER 0.45 07/06/2022    ESRML 130 (H) 01/09/2023    CRP 10.30 (H) 12/09/2022    MG 2.2 01/13/2023    PHOS 2.5 01/13/2023    CK 49 01/08/2023    B12 1,161 (H) 01/09/2023       Imaging:  [unfilled]   No results found. ASSESSMENT/PLAN:   Assessment       #1 hypertension  Monitor pressure low-salt diet reviewed with son to go home with lisinopril 20/day and Toprol 50 twice daily    #2 FRANKO creatinine re better 1.38    #3 cough better wean steroids    See her in about a month    Bumex 1 mg daily at home           1/13/2023  Ansiha Scott MD

## 2023-01-13 NOTE — PLAN OF CARE
Armida ENCINAS/Mirtha, RA, denies pain and shortness of breath. Pt to be discharged home today as ordered. Discharge instructions, medications/side effects, prescriptions, and follow up appointments reviewed with pt and son Shireen Gonzáles. Pt and son verbalized understanding and compliance. Tele removed. IV site DC, site CDI. Belongings gathered and sent with pt.

## 2023-01-16 ENCOUNTER — PATIENT OUTREACH (OUTPATIENT)
Dept: CASE MANAGEMENT | Age: 78
End: 2023-01-16

## 2023-01-16 DIAGNOSIS — Z02.9 ENCOUNTERS FOR UNSPECIFIED ADMINISTRATIVE PURPOSE: ICD-10-CM

## 2023-01-16 DIAGNOSIS — N17.9 AKI (ACUTE KIDNEY INJURY) (HCC): ICD-10-CM

## 2023-01-16 PROCEDURE — 1111F DSCHRG MED/CURRENT MED MERGE: CPT

## 2023-01-17 DIAGNOSIS — J30.1 SEASONAL ALLERGIC RHINITIS DUE TO POLLEN: ICD-10-CM

## 2023-01-18 ENCOUNTER — LAB REQUISITION (OUTPATIENT)
Dept: LAB | Facility: HOSPITAL | Age: 78
End: 2023-01-18
Payer: MEDICARE

## 2023-01-18 DIAGNOSIS — I13.0 HYPERTENSIVE HEART AND CHRONIC KIDNEY DISEASE WITH HEART FAILURE AND STAGE 1 THROUGH STAGE 4 CHRONIC KIDNEY DISEASE, OR UNSPECIFIED CHRONIC KIDNEY DISEASE (HCC): ICD-10-CM

## 2023-01-18 LAB
ALBUMIN SERPL-MCNC: 3.2 G/DL (ref 3.4–5)
ANION GAP SERPL CALC-SCNC: 9 MMOL/L (ref 0–18)
BUN BLD-MCNC: 57 MG/DL (ref 7–18)
CALCIUM BLD-MCNC: 8.7 MG/DL (ref 8.5–10.1)
CHLORIDE SERPL-SCNC: 101 MMOL/L (ref 98–112)
CO2 SERPL-SCNC: 24 MMOL/L (ref 21–32)
CREAT BLD-MCNC: 1.86 MG/DL
GFR SERPLBLD BASED ON 1.73 SQ M-ARVRAT: 27 ML/MIN/1.73M2 (ref 60–?)
GLUCOSE BLD-MCNC: 132 MG/DL (ref 70–99)
OSMOLALITY SERPL CALC.SUM OF ELEC: 296 MOSM/KG (ref 275–295)
PHOSPHATE SERPL-MCNC: 3.8 MG/DL (ref 2.5–4.9)
POTASSIUM SERPL-SCNC: 4.3 MMOL/L (ref 3.5–5.1)
SODIUM SERPL-SCNC: 134 MMOL/L (ref 136–145)

## 2023-01-18 PROCEDURE — 80069 RENAL FUNCTION PANEL: CPT | Performed by: INTERNAL MEDICINE

## 2023-01-18 RX ORDER — LEVOCETIRIZINE DIHYDROCHLORIDE 5 MG/1
5 TABLET, FILM COATED ORAL EVERY EVENING
Qty: 90 TABLET | Refills: 1 | Status: SHIPPED | OUTPATIENT
Start: 2023-01-18

## 2023-01-18 NOTE — TELEPHONE ENCOUNTER
Refill passed per [place] [employer] [place], [facility] protocol. Requested Prescriptions   Pending Prescriptions Disp Refills    levocetirizine 5 MG Oral Tab 90 tablet 1     Sig: Take 1 tablet (5 mg total) by mouth every evening. Allergy Medication Protocol Passed - 1/17/2023  7:49 PM        Passed - In person appointment or virtual visit in the past 12 mos or appointment in next 3 mos     Recent Outpatient Visits              3 weeks ago Community acquired pneumonia of left lower lobe of lung    [address] [facility], [facility]    Office Visit    2 months ago Rheumatoid arthritis, involving unspecified site, unspecified whether rheumatoid factor present [facility])    Flavio Bone, [address] [facility] [address], Lyudmila Cochran MD    Office Visit    5 months ago Rheumatoid arthritis, involving unspecified site, unspecified whether rheumatoid factor present [facility])    Flavio Bone, [address] [facility] [address], Lyudmila Cochran MD    Office Visit    5 months ago Bilateral hearing loss, unspecified hearing loss type    Flavio Bone, [facility], Dorrie Hodgkin, MD    Office Visit    6 months ago Hospital discharge follow-up    Skipper Chimera, Dorrie Hodgkin, MD    Telemedicine          Future Appointments         Provider Department Appt Notes    Tomorrow MD Grady Montoya Addison TCM/HFU (repeat renal function panel), hospital follow up    In 3 days MD Flavio June, [address] [facility] [address], [place] Follow up after hospital stay.     In 1 week Roberto Borjas, [address]     In 2 months Cheyenne Freeman, DO Flavio Bone, [address] [facility] f/u    In 3 months Apn, [address] 6 MONTH T[facility]R-M[facility] ECHO 1330                    Recent Outpatient Visits 3 weeks ago Community acquired pneumonia of left lower lobe of lung    [address], APRN    Office Visit    2 months ago Rheumatoid arthritis, involving unspecified site, unspecified whether rheumatoid factor present [facility])    Faizan Cosme [place], MD    Office Visit    5 months ago Rheumatoid arthritis, involving unspecified site, unspecified whether rheumatoid factor present [facility])    Kady Last, [address], Marv Vivas MD    Office Visit    5 months ago Bilateral hearing loss, unspecified hearing loss type    Kady Last, [address] [address], Cheryle Browning MD    Office Visit    6 months ago Hospital discharge follow-up    Rhonda Simmons, Cheryle Browning MD    Telemedicine            Future Appointments         Provider Department Appt Notes    Tomorrow MD Rhonda Gonsalves Addison TCM/HFU (repeat renal function panel), hospital follow up    In 3 days MD Kady Briseno, [address], [place] Follow up after hospital stay.     In 1 week Nimo Borjas, [address]     In 2 months DO Kady Bhatt, [address] [facility] f/u    In 3 months Apn, [address] 6 MONTH TAVR-MCI ECHO 9835

## 2023-01-19 ENCOUNTER — OFFICE VISIT (OUTPATIENT)
Dept: INTERNAL MEDICINE CLINIC | Facility: CLINIC | Age: 78
End: 2023-01-19

## 2023-01-19 VITALS
SYSTOLIC BLOOD PRESSURE: 123 MMHG | HEART RATE: 65 BPM | WEIGHT: 157 LBS | BODY MASS INDEX: 26.8 KG/M2 | DIASTOLIC BLOOD PRESSURE: 76 MMHG | HEIGHT: 64 IN

## 2023-01-19 DIAGNOSIS — I35.0 SEVERE AORTIC STENOSIS: ICD-10-CM

## 2023-01-19 DIAGNOSIS — I10 HYPERTENSION GOAL BP (BLOOD PRESSURE) < 130/80: ICD-10-CM

## 2023-01-19 DIAGNOSIS — N17.9 ACUTE RENAL FAILURE, UNSPECIFIED ACUTE RENAL FAILURE TYPE (HCC): ICD-10-CM

## 2023-01-19 DIAGNOSIS — Z09 HOSPITAL DISCHARGE FOLLOW-UP: Primary | ICD-10-CM

## 2023-01-19 DIAGNOSIS — I95.9 HYPOTENSION, UNSPECIFIED HYPOTENSION TYPE: ICD-10-CM

## 2023-01-19 PROBLEM — N28.9 RENAL INSUFFICIENCY: Status: RESOLVED | Noted: 2023-01-06 | Resolved: 2023-01-19

## 2023-01-19 PROBLEM — J18.9 COMMUNITY ACQUIRED PNEUMONIA OF LEFT LOWER LOBE OF LUNG: Status: RESOLVED | Noted: 2022-12-11 | Resolved: 2023-01-19

## 2023-01-19 PROBLEM — I50.33 ACUTE ON CHRONIC DIASTOLIC HEART FAILURE (HCC): Status: RESOLVED | Noted: 2022-11-07 | Resolved: 2023-01-19

## 2023-01-19 PROCEDURE — 99496 TRANSJ CARE MGMT HIGH F2F 7D: CPT | Performed by: INTERNAL MEDICINE

## 2023-01-19 PROCEDURE — 1111F DSCHRG MED/CURRENT MED MERGE: CPT | Performed by: INTERNAL MEDICINE

## 2023-01-21 ENCOUNTER — OFFICE VISIT (OUTPATIENT)
Dept: RHEUMATOLOGY | Facility: CLINIC | Age: 78
End: 2023-01-21

## 2023-01-21 ENCOUNTER — TELEPHONE (OUTPATIENT)
Dept: RHEUMATOLOGY | Facility: CLINIC | Age: 78
End: 2023-01-21

## 2023-01-21 VITALS
HEIGHT: 64 IN | HEART RATE: 78 BPM | BODY MASS INDEX: 24.53 KG/M2 | RESPIRATION RATE: 16 BRPM | SYSTOLIC BLOOD PRESSURE: 121 MMHG | DIASTOLIC BLOOD PRESSURE: 55 MMHG | WEIGHT: 143.69 LBS

## 2023-01-21 DIAGNOSIS — Z51.81 THERAPEUTIC DRUG MONITORING: ICD-10-CM

## 2023-01-21 DIAGNOSIS — M06.9 RHEUMATOID ARTHRITIS, INVOLVING UNSPECIFIED SITE, UNSPECIFIED WHETHER RHEUMATOID FACTOR PRESENT (HCC): Primary | ICD-10-CM

## 2023-01-21 PROCEDURE — 1111F DSCHRG MED/CURRENT MED MERGE: CPT | Performed by: INTERNAL MEDICINE

## 2023-01-21 PROCEDURE — 99214 OFFICE O/P EST MOD 30 MIN: CPT | Performed by: INTERNAL MEDICINE

## 2023-01-21 RX ORDER — PREDNISONE 10 MG/1
10 TABLET ORAL DAILY
Qty: 30 TABLET | Refills: 3 | Status: SHIPPED | OUTPATIENT
Start: 2023-01-21 | End: 2023-01-23

## 2023-01-21 RX ORDER — PREDNISONE 1 MG/1
15 TABLET ORAL DAILY
Qty: 90 TABLET | Refills: 3 | Status: SHIPPED | OUTPATIENT
Start: 2023-01-21

## 2023-01-21 NOTE — TELEPHONE ENCOUNTER
Please cancel the prednisone 10mg prescription sent in -   I would like her on 15mg a day - and sent in the 5mg script

## 2023-02-09 ENCOUNTER — OFFICE VISIT (OUTPATIENT)
Dept: RHEUMATOLOGY | Facility: CLINIC | Age: 78
End: 2023-02-09

## 2023-02-09 VITALS
HEIGHT: 64 IN | DIASTOLIC BLOOD PRESSURE: 63 MMHG | SYSTOLIC BLOOD PRESSURE: 100 MMHG | BODY MASS INDEX: 24.41 KG/M2 | OXYGEN SATURATION: 96 % | RESPIRATION RATE: 20 BRPM | HEART RATE: 96 BPM | WEIGHT: 143 LBS

## 2023-02-09 DIAGNOSIS — M06.9 RHEUMATOID ARTHRITIS, INVOLVING UNSPECIFIED SITE, UNSPECIFIED WHETHER RHEUMATOID FACTOR PRESENT (HCC): Primary | ICD-10-CM

## 2023-02-09 DIAGNOSIS — Z51.81 THERAPEUTIC DRUG MONITORING: ICD-10-CM

## 2023-02-09 PROCEDURE — 1111F DSCHRG MED/CURRENT MED MERGE: CPT | Performed by: INTERNAL MEDICINE

## 2023-02-09 PROCEDURE — 99214 OFFICE O/P EST MOD 30 MIN: CPT | Performed by: INTERNAL MEDICINE

## 2023-02-09 NOTE — PATIENT INSTRUCTIONS
1. cont. prednisoen 5mg a day -   2. Return to clinic in 2 months.    3. Check labs in 1 week   - will discuss about stay on prednisone or restarting leflunomide 10mg a day   - the safe thing to do is to just stay on prednisone 5mg a day

## 2023-02-19 DIAGNOSIS — I35.0 MODERATE AORTIC STENOSIS BY PRIOR ECHOCARDIOGRAM: ICD-10-CM

## 2023-02-19 DIAGNOSIS — I25.10 CORONARY ARTERY DISEASE INVOLVING NATIVE CORONARY ARTERY OF NATIVE HEART WITHOUT ANGINA PECTORIS: ICD-10-CM

## 2023-02-19 DIAGNOSIS — E87.6 HYPOKALEMIA: ICD-10-CM

## 2023-02-19 DIAGNOSIS — I50.33 ACUTE ON CHRONIC DIASTOLIC (CONGESTIVE) HEART FAILURE (HCC): ICD-10-CM

## 2023-02-19 DIAGNOSIS — Z95.5 S/P DRUG ELUTING CORONARY STENT PLACEMENT: ICD-10-CM

## 2023-02-20 RX ORDER — CLOPIDOGREL BISULFATE 75 MG/1
75 TABLET ORAL DAILY
Qty: 90 TABLET | Refills: 3 | Status: SHIPPED | OUTPATIENT
Start: 2023-02-20

## 2023-02-20 RX ORDER — POTASSIUM CHLORIDE 1500 MG/1
20 TABLET, FILM COATED, EXTENDED RELEASE ORAL 2 TIMES DAILY
Qty: 60 TABLET | Refills: 0 | Status: SHIPPED | OUTPATIENT
Start: 2023-02-20

## 2023-02-27 ENCOUNTER — TELEPHONE (OUTPATIENT)
Dept: INTERNAL MEDICINE CLINIC | Facility: CLINIC | Age: 78
End: 2023-02-27

## 2023-02-27 NOTE — TELEPHONE ENCOUNTER
[address] [place] [address] calling to inform that  She plans on re-certiifing patient for home health, will continue to see once a week for 8 weeks.

## 2023-02-27 NOTE — TELEPHONE ENCOUNTER
Toma/ NESHA from [facility] home health called requesting a Verbal order for extension of Physical Therapy for one time a week, for two weeks staring this week. Requesting for a call back or states to leave a voicemail if she happens to not answer the phone.

## 2023-03-09 ENCOUNTER — TELEPHONE (OUTPATIENT)
Dept: INTERNAL MEDICINE CLINIC | Facility: CLINIC | Age: 78
End: 2023-03-09

## 2023-03-09 RX ORDER — LEFLUNOMIDE 10 MG/1
10 TABLET ORAL DAILY
Qty: 90 TABLET | Refills: 0 | Status: SHIPPED | OUTPATIENT
Start: 2023-03-09

## 2023-03-09 NOTE — TELEPHONE ENCOUNTER
LOV: 2/9/23  Future Appointments   Date Time Provider César Estrada   3/21/2023 11:30 AM Anders Quinones DO [facility]   5/2/2023 12:45 PM Apn, Structural Heart [facility] [facility] [place] [facility]     Labs:   Component      Latest Ref Rng & Units 1/11/2023   Glucose      70 - 99 mg/dL 122 (H)   Sodium      136 - 145 mmol/L 142   Potassium      3.5 - 5.1 mmol/L 4.0   Chloride      98 - 112 mmol/L 111   Carbon Dioxide, Total      21.0 - 32.0 mmol/L 22.0   ANION GAP      0 - 18 mmol/L 9   BUN      7 - 18 mg/dL 52 (H)   CREATININE      0.55 - 1.02 mg/dL 1.54 (H)   BUN/CREATININE RATIO      10.0 - 20.0 33.8 (H)   CALCIUM      8.5 - 10.1 mg/dL 8.9   CALCULATED OSMOLALITY      275 - 295 mOsm/kg 309 (H)   eGFR-Cr      >=60 mL/min/1.73m2 34 (L)   ALT (SGPT)      13 - 56 U/L 34   AST (SGOT)      15 - 37 U/L 17   ALKALINE PHOSPHATASE      55 - 142 U/L 55   Total Bilirubin      0.1 - 2.0 mg/dL 0.2   PROTEIN, TOTAL      6.4 - 8.2 g/dL 5.8 (L)   Albumin      3.4 - 5.0 g/dL 2.3 (L)   Globulin      2.8 - 4.4 g/dL 3.5   A/G Ratio      1.0 - 2.0 0.7 (L)     Component      Latest Ref Rng & Units 1/13/2023   WBC      4.0 - 11.0 x10(3) uL 7.8   RBC      3.80 - 5.30 x10(6)uL 3.29 (L)   Hemoglobin      12.0 - 16.0 g/dL 8.9 (L)   Hematocrit      35.0 - 48.0 % 27.5 (L)   MCV      80.0 - 100.0 fL 83.6   MCH      26.0 - 34.0 pg 27.1   MCHC      31.0 - 37.0 g/dL 32.4   RDW-SD      35.1 - 46.3 fL 48.1 (H)   RDW      11.0 - 15.0 % 15.8 (H)   Platelet Count      703.2 - 450.0 10(3)uL 227.0   Prelim Neutrophil Abs      1.50 - 7.70 x10 (3) uL 4.82   Neutrophils Absolute      1.50 - 7.70 x10(3) uL 4.82   Lymphocytes Absolute      1.00 - 4.00 x10(3) uL 2.22   Monocytes Absolute      0.10 - 1.00 x10(3) uL 0.66   Eosinophils Absolute      0.00 - 0.70 x10(3) uL 0.02   Basophils Absolute      0.00 - 0.20 x10(3) uL 0.01   Immature Granulocyte Absolute      0.00 - 1.00 x10(3) uL 0.05   Neutrophils %      % 62.0   Lymphocytes %      % 28.5   Monocytes %      % 8.5   Eosinophils %      % 0.3   Basophils %      % 0.1   Immature Granulocyte %      % 0.6

## 2023-03-09 NOTE — TELEPHONE ENCOUNTER
Keara [employer] Physical Therapist from [place] [address] states she is re certifying patient for physical therapy.      # [phone]

## 2023-03-13 ENCOUNTER — TELEMEDICINE (OUTPATIENT)
Dept: INTERNAL MEDICINE CLINIC | Facility: CLINIC | Age: 78
End: 2023-03-13

## 2023-03-13 DIAGNOSIS — F32.A ANXIETY AND DEPRESSION: ICD-10-CM

## 2023-03-13 DIAGNOSIS — R41.3 POOR SHORT TERM MEMORY: Primary | ICD-10-CM

## 2023-03-13 DIAGNOSIS — F41.9 ANXIETY AND DEPRESSION: ICD-10-CM

## 2023-03-13 DIAGNOSIS — H91.93 BILATERAL HEARING LOSS, UNSPECIFIED HEARING LOSS TYPE: ICD-10-CM

## 2023-03-14 ENCOUNTER — TELEPHONE (OUTPATIENT)
Dept: INTERNAL MEDICINE CLINIC | Facility: CLINIC | Age: 78
End: 2023-03-14

## 2023-03-14 NOTE — TELEPHONE ENCOUNTER
Delia Galeazzi from [place] [address] calling to state will see patient this week for nursing visit and to continue plan of care, will be faxing orders to office.

## 2023-03-20 ENCOUNTER — MED REC SCAN ONLY (OUTPATIENT)
Dept: INTERNAL MEDICINE CLINIC | Facility: CLINIC | Age: 78
End: 2023-03-20

## 2023-03-30 ENCOUNTER — TELEPHONE (OUTPATIENT)
Dept: INTERNAL MEDICINE CLINIC | Facility: CLINIC | Age: 78
End: 2023-03-30

## 2023-03-30 NOTE — TELEPHONE ENCOUNTER
Spoke with Benji Santana from PT ,she will be arriving soon to see the patient    This RN will call Benji Santana  at 1:30 for a patient update

## 2023-03-30 NOTE — TELEPHONE ENCOUNTER
If pt is asx, continue current management monitor readings closely, if her BP is around 90/50-60 then hold one dose of metoprolol (only to it once a day)

## 2023-03-30 NOTE — TELEPHONE ENCOUNTER
Rancho mirage from PT calling with update after she saw the patient     V/s ;   B/p 100/52   HR = 85  Patient was now sitting up trying to eat   Rancho mirage states patient was fine, alert and responsive   Kevin bateman reinforced to daughter to continue to monitor patient b/p     Family is awaiting b/p parameters     Routing as [employer]

## 2023-03-30 NOTE — TELEPHONE ENCOUNTER
Grisel Nic calling from [facility] states she received a call from patient's daughter Rizwan Kiran that patient has low v/s today     B/p lying down= 107/44, HR =113,  02 sat =94%  It was reported to Lelo Vinson that daughter Rizwan Kiran then gave the patient her b/p medication    V/s after meds 117/56  ( lying down ) , AY=794    15 minutes later  B/p 90/42,  HR= 110   02 sat =94%  Reported to Lelo Vinson that daughter had to leave patient alone, Lelo Vinson informed patient to remain lying down with feet elevated and to hydrate herself    Reported to Lelo Vinson that daughter will be returning to patient soon  Lelo Vinson will be seeing patient today between 1 and 1:30pm     Lelo Vinson provided Daughter Rizwan Kiran number  -- [phone]    This RN called Rizwan Kiran, Left Voicemail to call back our office. Office phone number provided with office telephone hours.      Staff - please try to call patient daughter again  OR reach out to Tedra Goltz --- Routing as FYI as  Dr. Mariel De Souza  is out of office

## 2023-03-30 NOTE — TELEPHONE ENCOUNTER
Patient daughter Andrew Watt  calling ( identified name and  )  Andrew Watt is now with the patient  B/p is 104/42 lying down,  HR=  85      Patient is not dizzy ,not lightheaded     Patient has been drinking electrolyte water,Daughter states  patient has poor appetite       Yakelin ----Daughter states would like parameters on b/p meds; When to hold b/p meds   Daughter confirmed that patient takes all medications  as directed     Please advise and thank you. Anton from PT is now there with the patient also     Dolly Willard to call back if symptoms and/ or condition worsens    Daughter Autumn Miranda understanding and agrees with plan.

## 2023-03-31 RX ORDER — LEFLUNOMIDE 10 MG/1
10 TABLET ORAL DAILY
Qty: 90 TABLET | Refills: 0 | Status: CANCELLED | OUTPATIENT
Start: 2023-03-31

## 2023-03-31 NOTE — TELEPHONE ENCOUNTER
Left message for Toma-PT to call back-transfer to triage. Advised daughter Candie Dougherty blair) of Yakelin Landa's note. Daughter verbalized understanding.

## 2023-04-01 NOTE — TELEPHONE ENCOUNTER
Disp Refills Start End    leflunomide 10 MG Oral Tab 90 tablet 0 3/9/2023          LOV: 2/9/23  Future Appointments   Date Time Provider César Estrada   5/2/2023 12:45 PM Apn, Structural Heart [address] [facility]

## 2023-04-03 ENCOUNTER — TELEPHONE (OUTPATIENT)
Dept: RHEUMATOLOGY | Facility: CLINIC | Age: 78
End: 2023-04-03

## 2023-04-03 NOTE — TELEPHONE ENCOUNTER
Per Donna/Nurse, she would like to know what blood work doctor needs patient to do. Please fax it to [phone].

## 2023-04-05 ENCOUNTER — LAB REQUISITION (OUTPATIENT)
Dept: LAB | Facility: HOSPITAL | Age: 78
End: 2023-04-05
Payer: MEDICARE

## 2023-04-05 DIAGNOSIS — Z51.81 ENCOUNTER FOR THERAPEUTIC DRUG LEVEL MONITORING: ICD-10-CM

## 2023-04-05 DIAGNOSIS — M06.9 RHEUMATOID ARTHRITIS, UNSPECIFIED (HCC): ICD-10-CM

## 2023-04-05 LAB
BASOPHILS # BLD AUTO: 0.06 X10(3) UL (ref 0–0.2)
BASOPHILS NFR BLD AUTO: 0.6 %
BILIRUB UR QL STRIP.AUTO: NEGATIVE
COLOR UR AUTO: YELLOW
CRP SERPL-MCNC: 4.2 MG/DL (ref ?–0.3)
EOSINOPHIL # BLD AUTO: 0.79 X10(3) UL (ref 0–0.7)
EOSINOPHIL NFR BLD AUTO: 7.4 %
ERYTHROCYTE [DISTWIDTH] IN BLOOD BY AUTOMATED COUNT: 14.4 %
GLUCOSE UR STRIP.AUTO-MCNC: NEGATIVE MG/DL
HCT VFR BLD AUTO: 25.9 %
HGB BLD-MCNC: 8.4 G/DL
HYALINE CASTS #/AREA URNS AUTO: PRESENT /LPF
IMM GRANULOCYTES # BLD AUTO: 0.04 X10(3) UL (ref 0–1)
IMM GRANULOCYTES NFR BLD: 0.4 %
KETONES UR STRIP.AUTO-MCNC: NEGATIVE MG/DL
LYMPHOCYTES # BLD AUTO: 1.8 X10(3) UL (ref 1–4)
LYMPHOCYTES NFR BLD AUTO: 16.9 %
MCH RBC QN AUTO: 29 PG (ref 26–34)
MCHC RBC AUTO-ENTMCNC: 32.4 G/DL (ref 31–37)
MCV RBC AUTO: 89.3 FL
MONOCYTES # BLD AUTO: 0.95 X10(3) UL (ref 0.1–1)
MONOCYTES NFR BLD AUTO: 8.9 %
NEUTROPHILS # BLD AUTO: 7.02 X10 (3) UL (ref 1.5–7.7)
NEUTROPHILS # BLD AUTO: 7.02 X10(3) UL (ref 1.5–7.7)
NEUTROPHILS NFR BLD AUTO: 65.8 %
NITRITE UR QL STRIP.AUTO: NEGATIVE
PH UR STRIP.AUTO: 5 [PH] (ref 5–8)
PLATELET # BLD AUTO: 270 10(3)UL (ref 150–450)
PROT UR STRIP.AUTO-MCNC: NEGATIVE MG/DL
RBC # BLD AUTO: 2.9 X10(6)UL
SP GR UR STRIP.AUTO: 1.01 (ref 1–1.03)
UROBILINOGEN UR STRIP.AUTO-MCNC: <2 MG/DL
WBC # BLD AUTO: 10.7 X10(3) UL (ref 4–11)

## 2023-04-05 PROCEDURE — 85025 COMPLETE CBC W/AUTO DIFF WBC: CPT | Performed by: INTERNAL MEDICINE

## 2023-04-05 PROCEDURE — 81001 URINALYSIS AUTO W/SCOPE: CPT | Performed by: INTERNAL MEDICINE

## 2023-04-05 PROCEDURE — 85652 RBC SED RATE AUTOMATED: CPT | Performed by: INTERNAL MEDICINE

## 2023-04-05 PROCEDURE — 86140 C-REACTIVE PROTEIN: CPT | Performed by: INTERNAL MEDICINE

## 2023-04-05 PROCEDURE — 87086 URINE CULTURE/COLONY COUNT: CPT | Performed by: INTERNAL MEDICINE

## 2023-04-05 PROCEDURE — 84450 TRANSFERASE (AST) (SGOT): CPT | Performed by: INTERNAL MEDICINE

## 2023-04-05 PROCEDURE — 84460 ALANINE AMINO (ALT) (SGPT): CPT | Performed by: INTERNAL MEDICINE

## 2023-04-06 LAB
ALT SERPL-CCNC: 15 U/L
AST SERPL-CCNC: 17 U/L (ref 15–37)
ERYTHROCYTE [SEDIMENTATION RATE] IN BLOOD: 48 MM/HR

## 2023-04-07 ENCOUNTER — TELEPHONE (OUTPATIENT)
Dept: INTERNAL MEDICINE CLINIC | Facility: CLINIC | Age: 78
End: 2023-04-07

## 2023-04-07 NOTE — TELEPHONE ENCOUNTER
Patient's daughter Gricel Timmons called (on LUCIANO), verified patient's Name and . She has questions regarding the lab tests done on . This RN also reviewed the results and is asking for provider review and recommendation. Daughter states patient remains to have back pain for 1-1/2 weeks now. She is concerned it might related to her kidneys. Denies any urinary symptoms at this time. Follow-up appointment scheduled. Future Appointments   Date Time Provider César Estrada   2023 11:40 AM Bradly Mason PA-C [facility]   2023 12:45 PM Apn, Structural Heart Les Sidle       Dr. Sara Terrell (for Dr. Vianney Lawrence) please advise.

## 2023-04-08 ENCOUNTER — OFFICE VISIT (OUTPATIENT)
Dept: INTERNAL MEDICINE CLINIC | Facility: CLINIC | Age: 78
End: 2023-04-08

## 2023-04-08 VITALS
OXYGEN SATURATION: 96 % | HEIGHT: 64 IN | HEART RATE: 88 BPM | BODY MASS INDEX: 24.14 KG/M2 | WEIGHT: 141.38 LBS | DIASTOLIC BLOOD PRESSURE: 55 MMHG | SYSTOLIC BLOOD PRESSURE: 88 MMHG

## 2023-04-08 DIAGNOSIS — M54.2 NECK PAIN: ICD-10-CM

## 2023-04-08 DIAGNOSIS — I95.9 HYPOTENSION, UNSPECIFIED HYPOTENSION TYPE: ICD-10-CM

## 2023-04-08 DIAGNOSIS — N18.31 STAGE 3A CHRONIC KIDNEY DISEASE (HCC): ICD-10-CM

## 2023-04-08 DIAGNOSIS — D64.9 ANEMIA, UNSPECIFIED TYPE: ICD-10-CM

## 2023-04-08 DIAGNOSIS — I50.32 CHRONIC DIASTOLIC CONGESTIVE HEART FAILURE (HCC): Primary | ICD-10-CM

## 2023-04-08 PROCEDURE — 99214 OFFICE O/P EST MOD 30 MIN: CPT | Performed by: PHYSICIAN ASSISTANT

## 2023-04-08 NOTE — TELEPHONE ENCOUNTER
This message did not appear in my inbox yesterday  , she has an appointment today .  No further action

## 2023-04-10 ENCOUNTER — TELEPHONE (OUTPATIENT)
Dept: RHEUMATOLOGY | Facility: CLINIC | Age: 78
End: 2023-04-10

## 2023-04-10 NOTE — TELEPHONE ENCOUNTER
Latrell Castaneda from [facility] calling to make sure  saw labs from 4/5 due to abnormal values

## 2023-04-10 NOTE — TELEPHONE ENCOUNTER
FYI    Component      Latest Ref [facility] 4/5/2023   WBC      4.0 - 11.0 x10(3) uL 10.7    RBC      3.80 - 5.30 x10(6)uL 2.90 (L)    Hemoglobin      12.0 - 16.0 g/dL 8.4 (L)    Hematocrit      35.0 - 48.0 % 25.9 (L)    Platelet Count      068.0 - 450.0 10(3)uL 270.0    MCV      80.0 - 100.0 fL 89.3    MCH      26.0 - 34.0 pg 29.0    MCHC      31.0 - 37.0 g/dL 32.4    RDW      % 14.4    Prelim Neutrophil Abs      1.50 - 7.70 x10 (3) uL 7.02    Neutrophils Absolute      1.50 - 7.70 x10(3) uL 7.02    Lymphocytes Absolute      1.00 - 4.00 x10(3) uL 1.80    Monocytes Absolute      0.10 - 1.00 x10(3) uL 0.95    Eosinophils Absolute      0.00 - 0.70 x10(3) uL 0.79 (H)    Basophils Absolute      0.00 - 0.20 x10(3) uL 0.06    Immature Granulocyte Absolute      0.00 - 1.00 x10(3) uL 0.04    Neutrophils %      % 65.8    Lymphocytes %      % 16.9    Monocytes %      % 8.9    Eosinophils %      % 7.4    Basophils %      % 0.6    Immature Granulocyte %      % 0.4    Color Urine      Yellow  Yellow    Clarity Urine      Clear  Hazy ! Spec [place]      1.001 - 1.030  1.009    Glucose Urine      Negative mg/dL Negative    Bilirubin Urine      Negative  Negative    Ketones, UA      Negative mg/dL Negative    Blood Urine      Negative  Small ! PH Urine      5.0 - 8.0  5.0    Protein Urine      Negative mg/dL Negative    Urobilinogen Urine      <2.0 mg/dL <2.0    Nitrite Urine      Negative  Negative    Leukocyte Esterase       Negative  Large ! WBC Urine      0 - 5 /HPF 21-50 ! RBC Urine      0 - 2 /HPF 0-2    Bacteria Urine      None Seen /HPF None Seen    SQUAM EPI CELLS UR      None Seen /HPF Few ! RENAL TUBULAR EPITHELIAL CELLS      None Seen /HPF None Seen    TRANSITIONAL EPI CELLS      None Seen /HPF None Seen    HYALINE CASTS      None Seen /LPF Present !     YEAST URINE      None Seen /HPF None Seen    C-REACTIVE PROTEIN      <0.30 mg/dL 4.20 (H)    SED RATE      0 - 30 mm/Hr 48 (H)    ALT (SGPT)      13 - 56 U/L 15 AST (SGOT)      15 - 37 U/L 17       Legend:  (L) Low  (H) High  !  Abnormal

## 2023-04-11 NOTE — TELEPHONE ENCOUNTER
Talked to daughter - inflammation markers - wrere eleated   She felt the steroids makes her more loopy     She will get labs on Thursday with my visit -   Plan to start leflunomide   She will see cards tomorrow. Pt. Is on pred 5mg a day - daughter feels she is not tolerant on higher doses.    Talked to daughter to get labs tomorrow

## 2023-04-12 ENCOUNTER — LAB ENCOUNTER (OUTPATIENT)
Dept: LAB | Age: 78
End: 2023-04-12
Attending: STUDENT IN AN ORGANIZED HEALTH CARE EDUCATION/TRAINING PROGRAM
Payer: MEDICARE

## 2023-04-12 DIAGNOSIS — D64.9 ANEMIA, UNSPECIFIED TYPE: ICD-10-CM

## 2023-04-12 DIAGNOSIS — I50.32 CHRONIC DIASTOLIC CONGESTIVE HEART FAILURE (HCC): ICD-10-CM

## 2023-04-12 DIAGNOSIS — N18.31 STAGE 3A CHRONIC KIDNEY DISEASE (HCC): ICD-10-CM

## 2023-04-12 LAB
ALBUMIN SERPL-MCNC: 2.8 G/DL (ref 3.4–5)
ANION GAP SERPL CALC-SCNC: 13 MMOL/L (ref 0–18)
BASOPHILS # BLD AUTO: 0.06 X10(3) UL (ref 0–0.2)
BASOPHILS NFR BLD AUTO: 0.6 %
BILIRUB UR QL: NEGATIVE
BUN BLD-MCNC: 45 MG/DL (ref 7–18)
BUN/CREAT SERPL: 21.4 (ref 10–20)
CALCIUM BLD-MCNC: 9.6 MG/DL (ref 8.5–10.1)
CHLORIDE SERPL-SCNC: 98 MMOL/L (ref 98–112)
CO2 SERPL-SCNC: 20 MMOL/L (ref 21–32)
COLOR UR: YELLOW
CREAT BLD-MCNC: 2.1 MG/DL
DEPRECATED HBV CORE AB SER IA-ACNC: 614.9 NG/ML
DEPRECATED RDW RBC AUTO: 42.7 FL (ref 35.1–46.3)
EOSINOPHIL # BLD AUTO: 0.89 X10(3) UL (ref 0–0.7)
EOSINOPHIL NFR BLD AUTO: 9.2 %
ERYTHROCYTE [DISTWIDTH] IN BLOOD BY AUTOMATED COUNT: 13.8 % (ref 11–15)
FOLATE SERPL-MCNC: >20 NG/ML (ref 8.7–?)
GFR SERPLBLD BASED ON 1.73 SQ M-ARVRAT: 24 ML/MIN/1.73M2 (ref 60–?)
GLUCOSE BLD-MCNC: 172 MG/DL (ref 70–99)
GLUCOSE UR-MCNC: NORMAL MG/DL
HCT VFR BLD AUTO: 25.5 %
HGB BLD-MCNC: 8.3 G/DL
HGB UR QL STRIP.AUTO: NEGATIVE
HYALINE CASTS #/AREA URNS AUTO: PRESENT /LPF
IMM GRANULOCYTES # BLD AUTO: 0.03 X10(3) UL (ref 0–1)
IMM GRANULOCYTES NFR BLD: 0.3 %
IRON SATN MFR SERPL: 12 %
IRON SERPL-MCNC: 30 UG/DL
KETONES UR-MCNC: NEGATIVE MG/DL
LEUKOCYTE ESTERASE UR QL STRIP.AUTO: 500
LYMPHOCYTES # BLD AUTO: 2.16 X10(3) UL (ref 1–4)
LYMPHOCYTES NFR BLD AUTO: 22.3 %
MCH RBC QN AUTO: 28.1 PG (ref 26–34)
MCHC RBC AUTO-ENTMCNC: 32.5 G/DL (ref 31–37)
MCV RBC AUTO: 86.4 FL
MONOCYTES # BLD AUTO: 0.93 X10(3) UL (ref 0.1–1)
MONOCYTES NFR BLD AUTO: 9.6 %
NEUTROPHILS # BLD AUTO: 5.61 X10 (3) UL (ref 1.5–7.7)
NEUTROPHILS # BLD AUTO: 5.61 X10(3) UL (ref 1.5–7.7)
NEUTROPHILS NFR BLD AUTO: 58 %
NITRITE UR QL STRIP.AUTO: NEGATIVE
NT-PROBNP SERPL-MCNC: 934 PG/ML (ref ?–450)
OSMOLALITY SERPL CALC.SUM OF ELEC: 288 MOSM/KG (ref 275–295)
PH UR: 5 [PH] (ref 5–8)
PHOSPHATE SERPL-MCNC: 2.8 MG/DL (ref 2.5–4.9)
PLATELET # BLD AUTO: 288 10(3)UL (ref 150–450)
POTASSIUM SERPL-SCNC: 4.1 MMOL/L (ref 3.5–5.1)
PROT UR-MCNC: 20 MG/DL
RBC # BLD AUTO: 2.95 X10(6)UL
SODIUM SERPL-SCNC: 131 MMOL/L (ref 136–145)
SP GR UR STRIP: 1.02 (ref 1–1.03)
TIBC SERPL-MCNC: 256 UG/DL (ref 240–450)
TRANSFERRIN SERPL-MCNC: 172 MG/DL (ref 200–360)
UROBILINOGEN UR STRIP-ACNC: NORMAL
VIT B12 SERPL-MCNC: 1735 PG/ML (ref 193–986)
WBC # BLD AUTO: 9.7 X10(3) UL (ref 4–11)

## 2023-04-12 PROCEDURE — 81001 URINALYSIS AUTO W/SCOPE: CPT | Performed by: INTERNAL MEDICINE

## 2023-04-12 PROCEDURE — 36415 COLL VENOUS BLD VENIPUNCTURE: CPT

## 2023-04-12 PROCEDURE — 87086 URINE CULTURE/COLONY COUNT: CPT | Performed by: INTERNAL MEDICINE

## 2023-04-12 PROCEDURE — 82728 ASSAY OF FERRITIN: CPT

## 2023-04-12 PROCEDURE — 83880 ASSAY OF NATRIURETIC PEPTIDE: CPT

## 2023-04-12 PROCEDURE — 80069 RENAL FUNCTION PANEL: CPT

## 2023-04-12 PROCEDURE — 83540 ASSAY OF IRON: CPT

## 2023-04-12 PROCEDURE — 85025 COMPLETE CBC W/AUTO DIFF WBC: CPT

## 2023-04-12 PROCEDURE — 82746 ASSAY OF FOLIC ACID SERUM: CPT

## 2023-04-12 PROCEDURE — 82607 VITAMIN B-12: CPT

## 2023-04-12 PROCEDURE — 84466 ASSAY OF TRANSFERRIN: CPT

## 2023-04-13 ENCOUNTER — MED REC SCAN ONLY (OUTPATIENT)
Dept: INTERNAL MEDICINE CLINIC | Facility: CLINIC | Age: 78
End: 2023-04-13

## 2023-04-13 ENCOUNTER — OFFICE VISIT (OUTPATIENT)
Dept: RHEUMATOLOGY | Facility: CLINIC | Age: 78
End: 2023-04-13

## 2023-04-13 VITALS
HEART RATE: 106 BPM | BODY MASS INDEX: 24.07 KG/M2 | SYSTOLIC BLOOD PRESSURE: 147 MMHG | WEIGHT: 141 LBS | DIASTOLIC BLOOD PRESSURE: 79 MMHG | HEIGHT: 64 IN

## 2023-04-13 DIAGNOSIS — M06.9 RHEUMATOID ARTHRITIS, INVOLVING UNSPECIFIED SITE, UNSPECIFIED WHETHER RHEUMATOID FACTOR PRESENT (HCC): Primary | ICD-10-CM

## 2023-04-13 DIAGNOSIS — Z51.81 THERAPEUTIC DRUG MONITORING: ICD-10-CM

## 2023-04-13 PROCEDURE — 99214 OFFICE O/P EST MOD 30 MIN: CPT | Performed by: INTERNAL MEDICINE

## 2023-04-13 PROCEDURE — 1125F AMNT PAIN NOTED PAIN PRSNT: CPT | Performed by: INTERNAL MEDICINE

## 2023-04-13 RX ORDER — PREDNISONE 2.5 MG/1
5 TABLET ORAL DAILY
Qty: 60 TABLET | Refills: 2 | Status: SHIPPED | OUTPATIENT
Start: 2023-04-13

## 2023-04-13 RX ORDER — LEFLUNOMIDE 10 MG/1
10 TABLET ORAL DAILY
Qty: 30 TABLET | Refills: 2 | Status: SHIPPED | OUTPATIENT
Start: 2023-04-13

## 2023-04-13 NOTE — PATIENT INSTRUCTIONS
1. cont. prednisoen 5mg a day - in 2-3  weeks can taper to 2.5mg a day   2 . Add leflunomide 10mg a day   3. Return to clinic in 4-6  weeks.    - the plan would be to increase the leflunomide

## 2023-04-13 NOTE — TELEPHONE ENCOUNTER
Order #[id number] and #[id number] signed faxed to [facility].  Confirmation received and sent to scan

## 2023-04-13 NOTE — TELEPHONE ENCOUNTER
Order #[id number] nurse visit week of 3/14/23 signed and faxed to [facility].  Confirmation received and sent to scan

## 2023-04-13 NOTE — TELEPHONE ENCOUNTER
Db Ford order #[id number] ext physical therapy signed and faxed to [facility].  confirmation received and sent to scan

## 2023-04-17 NOTE — TELEPHONE ENCOUNTER
Dr. Lucy Montgomery recent office visit note of 4/13/23 and medications list was faxed today to Merari Love from [facility] at faxed  number FAX# [phone].  Fax confirmation was received at 5:10pm.

## 2023-04-17 NOTE — TELEPHONE ENCOUNTER
Cassandra Gutierres from [facility] is calling and requesting for someone to clarify the patients recent medication changes.  She also states that this info can be faxed to this number FAX# [phone]

## 2023-04-21 DIAGNOSIS — M81.0 AGE-RELATED OSTEOPOROSIS WITHOUT CURRENT PATHOLOGICAL FRACTURE: ICD-10-CM

## 2023-04-21 DIAGNOSIS — M06.9 RHEUMATOID ARTHRITIS, INVOLVING UNSPECIFIED SITE, UNSPECIFIED WHETHER RHEUMATOID FACTOR PRESENT (HCC): ICD-10-CM

## 2023-04-21 RX ORDER — MELATONIN
1000 DAILY
Qty: 30 TABLET | Refills: 0 | Status: CANCELLED | OUTPATIENT
Start: 2023-04-21

## 2023-04-21 NOTE — TELEPHONE ENCOUNTER
Refill passed per [place] [employer], [facility] protocol. Requested Prescriptions   Pending Prescriptions Disp Refills    Lidocaine 4 % External Gel 1 each 3     Sig: Apply 1 Application. topically in the morning, at noon, and at bedtime.        Non-Narcotic Pain Medication Protocol Passed - 4/21/2023 10:51 AM        Passed - In person appointment or virtual visit in the past 6 mos or appointment in next 3 mos     Recent Outpatient Visits              1 week ago Rheumatoid arthritis, involving unspecified site, unspecified whether rheumatoid factor [facility]    Mary Gu MD    Office Visit    1 week ago Chronic diastolic congestive heart failure [facility])    [address], [address] [facility] Sandrine Talley PA-C    Office Visit    1 month ago Poor short term memory    Dash Gu MD    Telemedicine    2 months ago Rheumatoid arthritis, involving unspecified site, unspecified whether rheumatoid factor present [facility])    aMry Gu MD    Office Visit    3 months ago Rheumatoid arthritis, involving unspecified site, unspecified whether rheumatoid factor present [facility])    Rhonda Gu MD    Office Visit          Future Appointments         Provider Department Appt Notes    In 1 week Apn, [address]  and [address] [facility] ECHO 1330    In 1 month Ronen Hayes MD [address], [address], [place] 4/6 week f/u

## 2023-04-21 NOTE — TELEPHONE ENCOUNTER
Spoke to patient's daughter Anne Cornell (on LUCIANO), verified Name and . Relayed that vitamin D was prescribed 2 years ago. No recent vitamin D level. She will give over-the-counter supplement for now. [employer] message will be sent regarding patient's \"purple\" fingertip. Daughter not with the patient, unable to fully describe complaint.

## 2023-04-25 ENCOUNTER — TELEPHONE (OUTPATIENT)
Dept: INTERNAL MEDICINE CLINIC | Facility: CLINIC | Age: 78
End: 2023-04-25

## 2023-04-25 NOTE — TELEPHONE ENCOUNTER
[facility] calling to inform that they are missing the following order numbers:   #[id number] and #[id number]

## 2023-04-28 ENCOUNTER — HOSPITAL ENCOUNTER (OUTPATIENT)
Age: 78
Discharge: ANOTHER HOSPITAL | End: 2023-04-28
Payer: MEDICARE

## 2023-04-28 ENCOUNTER — NURSE TRIAGE (OUTPATIENT)
Dept: INTERNAL MEDICINE CLINIC | Facility: CLINIC | Age: 78
End: 2023-04-28

## 2023-04-28 VITALS
DIASTOLIC BLOOD PRESSURE: 67 MMHG | HEART RATE: 98 BPM | SYSTOLIC BLOOD PRESSURE: 136 MMHG | OXYGEN SATURATION: 99 % | TEMPERATURE: 98 F | RESPIRATION RATE: 16 BRPM

## 2023-04-28 DIAGNOSIS — R00.2 PALPITATIONS: ICD-10-CM

## 2023-04-28 DIAGNOSIS — M79.89 LEG SWELLING: Primary | ICD-10-CM

## 2023-04-28 PROCEDURE — 99214 OFFICE O/P EST MOD 30 MIN: CPT | Performed by: NURSE PRACTITIONER

## 2023-05-02 ENCOUNTER — HOSPITAL ENCOUNTER (OUTPATIENT)
Dept: CARDIOLOGY CLINIC | Facility: HOSPITAL | Age: 78
Discharge: HOME | End: 2023-05-02
Attending: INTERNAL MEDICINE
Payer: MEDICARE

## 2023-05-02 VITALS — DIASTOLIC BLOOD PRESSURE: 57 MMHG | HEART RATE: 90 BPM | SYSTOLIC BLOOD PRESSURE: 130 MMHG | OXYGEN SATURATION: 98 %

## 2023-05-02 DIAGNOSIS — Z95.2 S/P TAVR (TRANSCATHETER AORTIC VALVE REPLACEMENT): ICD-10-CM

## 2023-05-02 PROCEDURE — 99213 OFFICE O/P EST LOW 20 MIN: CPT | Performed by: NURSE PRACTITIONER

## 2023-05-08 ENCOUNTER — TELEPHONE (OUTPATIENT)
Dept: INTERNAL MEDICINE CLINIC | Facility: CLINIC | Age: 78
End: 2023-05-08

## 2023-05-08 NOTE — TELEPHONE ENCOUNTER
[address] Marisol Hansen noted.  Dr Margaret Dominique will sign [facility] orders upon his return

## 2023-05-08 NOTE — TELEPHONE ENCOUNTER
Sangeetha Ennis with [facility] is updating Dr. Radha Sheikh on patient. Maday Huerta is planning on recertifying patient for Home Health 1x weekly for 8 weeks

## 2023-05-15 NOTE — TELEPHONE ENCOUNTER
Covering for Dr. Sanam Moon:     Dr. Citlali Simental be returning until 6/26/2023. Can signatures wait until then? If not, please give to a physician at the [address] [place] [address] who can sign for him.

## 2023-05-15 NOTE — TELEPHONE ENCOUNTER
Received HH orders from [facility]  #[id number] and #[id number] in dr Lorenzo Songies folder to review and sign upon his return

## 2023-05-17 ENCOUNTER — TELEPHONE (OUTPATIENT)
Dept: CARDIOLOGY CLINIC | Facility: HOSPITAL | Age: 78
End: 2023-05-17

## 2023-05-22 DIAGNOSIS — D64.9 ANEMIA, UNSPECIFIED TYPE: Primary | ICD-10-CM

## 2023-05-22 DIAGNOSIS — R82.90 ABNORMAL URINALYSIS: ICD-10-CM

## 2023-06-08 ENCOUNTER — LAB ENCOUNTER (OUTPATIENT)
Dept: LAB | Age: 78
End: 2023-06-08
Attending: INTERNAL MEDICINE
Payer: MEDICARE

## 2023-06-08 ENCOUNTER — OFFICE VISIT (OUTPATIENT)
Dept: RHEUMATOLOGY | Facility: CLINIC | Age: 78
End: 2023-06-08

## 2023-06-08 VITALS
DIASTOLIC BLOOD PRESSURE: 70 MMHG | HEIGHT: 64 IN | BODY MASS INDEX: 23.68 KG/M2 | SYSTOLIC BLOOD PRESSURE: 126 MMHG | WEIGHT: 138.69 LBS | HEART RATE: 92 BPM

## 2023-06-08 DIAGNOSIS — M06.9 RHEUMATOID ARTHRITIS, INVOLVING UNSPECIFIED SITE, UNSPECIFIED WHETHER RHEUMATOID FACTOR PRESENT (HCC): ICD-10-CM

## 2023-06-08 DIAGNOSIS — D64.9 ANEMIA, UNSPECIFIED TYPE: ICD-10-CM

## 2023-06-08 DIAGNOSIS — R82.90 ABNORMAL URINALYSIS: ICD-10-CM

## 2023-06-08 DIAGNOSIS — Z51.81 THERAPEUTIC DRUG MONITORING: ICD-10-CM

## 2023-06-08 DIAGNOSIS — M06.9 RHEUMATOID ARTHRITIS, INVOLVING UNSPECIFIED SITE, UNSPECIFIED WHETHER RHEUMATOID FACTOR PRESENT (HCC): Primary | ICD-10-CM

## 2023-06-08 LAB
ALT SERPL-CCNC: 19 U/L
AST SERPL-CCNC: 17 U/L (ref 15–37)
BASOPHILS # BLD AUTO: 0.05 X10(3) UL (ref 0–0.2)
BASOPHILS NFR BLD AUTO: 0.5 %
BILIRUB UR QL: NEGATIVE
CLARITY UR: CLEAR
COLOR UR: YELLOW
CRP SERPL-MCNC: 0.54 MG/DL (ref ?–0.3)
DEPRECATED RDW RBC AUTO: 47.6 FL (ref 35.1–46.3)
EOSINOPHIL # BLD AUTO: 0.31 X10(3) UL (ref 0–0.7)
EOSINOPHIL NFR BLD AUTO: 3.1 %
ERYTHROCYTE [DISTWIDTH] IN BLOOD BY AUTOMATED COUNT: 14.7 % (ref 11–15)
ERYTHROCYTE [SEDIMENTATION RATE] IN BLOOD: 24 MM/HR
GLUCOSE UR-MCNC: NORMAL MG/DL
HCT VFR BLD AUTO: 30.2 %
HGB BLD-MCNC: 9.5 G/DL
HGB UR QL STRIP.AUTO: NEGATIVE
IMM GRANULOCYTES # BLD AUTO: 0.03 X10(3) UL (ref 0–1)
IMM GRANULOCYTES NFR BLD: 0.3 %
KETONES UR-MCNC: NEGATIVE MG/DL
LEUKOCYTE ESTERASE UR QL STRIP.AUTO: NEGATIVE
LYMPHOCYTES # BLD AUTO: 2.49 X10(3) UL (ref 1–4)
LYMPHOCYTES NFR BLD AUTO: 25.3 %
MCH RBC QN AUTO: 28 PG (ref 26–34)
MCHC RBC AUTO-ENTMCNC: 31.5 G/DL (ref 31–37)
MCV RBC AUTO: 89.1 FL
MONOCYTES # BLD AUTO: 1.06 X10(3) UL (ref 0.1–1)
MONOCYTES NFR BLD AUTO: 10.8 %
NEUTROPHILS # BLD AUTO: 5.91 X10 (3) UL (ref 1.5–7.7)
NEUTROPHILS # BLD AUTO: 5.91 X10(3) UL (ref 1.5–7.7)
NEUTROPHILS NFR BLD AUTO: 60 %
NITRITE UR QL STRIP.AUTO: NEGATIVE
PH UR: 5.5 [PH] (ref 5–8)
PLATELET # BLD AUTO: 230 10(3)UL (ref 150–450)
PROT UR-MCNC: NEGATIVE MG/DL
RBC # BLD AUTO: 3.39 X10(6)UL
SP GR UR STRIP: 1.02 (ref 1–1.03)
UROBILINOGEN UR STRIP-ACNC: NORMAL
WBC # BLD AUTO: 9.9 X10(3) UL (ref 4–11)

## 2023-06-08 PROCEDURE — 99214 OFFICE O/P EST MOD 30 MIN: CPT | Performed by: INTERNAL MEDICINE

## 2023-06-08 PROCEDURE — 86140 C-REACTIVE PROTEIN: CPT

## 2023-06-08 PROCEDURE — 84450 TRANSFERASE (AST) (SGOT): CPT

## 2023-06-08 PROCEDURE — 85025 COMPLETE CBC W/AUTO DIFF WBC: CPT

## 2023-06-08 PROCEDURE — 84460 ALANINE AMINO (ALT) (SGPT): CPT

## 2023-06-08 PROCEDURE — 36415 COLL VENOUS BLD VENIPUNCTURE: CPT

## 2023-06-08 PROCEDURE — 1126F AMNT PAIN NOTED NONE PRSNT: CPT | Performed by: INTERNAL MEDICINE

## 2023-06-08 PROCEDURE — 85652 RBC SED RATE AUTOMATED: CPT

## 2023-06-08 RX ORDER — GABAPENTIN 100 MG/1
100 CAPSULE ORAL NIGHTLY
Qty: 30 CAPSULE | Refills: 2 | Status: SHIPPED | OUTPATIENT
Start: 2023-06-08

## 2023-06-08 NOTE — PATIENT INSTRUCTIONS
1. cont. prednisoen 5mg a day - can taper to 2.5mg a day if feels shaking. 2 .cont. leflunomide 10mg a day   3. Return to clinic in 2 months. .   4. Check labs today   5. Try gabapnetin 100mg at night   6.  Could consider adding hydroxcyhrlqouine 200mg a day - in the future

## 2023-06-24 DIAGNOSIS — M81.0 AGE-RELATED OSTEOPOROSIS WITHOUT CURRENT PATHOLOGICAL FRACTURE: ICD-10-CM

## 2023-06-26 ENCOUNTER — NURSE TRIAGE (OUTPATIENT)
Dept: INTERNAL MEDICINE CLINIC | Facility: CLINIC | Age: 78
End: 2023-06-26

## 2023-06-26 NOTE — TELEPHONE ENCOUNTER
Action Requested: Summary for Provider     []  Critical Lab, Recommendations Needed  [] Need Additional Advice  []   FYI    []   Need Orders  [] Need Medications Sent to Pharmacy  []  Other     SUMMARY: Patient having pains in upper back with deep inspiration, also  having stomach pains on and off. Daughter is concerned she could have pneumonia. Advised to go to Immediate Care because no appointments available today. Daughter declined [facility], wants office appointment. Scheduled with Dr Phoebe Mcdonnell for  and daughter made another appointment to see Dr Rose Marie Avila 2023. Future Appointments   Date Time Provider César Estrada   2023  1:10 PM Ashley Williamson MD [facility]   2023  1:40 PM Neville Collazo MD [facility]   2023 12:45 PM Apn, Structural Heart [facility]         Reason for call: Acute  stomach pains on and off; upper back pain with deep inspirationx  Onset: x 1 week for back pain; stomach pains longer than that    Call from [place], patient's daughter, on LUCIANO, verified patients name/. Reports as summarized above. Patient lives nearby. Daughter is concerned she may have stomach ulcers.   .  Reason for Disposition   Chest pain(s) lasting a few seconds persists > 3 days    Protocols used: Chest Pain-A-[place]

## 2023-06-27 ENCOUNTER — LAB ENCOUNTER (OUTPATIENT)
Dept: LAB | Age: 78
End: 2023-06-27
Attending: INTERNAL MEDICINE
Payer: MEDICARE

## 2023-06-27 ENCOUNTER — OFFICE VISIT (OUTPATIENT)
Dept: INTERNAL MEDICINE CLINIC | Facility: CLINIC | Age: 78
End: 2023-06-27

## 2023-06-27 VITALS
DIASTOLIC BLOOD PRESSURE: 61 MMHG | HEART RATE: 88 BPM | RESPIRATION RATE: 19 BRPM | TEMPERATURE: 98 F | SYSTOLIC BLOOD PRESSURE: 101 MMHG | BODY MASS INDEX: 23.73 KG/M2 | WEIGHT: 139 LBS | OXYGEN SATURATION: 96 % | HEIGHT: 64 IN

## 2023-06-27 DIAGNOSIS — R10.13 EPIGASTRIC PAIN: ICD-10-CM

## 2023-06-27 DIAGNOSIS — R10.13 EPIGASTRIC PAIN: Primary | ICD-10-CM

## 2023-06-27 PROBLEM — M05.771 RHEUMATOID ARTHRITIS INVOLVING BOTH ANKLES WITH POSITIVE RHEUMATOID FACTOR (HCC): Status: ACTIVE | Noted: 2021-11-15

## 2023-06-27 PROBLEM — R60.0 BILATERAL LOWER EXTREMITY EDEMA: Status: ACTIVE | Noted: 2021-09-13

## 2023-06-27 PROBLEM — R42 DIZZINESS AND GIDDINESS: Status: ACTIVE | Noted: 2023-06-27

## 2023-06-27 PROBLEM — D69.2 SENILE PURPURA (HCC): Status: ACTIVE | Noted: 2023-06-27

## 2023-06-27 PROBLEM — M05.772 RHEUMATOID ARTHRITIS INVOLVING BOTH ANKLES WITH POSITIVE RHEUMATOID FACTOR (HCC): Status: ACTIVE | Noted: 2021-11-15

## 2023-06-27 PROBLEM — M81.0 POSTMENOPAUSAL OSTEOPOROSIS: Status: ACTIVE | Noted: 2020-03-23

## 2023-06-27 PROBLEM — N95.2 POSTMENOPAUSAL ATROPHIC VAGINITIS: Status: ACTIVE | Noted: 2018-05-23

## 2023-06-27 PROBLEM — R09.89 BILATERAL CAROTID BRUITS: Status: ACTIVE | Noted: 2023-06-27

## 2023-06-27 PROBLEM — J84.10 LUNG GRANULOMA (HCC): Status: ACTIVE | Noted: 2023-06-27

## 2023-06-27 PROBLEM — D69.2 SENILE PURPURA: Status: ACTIVE | Noted: 2023-06-27

## 2023-06-27 LAB
ALBUMIN SERPL-MCNC: 3.5 G/DL (ref 3.4–5)
ALBUMIN/GLOB SERPL: 1 {RATIO} (ref 1–2)
ALP LIVER SERPL-CCNC: 59 U/L
ALT SERPL-CCNC: 20 U/L
ANION GAP SERPL CALC-SCNC: 7 MMOL/L (ref 0–18)
AST SERPL-CCNC: 17 U/L (ref 15–37)
BASOPHILS # BLD AUTO: 0.06 X10(3) UL (ref 0–0.2)
BASOPHILS NFR BLD AUTO: 0.5 %
BILIRUB SERPL-MCNC: 0.5 MG/DL (ref 0.1–2)
BUN BLD-MCNC: 37 MG/DL (ref 7–18)
BUN/CREAT SERPL: 23.9 (ref 10–20)
CALCIUM BLD-MCNC: 8.9 MG/DL (ref 8.5–10.1)
CHLORIDE SERPL-SCNC: 106 MMOL/L (ref 98–112)
CO2 SERPL-SCNC: 26 MMOL/L (ref 21–32)
CREAT BLD-MCNC: 1.55 MG/DL
DEPRECATED RDW RBC AUTO: 47.6 FL (ref 35.1–46.3)
EOSINOPHIL # BLD AUTO: 0.08 X10(3) UL (ref 0–0.7)
EOSINOPHIL NFR BLD AUTO: 0.7 %
ERYTHROCYTE [DISTWIDTH] IN BLOOD BY AUTOMATED COUNT: 14.7 % (ref 11–15)
FASTING STATUS PATIENT QL REPORTED: NO
GFR SERPLBLD BASED ON 1.73 SQ M-ARVRAT: 34 ML/MIN/1.73M2 (ref 60–?)
GLOBULIN PLAS-MCNC: 3.5 G/DL (ref 2.8–4.4)
GLUCOSE BLD-MCNC: 126 MG/DL (ref 70–99)
HCT VFR BLD AUTO: 29.6 %
HGB BLD-MCNC: 9.5 G/DL
IMM GRANULOCYTES # BLD AUTO: 0.03 X10(3) UL (ref 0–1)
IMM GRANULOCYTES NFR BLD: 0.3 %
LIPASE SERPL-CCNC: 39 U/L (ref 13–75)
LYMPHOCYTES # BLD AUTO: 1.81 X10(3) UL (ref 1–4)
LYMPHOCYTES NFR BLD AUTO: 15.7 %
MCH RBC QN AUTO: 27.9 PG (ref 26–34)
MCHC RBC AUTO-ENTMCNC: 32.1 G/DL (ref 31–37)
MCV RBC AUTO: 87.1 FL
MONOCYTES # BLD AUTO: 0.77 X10(3) UL (ref 0.1–1)
MONOCYTES NFR BLD AUTO: 6.7 %
NEUTROPHILS # BLD AUTO: 8.75 X10 (3) UL (ref 1.5–7.7)
NEUTROPHILS # BLD AUTO: 8.75 X10(3) UL (ref 1.5–7.7)
NEUTROPHILS NFR BLD AUTO: 76.1 %
OSMOLALITY SERPL CALC.SUM OF ELEC: 298 MOSM/KG (ref 275–295)
PLATELET # BLD AUTO: 174 10(3)UL (ref 150–450)
POTASSIUM SERPL-SCNC: 4.6 MMOL/L (ref 3.5–5.1)
PROT SERPL-MCNC: 7 G/DL (ref 6.4–8.2)
RBC # BLD AUTO: 3.4 X10(6)UL
SODIUM SERPL-SCNC: 139 MMOL/L (ref 136–145)
WBC # BLD AUTO: 11.5 X10(3) UL (ref 4–11)

## 2023-06-27 PROCEDURE — 36415 COLL VENOUS BLD VENIPUNCTURE: CPT

## 2023-06-27 PROCEDURE — 99213 OFFICE O/P EST LOW 20 MIN: CPT | Performed by: INTERNAL MEDICINE

## 2023-06-27 PROCEDURE — 83690 ASSAY OF LIPASE: CPT

## 2023-06-27 PROCEDURE — 80053 COMPREHEN METABOLIC PANEL: CPT

## 2023-06-27 PROCEDURE — 85025 COMPLETE CBC W/AUTO DIFF WBC: CPT

## 2023-06-27 RX ORDER — MELATONIN
1000 DAILY
Qty: 30 TABLET | Refills: 0 | Status: SHIPPED | OUTPATIENT
Start: 2023-06-27

## 2023-06-28 ENCOUNTER — TELEPHONE (OUTPATIENT)
Dept: INTERNAL MEDICINE CLINIC | Facility: CLINIC | Age: 78
End: 2023-06-28

## 2023-06-29 ENCOUNTER — HOSPITAL ENCOUNTER (OUTPATIENT)
Dept: CT IMAGING | Age: 78
Discharge: HOME | End: 2023-06-29
Attending: INTERNAL MEDICINE
Payer: MEDICARE

## 2023-06-29 DIAGNOSIS — R10.11 ACUTE ABDOMINAL PAIN IN RIGHT UPPER QUADRANT: Primary | ICD-10-CM

## 2023-06-29 DIAGNOSIS — R10.11 ACUTE ABDOMINAL PAIN IN RIGHT UPPER QUADRANT: ICD-10-CM

## 2023-06-29 PROCEDURE — 74170 CT ABD WO CNTRST FLWD CNTRST: CPT | Performed by: INTERNAL MEDICINE

## 2023-07-01 ENCOUNTER — MED REC SCAN ONLY (OUTPATIENT)
Dept: INTERNAL MEDICINE CLINIC | Facility: CLINIC | Age: 78
End: 2023-07-01

## 2023-07-02 DIAGNOSIS — E87.6 HYPOKALEMIA: ICD-10-CM

## 2023-07-03 ENCOUNTER — TELEPHONE (OUTPATIENT)
Dept: INTERNAL MEDICINE CLINIC | Facility: CLINIC | Age: 78
End: 2023-07-03

## 2023-07-03 DIAGNOSIS — R19.7 DIARRHEA, UNSPECIFIED TYPE: Primary | ICD-10-CM

## 2023-07-03 NOTE — TELEPHONE ENCOUNTER
Please review. Protocol failed/ No protocol      Requested Prescriptions   Pending Prescriptions Disp Refills    Potassium Chloride ER 20 MEQ Oral Tab CR 60 tablet 0     Sig: Take 20 mEq by mouth in the morning and 20 mEq before bedtime. There is no refill protocol information for this order          Future Appointments         Provider Department Appt Notes    In 2 weeks Bryant Esparza MD [address] [facility], [place] follow up/stomach pains  worried if could be ulcer?     In 4 months Apn, [address] 1 YEAR TAVR-[facility] ECHO 1330             Recent Outpatient Visits              6 days ago Epigastric pain    Ronna Fuentes MD    Office Visit    3 weeks ago Rheumatoid arthritis, involving unspecified site, unspecified whether rheumatoid factor present [facility])    Dmitri Vasquez, Cecilia Levine MD    Office Visit    2 months ago Rheumatoid arthritis, involving unspecified site, unspecified whether rheumatoid factor present [facility])    [facility] Juan, Cecilia Levine MD    Office Visit    2 months ago Chronic diastolic congestive heart failure [facility])    [facility], [address] [place] [address] Gopal Cid PA-C    Office Visit    3 months ago Poor short term memory    [address] [facility], Winston Mayer MD    Telemedicine

## 2023-07-03 NOTE — TELEPHONE ENCOUNTER
Dr. Patito Peña - patient's daughter is wondering: since abnormal CBC, should patient increase Iron tablets ? Takes OTC Iron 324mg tablets once a day         Received call from Ирина Farias, [address] [facility] with [facility] . Patient's date of birth and full name both confirmed. Daughter is asking question noted above. See 6/27/23 CBC   Phoenix Garcia MD  6/29/2023 10:44 AM CDT       Contacted patient's daughter, informed her of abnormal labs, Ct scan ordered stat. Thank you.

## 2023-07-03 NOTE — TELEPHONE ENCOUNTER
Dr. Phoebe Mcdonnell - jennifer - patient and daughter will call office to schedule a follow-up appointment. Last visit with you 6/27/23. Home Health RN of Patient calling office, transferred to triage from Call Center. Nathalie Silva with [facility] reports patient is stable. Last 6/27/23 appointment with Dr. Phoebe Mcdonnell. Same symptoms. But persisting and no improvement  Still has Right upper abdomen pain, 1 loose stool per day. Agrees to remove tomato sauce from diet, as she was still eating a decent portion of tomato sauce per home health RN. And per CT Abdomen results:   CT ABDOMEN (W+WO) (CPT=74170): Result Notes     Evangeline Lanes, MD  7/3/2023 12:57 PM CDT       Findings discussed with Tata Todd, patient's daughter, on the day of exam. No acute IAP. if still symptomatic patient should come back to the office. Otherwise,  Follow up with PCP. Thank you. Due to persisting symptoms, RN advised appointment. Home health RN will inform patient and patient's daughter. This RN advised they call the office for an appointment. aMck Hager RN verbalizes understanding of all information, and agreeable to plan to relay this information to them.        Future Appointments   Date Time Provider César Estrada   7/18/2023  1:40 PM Neville Collazo MD [facility]   11/7/2023 12:45 PM Apn, Structural Heart [address] [facility]

## 2023-07-04 RX ORDER — POTASSIUM CHLORIDE 1500 MG/1
20 TABLET, EXTENDED RELEASE ORAL 2 TIMES DAILY
Qty: 60 TABLET | Refills: 0 | Status: SHIPPED | OUTPATIENT
Start: 2023-07-04

## 2023-07-04 NOTE — TELEPHONE ENCOUNTER
Placed an order for repeat cbc. Please call patient and inform about plan.  If diarrhea > 5 times daily let me know

## 2023-07-04 NOTE — TELEPHONE ENCOUNTER
Her hemoglobin has been chronically low, currently at her baseline. If that is something she usually does with her PCP, she can resume taking iron pills. I can order a repeat cbc, to make sure her white count has gone down if symptoms are persistent. Order placed for patient. > 5 times daily let me know.

## 2023-07-05 NOTE — TELEPHONE ENCOUNTER
Spoke with patient's daughter Oneil Campbell - on LUCIANO (patient name and  verified) and discussed Dr. Ghazala Romo recommendations. Oneil Campbell verbalized understanding and agrees with plan.

## 2023-07-14 RX ORDER — PREDNISONE 2.5 MG/1
5 TABLET ORAL DAILY
Qty: 60 TABLET | Refills: 2 | Status: SHIPPED | OUTPATIENT
Start: 2023-07-14

## 2023-07-14 NOTE — TELEPHONE ENCOUNTER
Disp Refills Start End    predniSONE 2.5 MG Oral Tab 60 tablet 2 4/13/2023      LOV: 6/8/23  Future Appointments   Date Time Provider César Natalie   7/18/2023  1:40 PM Patrice Bruno MD [facility]   11/7/2023 12:45 PM Apn, [facility] Cassidy Pateledler     Labs:Summary:  1.cont. prednisoen 5mg a day - can taper to 2.5mg a day if feels shaking. 2 .cont. leflunomide 10mg a day   3. Return to clinic in 2 months. .   4. Check labs today   5. Try gabapnetin 100mg at night   6. Could consider adding hydroxcyhrlqouine 200mg a day - in the future      - the plan would be to increase the leflunomide if her kidney function improves         Ruiz Rosales MD  6/8/2023   10:48 AM  - [place]      Summary:  1.cont. prednisoen 5mg a day - can taper to 2.5mg a day if feels shaking. 2 .cont. leflunomide 10mg a day   3. Return to clinic in 2 months. .   4. Check labs today   5. Try gabapnetin 100mg at night   6.  Could consider adding hydroxcyhrlqouine 200mg a day - in the future      - the plan would be to increase the leflunomide if her kidney function improves     Ruzi Rosales MD  6/8/2023   10:48 AM  - [place] [employer]

## 2023-07-17 RX ORDER — PREDNISONE 2.5 MG/1
5 TABLET ORAL DAILY
Qty: 60 TABLET | Refills: 2 | OUTPATIENT
Start: 2023-07-17

## 2023-07-18 ENCOUNTER — TELEPHONE (OUTPATIENT)
Dept: INTERNAL MEDICINE CLINIC | Facility: CLINIC | Age: 78
End: 2023-07-18

## 2023-07-24 RX ORDER — GABAPENTIN 100 MG/1
100 CAPSULE ORAL NIGHTLY
Qty: 30 CAPSULE | Refills: 2 | OUTPATIENT
Start: 2023-07-24

## 2023-07-24 RX ORDER — PREDNISONE 5 MG/1
15 TABLET ORAL DAILY
Qty: 90 TABLET | Refills: 3 | OUTPATIENT
Start: 2023-07-24

## 2023-07-25 ENCOUNTER — OFFICE VISIT (OUTPATIENT)
Dept: INTERNAL MEDICINE CLINIC | Facility: CLINIC | Age: 78
End: 2023-07-25

## 2023-07-25 VITALS
WEIGHT: 140 LBS | DIASTOLIC BLOOD PRESSURE: 72 MMHG | BODY MASS INDEX: 23.9 KG/M2 | HEART RATE: 87 BPM | HEIGHT: 64 IN | SYSTOLIC BLOOD PRESSURE: 132 MMHG

## 2023-07-25 DIAGNOSIS — F41.9 ANXIETY AND DEPRESSION: ICD-10-CM

## 2023-07-25 DIAGNOSIS — Z95.2 S/P TAVR (TRANSCATHETER AORTIC VALVE REPLACEMENT): ICD-10-CM

## 2023-07-25 DIAGNOSIS — F32.A ANXIETY AND DEPRESSION: ICD-10-CM

## 2023-07-25 DIAGNOSIS — R29.818 NEUROGENIC CLAUDICATION: ICD-10-CM

## 2023-07-25 DIAGNOSIS — M62.830 SPASM OF MUSCLE OF LOWER BACK: ICD-10-CM

## 2023-07-25 DIAGNOSIS — I83.90 VARICOSE VEINS: Primary | ICD-10-CM

## 2023-07-25 DIAGNOSIS — R41.3 MEMORY PROBLEM: ICD-10-CM

## 2023-07-25 DIAGNOSIS — M48.061 NEURAL FORAMINAL STENOSIS OF LUMBAR SPINE: ICD-10-CM

## 2023-07-25 PROCEDURE — 99214 OFFICE O/P EST MOD 30 MIN: CPT | Performed by: INTERNAL MEDICINE

## 2023-07-25 RX ORDER — CYCLOBENZAPRINE HCL 5 MG
TABLET ORAL NIGHTLY PRN
Qty: 30 TABLET | Refills: 1 | Status: SHIPPED | OUTPATIENT
Start: 2023-07-25

## 2023-08-14 ENCOUNTER — NURSE TRIAGE (OUTPATIENT)
Dept: INTERNAL MEDICINE CLINIC | Facility: CLINIC | Age: 78
End: 2023-08-14

## 2023-08-14 NOTE — TELEPHONE ENCOUNTER
Action Requested: Summary for Provider     []  Critical Lab, Recommendations Needed  [x] Need Additional Advice  []   FYI    []   Need Orders  [] Need Medications Sent to Pharmacy  []  Other     SUMMARY: Per protocol, patient should be seen in the office today or tomorrow. No openings in [facility] office. Reason for call: Hernia  Onset: Data Unavailable    Spoke to daughter, Madisyn Leon,  (on LUCINAO, verified patient's name and ). Patient has a marble sized lump in her groin area that has been there for a few weeks. She does not have pain while she is still but when she gets up or down, she \"sees stars. \" Daughter denies pulsating lump or vomiting. There is no redness or irritation. Dr. Bronson File, daughter requesting an xray for what she thinks is a hernia. She is willing to schedule an appointment but no openings in [place].      Reason for Disposition   Patient wants to be seen    Protocols used: Jennifer Vincent

## 2023-08-15 ENCOUNTER — HOSPITAL ENCOUNTER (OUTPATIENT)
Dept: GENERAL RADIOLOGY | Age: 78
Discharge: HOME | End: 2023-08-15
Attending: PHYSICAL MEDICINE & REHABILITATION
Payer: MEDICARE

## 2023-08-15 ENCOUNTER — OFFICE VISIT (OUTPATIENT)
Dept: PHYSICAL MEDICINE AND REHAB | Facility: CLINIC | Age: 78
End: 2023-08-15
Payer: MEDICARE

## 2023-08-15 VITALS — SYSTOLIC BLOOD PRESSURE: 128 MMHG | DIASTOLIC BLOOD PRESSURE: 62 MMHG

## 2023-08-15 DIAGNOSIS — I10 ESSENTIAL HYPERTENSION: ICD-10-CM

## 2023-08-15 DIAGNOSIS — M51.37 DDD (DEGENERATIVE DISC DISEASE), LUMBOSACRAL: ICD-10-CM

## 2023-08-15 DIAGNOSIS — I25.10 CORONARY ARTERY DISEASE INVOLVING NATIVE CORONARY ARTERY OF NATIVE HEART WITHOUT ANGINA PECTORIS: ICD-10-CM

## 2023-08-15 DIAGNOSIS — M99.9 BIOMECHANICAL LESION: ICD-10-CM

## 2023-08-15 DIAGNOSIS — S76.019A STRAIN OF GLUTEUS MEDIUS, UNSPECIFIED LATERALITY, INITIAL ENCOUNTER: ICD-10-CM

## 2023-08-15 DIAGNOSIS — M54.59 MECHANICAL LOW BACK PAIN: ICD-10-CM

## 2023-08-15 DIAGNOSIS — M48.061 LUMBAR FORAMINAL STENOSIS: ICD-10-CM

## 2023-08-15 DIAGNOSIS — M47.816 FACET SYNDROME, LUMBAR: ICD-10-CM

## 2023-08-15 DIAGNOSIS — M06.9 RHEUMATOID ARTHRITIS, INVOLVING UNSPECIFIED SITE, UNSPECIFIED WHETHER RHEUMATOID FACTOR PRESENT (HCC): ICD-10-CM

## 2023-08-15 DIAGNOSIS — M51.16 LUMBAR DISC HERNIATION WITH RADICULOPATHY: ICD-10-CM

## 2023-08-15 DIAGNOSIS — M47.816 LUMBAR SPONDYLOSIS: ICD-10-CM

## 2023-08-15 DIAGNOSIS — M25.551 PAIN OF RIGHT HIP: ICD-10-CM

## 2023-08-15 DIAGNOSIS — I50.32 CHRONIC DIASTOLIC CONGESTIVE HEART FAILURE (HCC): ICD-10-CM

## 2023-08-15 DIAGNOSIS — K21.00 GASTROESOPHAGEAL REFLUX DISEASE WITH ESOPHAGITIS WITHOUT HEMORRHAGE: ICD-10-CM

## 2023-08-15 DIAGNOSIS — M54.16 LUMBAR RADICULOPATHY: Primary | ICD-10-CM

## 2023-08-15 DIAGNOSIS — Z95.5 S/P DRUG ELUTING CORONARY STENT PLACEMENT: ICD-10-CM

## 2023-08-15 DIAGNOSIS — I10 HYPERTENSION GOAL BP (BLOOD PRESSURE) < 130/80: ICD-10-CM

## 2023-08-15 DIAGNOSIS — M79.10 MYALGIA: ICD-10-CM

## 2023-08-15 DIAGNOSIS — E78.2 MIXED HYPERLIPIDEMIA: ICD-10-CM

## 2023-08-15 DIAGNOSIS — M81.0 AGE-RELATED OSTEOPOROSIS WITHOUT CURRENT PATHOLOGICAL FRACTURE: ICD-10-CM

## 2023-08-15 DIAGNOSIS — M51.36 BULGE OF LUMBAR DISC WITHOUT MYELOPATHY: ICD-10-CM

## 2023-08-15 DIAGNOSIS — M54.16 LUMBAR RADICULOPATHY: ICD-10-CM

## 2023-08-15 DIAGNOSIS — Z79.02 ANTIPLATELET OR ANTITHROMBOTIC LONG-TERM USE: ICD-10-CM

## 2023-08-15 DIAGNOSIS — E87.6 HYPOKALEMIA: ICD-10-CM

## 2023-08-15 DIAGNOSIS — M79.89 LEG SWELLING: ICD-10-CM

## 2023-08-15 PROCEDURE — 99214 OFFICE O/P EST MOD 30 MIN: CPT | Performed by: PHYSICAL MEDICINE & REHABILITATION

## 2023-08-15 PROCEDURE — 72110 X-RAY EXAM L-2 SPINE 4/>VWS: CPT | Performed by: PHYSICAL MEDICINE & REHABILITATION

## 2023-08-15 PROCEDURE — 73521 X-RAY EXAM HIPS BI 2 VIEWS: CPT | Performed by: PHYSICAL MEDICINE & REHABILITATION

## 2023-08-15 RX ORDER — LEFLUNOMIDE 10 MG/1
10 TABLET ORAL DAILY
Qty: 30 TABLET | Refills: 3 | Status: SHIPPED | OUTPATIENT
Start: 2023-08-15

## 2023-08-15 RX ORDER — PREDNISONE 5 MG/1
15 TABLET ORAL DAILY
Qty: 90 TABLET | Refills: 3 | Status: SHIPPED | OUTPATIENT
Start: 2023-08-15

## 2023-08-15 RX ORDER — GABAPENTIN 100 MG/1
100 CAPSULE ORAL NIGHTLY
Qty: 30 CAPSULE | Refills: 2 | Status: SHIPPED | OUTPATIENT
Start: 2023-08-15

## 2023-08-15 NOTE — TELEPHONE ENCOUNTER
X-ray does not diagnose hernia, needs ultrasound to start as recent CT does not show any evidence of inguinal/femoral hernia. See any provider to get physical exam eval of the mass and provider can then order ultrasound or CT if they feel it is warranted.

## 2023-08-15 NOTE — TELEPHONE ENCOUNTER
Spoke with pt's daughter Eloy Mancuso) per LUCIANO,  verified, she was  informed of MD recommendation. She stated pt is currently being seen by Dr Liu Last for back issue and xray was ordered. Pt has degenerative and doing PT. She stated pt had MRI and ultrasound done recently. She was offered an appt today for pt with any available Provider but she declined, she stated pt doesn't need appt at this time. Pt is good for now. No further action is needed.      FYI          Future Appointments   Date Time Provider César Estrada   2023  2:20 PM Micah Thomas MD [facility]   2023  7:45 AM Terrance Young, PT [facility] [place]   2023  8:30 AM Terrance Forresta, PT [facility] [place]   2023  1:45 PM Terrance Hannah, PT [facility] [place]   2023  1:00 PM Terrance Forresta, PT [facility] [place]   2023  1:00 PM Terrance Hannah, PT [facility] [place]   2023  1:00 PM Terrance Hannah, PT [facility] [place]   10/3/2023  2:00 PM Tricia Granados MD [facility]  [facility]   2023 12:45 PM Apn, Structural Heart [facility]

## 2023-08-15 NOTE — TELEPHONE ENCOUNTER
LOV: 6/8/23  Last Refilled:Gabapentin#30, 2rfs 6/8/23  Leflunomide#30, 2rfs 4/13/23  Prednisone 5mg#90, 3rfs 1/21/23  Labs:AST 17  ALT 20  6/27/23    Future Appointments   Date Time Provider César Estrada   8/24/2023  2:20 PM Huber Pfeiffer MD [facility]   8/29/2023  7:45 AM Laura Yennifer, PT [facility] PT [facility] [place]   8/31/2023  8:30 AM Laura Yennifer, PT [facility] [place]   9/11/2023  1:45 PM Laura Yennifer, PT [facility] PT [facility] [place]   9/13/2023  1:00 PM Laura Yennifer, PT [facility] [place]   9/19/2023  1:00 PM Laura Yennifer, PT [facility] PT [facility] [place]   9/26/2023  1:00 PM Laura Yennifer, PT [facility] [place]   10/3/2023  2:00 PM Ramon Ramos MD [facility]  [facility]   11/7/2023 12:45 PM Apn, Structural Heart [address] [facility]   Summary:  1.cont. prednisoen 5mg a day - can taper to 2.5mg a day if feels shaking. 2 .cont. leflunomide 10mg a day   3. Return to clinic in 2 months. .   4. Check labs today   5. Try gabapnetin 100mg at night   6. Could consider adding hydroxcyhrlqouine 200mg a day - in the future      - the plan would be to increase the leflunomide if her kidney function improves         Beverly Garcia MD  6/8/2023   10:48 AM    Please advise.

## 2023-08-15 NOTE — PATIENT INSTRUCTIONS
1) Please get X-rays of the lumbar spine and pelvis today on your way out. 2) Please begin physical therapy as soon as possible. 3) If physical therapy is not helpful for the right hip pain, then would recommend right hip CSI under ultrasound guidance  4) If physical therapy is not helpful for the numbness and tingling in the left leg, then would recommend left L4 and LEFt L5 TFESI    5) Tylenol 500-1000 mg every 6-8 hours as needed for pain. No more than 3000 mg daily. 6) Change gabapentin to 100 mg three times per day (every 8 hours)  7) Try to talk 0.5 tablet of the 5 mg Cyclobenzaprine (muscle relaxer) or stop it  8) Follow up with me in about 6-8 weeks.

## 2023-08-15 NOTE — PROGRESS NOTES
[address]  Progress Note    CHIEF COMPLAINT:  Patient presents with: Follow - Up: LOV: 7/12/2022  Pain is in the right sided  pelvic and hip that radiates to the  right foot. Pain is a 5/10. Walking makes pain worse. Right foot is swollen. She just started gabapentin and takes it in the evening once doesn't know if it helps yet. Cyclobenzaprine at nights PRN. She will Start PT on August 29th. Tingling on left leg. History of Present Illness: The patient is a 76year old  right-handed female with significant past medical history of thyroid disease, CHD, rheumatoid arthritis, atrial fibrillation, coronary stenosis status post stenting, and recent COVID infection who presents with right groin pain as well as left-sided leg tingling which has been ongoing for the last several weeks without an inciting event. First for the groin pain, she rates this about a 4 out of 10 and is aggravated by sitting as well as standing. She has been taking gabapentin and cyclobenzaprine. She is unclear if medications have been helpful. She denies any imaging of the pelvis. She denies previous injections to the right hip. As a pertains to the tingling in the left leg, she has had an MRI recently of the lumbar spine which I independently reviewed with her. She denies focal weakness. She is scheduled to begin physical therapy in the next couple of weeks.     PAST MEDICAL HISTORY:  Past Medical History:   Diagnosis Date    AS (aortic stenosis)     s/p TAVR    CAD (coronary artery disease)     CHF (congestive heart failure) (HCC)     Diastolic    CKD (chronic kidney disease)     Congestive heart disease (HCC)     High cholesterol     RA (rheumatoid arthritis) ([facility] [address]        SURGICAL HISTORY:  Past Surgical History:   Procedure Laterality Date    VALVE REPAIR         SOCIAL HISTORY:   Social History    Occupational History      Not on file    Tobacco Use      Smoking status: Never Smokeless tobacco: Never    Vaping Use      Vaping Use: Never used    Substance and Sexual Activity      Alcohol use: Never      Drug use: Never      Sexual activity: Not on file      FAMILY HISTORY:   History reviewed. No pertinent family history. CURRENT MEDICATIONS:   Current Outpatient Medications   Medication Sig Dispense Refill    cyclobenzaprine 5 MG Oral Tab Take 0.5-1 tablets (2.5-5 mg total) by mouth nightly as needed for Muscle spasms. May cause sedation; no driving. 30 tablet 1    sertraline 50 MG Oral Tab Take 1 tablet (50 mg total) by mouth daily. FOR ANXIETY. 90 tablet 9    Potassium Chloride ER 20 MEQ Oral Tab CR Take 20 mEq by mouth in the morning and 20 mEq before bedtime. 60 tablet 0    cholecalciferol 25 MCG (1000 UT) Oral Tab Take 1 tablet (1,000 Units total) by mouth daily. 30 tablet 0    gabapentin 100 MG Oral Cap Take 1 capsule (100 mg total) by mouth nightly. 30 capsule 2    bumetanide (BUMEX) 1 MG Oral Tab Take 1 tablet (1 mg total) by mouth daily. STOP FUROSEMIDE. 90 tablet 0    rosuvastatin 10 MG Oral Tab Take 1 tablet (10 mg total) by mouth nightly. FOR CHOLESTEROL. Dose reduction due to interaction with leflunomide. 90 tablet 0    Lidocaine 4 % External Gel Apply 1 Application. topically in the morning, at noon, and at bedtime. 1 each 3    leflunomide 10 MG Oral Tab Take 1 tablet (10 mg total) by mouth daily. 30 tablet 2    clopidogrel 75 MG Oral Tab Take 1 tablet (75 mg total) by mouth daily. FOR HEART STENTS. 90 tablet 3    predniSONE 5 MG Oral Tab Take 3 tablets (15 mg total) by mouth daily. 90 tablet 3    levocetirizine 5 MG Oral Tab Take 1 tablet (5 mg total) by mouth every evening. 90 tablet 1    lisinopril 20 MG Oral Tab Take 1 tablet (20 mg total) by mouth daily. FOR BLOOD PRESSURE. 90 tablet 3    benzonatate 200 MG Oral Cap Take 1 capsule (200 mg total) by mouth 3 (three) times daily as needed for cough.  30 capsule 0    aspirin 81 MG Oral Tab EC Take 1 tablet (81 mg total) by mouth daily. FOR HEART DISEASE. 90 tablet 3    folic acid 1 MG Oral Tab Take 1 tablet (1 mg total) by mouth daily. pantoprazole 20 MG Oral Tab EC Take 1 tablet (20 mg total) by mouth every morning before breakfast. TO PREVENT ACID REFLUX. 90 tablet 3    Albuterol Sulfate  (90 Base) MCG/ACT Inhalation Aero Soln Inhale 2 puffs into the lungs every 4 (four) hours as needed for Wheezing. (Patient not taking: Reported on 8/15/2023) 1 Inhaler 3       ALLERGIES:     Codeine                 PAIN    Comment:Chest pain  Sulfa Antibiotics       HIVES  Sulfa Antibiotics       HIVES  Codeine                 PAIN    Comment:Chest pain  Amlodipine              OTHER (SEE COMMENTS)    Comment:Swelling and leg cramps    REVIEW OF SYSTEMS:   Review of Systems   Constitutional: Negative. HENT: Negative. Eyes: Negative. Respiratory: Negative. Cardiovascular: Negative. Gastrointestinal: Negative. Genitourinary: Negative. Musculoskeletal: As per HPI  Skin: Negative. Neurological: As per HPI  Endo/Heme/Allergies: Negative. Psychiatric/Behavioral: Negative. All other systems reviewed and are negative. Pertinent positives and negatives noted in the HPI. PHYSICAL EXAM:   /62     There is no height or weight on file to calculate BMI. General: No immediate distress  Head: Normocephalic/ Atraumatic  Eyes: Extra-occular movements intact. Ears: No auricular hematoma or deformities  Mouth: No lesions or ulcerations  Heart: peripheral pulses intact. Normal capillary refill. Lungs: Non-labored respirations  Abdomen: No abdominal guarding  Extremities: No lower extremity edema bilaterally   Skin: No lesions noted. Cognition: alert & oriented x 3, attentive, able to follow 2 step commands, comprehention intact, spontaneous speech intact  Motor:    Musculoskeletal:    LUMBAR SPINE:  Inspection: no erythema, swelling, or obvious deformity.   Their iliac crest and shoulder heights are symmetrical.  Postural exam reveals  scoliosis   Palpation: Tender to palpation over the left lower lumbar facets and paraspinals, left glutes and piriformis  ROM: Full active range of motion of the lumbar spine  Motor: 5/5 in all myotomes of the BILATERAL lower extremities except 4 out of 5 during left great toe extension (L5)  Sensation: Intact to light touch in all dermatomes of the lower extremities  Reflexes: 1/4 at L4 and S1  Facet Loading: no specific facet pain  ANGELI: Negative  Straight leg raise: negative for radicular pain symptoms  Slump test: negative for pain symptoms or radicular pain symptoms    HIP:  Inspection: no erythema, swelling, or obvious deformity  Palpation: Tender to palpation over the left inguinal fold and groin  ROM: intact to all planes of motion  Hip Grind Test: Positive on the right  ANGELI: Negative  FADIR: Positive on the right    Gait:  normal    Data  [facility] Lab Encounter on 06/27/2023   Component Date Value Ref Range Status    Glucose 06/27/2023 126 (H)  70 - 99 mg/dL Final    Sodium 06/27/2023 139  136 - 145 mmol/L Final    Potassium 06/27/2023 4.6  3.5 - 5.1 mmol/L Final    Chloride 06/27/2023 106  98 - 112 mmol/L Final    CO2 06/27/2023 26.0  21.0 - 32.0 mmol/L Final    Anion Gap 06/27/2023 7  0 - 18 mmol/L Final    BUN 06/27/2023 37 (H)  7 - 18 mg/dL Final    Creatinine 06/27/2023 1.55 (H)  0.55 - 1.02 mg/dL Final    BUN/CREA Ratio 06/27/2023 23.9 (H)  10.0 - 20.0 Final    Calcium, Total 06/27/2023 8.9  8.5 - 10.1 mg/dL Final    Calculated Osmolality 06/27/2023 298 (H)  275 - 295 mOsm/kg Final    eGFR-Cr 06/27/2023 34 (L)  >=60 mL/min/1.73m2 Final    ALT 06/27/2023 20  13 - 56 U/L Final    AST 06/27/2023 17  15 - 37 U/L Final    Alkaline Phosphatase 06/27/2023 59  55 - 142 U/L Final    Bilirubin, Total 06/27/2023 0.5  0.1 - 2.0 mg/dL Final    Total Protein 06/27/2023 7.0  6.4 - 8.2 g/dL Final    Albumin 06/27/2023 3.5  3.4 - 5.0 g/dL Final    Globulin  06/27/2023 3.5 2.8 - 4.4 g/dL Final    A/G Ratio 06/27/2023 1.0  1.0 - 2.0 Final    Patient Fasting for CMP? 06/27/2023 No   Final    Lipase 06/27/2023 39  13 - 75 U/L Final    WBC 06/27/2023 11.5 (H)  4.0 - 11.0 x10(3) uL Final    RBC 06/27/2023 3.40 (L)  3.80 - 5.30 x10(6)uL Final    HGB 06/27/2023 9.5 (L)  12.0 - 16.0 g/dL Final    HCT 06/27/2023 29.6 (L)  35.0 - 48.0 % Final    MCV 06/27/2023 87.1  80.0 - 100.0 fL Final    MCH 06/27/2023 27.9  26.0 - 34.0 pg Final    MCHC 06/27/2023 32.1  31.0 - 37.0 g/dL Final    RDW-SD 06/27/2023 47.6 (H)  35.1 - 46.3 fL Final    RDW 06/27/2023 14.7  11.0 - 15.0 % Final    PLT 06/27/2023 174.0  150.0 - 450.0 10(3)uL Final    Neutrophil Absolute Prelim 06/27/2023 8.75 (H)  1.50 - 7.70 x10 (3) uL Final    Neutrophil Absolute 06/27/2023 8.75 (H)  1.50 - 7.70 x10(3) uL Final    Lymphocyte Absolute 06/27/2023 1.81  1.00 - 4.00 x10(3) uL Final    Monocyte Absolute 06/27/2023 0.77  0.10 - 1.00 x10(3) uL Final    Eosinophil Absolute 06/27/2023 0.08  0.00 - 0.70 x10(3) uL Final    Basophil Absolute 06/27/2023 0.06  0.00 - 0.20 x10(3) uL Final    Immature Granulocyte Absolute 06/27/2023 0.03  0.00 - 1.00 x10(3) uL Final    Neutrophil % 06/27/2023 76.1  % Final    Lymphocyte % 06/27/2023 15.7  % Final    Monocyte % 06/27/2023 6.7  % Final    Eosinophil % 06/27/2023 0.7  % Final    Basophil % 06/27/2023 0.5  % Final    Immature Granulocyte % 06/27/2023 0.3  % Final   [facility] Lab Encounter on 06/08/2023   Component Date Value Ref Range Status    C-Reactive Protein 06/08/2023 0.54 (H)  <0.30 mg/dL Final    Sed Rate 06/08/2023 24  0 - 30 mm/Hr Final    AST 06/08/2023 17  15 - 37 U/L Final    ALT 06/08/2023 19  13 - 56 U/L Final    Urine Color 06/08/2023 Yellow  Yellow Final    Clarity Urine 06/08/2023 Clear  Clear Final    Spec Gravity 06/08/2023 1.018  1.005 - 1.030 Final    Glucose Urine 06/08/2023 Normal  Normal mg/dL Final    Bilirubin Urine 06/08/2023 Negative  Negative Final    Ketones Urine 06/08/2023 Negative  Negative mg/dL Final    Blood Urine 06/08/2023 Negative  Negative Final    pH Urine 06/08/2023 5.5  5.0 - 8.0 Final    Protein Urine 06/08/2023 Negative  Negative, Trace mg/dL Final    Urobilinogen Urine 06/08/2023 Normal  Normal Final    Nitrite Urine 06/08/2023 Negative  Negative Final    Leukocyte Esterase Urine 06/08/2023 Negative  Negative Final    WBC Urine 06/08/2023 1-5  0 - 5 /HPF Final    RBC Urine 06/08/2023 0-2  0 - 2 /HPF Final    Bacteria Urine 06/08/2023 None Seen  None Seen /HPF Final    Squamous Epi.  Cells 06/08/2023 Few (A)  None Seen /HPF Final    WBC 06/08/2023 9.9  4.0 - 11.0 x10(3) uL Final    RBC 06/08/2023 3.39 (L)  3.80 - 5.30 x10(6)uL Final    HGB 06/08/2023 9.5 (L)  12.0 - 16.0 g/dL Final    HCT 06/08/2023 30.2 (L)  35.0 - 48.0 % Final    MCV 06/08/2023 89.1  80.0 - 100.0 fL Final    MCH 06/08/2023 28.0  26.0 - 34.0 pg Final    MCHC 06/08/2023 31.5  31.0 - 37.0 g/dL Final    RDW-SD 06/08/2023 47.6 (H)  35.1 - 46.3 fL Final    RDW 06/08/2023 14.7  11.0 - 15.0 % Final    PLT 06/08/2023 230.0  150.0 - 450.0 10(3)uL Final    Neutrophil Absolute Prelim 06/08/2023 5.91  1.50 - 7.70 x10 (3) uL Final    Neutrophil Absolute 06/08/2023 5.91  1.50 - 7.70 x10(3) uL Final    Lymphocyte Absolute 06/08/2023 2.49  1.00 - 4.00 x10(3) uL Final    Monocyte Absolute 06/08/2023 1.06 (H)  0.10 - 1.00 x10(3) uL Final    Eosinophil Absolute 06/08/2023 0.31  0.00 - 0.70 x10(3) uL Final    Basophil Absolute 06/08/2023 0.05  0.00 - 0.20 x10(3) uL Final    Immature Granulocyte Absolute 06/08/2023 0.03  0.00 - 1.00 x10(3) uL Final    Neutrophil % 06/08/2023 60.0  % Final    Lymphocyte % 06/08/2023 25.3  % Final    Monocyte % 06/08/2023 10.8  % Final    Eosinophil % 06/08/2023 3.1  % Final    Basophil % 06/08/2023 0.5  % Final    Immature Granulocyte % 06/08/2023 0.3  % Final   [facility] Lab Encounter on 04/12/2023   Component Date Value Ref Range Status    Iron 04/12/2023 30 (L)  50 - 170 ug/dL Final Transferrin 04/12/2023 172 (L)  200 - 360 mg/dL Final    Total Iron Binding Capacity 04/12/2023 256  240 - 450 ug/dL Final    % Saturation 04/12/2023 12 (L)  15 - 50 % Final    Ferritin 04/12/2023 614.9 (H)  18.0 - 340.0 ng/mL Final    Glucose 04/12/2023 172 (H)  70 - 99 mg/dL Final    Sodium 04/12/2023 131 (L)  136 - 145 mmol/L Final    Potassium 04/12/2023 4.1  3.5 - 5.1 mmol/L Final    Chloride 04/12/2023 98  98 - 112 mmol/L Final    CO2 04/12/2023 20.0 (L)  21.0 - 32.0 mmol/L Final    Anion Gap 04/12/2023 13  0 - 18 mmol/L Final    BUN 04/12/2023 45 (H)  7 - 18 mg/dL Final    Creatinine 04/12/2023 2.10 (H)  0.55 - 1.02 mg/dL Final    BUN/CREA Ratio 04/12/2023 21.4 (H)  10.0 - 20.0 Final    Calcium, Total 04/12/2023 9.6  8.5 - 10.1 mg/dL Final    Calculated Osmolality 04/12/2023 288  275 - 295 mOsm/kg Final    eGFR-Cr 04/12/2023 24 (L)  >=60 mL/min/1.73m2 Final    Albumin 04/12/2023 2.8 (L)  3.4 - 5.0 g/dL Final    Phosphorus 04/12/2023 2.8  2.5 - 4.9 mg/dL Final    Pro-Beta Natriuretic Peptide 04/12/2023 934 (H)  <450 pg/mL Final    Vitamin B12 04/12/2023 1,735 (H)  193 - 986 pg/mL Final    Folate (Folic Acid) 26/58/9932 >20.0  >=8.7 ng/mL Final    WBC 04/12/2023 9.7  4.0 - 11.0 x10(3) uL Final    RBC 04/12/2023 2.95 (L)  3.80 - 5.30 x10(6)uL Final    HGB 04/12/2023 8.3 (L)  12.0 - 16.0 g/dL Final    HCT 04/12/2023 25.5 (L)  35.0 - 48.0 % Final    MCV 04/12/2023 86.4  80.0 - 100.0 fL Final    MCH 04/12/2023 28.1  26.0 - 34.0 pg Final    MCHC 04/12/2023 32.5  31.0 - 37.0 g/dL Final    RDW-SD 04/12/2023 42.7  35.1 - 46.3 fL Final    RDW 04/12/2023 13.8  11.0 - 15.0 % Final    PLT 04/12/2023 288.0  150.0 - 450.0 10(3)uL Final    Neutrophil Absolute Prelim 04/12/2023 5.61  1.50 - 7.70 x10 (3) uL Final    Neutrophil Absolute 04/12/2023 5.61  1.50 - 7.70 x10(3) uL Final    Lymphocyte Absolute 04/12/2023 2.16  1.00 - 4.00 x10(3) uL Final    Monocyte Absolute 04/12/2023 0.93  0.10 - 1.00 x10(3) uL Final    Eosinophil Absolute 04/12/2023 0.89 (H)  0.00 - 0.70 x10(3) uL Final    Basophil Absolute 04/12/2023 0.06  0.00 - 0.20 x10(3) uL Final    Immature Granulocyte Absolute 04/12/2023 0.03  0.00 - 1.00 x10(3) uL Final    Neutrophil % 04/12/2023 58.0  % Final    Lymphocyte % 04/12/2023 22.3  % Final    Monocyte % 04/12/2023 9.6  % Final    Eosinophil % 04/12/2023 9.2  % Final    Basophil % 04/12/2023 0.6  % Final    Immature Granulocyte % 04/12/2023 0.3  % Final   Lab Requisition on 04/05/2023   Component Date Value Ref Range Status    Urine Color 04/05/2023 Yellow  Yellow Final    Clarity Urine 04/05/2023 Hazy (A)  Clear Final    Spec Gravity 04/05/2023 1.009  1.001 - 1.030 Final    Glucose Urine 04/05/2023 Negative  Negative mg/dL Final    Bilirubin Urine 04/05/2023 Negative  Negative Final    Ketones Urine 04/05/2023 Negative  Negative mg/dL Final    Blood Urine 04/05/2023 Small (A)  Negative Final    pH Urine 04/05/2023 5.0  5.0 - 8.0 Final    Protein Urine 04/05/2023 Negative  Negative mg/dL Final    Urobilinogen Urine 04/05/2023 <2.0  <2.0 mg/dL Final    Nitrite Urine 04/05/2023 Negative  Negative Final    Leukocyte Esterase Urine 04/05/2023 Large (A)  Negative Final    WBC Urine 04/05/2023 21-50 (A)  0 - 5 /HPF Final    RBC Urine 04/05/2023 0-2  0 - 2 /HPF Final    Bacteria Urine 04/05/2023 None Seen  None Seen /HPF Final    Squamous Epi.  Cells 04/05/2023 Few (A)  None Seen /HPF Final    Renal Tubular Epithelial Cells 04/05/2023 None Seen  None Seen /HPF Final    Transitional Cells 04/05/2023 None Seen  None Seen /HPF Final    Hyaline Casts 04/05/2023 Present (A)  None Seen /LPF Final    Yeast Urine 04/05/2023 None Seen  None Seen /HPF Final    C-Reactive Protein 04/05/2023 4.20 (H)  <0.30 mg/dL Final    WBC 04/05/2023 10.7  4.0 - 11.0 x10(3) uL Final    RBC 04/05/2023 2.90 (L)  3.80 - 5.30 x10(6)uL Final    HGB 04/05/2023 8.4 (L)  12.0 - 16.0 g/dL Final    HCT 04/05/2023 25.9 (L)  35.0 - 48.0 % Final    PLT 04/05/2023 270.0 150.0 - 450.0 10(3)uL Final    MCV 04/05/2023 89.3  80.0 - 100.0 fL Final    MCH 04/05/2023 29.0  26.0 - 34.0 pg Final    MCHC 04/05/2023 32.4  31.0 - 37.0 g/dL Final    RDW 04/05/2023 14.4  % Final    Neutrophil Absolute Prelim 04/05/2023 7.02  1.50 - 7.70 x10 (3) uL Final    Neutrophil Absolute 04/05/2023 7.02  1.50 - 7.70 x10(3) uL Final    Lymphocyte Absolute 04/05/2023 1.80  1.00 - 4.00 x10(3) uL Final    Monocyte Absolute 04/05/2023 0.95  0.10 - 1.00 x10(3) uL Final    Eosinophil Absolute 04/05/2023 0.79 (H)  0.00 - 0.70 x10(3) uL Final    Basophil Absolute 04/05/2023 0.06  0.00 - 0.20 x10(3) uL Final    Immature Granulocyte Absolute 04/05/2023 0.04  0.00 - 1.00 x10(3) uL Final    Neutrophil % 04/05/2023 65.8  % Final    Lymphocyte % 04/05/2023 16.9  % Final    Monocyte % 04/05/2023 8.9  % Final    Eosinophil % 04/05/2023 7.4  % Final    Basophil % 04/05/2023 0.6  % Final    Immature Granulocyte % 04/05/2023 0.4  % Final    Urine Culture 04/05/2023 No Growth at 18-24 hrs.    Final    Sed Rate 04/05/2023 48 (H)  0 - 30 mm/Hr Final    ALT 04/05/2023 15  13 - 56 U/L Final    AST 04/05/2023 17  15 - 37 U/L Final   Telemedicine on 03/13/2023   Component Date Value Ref Range Status    Urine Color 04/12/2023 Yellow  Yellow Final    Clarity Urine 04/12/2023 Turbid (A)  Clear Final    Spec Gravity 04/12/2023 1.016  1.005 - 1.030 Final    Glucose Urine 04/12/2023 Normal  Normal mg/dL Final    Bilirubin Urine 04/12/2023 Negative  Negative Final    Ketones Urine 04/12/2023 Negative  Negative mg/dL Final    Blood Urine 04/12/2023 Negative  Negative Final    pH Urine 04/12/2023 5.0  5.0 - 8.0 Final    Protein Urine 04/12/2023 20 (A)  Negative, Trace mg/dL Final    Urobilinogen Urine 04/12/2023 Normal  Normal Final    Nitrite Urine 04/12/2023 Negative  Negative Final    Leukocyte Esterase Urine 04/12/2023 500 (A)  Negative Final    WBC Urine 04/12/2023 6-10 (A)  0 - 5 /HPF Final    RBC Urine 04/12/2023 3-5 (A)  0 - 2 /HPF Final    Bacteria Urine 04/12/2023 Rare (A)  None Seen /HPF Final    Squamous Epi. Cells 04/12/2023 Few (A)  None Seen /HPF Final    Hyaline Casts 04/12/2023 Present (A)  None Seen /LPF Final    Urine Culture 04/12/2023 No Growth at 18-24 hrs. Final   ]      Radiology Imaging:  I reviewed with the patient her MRI of the lumbar spine from 8/12/2023  MRI SPINE LUMBAR (CPT=72148)  Exam: MRI LUMBAR SP W/O CONTRAST  CPT Code(s): 18717 - MRI LUMBAR SPINE W/O DYE    MRI LUMBAR SPINE WITHOUT CONTRAST    HISTORY: Low back pain    COMPARISON: None currently available. TECHNIQUE: Routine MRI exam performed on a wide bore ultra high field 3.0 T Siemens Skyra MR scanner, without intravenous contrast.    FINDINGS: Convex left curvature of the lumbar spine. Vertebral body height and alignment is maintained. No fracture. Multilevel disc degeneration with associated discogenic reactive marrow change. The tip of the conus medullaris is at the L1 level. Visualized spinal cord and cauda equina are normal in appearance. Paraspinous soft tissues demonstrate no acute process. L1-2: Disc degeneration characterized by disc desiccation, 30-60% loss of disc height and a broad posterior disc bulge. Mild facet arthropathy and ligamentum flavum hypertrophy. No significant central canal stenosis or neural foraminal narrowing. L2-3: Disc degeneration characterized by disc desiccation, less than 30% loss of disc height and a broad posterior disc bulge. Mild to moderate facet arthropathy and ligamentum flavum hypertrophy. No central canal stenosis or neural foraminal narrowing. L3-4: Disc degeneration characterized by disc desiccation, less than 30% loss of disc height and a broad posterior disc bulge. Superimposed right foraminal protrusion results in moderate narrowing of the right neural foramen. Mild to moderate facet   arthropathy. No significant central canal stenosis. Left neural foramen is patent.     L4-5: Disc degeneration characterized by disc desiccation, 30-60% loss of disc height and a broad posterior disc bulge. Superimposed left subarticular extrusion extending 7 mm cranial to the inferior endplate of L3. Effacement of the left subarticular   recess with encroachment on the left L4 nerve root. No central canal stenosis or right neural foraminal narrowing. L5-S1: Disc degeneration characterized by disc desiccation, less than 30% loss of disc height and a broad posterior disc bulge. Superimposed left foraminal extrusion results in moderate to severe left neural foraminal narrowing with encroachment on the   exiting L5 nerve root. Mild right neural foraminal narrowing. Moderate facet arthropathy. IMPRESSION:  1.Multilevel disc degeneration and facet arthropathy. No acute osseous abnormality. 2.L4-5: Disc bulge with a superimposed left subarticular extrusion extending 7 mm cranial to the inferior endplate of L3. Effacement of the left subarticular recess with encroachment on the left L4 nerve root. No central canal stenosis or right neural   foraminal narrowing. 3. L5-S1: Disc bulge with a superimposed left foraminal extrusion results in moderate to severe left neural foraminal narrowing with encroachment on the exiting L5 nerve root. Mild right neural foraminal narrowing. 4. L3-4: Disc bulge with a superimposed Superimposed right foraminal protrusion results in moderate narrowing of the right neural foramen. No significant central canal stenosis. Left neural foramen is patent. Interpreting Radiologist:    Giovanna Sanchez M.D. Electronically Signed: 08/12/2023 10:52 AM      ASSESSMENT AND PLAN:  Shabana Burger is a pleasant 20-year-old female accompanied by her daughter who presents for follow-up with complaints of right-sided hip and groin pain as well as left-sided low back pain with tingling down the left leg. For the hip pain, I believe this is due to intra-articular pathology in the right hip.   I have recommended x-rays of the pelvis as well as formal physical therapy. She should use Tylenol for pain. I am not recommending NSAIDs given her history of cardiac comorbidities. If her symptoms do not improve with 6 to 8 weeks of physical therapy, then I would recommend a right hip corticosteroid injection. As a pertains to the numbness and tingling in the left leg, she does have weakness during left great toe extension and tingling in the L4 distribution. I have independently reviewed her MRI of the lumbar spine where there is a left subarticular zone extrusion at L4-L5 effacing the left subarticular recess with encroachment on the left L4 nerve root as well as moderate to severe left foraminal narrowing due to a disc bulge and an extrusion at L5-S1. I am recommending physical therapy for this as well and if this is not improved with physical therapy, then I would recommend a left L4 and left L5 transforaminal epidural steroid injection. I will see her again in about 6 to 8 weeks. RTC in 6 to 8 weeks  Discharge Instructions were provided as documented in AVS summary. The patient was in agreement with the assessment and plan. All questions were answered. There were no barriers to learning. Lumbar radiculopathy  (primary encounter diagnosis)  Lumbar disc herniation with radiculopathy  DDD (degenerative disc disease), lumbosacral  Myalgia  Facet syndrome, lumbar  Mechanical low back pain  Lumbar spondylosis  Bulge of lumbar disc without myelopathy  Lumbar foraminal stenosis  Strain of gluteus medius, unspecified laterality, initial encounter  Biomechanical lesion  Essential hypertension  Rheumatoid arthritis, involving unspecified site, unspecified whether rheumatoid factor present ([facility] [address]  Coronary artery disease involving native coronary artery of native heart without angina pectoris  S/P drug eluting coronary stent placement  Pain of right hip    Fitz Hunt MD  Physical Medicine and Rehabilitation/Sports [facility]

## 2023-08-16 RX ORDER — BUMETANIDE 1 MG/1
1 TABLET ORAL DAILY
Qty: 90 TABLET | Refills: 3 | Status: SHIPPED | OUTPATIENT
Start: 2023-08-16

## 2023-08-16 RX ORDER — MELATONIN
1000 DAILY
Qty: 90 TABLET | Refills: 3 | Status: SHIPPED | OUTPATIENT
Start: 2023-08-16

## 2023-08-16 RX ORDER — POTASSIUM CHLORIDE 1500 MG/1
20 TABLET, EXTENDED RELEASE ORAL 2 TIMES DAILY
Qty: 60 TABLET | Refills: 1 | Status: SHIPPED | OUTPATIENT
Start: 2023-08-16

## 2023-08-16 RX ORDER — FOLIC ACID 1 MG/1
1 TABLET ORAL DAILY
Qty: 90 TABLET | Refills: 3 | Status: SHIPPED | OUTPATIENT
Start: 2023-08-16

## 2023-08-16 RX ORDER — PANTOPRAZOLE SODIUM 20 MG/1
20 TABLET, DELAYED RELEASE ORAL
Qty: 90 TABLET | Refills: 3 | Status: SHIPPED | OUTPATIENT
Start: 2023-08-16

## 2023-08-16 RX ORDER — ROSUVASTATIN CALCIUM 10 MG/1
10 TABLET, COATED ORAL NIGHTLY
Qty: 90 TABLET | Refills: 3 | Status: SHIPPED | OUTPATIENT
Start: 2023-08-16

## 2023-08-16 NOTE — TELEPHONE ENCOUNTER
Please review refill failed/no protocol     Requested Prescriptions     Pending Prescriptions Disp Refills    folic acid 1 MG Oral Tab 90 tablet 3     Sig: Take 1 tablet (1 mg total) by mouth daily. cholecalciferol 25 MCG (1000 UT) Oral Tab 90 tablet 3     Sig: Take 1 tablet (1,000 Units total) by mouth daily. Signed Prescriptions Disp Refills    pantoprazole 20 MG Oral Tab EC 90 tablet 3     Sig: Take 1 tablet (20 mg total) by mouth every morning before breakfast. TO PREVENT ACID REFLUX. Authorizing Provider: Demetrius Rodgers     Ordering User: Catrachita Martinez         Recent Visits  Date Type Provider Dept   07/25/23 Office Visit Rolando Avalos MD [facility]   01/19/23 Office Visit MD [facility]   07/25/22 Office Visit Rolando Avalos MD [facility]   06/07/22 Office Visit Rolando Avalos MD [facility]   05/12/22 Office Visit Rolando Avalos MD [facility]   04/06/22 Office Visit Rolando Avalos MD [facility]   Showing recent visits within past 540 days with a meds authorizing provider and meeting all other requirements  Future Appointments  Date Type Provider Dept   08/24/23 Appointment Rolando Avalos MD [facility]   Showing future appointments within next 150 days with a meds authorizing provider and meeting all other requirements    Requested Prescriptions   Pending Prescriptions Disp Refills    folic acid 1 MG Oral Tab 90 tablet 3     Sig: Take 1 tablet (1 mg total) by mouth daily. There is no refill protocol information for this order       cholecalciferol 25 MCG (1000 UT) Oral Tab 90 tablet 3     Sig: Take 1 tablet (1,000 Units total) by mouth daily. There is no refill protocol information for this order      Signed Prescriptions Disp Refills    pantoprazole 20 MG Oral Tab EC 90 tablet 3     Sig: Take 1 tablet (20 mg total) by mouth every morning before breakfast. TO PREVENT ACID REFLUX.        Gastrointestional Medication Protocol Passed - 8/15/2023 10:28 AM        Passed - In person appointment or virtual visit in the past 12 mos or appointment in next 3 mos     Recent Outpatient Visits              Yesterday Lumbar radiculopathy    [facility], [address], Mine Ramos MD    Office Visit    3 weeks ago Varicose veins    Tiago Maxwell, Sania Lazaro MD    Office Visit    1 month ago Epigastric pain    [facility], [address], Juan Lo MD    Office Visit    2 months ago Rheumatoid arthritis, involving unspecified site, unspecified whether rheumatoid factor present [facility])    [address], [facility], [place] Lionel Wood MD    Office Visit    4 months ago Rheumatoid arthritis, involving unspecified site, unspecified whether rheumatoid factor present [facility])    [address], [address] [place] [address], Caterina Reza MD    Office Visit          Future Appointments         Provider Department Appt Notes    In 1 week MD Tiago Carbajal Add[place] physical    In 1 week Jen Coppersmith, [address] in [employer]    In 2 weeks Jen Coppersmith, [address] in [address] [facility] Medicare A&B/BCBS Supp    In 3 weeks Jen Coppersmith, [address] in [address] [facility] Medicare A&B/BCBS Supp    In 4 weeks Jen Coppersmith, [address] in [address] [facility] Medicare A&B/BCBS Supp    In 1 month Jen Coppersmith, [address] in [address] [facility] Medicare A&B/BCBS Supp    In 1 month Jen Coppersmith, [address] in [employer]    In 1 month MD Tiago Mendoza Addison Return in about 6 weeks (around 9/26/2023).     In 2 months Apn, Structural Heart Edkaren [facility] and [place] 1 YEAR TAVR-[facility] ECHO 1330

## 2023-08-16 NOTE — TELEPHONE ENCOUNTER
Last refilled 5/24/23 by SANIA Bullard per Luis F.   Dr. Selma Quinones, please advise on refill request.

## 2023-08-16 NOTE — TELEPHONE ENCOUNTER
Please review refill failed/no protocol     Requested Prescriptions     Pending Prescriptions Disp Refills    bumetanide (BUMEX) 1 MG Oral Tab 90 tablet 0     Sig: Take 1 tablet (1 mg total) by mouth daily. STOP FUROSEMIDE. Recent Visits  Date Type Provider Dept   07/25/23 Office Visit Livan Cota MD [facility]   06/27/23 Office Visit Mark Beverly MD [facility]   04/08/23 Office Visit Kathia Tinoco PA-C [facility]   01/19/23 Office Visit Livan Cota MD [facility]   07/25/22 Office Visit Livan Cota MD [facility]   06/07/22 Office Visit Livan Cota MD [facility]   05/12/22 Office Visit Livan Cota MD [facility]   04/06/22 Office Visit Livan Cota MD [facility]   Showing recent visits within past 540 days with a meds authorizing provider and meeting all other requirements  Future Appointments  Date Type Provider Dept   08/24/23 Appointment Livan Cota MD [facility]   Showing future appointments within next 150 days with a meds authorizing provider and meeting all other requirements    Requested Prescriptions   Pending Prescriptions Disp Refills    bumetanide (BUMEX) 1 MG Oral Tab 90 tablet 0     Sig: Take 1 tablet (1 mg total) by mouth daily. STOP FUROSEMIDE.        Hypertensive Medications Protocol Failed - 8/15/2023 10:28 AM        Failed - EGFRCR or GFRNAA > 50     GFR Evaluation  EGFRCR: 34 , resulted on 6/27/2023          Passed - In person appointment in the past 12 or next 3 months     Recent Outpatient Visits              Yesterday Lumbar radiculopathy    Cong Kilpatrick MD    Office Visit    3 weeks ago Varicose veins    Mae Kilpatrick MD    Office Visit    1 month ago Epigastric pain    Christiano Coats MD    Office Visit    2 months ago Rheumatoid arthritis, involving unspecified site, unspecified whether rheumatoid factor present [facility])    [address], [place] Dixie Fire, MD    Office Visit    4 months ago Rheumatoid arthritis, involving unspecified site, unspecified whether rheumatoid factor present [facility])    [address], Ermelinda Epps MD    Office Visit          Future Appointments         Provider Department Appt Notes    In 1 week [facility]as [facility] [address] [facility]    In 1 week Lock [place], [address] in [employer]    In 2 weeks [place], [address] in [address] [facility] Medicare A&B/BCBS Supp    In 3 weeks [place], [address] in [address] [facility] Medicare A&B/BC[employer] Supp    In 4 weeks [place], [address] in [address] [facility] Medicare A&B/BCBS Supp    In 1 month Ashvin [place], [address] in [address] [facility] Medicare A&B/BCBS Supp    In 1 month [place], [address] in [employer]    In 1 month Nik Ortiz [facility] [address] [facility] Return in about 6 weeks (around 9/26/2023).     In 2 months Apn, Structural Heart Edward [facility] and [place] 1 YEAR TAVR-[facility] ECHO 1330               Passed - Last BP reading less than 140/90     BP Readings from Last 1 Encounters:  08/15/23 : 128/62              Passed - CMP or BMP in past 6 months     Recent Results (from the past 4392 hour(s))   COMP METABOLIC PANEL (14)    Collection Time: 06/27/23  1:46 PM   Result Value Ref Range    Glucose 126 (H) 70 - 99 mg/dL    Sodium 139 136 - 145 mmol/L    Potassium 4.6 3.5 - 5.1 mmol/L    Chloride 106 98 - 112 mmol/L    CO2 26.0 21.0 - 32.0 mmol/L    Anion Gap 7 0 - 18 mmol/L    BUN 37 (H) 7 - 18 mg/dL    Creatinine 1.55 (H) 0.55 - 1.02 mg/dL    BUN/CREA Ratio 23.9 (H) 10.0 - 20.0 Calcium, Total 8.9 8.5 - 10.1 mg/dL    Calculated Osmolality 298 (H) 275 - 295 mOsm/kg    eGFR-Cr 34 (L) >=60 mL/min/1.73m2    ALT 20 13 - 56 U/L    AST 17 15 - 37 U/L    Alkaline Phosphatase 59 55 - 142 U/L    Bilirubin, Total 0.5 0.1 - 2.0 mg/dL    Total Protein 7.0 6.4 - 8.2 g/dL    Albumin 3.5 3.4 - 5.0 g/dL    Globulin  3.5 2.8 - 4.4 g/dL    A/G Ratio 1.0 1.0 - 2.0    Patient Fasting for CMP? No      *Note: Due to a large number of results and/or encounters for the requested time period, some results have not been displayed. A complete set of results can be found in Results Review.                Passed - In person appointment or virtual visit in the past 6 months     Recent Outpatient Visits              Yesterday Lumbar radiculopathy    [address] [facility], Ebony Oshea MD    Office Visit    3 weeks ago Varicose veins    [address] [facility], Irene Batista MD    Office Visit    1 month ago Epigastric pain    Jaspreet Javier MD    Office Visit    2 months ago Rheumatoid arthritis, involving unspecified site, unspecified whether rheumatoid factor present [facility])    [address], Tg Dias MD    Office Visit    4 months ago Rheumatoid arthritis, involving unspecified site, unspecified whether rheumatoid factor present [facility])    [address], Tg Dias MD    Office Visit          Future Appointments         Provider Department Appt Notes    In 1 week Loi Temple MD [address] [facility], [place] physical    In 1 week Deya Minium, [address] in [address] [facility] A&B/BCBS Supp    In 2 weeks Deya Minium, [address] [place] Medicare A&B/[employer] Supp    In 3 weeks Deya Minium, PT HERBERTH Nova in [address] [place] Medicare A&B/[employer] Supp    In 4 weeks Cha Root, LAMONT [address] in [address] [place] Medicare A&B/[employer] Supp    In 1 month LAMONT Hu in [address] [facility] Medicare A&B/BCBS Supp    In 1 month LAMONT Hu in [address] [place] Medicare A[employer]B/[employer] Supp    In 1 month MD Lyudmila Martínez Addison Return in about 6 weeks (around 9/26/2023).     In 2 months Apn, Structural Heart Edkaren Structural Heart and [place] 1 YEAR TAVR-[facility] ECHO 1330

## 2023-08-16 NOTE — TELEPHONE ENCOUNTER
Refill passed per [place] [employer], [facility] protocol. Requested Prescriptions   Pending Prescriptions Disp Refills    pantoprazole 20 MG Oral Tab EC 90 tablet 3     Sig: Take 1 tablet (20 mg total) by mouth every morning before breakfast. TO PREVENT ACID REFLUX.        Gastrointestional Medication Protocol Passed - 8/15/2023 10:28 AM        Passed - In person appointment or virtual visit in the past 12 mos or appointment in next 3 mos     Recent Outpatient Visits              Yesterday Lumbar radiculopathy    [facility], [address], Robin Bello MD    Office Visit    3 weeks ago Varicose veins    [address] [facility], Kathryn Kearney MD    Office Visit    1 month ago Epigastric pain    [facility], [address] [place], Lynsey Yadav MD    Office Visit    2 months ago Rheumatoid arthritis, involving unspecified site, unspecified whether rheumatoid factor present [facility])    [address], [facility], [place] Rohit Austin MD    Office Visit    4 months ago Rheumatoid arthritis, involving unspecified site, unspecified whether rheumatoid factor present [facility])    [address], [address] [place] [address], Familia Mathis MD    Office Visit          Future Appointments         Provider Department Appt Notes    In 1 week Jennifer Greenfield MD [address] [facility], [place] physical    In 1 week Gearldine Dessert, [address] in [address] [facility] A&B/BCBS Supp    In 2 weeks Gearldine Dessert, [address] in [address] [facility] A&B/BCBS Supp    In 3 weeks Gearldine Dessert, [address] [place] Medicare A&B/[employer]BS Supp    In 4 weeks Gearldine Dessherrit, LAMONT Nova in [address] [place] Medicare A&B/[employer]BS Supp    In 1 month Gearldine Dessert, PT HERBERTH Nova in [address] [place] Medicare A&B/[employer]BS Supp    In 1 month Gearldine Dessert, [address] in [address] [facility] Medicare A&B/BCBS Supp    In 1 month MD Gaurav Munson Addison Return in about 6 weeks (around 9/26/2023). In 2 months Apn, Structural Heart Edward [facility] and [place] 1 YEAR TAVR-[facility] ECHO 1330                 folic acid 1 MG Oral Tab  0     Sig: Take 1 tablet (1 mg total) by mouth daily. There is no refill protocol information for this order       cholecalciferol 25 MCG (1000 UT) Oral Tab 30 tablet 0     Sig: Take 1 tablet (1,000 Units total) by mouth daily.        There is no refill protocol information for this order

## 2023-08-16 NOTE — TELEPHONE ENCOUNTER
Please review refill failed/no protocol     Requested Prescriptions     Pending Prescriptions Disp Refills    rosuvastatin 10 MG Oral Tab 90 tablet 0     Sig: Take 1 tablet (10 mg total) by mouth nightly. FOR CHOLESTEROL. Dose reduction due to interaction with leflunomide. Recent Visits  Date Type Provider Dept   07/25/23 Office Visit Jayden Andrade MD [facility]   06/27/23 Office Visit Shivani Arroyo MD [facility]   04/08/23 Office Visit Victoria Ambriz PA-C [facility]   01/19/23 Office Visit Jayden Andrade MD [facility]   07/25/22 Office Visit Jayden Andrade MD [facility]   06/07/22 Office Visit Jayden Andrade MD [facility]   05/12/22 Office Visit Jayden Andrade MD [facility]   04/06/22 Office Visit Jayden Andrade MD [facility]   Showing recent visits within past 540 days with a meds authorizing provider and meeting all other requirements  Future Appointments  Date Type Provider Dept   08/24/23 Appointment Jayden Andrade MD [facility]   Showing future appointments within next 150 days with a meds authorizing provider and meeting all other requirements    Requested Prescriptions   Pending Prescriptions Disp Refills    rosuvastatin 10 MG Oral Tab 90 tablet 0     Sig: Take 1 tablet (10 mg total) by mouth nightly. FOR CHOLESTEROL. Dose reduction due to interaction with leflunomide.        Cholesterol Medication Protocol Failed - 8/15/2023 10:28 AM        Failed - LDL in past 12 months        Failed - Last LDL < 130     Lab Results   Component Value Date     (H) 07/07/2022             Passed - ALT in past 12 months        Passed - Last ALT < 80     Lab Results   Component Value Date    ALT 20 06/27/2023             Passed - In person appointment or virtual visit in the past 12 mos or appointment in next 3 mos     Recent Outpatient Visits              Yesterday Lumbar radiculopathy    [address] [facility], [place] Cindy James MD    Office Visit    3 weeks ago Varicose veins    [address] [place] [address], Gaby Zepeda MD    Office Visit    1 month ago Epigastric pain    [facility], [address], Ming Ha MD    Office Visit    2 months ago Rheumatoid arthritis, involving unspecified site, unspecified whether rheumatoid factor present [facility])    [address], [facility], [place] Domingo Bracket, MD    Office Visit    4 months ago Rheumatoid arthritis, involving unspecified site, unspecified whether rheumatoid factor present [facility])    [address], [facility], Lali Romero MD    Office Visit          Future Appointments         Provider Department Appt Notes    In 1 week Eva Jha MD [address] [place] [address] physical    In 1 week Watt Kussmaul, [address] in [employer]    In 2 weeks Watt Kussmaul, [address] [place] Medicare A&B/BCBS Supp    In 3 weeks Watt Kussmaul, [address] in [employer]    In 4 weeks Watt Kussmaul, [address] in [address] [facility] Medicare A&B/BCBS Supp    In 1 month Watt Kussmaul, [address] in [address] [facility] Medicare A&B/BCBS Supp    In 1 month Watt Kussmaul, [address] in [employer]    In 1 month Cindy James MD [address] [place] [address] Return in about 6 weeks (around 9/26/2023).     In 2 months Apn, Structural Heart Julio Structural Heart and [place] 1 YEAR TAVR-[facility] ECHO 1330                    patek

## 2023-08-16 NOTE — TELEPHONE ENCOUNTER
Please review. Protocol failed/ No protocol      Requested Prescriptions   Pending Prescriptions Disp Refills    Potassium Chloride ER 20 MEQ Oral Tab CR 60 tablet 0     Sig: Take 20 mEq by mouth in the morning and 20 mEq before bedtime. There is no refill protocol information for this order          Future Appointments         Provider Department Appt Notes    In 1 week Bryant Esparza MD [address] [facility], [place] physical    In 1 week [address] [place] Medicare [employer]B/[employer] Supp    In 2 weeks Sangita Arceo, PT [facility] in [address] [place] Medicare A&B/[employer] Supp    In 3 weeks Sangita Arceo, [address] in [address] [place] Medicare A[employer]B/[employer] Supp    In 4 weeks Sangita Arceo, PT [facility] in [address] [place] Medicare A&B/[employer] Supp    In 1 month Sangita Arceo, PT [facility] in [address] [place] Medicare A&B/[employer] Supp    In 1 month Sangita Arceo, PT [facility] in [address] [place] Medicare A&B/[employer] Supp    In 1 month Jaspreet Morales MD [address] [facility], [place] Return in about 6 weeks (around 9/26/2023).     In 2 months Apn, [address] 1 YEAR TAVR-[facility]CI ECHO 1330             Recent Outpatient Visits              Yesterday Lumbar radiculopathy    [address] [facility], Taniya Friend MD    Office Visit    3 weeks ago Varicose veins    [address] [facility], Winston Mayer MD    Office Visit    1 month ago Epigastric pain    Ellie Bray, Ronna Mills MD    Office Visit    2 months ago Rheumatoid arthritis, involving unspecified site, unspecified whether rheumatoid factor present [facility])    [address], [facility], Cecilia Levine MD    Office Visit    4 months ago Rheumatoid arthritis, involving unspecified site, unspecified whether rheumatoid factor present [facility])    [place] [email or web address], [facility], Arben Rodriguez MD    Office Visit

## 2023-08-23 ENCOUNTER — MED REC SCAN ONLY (OUTPATIENT)
Dept: INTERNAL MEDICINE CLINIC | Facility: CLINIC | Age: 78
End: 2023-08-23

## 2023-08-24 ENCOUNTER — LAB ENCOUNTER (OUTPATIENT)
Dept: LAB | Age: 78
End: 2023-08-24
Attending: INTERNAL MEDICINE
Payer: MEDICARE

## 2023-08-24 ENCOUNTER — OFFICE VISIT (OUTPATIENT)
Dept: INTERNAL MEDICINE CLINIC | Facility: CLINIC | Age: 78
End: 2023-08-24

## 2023-08-24 VITALS
SYSTOLIC BLOOD PRESSURE: 152 MMHG | HEART RATE: 102 BPM | BODY MASS INDEX: 23.73 KG/M2 | DIASTOLIC BLOOD PRESSURE: 78 MMHG | HEIGHT: 64 IN | WEIGHT: 139 LBS

## 2023-08-24 DIAGNOSIS — K21.9 GASTROESOPHAGEAL REFLUX DISEASE WITHOUT ESOPHAGITIS: ICD-10-CM

## 2023-08-24 DIAGNOSIS — J84.10 LUNG GRANULOMA (HCC): ICD-10-CM

## 2023-08-24 DIAGNOSIS — N18.31 STAGE 3A CHRONIC KIDNEY DISEASE (HCC): ICD-10-CM

## 2023-08-24 DIAGNOSIS — I25.10 CORONARY ARTERY DISEASE INVOLVING NATIVE CORONARY ARTERY OF NATIVE HEART WITHOUT ANGINA PECTORIS: ICD-10-CM

## 2023-08-24 DIAGNOSIS — M81.0 POSTMENOPAUSAL OSTEOPOROSIS: ICD-10-CM

## 2023-08-24 DIAGNOSIS — F32.A ANXIETY AND DEPRESSION: ICD-10-CM

## 2023-08-24 DIAGNOSIS — Z00.00 ENCOUNTER FOR MEDICARE ANNUAL WELLNESS EXAM: Primary | ICD-10-CM

## 2023-08-24 DIAGNOSIS — D69.2 SENILE PURPURA (HCC): ICD-10-CM

## 2023-08-24 DIAGNOSIS — Z23 IMMUNIZATION DUE: ICD-10-CM

## 2023-08-24 DIAGNOSIS — I48.0 PAROXYSMAL ATRIAL FIBRILLATION (HCC): ICD-10-CM

## 2023-08-24 DIAGNOSIS — F41.9 ANXIETY AND DEPRESSION: ICD-10-CM

## 2023-08-24 DIAGNOSIS — R09.89 BILATERAL CAROTID BRUITS: ICD-10-CM

## 2023-08-24 DIAGNOSIS — I83.813 VARICOSE VEINS OF BOTH LOWER EXTREMITIES WITH PAIN: ICD-10-CM

## 2023-08-24 DIAGNOSIS — Z00.00 ENCOUNTER FOR MEDICARE ANNUAL WELLNESS EXAM: ICD-10-CM

## 2023-08-24 DIAGNOSIS — R41.3 POOR SHORT TERM MEMORY: ICD-10-CM

## 2023-08-24 DIAGNOSIS — M05.771 RHEUMATOID ARTHRITIS INVOLVING BOTH ANKLES WITH POSITIVE RHEUMATOID FACTOR (HCC): ICD-10-CM

## 2023-08-24 DIAGNOSIS — E55.9 VITAMIN D DEFICIENCY: ICD-10-CM

## 2023-08-24 DIAGNOSIS — R60.0 BILATERAL LOWER EXTREMITY EDEMA: ICD-10-CM

## 2023-08-24 DIAGNOSIS — M79.89 LEG SWELLING: ICD-10-CM

## 2023-08-24 DIAGNOSIS — I50.32 CHRONIC DIASTOLIC CONGESTIVE HEART FAILURE (HCC): ICD-10-CM

## 2023-08-24 DIAGNOSIS — E78.2 MIXED HYPERLIPIDEMIA: ICD-10-CM

## 2023-08-24 DIAGNOSIS — K43.2 INCISIONAL HERNIA, WITHOUT OBSTRUCTION OR GANGRENE: ICD-10-CM

## 2023-08-24 DIAGNOSIS — H90.3 SENSORINEURAL HEARING LOSS, BILATERAL: ICD-10-CM

## 2023-08-24 DIAGNOSIS — I35.0 MODERATE AORTIC STENOSIS BY PRIOR ECHOCARDIOGRAM: ICD-10-CM

## 2023-08-24 DIAGNOSIS — Z95.2 S/P AORTIC VALVE REPLACEMENT: ICD-10-CM

## 2023-08-24 DIAGNOSIS — N95.2 POSTMENOPAUSAL ATROPHIC VAGINITIS: ICD-10-CM

## 2023-08-24 DIAGNOSIS — D70.9 NEUTROPENIA, UNSPECIFIED TYPE (HCC): ICD-10-CM

## 2023-08-24 DIAGNOSIS — Z95.5 S/P DRUG ELUTING CORONARY STENT PLACEMENT: ICD-10-CM

## 2023-08-24 DIAGNOSIS — I10 HYPERTENSION GOAL BP (BLOOD PRESSURE) < 130/80: ICD-10-CM

## 2023-08-24 DIAGNOSIS — M05.772 RHEUMATOID ARTHRITIS INVOLVING BOTH ANKLES WITH POSITIVE RHEUMATOID FACTOR (HCC): ICD-10-CM

## 2023-08-24 PROBLEM — I95.9 HYPOTENSION, UNSPECIFIED HYPOTENSION TYPE: Status: RESOLVED | Noted: 2023-01-06 | Resolved: 2023-08-24

## 2023-08-24 PROBLEM — R42 DIZZINESS AND GIDDINESS: Status: RESOLVED | Noted: 2023-06-27 | Resolved: 2023-08-24

## 2023-08-24 PROBLEM — G89.29 CHRONIC RIGHT SHOULDER PAIN: Status: RESOLVED | Noted: 2020-11-25 | Resolved: 2023-08-24

## 2023-08-24 PROBLEM — M54.2 NECK PAIN: Status: RESOLVED | Noted: 2022-03-29 | Resolved: 2023-08-24

## 2023-08-24 PROBLEM — M06.9 RHEUMATOID ARTHRITIS (HCC): Status: RESOLVED | Noted: 2021-06-24 | Resolved: 2023-08-24

## 2023-08-24 PROBLEM — H91.90 HEARING LOSS: Status: RESOLVED | Noted: 2020-11-25 | Resolved: 2023-08-24

## 2023-08-24 PROBLEM — R53.1 WEAKNESS GENERALIZED: Status: RESOLVED | Noted: 2023-01-06 | Resolved: 2023-08-24

## 2023-08-24 PROBLEM — J30.1 SEASONAL ALLERGIC RHINITIS DUE TO POLLEN: Status: RESOLVED | Noted: 2021-10-05 | Resolved: 2023-08-24

## 2023-08-24 PROBLEM — M25.511 CHRONIC RIGHT SHOULDER PAIN: Status: RESOLVED | Noted: 2020-11-25 | Resolved: 2023-08-24

## 2023-08-24 LAB
ALBUMIN SERPL-MCNC: 3.7 G/DL (ref 3.4–5)
ALBUMIN/GLOB SERPL: 1.2 {RATIO} (ref 1–2)
ALP LIVER SERPL-CCNC: 65 U/L
ALT SERPL-CCNC: 22 U/L
ANION GAP SERPL CALC-SCNC: 4 MMOL/L (ref 0–18)
AST SERPL-CCNC: 17 U/L (ref 15–37)
BILIRUB SERPL-MCNC: 0.6 MG/DL (ref 0.1–2)
BILIRUB UR QL STRIP.AUTO: NEGATIVE
BUN BLD-MCNC: 38 MG/DL (ref 7–18)
CALCIUM BLD-MCNC: 9.2 MG/DL (ref 8.5–10.1)
CHLORIDE SERPL-SCNC: 105 MMOL/L (ref 98–112)
CHOLEST SERPL-MCNC: 219 MG/DL (ref ?–200)
CLARITY UR REFRACT.AUTO: CLEAR
CO2 SERPL-SCNC: 27 MMOL/L (ref 21–32)
COLOR UR AUTO: COLORLESS
CREAT BLD-MCNC: 1.73 MG/DL
EGFRCR SERPLBLD CKD-EPI 2021: 30 ML/MIN/1.73M2 (ref 60–?)
ERYTHROCYTE [DISTWIDTH] IN BLOOD BY AUTOMATED COUNT: 14.5 %
EST. AVERAGE GLUCOSE BLD GHB EST-MCNC: 140 MG/DL (ref 68–126)
FASTING PATIENT LIPID ANSWER: NO
FASTING STATUS PATIENT QL REPORTED: NO
GLOBULIN PLAS-MCNC: 3.1 G/DL (ref 2.8–4.4)
GLUCOSE BLD-MCNC: 131 MG/DL (ref 70–99)
GLUCOSE UR STRIP.AUTO-MCNC: NORMAL MG/DL
HBA1C MFR BLD: 6.5 % (ref ?–5.7)
HCT VFR BLD AUTO: 29 %
HDLC SERPL-MCNC: 61 MG/DL (ref 40–59)
HGB BLD-MCNC: 9.3 G/DL
KETONES UR STRIP.AUTO-MCNC: NEGATIVE MG/DL
LDLC SERPL CALC-MCNC: 125 MG/DL (ref ?–100)
LEUKOCYTE ESTERASE UR QL STRIP.AUTO: NEGATIVE
MCH RBC QN AUTO: 27.9 PG (ref 26–34)
MCHC RBC AUTO-ENTMCNC: 32.1 G/DL (ref 31–37)
MCV RBC AUTO: 87.1 FL
NITRITE UR QL STRIP.AUTO: NEGATIVE
NONHDLC SERPL-MCNC: 158 MG/DL (ref ?–130)
OSMOLALITY SERPL CALC.SUM OF ELEC: 293 MOSM/KG (ref 275–295)
PH UR STRIP.AUTO: 6 [PH] (ref 5–8)
PLATELET # BLD AUTO: 177 10(3)UL (ref 150–450)
POTASSIUM SERPL-SCNC: 4.7 MMOL/L (ref 3.5–5.1)
PROT SERPL-MCNC: 6.8 G/DL (ref 6.4–8.2)
PROT UR STRIP.AUTO-MCNC: NEGATIVE MG/DL
RBC # BLD AUTO: 3.33 X10(6)UL
RBC UR QL AUTO: NEGATIVE
SODIUM SERPL-SCNC: 136 MMOL/L (ref 136–145)
SP GR UR STRIP.AUTO: 1.01 (ref 1–1.03)
TRIGL SERPL-MCNC: 186 MG/DL (ref 30–149)
TSI SER-ACNC: 0.47 MIU/ML (ref 0.36–3.74)
UROBILINOGEN UR STRIP.AUTO-MCNC: NORMAL MG/DL
VIT D+METAB SERPL-MCNC: 48.9 NG/ML (ref 30–100)
VLDLC SERPL CALC-MCNC: 33 MG/DL (ref 0–30)
WBC # BLD AUTO: 8.5 X10(3) UL (ref 4–11)

## 2023-08-24 PROCEDURE — 83036 HEMOGLOBIN GLYCOSYLATED A1C: CPT | Performed by: INTERNAL MEDICINE

## 2023-08-24 PROCEDURE — 85027 COMPLETE CBC AUTOMATED: CPT | Performed by: INTERNAL MEDICINE

## 2023-08-24 PROCEDURE — 36415 COLL VENOUS BLD VENIPUNCTURE: CPT | Performed by: INTERNAL MEDICINE

## 2023-08-24 PROCEDURE — 80061 LIPID PANEL: CPT | Performed by: INTERNAL MEDICINE

## 2023-08-24 PROCEDURE — 81003 URINALYSIS AUTO W/O SCOPE: CPT

## 2023-08-24 PROCEDURE — 82306 VITAMIN D 25 HYDROXY: CPT | Performed by: INTERNAL MEDICINE

## 2023-08-24 PROCEDURE — 80053 COMPREHEN METABOLIC PANEL: CPT | Performed by: INTERNAL MEDICINE

## 2023-08-24 PROCEDURE — 84443 ASSAY THYROID STIM HORMONE: CPT | Performed by: INTERNAL MEDICINE

## 2023-08-24 RX ORDER — LISINOPRIL 20 MG/1
20 TABLET ORAL DAILY
Qty: 90 TABLET | Refills: 9 | Status: SHIPPED | OUTPATIENT
Start: 2023-08-24

## 2023-08-24 RX ORDER — ZOSTER VACCINE RECOMBINANT, ADJUVANTED 50 MCG/0.5
50 KIT INTRAMUSCULAR ONCE
Qty: 2 EACH | Refills: 0 | Status: SHIPPED | OUTPATIENT
Start: 2023-08-24 | End: 2023-08-24

## 2023-08-24 RX ORDER — BUMETANIDE 1 MG/1
1 TABLET ORAL DAILY
Qty: 90 TABLET | Refills: 9 | Status: SHIPPED | OUTPATIENT
Start: 2023-08-24

## 2023-08-24 RX ORDER — ASPIRIN 81 MG/1
81 TABLET ORAL DAILY
Qty: 90 TABLET | Refills: 9 | Status: SHIPPED | OUTPATIENT
Start: 2023-08-24

## 2023-08-24 RX ORDER — ROSUVASTATIN CALCIUM 10 MG/1
10 TABLET, COATED ORAL NIGHTLY
Qty: 90 TABLET | Refills: 9 | Status: SHIPPED | OUTPATIENT
Start: 2023-08-24

## 2023-08-26 ENCOUNTER — PATIENT MESSAGE (OUTPATIENT)
Dept: INTERNAL MEDICINE CLINIC | Facility: CLINIC | Age: 78
End: 2023-08-26

## 2023-08-26 DIAGNOSIS — K43.2 INCISIONAL HERNIA, WITHOUT OBSTRUCTION OR GANGRENE: Primary | ICD-10-CM

## 2023-08-28 NOTE — TELEPHONE ENCOUNTER
From: Milady Santana  To: Alissa Cam MD  Sent: 8/26/2023 7:48 PM CDT  Subject: Right side groin pain    Can Dr Flo Latham request my moms appointment be ordered on a rush basis? The soonest available appointment is September 28th and she is in a lot of pain.

## 2023-08-28 NOTE — TELEPHONE ENCOUNTER
If she is in pain needs to go to ER, painful hernia=could be incarcerated which is a medical emergency. If \"pain\" is mostly discomfort that comes and goes then we can do imaging. I will order ultrasound as stat. Please assist with scheduling, thanks.

## 2023-08-28 NOTE — TELEPHONE ENCOUNTER
Spoke to [place], daughter,  (on LUCIANO, verified patient's name and )  and relayed Dr. Ayesha Carvalho message below. [place] verbalized understanding and had no further questions. She said her mom complains of \"burning\" when getting up and down. She will call scheduling. Explained that if symptoms severe or worsening, she should be seen in ER.

## 2023-08-28 NOTE — TELEPHONE ENCOUNTER
Recorded ECG: HR=84 Condition=Condition 1 Test is ordered for     24. Incisional hernia, without obstruction or gangrene  -     US GROIN RIGHT LIMITED SH(CPT=    Daughter reporting groin pain and would like to know if test can be changed to STAT?      Future Appointments   Date Time Provider César Estrada   8/29/2023  7:45 AM Laura Yennifer, PT ADO [facility]   8/31/2023  8:30 AM Laura Yennifer, PT [facility]   9/11/2023  1:45 PM Laura Yennifer, PT [facility]   9/13/2023  1:00 PM Laura Yennifer, PT [facility]   9/19/2023  1:00 PM Laura Yennifer, PT [facility]   9/26/2023  1:00 PM Laura Yennifer, PT ADO [facility]   9/28/2023  8:30 AM [address] [place], [place] [id number] [facility] [address]   10/3/2023  2:00 PM Ramon Ramos MD PM&R ADO  [facility]   11/7/2023 12:45 PM Apricardo, Structural Heart [facility]

## 2023-08-29 ENCOUNTER — HOSPITAL ENCOUNTER (EMERGENCY)
Facility: HOSPITAL | Age: 78
Discharge: HOME | End: 2023-08-29
Attending: EMERGENCY MEDICINE
Payer: MEDICARE

## 2023-08-29 ENCOUNTER — OFFICE VISIT (OUTPATIENT)
Dept: PHYSICAL THERAPY | Age: 78
End: 2023-08-29
Attending: INTERNAL MEDICINE
Payer: MEDICARE

## 2023-08-29 ENCOUNTER — APPOINTMENT (OUTPATIENT)
Dept: CT IMAGING | Facility: HOSPITAL | Age: 78
End: 2023-08-29
Attending: EMERGENCY MEDICINE
Payer: MEDICARE

## 2023-08-29 VITALS
BODY MASS INDEX: 24 KG/M2 | SYSTOLIC BLOOD PRESSURE: 134 MMHG | WEIGHT: 140 LBS | TEMPERATURE: 97 F | DIASTOLIC BLOOD PRESSURE: 85 MMHG | RESPIRATION RATE: 16 BRPM | OXYGEN SATURATION: 97 % | HEART RATE: 80 BPM

## 2023-08-29 DIAGNOSIS — M48.061 NEURAL FORAMINAL STENOSIS OF LUMBAR SPINE: Primary | ICD-10-CM

## 2023-08-29 DIAGNOSIS — K40.90 RIGHT INGUINAL HERNIA: Primary | ICD-10-CM

## 2023-08-29 DIAGNOSIS — R29.818 NEUROGENIC CLAUDICATION: ICD-10-CM

## 2023-08-29 LAB
ALBUMIN SERPL-MCNC: 3.1 G/DL (ref 3.4–5)
ALP LIVER SERPL-CCNC: 65 U/L
ALT SERPL-CCNC: 15 U/L
ANION GAP SERPL CALC-SCNC: 9 MMOL/L (ref 0–18)
AST SERPL-CCNC: 17 U/L (ref 15–37)
BASOPHILS # BLD AUTO: 0.01 X10(3) UL (ref 0–0.2)
BASOPHILS NFR BLD AUTO: 0.1 %
BILIRUB DIRECT SERPL-MCNC: <0.1 MG/DL (ref 0–0.2)
BILIRUB SERPL-MCNC: 0.5 MG/DL (ref 0.1–2)
BILIRUB UR QL: NEGATIVE
BUN BLD-MCNC: 34 MG/DL (ref 7–18)
BUN/CREAT SERPL: 18.7 (ref 10–20)
CALCIUM BLD-MCNC: 8.3 MG/DL (ref 8.5–10.1)
CHLORIDE SERPL-SCNC: 104 MMOL/L (ref 98–112)
CLARITY UR: CLEAR
CO2 SERPL-SCNC: 23 MMOL/L (ref 21–32)
COLOR UR: COLORLESS
CREAT BLD-MCNC: 1.82 MG/DL
DEPRECATED RDW RBC AUTO: 46.3 FL (ref 35.1–46.3)
EGFRCR SERPLBLD CKD-EPI 2021: 28 ML/MIN/1.73M2 (ref 60–?)
EOSINOPHIL # BLD AUTO: 0.04 X10(3) UL (ref 0–0.7)
EOSINOPHIL NFR BLD AUTO: 0.6 %
ERYTHROCYTE [DISTWIDTH] IN BLOOD BY AUTOMATED COUNT: 14.7 % (ref 11–15)
GLUCOSE BLD-MCNC: 129 MG/DL (ref 70–99)
GLUCOSE UR-MCNC: NORMAL MG/DL
HCT VFR BLD AUTO: 26.9 %
HGB BLD-MCNC: 8.8 G/DL
HGB UR QL STRIP.AUTO: NEGATIVE
IMM GRANULOCYTES # BLD AUTO: 0.01 X10(3) UL (ref 0–1)
IMM GRANULOCYTES NFR BLD: 0.1 %
KETONES UR-MCNC: NEGATIVE MG/DL
LEUKOCYTE ESTERASE UR QL STRIP.AUTO: NEGATIVE
LYMPHOCYTES # BLD AUTO: 1.26 X10(3) UL (ref 1–4)
LYMPHOCYTES NFR BLD AUTO: 18.5 %
MCH RBC QN AUTO: 28.2 PG (ref 26–34)
MCHC RBC AUTO-ENTMCNC: 32.7 G/DL (ref 31–37)
MCV RBC AUTO: 86.2 FL
MONOCYTES # BLD AUTO: 0.46 X10(3) UL (ref 0.1–1)
MONOCYTES NFR BLD AUTO: 6.8 %
NEUTROPHILS # BLD AUTO: 5.02 X10 (3) UL (ref 1.5–7.7)
NEUTROPHILS # BLD AUTO: 5.02 X10(3) UL (ref 1.5–7.7)
NEUTROPHILS NFR BLD AUTO: 73.9 %
NITRITE UR QL STRIP.AUTO: NEGATIVE
OSMOLALITY SERPL CALC.SUM OF ELEC: 291 MOSM/KG (ref 275–295)
PH UR: 5 [PH] (ref 5–8)
PLATELET # BLD AUTO: 163 10(3)UL (ref 150–450)
POTASSIUM SERPL-SCNC: 4.4 MMOL/L (ref 3.5–5.1)
PROT SERPL-MCNC: 6.3 G/DL (ref 6.4–8.2)
PROT UR-MCNC: NEGATIVE MG/DL
RBC # BLD AUTO: 3.12 X10(6)UL
SODIUM SERPL-SCNC: 136 MMOL/L (ref 136–145)
SP GR UR STRIP: 1 (ref 1–1.03)
UROBILINOGEN UR STRIP-ACNC: NORMAL
WBC # BLD AUTO: 6.8 X10(3) UL (ref 4–11)

## 2023-08-29 PROCEDURE — 80048 BASIC METABOLIC PNL TOTAL CA: CPT | Performed by: EMERGENCY MEDICINE

## 2023-08-29 PROCEDURE — 99285 EMERGENCY DEPT VISIT HI MDM: CPT

## 2023-08-29 PROCEDURE — 81003 URINALYSIS AUTO W/O SCOPE: CPT | Performed by: EMERGENCY MEDICINE

## 2023-08-29 PROCEDURE — 36415 COLL VENOUS BLD VENIPUNCTURE: CPT

## 2023-08-29 PROCEDURE — 80076 HEPATIC FUNCTION PANEL: CPT | Performed by: EMERGENCY MEDICINE

## 2023-08-29 PROCEDURE — 97110 THERAPEUTIC EXERCISES: CPT

## 2023-08-29 PROCEDURE — 99284 EMERGENCY DEPT VISIT MOD MDM: CPT

## 2023-08-29 PROCEDURE — 74176 CT ABD & PELVIS W/O CONTRAST: CPT | Performed by: EMERGENCY MEDICINE

## 2023-08-29 PROCEDURE — 85025 COMPLETE CBC W/AUTO DIFF WBC: CPT | Performed by: EMERGENCY MEDICINE

## 2023-08-29 PROCEDURE — 97161 PT EVAL LOW COMPLEX 20 MIN: CPT

## 2023-08-29 RX ORDER — TRAMADOL HYDROCHLORIDE 50 MG/1
TABLET ORAL EVERY 6 HOURS PRN
Qty: 10 TABLET | Refills: 0 | Status: SHIPPED | OUTPATIENT
Start: 2023-08-29 | End: 2023-09-03

## 2023-08-29 RX ORDER — TRAMADOL HYDROCHLORIDE 50 MG/1
50 TABLET ORAL ONCE
Status: COMPLETED | OUTPATIENT
Start: 2023-08-29 | End: 2023-08-29

## 2023-08-29 NOTE — ED INITIAL ASSESSMENT (HPI)
Patient with c/o right groin pain. Had cardiac stents placed last year, per their PCP Strange the pain could be coming from the scar tissue from the stent placement. Patient states the area is red and it burns. Denies any dysuria/fevers.

## 2023-08-31 ENCOUNTER — TELEPHONE (OUTPATIENT)
Dept: INTERNAL MEDICINE CLINIC | Facility: CLINIC | Age: 78
End: 2023-08-31

## 2023-08-31 ENCOUNTER — APPOINTMENT (OUTPATIENT)
Dept: PHYSICAL THERAPY | Age: 78
End: 2023-08-31
Attending: INTERNAL MEDICINE
Payer: MEDICARE

## 2023-09-05 ENCOUNTER — PATIENT OUTREACH (OUTPATIENT)
Dept: CASE MANAGEMENT | Age: 78
End: 2023-09-05

## 2023-09-05 DIAGNOSIS — Z79.02 ANTIPLATELET OR ANTITHROMBOTIC LONG-TERM USE: ICD-10-CM

## 2023-09-05 DIAGNOSIS — K21.00 GASTROESOPHAGEAL REFLUX DISEASE WITH ESOPHAGITIS WITHOUT HEMORRHAGE: ICD-10-CM

## 2023-09-05 DIAGNOSIS — M62.830 SPASM OF MUSCLE OF LOWER BACK: ICD-10-CM

## 2023-09-05 DIAGNOSIS — F41.9 ANXIETY AND DEPRESSION: ICD-10-CM

## 2023-09-05 DIAGNOSIS — E87.6 HYPOKALEMIA: ICD-10-CM

## 2023-09-05 DIAGNOSIS — F32.A ANXIETY AND DEPRESSION: ICD-10-CM

## 2023-09-05 RX ORDER — CYCLOBENZAPRINE HCL 5 MG
TABLET ORAL NIGHTLY PRN
Qty: 30 TABLET | Refills: 1 | Status: CANCELLED | OUTPATIENT
Start: 2023-09-05

## 2023-09-05 RX ORDER — PANTOPRAZOLE SODIUM 20 MG/1
20 TABLET, DELAYED RELEASE ORAL
Qty: 90 TABLET | Refills: 3 | Status: CANCELLED | OUTPATIENT
Start: 2023-09-05

## 2023-09-05 RX ORDER — FOLIC ACID 1 MG/1
1 TABLET ORAL DAILY
Qty: 90 TABLET | Refills: 3 | Status: CANCELLED | OUTPATIENT
Start: 2023-09-05

## 2023-09-05 RX ORDER — POTASSIUM CHLORIDE 1500 MG/1
20 TABLET, EXTENDED RELEASE ORAL 2 TIMES DAILY
Qty: 60 TABLET | Refills: 1 | Status: CANCELLED | OUTPATIENT
Start: 2023-09-05

## 2023-09-06 ENCOUNTER — PATIENT MESSAGE (OUTPATIENT)
Dept: PHYSICAL MEDICINE AND REHAB | Facility: CLINIC | Age: 78
End: 2023-09-06

## 2023-09-06 RX ORDER — LEFLUNOMIDE 10 MG/1
10 TABLET ORAL DAILY
Qty: 30 TABLET | Refills: 3 | OUTPATIENT
Start: 2023-09-06

## 2023-09-06 RX ORDER — PREDNISONE 5 MG/1
15 TABLET ORAL DAILY
Qty: 90 TABLET | Refills: 3 | OUTPATIENT
Start: 2023-09-06

## 2023-09-06 RX ORDER — GABAPENTIN 100 MG/1
100 CAPSULE ORAL NIGHTLY
Qty: 30 CAPSULE | Refills: 2 | OUTPATIENT
Start: 2023-09-06

## 2023-09-06 NOTE — TELEPHONE ENCOUNTER
From: Amon Crigler  To: Briseida Moreno MD  Sent: 9/6/2023 10:33 AM CDT  Subject: Gabapentin    Please approve refill for Gabapentin. Per her last visit with you, you increased her dosage from once a day to three times a day.  Thank you

## 2023-09-06 NOTE — PROGRESS NOTES
2nd attempt ED f/up apt request  [facility] SUR[facility] -existing apt (9/19)  PCP-decline, pt dtr wants to wait until after surgery to schedule  Closing encounter

## 2023-09-07 NOTE — TELEPHONE ENCOUNTER
Take over prescribing the gabapentin if she would like. Padmini January.  Sachin Hernandez MD, [facility]  Physical Medicine and Rehabilitation/Sports Medicine  [facility]

## 2023-09-08 RX ORDER — GABAPENTIN 100 MG/1
100 CAPSULE ORAL 3 TIMES DAILY
Qty: 90 CAPSULE | Refills: 2 | Status: SHIPPED | OUTPATIENT
Start: 2023-09-08

## 2023-09-08 NOTE — TELEPHONE ENCOUNTER
Refill Request    Medication request:   gabapentin 100 MG Oral Cap       LOV: 8/15/2023 Dafne Arrieta MD   RTC: 6-8 weeks  NOV: 10/3/2023 Dafne Arrieta MD      [place]: 8/15/2023 #30 days    UDS: (if applicable): N/A  Pain contract: N/A    LOV plan (if weaning or changing medications): Change gabapentin to 100 mg three times per day (every 8 hours)

## 2023-09-11 ENCOUNTER — APPOINTMENT (OUTPATIENT)
Dept: PHYSICAL THERAPY | Age: 78
End: 2023-09-11
Attending: INTERNAL MEDICINE
Payer: MEDICARE

## 2023-09-13 ENCOUNTER — APPOINTMENT (OUTPATIENT)
Dept: PHYSICAL THERAPY | Age: 78
End: 2023-09-13
Attending: INTERNAL MEDICINE
Payer: MEDICARE

## 2023-09-14 ENCOUNTER — MED REC SCAN ONLY (OUTPATIENT)
Dept: INTERNAL MEDICINE CLINIC | Facility: CLINIC | Age: 78
End: 2023-09-14

## 2023-09-19 ENCOUNTER — APPOINTMENT (OUTPATIENT)
Dept: PHYSICAL THERAPY | Age: 78
End: 2023-09-19
Attending: INTERNAL MEDICINE
Payer: MEDICARE

## 2023-09-21 DIAGNOSIS — Z79.02 ANTIPLATELET OR ANTITHROMBOTIC LONG-TERM USE: ICD-10-CM

## 2023-09-21 DIAGNOSIS — K21.00 GASTROESOPHAGEAL REFLUX DISEASE WITH ESOPHAGITIS WITHOUT HEMORRHAGE: ICD-10-CM

## 2023-09-22 RX ORDER — PANTOPRAZOLE SODIUM 20 MG/1
20 TABLET, DELAYED RELEASE ORAL
Qty: 90 TABLET | Refills: 3 | OUTPATIENT
Start: 2023-09-22

## 2023-09-26 ENCOUNTER — APPOINTMENT (OUTPATIENT)
Dept: PHYSICAL THERAPY | Age: 78
End: 2023-09-26
Attending: INTERNAL MEDICINE
Payer: MEDICARE

## 2023-10-03 ENCOUNTER — OFFICE VISIT (OUTPATIENT)
Dept: PHYSICAL MEDICINE AND REHAB | Facility: CLINIC | Age: 78
End: 2023-10-03
Payer: MEDICARE

## 2023-10-03 VITALS — BODY MASS INDEX: 23.9 KG/M2 | OXYGEN SATURATION: 98 % | HEIGHT: 64 IN | WEIGHT: 140 LBS | HEART RATE: 95 BPM

## 2023-10-03 DIAGNOSIS — M54.16 LUMBAR RADICULOPATHY: Primary | ICD-10-CM

## 2023-10-03 DIAGNOSIS — S76.019A STRAIN OF GLUTEUS MEDIUS, UNSPECIFIED LATERALITY, INITIAL ENCOUNTER: ICD-10-CM

## 2023-10-03 DIAGNOSIS — M54.59 MECHANICAL LOW BACK PAIN: ICD-10-CM

## 2023-10-03 DIAGNOSIS — M25.551 PAIN OF RIGHT HIP: ICD-10-CM

## 2023-10-03 DIAGNOSIS — Z95.5 S/P DRUG ELUTING CORONARY STENT PLACEMENT: ICD-10-CM

## 2023-10-03 DIAGNOSIS — M48.061 LUMBAR FORAMINAL STENOSIS: ICD-10-CM

## 2023-10-03 DIAGNOSIS — M47.816 LUMBAR SPONDYLOSIS: ICD-10-CM

## 2023-10-03 DIAGNOSIS — M79.10 MYALGIA: ICD-10-CM

## 2023-10-03 DIAGNOSIS — M06.9 RHEUMATOID ARTHRITIS, INVOLVING UNSPECIFIED SITE, UNSPECIFIED WHETHER RHEUMATOID FACTOR PRESENT (HCC): ICD-10-CM

## 2023-10-03 DIAGNOSIS — I25.10 CORONARY ARTERY DISEASE INVOLVING NATIVE CORONARY ARTERY OF NATIVE HEART WITHOUT ANGINA PECTORIS: ICD-10-CM

## 2023-10-03 DIAGNOSIS — M47.816 FACET SYNDROME, LUMBAR: ICD-10-CM

## 2023-10-03 DIAGNOSIS — M99.9 BIOMECHANICAL LESION: ICD-10-CM

## 2023-10-03 DIAGNOSIS — M50.30 DDD (DEGENERATIVE DISC DISEASE), CERVICAL: ICD-10-CM

## 2023-10-03 DIAGNOSIS — I10 ESSENTIAL HYPERTENSION: ICD-10-CM

## 2023-10-03 DIAGNOSIS — M51.37 DDD (DEGENERATIVE DISC DISEASE), LUMBOSACRAL: ICD-10-CM

## 2023-10-03 DIAGNOSIS — M51.36 BULGE OF LUMBAR DISC WITHOUT MYELOPATHY: ICD-10-CM

## 2023-10-03 DIAGNOSIS — M51.16 LUMBAR DISC HERNIATION WITH RADICULOPATHY: ICD-10-CM

## 2023-10-03 PROCEDURE — 1125F AMNT PAIN NOTED PAIN PRSNT: CPT | Performed by: PHYSICAL MEDICINE & REHABILITATION

## 2023-10-03 PROCEDURE — 99214 OFFICE O/P EST MOD 30 MIN: CPT | Performed by: PHYSICAL MEDICINE & REHABILITATION

## 2023-10-03 NOTE — PATIENT INSTRUCTIONS
1) Follow up with Dr. Ely kelley discuss the right hip/groin hernia. If he does not feel it needs further workup, then we can perform a right hip CSI under ultrasound guidance  2) Begin formal physical therapy as soon as possible to work on core and pelvic strengthening/hamstring strengthen  3) Ok to continue chiropractics  4) Tylenol 500-1000 mg every 6-8 hours as needed for pain. No more than 3000 mg daily. 5)  Ice 20 minutes at a time 3-4 times per day  6) We can consider a LEFt L4 and LEFt L5 TFESI if physical therapy is not helpful  7) Follow up with me in about 6 weeks.

## 2023-10-06 DIAGNOSIS — M62.830 SPASM OF MUSCLE OF LOWER BACK: ICD-10-CM

## 2023-10-06 DIAGNOSIS — M81.0 AGE-RELATED OSTEOPOROSIS WITHOUT CURRENT PATHOLOGICAL FRACTURE: ICD-10-CM

## 2023-10-06 RX ORDER — LEFLUNOMIDE 10 MG/1
10 TABLET ORAL DAILY
Qty: 30 TABLET | Refills: 3 | Status: SHIPPED | OUTPATIENT
Start: 2023-10-06

## 2023-10-06 RX ORDER — GABAPENTIN 100 MG/1
100 CAPSULE ORAL 3 TIMES DAILY
Qty: 90 CAPSULE | Refills: 2 | Status: SHIPPED | OUTPATIENT
Start: 2023-10-06

## 2023-10-06 NOTE — TELEPHONE ENCOUNTER
Refill Request    Medication request: gabapentin 100 MG Oral Cap. Take 1 capsule (100 mg total) by mouth 3 (three) times daily. LOV:10/3/2023 Luann French MD   Due back to clinic per last office note:   Follow up with me in about 6 weeks   NOV: Visit date not found      [place]: 09/06/2023 #30    Urine drug screen (if applicable): n/a  Pain contract: n/a    LOV plan (if weaning or changing medications): Change gabapentin to 100 mg three times per day (every 8 hours)

## 2023-10-06 NOTE — TELEPHONE ENCOUNTER
LOV: 6/8/23  Last Refilled:#30, 3rfs 8/15/23  Labs:AST 17  ALT 15 8/29/23    Future Appointments   Date Time Provider César Mercadoi   10/10/2023  2:00 PM Bon Mann MD [facility] ADO   11/7/2023 12:45 PM Apn, Structural Heart [facility]     Summary:  1.cont. prednisoen 5mg a day - can taper to 2.5mg a day if feels shaking. 2 .cont. leflunomide 10mg a day   3. Return to clinic in 2 months. .   4. Check labs today   5. Try gabapnetin 100mg at night   6. Could consider adding hydroxcyhrlqouine 200mg a day - in the future      - the plan would be to increase the leflunomide if her kidney function improves         Allan Melgoza MD  6/8/2023   10:48 AM  Please advise.

## 2023-10-08 RX ORDER — CYCLOBENZAPRINE HCL 5 MG
TABLET ORAL NIGHTLY PRN
Qty: 30 TABLET | Refills: 1 | Status: SHIPPED | OUTPATIENT
Start: 2023-10-08

## 2023-10-08 RX ORDER — MELATONIN
1000 DAILY
Qty: 90 TABLET | Refills: 3 | OUTPATIENT
Start: 2023-10-08

## 2023-10-08 NOTE — TELEPHONE ENCOUNTER
Please review. Protocol failed / No Protocol. Requested Prescriptions   Pending Prescriptions Disp Refills    cyclobenzaprine 5 MG Oral Tab 30 tablet 1     Sig: Take 0.5-1 tablets (2.5-5 mg total) by mouth nightly as needed for Muscle spasms. May cause sedation; no driving. There is no refill protocol information for this order       cholecalciferol 25 MCG (1000 UT) Oral Tab 90 tablet 3     Sig: Take 1 tablet (1,000 Units total) by mouth daily.        There is no refill protocol information for this order

## 2023-10-09 DIAGNOSIS — E87.6 HYPOKALEMIA: ICD-10-CM

## 2023-10-10 ENCOUNTER — OFFICE VISIT (OUTPATIENT)
Dept: INTERNAL MEDICINE CLINIC | Facility: CLINIC | Age: 78
End: 2023-10-10

## 2023-10-10 VITALS
DIASTOLIC BLOOD PRESSURE: 63 MMHG | SYSTOLIC BLOOD PRESSURE: 123 MMHG | BODY MASS INDEX: 23 KG/M2 | WEIGHT: 135 LBS | HEART RATE: 106 BPM

## 2023-10-10 DIAGNOSIS — K40.90 RIGHT INGUINAL HERNIA: Primary | ICD-10-CM

## 2023-10-10 DIAGNOSIS — R10.31 RIGHT INGUINAL PAIN: ICD-10-CM

## 2023-10-10 DIAGNOSIS — N18.4 CKD (CHRONIC KIDNEY DISEASE) STAGE 4, GFR 15-29 ML/MIN (HCC): ICD-10-CM

## 2023-10-10 DIAGNOSIS — M54.16 LUMBAR RADICULOPATHY: ICD-10-CM

## 2023-10-10 PROCEDURE — 99214 OFFICE O/P EST MOD 30 MIN: CPT | Performed by: INTERNAL MEDICINE

## 2023-10-10 RX ORDER — POTASSIUM CHLORIDE 1500 MG/1
1 TABLET, EXTENDED RELEASE ORAL 2 TIMES DAILY
Qty: 60 TABLET | Refills: 0 | OUTPATIENT
Start: 2023-10-10

## 2023-10-10 RX ORDER — PREGABALIN 25 MG/1
25 CAPSULE ORAL NIGHTLY
Qty: 90 CAPSULE | Refills: 3 | Status: SHIPPED | OUTPATIENT
Start: 2023-10-10

## 2023-10-10 NOTE — TELEPHONE ENCOUNTER
Please review. Protocol failed / No Protocol.    Requested Prescriptions   Pending Prescriptions Disp Refills    POTASSIUM CHLORIDE ER 20 MEQ Oral Tab CR [Pharmacy Med Name: Potassium Chloride Er 20 Meq Tab Adva] 60 tablet 0     Sig: TAKE ONE TABLET BY MOUTH IN THE MORNING AND ONE TABLET BEFORE BEDTIME.       There is no refill protocol information for this order

## 2023-10-11 ENCOUNTER — MED REC SCAN ONLY (OUTPATIENT)
Dept: RHEUMATOLOGY | Facility: CLINIC | Age: 78
End: 2023-10-11

## 2023-10-18 ENCOUNTER — OFFICE VISIT (OUTPATIENT)
Dept: SURGERY | Facility: CLINIC | Age: 78
End: 2023-10-18
Payer: MEDICARE

## 2023-10-18 VITALS — WEIGHT: 135 LBS | HEIGHT: 64 IN | BODY MASS INDEX: 23.05 KG/M2

## 2023-10-18 DIAGNOSIS — M54.16 LUMBAR RADICULOPATHY: Primary | ICD-10-CM

## 2023-10-18 PROCEDURE — 1125F AMNT PAIN NOTED PAIN PRSNT: CPT | Performed by: NEUROLOGICAL SURGERY

## 2023-10-18 PROCEDURE — 99204 OFFICE O/P NEW MOD 45 MIN: CPT | Performed by: NEUROLOGICAL SURGERY

## 2023-10-18 NOTE — PROGRESS NOTES
Pt here for low back pain that travels down to both legs. No n/t. Pt states she has weakness. Pt has groin pain. No injections or PT. Pt sees a chiropractor. No h/o neck or back surgery. Pt had MRI lumbar spine 8/12/23.

## 2023-11-07 ENCOUNTER — HOSPITAL ENCOUNTER (OUTPATIENT)
Dept: CARDIOLOGY CLINIC | Facility: HOSPITAL | Age: 78
Discharge: HOME | End: 2023-11-07
Attending: INTERNAL MEDICINE
Payer: MEDICARE

## 2023-11-07 VITALS — SYSTOLIC BLOOD PRESSURE: 92 MMHG | DIASTOLIC BLOOD PRESSURE: 58 MMHG | OXYGEN SATURATION: 95 % | HEART RATE: 85 BPM

## 2023-11-07 PROCEDURE — 99213 OFFICE O/P EST LOW 20 MIN: CPT | Performed by: NURSE PRACTITIONER

## 2023-11-07 NOTE — PROGRESS NOTES
[facility]  TAVR Clinic Note    Subjective:   Patient presents for 1 year postop TAVR APN visit, accompanied by her daughter. She underwent LTF TAVR with 23 mm ultra valve on 11/3/2022 due to severe, symptomatic aortic stenosis. Since her last visit in May, she continues to do fairly well from a cardiac standpoint. Her main limitations have to do with her RA, and she has a recent change in medications which is helped with some of her joint pain. She ambulates with a cane assist.  She gets occasional LE edema, but this has been improved with her diuretics. She also states she has had some right groin pain, and has a hernia of which is to be repaired in December. She still gets some exertional dyspnea, but attributes this to decreased exercise tolerance due to her leg pain/hernia. She currently denies: CP, SOB, fatigue, dizziness, palpitations, or wound issues at present. Review of Systems   Constitutional: Negative. HENT: Negative. Eyes: Negative. Cardiovascular:  Positive for leg swelling. Respiratory: Negative. Endocrine: Negative. Skin: Negative. Musculoskeletal:  Positive for arthritis and joint pain. Gastrointestinal: Negative. Genitourinary: Negative. Neurological: Negative. Psychiatric/Behavioral: Negative. Objective:   BP 92/58   Pulse 85   SpO2 95%   Telemetry-NSR/LBBB    Physical Exam:   General: AAO, NAD  HEENT: PERRL, MMM  Neck: No JVD  Cardiac: RRR, S1, S2, no murmur or rub noted  Lungs:  CTA bilat  Abdomen: soft, NT, ND, BS +  Extremities: warm, dry, trace pedal edema BiLAP  Neurologic: AAO x 3  Skin: groin incision CDI, well healed      Laboratory/Data:  Echo: 5/2/2023  EF 52%, grade 1 diastolic dysfunction  2+ MR  TAVR valve functioning normally, well-seated.   Mean gradient 12 mmHg  2+ TR  PASP 46 mmHg, RA 3  No pericardial effusion noted    Labs:    Lab Results   Component Value Date     (H) 08/29/2023    BUN 34 (H) 08/29/2023 CREATSERUM 1.82 (H) 08/29/2023    BUNCREA 18.7 08/29/2023    ANIONGAP 9 08/29/2023    GFRAA 40 (L) 07/09/2022    GFRNAA 35 (L) 07/09/2022    CA 8.3 (L) 08/29/2023     08/29/2023    K 4.4 08/29/2023     08/29/2023    CO2 23.0 08/29/2023    OSMOCALC 291 08/29/2023     Lab Results   Component Value Date    WBC 6.8 08/29/2023    RBC 3.12 (L) 08/29/2023    HGB 8.8 (L) 08/29/2023    HCT 26.9 (L) 08/29/2023    MCV 86.2 08/29/2023    MCH 28.2 08/29/2023    MCHC 32.7 08/29/2023    RDW 14.7 08/29/2023    .0 08/29/2023       Lab Results   Component Value Date    INR 1.39 (H) 01/06/2023    INR 1.35 (H) 11/04/2022    INR 1.40 (H) 11/03/2022       Medications:  Current Outpatient Medications   Medication Sig Dispense Refill    pregabalin (LYRICA) 25 MG Oral Cap Take 1 capsule (25 mg total) by mouth at bedtime. FOR NERVE/BACK PAIN. STOP GABAPENTIN. 90 capsule 3    leflunomide 10 MG Oral Tab Take 1 tablet (10 mg total) by mouth daily. 30 tablet 3    aspirin 81 MG Oral Tab EC Take 1 tablet (81 mg total) by mouth daily. FOR HEART DISEASE. 90 tablet 9    lisinopril 20 MG Oral Tab Take 1 tablet (20 mg total) by mouth daily. FOR BLOOD PRESSURE. 90 tablet 9    bumetanide (BUMEX) 1 MG Oral Tab Take 1 tablet (1 mg total) by mouth daily. STOP FUROSEMIDE. 90 tablet 9    rosuvastatin 10 MG Oral Tab Take 1 tablet (10 mg total) by mouth nightly. FOR CHOLESTEROL. Dose reduction due to interaction with leflunomide. 90 tablet 9    sertraline 50 MG Oral Tab Take 1 tablet (50 mg total) by mouth daily. FOR ANXIETY. 90 tablet 9    Potassium Chloride ER 20 MEQ Oral Tab CR Take 20 mEq by mouth in the morning and 20 mEq before bedtime. 60 tablet 1    pantoprazole 20 MG Oral Tab EC Take 1 tablet (20 mg total) by mouth every morning before breakfast. TO PREVENT ACID REFLUX. 90 tablet 3    folic acid 1 MG Oral Tab Take 1 tablet (1 mg total) by mouth daily.  90 tablet 3    cholecalciferol 25 MCG (1000 UT) Oral Tab Take 1 tablet (1,000 Units total) by mouth daily. 90 tablet 3    predniSONE 5 MG Oral Tab Take 3 tablets (15 mg total) by mouth daily. 90 tablet 3    Lidocaine 4 % External Gel Apply 1 Application. topically in the morning, at noon, and at bedtime. 1 each 3    clopidogrel 75 MG Oral Tab Take 1 tablet (75 mg total) by mouth daily. FOR HEART STENTS. 90 tablet 3    levocetirizine 5 MG Oral Tab Take 1 tablet (5 mg total) by mouth every evening. 90 tablet 1    benzonatate 200 MG Oral Cap Take 1 capsule (200 mg total) by mouth 3 (three) times daily as needed for cough. 30 capsule 0    Albuterol Sulfate  (90 Base) MCG/ACT Inhalation Aero Soln Inhale 2 puffs into the lungs every 4 (four) hours as needed for Wheezing. (Patient not taking: Reported on 8/15/2023) 1 Inhaler 3       Assessment:  s/p LTF 23mm Ultra TAVR d/t severe, symptomatic aortic stenosis 11/3/22  CAD s/p PCI Cx/Diag/LAD 2/3/22  Asa, plavix, statin  HTN-controlled on current meds  HL-statin  OA -follows with rheumatology   chronic anemia-multifactorial etiology, on p.o. iron  Are inguinal hernia-repair planned for December per patient    Plan:    Complete TVT KCCQ -- results reviewed   Echo today   Increase acitivity as tolerated   F/U with primary cardiologist as directed -Dr. Chavo Lemus    I have spent 20 minutes with this patient with > 50% of my time spent in education, coordination, and counseling related to their care. We specifically discussed low NA, heart healthy diet, importance of continued daily exercise, SBE prophylaxis with dental/surgical procedures, compliance with medications, and f/u with specialists as directed. She has completed her annual follow-up in the [facility] heart center no longer needs to follow here unless clinically indicated. Thank you for allowing us to participate in your patient's care.     Marissa Nguyen, APRN  11/7/2023   [id number]

## 2023-12-07 ENCOUNTER — HOSPITAL ENCOUNTER (OUTPATIENT)
Facility: HOSPITAL | Age: 78
Setting detail: HOSPITAL OUTPATIENT SURGERY
Discharge: HOME | End: 2023-12-07
Attending: SURGERY | Admitting: SURGERY
Payer: MEDICARE

## 2023-12-07 ENCOUNTER — ANESTHESIA EVENT (OUTPATIENT)
Dept: SURGERY | Facility: HOSPITAL | Age: 78
End: 2023-12-07
Payer: MEDICARE

## 2023-12-07 ENCOUNTER — ANESTHESIA (OUTPATIENT)
Dept: SURGERY | Facility: HOSPITAL | Age: 78
End: 2023-12-07
Payer: MEDICARE

## 2023-12-07 VITALS
HEART RATE: 85 BPM | BODY MASS INDEX: 22.88 KG/M2 | TEMPERATURE: 98 F | OXYGEN SATURATION: 95 % | RESPIRATION RATE: 17 BRPM | HEIGHT: 64 IN | SYSTOLIC BLOOD PRESSURE: 125 MMHG | WEIGHT: 134 LBS | DIASTOLIC BLOOD PRESSURE: 65 MMHG

## 2023-12-07 PROCEDURE — 0YU54JZ SUPPLEMENT RIGHT INGUINAL REGION WITH SYNTHETIC SUBSTITUTE, PERCUTANEOUS ENDOSCOPIC APPROACH: ICD-10-PCS | Performed by: SURGERY

## 2023-12-07 PROCEDURE — 0DNU4ZZ RELEASE OMENTUM, PERCUTANEOUS ENDOSCOPIC APPROACH: ICD-10-PCS | Performed by: SURGERY

## 2023-12-07 PROCEDURE — 8E0W4CZ ROBOTIC ASSISTED PROCEDURE OF TRUNK REGION, PERCUTANEOUS ENDOSCOPIC APPROACH: ICD-10-PCS | Performed by: SURGERY

## 2023-12-07 DEVICE — PROGRIP MESH: Type: IMPLANTABLE DEVICE | Site: GROIN | Status: FUNCTIONAL

## 2023-12-07 RX ORDER — SODIUM CHLORIDE, SODIUM LACTATE, POTASSIUM CHLORIDE, CALCIUM CHLORIDE 600; 310; 30; 20 MG/100ML; MG/100ML; MG/100ML; MG/100ML
INJECTION, SOLUTION INTRAVENOUS CONTINUOUS
Status: DISCONTINUED | OUTPATIENT
Start: 2023-12-07 | End: 2023-12-07

## 2023-12-07 RX ORDER — HYDROMORPHONE HYDROCHLORIDE 1 MG/ML
0.6 INJECTION, SOLUTION INTRAMUSCULAR; INTRAVENOUS; SUBCUTANEOUS EVERY 5 MIN PRN
Status: DISCONTINUED | OUTPATIENT
Start: 2023-12-07 | End: 2023-12-07

## 2023-12-07 RX ORDER — MORPHINE SULFATE 4 MG/ML
2 INJECTION, SOLUTION INTRAMUSCULAR; INTRAVENOUS EVERY 10 MIN PRN
Status: DISCONTINUED | OUTPATIENT
Start: 2023-12-07 | End: 2023-12-07

## 2023-12-07 RX ORDER — HYDROMORPHONE HYDROCHLORIDE 1 MG/ML
0.2 INJECTION, SOLUTION INTRAMUSCULAR; INTRAVENOUS; SUBCUTANEOUS EVERY 5 MIN PRN
Status: DISCONTINUED | OUTPATIENT
Start: 2023-12-07 | End: 2023-12-07

## 2023-12-07 RX ORDER — ONDANSETRON 2 MG/ML
INJECTION INTRAMUSCULAR; INTRAVENOUS AS NEEDED
Status: DISCONTINUED | OUTPATIENT
Start: 2023-12-07 | End: 2023-12-07 | Stop reason: SURG

## 2023-12-07 RX ORDER — NALOXONE HYDROCHLORIDE 0.4 MG/ML
80 INJECTION, SOLUTION INTRAMUSCULAR; INTRAVENOUS; SUBCUTANEOUS AS NEEDED
Status: DISCONTINUED | OUTPATIENT
Start: 2023-12-07 | End: 2023-12-07

## 2023-12-07 RX ORDER — ROCURONIUM BROMIDE 10 MG/ML
INJECTION, SOLUTION INTRAVENOUS AS NEEDED
Status: DISCONTINUED | OUTPATIENT
Start: 2023-12-07 | End: 2023-12-07 | Stop reason: SURG

## 2023-12-07 RX ORDER — ONDANSETRON 2 MG/ML
4 INJECTION INTRAMUSCULAR; INTRAVENOUS EVERY 6 HOURS PRN
Status: DISCONTINUED | OUTPATIENT
Start: 2023-12-07 | End: 2023-12-07

## 2023-12-07 RX ORDER — METOCLOPRAMIDE HYDROCHLORIDE 5 MG/ML
10 INJECTION INTRAMUSCULAR; INTRAVENOUS EVERY 8 HOURS PRN
Status: CANCELLED | OUTPATIENT
Start: 2023-12-07

## 2023-12-07 RX ORDER — ACETAMINOPHEN 500 MG
1000 TABLET ORAL ONCE
Status: COMPLETED | OUTPATIENT
Start: 2023-12-07 | End: 2023-12-07

## 2023-12-07 RX ORDER — TRAMADOL HYDROCHLORIDE 50 MG/1
50 TABLET ORAL EVERY 6 HOURS PRN
Qty: 10 TABLET | Refills: 0 | Status: ON HOLD | OUTPATIENT
Start: 2023-12-07

## 2023-12-07 RX ORDER — DEXAMETHASONE SODIUM PHOSPHATE 4 MG/ML
VIAL (ML) INJECTION AS NEEDED
Status: DISCONTINUED | OUTPATIENT
Start: 2023-12-07 | End: 2023-12-07 | Stop reason: SURG

## 2023-12-07 RX ORDER — CEFAZOLIN SODIUM/WATER 2 G/20 ML
2 SYRINGE (ML) INTRAVENOUS ONCE
Status: COMPLETED | OUTPATIENT
Start: 2023-12-07 | End: 2023-12-07

## 2023-12-07 RX ORDER — METOCLOPRAMIDE HYDROCHLORIDE 5 MG/ML
10 INJECTION INTRAMUSCULAR; INTRAVENOUS EVERY 8 HOURS PRN
Status: DISCONTINUED | OUTPATIENT
Start: 2023-12-07 | End: 2023-12-07

## 2023-12-07 RX ORDER — LIDOCAINE HYDROCHLORIDE 10 MG/ML
INJECTION, SOLUTION EPIDURAL; INFILTRATION; INTRACAUDAL; PERINEURAL AS NEEDED
Status: DISCONTINUED | OUTPATIENT
Start: 2023-12-07 | End: 2023-12-07 | Stop reason: SURG

## 2023-12-07 RX ORDER — MORPHINE SULFATE 4 MG/ML
4 INJECTION, SOLUTION INTRAMUSCULAR; INTRAVENOUS EVERY 10 MIN PRN
Status: DISCONTINUED | OUTPATIENT
Start: 2023-12-07 | End: 2023-12-07

## 2023-12-07 RX ORDER — HYDROMORPHONE HYDROCHLORIDE 1 MG/ML
0.4 INJECTION, SOLUTION INTRAMUSCULAR; INTRAVENOUS; SUBCUTANEOUS EVERY 5 MIN PRN
Status: DISCONTINUED | OUTPATIENT
Start: 2023-12-07 | End: 2023-12-07

## 2023-12-07 RX ORDER — BUPIVACAINE HYDROCHLORIDE AND EPINEPHRINE 2.5; 5 MG/ML; UG/ML
INJECTION, SOLUTION INFILTRATION; PERINEURAL AS NEEDED
Status: DISCONTINUED | OUTPATIENT
Start: 2023-12-07 | End: 2023-12-07 | Stop reason: HOSPADM

## 2023-12-07 RX ORDER — MORPHINE SULFATE 10 MG/ML
6 INJECTION, SOLUTION INTRAMUSCULAR; INTRAVENOUS EVERY 10 MIN PRN
Status: DISCONTINUED | OUTPATIENT
Start: 2023-12-07 | End: 2023-12-07

## 2023-12-07 RX ORDER — DOCUSATE SODIUM 100 MG/1
100 CAPSULE, LIQUID FILLED ORAL 2 TIMES DAILY
Qty: 14 CAPSULE | Refills: 0 | Status: ON HOLD | OUTPATIENT
Start: 2023-12-07 | End: 2023-12-14

## 2023-12-07 RX ORDER — PHENYLEPHRINE HCL 10 MG/ML
VIAL (ML) INJECTION AS NEEDED
Status: DISCONTINUED | OUTPATIENT
Start: 2023-12-07 | End: 2023-12-07 | Stop reason: SURG

## 2023-12-07 RX ADMIN — CEFAZOLIN SODIUM/WATER 2 G: 2 G/20 ML SYRINGE (ML) INTRAVENOUS at 08:47:00

## 2023-12-07 RX ADMIN — PHENYLEPHRINE HCL 100 MCG: 10 MG/ML VIAL (ML) INJECTION at 08:56:00

## 2023-12-07 RX ADMIN — ROCURONIUM BROMIDE 30 MG: 10 INJECTION, SOLUTION INTRAVENOUS at 08:42:00

## 2023-12-07 RX ADMIN — ONDANSETRON 4 MG: 2 INJECTION INTRAMUSCULAR; INTRAVENOUS at 10:32:00

## 2023-12-07 RX ADMIN — LIDOCAINE HYDROCHLORIDE 50 MG: 10 INJECTION, SOLUTION EPIDURAL; INFILTRATION; INTRACAUDAL; PERINEURAL at 08:42:00

## 2023-12-07 RX ADMIN — DEXAMETHASONE SODIUM PHOSPHATE 4 MG: 4 MG/ML VIAL (ML) INJECTION at 08:42:00

## 2023-12-07 RX ADMIN — SODIUM CHLORIDE, SODIUM LACTATE, POTASSIUM CHLORIDE, CALCIUM CHLORIDE: 600; 310; 30; 20 INJECTION, SOLUTION INTRAVENOUS at 08:31:00

## 2023-12-07 RX ADMIN — ROCURONIUM BROMIDE 10 MG: 10 INJECTION, SOLUTION INTRAVENOUS at 09:46:00

## 2023-12-07 RX ADMIN — ROCURONIUM BROMIDE 20 MG: 10 INJECTION, SOLUTION INTRAVENOUS at 09:12:00

## 2023-12-07 NOTE — ANESTHESIA PROCEDURE NOTES
Airway  Date/Time: 12/7/2023 8:42 AM  Urgency: Elective    Airway not difficult    General Information and Staff    Patient location during procedure: OR  Anesthesiologist: Mariah Sawyer MD  Performed: anesthesiologist   Performed by: Mariah Sawyer MD  Authorized by: Mariah Sawyer MD      Indications and Patient Condition  Indications for airway management: anesthesia  Spontaneous Ventilation: absent  Sedation level: deep  Preoxygenated: yes  Patient position: sniffing  Mask difficulty assessment: 1 - vent by mask    Final Airway Details  Final airway type: endotracheal airway      Successful airway: ETT  Cuffed: yes   Successful intubation technique: direct laryngoscopy  Endotracheal tube insertion site: oral  Blade: Elizabet  Blade size: #3  ETT size (mm): 7.0    Cormack-Lehane Classification: grade IIA - partial view of glottis  Placement verified by: capnometry   Measured from: teeth  Number of attempts at approach: 1

## 2023-12-07 NOTE — INTERVAL H&P NOTE
Pre-op Diagnosis: Right inguinal hernia    The above referenced H&P was reviewed by Kingsley Gonzales MD on 12/7/2023, the patient was examined and no significant changes have occurred in the patient's condition since the H&P was performed. I discussed with the patient and/or legal representative the potential benefits, risks and side effects of this procedure; the likelihood of the patient achieving goals; and potential problems that might occur during recuperation. I discussed reasonable alternatives to the procedure, including risks, benefits and side effects related to the alternatives and risks related to not receiving this procedure. We will proceed with procedure as planned.

## 2023-12-07 NOTE — H&P (VIEW-ONLY)
Figueroa Davis is a 76year old female. Patient presents with:  Consult: Hernia     HPI:   The patient complains of right inguinal hernia. Signs and symptoms of hernia were first noticed 6 months ago. Aggravating factors include none. Associated symptoms include bulge and burning. The patient has back issues and needs a spinal fusion. The patient is scheduled to see a back surgeon in 2 weeks. Prior treatment has included open cholecystectomy via upper midline incision. And Open appendectomy   The patient had a colonoscopy many years ago. Patient with a [facility] cardiologist Shawn Linares MD    Patient with RA on Prednisone  CT scan of the abdomen pelvis 8 [phone],023:  Impression    CONCLUSION: Small fat containing right inguinal hernia with mild fat stranding throughout the hernia sac consistent with inflammation/infarction     MRI of the spine 8/12/2023:  IMPRESSION:   1.Multilevel disc degeneration and facet arthropathy. No acute osseous abnormality. 2.L4-5: Disc bulge with a superimposed left subarticular extrusion extending 7 mm cranial to the inferior endplate of L3. Effacement of the left subarticular recess with encroachment on the left L4 nerve root. No central canal stenosis or right neural   foraminal narrowing. 3. L5-S1: Disc bulge with a superimposed left foraminal extrusion results in moderate to severe left neural foraminal narrowing with encroachment on the exiting L5 nerve root. Mild right neural foraminal narrowing. 4. L3-4: Disc bulge with a superimposed Superimposed right foraminal protrusion results in moderate narrowing of the right neural foramen. No significant central canal stenosis. Left neural foramen is patent.      Malik Ulrich MD   Physician  Cardiology  Procedures   Signed  Date of Service: 2/3/2022 6:38 PM  Procedure Orders  CATH CORONARY INTERVENTION [[id number]] ordered by Alexa Aguilar MD at 02/02/22 1422    Pre-procedure Diagnoses  Non-STEMI (non-ST elevated myocardial infarction) ([facility] [address] [I21.4]    Post-procedure Diagnoses  Non-STEMI (non-ST elevated myocardial infarction) [facility]) [I21.4]       [facility] Cardiac Cath Procedure Note    Gilbert Jackson Patient Status: Inpatient   1945 MRN [id number]  Location [facility] 2W/SW Attending Luigi Garland MD  Hosp Day # 2 PCP Cedrick Husain MD      Cardiologist: Blake Islas MD  Primary Proceduralist: Blake Islas MD  Procedure Performed: PCI of mid circumflex/PCI of diagonal 1/PCI of mid LAD/PCI of mid to distal LAD  Date of Procedure: 2/3/2022   Indication: Non-ST relation MI, turndown for CABG    Allergies:    Codeine PAIN  Comment:Chest pain  Sulfa Antibiotics HIVES  Sulfa Antibiotics HIVES  Codeine PAIN  Comment:Chest pain  Furosemide MYALGIA  Comment: Cramping to feet and legs 3/31/21  Amlodipine OTHER (SEE COMMENTS)  Comment:Swelling and leg cramps   Current Meds:  Current Outpatient Medications   Medication Sig Dispense Refill   Rosuvastatin Calcium 10 MG Oral Capsule Sprinkle Take 10 mg by mouth daily. sertraline 50 MG Oral Tab Take 50 mg by mouth daily. cyclobenzaprine 5 MG Oral Tab Take 5 mg by mouth 3 (three) times daily as needed for Muscle spasms. leflunomide 10 MG Oral Tab Take 10 mg by mouth daily. Multiple Vitamins-Iron (DAILY-VITAMIN/IRON) Oral Tab Take by mouth. PREDNISONE 5 MG Oral Tab Take 1 tablet (5 mg total) by mouth once daily 30 tablet 3   aspirin 81 MG Oral Tab EC Take 1 tablet (81 mg total) by mouth daily. FOR HEART DISEASE. 90 tablet 3   lisinopril 10 MG Oral Tab Take 1 tablet (10 mg total) by mouth daily. FOR BLOOD PRESSURE. 90 tablet 3   clopidogrel 75 MG Oral Tab Take 1 tablet (75 mg total) by mouth daily. FOR HEART STENTS. 90 tablet 3   levocetirizine 5 MG Oral Tab Take 1 tablet (5 mg total) by mouth every evening.  90 tablet 1   Albuterol Sulfate  (90 Base) MCG/ACT Inhalation Aero Soln Inhale 2 puffs into the lungs every 4 (four) hours as needed for Wheezing. 1 Inhaler 3   Vitamin D3 25 MCG (1000 UT) Oral Tab Take 1 tablet (1,000 Units total) by mouth daily. 30 tablet 0   gabapentin 100 MG Oral Cap Take 100 mg by mouth nightly. metoprolol tartrate 25 MG Oral Tab Take 0.5 tablets (12.5 mg total) by mouth 2x Daily(Beta Blocker). FOR HEART RATE. (Patient not taking: Reported on 10/11/2023) 90 tablet 3   atorvastatin 80 MG Oral Tab Take 1 tablet (80 mg total) by mouth nightly. FOR CHOLESTEROL. (Patient not taking: Reported on 10/11/2023) 90 tablet 3   pregabalin 25 MG Oral Cap Take 1 capsule (25 mg total) by mouth 2 (two) times daily. FOR PAIN. 180 capsule 3   Lidocaine 4 % External Gel Apply 1 Application topically in the morning, at noon, and at bedtime. (Patient not taking: Reported on 10/11/2023) 1 each 3   Diclofenac Sodium 1 % External Gel Apply 2 g topically 4 (four) times daily. (Patient not taking: Reported on 10/11/2023) 100 g 3       HISTORY:  Past Medical History:   Diagnosis Date   Rheumatic arteritis     No past surgical history on file. No family history on file.    Social History  Tobacco Use  Smoking status: Never  Smokeless tobacco: Never  Vaping Use  Vaping Use: Never used  Alcohol use: Never  Drug use: Never      ROS:   HEENT: denies nasal congestion, sinus pain or sore throat  RESPIRATORY: denies shortness of breath, wheezing or cough   CARDIOVASCULAR: denies chest pain or NEGRON; no palpitations   GI: denies nausea, vomiting, constipation, diarrhea; no rectal bleeding  GENITAL/: dysuria - none; nocturia - none  MUSCULOSKELETAL: no joint complaints upper or lower extremities  NEURO: no sensory or motor complaint  PSYCHE: no symptoms of depression or anxiety  HEMATOLOGY: no bruising or excessive bleeding; anticoagulants: plavix and asa  ENDOCRINE: denies excessive thirst or urination; denies unexpected wt gain or wt loss    PHYSICAL EXAM:   GENERAL: well developed, well nourished, in no apparent distress  SKIN: no rashes, no suspicious lesions  HEENT: PERRLA, EOMI, anicteric, conjunctiva normal; no JVD, no TMJ   RESPIRATORY: clear to percussion and auscultation  CARDIOVASCULAR: normal, RRR; good peripheral perfusion  ABDOMEN: soft, nontender; no HSM; no masses; moderate reducible right inguinal/femoral hernia present; no evidence of left inguinal hernia   GENITAL/: normal external genitalia  LYMPHATIC: no lymphadenopathy  EXTREMITIES: no cyanosis, clubbing or edema, peripheral pulses intact  NEUROLOGIC: intact; no sensorimotor deficit; reflexes normal    ASSESSMENT/ PLAN:   This is a 76year old female who has a moderate reducible right inguinal/femoral hernia. Patient is on prednisone for rheumatoid arthritis. Would recommend patient contact her rheumatologist to see if she can decrease her prednisone at all. Patient also with back issues and will require back surgery. I did discuss with the patient that her back pain and radicular pain will not be improved with her hernia surgery. Patient will need cardiac clearance from her cardiologist.  Patient is on Plavix will need to hold Plavix 5 days prior to procedure. I had a lengthy discussion with the patient as to the etiology of inguinal hernias, as well as treatment options. I discussed the risk of nonoperative management including the low risk of incarceration statistically. I review the education booklet with the patient on hernias. I discussed with the patient open laparoscopic versus robotic-assisted inguinal hernia repair in detail.  I discussed the risks of the procedure including but not limited to bleeding, infection, wound infection, infected mesh, recurrent hernia even if mesh is used, post operative hematoma, perforated hollow viscus, vascular injury, need to convert to an open procedure, paraesthesia or chronic pain numbness or tingling involving the inner groin, scrotal, or thigh region, post operative activities and limitations, if an occult hernia is found on the contralateral side this will be repaired at the time of the initial operation; as well as the risks of anesthesia including myocardial infarction, respiratory failure, renal failure, stroke, pulmonary embolism, deep vein thrombosis, and even death were discussed in detail with the patient who understands, consents, and wishes to proceed with the operation . I reviewed patient education material with the patient. Will plan robotic-assisted repair of right inguinal hernia with mesh possible bilateral inguinal hernia repair with mesh possible open under general anesthesia at [facility] after his patient has clearance by cardiology. .   Will hold plavix 5 days and will continue aspirin for cardiac stent protection. I spent 45 minutes on this patient visit.  This included: preparing to see patient, obtaining history, reviewing separately obtained history, performing physical examination, independently interpreting results and discussing with patient, patient and family counseling, referring and communicating with other healthcare professionals, and care coordination    Linda Gallegos MD, Dr. Mitzy Hernandez, Dr. Geri Romero

## 2023-12-07 NOTE — ANESTHESIA POSTPROCEDURE EVALUATION
Patient: Kely Carmona    Procedure Summary       Date: 12/07/23 Room / Location: [address] [place] [address] MAIN OR 06 / [address] [place] [address] MAIN OR    Anesthesia Start: 0831 Anesthesia Stop:     Procedure: Robotic right inguinal hernia repair with mesh, lysis of adheions (Abdomen) Diagnosis: (right inguinal hernia)    Surgeons: Eduin Hu MD Anesthesiologist: Nicholos Kayser, MD    Anesthesia Type: general ASA Status: 3            Anesthesia Type: general    Vitals Value Taken Time   /90 12/07/23 1042   Temp 98.7 12/07/23 1045   Pulse 102 12/07/23 1044   Resp 8 12/07/23 1044   SpO2 95 % 12/07/23 1044   Vitals shown include unfiled device data.     [address] [place] [address] AN Post Evaluation:   Patient Evaluated in PACU  Patient Participation: complete - patient participated  Level of Consciousness: awake  Pain Score: 1  Pain Management: adequate  Airway Patency:patent  Dental exam unchanged from preop  Yes    Cardiovascular Status: acceptable  Respiratory Status: acceptable and nasal cannula  Postoperative Hydration acceptable      Lakeisha Bhatti MD  12/7/2023 10:45 AM

## 2023-12-07 NOTE — CONSULTS
Ashia Eisenberg is a 76year old female. Patient presents with:  Consult: Hernia     HPI:   The patient complains of right inguinal hernia. Signs and symptoms of hernia were first noticed 6 months ago. Aggravating factors include none. Associated symptoms include bulge and burning. The patient has back issues and needs a spinal fusion. The patient is scheduled to see a back surgeon in 2 weeks. Prior treatment has included open cholecystectomy via upper midline incision. And Open appendectomy   The patient had a colonoscopy many years ago. Patient with a [facility] cardiologist Cynthia Watson MD    Patient with RA on Prednisone  CT scan of the abdomen pelvis 8 29,023:  Impression    CONCLUSION: Small fat containing right inguinal hernia with mild fat stranding throughout the hernia sac consistent with inflammation/infarction     MRI of the spine 8/12/2023:  IMPRESSION:   1.Multilevel disc degeneration and facet arthropathy. No acute osseous abnormality. 2.L4-5: Disc bulge with a superimposed left subarticular extrusion extending 7 mm cranial to the inferior endplate of L3. Effacement of the left subarticular recess with encroachment on the left L4 nerve root. No central canal stenosis or right neural   foraminal narrowing. 3. L5-S1: Disc bulge with a superimposed left foraminal extrusion results in moderate to severe left neural foraminal narrowing with encroachment on the exiting L5 nerve root. Mild right neural foraminal narrowing. 4. L3-4: Disc bulge with a superimposed Superimposed right foraminal protrusion results in moderate narrowing of the right neural foramen. No significant central canal stenosis. Left neural foramen is patent.      Sachi Crow MD   Physician  Cardiology  Procedures   Signed  Date of Service: 2/3/2022 6:38 PM  Procedure Orders  CATH CORONARY INTERVENTION [[id number]] ordered by Antoinette Daniels MD at 02/02/22 1422    Pre-procedure Diagnoses  Non-STEMI (non-ST elevated myocardial infarction) ([facility] [address] [I21.4]    Post-procedure Diagnoses  Non-STEMI (non-ST elevated myocardial infarction) [facility]) [I21.4]       [facility]    Antoinette Sanchez Patient Status: Inpatient   1945 MRN [id number]  Location [facility] 2W/SW Attending Akira Johnston MD  Hosp Day # 2 PCP Marijean Harada, MD      Cardiologist: Robert Bustillo MD  Primary Proceduralist: Robert Bustillo MD  Procedure Performed: PCI of mid circumflex/PCI of diagonal 1/PCI of mid LAD/PCI of mid to distal LAD  Date of Procedure: 2/3/2022   Indication: Non-ST relation MI, turndown for CABG    Allergies:    Codeine PAIN  Comment:Chest pain  Sulfa Antibiotics HIVES  Sulfa Antibiotics HIVES  Codeine PAIN  Comment:Chest pain  Furosemide MYALGIA  Comment: Cramping to feet and legs 3/31/21  Amlodipine OTHER (SEE COMMENTS)  Comment:Swelling and leg cramps   Current Meds:  Current Outpatient Medications   Medication Sig Dispense Refill   Rosuvastatin Calcium 10 MG Oral Capsule Sprinkle Take 10 mg by mouth daily. sertraline 50 MG Oral Tab Take 50 mg by mouth daily. cyclobenzaprine 5 MG Oral Tab Take 5 mg by mouth 3 (three) times daily as needed for Muscle spasms. leflunomide 10 MG Oral Tab Take 10 mg by mouth daily. Multiple Vitamins-Iron (DAILY-VITAMIN/IRON) Oral Tab Take by mouth. PREDNISONE 5 MG Oral Tab Take 1 tablet (5 mg total) by mouth once daily 30 tablet 3   aspirin 81 MG Oral Tab EC Take 1 tablet (81 mg total) by mouth daily. FOR HEART DISEASE. 90 tablet 3   lisinopril 10 MG Oral Tab Take 1 tablet (10 mg total) by mouth daily. FOR BLOOD PRESSURE. 90 tablet 3   clopidogrel 75 MG Oral Tab Take 1 tablet (75 mg total) by mouth daily. FOR HEART STENTS. 90 tablet 3   levocetirizine 5 MG Oral Tab Take 1 tablet (5 mg total) by mouth every evening.  90 tablet 1   Albuterol Sulfate  (90 Base) MCG/ACT Inhalation Aero Soln Inhale 2 puffs into the lungs every 4 (four) hours as needed for Wheezing. 1 Inhaler 3   Vitamin D3 25 MCG (1000 UT) Oral Tab Take 1 tablet (1,000 Units total) by mouth daily. 30 tablet 0   gabapentin 100 MG Oral Cap Take 100 mg by mouth nightly. metoprolol tartrate 25 MG Oral Tab Take 0.5 tablets (12.5 mg total) by mouth 2x Daily(Beta Blocker). FOR HEART RATE. (Patient not taking: Reported on 10/11/2023) 90 tablet 3   atorvastatin 80 MG Oral Tab Take 1 tablet (80 mg total) by mouth nightly. FOR CHOLESTEROL. (Patient not taking: Reported on 10/11/2023) 90 tablet 3   pregabalin 25 MG Oral Cap Take 1 capsule (25 mg total) by mouth 2 (two) times daily. FOR PAIN. 180 capsule 3   Lidocaine 4 % External Gel Apply 1 Application topically in the morning, at noon, and at bedtime. (Patient not taking: Reported on 10/11/2023) 1 each 3   Diclofenac Sodium 1 % External Gel Apply 2 g topically 4 (four) times daily. (Patient not taking: Reported on 10/11/2023) 100 g 3       HISTORY:  Past Medical History:   Diagnosis Date   Rheumatic arteritis     No past surgical history on file. No family history on file.    Social History  Tobacco Use  Smoking status: Never  Smokeless tobacco: Never  Vaping Use  Vaping Use: Never used  Alcohol use: Never  Drug use: Never      ROS:   HEENT: denies nasal congestion, sinus pain or sore throat  RESPIRATORY: denies shortness of breath, wheezing or cough   CARDIOVASCULAR: denies chest pain or NEGRON; no palpitations   GI: denies nausea, vomiting, constipation, diarrhea; no rectal bleeding  GENITAL/: dysuria - none; nocturia - none  MUSCULOSKELETAL: no joint complaints upper or lower extremities  NEURO: no sensory or motor complaint  PSYCHE: no symptoms of depression or anxiety  HEMATOLOGY: no bruising or excessive bleeding; anticoagulants: plavix and asa  ENDOCRINE: denies excessive thirst or urination; denies unexpected wt gain or wt loss    PHYSICAL EXAM:   GENERAL: well developed, well nourished, in no apparent distress  SKIN: no rashes, no suspicious lesions  HEENT: PERRLA, EOMI, anicteric, conjunctiva normal; no JVD, no TMJ   RESPIRATORY: clear to percussion and auscultation  CARDIOVASCULAR: normal, RRR; good peripheral perfusion  ABDOMEN: soft, nontender; no HSM; no masses; moderate reducible right inguinal/femoral hernia present; no evidence of left inguinal hernia   GENITAL/: normal external genitalia  LYMPHATIC: no lymphadenopathy  EXTREMITIES: no cyanosis, clubbing or edema, peripheral pulses intact  NEUROLOGIC: intact; no sensorimotor deficit; reflexes normal    ASSESSMENT/ PLAN:   This is a 76year old female who has a moderate reducible right inguinal/femoral hernia. Patient is on prednisone for rheumatoid arthritis. Would recommend patient contact her rheumatologist to see if she can decrease her prednisone at all. Patient also with back issues and will require back surgery. I did discuss with the patient that her back pain and radicular pain will not be improved with her hernia surgery. Patient will need cardiac clearance from her cardiologist.  Patient is on Plavix will need to hold Plavix 5 days prior to procedure. I had a lengthy discussion with the patient as to the etiology of inguinal hernias, as well as treatment options. I discussed the risk of nonoperative management including the low risk of incarceration statistically. I review the education booklet with the patient on hernias. I discussed with the patient open laparoscopic versus robotic-assisted inguinal hernia repair in detail.  I discussed the risks of the procedure including but not limited to bleeding, infection, wound infection, infected mesh, recurrent hernia even if mesh is used, post operative hematoma, perforated hollow viscus, vascular injury, need to convert to an open procedure, paraesthesia or chronic pain numbness or tingling involving the inner groin, scrotal, or thigh region, post operative activities and limitations, if an occult hernia is found on the contralateral side this will be repaired at the time of the initial operation; as well as the risks of anesthesia including myocardial infarction, respiratory failure, renal failure, stroke, pulmonary embolism, deep vein thrombosis, and even death were discussed in detail with the patient who understands, consents, and wishes to proceed with the operation . I reviewed patient education material with the patient. Will plan robotic-assisted repair of right inguinal hernia with mesh possible bilateral inguinal hernia repair with mesh possible open under general anesthesia at [facility] after his patient has clearance by cardiology. .   Will hold plavix 5 days and will continue aspirin for cardiac stent protection. I spent 45 minutes on this patient visit.  This included: preparing to see patient, obtaining history, reviewing separately obtained history, performing physical examination, independently interpreting results and discussing with patient, patient and family counseling, referring and communicating with other healthcare professionals, and care coordination    Anastasiya Rolle MD, Dr. Yisel Preciado, Dr. Vicky Pérez

## 2023-12-07 NOTE — OPERATIVE REPORT
[facility] OPERATING ROOM OPERATIVE REPORT:     PATIENT NAME: Shannon Kinney  : 1945   MRN: [id number]  SITE: [address] [facility] B:   2023    PREOPERATIVE DIAGNOSIS: Right reducible  inguinal hernia     POSTOPERATIVE DIAGNOSIS: Right reducible   inguinal hernia with extensive abdominal adhesions     PROCEDURE PERFORMED:   robotic  repair of   reducible  Right inguinal hernia repair with mesh,  with extensive lysis of adhesions separate site separate trocar sites         SURGEON:  Andrea Wolff MD    ASST:  Elizabeth ADRIAN (Assistant helped position patient and helped with positioning, retraction, suturing, closure, dressings etc.)    ANESTHESIA: General anesthesia with endotracheal tube    ESTIMATED BLOOD LOSS:   8 ml    COMPLICATIONS: none    INDICATIONS:   This is a 76year old female who presents with a large, reducible, right inguinal hernia, which is symptomatic. I had a lengthy discussion with the patient as to the etiology of the above. I discussed options of continued observation versus surgical repair. Open versus laparoscopic versus robotic repair were discussed in extensive detail. The risks of the procedure including bleeding, infection, postoperative hematoma, wound infection, nonhealing wound, scar formation, recurrent hernia if mesh is used, infected mesh, chronic pain, numbness, or tingling of the inner groin, scrotal or thigh region, perforated hollow viscus, vascular injury, need to convert to an open procedure, need for possible bilateral repair if hernia is present on the contralateral side, as well as risks with anesthesia including myocardial infarction, respiratory failure, renal failure, pulmonary embolus, DVT and even death were all discussed in detail with the patient who understands, consents, and wishes to proceed with the operation.     OPERATION:   The patient was brought to the operating room and placed on the operating table in the supine position. General anesthetic was administered by  endotracheal tube  by anesthesia. The patient's stomach was decompressed with an orogastric tube. The bladder was compressed with a Braswell catheter. The abdomen was prepped and draped in routine sterile fashion. A small incision was made in the supraumbilical region. A Veress needle was introduced into the abdominal cavity without difficulty. Using the saline drop test, placement was confirmed and pneumoperitoneum was established to approximately 15 mmHg. Next, an 8 mm robotic trocar  was inserted under direct visualization into the abdominal cavity without difficulty. A laparoscopic camera was inserted and exploration of all 4 quadrants revealed no evidence of bowel injury or vascular injury present. There was extensive adhesions from the prior abdominal operations in the epigastric region right and left lower quadrants with omentum adherent to the abdominal wall. Due to these adhesions trocars could not be placed. A 5 mm Visiport trocar was placed in the left lower quadrant without visualization. The subtotal was placed to facilitate the lysis of adhesions which was separate and distinct part of the operation. At this time extensive lysis of adhesions was performed with the LigaSure device in the right and left lateral abdominal wall. After these adhesions were taken down 2  8 mm robotic trocars were placed in the left and right lateral abdominal walls under direct visualization. Lysis of adhesions was extensive and doubled the length of the operation. Lysis of adhesions approximately 45 minutes. The additional trocar had to be placed to facilitate this lysis of the.     Visualization of the  inguinal region revealed a Right  inguinal hernia present. There was no evidence of a  Left    inguinal hernia present on examination intraperitoneally.    At this time, it was decided that a   robotic  Right  inguinal hernia repair would be performed. The patient was placed in position. The instruments were inserted. I began  to perform the  robotic Right  herniorrhaphy. Using scissors, the peritoneum was scored on the anterior abdominal wall in the Right  inguinal region. This extended medially laterally. The preperitoneal space was dissected free using blunt dissection. The epigastric vessels were identified and preserved. The hernia sac was identified and dissected free circumferentially. A right  large reducible indirect   hernia defect was identified. The  hernia defect was identified and this was dissected free into the preperitoneal space. The pubic symphysis and Triston ligaments were dissected free medially. Laterally, the dissection continued out into the preperitoneal space into the pelvis into the femoral vessels. Care was taken to avoid injury to these vascular structures. The round ligament was identified and this was divided with electrocautery to facilitate the placement of the mesh. At this time, a piece of ProGrip was placed with  the   medial portion against the right pubic symphysis and the lateral portion into the opening. The mesh was unrolled and placed overlying the defect. The mesh extended into the retroperitoneum and easily covered the retroperitoneum and femoral vessels to prevent a hernia from recurring. Anteriorly, the mesh was opened up into the anterior abdominal wall. The mesh was in good position, covering the defect adequately overlying the femoral/iliac vessels. At this time, pneumoperitoneum was reduced to approximately 8 mmHg. The peritoneum was reapproximated with 2-0 V-Loc 90-day running suture. At this time, bilateral TAP block was performed after the instruments were removed. The 8 mm robotic camera trochar was  removed  with no impingement of bowel or bleeding present.  The remaining pneumoperitoneum was removed and the remaining 8 mm trocars were removed under direct visualization with no impingement of bowel or bleeding present. The wounds was irrigated thoroughly with saline solution. The skin was approximated with 4-0 Vicryl in subcuticular suture. Mastisol and Steri-Strips were applied to the wound. Sterile dressing was applied to the wound. The patient was transferred off the operative table, to the recovery room, extubated, in stable condition. All counts were correct at the end of the case.         [address] [facility]    12/7/2023  10:15 AM  Trenton Mejia MD

## 2023-12-07 NOTE — BRIEF OP NOTE
Pre-Operative Diagnosis: Right inguinal hernia     Post-Operative Diagnosis: right inguinal hernia abdominal adhesions     Procedure Performed:   Robotic right inguinal hernia repair with mesh, lysis of adheions    Surgeon(s) and Role:     Alis Durant MD - Primary    Assistant(s):  PA: NGUYỄN Villanueva     Surgical Findings: Right inguinal hernia with extensive abdominal adhesions     Specimen: None     Estimated Blood Loss: 6 mL  Dictation Number:      Anca Santos MD  12/7/2023  10:12 AM

## 2023-12-08 DIAGNOSIS — I35.0 MODERATE AORTIC STENOSIS BY PRIOR ECHOCARDIOGRAM: ICD-10-CM

## 2023-12-08 DIAGNOSIS — I25.10 CORONARY ARTERY DISEASE INVOLVING NATIVE CORONARY ARTERY OF NATIVE HEART WITHOUT ANGINA PECTORIS: ICD-10-CM

## 2023-12-08 DIAGNOSIS — Z95.5 S/P DRUG ELUTING CORONARY STENT PLACEMENT: ICD-10-CM

## 2023-12-08 DIAGNOSIS — I50.33 ACUTE ON CHRONIC DIASTOLIC (CONGESTIVE) HEART FAILURE (HCC): ICD-10-CM

## 2023-12-08 RX ORDER — CLOPIDOGREL BISULFATE 75 MG/1
75 TABLET ORAL DAILY
Qty: 90 TABLET | Refills: 0 | OUTPATIENT
Start: 2023-12-08

## 2023-12-11 RX ORDER — CLOPIDOGREL BISULFATE 75 MG/1
75 TABLET ORAL DAILY
Qty: 90 TABLET | Refills: 9 | Status: SHIPPED | OUTPATIENT
Start: 2023-12-11

## 2023-12-11 NOTE — TELEPHONE ENCOUNTER
Please review; protocol failed. Requested Prescriptions   Pending Prescriptions Disp Refills    CLOPIDOGREL 75 MG Oral Tab [Pharmacy Med Name: Clopidogrel 75 Mg Tab Nort] 90 tablet 0     Sig: Take 1 tablet (75 mg total) by mouth daily. FOR HEART STENTS.        There is no refill protocol information for this order        Recent Outpatient Visits              1 month ago Lumbar radiculopathy    Martin Mcdaniel MD    Office Visit    2 months ago Right inguinal hernia    Steven Newborn, Elyse Severe, MD    Office Visit    2 months ago Lumbar radiculopathy    Osei Mcdaniel MD    Office Visit    3 months ago Neural foraminal stenosis of lumbar spine    [facility] Daron, Barby, PT    Office Visit    3 months ago Encounter for [employer] annual wellness exam    Steven Newborn, Elyse Severe, MD    Office Visit

## 2023-12-14 ENCOUNTER — HOSPITAL ENCOUNTER (INPATIENT)
Facility: HOSPITAL | Age: 78
LOS: 8 days | Discharge: HOME HEALTH | End: 2023-12-22
Attending: EMERGENCY MEDICINE | Admitting: INTERNAL MEDICINE
Payer: MEDICARE

## 2023-12-14 ENCOUNTER — APPOINTMENT (OUTPATIENT)
Dept: GENERAL RADIOLOGY | Facility: HOSPITAL | Age: 78
End: 2023-12-14
Attending: EMERGENCY MEDICINE
Payer: MEDICARE

## 2023-12-14 ENCOUNTER — APPOINTMENT (OUTPATIENT)
Dept: GENERAL RADIOLOGY | Facility: HOSPITAL | Age: 78
DRG: 177 | End: 2023-12-14
Attending: EMERGENCY MEDICINE
Payer: MEDICARE

## 2023-12-14 ENCOUNTER — HOSPITAL ENCOUNTER (INPATIENT)
Facility: HOSPITAL | Age: 78
LOS: 8 days | Discharge: HOME HEALTH | DRG: 177 | End: 2023-12-22
Attending: EMERGENCY MEDICINE | Admitting: INTERNAL MEDICINE
Payer: MEDICARE

## 2023-12-14 DIAGNOSIS — U07.1 COVID-19 VIRUS INFECTION: Primary | ICD-10-CM

## 2023-12-14 DIAGNOSIS — R09.02 HYPOXIA: ICD-10-CM

## 2023-12-14 DIAGNOSIS — I50.9 ACUTE ON CHRONIC HEART FAILURE, UNSPECIFIED HEART FAILURE TYPE (HCC): ICD-10-CM

## 2023-12-14 LAB
ALBUMIN SERPL-MCNC: 3.7 G/DL (ref 3.2–4.8)
ALBUMIN/GLOB SERPL: 1.5 {RATIO} (ref 1–2)
ALP LIVER SERPL-CCNC: 92 U/L
ALT SERPL-CCNC: <7 U/L
ANION GAP SERPL CALC-SCNC: 6 MMOL/L (ref 0–18)
ANION GAP SERPL CALC-SCNC: 7 MMOL/L (ref 0–18)
AST SERPL-CCNC: 20 U/L (ref ?–34)
ATRIAL RATE: 106 BPM
ATRIAL RATE: 121 BPM
BASOPHILS # BLD AUTO: 0.04 X10(3) UL (ref 0–0.2)
BASOPHILS NFR BLD AUTO: 0.3 %
BILIRUB SERPL-MCNC: 0.8 MG/DL (ref 0.2–1.1)
BNP SERPL-MCNC: 1070 PG/ML
BUN BLD-MCNC: 14 MG/DL (ref 9–23)
BUN BLD-MCNC: 14 MG/DL (ref 9–23)
BUN/CREAT SERPL: 13.3 (ref 10–20)
BUN/CREAT SERPL: 14.1 (ref 10–20)
CALCIUM BLD-MCNC: 9 MG/DL (ref 8.7–10.4)
CALCIUM BLD-MCNC: 9.1 MG/DL (ref 8.7–10.4)
CHLORIDE SERPL-SCNC: 103 MMOL/L (ref 98–112)
CHLORIDE SERPL-SCNC: 105 MMOL/L (ref 98–112)
CHOLEST SERPL-MCNC: 152 MG/DL (ref ?–200)
CO2 SERPL-SCNC: 22 MMOL/L (ref 21–32)
CO2 SERPL-SCNC: 24 MMOL/L (ref 21–32)
CREAT BLD-MCNC: 0.99 MG/DL
CREAT BLD-MCNC: 1.02 MG/DL
CREAT BLD-MCNC: 1.05 MG/DL
DEPRECATED HBV CORE AB SER IA-ACNC: 354.3 NG/ML
DEPRECATED RDW RBC AUTO: 52.2 FL (ref 35.1–46.3)
EGFRCR SERPLBLD CKD-EPI 2021: 54 ML/MIN/1.73M2 (ref 60–?)
EGFRCR SERPLBLD CKD-EPI 2021: 56 ML/MIN/1.73M2 (ref 60–?)
EGFRCR SERPLBLD CKD-EPI 2021: 58 ML/MIN/1.73M2 (ref 60–?)
EOSINOPHIL # BLD AUTO: 0.2 X10(3) UL (ref 0–0.7)
EOSINOPHIL NFR BLD AUTO: 1.5 %
ERYTHROCYTE [DISTWIDTH] IN BLOOD BY AUTOMATED COUNT: 17.8 % (ref 11–15)
FLUAV + FLUBV RNA SPEC NAA+PROBE: NEGATIVE
FLUAV + FLUBV RNA SPEC NAA+PROBE: NEGATIVE
GLOBULIN PLAS-MCNC: 2.5 G/DL (ref 2.8–4.4)
GLUCOSE BLD-MCNC: 122 MG/DL (ref 70–99)
GLUCOSE BLD-MCNC: 146 MG/DL (ref 70–99)
HCT VFR BLD AUTO: 26.1 %
HDLC SERPL-MCNC: 30 MG/DL (ref 40–59)
HGB BLD-MCNC: 7.9 G/DL
IMM GRANULOCYTES # BLD AUTO: 0.06 X10(3) UL (ref 0–1)
IMM GRANULOCYTES NFR BLD: 0.4 %
IRON SATN MFR SERPL: 6 %
IRON SERPL-MCNC: 15 UG/DL
LDLC SERPL CALC-MCNC: 101 MG/DL (ref ?–100)
LYMPHOCYTES # BLD AUTO: 1.2 X10(3) UL (ref 1–4)
LYMPHOCYTES NFR BLD AUTO: 8.9 %
MCH RBC QN AUTO: 24.9 PG (ref 26–34)
MCHC RBC AUTO-ENTMCNC: 30.3 G/DL (ref 31–37)
MCV RBC AUTO: 82.3 FL
MONOCYTES # BLD AUTO: 0.92 X10(3) UL (ref 0.1–1)
MONOCYTES NFR BLD AUTO: 6.8 %
NEUTROPHILS # BLD AUTO: 11.02 X10 (3) UL (ref 1.5–7.7)
NEUTROPHILS # BLD AUTO: 11.02 X10(3) UL (ref 1.5–7.7)
NEUTROPHILS NFR BLD AUTO: 82.1 %
NONHDLC SERPL-MCNC: 122 MG/DL (ref ?–130)
OSMOLALITY SERPL CALC.SUM OF ELEC: 279 MOSM/KG (ref 275–295)
OSMOLALITY SERPL CALC.SUM OF ELEC: 280 MOSM/KG (ref 275–295)
P AXIS: 72 DEGREES
P AXIS: 96 DEGREES
P-R INTERVAL: 160 MS
P-R INTERVAL: 166 MS
PLATELET # BLD AUTO: 255 10(3)UL (ref 150–450)
POTASSIUM SERPL-SCNC: 3.6 MMOL/L (ref 3.5–5.1)
POTASSIUM SERPL-SCNC: 3.8 MMOL/L (ref 3.5–5.1)
PROCALCITONIN SERPL-MCNC: 0.3 NG/ML (ref ?–0.05)
PROT SERPL-MCNC: 6.2 G/DL (ref 5.7–8.2)
Q-T INTERVAL: 336 MS
Q-T INTERVAL: 392 MS
QRS DURATION: 122 MS
QRS DURATION: 130 MS
QTC CALCULATION (BEZET): 477 MS
QTC CALCULATION (BEZET): 520 MS
R AXIS: -36 DEGREES
R AXIS: -45 DEGREES
RBC # BLD AUTO: 3.17 X10(6)UL
RSV RNA SPEC NAA+PROBE: NEGATIVE
SARS-COV-2 RNA RESP QL NAA+PROBE: DETECTED
SODIUM SERPL-SCNC: 133 MMOL/L (ref 136–145)
SODIUM SERPL-SCNC: 134 MMOL/L (ref 136–145)
T AXIS: 110 DEGREES
T AXIS: 98 DEGREES
TIBC SERPL-MCNC: 246 UG/DL (ref 250–425)
TRANSFERRIN SERPL-MCNC: 165 MG/DL (ref 250–380)
TRIGL SERPL-MCNC: 116 MG/DL (ref 30–149)
TROPONIN I SERPL HS-MCNC: 109 NG/L
TROPONIN I SERPL HS-MCNC: 88 NG/L
VENTRICULAR RATE: 106 BPM
VENTRICULAR RATE: 121 BPM
VLDLC SERPL CALC-MCNC: 19 MG/DL (ref 0–30)
WBC # BLD AUTO: 13.4 X10(3) UL (ref 4–11)

## 2023-12-14 PROCEDURE — XW033E5 INTRODUCTION OF REMDESIVIR ANTI-INFECTIVE INTO PERIPHERAL VEIN, PERCUTANEOUS APPROACH, NEW TECHNOLOGY GROUP 5: ICD-10-PCS | Performed by: INTERNAL MEDICINE

## 2023-12-14 PROCEDURE — 71045 X-RAY EXAM CHEST 1 VIEW: CPT | Performed by: EMERGENCY MEDICINE

## 2023-12-14 PROCEDURE — 99223 1ST HOSP IP/OBS HIGH 75: CPT | Performed by: INTERNAL MEDICINE

## 2023-12-14 PROCEDURE — 5A0935Z ASSISTANCE WITH RESPIRATORY VENTILATION, LESS THAN 24 CONSECUTIVE HOURS: ICD-10-PCS | Performed by: HOSPITALIST

## 2023-12-14 RX ORDER — METOCLOPRAMIDE HYDROCHLORIDE 5 MG/ML
5 INJECTION INTRAMUSCULAR; INTRAVENOUS EVERY 8 HOURS PRN
Status: DISCONTINUED | OUTPATIENT
Start: 2023-12-14 | End: 2023-12-22

## 2023-12-14 RX ORDER — FUROSEMIDE 10 MG/ML
40 INJECTION INTRAMUSCULAR; INTRAVENOUS ONCE
Status: DISCONTINUED | OUTPATIENT
Start: 2023-12-14 | End: 2023-12-14

## 2023-12-14 RX ORDER — BENZONATATE 100 MG/1
200 CAPSULE ORAL 3 TIMES DAILY PRN
Status: DISCONTINUED | OUTPATIENT
Start: 2023-12-14 | End: 2023-12-22

## 2023-12-14 RX ORDER — BUMETANIDE 0.25 MG/ML
1 INJECTION INTRAMUSCULAR; INTRAVENOUS 2 TIMES DAILY
Status: DISCONTINUED | OUTPATIENT
Start: 2023-12-14 | End: 2023-12-16

## 2023-12-14 RX ORDER — ALBUTEROL SULFATE 90 UG/1
2 AEROSOL, METERED RESPIRATORY (INHALATION) EVERY 4 HOURS PRN
Status: DISCONTINUED | OUTPATIENT
Start: 2023-12-14 | End: 2023-12-22

## 2023-12-14 RX ORDER — ASPIRIN 81 MG/1
81 TABLET ORAL DAILY
Status: DISCONTINUED | OUTPATIENT
Start: 2023-12-14 | End: 2023-12-22

## 2023-12-14 RX ORDER — LISINOPRIL 20 MG/1
20 TABLET ORAL EVERY MORNING
Status: DISCONTINUED | OUTPATIENT
Start: 2023-12-14 | End: 2023-12-22

## 2023-12-14 RX ORDER — ALBUTEROL SULFATE 90 UG/1
4 AEROSOL, METERED RESPIRATORY (INHALATION) EVERY 4 HOURS PRN
Status: DISCONTINUED | OUTPATIENT
Start: 2023-12-14 | End: 2023-12-22

## 2023-12-14 RX ORDER — BUMETANIDE 0.25 MG/ML
1 INJECTION INTRAMUSCULAR; INTRAVENOUS 2 TIMES DAILY
Status: DISCONTINUED | OUTPATIENT
Start: 2023-12-14 | End: 2023-12-14

## 2023-12-14 RX ORDER — LIDOCAINE HYDROCHLORIDE 40 MG/ML
SOLUTION TOPICAL AS NEEDED
Status: DISCONTINUED | OUTPATIENT
Start: 2023-12-14 | End: 2023-12-22

## 2023-12-14 RX ORDER — CLOPIDOGREL BISULFATE 75 MG/1
75 TABLET ORAL DAILY
Status: DISCONTINUED | OUTPATIENT
Start: 2023-12-14 | End: 2023-12-22

## 2023-12-14 RX ORDER — DOCUSATE SODIUM 100 MG/1
100 CAPSULE, LIQUID FILLED ORAL 2 TIMES DAILY
Status: DISCONTINUED | OUTPATIENT
Start: 2023-12-14 | End: 2023-12-22

## 2023-12-14 RX ORDER — ROSUVASTATIN CALCIUM 20 MG/1
20 TABLET, COATED ORAL NIGHTLY
Status: DISCONTINUED | OUTPATIENT
Start: 2023-12-14 | End: 2023-12-21

## 2023-12-14 RX ORDER — ONDANSETRON 2 MG/ML
4 INJECTION INTRAMUSCULAR; INTRAVENOUS EVERY 6 HOURS PRN
Status: DISCONTINUED | OUTPATIENT
Start: 2023-12-14 | End: 2023-12-22

## 2023-12-14 RX ORDER — HEPARIN SODIUM 5000 [USP'U]/ML
5000 INJECTION, SOLUTION INTRAVENOUS; SUBCUTANEOUS EVERY 8 HOURS SCHEDULED
Status: DISCONTINUED | OUTPATIENT
Start: 2023-12-14 | End: 2023-12-22

## 2023-12-14 RX ORDER — PREGABALIN 25 MG/1
25 CAPSULE ORAL NIGHTLY
Status: DISCONTINUED | OUTPATIENT
Start: 2023-12-14 | End: 2023-12-22

## 2023-12-14 RX ORDER — POTASSIUM CHLORIDE 1.5 G/1.58G
40 POWDER, FOR SOLUTION ORAL ONCE
Status: COMPLETED | OUTPATIENT
Start: 2023-12-14 | End: 2023-12-14

## 2023-12-14 RX ORDER — FOLIC ACID 1 MG/1
1 TABLET ORAL EVERY MORNING
Status: DISCONTINUED | OUTPATIENT
Start: 2023-12-14 | End: 2023-12-22

## 2023-12-14 RX ORDER — LEFLUNOMIDE 10 MG/1
10 TABLET ORAL EVERY MORNING
Status: DISCONTINUED | OUTPATIENT
Start: 2023-12-14 | End: 2023-12-22

## 2023-12-14 RX ORDER — PREDNISONE 5 MG/1
5 TABLET ORAL 2 TIMES DAILY
Status: DISCONTINUED | OUTPATIENT
Start: 2023-12-14 | End: 2023-12-22

## 2023-12-14 RX ORDER — ACETAMINOPHEN 500 MG
500 TABLET ORAL EVERY 4 HOURS PRN
Status: DISCONTINUED | OUTPATIENT
Start: 2023-12-14 | End: 2023-12-22

## 2023-12-14 RX ORDER — BUMETANIDE 0.25 MG/ML
1 INJECTION INTRAMUSCULAR; INTRAVENOUS ONCE
Status: COMPLETED | OUTPATIENT
Start: 2023-12-14 | End: 2023-12-14

## 2023-12-14 RX ORDER — GUAIFENESIN 600 MG/1
600 TABLET, EXTENDED RELEASE ORAL 2 TIMES DAILY
Status: DISCONTINUED | OUTPATIENT
Start: 2023-12-14 | End: 2023-12-22

## 2023-12-14 NOTE — ED QUICK NOTES
Orders for admission, patient is aware of plan and ready to go upstairs. Any questions, please call ED RN Flavia at extension [phone].      Patient Covid vaccination status: Fully vaccinated     COVID Test Ordered in ED: SARS-CoV-2/Flu A and B/RSV by PCR (GeneXpert)    COVID Suspicion at Admission: N/A    Running Infusions:  None    Mental Status/LOC at time of transport: A&O x3    Other pertinent information:   CIWA score: N/A   NIH score:  N/A

## 2023-12-14 NOTE — DIETARY NOTE
NUTRITION EDUCATION NOTE     Received consult for nutrition education per CHF order set. Pt is mostly [place] speaking. On isolation for COVID+. Pt reports her  who speaks/reads English prepares her meals. He will be visiting this afternoon. Briefly reviewed basic low salt diet restrictions for CHF. Provided with Congestive Heart Failure Nutrition Therapy [facility] handout to share with spouse. Receptive to instruction.      [facility] [address], [address] [place] [address], [address] [facility], [address] [facility]

## 2023-12-14 NOTE — ED INITIAL ASSESSMENT (HPI)
Pt to ED for difficulty breathing since yesterday. Pt also vomited shortly PTA. Pt is audibly wheezing in triage, 92% on RA, put on 2L of o2.

## 2023-12-14 NOTE — PLAN OF CARE
Problem: Patient Centered Care  Goal: Patient preferences are identified and integrated in the patient's plan of care  Description: Interventions:  - What would you like us to know as we care for you?  Lives with family at home  Problem: Patient/Family Goals  Goal: Patient/Family Long Term Goal  Description: Patient's Long Term Goal: get better and go home    Interventions:  - assess q shift prn  -ramdesavir iv  - See additional Care Plan goals for specific interventions  Outcome: Progressing  Goal: Patient/Family Short Term Goal  Description: Patient's Short Term Goal:     Interventions:   - will remain without injury  -call light and personal belongings are within reach  -pain controlled  -bathroom assistance  -hourly rounds done  - See additional Care Plan goals for specific interventions  Outcome: Progressing     - Provide timely, complete, and accurate information to patient/family  - Incorporate patient and family knowledge, values, beliefs, and cultural backgrounds into the planning and delivery of care  - Encourage patient/family to participate in care and decision-making at the level they choose  - Honor patient and family perspectives and choices  Outcome: Progressing

## 2023-12-15 LAB
ALBUMIN SERPL-MCNC: 3.9 G/DL (ref 3.2–4.8)
ALBUMIN/GLOB SERPL: 1.5 {RATIO} (ref 1–2)
ALP LIVER SERPL-CCNC: 94 U/L
ALT SERPL-CCNC: <7 U/L
ANION GAP SERPL CALC-SCNC: 8 MMOL/L (ref 0–18)
AST SERPL-CCNC: 17 U/L (ref ?–34)
BASOPHILS # BLD AUTO: 0.04 X10(3) UL (ref 0–0.2)
BASOPHILS NFR BLD AUTO: 0.4 %
BILIRUB SERPL-MCNC: 0.5 MG/DL (ref 0.2–1.1)
BUN BLD-MCNC: 22 MG/DL (ref 9–23)
BUN/CREAT SERPL: 19 (ref 10–20)
CALCIUM BLD-MCNC: 9.4 MG/DL (ref 8.7–10.4)
CHLORIDE SERPL-SCNC: 104 MMOL/L (ref 98–112)
CO2 SERPL-SCNC: 25 MMOL/L (ref 21–32)
CREAT BLD-MCNC: 1.16 MG/DL
CRP SERPL-MCNC: 5.5 MG/DL (ref ?–1)
DEPRECATED HBV CORE AB SER IA-ACNC: 361.7 NG/ML
DEPRECATED RDW RBC AUTO: 49.1 FL (ref 35.1–46.3)
EGFRCR SERPLBLD CKD-EPI 2021: 48 ML/MIN/1.73M2 (ref 60–?)
EOSINOPHIL # BLD AUTO: 0.08 X10(3) UL (ref 0–0.7)
EOSINOPHIL NFR BLD AUTO: 0.9 %
ERYTHROCYTE [DISTWIDTH] IN BLOOD BY AUTOMATED COUNT: 17.6 % (ref 11–15)
GLOBULIN PLAS-MCNC: 2.6 G/DL (ref 2.8–4.4)
GLUCOSE BLD-MCNC: 123 MG/DL (ref 70–99)
HCT VFR BLD AUTO: 25.7 %
HGB BLD-MCNC: 8.3 G/DL
IMM GRANULOCYTES # BLD AUTO: 0.03 X10(3) UL (ref 0–1)
IMM GRANULOCYTES NFR BLD: 0.3 %
LDH SERPL L TO P-CCNC: 234 U/L
LYMPHOCYTES # BLD AUTO: 1.44 X10(3) UL (ref 1–4)
LYMPHOCYTES NFR BLD AUTO: 15.4 %
MCH RBC QN AUTO: 25.4 PG (ref 26–34)
MCHC RBC AUTO-ENTMCNC: 32.3 G/DL (ref 31–37)
MCV RBC AUTO: 78.6 FL
MONOCYTES # BLD AUTO: 0.78 X10(3) UL (ref 0.1–1)
MONOCYTES NFR BLD AUTO: 8.3 %
NEUTROPHILS # BLD AUTO: 7.01 X10 (3) UL (ref 1.5–7.7)
NEUTROPHILS # BLD AUTO: 7.01 X10(3) UL (ref 1.5–7.7)
NEUTROPHILS NFR BLD AUTO: 74.7 %
OSMOLALITY SERPL CALC.SUM OF ELEC: 289 MOSM/KG (ref 275–295)
PLATELET # BLD AUTO: 278 10(3)UL (ref 150–450)
POTASSIUM SERPL-SCNC: 4 MMOL/L (ref 3.5–5.1)
POTASSIUM SERPL-SCNC: 4 MMOL/L (ref 3.5–5.1)
PROT SERPL-MCNC: 6.5 G/DL (ref 5.7–8.2)
RBC # BLD AUTO: 3.27 X10(6)UL
SODIUM SERPL-SCNC: 137 MMOL/L (ref 136–145)
WBC # BLD AUTO: 9.4 X10(3) UL (ref 4–11)

## 2023-12-15 PROCEDURE — 99233 SBSQ HOSP IP/OBS HIGH 50: CPT | Performed by: INTERNAL MEDICINE

## 2023-12-15 RX ORDER — VANCOMYCIN HYDROCHLORIDE
25 ONCE
Status: COMPLETED | OUTPATIENT
Start: 2023-12-15 | End: 2023-12-15

## 2023-12-15 RX ORDER — MELATONIN
325
Status: DISCONTINUED | OUTPATIENT
Start: 2023-12-15 | End: 2023-12-22

## 2023-12-15 NOTE — CDS QUERY
...How to answer this Query:     1.) DON'T CLICK COSIGN BUTTON FIRST  2.) Click \"3 dots. Veronica Shell Veronica Shell \" to the right of cosign button and click EDIT on the toolbar.  2.) Type an \"X\" in the bracket for the diagnosis that applies. (You may also add additional clinical details as you feel necessary to substantiate your response). 3.) Finally click \"Sign\" to complete response. Thank Marquis Jolly  Dear Doctor Rosie Claros (X) DIAGNOSIS THAT APPLY. (  X  ) Acute Diastolic Heart Failure  (    ) Acute on Chronic Diastolic Heart Failure   (    ) Other - please specify:         Risk Factors: HTN, CAD    Clinical Indicators: BNP -1070, Troponin -109. TTE 11/2023 with EF 99-57%, grade 2 diastolic dysfunction, normal functioning TAVR, moderate mitral stenosis with severe mitral regurgitation,    Treatment: Cardiology consult, IV Bumex        If you have any questions, please contact Clinical :  Ashia Stover at [phone]     Thank You!      THIS FORM IS A PERMANENT PART OF THE MEDICAL RECORD

## 2023-12-15 NOTE — PLAN OF CARE
Patient A&Ox4, now on room air. Reported being dyspneic while ambulating to bathroom. Currently on IV remdesivir and IV bumex. Safety precautions maintained, call light and personal belongings within reach. Continuing to monitor. Problem: Patient Centered Care  Goal: Patient preferences are identified and integrated in the patient's plan of care  Description: Interventions:  - What would you like us to know as we care for you? From home w/ family  - Provide timely, complete, and accurate information to patient/family  - Incorporate patient and family knowledge, values, beliefs, and cultural backgrounds into the planning and delivery of care  - Encourage patient/family to participate in care and decision-making at the level they choose  - Honor patient and family perspectives and choices  Outcome: Progressing     Problem: Patient/Family Goals  Goal: Patient/Family Long Term Goal  Description: Patient's Long Term Goal: to breathe with ease when moving    Interventions:  - monitor vitals  - wean off oxygen  - IV remdesivir and IVP bumex  - See additional Care Plan goals for specific interventions  Outcome: Progressing  Goal: Patient/Family Short Term Goal  Description: Patient's Short Term Goal: to go home w/ family    Interventions:   - monitor vitals  - daily weights  - See additional Care Plan goals for specific interventions  Outcome: Progressing     Problem: SAFETY ADULT - FALL  Goal: Free from fall injury  Description: INTERVENTIONS:  - Assess pt frequently for physical needs  - Identify cognitive and physical deficits and behaviors that affect risk of falls.   - [place] fall precautions as indicated by assessment.  - Educate pt/family on patient safety including physical limitations  - Instruct pt to call for assistance with activity based on assessment  - Modify environment to reduce risk of injury  - Provide assistive devices as appropriate  - Consider OT/PT consult to assist with strengthening/mobility  - Encourage toileting schedule  Outcome: Progressing     Problem: DISCHARGE PLANNING  Goal: Parent/family are prepared for discharge  Description: Interventions:  - Complete Hearing screen(s)  - Complete  Screens  - Complete Car Seat Challenge per policy  - Complete CCHD screening  - Complete education with parent/legal guardian  - Teach family how to prepare feedings  - Teach family how to administer medications  - Obtain prescriptions and verify correct dosage of home medications  - Build confidence of parent/family by encouraging them to provide cares  - Administer immunizations and RSV prophylaxis as ordered  - Provide education handouts and proof of immunizations to parent/legal guardian  - Facilitate outpatient follow-up appointments  - Consult Case Management and Social Work for medical and insurance needs  Outcome: Progressing

## 2023-12-15 NOTE — PLAN OF CARE
Problem: Patient Centered Care  Goal: Patient preferences are identified and integrated in the patient's plan of care  Description: Interventions:  - What would you like us to know as we care for you? I was born in [facility]  - Provide timely, complete, and accurate information to patient/family  - Incorporate patient and family knowledge, values, beliefs, and cultural backgrounds into the planning and delivery of care  - Encourage patient/family to participate in care and decision-making at the level they choose  - Honor patient and family perspectives and choices  Outcome: Progressing     Problem: Patient/Family Goals  Goal: Patient/Family Long Term Goal  Description: Patient's Long Term Goal: get better and go home    Interventions:  - assess q shift prn  -IV ramdesavir  -ID consult  -up to chair for meals  - See additional Care Plan goals for specific interventions  Outcome: Progressing  Goal: Patient/Family Short Term Goal  Description: Patient's Short Term Goal: will remain without injury    Interventions:   - call light and personal belongings are within reach  -pain controlled  -bathroom assistance  -hourly rounds done  - See additional Care Plan goals for specific interventions  Outcome: Progressing     Problem: SAFETY ADULT - FALL  Goal: Free from fall injury  Description: INTERVENTIONS:  - Assess pt frequently for physical needs  - Identify cognitive and physical deficits and behaviors that affect risk of falls.   - [place] fall precautions as indicated by assessment.  - Educate pt/family on patient safety including physical limitations  - Instruct pt to call for assistance with activity based on assessment  - Modify environment to reduce risk of injury  - Provide assistive devices as appropriate  - Consider OT/PT consult to assist with strengthening/mobility  - Encourage toileting schedule  Outcome: Progressing     Problem: DISCHARGE PLANNING  Goal: Parent/family are prepared for discharge  Description: Interventions:  - Complete Hearing screen(s)  - Complete [place] Screens  - Complete Car Seat Challenge per policy  - Complete CCHD screening  - Complete education with parent/legal guardian  - Teach family how to prepare feedings  - Teach family how to administer medications  - Obtain prescriptions and verify correct dosage of home medications  - Build confidence of parent/family by encouraging them to provide cares  - Administer immunizations and RSV prophylaxis as ordered  - Provide education handouts and proof of immunizations to parent/legal guardian  - Facilitate outpatient follow-up appointments  - Consult Case Management and Social Work for medical and insurance needs  Outcome: Progressing

## 2023-12-16 ENCOUNTER — APPOINTMENT (OUTPATIENT)
Dept: CV DIAGNOSTICS | Facility: HOSPITAL | Age: 78
End: 2023-12-16
Attending: INTERNAL MEDICINE
Payer: MEDICARE

## 2023-12-16 ENCOUNTER — APPOINTMENT (OUTPATIENT)
Dept: CV DIAGNOSTICS | Facility: HOSPITAL | Age: 78
DRG: 177 | End: 2023-12-16
Attending: INTERNAL MEDICINE
Payer: MEDICARE

## 2023-12-16 LAB
ALBUMIN SERPL-MCNC: 3.8 G/DL (ref 3.2–4.8)
ALBUMIN/GLOB SERPL: 1.6 {RATIO} (ref 1–2)
ALP LIVER SERPL-CCNC: 87 U/L
ALT SERPL-CCNC: <7 U/L
ANION GAP SERPL CALC-SCNC: 4 MMOL/L (ref 0–18)
AST SERPL-CCNC: 16 U/L (ref ?–34)
BASOPHILS # BLD AUTO: 0.04 X10(3) UL (ref 0–0.2)
BASOPHILS NFR BLD AUTO: 0.4 %
BILIRUB SERPL-MCNC: 0.4 MG/DL (ref 0.2–1.1)
BUN BLD-MCNC: 30 MG/DL (ref 9–23)
BUN/CREAT SERPL: 25.2 (ref 10–20)
CALCIUM BLD-MCNC: 9.3 MG/DL (ref 8.7–10.4)
CHLORIDE SERPL-SCNC: 106 MMOL/L (ref 98–112)
CO2 SERPL-SCNC: 27 MMOL/L (ref 21–32)
CREAT BLD-MCNC: 1.16 MG/DL
CREAT BLD-MCNC: 1.19 MG/DL
CRP SERPL-MCNC: 3.4 MG/DL (ref ?–1)
DEPRECATED HBV CORE AB SER IA-ACNC: 360.8 NG/ML
DEPRECATED RDW RBC AUTO: 49.1 FL (ref 35.1–46.3)
EGFRCR SERPLBLD CKD-EPI 2021: 47 ML/MIN/1.73M2 (ref 60–?)
EGFRCR SERPLBLD CKD-EPI 2021: 48 ML/MIN/1.73M2 (ref 60–?)
EOSINOPHIL # BLD AUTO: 0.31 X10(3) UL (ref 0–0.7)
EOSINOPHIL NFR BLD AUTO: 2.9 %
ERYTHROCYTE [DISTWIDTH] IN BLOOD BY AUTOMATED COUNT: 17.8 % (ref 11–15)
GLOBULIN PLAS-MCNC: 2.4 G/DL (ref 2.8–4.4)
GLUCOSE BLD-MCNC: 93 MG/DL (ref 70–99)
HCT VFR BLD AUTO: 25.2 %
HGB BLD-MCNC: 8.1 G/DL
IMM GRANULOCYTES # BLD AUTO: 0.03 X10(3) UL (ref 0–1)
IMM GRANULOCYTES NFR BLD: 0.3 %
LDH SERPL L TO P-CCNC: 226 U/L
LYMPHOCYTES # BLD AUTO: 2.11 X10(3) UL (ref 1–4)
LYMPHOCYTES NFR BLD AUTO: 20 %
MCH RBC QN AUTO: 25.2 PG (ref 26–34)
MCHC RBC AUTO-ENTMCNC: 32.1 G/DL (ref 31–37)
MCV RBC AUTO: 78.5 FL
MONOCYTES # BLD AUTO: 1 X10(3) UL (ref 0.1–1)
MONOCYTES NFR BLD AUTO: 9.5 %
NEUTROPHILS # BLD AUTO: 7.04 X10 (3) UL (ref 1.5–7.7)
NEUTROPHILS # BLD AUTO: 7.04 X10(3) UL (ref 1.5–7.7)
NEUTROPHILS NFR BLD AUTO: 66.9 %
OSMOLALITY SERPL CALC.SUM OF ELEC: 290 MOSM/KG (ref 275–295)
PLATELET # BLD AUTO: 263 10(3)UL (ref 150–450)
POTASSIUM SERPL-SCNC: 3.4 MMOL/L (ref 3.5–5.1)
POTASSIUM SERPL-SCNC: 4.6 MMOL/L (ref 3.5–5.1)
PROT SERPL-MCNC: 6.2 G/DL (ref 5.7–8.2)
RBC # BLD AUTO: 3.21 X10(6)UL
SODIUM SERPL-SCNC: 137 MMOL/L (ref 136–145)
WBC # BLD AUTO: 10.5 X10(3) UL (ref 4–11)

## 2023-12-16 PROCEDURE — 93306 TTE W/DOPPLER COMPLETE: CPT | Performed by: INTERNAL MEDICINE

## 2023-12-16 PROCEDURE — 99233 SBSQ HOSP IP/OBS HIGH 50: CPT | Performed by: INTERNAL MEDICINE

## 2023-12-16 RX ORDER — POTASSIUM CHLORIDE 20 MEQ/1
40 TABLET, EXTENDED RELEASE ORAL EVERY 4 HOURS
Status: COMPLETED | OUTPATIENT
Start: 2023-12-16 | End: 2023-12-16

## 2023-12-16 RX ORDER — BUMETANIDE 1 MG/1
1 TABLET ORAL DAILY
Status: DISCONTINUED | OUTPATIENT
Start: 2023-12-16 | End: 2023-12-22

## 2023-12-16 NOTE — PLAN OF CARE
Pt A/O x3, stand-by assist. IV Bumex and Remdesivir continued. Echo ordered. Problem: Patient Centered Care  Goal: Patient preferences are identified and integrated in the patient's plan of care  Description: Interventions:  - What would you like us to know as we care for you? I Speak [place].   - Provide timely, complete, and accurate information to patient/family  - Incorporate patient and family knowledge, values, beliefs, and cultural backgrounds into the planning and delivery of care  - Encourage patient/family to participate in care and decision-making at the level they choose  - Honor patient and family perspectives and choices  Outcome: Progressing     Problem: Patient/Family Goals  Goal: Patient/Family Long Term Goal  Description: Patient's Long Term Goal: Get stronger. Interventions:  - Work with PT/OT. - See additional Care Plan goals for specific interventions  Outcome: Progressing  Goal: Patient/Family Short Term Goal  Description: Patient's Short Term Goal: Ambulate often. Interventions:   - Work with staff to eat all scheduled meals up in the chair.   - See additional Care Plan goals for specific interventions  Outcome: Progressing     Problem: SAFETY ADULT - FALL  Goal: Free from fall injury  Description: INTERVENTIONS:  - Assess pt frequently for physical needs  - Identify cognitive and physical deficits and behaviors that affect risk of falls.   - [place] fall precautions as indicated by assessment.  - Educate pt/family on patient safety including physical limitations  - Instruct pt to call for assistance with activity based on assessment  - Modify environment to reduce risk of injury  - Provide assistive devices as appropriate  - Consider OT/PT consult to assist with strengthening/mobility  - Encourage toileting schedule  Outcome: Progressing     Problem: DISCHARGE PLANNING  Goal: Parent/family are prepared for discharge  Description: Interventions:  - Complete Hearing screen(s)  - Complete  Screens  - Complete Car Seat Challenge per policy  - Complete CCHD screening  - Complete education with parent/legal guardian  - Teach family how to prepare feedings  - Teach family how to administer medications  - Obtain prescriptions and verify correct dosage of home medications  - Build confidence of parent/family by encouraging them to provide cares  - Administer immunizations and RSV prophylaxis as ordered  - Provide education handouts and proof of immunizations to parent/legal guardian  - Facilitate outpatient follow-up appointments  - Consult Case Management and Social Work for medical and insurance needs  Outcome: Progressing

## 2023-12-16 NOTE — PLAN OF CARE
Patient alert and oriented. Ambulating independently in room with walker. PO bumex. IV Remdesivir and IV Vancomycin. Echo done in the morning. Plan to discharge home when medically clear. Up to chair during the day. Resting in chair with fall precautions in place and call light in reach. Problem: Patient Centered Care  Goal: Patient preferences are identified and integrated in the patient's plan of care  Description: Interventions:  - What would you like us to know as we care for you?   - Provide timely, complete, and accurate information to patient/family  - Incorporate patient and family knowledge, values, beliefs, and cultural backgrounds into the planning and delivery of care  - Encourage patient/family to participate in care and decision-making at the level they choose  - Honor patient and family perspectives and choices  Outcome: Progressing     Problem: SAFETY ADULT - FALL  Goal: Free from fall injury  Description: INTERVENTIONS:  - Assess pt frequently for physical needs  - Identify cognitive and physical deficits and behaviors that affect risk of falls.   - [place] fall precautions as indicated by assessment.  - Educate pt/family on patient safety including physical limitations  - Instruct pt to call for assistance with activity based on assessment  - Modify environment to reduce risk of injury  - Provide assistive devices as appropriate  - Consider OT/PT consult to assist with strengthening/mobility  - Encourage toileting schedule  Outcome: Progressing     Problem: DISCHARGE PLANNING  Goal: Parent/family are prepared for discharge  Description: Interventions:  - Complete Hearing screen(s)  - Complete [place] Screens  - Complete Car Seat Challenge per policy  - Complete CCHD screening  - Complete education with parent/legal guardian  - Teach family how to prepare feedings  - Teach family how to administer medications  - Obtain prescriptions and verify correct dosage of home medications  - Build confidence of parent/family by encouraging them to provide cares  - Administer immunizations and RSV prophylaxis as ordered  - Provide education handouts and proof of immunizations to parent/legal guardian  - Facilitate outpatient follow-up appointments  - Consult Case Management and Social Work for medical and insurance needs  Outcome: Progressing

## 2023-12-17 LAB
ALBUMIN SERPL-MCNC: 3.8 G/DL (ref 3.2–4.8)
ALBUMIN/GLOB SERPL: 1.7 {RATIO} (ref 1–2)
ALP LIVER SERPL-CCNC: 83 U/L
ALT SERPL-CCNC: <7 U/L
ANION GAP SERPL CALC-SCNC: 5 MMOL/L (ref 0–18)
AST SERPL-CCNC: 17 U/L (ref ?–34)
BASOPHILS # BLD AUTO: 0.04 X10(3) UL (ref 0–0.2)
BASOPHILS NFR BLD AUTO: 0.5 %
BILIRUB SERPL-MCNC: 0.4 MG/DL (ref 0.2–1.1)
BUN BLD-MCNC: 32 MG/DL (ref 9–23)
BUN/CREAT SERPL: 26.4 (ref 10–20)
CALCIUM BLD-MCNC: 9.1 MG/DL (ref 8.7–10.4)
CHLORIDE SERPL-SCNC: 109 MMOL/L (ref 98–112)
CO2 SERPL-SCNC: 25 MMOL/L (ref 21–32)
CREAT BLD-MCNC: 1.21 MG/DL
CRP SERPL-MCNC: 3.1 MG/DL (ref ?–1)
DEPRECATED HBV CORE AB SER IA-ACNC: 354.7 NG/ML
DEPRECATED RDW RBC AUTO: 51.9 FL (ref 35.1–46.3)
EGFRCR SERPLBLD CKD-EPI 2021: 46 ML/MIN/1.73M2 (ref 60–?)
EOSINOPHIL # BLD AUTO: 0.06 X10(3) UL (ref 0–0.7)
EOSINOPHIL NFR BLD AUTO: 0.7 %
ERYTHROCYTE [DISTWIDTH] IN BLOOD BY AUTOMATED COUNT: 18 % (ref 11–15)
GLOBULIN PLAS-MCNC: 2.3 G/DL (ref 2.8–4.4)
GLUCOSE BLD-MCNC: 107 MG/DL (ref 70–99)
HCT VFR BLD AUTO: 24.1 %
HGB BLD-MCNC: 7.8 G/DL
IMM GRANULOCYTES # BLD AUTO: 0.02 X10(3) UL (ref 0–1)
IMM GRANULOCYTES NFR BLD: 0.2 %
LDH SERPL L TO P-CCNC: 223 U/L
LYMPHOCYTES # BLD AUTO: 1.95 X10(3) UL (ref 1–4)
LYMPHOCYTES NFR BLD AUTO: 22.1 %
MCH RBC QN AUTO: 26 PG (ref 26–34)
MCHC RBC AUTO-ENTMCNC: 32.4 G/DL (ref 31–37)
MCV RBC AUTO: 80.3 FL
MONOCYTES # BLD AUTO: 0.89 X10(3) UL (ref 0.1–1)
MONOCYTES NFR BLD AUTO: 10.1 %
NEUTROPHILS # BLD AUTO: 5.87 X10 (3) UL (ref 1.5–7.7)
NEUTROPHILS # BLD AUTO: 5.87 X10(3) UL (ref 1.5–7.7)
NEUTROPHILS NFR BLD AUTO: 66.4 %
OSMOLALITY SERPL CALC.SUM OF ELEC: 295 MOSM/KG (ref 275–295)
PLATELET # BLD AUTO: 266 10(3)UL (ref 150–450)
POTASSIUM SERPL-SCNC: 4 MMOL/L (ref 3.5–5.1)
PROT SERPL-MCNC: 6.1 G/DL (ref 5.7–8.2)
RBC # BLD AUTO: 3 X10(6)UL
SODIUM SERPL-SCNC: 139 MMOL/L (ref 136–145)
WBC # BLD AUTO: 8.8 X10(3) UL (ref 4–11)

## 2023-12-17 PROCEDURE — 99233 SBSQ HOSP IP/OBS HIGH 50: CPT | Performed by: INTERNAL MEDICINE

## 2023-12-17 NOTE — PLAN OF CARE
Patient alert and oriented. IV Vanco for positive blood cultures. Daughter updated on plan of care. Family at the bedside in the afternoon. Patient ambulating independently in room. Up to chair in the afternoon. Plan to discharge home when medically clear. Problem: Patient Centered Care  Goal: Patient preferences are identified and integrated in the patient's plan of care  Description: Interventions:  - What would you like us to know as we care for you? I want to walk in the hallways. - Provide timely, complete, and accurate information to patient/family  - Incorporate patient and family knowledge, values, beliefs, and cultural backgrounds into the planning and delivery of care  - Encourage patient/family to participate in care and decision-making at the level they choose  - Honor patient and family perspectives and choices  Outcome: Progressing     Problem: SAFETY ADULT - FALL  Goal: Free from fall injury  Description: INTERVENTIONS:  - Assess pt frequently for physical needs  - Identify cognitive and physical deficits and behaviors that affect risk of falls.   - [place] fall precautions as indicated by assessment.  - Educate pt/family on patient safety including physical limitations  - Instruct pt to call for assistance with activity based on assessment  - Modify environment to reduce risk of injury  - Provide assistive devices as appropriate  - Consider OT/PT consult to assist with strengthening/mobility  - Encourage toileting schedule  Outcome: Progressing     Problem: DISCHARGE PLANNING  Goal: Parent/family are prepared for discharge  Description: Interventions:  - Complete Hearing screen(s)  - Complete  Screens  - Complete Car Seat Challenge per policy  - Complete CCHD screening  - Complete education with parent/legal guardian  - Teach family how to prepare feedings  - Teach family how to administer medications  - Obtain prescriptions and verify correct dosage of home medications  - Build confidence of parent/family by encouraging them to provide cares  - Administer immunizations and RSV prophylaxis as ordered  - Provide education handouts and proof of immunizations to parent/legal guardian  - Facilitate outpatient follow-up appointments  - Consult Case Management and Social Work for medical and insurance needs  Outcome: Progressing

## 2023-12-17 NOTE — PLAN OF CARE
Pt A/Ox 3 overnight, self. IV vanco continued. Problem: Patient Centered Care  Goal: Patient preferences are identified and integrated in the patient's plan of care  Description: Interventions:  - What would you like us to know as we care for you? I would like to return home. - Provide timely, complete, and accurate information to patient/family  - Incorporate patient and family knowledge, values, beliefs, and cultural backgrounds into the planning and delivery of care  - Encourage patient/family to participate in care and decision-making at the level they choose  - Honor patient and family perspectives and choices  Outcome: Progressing     Problem: Patient/Family Goals  Goal: Patient/Family Long Term Goal  Description: Patient's Long Term Goal: Get stronger. Interventions:  - Work with PT/OT. - See additional Care Plan goals for specific interventions  Outcome: Progressing  Goal: Patient/Family Short Term Goal  Description: Patient's Short Term Goal: Ambulate often. Interventions:   - Work with staff to ambulate often in the room. - See additional Care Plan goals for specific interventions  Outcome: Progressing     Problem: SAFETY ADULT - FALL  Goal: Free from fall injury  Description: INTERVENTIONS:  - Assess pt frequently for physical needs  - Identify cognitive and physical deficits and behaviors that affect risk of falls.   - [place] fall precautions as indicated by assessment.  - Educate pt/family on patient safety including physical limitations  - Instruct pt to call for assistance with activity based on assessment  - Modify environment to reduce risk of injury  - Provide assistive devices as appropriate  - Consider OT/PT consult to assist with strengthening/mobility  - Encourage toileting schedule  Outcome: Progressing     Problem: DISCHARGE PLANNING  Goal: Parent/family are prepared for discharge  Description: Interventions:  - Complete Hearing screen(s)  - Complete [place] Screens  - Complete Car Seat Challenge per policy  - Complete CCHD screening  - Complete education with parent/legal guardian  - Teach family how to prepare feedings  - Teach family how to administer medications  - Obtain prescriptions and verify correct dosage of home medications  - Build confidence of parent/family by encouraging them to provide cares  - Administer immunizations and RSV prophylaxis as ordered  - Provide education handouts and proof of immunizations to parent/legal guardian  - Facilitate outpatient follow-up appointments  - Consult Case Management and Social Work for medical and insurance needs  Outcome: Progressing

## 2023-12-18 ENCOUNTER — APPOINTMENT (OUTPATIENT)
Dept: CV DIAGNOSTICS | Facility: HOSPITAL | Age: 78
End: 2023-12-18
Attending: INTERNAL MEDICINE
Payer: MEDICARE

## 2023-12-18 ENCOUNTER — APPOINTMENT (OUTPATIENT)
Dept: CV DIAGNOSTICS | Facility: HOSPITAL | Age: 78
DRG: 177 | End: 2023-12-18
Attending: INTERNAL MEDICINE
Payer: MEDICARE

## 2023-12-18 LAB
ALBUMIN SERPL-MCNC: 3.8 G/DL (ref 3.2–4.8)
ALBUMIN/GLOB SERPL: 1.5 {RATIO} (ref 1–2)
ALP LIVER SERPL-CCNC: 86 U/L
ALT SERPL-CCNC: 8 U/L
ANION GAP SERPL CALC-SCNC: 11 MMOL/L (ref 0–18)
AST SERPL-CCNC: 22 U/L (ref ?–34)
BILIRUB SERPL-MCNC: 0.4 MG/DL (ref 0.2–1.1)
BUN BLD-MCNC: 38 MG/DL (ref 9–23)
BUN/CREAT SERPL: 31.9 (ref 10–20)
CALCIUM BLD-MCNC: 9.2 MG/DL (ref 8.7–10.4)
CHLORIDE SERPL-SCNC: 105 MMOL/L (ref 98–112)
CK SERPL-CCNC: 29 U/L
CO2 SERPL-SCNC: 23 MMOL/L (ref 21–32)
CREAT BLD-MCNC: 1.19 MG/DL
EGFRCR SERPLBLD CKD-EPI 2021: 47 ML/MIN/1.73M2 (ref 60–?)
GLOBULIN PLAS-MCNC: 2.5 G/DL (ref 2.8–4.4)
GLUCOSE BLD-MCNC: 107 MG/DL (ref 70–99)
OSMOLALITY SERPL CALC.SUM OF ELEC: 298 MOSM/KG (ref 275–295)
POTASSIUM SERPL-SCNC: 4.1 MMOL/L (ref 3.5–5.1)
PROT SERPL-MCNC: 6.3 G/DL (ref 5.7–8.2)
SODIUM SERPL-SCNC: 139 MMOL/L (ref 136–145)

## 2023-12-18 PROCEDURE — 99233 SBSQ HOSP IP/OBS HIGH 50: CPT | Performed by: INTERNAL MEDICINE

## 2023-12-18 NOTE — PLAN OF CARE
Patient is Aox4 on RA. No complaints of SOB or dyspnea. BLE edema. Indep. Heparin SubQ. Remote tele no calls. Voiding freely up to the bathroom. Q[place]/English Speaking. Plan for Home when med clear. Problem: Patient Centered Care  Goal: Patient preferences are identified and integrated in the patient's plan of care  Description: Interventions:  - What would you like us to know as we care for you?   - Provide timely, complete, and accurate information to patient/family  - Incorporate patient and family knowledge, values, beliefs, and cultural backgrounds into the planning and delivery of care  - Encourage patient/family to participate in care and decision-making at the level they choose  - Honor patient and family perspectives and choices  Outcome: Progressing       Problem: SAFETY ADULT - FALL  Goal: Free from fall injury  Description: INTERVENTIONS:  - Assess pt frequently for physical needs  - Identify cognitive and physical deficits and behaviors that affect risk of falls.   - [place] fall precautions as indicated by assessment.  - Educate pt/family on patient safety including physical limitations  - Instruct pt to call for assistance with activity based on assessment  - Modify environment to reduce risk of injury  - Provide assistive devices as appropriate  - Consider OT/PT consult to assist with strengthening/mobility  - Encourage toileting schedule  Outcome: Progressing     Problem: DISCHARGE PLANNING  Goal: Parent/family are prepared for discharge  Description: Interventions:  - Complete Hearing screen(s)  - Complete  Screens  - Complete Car Seat Challenge per policy  - Complete CCHD screening  - Complete education with parent/legal guardian  - Teach family how to prepare feedings  - Teach family how to administer medications  - Obtain prescriptions and verify correct dosage of home medications  - Build confidence of parent/family by encouraging them to provide cares  - Administer immunizations and RSV prophylaxis as ordered  - Provide education handouts and proof of immunizations to parent/legal guardian  - Facilitate outpatient follow-up appointments  - Consult Case Management and Social Work for medical and insurance needs  Outcome: Progressing

## 2023-12-18 NOTE — PLAN OF CARE
Alert x3-4. SHAWNA tomorrow NPO at midnight. IV abx. VSS. Problem: Patient Centered Care  Goal: Patient preferences are identified and integrated in the patient's plan of care  Description: Interventions:  - What would you like us to know as we care for you? With family  - Provide timely, complete, and accurate information to patient/family  - Incorporate patient and family knowledge, values, beliefs, and cultural backgrounds into the planning and delivery of care  - Encourage patient/family to participate in care and decision-making at the level they choose  - Honor patient and family perspectives and choices  Outcome: Progressing     Problem: Patient/Family Goals  Goal: Patient/Family Long Term Goal  Description: Patient's Long Term Goal:     Interventions:  -   - See additional Care Plan goals for specific interventions  Outcome: Progressing  Goal: Patient/Family Short Term Goal  Description: Patient's Short Term Goal:     Interventions:   -   - See additional Care Plan goals for specific interventions  Outcome: Progressing     Problem: SAFETY ADULT - FALL  Goal: Free from fall injury  Description: INTERVENTIONS:  - Assess pt frequently for physical needs  - Identify cognitive and physical deficits and behaviors that affect risk of falls.   - [place] fall precautions as indicated by assessment.  - Educate pt/family on patient safety including physical limitations  - Instruct pt to call for assistance with activity based on assessment  - Modify environment to reduce risk of injury  - Provide assistive devices as appropriate  - Consider OT/PT consult to assist with strengthening/mobility  - Encourage toileting schedule  Outcome: Progressing     Problem: DISCHARGE PLANNING  Goal: Parent/family are prepared for discharge  Description: Interventions:  - Complete Hearing screen(s)  - Complete  Screens  - Complete Car Seat Challenge per policy  - Complete CCHD screening  - Complete education with parent/legal guardian  - Teach family how to prepare feedings  - Teach family how to administer medications  - Obtain prescriptions and verify correct dosage of home medications  - Build confidence of parent/family by encouraging them to provide cares  - Administer immunizations and RSV prophylaxis as ordered  - Provide education handouts and proof of immunizations to parent/legal guardian  - Facilitate outpatient follow-up appointments  - Consult Case Management and Social Work for medical and insurance needs  Outcome: Progressing

## 2023-12-19 LAB
ALBUMIN SERPL-MCNC: 4 G/DL (ref 3.2–4.8)
ALBUMIN/GLOB SERPL: 1.6 {RATIO} (ref 1–2)
ALP LIVER SERPL-CCNC: 89 U/L
ALT SERPL-CCNC: 8 U/L
ANION GAP SERPL CALC-SCNC: 3 MMOL/L (ref 0–18)
ANION GAP SERPL CALC-SCNC: 7 MMOL/L (ref 0–18)
AST SERPL-CCNC: 20 U/L (ref ?–34)
BASOPHILS # BLD AUTO: 0.06 X10(3) UL (ref 0–0.2)
BASOPHILS NFR BLD AUTO: 0.6 %
BILIRUB SERPL-MCNC: 0.5 MG/DL (ref 0.2–1.1)
BUN BLD-MCNC: 39 MG/DL (ref 9–23)
BUN BLD-MCNC: 41 MG/DL (ref 9–23)
BUN/CREAT SERPL: 29.8 (ref 10–20)
BUN/CREAT SERPL: 30.8 (ref 10–20)
CALCIUM BLD-MCNC: 9.2 MG/DL (ref 8.7–10.4)
CALCIUM BLD-MCNC: 9.5 MG/DL (ref 8.7–10.4)
CHLORIDE SERPL-SCNC: 109 MMOL/L (ref 98–112)
CHLORIDE SERPL-SCNC: 109 MMOL/L (ref 98–112)
CO2 SERPL-SCNC: 24 MMOL/L (ref 21–32)
CO2 SERPL-SCNC: 25 MMOL/L (ref 21–32)
CREAT BLD-MCNC: 1.31 MG/DL
CREAT BLD-MCNC: 1.33 MG/DL
DEPRECATED RDW RBC AUTO: 53.4 FL (ref 35.1–46.3)
EGFRCR SERPLBLD CKD-EPI 2021: 41 ML/MIN/1.73M2 (ref 60–?)
EGFRCR SERPLBLD CKD-EPI 2021: 41 ML/MIN/1.73M2 (ref 60–?)
EOSINOPHIL # BLD AUTO: 0.12 X10(3) UL (ref 0–0.7)
EOSINOPHIL NFR BLD AUTO: 1.1 %
ERYTHROCYTE [DISTWIDTH] IN BLOOD BY AUTOMATED COUNT: 18.4 % (ref 11–15)
GLOBULIN PLAS-MCNC: 2.5 G/DL (ref 2.8–4.4)
GLUCOSE BLD-MCNC: 107 MG/DL (ref 70–99)
GLUCOSE BLD-MCNC: 118 MG/DL (ref 70–99)
HCT VFR BLD AUTO: 26 %
HGB BLD-MCNC: 8.3 G/DL
IMM GRANULOCYTES # BLD AUTO: 0.05 X10(3) UL (ref 0–1)
IMM GRANULOCYTES NFR BLD: 0.5 %
LYMPHOCYTES # BLD AUTO: 2.13 X10(3) UL (ref 1–4)
LYMPHOCYTES NFR BLD AUTO: 20.2 %
MCH RBC QN AUTO: 26.1 PG (ref 26–34)
MCHC RBC AUTO-ENTMCNC: 31.9 G/DL (ref 31–37)
MCV RBC AUTO: 81.8 FL
MONOCYTES # BLD AUTO: 0.78 X10(3) UL (ref 0.1–1)
MONOCYTES NFR BLD AUTO: 7.4 %
NEUTROPHILS # BLD AUTO: 7.41 X10 (3) UL (ref 1.5–7.7)
NEUTROPHILS # BLD AUTO: 7.41 X10(3) UL (ref 1.5–7.7)
NEUTROPHILS NFR BLD AUTO: 70.2 %
OSMOLALITY SERPL CALC.SUM OF ELEC: 294 MOSM/KG (ref 275–295)
OSMOLALITY SERPL CALC.SUM OF ELEC: 301 MOSM/KG (ref 275–295)
PLATELET # BLD AUTO: 301 10(3)UL (ref 150–450)
POTASSIUM SERPL-SCNC: 4.2 MMOL/L (ref 3.5–5.1)
POTASSIUM SERPL-SCNC: 4.3 MMOL/L (ref 3.5–5.1)
PROT SERPL-MCNC: 6.5 G/DL (ref 5.7–8.2)
RBC # BLD AUTO: 3.18 X10(6)UL
SODIUM SERPL-SCNC: 137 MMOL/L (ref 136–145)
SODIUM SERPL-SCNC: 140 MMOL/L (ref 136–145)
WBC # BLD AUTO: 10.6 X10(3) UL (ref 4–11)

## 2023-12-19 PROCEDURE — 99233 SBSQ HOSP IP/OBS HIGH 50: CPT | Performed by: INTERNAL MEDICINE

## 2023-12-19 RX ORDER — SODIUM CHLORIDE 0.9 % (FLUSH) 0.9 %
10 SYRINGE (ML) INJECTION AS NEEDED
Status: DISCONTINUED | OUTPATIENT
Start: 2023-12-20 | End: 2023-12-22

## 2023-12-19 RX ORDER — SODIUM CHLORIDE 9 MG/ML
INJECTION, SOLUTION INTRAVENOUS
Status: ACTIVE | OUTPATIENT
Start: 2023-12-20 | End: 2023-12-20

## 2023-12-19 NOTE — PLAN OF CARE
Alert x4. Denies pain. Ambulating in room. Plan to have SHAWNA tomorrow. NPO at midnight. IV abx. VSS. Problem: Patient Centered Care  Goal: Patient preferences are identified and integrated in the patient's plan of care  Description: Interventions:  - What would you like us to know as we care for you?   - Provide timely, complete, and accurate information to patient/family  - Incorporate patient and family knowledge, values, beliefs, and cultural backgrounds into the planning and delivery of care  - Encourage patient/family to participate in care and decision-making at the level they choose  - Honor patient and family perspectives and choices  Outcome: Progressing     Problem: Patient/Family Goals  Goal: Patient/Family Long Term Goal  Description: Patient's Long Term Goal:     Interventions:  -   - See additional Care Plan goals for specific interventions  Outcome: Progressing  Goal: Patient/Family Short Term Goal  Description: Patient's Short Term Goal:     Interventions:   -   - See additional Care Plan goals for specific interventions  Outcome: Progressing     Problem: SAFETY ADULT - FALL  Goal: Free from fall injury  Description: INTERVENTIONS:  - Assess pt frequently for physical needs  - Identify cognitive and physical deficits and behaviors that affect risk of falls.   - [place] fall precautions as indicated by assessment.  - Educate pt/family on patient safety including physical limitations  - Instruct pt to call for assistance with activity based on assessment  - Modify environment to reduce risk of injury  - Provide assistive devices as appropriate  - Consider OT/PT consult to assist with strengthening/mobility  - Encourage toileting schedule  Outcome: Progressing     Problem: DISCHARGE PLANNING  Goal: Parent/family are prepared for discharge  Description: Interventions:  - Complete Hearing screen(s)  - Complete  Screens  - Complete Car Seat Challenge per policy  - Complete CCHD screening  - Complete education with parent/legal guardian  - Teach family how to prepare feedings  - Teach family how to administer medications  - Obtain prescriptions and verify correct dosage of home medications  - Build confidence of parent/family by encouraging them to provide cares  - Administer immunizations and RSV prophylaxis as ordered  - Provide education handouts and proof of immunizations to parent/legal guardian  - Facilitate outpatient follow-up appointments  - Consult Case Management and Social Work for medical and insurance needs  Outcome: Progressing     Problem: RESPIRATORY - ADULT  Goal: Achieves optimal ventilation and oxygenation  Description: INTERVENTIONS:  - Assess for changes in respiratory status  - Assess for changes in mentation and behavior  - Position to facilitate oxygenation and minimize respiratory effort  - Oxygen supplementation based on oxygen saturation or ABGs  - Provide Smoking Cessation handout, if applicable  - Encourage broncho-pulmonary hygiene including cough, deep breathe, Incentive Spirometry  - Assess the need for suctioning and perform as needed  - Assess and instruct to report SOB or any respiratory difficulty  - Respiratory Therapy support as indicated  - Manage/alleviate anxiety  - Monitor for signs/symptoms of CO2 retention  Outcome: Progressing

## 2023-12-19 NOTE — PLAN OF CARE
Problem: SAFETY ADULT - FALL  Goal: Free from fall injury  Description: INTERVENTIONS:  - Assess pt frequently for physical needs  - Identify cognitive and physical deficits and behaviors that affect risk of falls. - [place] fall precautions as indicated by assessment.  - Educate pt/family on patient safety including physical limitations  - Instruct pt to call for assistance with activity based on assessment  - Modify environment to reduce risk of injury  - Provide assistive devices as appropriate  - Consider OT/PT consult to assist with strengthening/mobility  - Encourage toileting schedule  12/19/2023 0302 by Mary Dias RN  Outcome: Progressing     Problem: RESPIRATORY - ADULT  Goal: Achieves optimal ventilation and oxygenation  Description: INTERVENTIONS:  - Assess for changes in respiratory status  - Assess for changes in mentation and behavior  - Position to facilitate oxygenation and minimize respiratory effort  - Oxygen supplementation based on oxygen saturation or ABGs  - Provide Smoking Cessation handout, if applicable  - Encourage broncho-pulmonary hygiene including cough, deep breathe, Incentive Spirometry  - Assess the need for suctioning and perform as needed  - Assess and instruct to report SOB or any respiratory difficulty  - Respiratory Therapy support as indicated  - Manage/alleviate anxiety  - Monitor for signs/symptoms of CO2 retention  Outcome: Progressing     Received awake,oriented. Tylenol given for back pain. Slept fairly during the night. Plans for SHAWNA today. Kept on NPO post midnight.

## 2023-12-20 ENCOUNTER — APPOINTMENT (OUTPATIENT)
Dept: CV DIAGNOSTICS | Facility: HOSPITAL | Age: 78
End: 2023-12-20
Attending: INTERNAL MEDICINE
Payer: MEDICARE

## 2023-12-20 ENCOUNTER — APPOINTMENT (OUTPATIENT)
Dept: CV DIAGNOSTICS | Facility: HOSPITAL | Age: 78
DRG: 177 | End: 2023-12-20
Attending: INTERNAL MEDICINE
Payer: MEDICARE

## 2023-12-20 ENCOUNTER — APPOINTMENT (OUTPATIENT)
Dept: INTERVENTIONAL RADIOLOGY/VASCULAR | Facility: HOSPITAL | Age: 78
End: 2023-12-20
Attending: STUDENT IN AN ORGANIZED HEALTH CARE EDUCATION/TRAINING PROGRAM
Payer: MEDICARE

## 2023-12-20 ENCOUNTER — APPOINTMENT (OUTPATIENT)
Dept: PICC SERVICES | Facility: HOSPITAL | Age: 78
DRG: 177 | End: 2023-12-20
Attending: INTERNAL MEDICINE
Payer: MEDICARE

## 2023-12-20 ENCOUNTER — APPOINTMENT (OUTPATIENT)
Dept: INTERVENTIONAL RADIOLOGY/VASCULAR | Facility: HOSPITAL | Age: 78
DRG: 177 | End: 2023-12-20
Attending: STUDENT IN AN ORGANIZED HEALTH CARE EDUCATION/TRAINING PROGRAM
Payer: MEDICARE

## 2023-12-20 ENCOUNTER — APPOINTMENT (OUTPATIENT)
Dept: PICC SERVICES | Facility: HOSPITAL | Age: 78
End: 2023-12-20
Attending: INTERNAL MEDICINE
Payer: MEDICARE

## 2023-12-20 PROCEDURE — B24BZZ4 ULTRASONOGRAPHY OF HEART WITH AORTA, TRANSESOPHAGEAL: ICD-10-PCS | Performed by: INTERNAL MEDICINE

## 2023-12-20 PROCEDURE — 93320 DOPPLER ECHO COMPLETE: CPT | Performed by: INTERNAL MEDICINE

## 2023-12-20 PROCEDURE — 02HV33Z INSERTION OF INFUSION DEVICE INTO SUPERIOR VENA CAVA, PERCUTANEOUS APPROACH: ICD-10-PCS | Performed by: HOSPITALIST

## 2023-12-20 PROCEDURE — 93325 DOPPLER ECHO COLOR FLOW MAPG: CPT | Performed by: INTERNAL MEDICINE

## 2023-12-20 PROCEDURE — 99233 SBSQ HOSP IP/OBS HIGH 50: CPT | Performed by: HOSPITALIST

## 2023-12-20 RX ORDER — LIDOCAINE HYDROCHLORIDE 10 MG/ML
5 INJECTION, SOLUTION EPIDURAL; INFILTRATION; INTRACAUDAL; PERINEURAL
Status: COMPLETED | OUTPATIENT
Start: 2023-12-20 | End: 2023-12-20

## 2023-12-20 RX ORDER — MIDAZOLAM HYDROCHLORIDE 1 MG/ML
INJECTION INTRAMUSCULAR; INTRAVENOUS
Status: COMPLETED
Start: 2023-12-20 | End: 2023-12-20

## 2023-12-20 NOTE — PLAN OF CARE
Problem: SAFETY ADULT - FALL  Goal: Free from fall injury  Description: INTERVENTIONS:  - Assess pt frequently for physical needs  - Identify cognitive and physical deficits and behaviors that affect risk of falls. - [place] fall precautions as indicated by assessment.  - Educate pt/family on patient safety including physical limitations  - Instruct pt to call for assistance with activity based on assessment  - Modify environment to reduce risk of injury  - Provide assistive devices as appropriate  - Consider OT/PT consult to assist with strengthening/mobility  - Encourage toileting schedule  Outcome: Progressing     Problem: RESPIRATORY - ADULT  Goal: Achieves optimal ventilation and oxygenation  Description: INTERVENTIONS:  - Assess for changes in respiratory status  - Assess for changes in mentation and behavior  - Position to facilitate oxygenation and minimize respiratory effort  - Oxygen supplementation based on oxygen saturation or ABGs  - Provide Smoking Cessation handout, if applicable  - Encourage broncho-pulmonary hygiene including cough, deep breathe, Incentive Spirometry  - Assess the need for suctioning and perform as needed  - Assess and instruct to report SOB or any respiratory difficulty  - Respiratory Therapy support as indicated  - Manage/alleviate anxiety  - Monitor for signs/symptoms of CO2 retention  Outcome: Progressing     Received awake,oriented. Kept on NPO post midnight for SHAWNA today. Tylenol given for pain. Will continue to monitor patient.

## 2023-12-20 NOTE — PROCEDURES
SHAWNA performed    LV normal size and function  Right ventricle normal size and function  Aortic valve bioprosthetic previous valve in place normal functioning no evidence of vegetations no evidence of paravalvular abscess  Tricuspid normal no vegetation mild to moderate TR  Pulmonic not visualized  Mitral regurgitation severe with evidence of vegetation 0.6 cm in diameter on the posterior leaflet. Left atrial appendage no thrombus. Moderately calcified descending aorta.

## 2023-12-20 NOTE — PLAN OF CARE
Frankie Hugo is A&Ox4 on RA with no c/o of pain. Patient see by PICC line RN. See notes. Plan for SHAWNA today. Plan to discharge home with long term IV abx use. Problem: Patient Centered Care  Goal: Patient preferences are identified and integrated in the patient's plan of care  Description: Interventions:  - What would you like us to know as we care for you? Home with damily  - Provide timely, complete, and accurate information to patient/family  - Incorporate patient and family knowledge, values, beliefs, and cultural backgrounds into the planning and delivery of care  - Encourage patient/family to participate in care and decision-making at the level they choose  - Honor patient and family perspectives and choices  Outcome: Progressing     Problem: Patient/Family Goals  Goal: Patient/Family Long Term Goal  Description: Patient's Long Term Goal: improve covid symptoms    Interventions:  - follow plan of car  -medication management   - See additional Care Plan goals for specific interventions  Outcome: Progressing  Goal: Patient/Family Short Term Goal  Description: Patient's Short Term Goal: home    Interventions:   - SHAWNA  -IV abx  -monitor vitals and labs  -follow plan of care  -medication management   - See additional Care Plan goals for specific interventions  Outcome: Progressing     Problem: SAFETY ADULT - FALL  Goal: Free from fall injury  Description: INTERVENTIONS:  - Assess pt frequently for physical needs  - Identify cognitive and physical deficits and behaviors that affect risk of falls.   - [place] fall precautions as indicated by assessment.  - Educate pt/family on patient safety including physical limitations  - Instruct pt to call for assistance with activity based on assessment  - Modify environment to reduce risk of injury  - Provide assistive devices as appropriate  - Consider OT/PT consult to assist with strengthening/mobility  - Encourage toileting schedule  Outcome: Progressing     Problem: DISCHARGE PLANNING  Goal: Parent/family are prepared for discharge  Description: Interventions:  - Complete Hearing screen(s)  - Complete [place] Screens  - Complete Car Seat Challenge per policy  - Complete CCHD screening  - Complete education with parent/legal guardian  - Teach family how to prepare feedings  - Teach family how to administer medications  - Obtain prescriptions and verify correct dosage of home medications  - Build confidence of parent/family by encouraging them to provide cares  - Administer immunizations and RSV prophylaxis as ordered  - Provide education handouts and proof of immunizations to parent/legal guardian  - Facilitate outpatient follow-up appointments  - Consult Case Management and Social Work for medical and insurance needs  Outcome: Progressing     Problem: RESPIRATORY - ADULT  Goal: Achieves optimal ventilation and oxygenation  Description: INTERVENTIONS:  - Assess for changes in respiratory status  - Assess for changes in mentation and behavior  - Position to facilitate oxygenation and minimize respiratory effort  - Oxygen supplementation based on oxygen saturation or ABGs  - Provide Smoking Cessation handout, if applicable  - Encourage broncho-pulmonary hygiene including cough, deep breathe, Incentive Spirometry  - Assess the need for suctioning and perform as needed  - Assess and instruct to report SOB or any respiratory difficulty  - Respiratory Therapy support as indicated  - Manage/alleviate anxiety  - Monitor for signs/symptoms of CO2 retention  Outcome: Progressing

## 2023-12-20 NOTE — PROGRESS NOTES
Procedure hand off report given to Dianne Street RN. Dr Ifeoma Lay came to see pt /family at bedside post procedure. Pt is generally stable.

## 2023-12-20 NOTE — CM/SW NOTE
ADDENDUM: If the patient continues on Daptomycin Q24hrs the total cost will be $65 per day. The patient's daughter Tata Todd is agreeable with the cost. The patient has no Medicare part D coverage. Social work met with the patient at bedside and spoke with the daughter Tata Todd via phone to discuss IV abx needs. The patient and family would like to do IV abx at home and are agreeable to [employer]. Current IV abx is Daptomycin 500mg Q24 but will need final script. Social work sent infusion referral and Home health referral in [address] [place] [address]. Social work will follow up on final script and cost.  Social work will give [facility] options once available. SW/CM to remain available for support and/or discharge planning.      Christiana Henley MSW, [facility]  Discharge Planner [id number]

## 2023-12-21 LAB
ANION GAP SERPL CALC-SCNC: 7 MMOL/L (ref 0–18)
BASOPHILS # BLD AUTO: 0.05 X10(3) UL (ref 0–0.2)
BASOPHILS NFR BLD AUTO: 0.6 %
BUN BLD-MCNC: 57 MG/DL (ref 9–23)
BUN/CREAT SERPL: 40.1 (ref 10–20)
CALCIUM BLD-MCNC: 9.1 MG/DL (ref 8.7–10.4)
CHLORIDE SERPL-SCNC: 108 MMOL/L (ref 98–112)
CO2 SERPL-SCNC: 23 MMOL/L (ref 21–32)
CREAT BLD-MCNC: 1.42 MG/DL
DEPRECATED RDW RBC AUTO: 55.8 FL (ref 35.1–46.3)
EGFRCR SERPLBLD CKD-EPI 2021: 38 ML/MIN/1.73M2 (ref 60–?)
EOSINOPHIL # BLD AUTO: 0.06 X10(3) UL (ref 0–0.7)
EOSINOPHIL NFR BLD AUTO: 0.7 %
ERYTHROCYTE [DISTWIDTH] IN BLOOD BY AUTOMATED COUNT: 19.1 % (ref 11–15)
GLUCOSE BLD-MCNC: 110 MG/DL (ref 70–99)
HCT VFR BLD AUTO: 25.9 %
HGB BLD-MCNC: 7.9 G/DL
IMM GRANULOCYTES # BLD AUTO: 0.03 X10(3) UL (ref 0–1)
IMM GRANULOCYTES NFR BLD: 0.3 %
LYMPHOCYTES # BLD AUTO: 1.87 X10(3) UL (ref 1–4)
LYMPHOCYTES NFR BLD AUTO: 21.2 %
MAGNESIUM SERPL-MCNC: 2.3 MG/DL (ref 1.6–2.6)
MCH RBC QN AUTO: 25.4 PG (ref 26–34)
MCHC RBC AUTO-ENTMCNC: 30.5 G/DL (ref 31–37)
MCV RBC AUTO: 83.3 FL
MONOCYTES # BLD AUTO: 0.69 X10(3) UL (ref 0.1–1)
MONOCYTES NFR BLD AUTO: 7.8 %
NEUTROPHILS # BLD AUTO: 6.14 X10 (3) UL (ref 1.5–7.7)
NEUTROPHILS # BLD AUTO: 6.14 X10(3) UL (ref 1.5–7.7)
NEUTROPHILS NFR BLD AUTO: 69.4 %
OSMOLALITY SERPL CALC.SUM OF ELEC: 302 MOSM/KG (ref 275–295)
PHOSPHATE SERPL-MCNC: 4 MG/DL (ref 2.4–5.1)
PLATELET # BLD AUTO: 316 10(3)UL (ref 150–450)
POTASSIUM SERPL-SCNC: 4.2 MMOL/L (ref 3.5–5.1)
RBC # BLD AUTO: 3.11 X10(6)UL
SODIUM SERPL-SCNC: 138 MMOL/L (ref 136–145)
WBC # BLD AUTO: 8.8 X10(3) UL (ref 4–11)

## 2023-12-21 PROCEDURE — 99233 SBSQ HOSP IP/OBS HIGH 50: CPT | Performed by: HOSPITALIST

## 2023-12-21 NOTE — PLAN OF CARE
Pt updated on plan of care. Pt ambulated in room, tolerated well. Isolation precautions in place. No c/o pain. Plan for LT abx at discharge, pending med clearance. Call light within reach. Problem: Patient Centered Care  Goal: Patient preferences are identified and integrated in the patient's plan of care  Description: Interventions:  - What would you like us to know as we care for you?   - Provide timely, complete, and accurate information to patient/family  - Incorporate patient and family knowledge, values, beliefs, and cultural backgrounds into the planning and delivery of care  - Encourage patient/family to participate in care and decision-making at the level they choose  - Honor patient and family perspectives and choices  Outcome: Progressing     Problem: Patient/Family Goals  Goal: Patient/Family Long Term Goal  Description: Patient's Long Term Goal:     Interventions:  -   - See additional Care Plan goals for specific interventions  Outcome: Progressing  Goal: Patient/Family Short Term Goal  Description: Patient's Short Term Goal:     Interventions:   -   - See additional Care Plan goals for specific interventions  Outcome: Progressing     Problem: SAFETY ADULT - FALL  Goal: Free from fall injury  Description: INTERVENTIONS:  - Assess pt frequently for physical needs  - Identify cognitive and physical deficits and behaviors that affect risk of falls.   - [place] fall precautions as indicated by assessment.  - Educate pt/family on patient safety including physical limitations  - Instruct pt to call for assistance with activity based on assessment  - Modify environment to reduce risk of injury  - Provide assistive devices as appropriate  - Consider OT/PT consult to assist with strengthening/mobility  - Encourage toileting schedule  Outcome: Progressing     Problem: DISCHARGE PLANNING  Goal: Parent/family are prepared for discharge  Description: Interventions:  - Complete Hearing screen(s)  - Complete [place] Screens  - Complete Car Seat Challenge per policy  - Complete CCHD screening  - Complete education with parent/legal guardian  - Teach family how to prepare feedings  - Teach family how to administer medications  - Obtain prescriptions and verify correct dosage of home medications  - Build confidence of parent/family by encouraging them to provide cares  - Administer immunizations and RSV prophylaxis as ordered  - Provide education handouts and proof of immunizations to parent/legal guardian  - Facilitate outpatient follow-up appointments  - Consult Case Management and Social Work for medical and insurance needs  Outcome: Progressing     Problem: RESPIRATORY - ADULT  Goal: Achieves optimal ventilation and oxygenation  Description: INTERVENTIONS:  - Assess for changes in respiratory status  - Assess for changes in mentation and behavior  - Position to facilitate oxygenation and minimize respiratory effort  - Oxygen supplementation based on oxygen saturation or ABGs  - Provide Smoking Cessation handout, if applicable  - Encourage broncho-pulmonary hygiene including cough, deep breathe, Incentive Spirometry  - Assess the need for suctioning and perform as needed  - Assess and instruct to report SOB or any respiratory difficulty  - Respiratory Therapy support as indicated  - Manage/alleviate anxiety  - Monitor for signs/symptoms of CO2 retention  Outcome: Progressing

## 2023-12-21 NOTE — HOME CARE LIAISON
[facility]. Spoke w/ patient's neidr/Olivia who is agreeable with [facility].  Contact information placed on AVS.

## 2023-12-21 NOTE — PLAN OF CARE
Patient denied pain or discomfort. Ambulating in the room. Minimal assist. Fall precaution in place. Isolation remained in place for COVID. Cough medication administered. On RA. PICC line single lumen, RUE. CHG bath provided. Plan for discharge with long term antibiotics, pending medical clearance. Problem: Patient Centered Care  Goal: Patient preferences are identified and integrated in the patient's plan of care  Description: Interventions:  - What would you like us to know as we care for you? Home with daughter  - Provide timely, complete, and accurate information to patient/family  - Incorporate patient and family knowledge, values, beliefs, and cultural backgrounds into the planning and delivery of care  - Encourage patient/family to participate in care and decision-making at the level they choose  - Honor patient and family perspectives and choices  Outcome: Progressing     Problem: Patient/Family Goals  Goal: Patient/Family Long Term Goal  Description: Patient's Long Term Goal: to go home    Interventions:  - follow treatment plan  - COVID isolation  - See additional Care Plan goals for specific interventions  Outcome: Progressing  Goal: Patient/Family Short Term Goal  Description: Patient's Short Term Goal: To feel better    Interventions:   - IV remdisivir  -IV antibiotic  -ID consult  - See additional Care Plan goals for specific interventions  Outcome: Progressing     Problem: SAFETY ADULT - FALL  Goal: Free from fall injury  Description: INTERVENTIONS:  - Assess pt frequently for physical needs  - Identify cognitive and physical deficits and behaviors that affect risk of falls.   - [place] fall precautions as indicated by assessment.  - Educate pt/family on patient safety including physical limitations  - Instruct pt to call for assistance with activity based on assessment  - Modify environment to reduce risk of injury  - Provide assistive devices as appropriate  - Consider OT/PT consult to assist with strengthening/mobility  - Encourage toileting schedule  Outcome: Progressing     Problem: DISCHARGE PLANNING  Goal: Parent/family are prepared for discharge  Description: Interventions:  - Complete Hearing screen(s)  - Complete  Screens  - Complete Car Seat Challenge per policy  - Complete CCHD screening  - Complete education with parent/legal guardian  - Teach family how to prepare feedings  - Teach family how to administer medications  - Obtain prescriptions and verify correct dosage of home medications  - Build confidence of parent/family by encouraging them to provide cares  - Administer immunizations and RSV prophylaxis as ordered  - Provide education handouts and proof of immunizations to parent/legal guardian  - Facilitate outpatient follow-up appointments  - Consult Case Management and Social Work for medical and insurance needs  Outcome: Progressing     Problem: RESPIRATORY - ADULT  Goal: Achieves optimal ventilation and oxygenation  Description: INTERVENTIONS:  - Assess for changes in respiratory status  - Assess for changes in mentation and behavior  - Position to facilitate oxygenation and minimize respiratory effort  - Oxygen supplementation based on oxygen saturation or ABGs  - Provide Smoking Cessation handout, if applicable  - Encourage broncho-pulmonary hygiene including cough, deep breathe, Incentive Spirometry  - Assess the need for suctioning and perform as needed  - Assess and instruct to report SOB or any respiratory difficulty  - Respiratory Therapy support as indicated  - Manage/alleviate anxiety  - Monitor for signs/symptoms of CO2 retention  Outcome: Progressing

## 2023-12-21 NOTE — CM/SW NOTE
Patient discussed in rounds, possible discharge today. Spoke w/ Janan Cooks w/ [facility], patient only has In Office Infusion benefits through [facility]. Spoke w/ patient's daughter Marvetta Essex, she prefers home plan. She states RN started teachings yesterday. She is aware of out of pocket cost quoted by [facility] ($65/day) and she is agreeable. Discussed home health options, she prefers [facility]. Azalia Marker made via [facility] liaison. Received Rx for Dapto from [facility], Rx sent to [facility] & [facility] via [employer].     Isak Javier, [address]

## 2023-12-21 NOTE — DISCHARGE INSTRUCTIONS
Sometimes managing your health at home requires assistance. The [place] the [facility] team has recognized your preference to use [facility]. They can be reached by phone at [phone]. The fax number for your reference is [phone]. A representative from the home health agency will contact you or your family to schedule your first visit. Fu cpk/labs as per id  Send home with picc: Picc care at home  c as scheduled  Home covid precautions     Medication List        START taking these medications      acetaminophen 500 MG Tabs  Commonly known as: Tylenol Extra Strength     DAPTOmycin 50 mg/mL in sodium chloride 0.9% PF  Inject 9 mL (450 mg total) into the vein every other day. ICD10: I33.0  WEEKLY CBC W/DIFF, CMP, CK  FAX LABS TO DR. MCNEILL - [phone]     ferrous sulfate 325 (65 FE) MG Tbec  Take 1 tablet (325 mg total) by mouth daily with breakfast.  Start taking on: December 23, 2023     guaiFENesin  MG Tb12  Commonly known as: Mucinex  Take 1 tablet (600 mg total) by mouth 2 (two) times daily. CHANGE how you take these medications      bumetanide 1 MG Tabs  Commonly known as: Bumex  Take 1 tablet (1 mg total) by mouth daily. STOP FUROSEMIDE. What changed: when to take this     folic acid 1 MG Tabs  Commonly known as: Folvite  Take 1 tablet (1 mg total) by mouth daily. What changed: when to take this     leflunomide 10 MG Tabs  Commonly known as: Arava  Take 1 tablet (10 mg total) by mouth daily. What changed: when to take this     lisinopril 20 MG Tabs  Commonly known as: Prinivil; Zestril  Take 1 tablet (20 mg total) by mouth daily. FOR BLOOD PRESSURE. What changed: when to take this     predniSONE 5 MG Tabs  Commonly known as: Deltasone  Take 1 tablet (5 mg total) by mouth 2 (two) times daily.  With food  What changed:   how much to take  when to take this  additional instructions     sertraline 50 MG Tabs  Commonly known as: Zoloft  Take 1 tablet (50 mg total) by mouth daily. FOR ANXIETY. What changed: when to take this            CONTINUE taking these medications      albuterol 108 (90 Base) MCG/ACT Aers  Commonly known as: Ventolin HFA  Inhale 2 puffs into the lungs every 4 (four) hours as needed for Wheezing. aspirin 81 MG Tbec  Take 1 tablet (81 mg total) by mouth daily. FOR HEART DISEASE.     benzonatate 200 MG Caps  Commonly known as: Tessalon  Take 1 capsule (200 mg total) by mouth 3 (three) times daily as needed for cough. cholecalciferol 25 MCG (1000 UT) Tabs  Commonly known as: VITAMIN D3  Take 1 tablet (1,000 Units total) by mouth daily. clopidogrel 75 MG Tabs  Commonly known as: Plavix  Take 1 tablet (75 mg total) by mouth daily. FOR HEART STENTS.     docusate sodium 100 MG Caps  Commonly known as: Colace  Take 1 capsule (100 mg total) by mouth 2 (two) times daily for 7 days. levocetirizine 5 MG Tabs  Commonly known as: Xyzal  Take 1 tablet (5 mg total) by mouth every evening. Lidocaine 4 % Gel  Apply 1 Application. topically in the morning, at noon, and at bedtime. pantoprazole 20 MG Tbec  Commonly known as: Protonix  Take 1 tablet (20 mg total) by mouth every morning before breakfast. TO PREVENT ACID REFLUX. Potassium Chloride ER 20 MEQ Tbcr  Take 20 mEq by mouth in the morning and 20 mEq before bedtime. pregabalin 25 MG Caps  Commonly known as: Lyrica  Take 1 capsule (25 mg total) by mouth at bedtime. FOR NERVE/BACK PAIN. STOP GABAPENTIN.     traMADol 50 MG Tabs  Commonly known as: Ultram  Take 1 tablet (50 mg total) by mouth every 6 (six) hours as needed for Pain.             STOP taking these medications      rosuvastatin 10 MG Tabs  Commonly known as: Crestor               Where to Get Your Medications        These medications were sent to [address] [place] [address], [address] [facility]  [address] [phone], [phone]  [address]      Phone: [phone]   docusate sodium 100 MG Caps  ferrous sulfate 325 (65 FE) MG Tbec  guaiFENesin  MG Tb12  predniSONE 5 MG Tabs       You can get these medications from any pharmacy    Bring a paper prescription for each of these medications  DAPTOmycin 50 mg/mL in sodium chloride 0.9% PF

## 2023-12-21 NOTE — SPIRITUAL CARE NOTE
Spiritual Care Visit Note    Visited With: Patient    Writer report visiting with patient via phone to offer spiritual support. Patient expressed no need at this time. Writer mentioned to inform RN if patient changes her mind. Follow up as requested. Spiritual Care Taxonomy:    Intended Effects: Establish rapport and connectedness    Methods: Offer support    Interventions: Active listening; Ask guided questions;Silent prayer     [address], [address]   [id number]     On call  services [phone]

## 2023-12-22 ENCOUNTER — PATIENT OUTREACH (OUTPATIENT)
Dept: CASE MANAGEMENT | Age: 78
End: 2023-12-22

## 2023-12-22 VITALS
TEMPERATURE: 98 F | RESPIRATION RATE: 18 BRPM | SYSTOLIC BLOOD PRESSURE: 106 MMHG | OXYGEN SATURATION: 98 % | WEIGHT: 125.38 LBS | HEART RATE: 93 BPM | BODY MASS INDEX: 22 KG/M2 | DIASTOLIC BLOOD PRESSURE: 57 MMHG

## 2023-12-22 LAB
ANION GAP SERPL CALC-SCNC: 8 MMOL/L (ref 0–18)
BASOPHILS # BLD AUTO: 0.04 X10(3) UL (ref 0–0.2)
BASOPHILS NFR BLD AUTO: 0.4 %
BUN BLD-MCNC: 61 MG/DL (ref 9–23)
BUN/CREAT SERPL: 43.6 (ref 10–20)
CALCIUM BLD-MCNC: 9 MG/DL (ref 8.7–10.4)
CHLORIDE SERPL-SCNC: 110 MMOL/L (ref 98–112)
CO2 SERPL-SCNC: 22 MMOL/L (ref 21–32)
CREAT BLD-MCNC: 1.4 MG/DL
DEPRECATED RDW RBC AUTO: 56.2 FL (ref 35.1–46.3)
EGFRCR SERPLBLD CKD-EPI 2021: 38 ML/MIN/1.73M2 (ref 60–?)
EOSINOPHIL # BLD AUTO: 0.07 X10(3) UL (ref 0–0.7)
EOSINOPHIL NFR BLD AUTO: 0.7 %
ERYTHROCYTE [DISTWIDTH] IN BLOOD BY AUTOMATED COUNT: 19.1 % (ref 11–15)
GLUCOSE BLD-MCNC: 126 MG/DL (ref 70–99)
HCT VFR BLD AUTO: 25.7 %
HGB BLD-MCNC: 7.9 G/DL
IMM GRANULOCYTES # BLD AUTO: 0.02 X10(3) UL (ref 0–1)
IMM GRANULOCYTES NFR BLD: 0.2 %
LYMPHOCYTES # BLD AUTO: 1.71 X10(3) UL (ref 1–4)
LYMPHOCYTES NFR BLD AUTO: 17.8 %
MAGNESIUM SERPL-MCNC: 2.5 MG/DL (ref 1.6–2.6)
MCH RBC QN AUTO: 25.1 PG (ref 26–34)
MCHC RBC AUTO-ENTMCNC: 30.7 G/DL (ref 31–37)
MCV RBC AUTO: 81.6 FL
MONOCYTES # BLD AUTO: 0.65 X10(3) UL (ref 0.1–1)
MONOCYTES NFR BLD AUTO: 6.8 %
NEUTROPHILS # BLD AUTO: 7.12 X10 (3) UL (ref 1.5–7.7)
NEUTROPHILS # BLD AUTO: 7.12 X10(3) UL (ref 1.5–7.7)
NEUTROPHILS NFR BLD AUTO: 74.1 %
OSMOLALITY SERPL CALC.SUM OF ELEC: 309 MOSM/KG (ref 275–295)
PHOSPHATE SERPL-MCNC: 4.7 MG/DL (ref 2.4–5.1)
PLATELET # BLD AUTO: 309 10(3)UL (ref 150–450)
POTASSIUM SERPL-SCNC: 4.4 MMOL/L (ref 3.5–5.1)
RBC # BLD AUTO: 3.15 X10(6)UL
SODIUM SERPL-SCNC: 140 MMOL/L (ref 136–145)
WBC # BLD AUTO: 9.6 X10(3) UL (ref 4–11)

## 2023-12-22 PROCEDURE — 99239 HOSP IP/OBS DSCHRG MGMT >30: CPT | Performed by: HOSPITALIST

## 2023-12-22 RX ORDER — GUAIFENESIN 600 MG/1
600 TABLET, EXTENDED RELEASE ORAL 2 TIMES DAILY
Qty: 12 TABLET | Refills: 0 | Status: SHIPPED | OUTPATIENT
Start: 2023-12-22

## 2023-12-22 RX ORDER — DOCUSATE SODIUM 100 MG/1
100 CAPSULE, LIQUID FILLED ORAL 2 TIMES DAILY
Qty: 14 CAPSULE | Refills: 0 | Status: SHIPPED | OUTPATIENT
Start: 2023-12-22 | End: 2023-12-29

## 2023-12-22 RX ORDER — ACETAMINOPHEN 500 MG
500 TABLET ORAL EVERY 4 HOURS PRN
Status: SHIPPED | COMMUNITY
Start: 2023-12-22

## 2023-12-22 RX ORDER — PREDNISONE 5 MG/1
5 TABLET ORAL 2 TIMES DAILY
Qty: 40 TABLET | Refills: 0 | Status: SHIPPED | OUTPATIENT
Start: 2023-12-22

## 2023-12-22 RX ORDER — MELATONIN
325
Qty: 20 TABLET | Refills: 0 | Status: SHIPPED | OUTPATIENT
Start: 2023-12-23

## 2023-12-22 NOTE — BH RN DISCHARGE NOTE
Pt ready and cleared for discharge. IV and tele removed. Pt and daughter given paperwork and education. All questions answered. Pt taken downstairs in wheelchair and driven home by daughter.

## 2023-12-22 NOTE — DISCHARGE SUMMARY
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 TCM Follow-Up Recommendation:  LACE > 58:  High Risk of readmission after discharge from the hospital.  Tcm fu recommended

## 2023-12-22 NOTE — CM/SW NOTE
12/22/23 1100   Discharge disposition   Expected discharge disposition Home-Health   Post Acute Care Provider [facility]   DME/Infusion Providers [facility]   Discharge transportation Private car     The patient received a MDO for discharge. The patient will be transported home by her daughter Dinh De La Paz via private car. The patient is reserved with [address] for [facility] on 12/23. [facility] will deliver the IV abx today 12/22 between 5pm-9pm. Jose Armando from [facility] confirmed delivery time with the patient's daughter Dinh De La Paz. The patient and family have no further questions at this time. SW/CM to remain available for support and/or discharge planning.      Germaine BECERRA, [facility]  Discharge Planner [id number]

## 2023-12-22 NOTE — PLAN OF CARE
Pt is alert and oriented x4. Independent. R PICC in place for long term abx. 2L fluid restriction. PO Bumex. Pt and family updated on plan of care. Discharge to home today. First dose of IV Daptomycin given. Safety precautions in place. Call light within reach. Problem: Patient Centered Care  Goal: Patient preferences are identified and integrated in the patient's plan of care  Description: Interventions:  - What would you like us to know as we care for you?  - Provide timely, complete, and accurate information to patient/family  - Incorporate patient and family knowledge, values, beliefs, and cultural backgrounds into the planning and delivery of care  - Encourage patient/family to participate in care and decision-making at the level they choose  - Honor patient and family perspectives and choices  Outcome: Progressing     Problem: Patient/Family Goals  Goal: Patient/Family Long Term Goal  Description: Patient's Long Term Goal: discharge to home    Interventions:  - Monitor vital signs  - Patient education with PICC Line  - Patient education with long term antibiotics  - See additional Care Plan goals for specific interventions  Outcome: Progressing  Goal: Patient/Family Short Term Goal  Description: Patient's Short Term Goal: improve oxygen saturation    Interventions:   - Maintain oxygen saturation on room air  - Continue oral diuretics  - Continue ambulating as tolerated  - See additional Care Plan goals for specific interventions  Outcome: Progressing     Problem: SAFETY ADULT - FALL  Goal: Free from fall injury  Description: INTERVENTIONS:  - Assess pt frequently for physical needs  - Identify cognitive and physical deficits and behaviors that affect risk of falls.   - [place] fall precautions as indicated by assessment.  - Educate pt/family on patient safety including physical limitations  - Instruct pt to call for assistance with activity based on assessment  - Modify environment to reduce risk of injury  - Provide assistive devices as appropriate  - Consider OT/PT consult to assist with strengthening/mobility  - Encourage toileting schedule  Outcome: Progressing     Problem: DISCHARGE PLANNING  Goal: Parent/family are prepared for discharge  Description: Interventions:  - Complete Hearing screen(s)  - Complete  Screens  - Complete Car Seat Challenge per policy  - Complete CCHD screening  - Complete education with parent/legal guardian  - Teach family how to prepare feedings  - Teach family how to administer medications  - Obtain prescriptions and verify correct dosage of home medications  - Build confidence of parent/family by encouraging them to provide cares  - Administer immunizations and RSV prophylaxis as ordered  - Provide education handouts and proof of immunizations to parent/legal guardian  - Facilitate outpatient follow-up appointments  - Consult Case Management and Social Work for medical and insurance needs  Outcome: Progressing     Problem: RESPIRATORY - ADULT  Goal: Achieves optimal ventilation and oxygenation  Description: INTERVENTIONS:  - Assess for changes in respiratory status  - Assess for changes in mentation and behavior  - Position to facilitate oxygenation and minimize respiratory effort  - Oxygen supplementation based on oxygen saturation or ABGs  - Provide Smoking Cessation handout, if applicable  - Encourage broncho-pulmonary hygiene including cough, deep breathe, Incentive Spirometry  - Assess the need for suctioning and perform as needed  - Assess and instruct to report SOB or any respiratory difficulty  - Respiratory Therapy support as indicated  - Manage/alleviate anxiety  - Monitor for signs/symptoms of CO2 retention  Outcome: Progressing

## 2023-12-22 NOTE — PLAN OF CARE
Patient is alert & oriented x4 on RA. Vital signs stable at this time. No acute changes noted throughout shift; patient slept. Fall precautions maintained - bed alarm on, bed locked in lowest position, call light and personal belongings within reach, non-skid socks in place. Frequent rounding by nursing staff. Plan IV abx. Problem: Patient Centered Care  Goal: Patient preferences are identified and integrated in the patient's plan of care  Description: Interventions:  - What would you like us to know as we care for you? From home with family  - Provide timely, complete, and accurate information to patient/family  - Incorporate patient and family knowledge, values, beliefs, and cultural backgrounds into the planning and delivery of care  - Encourage patient/family to participate in care and decision-making at the level they choose  - Honor patient and family perspectives and choices  Outcome: Progressing     Problem: Patient/Family Goals  Goal: Patient/Family Long Term Goal  Description: Patient's Long Term Goal: To go home     Interventions:  - Follow MD orders  - See additional Care Plan goals for specific interventions  Outcome: Progressing  Goal: Patient/Family Short Term Goal  Description: Patient's Short Term Goal: Manage covid    Interventions:   - Follow MD orders  - See additional Care Plan goals for specific interventions  Outcome: Progressing     Problem: SAFETY ADULT - FALL  Goal: Free from fall injury  Description: INTERVENTIONS:  - Assess pt frequently for physical needs  - Identify cognitive and physical deficits and behaviors that affect risk of falls.   - [place] fall precautions as indicated by assessment.  - Educate pt/family on patient safety including physical limitations  - Instruct pt to call for assistance with activity based on assessment  - Modify environment to reduce risk of injury  - Provide assistive devices as appropriate  - Consider OT/PT consult to assist with strengthening/mobility  - Encourage toileting schedule  Outcome: Progressing     Problem: DISCHARGE PLANNING  Goal: Parent/family are prepared for discharge  Description: Interventions:  - Complete Hearing screen(s)  - Complete  Screens  - Complete Car Seat Challenge per policy  - Complete CCHD screening  - Complete education with parent/legal guardian  - Teach family how to prepare feedings  - Teach family how to administer medications  - Obtain prescriptions and verify correct dosage of home medications  - Build confidence of parent/family by encouraging them to provide cares  - Administer immunizations and RSV prophylaxis as ordered  - Provide education handouts and proof of immunizations to parent/legal guardian  - Facilitate outpatient follow-up appointments  - Consult Case Management and Social Work for medical and insurance needs  Outcome: Progressing     Problem: RESPIRATORY - ADULT  Goal: Achieves optimal ventilation and oxygenation  Description: INTERVENTIONS:  - Assess for changes in respiratory status  - Assess for changes in mentation and behavior  - Position to facilitate oxygenation and minimize respiratory effort  - Oxygen supplementation based on oxygen saturation or ABGs  - Provide Smoking Cessation handout, if applicable  - Encourage broncho-pulmonary hygiene including cough, deep breathe, Incentive Spirometry  - Assess the need for suctioning and perform as needed  - Assess and instruct to report SOB or any respiratory difficulty  - Respiratory Therapy support as indicated  - Manage/alleviate anxiety  - Monitor for signs/symptoms of CO2 retention  Outcome: Progressing

## 2023-12-22 NOTE — PROGRESS NOTES
Calling pt daughterOneil to schedule SCC-Pneumonia apt    [facility]  [address] [facility]  [phone]  [address]  Pt daughter, Oneil Campbell declined apt; pt will have [facility] coming to the home once a week.   Closing encounter

## 2023-12-23 NOTE — DISCHARGE SUMMARY
[facility]    PATIENT'S NAME: KAYLI GARZA   ATTENDING PHYSICIAN: Nadeem López MD   PATIENT ACCOUNT#:   [id number]    LOCATION:  [address] [place] [address] Ave #:   [id number]       YOB: 1945  ADMISSION DATE:       12/14/2023      DISCHARGE DATE:  12/22/2023    DISCHARGE SUMMARY      HISTORY AND HOSPITAL COURSE:  This is a very pleasant 23-year-old [facility] American female born in [facility] who appears much younger than her stated age, who presents with a history of shortness of breath. She has multiple issues with valve disease and recently had a bioprosthetic aortic valve replacement. She also has issues with her tricuspid valve. She was seen by Cardiology and Infectious Disease. She had suspicions of having endocarditis and as she had unfortunately Staph epidermidis in her blood. This quickly cleared, but a SHAWNA, transesophageal echocardiogram, revealed a mitral valve vegetation that was 0.6 cm. Antibiotics were set up for 6 weeks of every other day daptomycin that her daughter was and instructed as to how to give. The patient also incidentally tested positive for COVID but thankfully was able to be weaned off oxygen. We were able to discharge her home, and she was very happy to be discharged in time to celebrate her birthday the next day and [place]. PHYSICAL EXAMINATION ON DISCHARGE:    VITAL SIGNS:  Temperature is 97.8, pulse is 93, respiratory rate 18, blood pressure is 106/57, 98%. LUNGS:  Crackles both bases. HEART:  Normal S1, S2. No S3.  I still do not hear a murmur when I listened. ABDOMEN:  Soft and nontender. EXTREMITIES:  Without significant calf tenderness. Edema seems to improved. NEUROLOGIC:  Alert and oriented, friendly and cooperative. LABORATORY STUDIES:  Please see chart. 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   2.   Acute hypoxemic respiratory failure from congestive heart failure exacerbation and COVID pneumonia. Patient is much better status post Bumex. 3.   Anemia. Hemoglobin stable. 4.   Chronic kidney disease stage 3, stable. 5.   Coronary artery disease, status post percutaneous coronary intervention, stable. 6.   Severe aortic stenosis, status post transcatheter aortic valve replacement 11/03/2022. I do not actually hear a murmur, and I do not know why.  7.   Hypertension, well controlled. 8.   Hyperlipidemia. 9.   COVID pneumonia, better. CONDITION ON DISCHARGE:  Stable. CODE STATUS:  Full Code. DIET:  Low fat, low salt. ACTIVITY:  As tolerated. No heavy exercise. FOLLOWUP:  With DE Doran in 1 month. Follow up with Dr. Trinh Nuñez in a few days for followup advice. Follow up with Dr. Emily Austin in 1 month. Followup PICC line care at home. CPK labs as per ID once a week. Home healthcare as scheduled. Home COVID precautions. DISCHARGE MEDICATIONS:    1. Ventolin 2 puffs every 4 hours as needed. 2.   Ecotrin 81 mg daily with food. 3.   Benzoate 200 mg 3 times a day as needed. 4.   Bumex 1 mg daily. 5.   Vitamin D 1000 units daily. 6.   Plavix 75 mg daily. 7.   Colace 100 mg p.o. b.i.d. p.r.n. constipation. 8.   Folate 1 mg daily. 9.   Leflunomide 10 mg daily. 10.   Levocetirizine 5 mg every morning. 11.   Lidocaine as needed. 12.   Lisinopril 20 mg daily. 13.   Protonix 20 mg every morning. 14.   Potassium chloride 20 mEq twice a day. 15.   Lyrica 25 mg at bedtime. 16.   Hold rosuvastatin, Crestor, until off daptomycin for at least a few days. 17.   Zoloft 50 mg daily. 18.   Tramadol 50 mg every 6 hours as needed. Watch for severe muscle stiffness, fever. Watch drowsiness. No alcohol. 19.   Prednisone 5 mg twice a day. Please to speak to Dr. Tsang Cough about weaning off. 20.   Tylenol 500 mg every 4 hours as needed. 21.   Daptomycin 450 mg every other day for 6 weeks with CPK, CBC, CMP weekly.   22.   Ferrous sulfate 325 mg daily. 23.   Guaifenesin  mg twice a day. RISK OF READMISSION:  Very high. TCM followup recommended. Dictated By Juan C Howe.  MD Maribel  d: 12/22/2023 19:14:52  t: 12/22/2023 22:19:28  [facility] [id number]  [facility]/

## 2023-12-24 DIAGNOSIS — E87.6 HYPOKALEMIA: ICD-10-CM

## 2023-12-26 ENCOUNTER — TELEPHONE (OUTPATIENT)
Facility: LOCATION | Age: 78
End: 2023-12-26

## 2023-12-26 ENCOUNTER — PATIENT OUTREACH (OUTPATIENT)
Dept: CASE MANAGEMENT | Age: 78
End: 2023-12-26

## 2023-12-26 DIAGNOSIS — Z02.9 ENCOUNTERS FOR UNSPECIFIED ADMINISTRATIVE PURPOSE: ICD-10-CM

## 2023-12-26 DIAGNOSIS — I05.9 ENDOCARDITIS OF MITRAL VALVE: Primary | ICD-10-CM

## 2023-12-26 PROCEDURE — 1111F DSCHRG MED/CURRENT MED MERGE: CPT

## 2023-12-26 NOTE — TELEPHONE ENCOUNTER
Jamaal Pavon RN residential home care indicated that patient was just discharged from the hospital on 12/22/2023 for COVID/pneumonia was admitted on 12/14/2023. Wanted to to verify that doctor would sign off home health orders?

## 2023-12-26 NOTE — TELEPHONE ENCOUNTER
Please review. Protocol failed / Has no protocol. Requested Prescriptions   Pending Prescriptions Disp Refills    Potassium Chloride ER 20 MEQ Oral Tab CR [Pharmacy Med Name: Potassium Chloride Er 20 Meq Tab Adva] 60 tablet 2     Sig: Take 1 tablet by mouth in the morning and 1 tablet before bedtime.        There is no refill protocol information for this order        Future Appointments         Provider Department Appt Notes    In 2 days Pravin Granados MD [address] [facility] [place],[address], [facility] [address], [address]           Recent Outpatient Visits              2 months ago Lumbar radiculopathy    [address] [place] [address], [address] Shelia Urbina MD    Office Visit    2 months ago Right inguinal hernia    [address] [place] [address], Amelie Benson MD    Office Visit    2 months ago Lumbar radiculopathy    [address] [place] [address], Johnny Grace MD    Office Visit    3 months ago Neural foraminal stenosis of lumbar spine    [facility] [place], PT    Office Visit    4 months ago Encounter for [employer] annual wellness exam    [address] [place] [address], Amelie Benson MD    Office Visit

## 2023-12-26 NOTE — PROGRESS NOTES
Initial Post Discharge Follow Up   Discharge Date: 12/22/23  Contact Date: 12/26/2023    Consent Verification:  Assessment Completed With: Other: dtr Elizabeth Mcnair received per patient?  written  HIPAA Verified? Yes    Discharge Dx:    covid (12/14/2023) and mitral valve endocarditis     General:   How have you been since your discharge from the hospital? Dtr Vickie Garcia reports pt feeling better, since hospital discharge--right UE PICC in place for every other day Daptomycin, [facility] RN with pt during this call--lungs clear per [facility] RN. Pt tolerating diet, staying hydrated, using IS, as directed, following Covid guidelines Dtr denies pt having fever, chills, shaking, nausea, vomiting, diarrhea, productive cough, chest pain or shortness of breath at this time. Do you have any pain since discharge? No    How well was your pain managed while in the hospital?   On a scale of 1-5   1- Very Poor and 5- Very well   Very Well  When you were leaving the hospital were your discharge instructions reviewed with you? Yes  How well were your discharge instructions explained to you? On a scale of 1-5   1- Very Poor and 5- Very well   Very Well  Do you have any questions about your discharge instructions? No  Before leaving the hospital was your diagnoses explained to you? Yes  Do you have any questions about your diagnoses? No  Are you able to perform normal daily activities of living as you have prior to your hospital stay (dressing, bathing, ambulating to the bathroom, etc)? yes  (NCM) Was patient given a different diet per AVS? no    Medications:   Current Outpatient Medications   Medication Sig Dispense Refill    predniSONE 5 MG Oral Tab Take 1 tablet (5 mg total) by mouth 2 (two) times daily. With food 40 tablet 0    acetaminophen 500 MG Oral Tab Take 1 tablet (500 mg total) by mouth every 4 (four) hours as needed.       ferrous sulfate 325 (65 FE) MG Oral Tab EC Take 1 tablet (325 mg total) by mouth daily with breakfast. 20 tablet 0    guaiFENesin  MG Oral Tablet 12 Hr Take 1 tablet (600 mg total) by mouth 2 (two) times daily. 12 tablet 0    docusate sodium (COLACE) 100 MG Oral Cap Take 1 capsule (100 mg total) by mouth 2 (two) times daily for 7 days. 14 capsule 0    DAPTOmycin 50 mg/mL in sodium chloride 0.9% PF Inject 9 mL (450 mg total) into the vein every other day. ICD10: I33.0  WEEKLY CBC W/DIFF, CMP, CK  FAX LABS TO DR. MCNEILL - [phone] 162 mL 0    clopidogrel 75 MG Oral Tab Take 1 tablet (75 mg total) by mouth daily. FOR HEART STENTS. 90 tablet 9    traMADol 50 MG Oral Tab Take 1 tablet (50 mg total) by mouth every 6 (six) hours as needed for Pain. 10 tablet 0    pregabalin (LYRICA) 25 MG Oral Cap Take 1 capsule (25 mg total) by mouth at bedtime. FOR NERVE/BACK PAIN. STOP GABAPENTIN. 90 capsule 3    leflunomide 10 MG Oral Tab Take 1 tablet (10 mg total) by mouth daily. (Patient taking differently: Take 1 tablet (10 mg total) by mouth every morning.) 30 tablet 3    aspirin 81 MG Oral Tab EC Take 1 tablet (81 mg total) by mouth daily. FOR HEART DISEASE. 90 tablet 9    lisinopril 20 MG Oral Tab Take 1 tablet (20 mg total) by mouth daily. FOR BLOOD PRESSURE. (Patient taking differently: Take 1 tablet (20 mg total) by mouth every morning. FOR BLOOD PRESSURE.) 90 tablet 9    bumetanide (BUMEX) 1 MG Oral Tab Take 1 tablet (1 mg total) by mouth daily. STOP FUROSEMIDE. (Patient taking differently: Take 1 tablet (1 mg total) by mouth every morning. STOP FUROSEMIDE.) 90 tablet 9    sertraline 50 MG Oral Tab Take 1 tablet (50 mg total) by mouth daily. FOR ANXIETY. (Patient taking differently: Take 1 tablet (50 mg total) by mouth every morning. FOR ANXIETY. ) 90 tablet 9    Potassium Chloride ER 20 MEQ Oral Tab CR Take 20 mEq by mouth in the morning and 20 mEq before bedtime. 60 tablet 1    pantoprazole 20 MG Oral Tab EC Take 1 tablet (20 mg total) by mouth every morning before breakfast. TO PREVENT ACID REFLUX.  80 tablet 3    folic acid 1 MG Oral Tab Take 1 tablet (1 mg total) by mouth daily. (Patient taking differently: Take 1 tablet (1 mg total) by mouth every morning.) 90 tablet 3    cholecalciferol 25 MCG (1000 UT) Oral Tab Take 1 tablet (1,000 Units total) by mouth daily. 90 tablet 3    Lidocaine 4 % External Gel Apply 1 Application. topically in the morning, at noon, and at bedtime. 1 each 3    levocetirizine 5 MG Oral Tab Take 1 tablet (5 mg total) by mouth every evening. 90 tablet 1    benzonatate 200 MG Oral Cap Take 1 capsule (200 mg total) by mouth 3 (three) times daily as needed for cough. 30 capsule 0    Albuterol Sulfate  (90 Base) MCG/ACT Inhalation Aero Soln Inhale 2 puffs into the lungs every 4 (four) hours as needed for Wheezing. 1 Inhaler 3     Were there any changes to your current medication(s) noted on the AVS? Yes  START taking:  acetaminophen (Tylenol Extra Strength)  DAPTOmycin 50 mg/mL in sodium chloride 0.9% PF  ferrous sulfate  Start taking on: December 23, 2023  guaiFENesin ER (Mucinex)  CHANGE how you take:  predniSONE (Deltasone)  STOP taking:  rosuvastatin 10 MG Tabs (Crestor)  If so, were these medication changes discussed with you prior to leaving the hospital? Yes  If a new medication was prescribed:    Was the new medication's purpose & side effects reviewed? Yes  Do you have any questions about your new medication? No  Did you  your discharge medications when you left the hospital? Yes  Let's go over your medications together to make sure we are not missing anything. Medications Reviewed  Are there any reasons that keep you from taking your medication as prescribed? No  Are you having any concerns with constipation? No  Did patient receive their flu shot (Sept-March)? No    Discharge medications reviewed/discussed/and reconciled against outpatient medications with patient. Any changes or updates to medications sent to PCP.   Patient Acknowledged     Referrals/orders at D/C:  Referrals/orders placed at D/C? yes  What services:   Home health--RN   (If HH was ordered) Has HH been set up? Yes    If Yes: With Whom:   Elif Jacob. They can be reached by phone at [phone]. The fax number for your reference is [phone]. Fu cpk/labs as per id--WEEKLY CBC W/DIFF, CMP, CK  FAX LABS TO DR. Ly Ayala -  [phone]  Send home with picc: Picc care at home  Hhc as scheduled  Home covid precautions  DME ordered at D/C? Yes  What? PICC, IS  From where? Hospital, Daptomycin from [facility]  Have you received your (DME)? yes    Discharge orders, AVS reviewed and discussed with patient. Any changes or updates to orders sent to PCP. Patient Acknowledged    SDOH:   Transportation:    Transportation Needs: No Transportation Needs (12/26/2023)    Transportation Needs     Lack of Transportation: No     Financial Strain:    Financial Resource Strain: Low Risk  (12/26/2023)    Financial Resource Strain     Difficulty of Paying Living Expenses: Not very hard     Med Affordability: No       Diagnosis specifics:   Pneumonia: Pneumonia:   Tell me what you understand about the signs and symptoms of pneumonia reoccurrence?   (guide to discuss: fever, chills, shaking, chest pain, productive cough, difficulty breathing)    Comments: Dtr denies any of the above symptoms--she will monitor and bring pt to ER if suddenly worsens. Follow up appointments:    COPD/Pneumonia - Reason Appt Not Made Pt Declined - Other  pt daughter, Anne Cornell declined apt; pt has [place] coming once a week to the home  -[facility]     Start   Ordered   12/14/23 0934  Follow up for COPD/Pneumonia  (Post-Discharge Follow-Up Orders)  Once     Completed     Comments: Pneumonia , +covid 19, chf, consider virtual visit   Priority: Routine   Question Answer Comment   Patient to be seen for: Pneumonia    When should patient follow up? Next week    Where to follow-up for COPD/Pneumonia?  [address]        12/14/23 2031         Follow-up Information    Follow up With Specialties Details Why Contact Info   DE DAMIAN  Follow up in 1 month(s) Office will call to schedule your follow up visit [address] [address] [phone]   Kylah Aragon MD Internal Medicine Follow up in 3 day(s) please call 12/26 for fu advice [address]  [place] [address]  [phone]   Syd Vail MD INFECTIOUS DISEASES Follow up in 1 month(s)  [address] [facility] [address]  [phone]         TCC  Was TCC ordered: No    PCP (If no TCC appointment)  Does patient already have a PCP appointment scheduled? No  NCM Scheduled PCP office TCM appointment with patient 12/28/2023    Specialist    Does the patient have any other follow up appointment(s) needing to be scheduled? Yes  If yes: NCM reviewed upcoming specialist appointment with patient: Yes  Does the patient need assistance scheduling appointment(s): No--dtr will call for one month f/u with [facility] and [facility]    Is there any reason as to why you cannot make your appointment(s)? No     Needs post D/C:   Now that you are home, are there any needs or concerns you need addressed before your next visit with your PCP?  (DME, meds, questions, etc.): No    Interventions by NCM:   Discussed diet, activity, medications and need for f/u visits. Dtr will f/u with [facility] for add'l Daptomycin abx delivery and will also call for one month f/u with cardiology and ID. Dtr continues to decline SCC/PNA clinic f/u. In office TCM appt made for 12/28/2023 with Dr. Flo Latham at [facility] office--relayed address to dtr. Dtr aware when to contact PCP/specialists and when to seek emergency care. No further questions/concerns at this time. Overall Rating:    How would you rate the care you received while in the hospital? excellent    CCM referral placed:    No    BOOK BY DATE: 1/05/2025

## 2023-12-27 DIAGNOSIS — E87.6 HYPOKALEMIA: ICD-10-CM

## 2023-12-27 RX ORDER — POTASSIUM CHLORIDE 1500 MG/1
1 TABLET, EXTENDED RELEASE ORAL 2 TIMES DAILY
Qty: 60 TABLET | Refills: 2 | Status: SHIPPED | OUTPATIENT
Start: 2023-12-27

## 2023-12-27 NOTE — TELEPHONE ENCOUNTER
Changed Appt to a Video Visit for Tomorrow at 3:20pm and Left a message for Daughter Anne Cornell and [address] to let them know as well,

## 2023-12-28 ENCOUNTER — TELEMEDICINE (OUTPATIENT)
Facility: LOCATION | Age: 78
End: 2023-12-28

## 2023-12-28 DIAGNOSIS — I25.10 CORONARY ARTERY DISEASE INVOLVING NATIVE CORONARY ARTERY OF NATIVE HEART WITHOUT ANGINA PECTORIS: ICD-10-CM

## 2023-12-28 DIAGNOSIS — Z95.2 S/P AORTIC VALVE REPLACEMENT: ICD-10-CM

## 2023-12-28 DIAGNOSIS — N18.31 STAGE 3A CHRONIC KIDNEY DISEASE (HCC): ICD-10-CM

## 2023-12-28 DIAGNOSIS — E78.2 MIXED HYPERLIPIDEMIA: ICD-10-CM

## 2023-12-28 DIAGNOSIS — Z09 HOSPITAL DISCHARGE FOLLOW-UP: Primary | ICD-10-CM

## 2023-12-28 DIAGNOSIS — I10 ESSENTIAL (PRIMARY) HYPERTENSION: ICD-10-CM

## 2023-12-28 DIAGNOSIS — I50.9 ACUTE ON CHRONIC HEART FAILURE, UNSPECIFIED HEART FAILURE TYPE (HCC): ICD-10-CM

## 2023-12-28 RX ORDER — POTASSIUM CHLORIDE 1500 MG/1
1 TABLET, EXTENDED RELEASE ORAL 2 TIMES DAILY
Qty: 60 TABLET | Refills: 2 | OUTPATIENT
Start: 2023-12-28

## 2023-12-28 RX ORDER — FOLIC ACID 1 MG/1
1 TABLET ORAL DAILY
Qty: 90 TABLET | Refills: 3 | OUTPATIENT
Start: 2023-12-28

## 2023-12-29 NOTE — TELEPHONE ENCOUNTER
Duplicate request, previously addressed. Disp Refills Start End     Potassium Chloride ER 20 MEQ Oral Tab CR 60 tablet 2 12/27/2023 --    Sig - Route:  Take 1 tablet by mouth in the morning and 1 tablet before bedtime. - Oral    Sent to pharmacy as: Potassium Chloride ER 20 MEQ Oral Tablet Extended Release    E-Prescribing Status: Receipt confirmed by pharmacy (12/27/2023 10:07 AM CST)    Pharmacy  [address] [place] [address] [id number] - [address] [phone], [phone]

## 2024-01-08 ENCOUNTER — TELEPHONE (OUTPATIENT)
Facility: LOCATION | Age: 79
End: 2024-01-08

## 2024-01-08 NOTE — PROGRESS NOTES
[facility] note: Duplicate Proton Pump Inhibitor with Histamine 2 blocker. Gary Wilkinson is a 64year old patient who has been prescribed both famotidine (Pepcid)  And pantoprazole (Protonix). Famotidine was discontinued per P&T approved protocol for duplicate therapy in adult patients for medications not ordered by gastroenterology. Pt is taking pantoprazole 20mg qam PTA - ordered IV/PO option.      Thank you,  Sammy Espinosa, PharmD  11/3/2022 Visit with patient and her son at bedside. Patient has three children. Patient awake but not oriented to situation. She appeared to be comfortable. Family Is aware that chaplains are available for continued care and support.

## 2024-01-09 ENCOUNTER — TELEPHONE (OUTPATIENT)
Facility: LOCATION | Age: 79
End: 2024-01-09

## 2024-01-09 NOTE — TELEPHONE ENCOUNTER
[facility] Order #[id number] Received and Placed in Dr. Strange Folder for Review and Sign    Patient has no objection to blood transfusions.

## 2024-01-09 NOTE — TELEPHONE ENCOUNTER
Clinical staff:  Please look out for lab report for CMP.   Per Adelina, of [facility], Patient had labs drawn and abnormal creatinine level, K level.   She faxed to [place] office [phone].     She had also faxed report to Dr Booker.

## 2024-01-11 ENCOUNTER — HOSPITAL ENCOUNTER (EMERGENCY)
Facility: HOSPITAL | Age: 79
Discharge: HOME | End: 2024-01-11
Attending: EMERGENCY MEDICINE
Payer: MEDICARE

## 2024-01-11 ENCOUNTER — TELEPHONE (OUTPATIENT)
Facility: LOCATION | Age: 79
End: 2024-01-11

## 2024-01-11 VITALS
DIASTOLIC BLOOD PRESSURE: 78 MMHG | SYSTOLIC BLOOD PRESSURE: 143 MMHG | HEART RATE: 84 BPM | TEMPERATURE: 98 F | HEIGHT: 61 IN | RESPIRATION RATE: 16 BRPM | BODY MASS INDEX: 23.6 KG/M2 | OXYGEN SATURATION: 98 % | WEIGHT: 125 LBS

## 2024-01-11 DIAGNOSIS — N17.9 AKI (ACUTE KIDNEY INJURY) (HCC): Primary | ICD-10-CM

## 2024-01-11 LAB
ANION GAP SERPL CALC-SCNC: 9 MMOL/L (ref 0–18)
BASOPHILS # BLD AUTO: 0.05 X10(3) UL (ref 0–0.2)
BASOPHILS NFR BLD AUTO: 0.6 %
BUN BLD-MCNC: 64 MG/DL (ref 9–23)
BUN/CREAT SERPL: 28.4 (ref 10–20)
CALCIUM BLD-MCNC: 8.7 MG/DL (ref 8.7–10.4)
CHLORIDE SERPL-SCNC: 111 MMOL/L (ref 98–112)
CO2 SERPL-SCNC: 17 MMOL/L (ref 21–32)
CREAT BLD-MCNC: 2.25 MG/DL
DEPRECATED RDW RBC AUTO: 59.3 FL (ref 35.1–46.3)
EGFRCR SERPLBLD CKD-EPI 2021: 22 ML/MIN/1.73M2 (ref 60–?)
EOSINOPHIL # BLD AUTO: 0.78 X10(3) UL (ref 0–0.7)
EOSINOPHIL NFR BLD AUTO: 9 %
ERYTHROCYTE [DISTWIDTH] IN BLOOD BY AUTOMATED COUNT: 19.6 % (ref 11–15)
GLUCOSE BLD-MCNC: 113 MG/DL (ref 70–99)
HCT VFR BLD AUTO: 24.8 %
HGB BLD-MCNC: 7.9 G/DL
IMM GRANULOCYTES # BLD AUTO: 0.02 X10(3) UL (ref 0–1)
IMM GRANULOCYTES NFR BLD: 0.2 %
LYMPHOCYTES # BLD AUTO: 2.17 X10(3) UL (ref 1–4)
LYMPHOCYTES NFR BLD AUTO: 24.9 %
MCH RBC QN AUTO: 26.3 PG (ref 26–34)
MCHC RBC AUTO-ENTMCNC: 31.9 G/DL (ref 31–37)
MCV RBC AUTO: 82.7 FL
MONOCYTES # BLD AUTO: 0.75 X10(3) UL (ref 0.1–1)
MONOCYTES NFR BLD AUTO: 8.6 %
NEUTROPHILS # BLD AUTO: 4.94 X10 (3) UL (ref 1.5–7.7)
NEUTROPHILS # BLD AUTO: 4.94 X10(3) UL (ref 1.5–7.7)
NEUTROPHILS NFR BLD AUTO: 56.7 %
OSMOLALITY SERPL CALC.SUM OF ELEC: 303 MOSM/KG (ref 275–295)
PLATELET # BLD AUTO: 213 10(3)UL (ref 150–450)
POTASSIUM SERPL-SCNC: 4.6 MMOL/L (ref 3.5–5.1)
RBC # BLD AUTO: 3 X10(6)UL
SODIUM SERPL-SCNC: 137 MMOL/L (ref 136–145)
WBC # BLD AUTO: 8.7 X10(3) UL (ref 4–11)

## 2024-01-11 PROCEDURE — 96360 HYDRATION IV INFUSION INIT: CPT

## 2024-01-11 PROCEDURE — 85025 COMPLETE CBC W/AUTO DIFF WBC: CPT | Performed by: EMERGENCY MEDICINE

## 2024-01-11 PROCEDURE — 99284 EMERGENCY DEPT VISIT MOD MDM: CPT

## 2024-01-11 PROCEDURE — 80048 BASIC METABOLIC PNL TOTAL CA: CPT | Performed by: EMERGENCY MEDICINE

## 2024-01-11 NOTE — TELEPHONE ENCOUNTER
Spoke with Dr. Strange and patient to go to ER for evaluation.   nurse said that patient is on ABT's by infectious disease who is not answering the phone. She keeps getting placed into a voice mail. Nurse calling PCP for help. Patient being treated for covid PNA with Daptomycin 50mg every other day IV push.     Advised Nurse of MD recommendation for ER and nurse agrees. She will call the family and have patient go to ER for evaluation.

## 2024-01-11 NOTE — ED INITIAL ASSESSMENT (HPI)
Pt presents for elevated creatinine. Pt receiving IV abx for last five weeks for staph infection. Pt denies fevers. Pt's last void this afternoon.

## 2024-01-11 NOTE — TELEPHONE ENCOUNTER
Home health RN calling asking for order   Patient potassium today 5.7  BUN 68 today  Creatinine 2.48 today     Asking for order to DC potassium oral supplement 20MEQ BID    Please advise.     MA at office said labs were received and placed in your folder.

## 2024-01-12 NOTE — ED PROVIDER NOTES
Patient Seen in: [facility] Emergency Department    History     Chief Complaint   Patient presents with    Abnormal Result       HPI    The patient presents to the ED after being told to come to the ER by her primary care physician.  Apparently her kidney function tests and potassium was elevated on outpatient labs today.  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  She receives this every other day.  Denies other complaints.    History reviewed.   Past Medical History:   Diagnosis Date    Anxiety and depression 03/13/2023    AS (aortic stenosis)     s/p TAVR    Asthma     CAD (coronary artery disease)     CHF (congestive heart failure) ([facility])     Diastolic    Chronic atrial fibrillation (HCC)     CKD (chronic kidney disease)     Congestive heart disease ([facility])     Depression 02/01/2023    Essential hypertension     Hearing impairment     bilateral hearing aids    High cholesterol     Hyperlipidemia     PONV (postoperative nausea and vomiting)     RA (rheumatoid arthritis) ([facility])        History reviewed.   Past Surgical History:   Procedure Laterality Date    APPENDECTOMY      CATH BARE METAL STENT (BMS)  2022    Cardiac Stents Placement x4    OTHER SURGICAL HISTORY Bilateral     Gel injections to bilateral knees    REPAIR ING HERNIA,5+Y/O,REDUCIBL      VALVE REPAIR  11/03/2022         Medications :  (Not in a hospital admission)       Family History   Problem Relation Age of Onset    Other (emphysema) Father     Dementia Mother     No Known Problems Daughter     No Known Problems Daughter     No Known Problems Son     No Known Problems Son     Other (Osteoarthritis) Sister     Heart Attack Brother     Cancer Brother     Heart Disorder Brother        Smoking Status:   Social History     Socioeconomic History    Marital status:    Tobacco Use    Smoking status: Never     Passive exposure: Never    Smokeless tobacco: Never   Vaping Use    Vaping Use: Never used   Substance and Sexual Activity     Alcohol use: Never    Drug use: Never   Other Topics Concern    Caffeine Concern No    Seat Belt Yes    Special Diet No       Constitutional and vital signs reviewed.      Social History and Family History elements reviewed from today, pertinent positives to the presenting problem noted.    Physical Exam     ED Triage Vitals [01/11/24 1743]   /66   Pulse 86   Resp 19   Temp 98.2 °F (36.8 °C)   Temp src Oral   SpO2 99 %   O2 Device None (Room air)       All measures to prevent infection transmission during my interaction with the patient were taken. The patient was already wearing a droplet mask on my arrival to the room. Personal protective equipment was worn throughout the duration of the exam.  Handwashing was performed prior to and after the exam.  Stethoscope and any equipment used during my examination was cleaned with super sani-cloth germicidal wipes following the exam.     Physical Exam  Vitals and nursing note reviewed.   Constitutional:       General: She is not in acute distress.     Appearance: She is well-developed.   HENT:      Head: Normocephalic and atraumatic.   Eyes:      General:         Right eye: No discharge.         Left eye: No discharge.      Conjunctiva/sclera: Conjunctivae normal.   Neck:      Trachea: No tracheal deviation.   Cardiovascular:      Rate and Rhythm: Normal rate and regular rhythm.   Pulmonary:      Effort: Pulmonary effort is normal. No respiratory distress.      Breath sounds: No stridor.   Abdominal:      General: There is no distension.      Palpations: Abdomen is soft.   Musculoskeletal:         General: No deformity.   Skin:     General: Skin is warm and dry.   Neurological:      Mental Status: She is alert and oriented to person, place, and time.   Psychiatric:         Mood and Affect: Mood normal.         Behavior: Behavior normal.         ED Course        Labs Reviewed   BASIC METABOLIC PANEL (8) - Abnormal; Notable for the following components:       Result  Value    Glucose 113 (*)     CO2 17.0 (*)     BUN 64 (*)     Creatinine 2.25 (*)     BUN/CREA Ratio 28.4 (*)     Calculated Osmolality 303 (*)     eGFR-Cr 22 (*)     All other components within normal limits   CBC W/ DIFFERENTIAL - Abnormal; Notable for the following components:    RBC 3.00 (*)     HGB 7.9 (*)     HCT 24.8 (*)     RDW-SD 59.3 (*)     RDW 19.6 (*)     Eosinophil Absolute 0.78 (*)     All other components within normal limits   CBC WITH DIFFERENTIAL WITH PLATELET    Narrative:     The following orders were created for panel order CBC With Differential With Platelet.                  Procedure                               Abnormality         Status                                     ---------                               -----------         ------                                     CBC W/ DIFFERENTIAL[[id number]]          Abnormal            Final result                                                 Please view results for these tests on the individual orders.       As Interpreted by me    Imaging Results Available and Reviewed while in ED: No results found.  ED Medications Administered:   Medications   sodium chloride 0.9 % IV bolus 500 mL (0 mL Intravenous Stopped 1/11/24 2104)         MDM     Vitals:    01/11/24 1803 01/11/24 1845 01/11/24 1930 01/11/24 2100   BP: 118/61 116/56 108/53 143/78   Pulse: 80 78 76 84   Resp: 18  18 16   Temp:       TempSrc:       SpO2: 98% 99% 98% 98%   Weight:       Height:         *I personally reviewed and interpreted all ED vitals.    Pulse Ox: 98%, Room air, Normal     Monitor Interpretation:   normal sinus rhythm as interpreted by me.  The cardiac monitor was ordered to monitor heart rate.    Differential Diagnosis/ Diagnostic Considerations: FRANKO, hyperkalemia, other    Complicating Factors: The patient already has does not have any pertinent problems on file. to contribute to the complexity of this ED evaluation.    Medical Decision Making  The patient  presents to the ED for elevated potassium and elevated creatinine levels today.  Nondistressed on evaluation and she denies complaints.  Potassium normal on ED draw.  Creatinine 2.2 which is slightly elevated from the patient's baseline.  She was given a small fluid bolus.  I discussed with Dr. Newby for ID consultation who recommends not changing the daptomycin dosing, and low concern for antibiotic induced renal injury.  Patient advised on adequate hydration and stable for discharge home with daughter.    Problems Addressed:  FRANKO (acute kidney injury) (HCC): acute illness or injury    Amount and/or Complexity of Data Reviewed  Labs: ordered. Decision-making details documented in ED Course.  Discussion of management or test interpretation with external provider(s): I discussed with Dr. Newby for ID consultation        Condition upon leaving the department: Stable    Disposition and Plan     Clinical Impression:  1. FRANKO (acute kidney injury) (HCC)        Disposition:  Discharge    Follow-up:  Errol Booker MD  [address]  [facility] [address]  [facility] [address]  [phone]    Schedule an appointment as soon as possible for a visit in 1 week(s)        Medications Prescribed:  Discharge Medication List as of 1/11/2024  8:02 PM

## 2024-01-12 NOTE — ED QUICK NOTES
Pt has a RN come to the house to give IV abx and ivan labs. They say her creatinine and potassium were elevated and told to come in for further evaluation.

## 2024-01-14 DIAGNOSIS — I50.32 CHRONIC DIASTOLIC CONGESTIVE HEART FAILURE (HCC): ICD-10-CM

## 2024-01-14 DIAGNOSIS — M79.89 LEG SWELLING: ICD-10-CM

## 2024-01-14 DIAGNOSIS — I10 HYPERTENSION GOAL BP (BLOOD PRESSURE) < 130/80: ICD-10-CM

## 2024-01-16 ENCOUNTER — MED REC SCAN ONLY (OUTPATIENT)
Facility: LOCATION | Age: 79
End: 2024-01-16

## 2024-01-16 ENCOUNTER — PATIENT MESSAGE (OUTPATIENT)
Facility: LOCATION | Age: 79
End: 2024-01-16

## 2024-01-16 ENCOUNTER — PATIENT OUTREACH (OUTPATIENT)
Dept: INTERNAL MEDICINE CLINIC | Facility: CLINIC | Age: 79
End: 2024-01-16

## 2024-01-16 RX ORDER — BUMETANIDE 1 MG/1
1 TABLET ORAL DAILY
Qty: 90 TABLET | Refills: 9 | OUTPATIENT
Start: 2024-01-16

## 2024-01-16 NOTE — PROGRESS NOTES
Called pt daughter (Olivia) 1st attempt ER f/u appt request.  No answer, LVMTCB to schedule appts    - INFECTIOUS DISEASES   Errol Booker MD  [address]  [facility] [address]  [facility] [address] [phone]  - unable to contact.  - closing encounter

## 2024-01-16 NOTE — TELEPHONE ENCOUNTER
Please review; protocol failed/ has no protocol   Patient has labs done from another provider  on 1/2024    Requested Prescriptions   Pending Prescriptions Disp Refills    bumetanide (BUMEX) 1 MG Oral Tab 90 tablet 9     Sig: Take 1 tablet (1 mg total) by mouth daily. STOP FUROSEMIDE.       Hypertensive Medications Protocol Failed - 1/14/2024  1:36 PM        Failed - Last BP reading less than 140/90     BP Readings from Last 1 Encounters:   01/11/24 143/78               Failed - EGFRCR or GFRNAA > 50     GFR Evaluation  EGFRCR: 22 , resulted on 1/11/2024          Passed - In person appointment in the past 12 or next 3 months     Recent Outpatient Visits              2 weeks ago Hospital discharge follow-up    [facility] Presley Strange MD    Telemedicine    3 months ago Lumbar radiculopathy    [facility], Kendrick Joseph MD    Office Visit    3 months ago Right inguinal hernia    [facility] [place] Presley Strange MD    Office Visit    3 months ago Lumbar radiculopathy    [facility] [place] Behar, Alex, MD    Office Visit    4 months ago Neural foraminal stenosis of lumbar spine    [place]  [facility] [place] Barby Puga PT    Office Visit                      Passed - CMP or BMP in past 6 months     Recent Results (from the past 4392 hour(s))   Basic Metabolic Panel (8)    Collection Time: 01/11/24  6:21 PM   Result Value Ref Range    Glucose 113 (H) 70 - 99 mg/dL    Sodium 137 136 - 145 mmol/L    Potassium 4.6 3.5 - 5.1 mmol/L    Chloride 111 98 - 112 mmol/L    CO2 17.0 (L) 21.0 - 32.0 mmol/L    Anion Gap 9 0 - 18 mmol/L    BUN 64 (H) 9 - 23 mg/dL    Creatinine 2.25 (H) 0.55 - 1.02 mg/dL    BUN/CREA Ratio 28.4 (H) 10.0 - 20.0    Calcium, Total 8.7 8.7 - 10.4 mg/dL    Calculated Osmolality 303 (H) 275 - 295 mOsm/kg    eGFR-Cr 22 (L) >=60  mL/min/1.73m2     *Note: Due to a large number of results and/or encounters for the requested time period, some results have not been displayed. A complete set of results can be found in Results Review.               Passed - In person appointment or virtual visit in the past 6 months     Recent Outpatient Visits              2 weeks ago Hospital discharge follow-up    [facility] Presley Strange MD    Telemedicine    3 months ago Lumbar radiculopathy    [facility] Kendrick Joseph MD    Office Visit    3 months ago Right inguinal hernia    [facility] Presley Delgado MD    Office Visit    3 months ago Lumbar radiculopathy    [facility] [place] Behar, Alex, MD    Office Visit    4 months ago Neural foraminal stenosis of lumbar spine    [place]  [facility] [place] Barby Puga, PT    Office Visit                         Recent Outpatient Visits              2 weeks ago Hospital discharge follow-up    [facility] Presley Strange MD    Telemedicine    3 months ago Lumbar radiculopathy    [facility], Kendrick Joseph MD    Office Visit    3 months ago Right inguinal hernia    [facility], Presley Delgado MD    Office Visit    3 months ago Lumbar radiculopathy    [facility] [place] Behar, Alex, MD    Office Visit    4 months ago Neural foraminal stenosis of lumbar spine    [place]  [facility] [place], PT    Office Visit

## 2024-01-17 RX ORDER — MELATONIN
325
Qty: 90 TABLET | Refills: 3 | Status: SHIPPED | OUTPATIENT
Start: 2024-01-17

## 2024-01-17 NOTE — TELEPHONE ENCOUNTER
Please advise on refill request.    LR-12/23/23 by hospitalist.  Pended as 1 yr supply. Please review and edit if needed.  Requested Prescriptions   Pending Prescriptions Disp Refills    ferrous sulfate 325 (65 FE) MG Oral Tab EC 20 tablet 0     Sig: Take 1 tablet (325 mg total) by mouth daily with breakfast.       There is no refill protocol information for this order

## 2024-01-22 ENCOUNTER — TELEPHONE (OUTPATIENT)
Dept: INTERNAL MEDICINE CLINIC | Facility: CLINIC | Age: 79
End: 2024-01-22

## 2024-01-22 NOTE — TELEPHONE ENCOUNTER
Ema VELASQUEZ paged by [facility]ARR[facility] Williamson office of critical H&H of Hg 7.7, and RN called home health and pt is asymptomatic and on iron pills with no reported bleeding. If patient develops symptoms to go to ER. Otherwise continue with weekly draws scheduled for Monday 1/29/2024

## 2024-01-23 ENCOUNTER — TELEPHONE (OUTPATIENT)
Facility: LOCATION | Age: 79
End: 2024-01-23

## 2024-01-23 NOTE — TELEPHONE ENCOUNTER
Spoke with pt's daughter per LUCIANO,  verified.  She stated she called Dr. Booker ( ID) office and she was told to call pt PCP regarding iron pills.   Pt last labs done on 1-15-24, pt Hgb was low (8.2).  She also req to speak to Dr KOREY Strange.   pls advise, thanks in advance.

## 2024-01-30 NOTE — TELEPHONE ENCOUNTER
Pt viewed mychart.         Read Composed From To  Subject   Y 1/23/2024  7:15 PM MD Faby William Maria iron

## 2024-02-07 ENCOUNTER — TELEPHONE (OUTPATIENT)
Facility: LOCATION | Age: 79
End: 2024-02-07

## 2024-02-07 NOTE — TELEPHONE ENCOUNTER
[facility] Order #[id number] Received and Place in Dr. Strange Folder for Review and Sign

## 2024-02-13 ENCOUNTER — MED REC SCAN ONLY (OUTPATIENT)
Facility: LOCATION | Age: 79
End: 2024-02-13

## 2024-02-14 DIAGNOSIS — M54.16 LUMBAR RADICULOPATHY: ICD-10-CM

## 2024-02-14 RX ORDER — PREGABALIN 50 MG/1
50 CAPSULE ORAL NIGHTLY
Qty: 90 CAPSULE | Refills: 2 | Status: SHIPPED | OUTPATIENT
Start: 2024-02-14

## 2024-03-04 ENCOUNTER — PATIENT MESSAGE (OUTPATIENT)
Facility: LOCATION | Age: 79
End: 2024-03-04

## 2024-03-05 DIAGNOSIS — E87.6 HYPOKALEMIA: ICD-10-CM

## 2024-03-07 RX ORDER — POTASSIUM CHLORIDE 1500 MG/1
1 TABLET, EXTENDED RELEASE ORAL 2 TIMES DAILY
Qty: 60 TABLET | Refills: 2 | Status: SHIPPED | OUTPATIENT
Start: 2024-03-07

## 2024-03-07 NOTE — TELEPHONE ENCOUNTER
Please review. Protocol failed / No protocol.     Requested Prescriptions   Pending Prescriptions Disp Refills    Potassium Chloride ER 20 MEQ Oral Tab CR 60 tablet 2     Sig: Take 1 tablet by mouth in the morning and 1 tablet before bedtime.       There is no refill protocol information for this order          Recent Outpatient Visits              2 months ago Hospital discharge follow-up    [facility] Presley Strange MD    Telemedicine    4 months ago Lumbar radiculopathy    [facility] [place]    Office Visit    4 months ago Right inguinal hernia    [facility] Presley Strange MD    Office Visit    5 months ago Lumbar radiculopathy    [facility] Behar, Alex, MD    Office Visit    6 months ago Neural foraminal stenosis of lumbar spine    [place]  [facility] Barby Puga, PT    Office Visit            Future Appointments         Provider Department Appt Notes    In 3 weeks Frederic Courtney MD [facility] Beginning stages of mike

## 2024-03-12 NOTE — TELEPHONE ENCOUNTER
Charlane Ganser--    Received operative and pathology report from colonoscopy/egd performed 09/11/2019 at [facility]. Placed on your desk for review. Of note, patient underwent colonoscopy with Dr. Hunter Schulz 10/12/2021 with 10 year recall recommended.
Bleeding in early pregnancy

## 2024-03-22 ENCOUNTER — TELEPHONE (OUTPATIENT)
Facility: LOCATION | Age: 79
End: 2024-03-22

## 2024-03-22 NOTE — TELEPHONE ENCOUNTER
Do not stop plravix, bruising is expected, with that being said recommend patient seek care in [place] for trauma evaluation given she is bruising- need to evaluate for other sources of bleeding and this should be done in person.

## 2024-03-22 NOTE — TELEPHONE ENCOUNTER
Per daughter Olivia who is currently in [place] with pt. She stated that pt whole body is bruised .Daughter is wanting to know if she can be taken off Plavix.  Per daughter pt fell but her bruising is worse where she fell. Per daughter pt did not hit her head. I also inform her that pt should seek medical attention in [place]. Daughter again stated that its hard to find a MD in [place] and will like if Dr. Strange can call her since he is familiar with pt. Dr. Strange please see 3/4/2024 [employer] message there is a picture of pt bruising. Please advise.

## 2024-04-03 DIAGNOSIS — Z51.81 THERAPEUTIC DRUG MONITORING: ICD-10-CM

## 2024-04-03 DIAGNOSIS — M06.9 RHEUMATOID ARTHRITIS, INVOLVING UNSPECIFIED SITE, UNSPECIFIED WHETHER RHEUMATOID FACTOR PRESENT (HCC): Primary | ICD-10-CM

## 2024-04-03 DIAGNOSIS — E87.6 HYPOKALEMIA: ICD-10-CM

## 2024-04-03 RX ORDER — POTASSIUM CHLORIDE 1500 MG/1
1 TABLET, EXTENDED RELEASE ORAL 2 TIMES DAILY
Qty: 60 TABLET | Refills: 2 | Status: CANCELLED | OUTPATIENT
Start: 2024-04-03

## 2024-04-03 NOTE — TELEPHONE ENCOUNTER
LOV: 6/8/23  Last Refilled:#30, 3rfs 10/6/23  Labs:AST 14  ALT 12  1/15/24  Summary:  1.cont. prednisoen 5mg a day - can taper to 2.5mg a day if feels shaking.   2 .cont. leflunomide 10mg a day   3. Return to clinic in 2 months.   .   4. Check labs today   5. Try gabapnetin 100mg at night   6. Could consider adding hydroxcyhrlqouine 200mg a day - in the future      - the plan would be to increase the leflunomide if her kidney function improves         Colin Lopez MD  6/8/2023   10:48 AM  Please advise.

## 2024-04-04 RX ORDER — LEFLUNOMIDE 10 MG/1
10 TABLET ORAL DAILY
Qty: 30 TABLET | Refills: 3 | OUTPATIENT
Start: 2024-04-04

## 2024-04-05 NOTE — TELEPHONE ENCOUNTER
Potassium Chloride ER 20 MEQ Oral Tab CR 60 tablet 2 3/7/2024 --    Sig - Route: Take 1 tablet by mouth in the morning and 1 tablet before bedtime. - Oral    Sent to pharmacy as: Potassium Chloride ER 20 MEQ Oral Tablet Extended Release    E-Prescribing Status: Receipt confirmed by pharmacy (3/7/2024 11:47 AM CST)      Associated Diagnoses    Hypokalemia        Pharmacy    [id number] - [address] [phone], [phone]

## 2024-04-10 ENCOUNTER — LAB ENCOUNTER (OUTPATIENT)
Dept: LAB | Age: 79
End: 2024-04-10
Attending: INTERNAL MEDICINE
Payer: MEDICARE

## 2024-04-10 DIAGNOSIS — Z09 HOSPITAL DISCHARGE FOLLOW-UP: ICD-10-CM

## 2024-04-10 DIAGNOSIS — Z51.81 THERAPEUTIC DRUG MONITORING: ICD-10-CM

## 2024-04-10 DIAGNOSIS — M06.9 RHEUMATOID ARTHRITIS, INVOLVING UNSPECIFIED SITE, UNSPECIFIED WHETHER RHEUMATOID FACTOR PRESENT (HCC): ICD-10-CM

## 2024-04-10 LAB
ALBUMIN SERPL-MCNC: 4.3 G/DL (ref 3.2–4.8)
ALBUMIN/GLOB SERPL: 2 {RATIO} (ref 1–2)
ALP LIVER SERPL-CCNC: 90 U/L
ALT SERPL-CCNC: 10 U/L
ANION GAP SERPL CALC-SCNC: 9 MMOL/L (ref 0–18)
AST SERPL-CCNC: 16 U/L (ref ?–34)
BASOPHILS # BLD AUTO: 0.06 X10(3) UL (ref 0–0.2)
BASOPHILS NFR BLD AUTO: 0.7 %
BILIRUB SERPL-MCNC: 0.4 MG/DL (ref 0.2–1.1)
BUN BLD-MCNC: 50 MG/DL (ref 9–23)
BUN/CREAT SERPL: 35.2 (ref 10–20)
CALCIUM BLD-MCNC: 9.3 MG/DL (ref 8.7–10.4)
CHLORIDE SERPL-SCNC: 107 MMOL/L (ref 98–112)
CO2 SERPL-SCNC: 23 MMOL/L (ref 21–32)
CREAT BLD-MCNC: 1.42 MG/DL
CRP SERPL-MCNC: 1.3 MG/DL (ref ?–1)
DEPRECATED RDW RBC AUTO: 47.1 FL (ref 35.1–46.3)
EGFRCR SERPLBLD CKD-EPI 2021: 37 ML/MIN/1.73M2 (ref 60–?)
EOSINOPHIL # BLD AUTO: 1.36 X10(3) UL (ref 0–0.7)
EOSINOPHIL NFR BLD AUTO: 16.4 %
ERYTHROCYTE [DISTWIDTH] IN BLOOD BY AUTOMATED COUNT: 14.8 % (ref 11–15)
ERYTHROCYTE [SEDIMENTATION RATE] IN BLOOD: 37 MM/HR
FASTING STATUS PATIENT QL REPORTED: NO
GLOBULIN PLAS-MCNC: 2.2 G/DL (ref 2.8–4.4)
GLUCOSE BLD-MCNC: 104 MG/DL (ref 70–99)
HCT VFR BLD AUTO: 27.4 %
HGB BLD-MCNC: 8.6 G/DL
IMM GRANULOCYTES # BLD AUTO: 0.02 X10(3) UL (ref 0–1)
IMM GRANULOCYTES NFR BLD: 0.2 %
LYMPHOCYTES # BLD AUTO: 2.16 X10(3) UL (ref 1–4)
LYMPHOCYTES NFR BLD AUTO: 26.1 %
MCH RBC QN AUTO: 27.9 PG (ref 26–34)
MCHC RBC AUTO-ENTMCNC: 31.4 G/DL (ref 31–37)
MCV RBC AUTO: 89 FL
MONOCYTES # BLD AUTO: 0.88 X10(3) UL (ref 0.1–1)
MONOCYTES NFR BLD AUTO: 10.6 %
NEUTROPHILS # BLD AUTO: 3.8 X10 (3) UL (ref 1.5–7.7)
NEUTROPHILS # BLD AUTO: 3.8 X10(3) UL (ref 1.5–7.7)
NEUTROPHILS NFR BLD AUTO: 46 %
OSMOLALITY SERPL CALC.SUM OF ELEC: 302 MOSM/KG (ref 275–295)
PLATELET # BLD AUTO: 260 10(3)UL (ref 150–450)
POTASSIUM SERPL-SCNC: 4.5 MMOL/L (ref 3.5–5.1)
PROT SERPL-MCNC: 6.5 G/DL (ref 5.7–8.2)
RBC # BLD AUTO: 3.08 X10(6)UL
SODIUM SERPL-SCNC: 139 MMOL/L (ref 136–145)
WBC # BLD AUTO: 8.3 X10(3) UL (ref 4–11)

## 2024-04-10 PROCEDURE — 80053 COMPREHEN METABOLIC PANEL: CPT

## 2024-04-10 PROCEDURE — 85652 RBC SED RATE AUTOMATED: CPT

## 2024-04-10 PROCEDURE — 36415 COLL VENOUS BLD VENIPUNCTURE: CPT

## 2024-04-10 PROCEDURE — 85025 COMPLETE CBC W/AUTO DIFF WBC: CPT

## 2024-04-10 PROCEDURE — 85060 BLOOD SMEAR INTERPRETATION: CPT

## 2024-04-10 PROCEDURE — 86140 C-REACTIVE PROTEIN: CPT

## 2024-04-10 RX ORDER — LEFLUNOMIDE 10 MG/1
10 TABLET ORAL DAILY
Qty: 30 TABLET | Refills: 3 | Status: SHIPPED | OUTPATIENT
Start: 2024-04-10

## 2024-04-10 NOTE — TELEPHONE ENCOUNTER
Requested Prescriptions     Pending Prescriptions Disp Refills    leflunomide 10 MG Oral Tab 30 tablet 3     Sig: Take 1 tablet (10 mg total) by mouth daily.     Future Appointments   Date Time Provider Department Center   4/18/2024 10:20 AM Colin Lopez MD [facility] [place]   5/20/2024  2:00 PM Frederic Courtney MD [facility] [place]     LOV: 6/8/23   Last Refilled:10/6/23 #30 3RF       Summary:  1.cont. prednisoen 5mg a day - can taper to 2.5mg a day if feels shaking.   2 .cont. leflunomide 10mg a day   3. Return to clinic in 2 months.   .   4. Check labs today   5. Try gabapnetin 100mg at night   6. Could consider adding hydroxcyhrlqouine 200mg a day - in the future      - the plan would be to increase the leflunomide if her kidney function improves         Colin Lopez MD  6/8/2023   10:48 AM  - Reviewed [place]- information  through Epic

## 2024-04-10 NOTE — TELEPHONE ENCOUNTER
Please see TE from 4/3.  Patient's daughter scheduled a follow up appointment for 4/18. Patient will complete lab work before coming in for her appointment. Daughter is requesting a refill for leflunomide. Patient is out of medication. Please advise     [id number] - [place] [address] [facility] [address]     Medication Quantity Refills Start End   leflunomide 10 MG Oral Tab 30 tablet 3 10/6/2023 --   Sig:   Take 1 tablet (10 mg total) by mouth daily.     Patient taking differently:   Take 1 tablet (10 mg total) by mouth every morning.

## 2024-04-11 ENCOUNTER — TELEPHONE (OUTPATIENT)
Facility: LOCATION | Age: 79
End: 2024-04-11

## 2024-04-11 NOTE — TELEPHONE ENCOUNTER
Dr. Strange - when you review CMP , please address BUN and Creatinine specifically, daughter is concerned.       Received call from daughter on verbal release    . Patient Contact: Kianna Hobbs                       Relationship: DAUGHTER                Phone: [phone]  She is worried about BUN and Creatinine numbers.   Advised results have not been reviewed by Dr. Strange.   RN advised that test results now post immediately to [employer] account.  However, provider may not have the chance to review or comment on them before the results reach patient.    She is asking specifically about kidney function results. Advised on Trend since last CMP. And advised to await PCP's official review .

## 2024-04-12 NOTE — TELEPHONE ENCOUNTER
Her labs are overall stable from before.  Your kidney function is improved.   Written by Presley Strange MD on 4/11/2024  3:09 PM CDT  Seen by patient Armida Hobbs on 4/11/2024  3:11 PM

## 2024-04-18 ENCOUNTER — OFFICE VISIT (OUTPATIENT)
Dept: RHEUMATOLOGY | Facility: CLINIC | Age: 79
End: 2024-04-18

## 2024-04-18 VITALS
OXYGEN SATURATION: 97 % | HEART RATE: 97 BPM | HEIGHT: 61 IN | BODY MASS INDEX: 26.66 KG/M2 | RESPIRATION RATE: 16 BRPM | WEIGHT: 141.19 LBS

## 2024-04-18 DIAGNOSIS — Z51.81 THERAPEUTIC DRUG MONITORING: ICD-10-CM

## 2024-04-18 DIAGNOSIS — M06.9 RHEUMATOID ARTHRITIS, INVOLVING UNSPECIFIED SITE, UNSPECIFIED WHETHER RHEUMATOID FACTOR PRESENT (HCC): Primary | ICD-10-CM

## 2024-04-18 PROCEDURE — 99214 OFFICE O/P EST MOD 30 MIN: CPT | Performed by: INTERNAL MEDICINE

## 2024-04-18 RX ORDER — LEFLUNOMIDE 10 MG/1
10 TABLET ORAL DAILY
Qty: 90 TABLET | Refills: 0 | Status: SHIPPED | OUTPATIENT
Start: 2024-04-18

## 2024-04-18 RX ORDER — PREDNISONE 2.5 MG/1
2.5 TABLET ORAL DAILY
Qty: 30 TABLET | Refills: 1 | Status: SHIPPED | OUTPATIENT
Start: 2024-04-18

## 2024-04-18 NOTE — PATIENT INSTRUCTIONS
1.take prednisone 2.5mg as needed.   2 .cont. leflunomide 10mg a day   3. Return to clinic in 6 months.   .   4. Check labs every 6 months.   5. Cont. Pregabalin 50mg at night.

## 2024-04-18 NOTE — PROGRESS NOTES
Armida Hobbs is a 78 year old female who presents for   Chief Complaint   Patient presents with    Rheumatoid Arthritis    Medication Follow-Up    Lab Results    Foot Pain     B/L    Ankle Pain     B/L    Back Pain   .   HPI:     I had the pleasure of seeing Armida Hobbs on 12/9/2020 for evaluation.     She just moved her in the last year. She was moved from [facility].   She was diagnosed with rheumatoid arthritis. She was diagnosed 5 years ago.    It affects her knee and ankles. Her hands hurt   She was put on medication once a long time ago.   Then seh just started using nautural supplements - like food and fish oil and turmeric.   She was seeing Dr. Galeana and was getting a lot of cortisone injections.     She was taking fish oil - this was helping a lot.   She used to get cortisone injections for her knees for osteoarthritis - and did get gel injections. It has been helping. She got it for the first time 2-3 months ago.     She also getting varcisoe vein shots.   She also had a pulled tendon of left knee and saw an orthopedic. And got an injectiosn 2 weeks ago and given diclofenac gel 1 % topical.   She got an xray at that time.     She has 7/10 pain - samara in left knee. It hurts and swollen at rest. It's beter than 1 month ago.     The pain travels and sometimes it's in her ankles and toes.   She was told not to take tyelnol.     She went last year for 2 session - for her knee.   She has not seen rheumatologist in 1 year.     12/23/2020  She still has left knee pain samara when she sleeps. Her left ankle and toe - hurts. And she has a hard time getting rid of them.   She is getting cramps in her legs. . She has trouble going up the steps. She went to physical therapy a  Couple of years ago.   One time she hurt her right hip in physical therapy  And got an MRI  And was told there was a lot swelling - she had injectiosn done at that time.     She has b/l knee pain and now her ankles.   She has a lot of pain  in the mrmicha.   No foot or hand pain.   Now it's her hips pain.     Her knees are on and off   Better in he rknees about 50% better. She still has left knee . She had the injectiosn 4 weekss ago.   About 2 weeks later she had so much pain and she couldn't bend her knee.     She has onoging ankle pain at times.     2/18/2021  Some days every part of her body hurts. Domonique her righ tknee - it is also her right hip and both feet . Cynditts.   reveiwed records from 's office - from 6/2019 -   She had mri of her right hand and right ankle in 2016 - showed fluid in her righ thand and also tendonitis in right ankle -  He had dw her arava and biologics in the past but she declined.   He has summary of all her mris.   She had a lot of pain last week and got a right knee injeciton methryl pred 20mg - and she feels ian.r   She hasn't felt better on plaquenil 200mg bid.   She is going to get a hyaluronic acid injecito in 3/2021  [facility]   It travels from her knees to her right ankle and toes.     4/22/2021  She is on leflunomide 10mg a day - and prednisone 5mg ad ay.   She was getting hair loss and feeling tired with the lelfunomide.   She stopped the medication.   She felt a little better with   She had bad palpitations - every 6 months - she is getting echo - this was nromal.   Her cardiologist told her not to take - lasix, amlodipine and atoravastatin. She was started on pravastatin.   She was on plaquenil 200mg bid.   She still has bad back pain .     She has left ankle and left hip pain.     7/1/2021  She feels her legs are heavy that is newer.   She feels more ankle pain and swelling.   She feels a little better on leflunomide 20mg a day.   seshira's had 2 weeks of sciatica.   She broke her left 2nd toe and had to see orthopedcs. At the time her feet were hurting so bad. So when she moves the wrong way and she felt a right sided sciatica pain. She was given ice and was put in ice bad and water and  alcohol 3-4 times a day. It only helps a little bit.   She bumped her toe on a post - had a fragility fracture - her DEXA in 2/2021 and is osteopenia -  fragility fracture on 4/2021 /   Reviewed the xray of foot on 6/16/ and it showed no fracture.   She feels she has plantar fascitis.   She feels less pain in her legs but she still has issues in her legs.   She still can't stand long doing her activities.   She has to sit down. She has 8/10 pain.     7/26/2022:   Presents for follow-up of seropositive RA (+RF 54) and osteoarthritis  She follows with Dr. Lopez, last seen in July 2021  Currently on prednisone 5 mg daily  Recent blood work shows creatinine of 1.43.  Normal LFTs  She was discharged from the hospital on 7/10/2022 for acute on chronic congestive heart failure.  MRI of the lower spine was done recently showing severe narrowing of the right neural foramen and disc protrusion at L3 and L4  She also had a recent x-ray of the neck by Dr. Behar showing multilevel degenerative disc changes mostly at C5-C6 and C6-C7, recommended PT and if not improvement then MRI cervical spine  Also on lyrica for pain  Has some hand pain mosty in the 1st CMC joints and the mcps  Most of the pain is in the neck and upper back    11/14/2022:   Presents for follow-up of seropositive RA (+RF 54) and osteoarthritis  She follows with Dr. Lopez, last seen in July 2021  Was recently discharged from the hospital for severe symptomatic aortic stenosis s/p aortic valve replacement.  Reports joint pain involving her left fourth finger with swelling.  Also has some pain in her knees.  Reports of burning in her feet  She was on prednisone 5 mg daily, it was discontinued.  She has had gel injections in the past which helped her knees.    1/21/2023  Since last visit - he has stents, AVR and then had pneumoina.   Had pneumonon on 12/11-12/20/2022 - was given solumedrol and cont. On arav - was doing better with abx and solumedrol.    She had low blood pressure and kidney failure.   She has a nurse coming in once a week and phsyical therapy.   She was told she had low bp - and was in the hospital for 10 days to get the meds right.   She was admitted on 1/6/2023 - 1/13/2023 for FRANKO -creatine baseling was  1.3--> 3.06 on admit - now back to baseline - thought to be from overdiuresis  She was put on pred 40mg a day and tapered now to 20mg a day   She is on prednisone 20mg a day for the last 2 days.   She was given prednisoen burst last time for 10 days and daughter feels everytime she goes off prednsone - she has more flare and pain.   She's shaky as she taper this prednisone.   She is confused right now post hospitalization - it's slowly getting better after tapering the prednisone.     Her knees are hurting right now. Her knees are not swollen. -   There are slight ankle swelling.   Some days she is a lot of pain and sometiems not.   She's on icy patches and put on neck and back and that helps.   Sometimes her feet are burning.       2/9/2023 -   Her o2 is 96 % NR is 96 -   She is recovering from her recent admission on 1/6-1/13 - she is overall slowly recovering.   Her daughter feels she has more weakness and memory issues - CT head was done in patient and was normal.   She's having increasing sob - just yesterday and today since her last visit.   She's down to pred 5mg ad ay.   She is still shaking a little and she still feels palpiatiotns.     She's not in any pain.   She's still shaking in her hands on this dose of her prednisone.   She has palpiataitons. - this is not worse - but it's still intermittent -   If she rests, it's better.     No swelling in her legs -she si 142 lbs. -   She notices palpiations with going up the staris - 6 stairs.   seh is getting winded really fast.   She sees Cardiology next week.     Her memory is really bad - sometiems she forgets things - she cant' remember her meds.     4/13/2023  She's had her metoprolol  and lisinopril cut back yesterday - and her blood pressure is better. - it was too low before.   She is in a lot of pain.   She is shaking and on prednisone -   She is in a lot fo pain   Reviewed labs -   Cr is 2.10.   She is taking tyelnol arthritis   She has 8-9/10 pain.   She has been drinking more juices.   She is feeling winded and sob  Her pain in her hips is really bad   The botton of her feet are burning. But no numbness.   She has no dysuria - her ua is showsing elevated white cells.     6/8/2023  She overall has been doing better.   She was having a lto fo swellign in her legs for the last week.   She went to the cardiologist andtold it  was not the heart.   She had us lf legs last week - and it was negative.   The swelling went down on it's own since then.   She feels a cardboard on the bottom of her feet.   She feels brunign and numbness - like plastric and she feels her toes go red.   The prednisone was making her shaky.   So she went to pred 2.5mg a day so she had more pain.   She is back on pred 5m ga dya.   Her pain is more manageable  Than last visit.   She was just swollen . She does have inflammation in her toes - she feels it's her RA.   She was not able to walk before so now she is able to walk.   No back and hips pain now.   She's walking around the house now and a nurse is walking around her.e     She' s getting parastehsias in both her feet.   It's stayin in both feet.   This bothers her.   The pain she has she can tolerated it.     She feels her pain si 4/10 pain.     4/18/2024  Not doing good. She still has inflammation.   Before it ranse she knows.   She has right ankle pain and it's swelling.   She is on gabapentin.   More trouble with her right ankle and leg - she almost fell with it give out.   If she tries to cook and if she tries to go [employer] shopping. Then the next day she's wiped out.   Has neck pain.   She gets regular massages - and that helps the muscle pain.   She went to  [place] last month and felt worse - with swelling when she was there.       She's been off the prendisone 2.5mg very other day - - this last month. Tapered off.   She is on leflunomide 10mg ad ay     Admitted in 12/2023 -for covid 19 and acute on chronic CHF    Shew as on pregabilin and then switched back to gabpaentin.     Wt Readings from Last 2 Encounters:   04/18/24 141 lb 3.2 oz (64 kg)   01/11/24 125 lb (56.7 kg)     Body mass index is 26.68 kg/m².      Current Outpatient Medications   Medication Sig Dispense Refill    leflunomide 10 MG Oral Tab Take 1 tablet (10 mg total) by mouth daily. 30 tablet 3    Potassium Chloride ER 20 MEQ Oral Tab CR Take 1 tablet by mouth in the morning and 1 tablet before bedtime. 60 tablet 2    pregabalin (LYRICA) 50 MG Oral Cap Take 1 capsule (50 mg total) by mouth at bedtime. FOR NERVE/BACK PAIN. STOP GABAPENTIN. 90 capsule 2    ferrous sulfate 325 (65 FE) MG Oral Tab EC Take 1 tablet (325 mg total) by mouth daily with breakfast. 90 tablet 3    predniSONE 5 MG Oral Tab Take 1 tablet (5 mg total) by mouth 2 (two) times daily. With food 40 tablet 0    acetaminophen 500 MG Oral Tab Take 1 tablet (500 mg total) by mouth every 4 (four) hours as needed.      clopidogrel 75 MG Oral Tab Take 1 tablet (75 mg total) by mouth daily. FOR HEART STENTS. 90 tablet 9    aspirin 81 MG Oral Tab EC Take 1 tablet (81 mg total) by mouth daily. FOR HEART DISEASE. 90 tablet 9    lisinopril 20 MG Oral Tab Take 1 tablet (20 mg total) by mouth daily. FOR BLOOD PRESSURE. (Patient taking differently: Take 1 tablet (20 mg total) by mouth every morning. FOR BLOOD PRESSURE.) 90 tablet 9    bumetanide (BUMEX) 1 MG Oral Tab Take 1 tablet (1 mg total) by mouth daily. STOP FUROSEMIDE. (Patient taking differently: Take 1 tablet (1 mg total) by mouth every morning. STOP FUROSEMIDE.) 90 tablet 9    sertraline 50 MG Oral Tab Take 1 tablet (50 mg total) by mouth daily. FOR ANXIETY. (Patient taking differently: Take 1  tablet (50 mg total) by mouth every morning. FOR ANXIETY.) 90 tablet 9    pantoprazole 20 MG Oral Tab EC Take 1 tablet (20 mg total) by mouth every morning before breakfast. TO PREVENT ACID REFLUX. 90 tablet 3    folic acid 1 MG Oral Tab Take 1 tablet (1 mg total) by mouth daily. (Patient taking differently: Take 1 tablet (1 mg total) by mouth every morning.) 90 tablet 3    cholecalciferol 25 MCG (1000 UT) Oral Tab Take 1 tablet (1,000 Units total) by mouth daily. 90 tablet 3      Past Medical History:    Anxiety and depression    AS (aortic stenosis)    s/p TAVR    Asthma ([facility])    CAD (coronary artery disease)    CHF (congestive heart failure) ([facility])    Diastolic    Chronic atrial fibrillation (HCC)    CKD (chronic kidney disease)    Congestive heart disease ([facility])    Depression    Essential hypertension    Hearing impairment    bilateral hearing aids    High cholesterol    Hyperlipidemia    PONV (postoperative nausea and vomiting)    RA (rheumatoid arthritis) ([facility])      Past Surgical History:   Procedure Laterality Date    Appendectomy      Cath bare metal stent (bms)  2022    Cardiac Stents Placement x4    Other surgical history Bilateral     Gel injections to bilateral knees    Repair ing hernia,5+y/o,reducibl      Valve repair  11/03/2022      Family History   Problem Relation Age of Onset    Other (emphysema) Father     Dementia Mother     No Known Problems Daughter     No Known Problems Daughter     No Known Problems Son     No Known Problems Son     Other (Osteoarthritis) Sister     Heart Attack Brother     Cancer Brother     Heart Disorder Brother       Mom had back pain     Social History:  Social History     Socioeconomic History    Marital status:    Tobacco Use    Smoking status: Never     Passive exposure: Never    Smokeless tobacco: Never   Vaping Use    Vaping status: Never Used   Substance and Sexual Activity    Alcohol use: Never    Drug use: Never   Other Topics Concern    Caffeine Concern No     Seat Belt Yes    Special Diet No   Social History Narrative    ** Merged History Encounter **          Social Determinants of Health     Financial Resource Strain: Low Risk  (12/26/2023)    Financial Resource Strain     Difficulty of Paying Living Expenses: Not very hard     Med Affordability: No   Food Insecurity: No Food Insecurity (12/14/2023)    Food Insecurity     Food Insecurity: Never true   Transportation Needs: No Transportation Needs (12/26/2023)    Transportation Needs     Lack of Transportation: No   Housing Stability: Low Risk  (12/14/2023)    Housing Stability     Housing Instability: No      Living with daughter,        REVIEW OF SYSTEMS:   Review Of Systems:  Fatigue  Constitutional:No fever, no change in weight or appetitie  Derm: No rashes, no oral ulcers, no alopecia, no photosensitivity, no psoriasis  HEENT: No dry eyes, no dry mouth, no Raynaud's, no nasal ulcers, no parotid swelling, no neck pain, no jaw pain, no temple pain  Eyes: No visual changes,   CVS: No chest pain, no heart disease  RS: No SOB, no Cough, No Pleurtic pain,   GI: No nausea, no vomiiting, no abominal pain, no hx of ulcer, no gastritis, no heartburn, no dyshpagia, no BRBPR or melena    : gets UTIs -  no hx of miscarriages, no DVT Hx, no hx of OCP,   Neuro: No numbness or tingling, no headache, no hx of seizures,   Psych: no hx of anxiety or depression  ENDO: no hx of thyroid disease, no hx of DM  Joint/Muscluskeltal: see HPI,   All other ROS are negative.     EXAM:   Pulse 97   Resp 16   Ht 5' 1\" (1.549 m)   Wt 141 lb 3.2 oz (64 kg)   SpO2 97%   BMI 26.68 kg/m²   HEENT: Clear oropharynx, no oral ulcers, EOM intact, clear sclear, PERRLA, pleasant, no acute distress, no CAD, no neck tendnerness, good ROM,   No rashes  CVS: RRR, no murmurs  RS: CTAB, no crackles, no rhonchi  ABD: Soft Non tender,   Joint exam:   Mild  tender in shoulders , able to raise up - improved  nmild Tender in right 2nd dip - with severe oa  changes   Mild Tender in right elbows   Mild tender in 1-5th mcps bilate - ok clsoing her hands   heb and bouchrd notdes  Mild  tender in b/l knees with swelling   Mild Tender in b/l hips , shooting down   No edema in legs    tender in b/l ankles.   parastehsias in b/l feet to the ankles -   1-5th dips tender to her.   Squeeze test positive     Component      Latest Ref Rng 4/10/2024   WBC      4.0 - 11.0 x10(3) uL 8.3    RBC      3.80 - 5.30 x10(6)uL 3.08 (L)    Hemoglobin      12.0 - 16.0 g/dL 8.6 (L)    Hematocrit      35.0 - 48.0 % 27.4 (L)    MCV      80.0 - 100.0 fL 89.0    MCH      26.0 - 34.0 pg 27.9    MCHC      31.0 - 37.0 g/dL 31.4    RDW-SD      35.1 - 46.3 fL 47.1 (H)    RDW      11.0 - 15.0 % 14.8    Platelet Count      150.0 - 450.0 10(3)uL 260.0    Prelim Neutrophil Abs      1.50 - 7.70 x10 (3) uL 3.80    Neutrophils Absolute      1.50 - 7.70 x10(3) uL 3.80    Lymphocytes Absolute      1.00 - 4.00 x10(3) uL 2.16    Monocytes Absolute      0.10 - 1.00 x10(3) uL 0.88    Eosinophils Absolute      0.00 - 0.70 x10(3) uL 1.36 (H)    Basophils Absolute      0.00 - 0.20 x10(3) uL 0.06    Immature Granulocyte Absolute      0.00 - 1.00 x10(3) uL 0.02    Neutrophils %      % 46.0    Lymphocytes %      % 26.1    Monocytes %      % 10.6    Eosinophils %      % 16.4    Basophils %      % 0.7    Immature Granulocyte %      % 0.2    Glucose      70 - 99 mg/dL 104 (H)    Sodium      136 - 145 mmol/L 139    Potassium      3.5 - 5.1 mmol/L 4.5    Chloride      98 - 112 mmol/L 107    Carbon Dioxide, Total      21.0 - 32.0 mmol/L 23.0    ANION GAP      0 - 18 mmol/L 9    BUN      9 - 23 mg/dL 50 (H)    CREATININE      0.55 - 1.02 mg/dL 1.42 (H)    BUN/CREATININE RATIO      10.0 - 20.0  35.2 (H)    CALCIUM      8.7 - 10.4 mg/dL 9.3    CALCULATED OSMOLALITY      275 - 295 mOsm/kg 302 (H)    EGFR      >=60 mL/min/1.73m2 37 (L)    ALT (SGPT)      10 - 49 U/L 10    AST (SGOT)      <=34 U/L 16    ALKALINE PHOSPHATASE      55 -  142 U/L 90    Total Bilirubin      0.2 - 1.1 mg/dL 0.4    PROTEIN, TOTAL      5.7 - 8.2 g/dL 6.5    Albumin      3.2 - 4.8 g/dL 4.3    Globulin      2.8 - 4.4 g/dL 2.2 (L)    A/G Ratio      1.0 - 2.0  2.0    Patient Fasting for CMP? No    C-REACTIVE PROTEIN      <1.00 mg/dL 1.30 (H)    SED RATE      0 - 30 mm/Hr 37 (H)       Legend:  (L) Low  (H) High    12/2/2020 - left hip xray   =====  CONCLUSION: Mild left hip osteoarthritis without acute fracture or dislocation.    12/2/2020 - left knee xray   =====  CONCLUSION:      Moderate left knee tricompartmental osteoarthritis.     Small suprapatellar joint effusion.     Chondrocalcinosis    10/15/2018   BONE DENSITY MEASUREMENTS   TEST DATE BMD gm/cm2   T-SCORE  %REF   Z-SCORE %AGE MATCH  BODY PART  10/15/2018      0.724     -1.10 85.0%     +0.90     117.0%  left femur neck  08/16/2016      0.720     -1.20 85.0%     +0.80       0.0%  left femur neck  02/10/2014      0.724     -1.10 85.0%     +0.70       0.0%  left femur neck  4.7 years      0.0% change in BMD for left femur neck    10/15/2018      0.862     -0.70 92.0%     +1.10     119.0%  left hip  08/16/2016      0.834     -0.90 89.0%     +0.80       0.0%  left hip  02/10/2014      0.851     -0.70 90.0%     +0.80       0.0%  left hip  4.7 years      1.3% change in BMD for left hip    08/16/2016      0.875     -1.60 84.0%     +0.70       0.0%  AP L1-L4 region of spine  02/10/2014      0.868     -1.60 83.0%     +0.50       0.0%  AP L1-L4 region of spine  2.5 years      0.8% change in BMD for AP L1-L4 region of spine    08/16/2016      0.867     -1.90 80.0%     +0.40       0.0%  AP L2-L4 region of spine  No comparisons available for AP L2-L4 region of spine    10/15/2018      0.873     -2.10 79.0%     +0.50     106.0%  AP L3-L4 region of spine  No comparisons available for AP L3-L4 region of spine    2/15/2021 - DEXA  LEFT FEMORAL NECK               BMD:    0.728 gm/sq. cm.            T SCORE:         -1.1       Z  SCORE:         1.1     LEFT TOTAL HIP               BMD:    0.891 gm/sq. cm.            T SCORE:         -0.4       Z SCORE:         1.5     PA LUMBAR SPINE (L1 - L4)               BMD:    0.946 gm/sq. cm.            T SCORE:         -0.9       Z SCORE:         1.6    ASSESSMENT AND PLAN:   Armida Hobbs is a 78 year old female who presents for   Chief Complaint   Patient presents with    Rheumatoid Arthritis    Medication Follow-Up    Lab Results    Foot Pain     B/L    Ankle Pain     B/L    Back Pain     1. Seropositive RA (+ RF)- active  Generalized osteoarthritis  Chronic neck and lower back pain  -  admission for PNA in 12/2022 - again on 1/5/2023 - 1/13/2023 -   Recent admisstion in 12/2023 - PNA -   - been on prednisone 20mg a day  - and pain is ok - having side effects on high dose prendisone.- now tapered to 5mg a da y -    -    Now she prednisone 5mg ad ay  caused b/c of palpitations and excessive shaking -on higher doses - now weendd off.   - cont.  leflunomide again 10mg a day - -  - she has increasing foot pain - this could be from her neuropathy or her RA -   Since the parasethesia sensation is worse - stopped gabpaentin 100mg at night , now on pregabalin 50mg a   - if this doesn' thelp on next visit - consider adding hcq 200mg daily   Discussed risks and benefits of medication with patient , including retinal toxicity risk and importance of regular monitoring on the medication.   rtc in 3 months.     - was restarted on  leflunomide 10 mg daily in 11/2022 - but  to help her RA symptoms but this was stopped in the hospital on 1/5 - she   .  She does have CKD with creatinine 1.5. - now 1.8 so this was stopped on 1/6/2023 - admission to hospital   -  If kidney function is better in the future can consider increasing to leflunomdie 10/20mg atlerning       2. Hx of Recent Aortic valve replacement , CHF   -receent hosptialization for FRANKO, hypotenstion - from overdiuress   - cr now 1.8-2.1 -     3. PNA in  12/2022 -     4. incresaing neuropathy in feet - and intermittent swelling in the feet   - was on  gabapnetin 100mg at night - now on pregabilin 50mg at night   - consider EMG  Recently had us of legs - negative   - swelling - not related to the neuroapthy -     Summary:  1.take prednisone 2.5mg as needed.   2 .cont. leflunomide 10mg a day   3. Return to clinic in 6 months.   .   4. Check labs every 6 months.   5. Cont. Pregabalin 50mg at night.       - the plan would be to increase the leflunomide if her kidney function improves       Colin Lopez MD  4/18/2024   11:00 AM

## 2024-05-07 ENCOUNTER — HOSPITAL ENCOUNTER (OUTPATIENT)
Age: 79
Discharge: HOME | End: 2024-05-07
Payer: MEDICARE

## 2024-05-07 VITALS
TEMPERATURE: 98 F | OXYGEN SATURATION: 99 % | RESPIRATION RATE: 18 BRPM | HEART RATE: 88 BPM | SYSTOLIC BLOOD PRESSURE: 123 MMHG | DIASTOLIC BLOOD PRESSURE: 61 MMHG

## 2024-05-07 DIAGNOSIS — L02.01 FACIAL ABSCESS: Primary | ICD-10-CM

## 2024-05-07 PROCEDURE — 99213 OFFICE O/P EST LOW 20 MIN: CPT | Performed by: NURSE PRACTITIONER

## 2024-05-07 RX ORDER — TRIAMCINOLONE ACETONIDE 1 MG/G
CREAM TOPICAL 2 TIMES DAILY
Qty: 15 G | Refills: 0 | Status: SHIPPED | OUTPATIENT
Start: 2024-05-07

## 2024-05-07 RX ORDER — LIDOCAINE HYDROCHLORIDE AND EPINEPHRINE 10; 10 MG/ML; UG/ML
5 INJECTION, SOLUTION INFILTRATION; PERINEURAL ONCE
Status: DISCONTINUED | OUTPATIENT
Start: 2024-05-07 | End: 2024-05-07

## 2024-05-07 RX ORDER — LIDOCAINE HYDROCHLORIDE AND EPINEPHRINE 10; 10 MG/ML; UG/ML
10 INJECTION, SOLUTION INFILTRATION; PERINEURAL ONCE
Status: COMPLETED | OUTPATIENT
Start: 2024-05-07 | End: 2024-05-07

## 2024-05-07 RX ORDER — DOXYCYCLINE HYCLATE 100 MG/1
100 CAPSULE ORAL 2 TIMES DAILY
Qty: 14 CAPSULE | Refills: 0 | Status: SHIPPED | OUTPATIENT
Start: 2024-05-07 | End: 2024-05-09

## 2024-05-07 NOTE — DISCHARGE INSTRUCTIONS
Pulisci l'area con acqua e sapone. Applicare barber pomata antibiotica topica due volte al giorno. Cambia il cerotto secondo necessità. Applicare impacchi caldi e umidi per circa 10 minuti lester volta ogni oralia ore nba il giorno. Prendi Tylenol secondo necessità per il dolore. L'area sarà dolorante e presenterà lividi. Prendi l'antibiotico due volte al giorno becka lester scomparsa. Si è in attesa di barber coltura rubina ferita e i risultati saranno disponibili tra circa 48 ore. Ti chiameremo se avremo bisogno di cambiare l'antibiotico. Applicare la crema steroidea sulla mano destra. Joann essere ricontrollato zoran Strange nei prossimi giorni, chiama oggi per vedere se può vederti in [facility] vener[place]. Recarsi al pronto soccorso se peggiorano i sintomi, aumento del dolore, febbre, gonfiore.      Clean the area with soap and water.  Apply topical antibiotic ointment twice a day.  Change the Band-Aid as needed.  Apply warm moist compresses for about 10 minutes at a time every few hours throughout the day.  Take Tylenol as needed for pain.  The area will be sore and will bruise.  Take the antibiotic twice a day until gone.   A wound culture is pending and results will be available in about 48 hours.  We will call you if we need to change the antibiotic. Apply the steroid cream to your right hand.  You need to be rechecked by Dr. Strange within the next few days, call today to see if he can see you in the office on Friday.  Go to the emergency room if worsening symptoms, increased pain, fever, swelling.

## 2024-05-07 NOTE — ED PROVIDER NOTES
Patient Seen in: [facility]      History     Chief Complaint   Patient presents with    Bump     Stated Complaint: bump on face    Subjective:   78-year-old female with rheumatoid arthritis, asthma, CHF, CAD, CKD, high cholesterol, depression, hypertension presents from home with a complaint of a bump to her left ear.  Onset of symptoms about 2 days ago.  States initially it started as a pimple and now she feels like there is fluid in the area.  There was a small amount of drainage.  It is painful.  No fever.  Also notes that she has something on her right thumb.  No home remedies were attempted.    The history is provided by the patient. No  was used.       Objective:   No pertinent past medical history.        HISTORY:  Past Medical History:    Anxiety and depression    AS (aortic stenosis)    s/p TAVR    Asthma (HCC)    CAD (coronary artery disease)    CHF (congestive heart failure) (HCC)    Diastolic    Chronic atrial fibrillation (HCC)    CKD (chronic kidney disease)    Congestive heart disease (HCC)    Depression    Essential hypertension    Hearing impairment    bilateral hearing aids    High cholesterol    Hyperlipidemia    PONV (postoperative nausea and vomiting)    RA (rheumatoid arthritis) ([facility])      Past Surgical History:   Procedure Laterality Date    Appendectomy      Cath bare metal stent (bms)  2022    Cardiac Stents Placement x4    Other surgical history Bilateral     Gel injections to bilateral knees    Repair ing hernia,5+y/o,reducibl      Valve repair  11/03/2022      Family History   Problem Relation Age of Onset    Other (emphysema) Father     Dementia Mother     No Known Problems Daughter     No Known Problems Daughter     No Known Problems Son     No Known Problems Son     Other (Osteoarthritis) Sister     Heart Attack Brother     Cancer Brother     Heart Disorder Brother       Social History     Socioeconomic History    Marital status:    Tobacco Use     Smoking status: Never     Passive exposure: Never    Smokeless tobacco: Never   Vaping Use    Vaping status: Never Used   Substance and Sexual Activity    Alcohol use: Never    Drug use: Never   Other Topics Concern    Caffeine Concern No    Seat Belt Yes    Special Diet No   Social History Narrative    ** Merged History Encounter **          Social Determinants of Health     Financial Resource Strain: Low Risk  (12/26/2023)    Financial Resource Strain     Difficulty of Paying Living Expenses: Not very hard     Med Affordability: No   Food Insecurity: No Food Insecurity (12/14/2023)    Food Insecurity     Food Insecurity: Never true   Transportation Needs: No Transportation Needs (12/26/2023)    Transportation Needs     Lack of Transportation: No   Housing Stability: Low Risk  (12/14/2023)    Housing Stability     Housing Instability: No            No pertinent past surgical history.              No pertinent social history.            Review of Systems    Positive for stated complaint: bump on face  Other systems are as noted in HPI.  Constitutional and vital signs reviewed.      All other systems reviewed and negative except as noted above.    Physical Exam     ED Triage Vitals [05/07/24 1234]   /61   Pulse 88   Resp 18   Temp 98.4 °F (36.9 °C)   Temp src Temporal   SpO2 99 %   O2 Device None (Room air)       Current Vitals:   Vital Signs  BP: 123/61  Pulse: 88  Resp: 18  Temp: 98.4 °F (36.9 °C)  Temp src: Temporal    Oxygen Therapy  SpO2: 99 %  O2 Device: None (Room air)            Physical Exam  Vitals and nursing note reviewed.   Constitutional:       General: She is not in acute distress.     Appearance: Normal appearance. She is not ill-appearing or toxic-appearing.   HENT:      Head: Normocephalic and atraumatic.      Right Ear: Tympanic membrane, ear canal and external ear normal.      Left Ear: Tympanic membrane, ear canal and external ear normal.      Ears:      Comments: No external ear  involvement     Nose: Nose normal.      Mouth/Throat:      Mouth: Mucous membranes are moist.      Pharynx: Oropharynx is clear.      Comments: No trismus  Eyes:      Pupils: Pupils are equal, round, and reactive to light.   Cardiovascular:      Rate and Rhythm: Normal rate and regular rhythm.      Pulses: Normal pulses.   Pulmonary:      Effort: Pulmonary effort is normal. No respiratory distress.      Breath sounds: Normal breath sounds.      Comments: Lungs clear.  No adventitious lung sounds.  No distress.  No hypoxia.  Pulse ox 99% ra. Which is normal    Abdominal:      General: Abdomen is flat.      Palpations: Abdomen is soft.   Musculoskeletal:         General: No signs of injury. Normal range of motion.      Cervical back: Normal range of motion and neck supple.   Skin:     General: Skin is warm and dry.      Capillary Refill: Capillary refill takes less than 2 seconds.      Comments: 2cm round area of fluctuance with central comedone to left cheek area/just inferior to the left zygoma.  Tender to touch.  No erythema.  No spontaneous drainage.    3cm circular area of erythema, dry skin to dorsal base of right thumb. No abscess/cellulitis.    Neurological:      General: No focal deficit present.      Mental Status: She is alert and oriented to person, place, and time.      GCS: GCS eye subscore is 4. GCS verbal subscore is 5. GCS motor subscore is 6.   Psychiatric:         Mood and Affect: Mood normal.         Behavior: Behavior normal.         Thought Content: Thought content normal.         Judgment: Judgment normal.           ED Course     Labs Reviewed   AEROBIC BACTERIAL CULTURE   Procedure Note:  Risks and benefits of procedure discussed.   There is an area characterized by a subcutaneous mass consistent with a cutaneous abscess measuring 2 cm in greatest dimension. Location: left cheek.   Area prepped and draped.  1%lidocaine with epi 2 cc injected circumferentially.  0.25cm incision to abscess site  made with #11 blade, all loculations broken.  2cc yellow pus drained.  Wound irrigated copiously.  Wound packed with bacitracin. No drain inserted  Dressing applied.  Pt tolerated procedure well.      MDM        Medical Decision Making  Bump to left ear  Differential diagnosis: Abscess, mastoiditis, cellulitis  Abscess noted over the left zygoma.  No significant cellulitis.  Not consistent with mastoiditis  Presentation consistent with abscess.  Discussed the option of seeing dermatology or plastic surgery for abscess drainage in the face but patient declined.  Patient is status post incision and drainage  Plan of care: Doxycycline (no renal dose adjustment necessary), topical antibiotic ointment, Tylenol, warm moist compresses.  Rash on the hand more consistent with contact dermatitis.  No abscess or cellulitis.  May use Kenalog cream.  Needs wound check with primary doctor within the next 3 days.  She was encouraged to go to the emergency room if redness, swelling, drainage, fever increases.    Results and plan of care discussed with the patient/family. They are in agreement with discharge. They understand to follow up with their primary doctor or the referral physician for further evaluation, especially if no improvement.  Also discussed the limitations of immediate care, patient is aware that if symptoms are worse they should go to the emergency room. Verbal and written discharge instructions were given.       Problems Addressed:  Facial abscess: acute illness or injury    Amount and/or Complexity of Data Reviewed  Independent Historian:      Details: sister    Risk  OTC drugs.  Prescription drug management.        Disposition and Plan     Clinical Impression:  1. Facial abscess         Disposition:  Discharge  5/7/2024  1:03 pm    Follow-up:  Presley Strange MD  [id number] [facility] [id number]  [phone]    Schedule an appointment as soon as possible for a visit in 3 days            Medications  Prescribed:  Discharge Medication List as of 5/7/2024  1:05 PM        START taking these medications    Details   doxycycline 100 MG Oral Cap Take 1 capsule (100 mg total) by mouth 2 (two) times daily for 7 days., Normal, Disp-14 capsule, R-0      triamcinolone 0.1 % External Cream Apply topically 2 (two) times daily., Normal, Disp-15 g, R-0

## 2024-05-07 NOTE — ED INITIAL ASSESSMENT (HPI)
Pt c/o bump + redness let left side of her face in front of her earlobe started yesterday, denies fever

## 2024-05-09 DIAGNOSIS — M79.89 LEG SWELLING: ICD-10-CM

## 2024-05-09 DIAGNOSIS — I50.32 CHRONIC DIASTOLIC CONGESTIVE HEART FAILURE (HCC): ICD-10-CM

## 2024-05-09 DIAGNOSIS — I10 HYPERTENSION GOAL BP (BLOOD PRESSURE) < 130/80: ICD-10-CM

## 2024-05-09 RX ORDER — CEFADROXIL 500 MG/1
500 CAPSULE ORAL 2 TIMES DAILY
Qty: 14 CAPSULE | Refills: 0 | Status: SHIPPED | OUTPATIENT
Start: 2024-05-09 | End: 2024-05-16

## 2024-05-10 NOTE — TELEPHONE ENCOUNTER
Please review; protocol failed/ has no protocol    Requested Prescriptions   Pending Prescriptions Disp Refills    bumetanide (BUMEX) 1 MG Oral Tab 90 tablet 9     Sig: Take 1 tablet (1 mg total) by mouth daily. STOP FUROSEMIDE.       Hypertension Medications Protocol Failed - 5/9/2024  9:42 PM        Failed - EGFRCR or GFRNAA > 50     GFR Evaluation  EGFRCR: 37 , resulted on 4/10/2024          Passed - CMP or BMP in past 12 months        Passed - Last BP reading less than 140/90     BP Readings from Last 1 Encounters:   05/07/24 123/61               Passed - In person appointment or virtual visit in the past 12 mos or appointment in next 3 mos     Recent Outpatient Visits              3 weeks ago Rheumatoid arthritis, involving unspecified site, unspecified whether rheumatoid factor present (HCC)    [facility], [address], [place] Colin Lopez MD    Office Visit    4 months ago Hospital discharge follow-up    [facility] Presley Strange MD    Telemedicine    6 months ago [facility]    [facility]    7 months ago Right inguinal hernia    [facility] Presley Strange MD    Office Visit    7 months ago Lumbar radiculopathy    [facility] Behar, Alex, MD    Office Visit          Future Appointments         Provider Department Appt Notes    In 1 week Frederic Courtney MD [facility]                       Recent Outpatient Visits              3 weeks ago Rheumatoid arthritis, involving unspecified site, unspecified whether rheumatoid factor present (HCC)    [facility], [address], [place] Colin Lopez MD    Office Visit    4 months ago Hospital discharge follow-up    [facility],  [facility] Presley Strange MD    Telemedicine    6 months ago Lumbar radiculopathy    [facility], Kendrick Joseph MD    Office Visit    7 months ago Right inguinal hernia    [facility] Presley Strange MD    Office Visit    7 months ago Lumbar radiculopathy    [facility] Behar, Alex, MD    Office Visit          Future Appointments         Provider Department Appt Notes    In 1 week Frederic Courtney MD [facility], [place]

## 2024-05-13 ENCOUNTER — OFFICE VISIT (OUTPATIENT)
Facility: LOCATION | Age: 79
End: 2024-05-13

## 2024-05-13 VITALS
DIASTOLIC BLOOD PRESSURE: 62 MMHG | WEIGHT: 135 LBS | OXYGEN SATURATION: 97 % | BODY MASS INDEX: 23.92 KG/M2 | HEART RATE: 88 BPM | SYSTOLIC BLOOD PRESSURE: 101 MMHG | HEIGHT: 63 IN

## 2024-05-13 DIAGNOSIS — I50.32 CHRONIC DIASTOLIC CONGESTIVE HEART FAILURE (HCC): ICD-10-CM

## 2024-05-13 DIAGNOSIS — L02.01 ABSCESS OF CHEEK: Primary | ICD-10-CM

## 2024-05-13 DIAGNOSIS — B35.9 TINEA: ICD-10-CM

## 2024-05-13 PROCEDURE — 99214 OFFICE O/P EST MOD 30 MIN: CPT | Performed by: INTERNAL MEDICINE

## 2024-05-13 RX ORDER — CLOTRIMAZOLE 1 %
1 CREAM (GRAM) TOPICAL 2 TIMES DAILY
Qty: 113 G | Refills: 2 | Status: SHIPPED | OUTPATIENT
Start: 2024-05-13 | End: 2024-05-27

## 2024-05-13 RX ORDER — BUMETANIDE 1 MG/1
1 TABLET ORAL DAILY
Qty: 90 TABLET | Refills: 3 | Status: SHIPPED | OUTPATIENT
Start: 2024-05-13

## 2024-05-13 NOTE — PATIENT INSTRUCTIONS
1.  Stop the triamcinolone cream, this is a steroid cream  2.  I will give you mupirocin topical ointment, you should use this 3 times a day for the next 5 days to both the cheek and to the right wrist.  3.  I am also going to give you clotrimazole which is an antifungal cream that you will apply to the right wrist only.  4.  Because you have bilateral leg swelling I would like for you to take 1 Bumex in the morning at about 7 or 8 AM and take an additional Bumex 1 mg at about 2 PM and do this for about 2 to 3 days or until the swelling reduces.  If at any point in time you feel lightheaded and/or dizzy stop and let me know right away, the 1 thing that I am concerned about is that your blood pressure is low but this could be result of some of the fluid building up in the wrong area.  If despite doubling the Bumex you do not have any reduction in the leg swelling or if your symptoms worsen please contact the cardiologist and me to let us know.

## 2024-05-13 NOTE — PROGRESS NOTES
INTERNAL MEDICINE OFFICE NOTE     Patient ID: Armida Hobbs is a 78 year old female.  Today's Date: 05/13/24  Chief Complaint: Follow - Up (Pt here for a F/U on abscess on cheek)    HPI  79-year-old female presents for urgent care follow-up.  Notes reviewed and appreciated, symptoms started 5/5/2024, with small amount of drainage due to a pimple, she had I&D she was given cefadroxi recommended to follow-up with PCP.  She was also given triamcinolone for some eczema of the right thumb.  Doing ok today. Feeling a bit better. Having some leg swelling but no oprthopnea.                   Vitals:    05/13/24 1435   BP: 101/62   Pulse: 88   SpO2: 97%   Weight: 135 lb (61.2 kg)   Height: 5' 3\" (1.6 m)     body mass index is 23.91 kg/m².  BP Readings from Last 3 Encounters:   05/13/24 101/62   05/07/24 123/61   01/11/24 143/78     The ASCVD Risk score (Chelsea DK, et al., 2019) failed to calculate for the following reasons:    The 2019 ASCVD risk score is only valid for ages 40 to 79  Medications reviewed:  Current Outpatient Medications   Medication Sig Dispense Refill    bumetanide (BUMEX) 1 MG Oral Tab Take 1 tablet (1 mg total) by mouth daily. STOP FUROSEMIDE. 90 tablet 3    mupirocin 2 % External Ointment Apply 1 Application topically 3 (three) times daily. TO RIGHT WRIST AND LEFT CHEEK. 22 g 0    clotrimazole 1 % External Cream Apply 1 Application topically 2 (two) times daily for 14 days. TO RIGHT WRIST. 113 g 2    cefadroxil 500 MG Oral Cap Take 1 capsule (500 mg total) by mouth 2 (two) times daily for 7 days. 14 capsule 0    triamcinolone 0.1 % External Cream Apply topically 2 (two) times daily. 15 g 0    leflunomide 10 MG Oral Tab Take 1 tablet (10 mg total) by mouth daily. 90 tablet 0    predniSONE 2.5 MG Oral Tab Take 1 tablet (2.5 mg total) by mouth daily. 30 tablet 1    Potassium Chloride ER 20 MEQ Oral Tab CR Take 1 tablet by mouth in the morning and 1 tablet before bedtime. 60 tablet 2     pregabalin (LYRICA) 50 MG Oral Cap Take 1 capsule (50 mg total) by mouth at bedtime. FOR NERVE/BACK PAIN. STOP GABAPENTIN. 90 capsule 2    ferrous sulfate 325 (65 FE) MG Oral Tab EC Take 1 tablet (325 mg total) by mouth daily with breakfast. 90 tablet 3    acetaminophen 500 MG Oral Tab Take 1 tablet (500 mg total) by mouth every 4 (four) hours as needed.      clopidogrel 75 MG Oral Tab Take 1 tablet (75 mg total) by mouth daily. FOR HEART STENTS. 90 tablet 9    aspirin 81 MG Oral Tab EC Take 1 tablet (81 mg total) by mouth daily. FOR HEART DISEASE. 90 tablet 9    lisinopril 20 MG Oral Tab Take 1 tablet (20 mg total) by mouth daily. FOR BLOOD PRESSURE. (Patient taking differently: Take 1 tablet (20 mg total) by mouth every morning. FOR BLOOD PRESSURE.) 90 tablet 9    sertraline 50 MG Oral Tab Take 1 tablet (50 mg total) by mouth daily. FOR ANXIETY. (Patient taking differently: Take 1 tablet (50 mg total) by mouth every morning. FOR ANXIETY.) 90 tablet 9    pantoprazole 20 MG Oral Tab EC Take 1 tablet (20 mg total) by mouth every morning before breakfast. TO PREVENT ACID REFLUX. 90 tablet 3    folic acid 1 MG Oral Tab Take 1 tablet (1 mg total) by mouth daily. (Patient taking differently: Take 1 tablet (1 mg total) by mouth every morning.) 90 tablet 3    cholecalciferol 25 MCG (1000 UT) Oral Tab Take 1 tablet (1,000 Units total) by mouth daily. 90 tablet 3         Assessment & Plan    1. Abscess of cheek (Primary)  -     Mupirocin; Apply 1 Application topically 3 (three) times daily. TO RIGHT WRIST AND LEFT CHEEK.  Dispense: 22 g; Refill: 0  -     ENT Referral - In Network  -     Derm Referral - In Network  2. Tinea  -     Clotrimazole; Apply 1 Application topically 2 (two) times daily for 14 days. TO RIGHT WRIST.  Dispense: 113 g; Refill: 2  3. Chronic diastolic congestive heart failure (HCC)  Plan  She is growing staph, she was switched to cefadroxil, add in the Kay and recommend that she follow-up with ENT or  Derm whoever is available first as there is still a lesion underneath the skin that I would have expected to have been drained by now.  May need biopsy?  Double Bumex for the next few days to reduce leg swelling, if no resolution by the end of this week let us know/contact cardiology.    Follow Up: No follow-ups on file..         Objective: Results:   Physical Exam  Vitals and nursing note reviewed.   Constitutional:       General: She is not in acute distress.     Appearance: Normal appearance.   HENT:      Head: Normocephalic.      Right Ear: External ear normal.      Left Ear: External ear normal.   Eyes:      Extraocular Movements: Extraocular movements intact.      Conjunctiva/sclera: Conjunctivae normal.   Cardiovascular:      Rate and Rhythm: Normal rate and regular rhythm.      Pulses: Normal pulses.      Heart sounds: Normal heart sounds.   Pulmonary:      Effort: Pulmonary effort is normal. No respiratory distress.      Breath sounds: Normal breath sounds. No wheezing.   Abdominal:      General: Abdomen is flat. Bowel sounds are normal.      Tenderness: There is no abdominal tenderness.   Musculoskeletal:         General: Normal range of motion.      Cervical back: Normal range of motion and neck supple.      Right lower leg: Edema present.      Left lower leg: Edema present.   Skin:     Coloration: Skin is not jaundiced.      Findings: Lesion present.   Neurological:      General: No focal deficit present.      Mental Status: She is alert and oriented to person, place, and time. Mental status is at baseline.   Psychiatric:         Mood and Affect: Mood normal.         Behavior: Behavior normal.        Reviewed:    Patient Active Problem List    Diagnosis    COVID-19 virus infection    Acute on chronic heart failure, unspecified heart failure type (HCC)    Hypoxia    CKD (chronic kidney disease) stage 4, GFR 15-29 ml/min (HCC)    Lumbar radiculopathy    Bilateral carotid bruits     Formatting of this note  might be different from the original. 12/2020 Mild heterogenous atherosclerotic plaque bilateral with less than 50% stenosis in both right and left internal carotid arteries, based on NASCET criteria. Anterograde flow present in both vertebral arteries      Lung granuloma (HCC)    Senile purpura (HCC)    Anxiety and depression    Poor short term memory    Neutropenia, unspecified (HCC)    Coronary artery disease involving native coronary artery of native heart without angina pectoris    S/P drug eluting coronary stent placement    Chronic diastolic congestive heart failure (HCC)    Stage 3a chronic kidney disease (HCC)    Hypertension goal BP (blood pressure) < 130/80    S/P aortic valve replacement    Paroxysmal atrial fibrillation (HCC)    Rheumatoid arthritis involving both ankles with positive rheumatoid factor (HCC)     Formatting of this note might be different from the original. Stable on steroids      Bilateral lower extremity edema     Formatting of this note might be different from the original. Multifactorial including dependent and venous insufficiency. Compression stockings and leg elevation.. Lasix 20 mg as directed.      Varicose veins of both lower extremities with pain    Mixed hyperlipidemia    Gastroesophageal reflux disease without esophagitis    Postmenopausal osteoporosis    Postmenopausal atrophic vaginitis    Sensorineural hearing loss, bilateral      Allergies   Allergen Reactions    Codeine PAIN     Chest pain     Sulfa Antibiotics HIVES    Amlodipine OTHER (SEE COMMENTS)     Swelling and leg cramps        Social History     Socioeconomic History    Marital status:    Tobacco Use    Smoking status: Never     Passive exposure: Never    Smokeless tobacco: Never   Vaping Use    Vaping status: Never Used   Substance and Sexual Activity    Alcohol use: Never    Drug use: Never   Other Topics Concern    Caffeine Concern No    Seat Belt Yes    Special Diet No   Social History Narrative     ** Merged History Encounter **          Social Determinants of Health     Financial Resource Strain: Low Risk  (12/26/2023)    Financial Resource Strain     Difficulty of Paying Living Expenses: Not very hard     Med Affordability: No   Food Insecurity: No Food Insecurity (12/14/2023)    Food Insecurity     Food Insecurity: Never true   Transportation Needs: No Transportation Needs (12/26/2023)    Transportation Needs     Lack of Transportation: No   Housing Stability: Low Risk  (12/14/2023)    Housing Stability     Housing Instability: No      Review of Systems  All other systems negative unless otherwise stated in ROS or HPI above.       Presley Strange MD  Internal Medicine       Call office with any questions or seek emergency care if necessary.   Patient understands and agrees to follow directions and advice.      ----------------------------------------- PATIENT INSTRUCTIONS-----------------------------------------   .    Patient Instructions   1.  Stop the triamcinolone cream, this is a steroid cream  2.  I will give you mupirocin topical ointment, you should use this 3 times a day for the next 5 days to both the cheek and to the right wrist.  3.  I am also going to give you clotrimazole which is an antifungal cream that you will apply to the right wrist only.  4.  Because you have bilateral leg swelling I would like for you to take 1 Bumex in the morning at about 7 or 8 AM and take an additional Bumex 1 mg at about 2 PM and do this for about 2 to 3 days or until the swelling reduces.  If at any point in time you feel lightheaded and/or dizzy stop and let me know right away, the 1 thing that I am concerned about is that your blood pressure is low but this could be result of some of the fluid building up in the wrong area.  If despite doubling the Bumex you do not have any reduction in the leg swelling or if your symptoms worsen please contact the cardiologist and me to let us know.

## 2024-05-17 VITALS — HEIGHT: 63 IN | WEIGHT: 135 LBS | BODY MASS INDEX: 23.92 KG/M2

## 2024-05-17 RX ORDER — SODIUM CHLORIDE 0.9 % (FLUSH) 0.9 %
10 SYRINGE (ML) INJECTION AS NEEDED
OUTPATIENT
Start: 2024-05-17

## 2024-05-17 RX ORDER — SODIUM CHLORIDE 9 MG/ML
INJECTION, SOLUTION INTRAVENOUS
OUTPATIENT
Start: 2024-05-18 | End: 2024-05-18

## 2024-05-20 ENCOUNTER — OFFICE VISIT (OUTPATIENT)
Dept: NEUROLOGY | Facility: CLINIC | Age: 79
End: 2024-05-20

## 2024-05-20 ENCOUNTER — HOSPITAL ENCOUNTER (OUTPATIENT)
Age: 79
Discharge: HOME | End: 2024-05-20

## 2024-05-20 ENCOUNTER — APPOINTMENT (OUTPATIENT)
Dept: GENERAL RADIOLOGY | Age: 79
End: 2024-05-20
Attending: NURSE PRACTITIONER

## 2024-05-20 VITALS — HEIGHT: 63 IN | BODY MASS INDEX: 23.92 KG/M2 | WEIGHT: 135 LBS

## 2024-05-20 VITALS
OXYGEN SATURATION: 100 % | DIASTOLIC BLOOD PRESSURE: 54 MMHG | TEMPERATURE: 98 F | HEART RATE: 84 BPM | RESPIRATION RATE: 18 BRPM | SYSTOLIC BLOOD PRESSURE: 123 MMHG

## 2024-05-20 DIAGNOSIS — T14.8XXA CONTUSION OF SOFT TISSUE: Primary | ICD-10-CM

## 2024-05-20 DIAGNOSIS — R41.3 MEMORY LOSS: Primary | ICD-10-CM

## 2024-05-20 PROCEDURE — 99213 OFFICE O/P EST LOW 20 MIN: CPT | Performed by: NURSE PRACTITIONER

## 2024-05-20 PROCEDURE — 73590 X-RAY EXAM OF LOWER LEG: CPT | Performed by: NURSE PRACTITIONER

## 2024-05-20 NOTE — ED PROVIDER NOTES
Patient Seen in: [facility]      History     Chief Complaint   Patient presents with    Leg Pain     Stated Complaint: L leg pain    Subjective:   HPI    This is a 78-year-old female with history of hypertension, CHF, coronary artery disease, hyperlipidemia who presents with a hematoma to the left leg.  Patient states that earlier today she was bending down to grab something and left knee hit the floor.  States since then she noticed a hematoma to the medial aspect of the left leg.  Tender only to that area.  Able to ambulate with steady upright gait.  Did not continue to fall, did not hit head.    Objective:   Past Medical History:    Anxiety and depression    AS (aortic stenosis)    s/p TAVR    Asthma (HCC)    CAD (coronary artery disease)    CHF (congestive heart failure) ([facility])    Diastolic    Chronic atrial fibrillation (HCC)    CKD (chronic kidney disease)    Congestive heart disease (HCC)    Depression    Essential hypertension    Hearing impairment    bilateral hearing aids    High cholesterol    Hyperlipidemia    PONV (postoperative nausea and vomiting)    RA (rheumatoid arthritis) ([facility])              Past Surgical History:   Procedure Laterality Date    Appendectomy      Cath bare metal stent (bms)  2022    Cardiac Stents Placement x4    Other surgical history Bilateral     Gel injections to bilateral knees    Repair ing hernia,5+y/o,reducibl      Valve repair  11/03/2022                Social History     Socioeconomic History    Marital status:    Tobacco Use    Smoking status: Never     Passive exposure: Never    Smokeless tobacco: Never   Vaping Use    Vaping status: Never Used   Substance and Sexual Activity    Alcohol use: Never    Drug use: Never   Other Topics Concern    Caffeine Concern No    Seat Belt Yes    Special Diet No   Social History Narrative    ** Merged History Encounter **          Social Determinants of Health     Financial Resource Strain: Low Risk  (12/26/2023)     Financial Resource Strain     Difficulty of Paying Living Expenses: Not very hard     Med Affordability: No   Food Insecurity: No Food Insecurity (12/14/2023)    Food Insecurity     Food Insecurity: Never true   Transportation Needs: No Transportation Needs (12/26/2023)    Transportation Needs     Lack of Transportation: No   Housing Stability: Low Risk  (12/14/2023)    Housing Stability     Housing Instability: No              Review of Systems   Musculoskeletal:  Positive for arthralgias and joint swelling.   Skin:  Positive for color change.   All other systems reviewed and are negative.      Positive for stated complaint: L leg pain  Other systems are as noted in HPI.  Constitutional and vital signs reviewed.      All other systems reviewed and negative except as noted above.    Physical Exam     ED Triage Vitals [05/20/24 1440]   /54   Pulse 84   Resp 18   Temp 97.6 °F (36.4 °C)   Temp src Temporal   SpO2 100 %   O2 Device None (Room air)       Current Vitals:   Vital Signs  BP: 123/54  Pulse: 84  Resp: 18  Temp: 97.6 °F (36.4 °C)  Temp src: Temporal    Oxygen Therapy  SpO2: 100 %  O2 Device: None (Room air)        Physical Exam  Vitals and nursing note reviewed.   Constitutional:       General: She is awake. She is not in acute distress.     Appearance: Normal appearance. She is not ill-appearing, toxic-appearing or diaphoretic.   HENT:      Head: Normocephalic and atraumatic.      Right Ear: Tympanic membrane, ear canal and external ear normal.      Left Ear: Tympanic membrane, ear canal and external ear normal.      Nose: Nose normal.      Mouth/Throat:      Mouth: Mucous membranes are moist.      Pharynx: Oropharynx is clear. Uvula midline.   Eyes:      General: Lids are normal.      Extraocular Movements: Extraocular movements intact.      Conjunctiva/sclera: Conjunctivae normal.      Pupils: Pupils are equal, round, and reactive to light.   Cardiovascular:      Rate and Rhythm: Normal rate and  regular rhythm.      Pulses: Normal pulses.      Heart sounds: Normal heart sounds.   Pulmonary:      Effort: Pulmonary effort is normal.      Breath sounds: Normal breath sounds.   Musculoskeletal:        Legs:       Comments: Hematoma with tenderness.  No calf pain or swelling.  No calf tenderness.  No knee pain or knee swelling.  Distal pedal pulse intact.  Soft compartments.   Skin:     General: Skin is warm and dry.      Capillary Refill: Capillary refill takes less than 2 seconds.   Neurological:      General: No focal deficit present.      Mental Status: She is alert and oriented to person, place, and time.   Psychiatric:         Mood and Affect: Mood normal.         Behavior: Behavior normal. Behavior is cooperative.         Thought Content: Thought content normal.         Judgment: Judgment normal.       ED Course   Labs Reviewed - No data to display  Xray and re-evaluate    Proximal pretibial soft tissue swelling without acute fracture.  Moderate tricompartmental knee osteoarthritis.    XR TIBIA + FIBULA (2 VIEWS), LEFT (CPT=73590)    Result Date: 5/20/2024  CONCLUSION:  Proximal pretibial soft tissue swelling, without acute fracture.  Demineralization.  Moderate tricompartmental knee osteoarthritis.    Dictated by (CST): Zeynep Daniel MD on 5/20/2024 at 3:34 PM     Finalized by (CST): Zeynep Daniel MD on 5/20/2024 at 3:36 PM          MDM         Medical Decision Making  Patient is well-appearing on exam, nontoxic appearance, exam as noted above.  Differential diagnosis including but not limited to hematoma, tibial fracture, soft tissue contusion.  No evidence of fracture on x-ray.  I discussed the findings with the patient.  Also discussed the importance of close follow-up with PCP.  The area was Ace wrap.  Discussed RICE.  Patient able to ambulate with steady upright gait exit.    Problems Addressed:  Contusion of soft tissue: acute illness or injury    Amount and/or Complexity of Data  Reviewed  Independent Historian: caregiver     Details: Daughter  Radiology: ordered and independent interpretation performed. Decision-making details documented in ED Course.     Details: Personally reviewed the images of the x-ray.  No acute fracture.  ECG/medicine tests: ordered and independent interpretation performed. Decision-making details documented in ED Course.     Details: Ace wrap    Risk  OTC drugs.        Disposition and Plan     Clinical Impression:  1. Contusion of soft tissue         Disposition:  Discharge  5/20/2024  3:45 pm    Follow-up:  Presley Strange MD  [address] [facility] [address]  [phone]                Medications Prescribed:  Discharge Medication List as of 5/20/2024  3:46 PM

## 2024-05-20 NOTE — ED INITIAL ASSESSMENT (HPI)
Pr presents with raised bruised area to right inner area just below patella.     Pt reports striking area on the floor. Pt reports, \"I was reaching down to grab something and I fell onto my knee\".     Pt takes Plavix and ASA

## 2024-05-30 NOTE — TELEPHONE ENCOUNTER
HPI:   Maricruz De La Rosa is a 63 year old female who presents for a complete physical exam.   No concerns today  Colonoscopy utd repeat 10 yrs.  She has an appt [facility] for her ct scan result no printed report available.  Discuss shingrix with her.  Discuss prevnar 20 she will check with her insurance regarding coverage.  She has concern for sleep apnea testing done in past. It was 2021.   She is cardiologist Dr Alon Marion and Dr. Pierce.   She already got her labs done discuss with her.   Hyperlipidemia on meds   Diet healthy  Exercise- yes.   She is going to see DR. Melchor. She will discuss her ct result with him recommend to get printed report.   Constipation taking colace.   She has some hearing issue.  Hypothyroidism for low thyroid no thyroid surgery no thyroid cancer.         Pap 2022 normal.  Mammogram utd  Has gyne: no        Wt Readings from Last 6 Encounters:   05/30/24 149 lb 9.6 oz (67.9 kg)   04/29/24 152 lb 3.2 oz (69 kg)   10/30/23 146 lb (66.2 kg)   10/18/23 145 lb (65.8 kg)   05/12/23 142 lb (64.4 kg)   02/23/23 143 lb (64.9 kg)     Body mass index is 26.5 kg/m².     Lab Results   Component Value Date    A1C 5.6 02/05/2020    A1C 5.7 (H) 10/20/2016    A1C 5.8 (H) 05/26/2015     Lab Results   Component Value Date    CHOLEST 142 10/19/2021    CHOLEST 248 (H) 02/05/2020    CHOLEST 244 (H) 08/30/2017     Lab Results   Component Value Date    HDL 43 10/19/2021    HDL 57 02/05/2020    HDL 45 (L) 08/30/2017     Lab Results   Component Value Date    LDL 68 10/19/2021     (H) 02/05/2020     (H) 08/30/2017     Lab Results   Component Value Date    AST 15 02/05/2020    AST 19 10/20/2016    AST 20 05/26/2015     Lab Results   Component Value Date    ALT 15 02/05/2020    ALT 32 10/20/2016    ALT 23 05/26/2015           Immunization History  Administered            Date(s) Administered    Covid-19 Vaccine Moderna 100 mcg/0.5 ml                          03/14/2021 04/11/2021      Pneumovax 23        Jorge calling from [facility]  saw patient for a visit today and she has been more fatigue and checked vitals and blood pressure was low 88/52 no eating or drinking no fever heart rate 92. She is A symptomatic. Please call Jorge back or the daughter of the patient.    08/12/2021      TDAP                  08/27/2007 01/03/2018         Current Outpatient Medications   Medication Sig Dispense Refill    levothyroxine 50 MCG Oral Tab TAKE 1 TABLET(50 MCG) BY MOUTH DAILY 90 tablet 0    FARXIGA 10 MG Oral Tab       clonazePAM 0.5 MG Oral Tab Take 0.5 tablets (0.25 mg total) by mouth 2 (two) times daily as needed for Anxiety. 20 tablet 0    rosuvastatin 20 MG Oral Tab Take 1 tablet (20 mg total) by mouth daily. 90 tablet 1    metoprolol succinate ER 50 MG Oral Tablet 24 Hr Take 1 tablet (50 mg total) by mouth daily. 90 tablet 1    sacubitril-valsartan (ENTRESTO) 49-51 MG Oral Tab Take 1 tablet by mouth every morning. 90 tablet 0    sacubitril-valsartan (ENTRESTO)  MG Oral Tab Take 1 tablet by mouth every evening. 90 tablet 0    calcium carbonate 500 MG Oral Chew Tab Chew 1 tablet (500 mg total) by mouth daily as needed for Reflux.      Multiple Vitamin (MULTI-VITAMIN) Oral Tab multivitamin tablet, [RxNorm: 0] (Patient not taking: Reported on 5/30/2024)      albuterol 108 (90 Base) MCG/ACT Inhalation Aero Soln Inhale 1-2 puffs into the lungs every 4 (four) hours as needed for Wheezing or Shortness of Breath. (Patient not taking: Reported on 4/29/2024) 1 each 0      Past Medical History:    Abdominal distention    Abdominal hernia    Hiatel hernia    Abdominal pain    From intermitent constipation    Anxiety    After pacemaker    Arthritis    Had MRI of spine due to pain    Back pain    Did yoga to help and improved posture    Belching    After new medicaions with pacemaker    Bloating    Occasional    Bradycardia    Cardiac defibrillator in place    Congestive heart disease (HCC)    Constipation    More frequent with new medications    Diarrhea, unspecified    On and off less frequent now    Dizziness    Med increase caused dizzyness at cardio workout    Ejection fraction < 50%    15-20% 6/2021      Esophageal reflux    Flatulence/gas pain/belching    Occational    Food  intolerance    Pay attention to what I eat    Frequent urination    Frequent use of laxatives    Senna soft gels and recently metamucil    Heart palpitations    Heartburn    Use  omeprazole or tums as needed    Hemorrhoids    Occationally    High blood pressure    High cholesterol    History of 2019 novel coronavirus disease (COVID-19)    No Hospitalizations. Symptoms: cough,loss of taste and smell,fatigue headache    Hx of motion sickness    Hyperlipidemia    Indigestion    Iccationally    Irregular bowel habits    Since new medications after surgery    Pacemaker    Pain with bowel movements    Sometimes when constipated    Sleep apnea    Sleep disturbance    Diagnosed with severe sleep apnea 8/18/21    Stool incontinence    Stress     diagnosed with cancer    Thyroid disease    Visual impairment    glasses,contacts    Wears glasses    Glasses since I entered 2nd grade    Weight loss    Doing cardio rehab lost 8 pounds      Past Surgical History:   Procedure Laterality Date    Cardiac defibrillator placement      [employer]    Cardiac pacemaker placement  6/21/21    Colonoscopy N/A 4/8/2022    Procedure: COLONOSCOPY;  Surgeon: Cash Cristobal DO;  Location:  ENDOSCOPY      Family History   Problem Relation Age of Onset    Heart Disorder Father         AAA    Diabetes Mother 85        Passed away in 2019    Hypertension Mother     Other (Other) Mother         thyroid    Cancer Maternal Grandfather         throat    Other (Other) Sister         thyroid    Diabetes Brother         Got taken off medication/improved his diet      Social History:   Social History     Socioeconomic History    Marital status:    Tobacco Use    Smoking status: Never    Smokeless tobacco: Never   Vaping Use    Vaping status: Never Used   Substance and Sexual Activity    Alcohol use: Yes     Alcohol/week: 4.0 standard drinks of alcohol     Types: 4 Cans of beer per week     Comment: weekly    Drug use: No     Social  Determinants of Health      [facility]    Social Connections    [facility]    Housing Stability          REVIEW OF SYSTEMS:   GENERAL: feels well otherwise  SKIN: denies any unusual skin lesions  EYES:denies blurred vision or double vision  HEENT: denies nasal congestion, sinus pain or ST  LUNGS: denies shortness of breath  or cough  CARDIOVASCULAR: denies chest pain or palpitations  GI: denies abdominal pain. Denies heartburn. Has regular bowel movements. No blood in stool.  : denies dysuria. No discharge or itching.   MUSCULOSKELETAL: denies back pain  NEURO: denies headaches or dizziness  PSYCHE: denies depression or anxiety    EXAM:   /82   Pulse 72   Temp 97.8 °F (36.6 °C)   Resp 16   Ht 5' 3\" (1.6 m)   Wt 149 lb 9.6 oz (67.9 kg)   SpO2 100%   BMI 26.50 kg/m²   Body mass index is 26.5 kg/m².   GENERAL: well nourished,in no apparent distress  SKIN: no rashes  HEENT: atraumatic, normocephalic,ears and throat are clear  EYES:PERRLA, conjunctiva are clear  NECK: supple,no adenopathy,no bruits. No thyromegaly  BREAST:  no  concern decline  LUNGS: clear to auscultation  CARDIO: RRR without murmur  GI: soft, good BS's,no masses, HSM or tenderness  :no concern decline.   MUSCULOSKELETAL: back is not tender  EXTREMITIES: no edema  NEURO: Cranial nerves are intact,motor and sensory are grossly intact  PSYCH: mood, thought, behavior and judgement normal    ASSESSMENT AND PLAN:   Marciruz De La Rosa is a 63 year old female who presents for a complete physical exam.   Fasting BW ordered: Lipids, CMP, CBC., TSH and HbA1c.  Screening colonoscopy : utd   Pt' s weight is Body mass index is 26.5 kg/m²., recommended low fat/carb diet and aerobic exercise 45 minutes 5 times weekly.          The patient indicates understanding of these issues and agrees to the plan. The patient is asked to  return for CPX in 1 year.

## 2024-06-03 NOTE — H&P
I saw and examined the patient agree with attached findings.  I discussed the risk and benefits of the procedure at length and patient was agreeable to proceed with SHAWNA to reassess mitral valve endocarditis and regurgitation.    Tom Lay MD  [facility]

## 2024-06-04 ENCOUNTER — APPOINTMENT (OUTPATIENT)
Dept: CV DIAGNOSTICS | Facility: HOSPITAL | Age: 79
End: 2024-06-04
Attending: INTERNAL MEDICINE
Payer: MEDICARE

## 2024-06-04 ENCOUNTER — HOSPITAL ENCOUNTER (OUTPATIENT)
Dept: INTERVENTIONAL RADIOLOGY/VASCULAR | Facility: HOSPITAL | Age: 79
Discharge: HOME | End: 2024-06-04
Attending: INTERNAL MEDICINE
Payer: MEDICARE

## 2024-06-06 DIAGNOSIS — E87.6 HYPOKALEMIA: ICD-10-CM

## 2024-06-06 DIAGNOSIS — Z79.02 ANTIPLATELET OR ANTITHROMBOTIC LONG-TERM USE: ICD-10-CM

## 2024-06-06 DIAGNOSIS — K21.00 GASTROESOPHAGEAL REFLUX DISEASE WITH ESOPHAGITIS WITHOUT HEMORRHAGE: ICD-10-CM

## 2024-06-06 NOTE — TELEPHONE ENCOUNTER
LOV: 4/18/24  Last Refilled:#30, 1rf 4/18/24    Summary:  1.take prednisone 2.5mg as needed.   2 .cont. leflunomide 10mg a day   3. Return to clinic in 6 months.   .   4. Check labs every 6 months.   5. Cont. Pregabalin 50mg at night.         - the plan would be to increase the leflunomide if her kidney function improves         Colin Lopez MD  4/18/2024   Please advise.

## 2024-06-08 RX ORDER — PREDNISONE 2.5 MG/1
2.5 TABLET ORAL DAILY
Qty: 30 TABLET | Refills: 1 | Status: SHIPPED | OUTPATIENT
Start: 2024-06-08

## 2024-06-11 DIAGNOSIS — M54.16 LUMBAR RADICULOPATHY: ICD-10-CM

## 2024-06-11 RX ORDER — PANTOPRAZOLE SODIUM 20 MG/1
20 TABLET, DELAYED RELEASE ORAL
Qty: 90 TABLET | Refills: 3 | Status: SHIPPED | OUTPATIENT
Start: 2024-06-11

## 2024-06-11 RX ORDER — POTASSIUM CHLORIDE 1500 MG/1
1 TABLET, EXTENDED RELEASE ORAL 2 TIMES DAILY
Qty: 90 TABLET | Refills: 3 | Status: SHIPPED | OUTPATIENT
Start: 2024-06-11

## 2024-06-11 NOTE — TELEPHONE ENCOUNTER
Refill passed per [facility] protocol.  Requested Prescriptions   Pending Prescriptions Disp Refills    pantoprazole 20 MG Oral Tab EC 90 tablet 3     Sig: Take 1 tablet (20 mg total) by mouth every morning before breakfast. TO PREVENT ACID REFLUX.       Gastrointestional Medication Protocol Passed - 6/6/2024 12:24 PM        Passed - In person appointment or virtual visit in the past 12 mos or appointment in next 3 mos     Recent Outpatient Visits              3 weeks ago Memory loss    [facility] Frederic Courtney MD    Office Visit    4 weeks ago Abscess of cheek    [facility] Presley Strange MD    Office Visit    1 month ago Rheumatoid arthritis, involving unspecified site, unspecified whether rheumatoid factor present ([facility])    [facility] [address], [place] PalaniswamiColin MD    Office Visit    5 months ago Hospital discharge follow-up    [facility] Presley Strange MD    Telemedicine    7 months ago Lumbar radiculopathy    [facility] Kendrick Clemente MD    Office Visit          Future Appointments         Provider Department Appt Notes    In 2 days Tom Lay MD; [facility] IVS TREATMENT [facility]     In 2 days [facility]                        Recent Outpatient Visits              3 weeks ago Memory loss    [facility] Frederic Courtney MD    Office Visit    4 weeks ago Abscess of cheek    [facility] Presley Strange MD    Office Visit    1 month ago Rheumatoid arthritis, involving unspecified site, unspecified whether rheumatoid factor present ([facility])    [facility] [address], [place] Palaniswami  MD Colin    Office Visit    5 months ago Hospital discharge follow-up    [facility] Presley Strange MD    Telemedicine    7 months ago Lumbar radiculopathy    [facility] [place] Kendrick Clemente MD    Office Visit          Future Appointments         Provider Department Appt Notes    In 2 days Tom Lay MD; [facility] IVS TREATMENT [facility]     In 2 days [facility] CARD PORTABLE [facility]

## 2024-06-11 NOTE — TELEPHONE ENCOUNTER
Please review; protocol failed/ has no protocol    Requested Prescriptions   Pending Prescriptions Disp Refills    Potassium Chloride ER 20 MEQ Oral Tab CR 60 tablet 2     Sig: Take 1 tablet by mouth in the morning and 1 tablet before bedtime.       There is no refill protocol information for this order        Recent Outpatient Visits              3 weeks ago Memory loss    [facility] Frederic Courtney MD    Office Visit    4 weeks ago Abscess of cheek    [facility] Presley Strange MD    Office Visit    1 month ago Rheumatoid arthritis, involving unspecified site, unspecified whether rheumatoid factor present (HCC)    [facility] [address], [place] Colin Lopez MD    Office Visit    5 months ago Hospital discharge follow-up    [facility] Presley Strange MD    Telemedicine    7 months ago Lumbar radiculopathy    [facility] Kendrick Clemente MD    Office Visit          Future Appointments         Provider Department Appt Notes    In 2 days Tom Lay MD; [facility] IVS TREATMENT [facility] Inter[facility]     In 2 days [facility] CARD PORTABLE [facility]

## 2024-06-13 ENCOUNTER — HOSPITAL ENCOUNTER (OUTPATIENT)
Dept: INTERVENTIONAL RADIOLOGY/VASCULAR | Facility: HOSPITAL | Age: 79
Discharge: HOME | End: 2024-06-13
Attending: INTERNAL MEDICINE | Admitting: STUDENT IN AN ORGANIZED HEALTH CARE EDUCATION/TRAINING PROGRAM

## 2024-06-13 ENCOUNTER — HOSPITAL ENCOUNTER (OUTPATIENT)
Dept: CV DIAGNOSTICS | Facility: HOSPITAL | Age: 79
Discharge: HOME | End: 2024-06-13
Attending: INTERNAL MEDICINE

## 2024-06-13 VITALS
OXYGEN SATURATION: 97 % | HEIGHT: 63 IN | WEIGHT: 135 LBS | BODY MASS INDEX: 23.92 KG/M2 | HEART RATE: 71 BPM | SYSTOLIC BLOOD PRESSURE: 111 MMHG | DIASTOLIC BLOOD PRESSURE: 47 MMHG | TEMPERATURE: 97 F | RESPIRATION RATE: 16 BRPM

## 2024-06-13 DIAGNOSIS — I05.9 ENDOCARDITIS OF MITRAL VALVE: ICD-10-CM

## 2024-06-13 DIAGNOSIS — I34.0 SEVERE MITRAL REGURGITATION: ICD-10-CM

## 2024-06-13 DIAGNOSIS — Z95.2 S/P TAVR (TRANSCATHETER AORTIC VALVE REPLACEMENT): ICD-10-CM

## 2024-06-13 DIAGNOSIS — Z01.818 PRE-OP TESTING: Primary | ICD-10-CM

## 2024-06-13 LAB
ANION GAP SERPL CALC-SCNC: 9 MMOL/L (ref 0–18)
BUN BLD-MCNC: 98 MG/DL (ref 9–23)
BUN/CREAT SERPL: 49.5 (ref 10–20)
CALCIUM BLD-MCNC: 9.1 MG/DL (ref 8.7–10.4)
CHLORIDE SERPL-SCNC: 116 MMOL/L (ref 98–112)
CO2 SERPL-SCNC: 19 MMOL/L (ref 21–32)
CREAT BLD-MCNC: 1.98 MG/DL
EGFRCR SERPLBLD CKD-EPI 2021: 25 ML/MIN/1.73M2 (ref 60–?)
FASTING STATUS PATIENT QL REPORTED: YES
GLUCOSE BLD-MCNC: 113 MG/DL (ref 70–99)
OSMOLALITY SERPL CALC.SUM OF ELEC: 329 MOSM/KG (ref 275–295)
POTASSIUM SERPL-SCNC: 3.8 MMOL/L (ref 3.5–5.1)
SODIUM SERPL-SCNC: 144 MMOL/L (ref 136–145)

## 2024-06-13 PROCEDURE — 93312 ECHO TRANSESOPHAGEAL: CPT | Performed by: STUDENT IN AN ORGANIZED HEALTH CARE EDUCATION/TRAINING PROGRAM

## 2024-06-13 PROCEDURE — 99152 MOD SED SAME PHYS/QHP 5/>YRS: CPT | Performed by: STUDENT IN AN ORGANIZED HEALTH CARE EDUCATION/TRAINING PROGRAM

## 2024-06-13 PROCEDURE — B24BZZ4 ULTRASONOGRAPHY OF HEART WITH AORTA, TRANSESOPHAGEAL: ICD-10-PCS | Performed by: STUDENT IN AN ORGANIZED HEALTH CARE EDUCATION/TRAINING PROGRAM

## 2024-06-13 PROCEDURE — 93320 DOPPLER ECHO COMPLETE: CPT | Performed by: INTERNAL MEDICINE

## 2024-06-13 PROCEDURE — 80048 BASIC METABOLIC PNL TOTAL CA: CPT | Performed by: STUDENT IN AN ORGANIZED HEALTH CARE EDUCATION/TRAINING PROGRAM

## 2024-06-13 PROCEDURE — 93325 DOPPLER ECHO COLOR FLOW MAPG: CPT | Performed by: INTERNAL MEDICINE

## 2024-06-13 RX ORDER — SODIUM CHLORIDE 0.9 % (FLUSH) 0.9 %
10 SYRINGE (ML) INJECTION AS NEEDED
Status: DISCONTINUED | OUTPATIENT
Start: 2024-06-13 | End: 2024-06-13

## 2024-06-13 RX ORDER — MIDAZOLAM HYDROCHLORIDE 1 MG/ML
3 INJECTION INTRAMUSCULAR; INTRAVENOUS ONCE
Status: COMPLETED | OUTPATIENT
Start: 2024-06-13 | End: 2024-06-13

## 2024-06-13 RX ORDER — MIDAZOLAM HYDROCHLORIDE 1 MG/ML
INJECTION INTRAMUSCULAR; INTRAVENOUS
Status: COMPLETED
Start: 2024-06-13 | End: 2024-06-13

## 2024-06-13 RX ORDER — SODIUM CHLORIDE 9 MG/ML
INJECTION, SOLUTION INTRAVENOUS
Status: COMPLETED | OUTPATIENT
Start: 2024-06-13 | End: 2024-06-13

## 2024-06-13 RX ADMIN — SODIUM CHLORIDE: 9 INJECTION, SOLUTION INTRAVENOUS at 08:00:00

## 2024-06-13 RX ADMIN — MIDAZOLAM HYDROCHLORIDE 3 MG: 1 INJECTION INTRAMUSCULAR; INTRAVENOUS at 09:30:00

## 2024-06-13 NOTE — IVS NOTE
DISCHARGE NOTE     Pt is able to sit up and ambulate without difficulty.   Pt voided and tolerated fluids.  Procedural site remains dry and intact.  IV access removed.  Instruction provided, patient/family verbalizes understanding.   Dr. Lay spoke with patient/family post procedure.     Pt discharge via wheelchair to [facility].    Follow up Appointment: as scheduled    New Prescription: none

## 2024-06-13 NOTE — DISCHARGE INSTRUCTIONS
HOME CARE INSTRUCTIONS FOLLOWING  TRANSESOPHAGEAL ECHOCARDIOGRAPHY  (SHAWNA)    Activity    DO NOT drive after the procedure. You may resume driving the following day    Do not operate any machinery (including kitchen appliances or power tools)    Avoid drinking alcohol for 24 hours    You may resume your normal activities tomorrow    Sip cool liquids initially and advance to a soft diet tonight    What is normal    You may experience a sore throat for 2 to 3 days following the examination    Gargling with warm salt water (1/2 teaspoon of salt to 8 oz. of warm water) or using throat lozenges will help to soothe your throat    When you should NOTIFY YOUR PHYSICIAN     If you experience sharp pain in your neck, abdomen, or chest     If you have sudden nausea and/or vomiting     If you vomit blood     If you have a fever greater than 100 degrees    Other     You may resume your present diet, unless otherwise specified by your physician     You may resume all of your medications as prescribed, unless otherwise directed by your physician. A list of your medications was provided to you at discharge     Please call your physician’s office for a follow-up appointment. You should be seen in 2 weeks

## 2024-06-14 RX ORDER — PREGABALIN 50 MG/1
50 CAPSULE ORAL NIGHTLY
Qty: 90 CAPSULE | Refills: 2 | OUTPATIENT
Start: 2024-06-14

## 2024-06-26 ENCOUNTER — LAB ENCOUNTER (OUTPATIENT)
Dept: LAB | Age: 79
End: 2024-06-26
Attending: STUDENT IN AN ORGANIZED HEALTH CARE EDUCATION/TRAINING PROGRAM

## 2024-06-26 DIAGNOSIS — M06.9 RHEUMATOID ARTHRITIS, INVOLVING UNSPECIFIED SITE, UNSPECIFIED WHETHER RHEUMATOID FACTOR PRESENT (HCC): ICD-10-CM

## 2024-06-26 DIAGNOSIS — R53.83 FATIGUE, UNSPECIFIED TYPE: ICD-10-CM

## 2024-06-26 DIAGNOSIS — Z01.818 PRE-OP TESTING: ICD-10-CM

## 2024-06-26 DIAGNOSIS — R71.8 LOW MEAN CORPUSCULAR VOLUME (MCV): ICD-10-CM

## 2024-06-26 DIAGNOSIS — Z51.81 THERAPEUTIC DRUG MONITORING: ICD-10-CM

## 2024-06-26 LAB
ALBUMIN SERPL-MCNC: 3.8 G/DL (ref 3.2–4.8)
ALT SERPL-CCNC: 20 U/L
ANION GAP SERPL CALC-SCNC: 9 MMOL/L (ref 0–18)
AST SERPL-CCNC: 19 U/L (ref ?–34)
BASOPHILS # BLD AUTO: 0.04 X10(3) UL (ref 0–0.2)
BASOPHILS NFR BLD AUTO: 0.4 %
BUN BLD-MCNC: 28 MG/DL (ref 9–23)
BUN/CREAT SERPL: 18.8 (ref 10–20)
CALCIUM BLD-MCNC: 9.4 MG/DL (ref 8.7–10.4)
CHLORIDE SERPL-SCNC: 109 MMOL/L (ref 98–112)
CO2 SERPL-SCNC: 19 MMOL/L (ref 21–32)
CREAT BLD-MCNC: 1.49 MG/DL
CRP SERPL-MCNC: 2 MG/DL (ref ?–1)
DEPRECATED RDW RBC AUTO: 53.3 FL (ref 35.1–46.3)
EGFRCR SERPLBLD CKD-EPI 2021: 35 ML/MIN/1.73M2 (ref 60–?)
EOSINOPHIL # BLD AUTO: 0.33 X10(3) UL (ref 0–0.7)
EOSINOPHIL NFR BLD AUTO: 3.5 %
ERYTHROCYTE [DISTWIDTH] IN BLOOD BY AUTOMATED COUNT: 16.6 % (ref 11–15)
ERYTHROCYTE [SEDIMENTATION RATE] IN BLOOD: 18 MM/HR
FASTING STATUS PATIENT QL REPORTED: NO
GLUCOSE BLD-MCNC: 119 MG/DL (ref 70–99)
HCT VFR BLD AUTO: 27.8 %
HGB BLD-MCNC: 9 G/DL
IMM GRANULOCYTES # BLD AUTO: 0.03 X10(3) UL (ref 0–1)
IMM GRANULOCYTES NFR BLD: 0.3 %
LYMPHOCYTES # BLD AUTO: 1.25 X10(3) UL (ref 1–4)
LYMPHOCYTES NFR BLD AUTO: 13.2 %
MCH RBC QN AUTO: 28.3 PG (ref 26–34)
MCHC RBC AUTO-ENTMCNC: 32.4 G/DL (ref 31–37)
MCV RBC AUTO: 87.4 FL
MONOCYTES # BLD AUTO: 0.69 X10(3) UL (ref 0.1–1)
MONOCYTES NFR BLD AUTO: 7.3 %
NEUTROPHILS # BLD AUTO: 7.14 X10 (3) UL (ref 1.5–7.7)
NEUTROPHILS # BLD AUTO: 7.14 X10(3) UL (ref 1.5–7.7)
NEUTROPHILS NFR BLD AUTO: 75.3 %
OSMOLALITY SERPL CALC.SUM OF ELEC: 291 MOSM/KG (ref 275–295)
PLATELET # BLD AUTO: 232 10(3)UL (ref 150–450)
POTASSIUM SERPL-SCNC: 4.8 MMOL/L (ref 3.5–5.1)
RBC # BLD AUTO: 3.18 X10(6)UL
SODIUM SERPL-SCNC: 137 MMOL/L (ref 136–145)
WBC # BLD AUTO: 9.5 X10(3) UL (ref 4–11)

## 2024-06-26 PROCEDURE — 85025 COMPLETE CBC W/AUTO DIFF WBC: CPT

## 2024-06-26 PROCEDURE — 82728 ASSAY OF FERRITIN: CPT

## 2024-06-26 PROCEDURE — 84460 ALANINE AMINO (ALT) (SGPT): CPT

## 2024-06-26 PROCEDURE — 85652 RBC SED RATE AUTOMATED: CPT

## 2024-06-26 PROCEDURE — 84450 TRANSFERASE (AST) (SGOT): CPT

## 2024-06-26 PROCEDURE — 84466 ASSAY OF TRANSFERRIN: CPT

## 2024-06-26 PROCEDURE — 80048 BASIC METABOLIC PNL TOTAL CA: CPT

## 2024-06-26 PROCEDURE — 86140 C-REACTIVE PROTEIN: CPT

## 2024-06-26 PROCEDURE — 36415 COLL VENOUS BLD VENIPUNCTURE: CPT

## 2024-06-26 PROCEDURE — 82040 ASSAY OF SERUM ALBUMIN: CPT

## 2024-06-26 PROCEDURE — 84443 ASSAY THYROID STIM HORMONE: CPT

## 2024-06-26 PROCEDURE — 83540 ASSAY OF IRON: CPT

## 2024-06-27 ENCOUNTER — TELEMEDICINE (OUTPATIENT)
Facility: LOCATION | Age: 79
End: 2024-06-27

## 2024-06-27 DIAGNOSIS — N18.4 CKD (CHRONIC KIDNEY DISEASE) STAGE 4, GFR 15-29 ML/MIN (HCC): ICD-10-CM

## 2024-06-27 DIAGNOSIS — L12.0 BULLOUS PEMPHIGOID (HCC): ICD-10-CM

## 2024-06-27 DIAGNOSIS — M05.771 RHEUMATOID ARTHRITIS INVOLVING BOTH ANKLES WITH POSITIVE RHEUMATOID FACTOR (HCC): ICD-10-CM

## 2024-06-27 DIAGNOSIS — I48.0 PAROXYSMAL ATRIAL FIBRILLATION (HCC): ICD-10-CM

## 2024-06-27 DIAGNOSIS — Z95.2 S/P TAVR (TRANSCATHETER AORTIC VALVE REPLACEMENT): ICD-10-CM

## 2024-06-27 DIAGNOSIS — M05.772 RHEUMATOID ARTHRITIS INVOLVING BOTH ANKLES WITH POSITIVE RHEUMATOID FACTOR (HCC): ICD-10-CM

## 2024-06-27 DIAGNOSIS — R71.8 LOW MEAN CORPUSCULAR VOLUME (MCV): Primary | ICD-10-CM

## 2024-06-27 DIAGNOSIS — D69.2 SENILE PURPURA (HCC): ICD-10-CM

## 2024-06-27 DIAGNOSIS — R53.83 FATIGUE, UNSPECIFIED TYPE: ICD-10-CM

## 2024-06-27 PROBLEM — M81.0 POSTMENOPAUSAL OSTEOPOROSIS: Status: RESOLVED | Noted: 2020-03-23 | Resolved: 2024-06-27

## 2024-06-27 PROBLEM — R09.02 HYPOXIA: Status: RESOLVED | Noted: 2023-12-14 | Resolved: 2024-06-27

## 2024-06-27 PROBLEM — I50.9 ACUTE ON CHRONIC HEART FAILURE, UNSPECIFIED HEART FAILURE TYPE (HCC): Status: RESOLVED | Noted: 2023-12-14 | Resolved: 2024-06-27

## 2024-06-27 LAB
DEPRECATED HBV CORE AB SER IA-ACNC: 367 NG/ML
IRON SATN MFR SERPL: 26 %
IRON SERPL-MCNC: 45 UG/DL
TIBC SERPL-MCNC: 174 UG/DL (ref 250–425)
TRANSFERRIN SERPL-MCNC: 117 MG/DL (ref 250–380)
TSI SER-ACNC: 0.61 MIU/ML (ref 0.55–4.78)

## 2024-06-27 NOTE — PROGRESS NOTES
INTERNAL MEDICINE VIDEO VISIT NOTE     Patient ID: Armida Hobbs is a 78 year old female.  Today's Date: 06/27/24  HPI  S/p TAVR 6/13/24, cardio notes reviewed, FRANKO on CKD4, diuretics/bumex/lisinopril stopped, repeat labs today show gfr back to baseline 35. +rheumatoid, crp elevated. CBC improved, iron studies added on. Echo reviewed below, normal. Had labs done at [facility] may- TSH normal, TPO negative, JAMES negative, ferritin normal, unable to review any notes. Has been taking lyrica 50mg nightly for nerve/back pain.   Feeling a little winded, has been losing weight. No leg swelling. No difficulty laying flat.   Given some antibiotic for 4 weeks--- bullous pehmipgoid + topical steroid, has not followed up derm. Was told by APRN she needs blood tranfusion because iron is low; no iron studies for review, ferritin is normal.       Conclusions:     1. Left ventricle: Systolic function was normal. The estimated ejection      fraction was 55-60%, by visual assessment.   2. Aortic valve: A bioprosthetic valve is present. The leaflets were normal      thickness. There was no evidence of a vegetation.   3. Mitral valve: The annulus was moderately calcified. There was no evidence      of vegetation. There was moderate regurgitation, directed centrally.      There was no pulmonary vein systolic flow blunting or reversal.   4. Left atrium: The atrium was dilated. There is no evidence of a thrombus      in the atrial cavity or appendage.   5. Tricuspid valve: There was no evidence of a vegetation. There was      mild-moderate regurgitation.   6. Pulmonic valve: There was no evidence of a vegetation.       There were no vitals filed for this visit.  body mass index is unknown because there is no height or weight on file.  BP Readings from Last 3 Encounters:   06/13/24 111/47   05/20/24 123/54   05/13/24 101/62     The ASCVD Risk score (Chelsea DK, et al., 2019) failed to calculate for the following reasons:     The 2019 ASCVD risk score is only valid for ages 40 to 79  Medications reviewed:  Current Outpatient Medications   Medication Sig Dispense Refill    Potassium Chloride ER 20 MEQ Oral Tab CR Take 1 tablet by mouth in the morning and 1 tablet before bedtime. 90 tablet 3    pantoprazole 20 MG Oral Tab EC Take 1 tablet (20 mg total) by mouth every morning before breakfast. TO PREVENT ACID REFLUX. 90 tablet 3    predniSONE 2.5 MG Oral Tab Take 1 tablet (2.5 mg total) by mouth daily. 30 tablet 1    bumetanide (BUMEX) 1 MG Oral Tab Take 1 tablet (1 mg total) by mouth daily. STOP FUROSEMIDE. 90 tablet 3    mupirocin 2 % External Ointment Apply 1 Application topically 3 (three) times daily. TO RIGHT WRIST AND LEFT CHEEK. 22 g 0    triamcinolone 0.1 % External Cream Apply topically 2 (two) times daily. 15 g 0    leflunomide 10 MG Oral Tab Take 1 tablet (10 mg total) by mouth daily. 90 tablet 0    pregabalin (LYRICA) 50 MG Oral Cap Take 1 capsule (50 mg total) by mouth at bedtime. FOR NERVE/BACK PAIN. STOP GABAPENTIN. 90 capsule 2    acetaminophen 500 MG Oral Tab Take 1 tablet (500 mg total) by mouth every 4 (four) hours as needed.      clopidogrel 75 MG Oral Tab Take 1 tablet (75 mg total) by mouth daily. FOR HEART STENTS. 90 tablet 9    aspirin 81 MG Oral Tab EC Take 1 tablet (81 mg total) by mouth daily. FOR HEART DISEASE. 90 tablet 9    lisinopril 20 MG Oral Tab Take 1 tablet (20 mg total) by mouth daily. FOR BLOOD PRESSURE. (Patient taking differently: Take 1 tablet (20 mg total) by mouth every evening. FOR BLOOD PRESSURE.) 90 tablet 9    folic acid 1 MG Oral Tab Take 1 tablet (1 mg total) by mouth daily. (Patient taking differently: Take 1 tablet (1 mg total) by mouth every morning.) 90 tablet 3    cholecalciferol 25 MCG (1000 UT) Oral Tab Take 1 tablet (1,000 Units total) by mouth daily. 90 tablet 3         Assessment & Plan    1. Low mean corpuscular volume (MCV) (Primary)  -     Ferritin; Future; Expected date:  06/27/2024  -     Iron And Tibc; Future; Expected date: 06/27/2024  2. Fatigue, unspecified type  -     TSH W Reflex To Free T4; Future; Expected date: 06/27/2024  3. Senile purpura (HCC)  4. Paroxysmal atrial fibrillation (HCC)  5. S/P TAVR (transcatheter aortic valve replacement)  6. CKD (chronic kidney disease) stage 4, GFR 15-29 ml/min ([facility])  7. Rheumatoid arthritis involving both ankles with positive rheumatoid factor ([facility])  Overview:  Formatting of this note might be different from the original. Stable on steroids    8. Bullous pemphigoid ([facility])  Plan  Labs added onto the labs were already drawn to evaluate for tea-colored urine, aiwayt results. I suspect is a combination of her rheumatoid, CKD, atrial fibs, age bullous pemphigoid.  I have reached out to her rheumatologist however recommend that this be addressed by dermatology, I agree, will need to review reoecords from outside derm.  Will have them reach out to their dermatologist to see if treatment can be started if not we will have her establish with our dermatology service.  For now continue with current medications pending lab results.    Follow Up: Return for DEPENDING ON RESULTS..         Objective: Results:   Physical Exam   Reviewed:  Patient Active Problem List    Diagnosis    Bullous pemphigoid (HCC)    COVID-19 virus infection    CKD (chronic kidney disease) stage 4, GFR 15-29 ml/min (HCC)    Lumbar radiculopathy    Bilateral carotid bruits     Formatting of this note might be different from the original. 12/2020 Mild heterogenous atherosclerotic plaque bilateral with less than 50% stenosis in both right and left internal carotid arteries, based on NASCET criteria. Anterograde flow present in both vertebral arteries      Lung granuloma (HCC)    Senile purpura (HCC)    Anxiety and depression    Poor short term memory    Neutropenia, unspecified (HCC)    Coronary artery disease involving native coronary artery of native heart without angina  pectoris    S/P drug eluting coronary stent placement    Chronic diastolic congestive heart failure (HCC)    Stage 3a chronic kidney disease (HCC)    Hypertension goal BP (blood pressure) < 130/80    S/P TAVR (transcatheter aortic valve replacement)    Paroxysmal atrial fibrillation (HCC)    Rheumatoid arthritis involving both ankles with positive rheumatoid factor (HCC)     Formatting of this note might be different from the original. Stable on steroids      Bilateral lower extremity edema     Formatting of this note might be different from the original. Multifactorial including dependent and venous insufficiency. Compression stockings and leg elevation.. Lasix 20 mg as directed.      Varicose veins of both lower extremities with pain    Mixed hyperlipidemia    Gastroesophageal reflux disease without esophagitis    Postmenopausal atrophic vaginitis    Sensorineural hearing loss, bilateral      Allergies   Allergen Reactions    Codeine PAIN     Chest pain     Sulfa Antibiotics HIVES    Amlodipine OTHER (SEE COMMENTS)     Swelling and leg cramps        Social History     Socioeconomic History    Marital status:    Tobacco Use    Smoking status: Never     Passive exposure: Never    Smokeless tobacco: Never   Vaping Use    Vaping status: Never Used   Substance and Sexual Activity    Alcohol use: Never    Drug use: Never   Other Topics Concern    Caffeine Concern No    Seat Belt Yes    Special Diet No   Social History Narrative    ** Merged History Encounter **          Social Determinants of Health     Financial Resource Strain: Low Risk  (12/26/2023)    Financial Resource Strain     Difficulty of Paying Living Expenses: Not very hard     Med Affordability: No   Food Insecurity: No Food Insecurity (12/14/2023)    Food Insecurity     Food Insecurity: Never true   Transportation Needs: No Transportation Needs (12/26/2023)    Transportation Needs     Lack of Transportation: No   Housing Stability: Low Risk   (12/14/2023)    Housing Stability     Housing Instability: No      Review of Systems  All other systems negative unless otherwise stated in ROS or HPI above.       Presley Strange MD  Internal Medicine       Call office with any questions or seek emergency care if necessary.   Patient understands and agrees to follow directions and advice.    Telehealth Verbal Consent   I conducted a telehealth visit with Armida Hobbs today, 06/27/24, which was completed using two-way, real-time interactive audio and video communication. This has been done in good awa to provide continuity of care in the best interest of the provider-patient relationship, due to the COVID -19 public health crisis/national emergency where restrictions of face-to-face office visits are ongoing. Every conscious effort was taken to allow for sufficient and adequate time to complete the visit.The patient was made aware of the limitations of the telehealth visit, including treatment limitations as no physical exam could be performed.  The patient was advised to call 911 or to go to the ER in case there was an emergency.  The patient was also advised of the potential privacy & security concerns related to the telehealth platform. The patient was made aware of where to find [facility]'s notice of privacy practices, telehealth consent form and other related consent forms and documents.  which are located on the [facility] website. The patient verbally agreed to telehealth consent form, related consents and the risks discussed.  Lastly, the patient confirmed that they were in [place]. Included in this visit, time may have been spent reviewing labs, medications, radiology tests and decision making. Appropriate medical decision-making and tests are ordered as detailed in the plan of care above.  Coding/billing information is submitted for this visit based on complexity of care and/or time spent for the visit.  ----------------------------------------- PATIENT  INSTRUCTIONS-----------------------------------------     There are no Patient Instructions on file for this visit.

## 2024-07-17 ENCOUNTER — TELEPHONE (OUTPATIENT)
Dept: RHEUMATOLOGY | Facility: CLINIC | Age: 79
End: 2024-07-17

## 2024-07-17 NOTE — TELEPHONE ENCOUNTER
Returned call; spoke to Kianna/LUCIANO.     1. Pt saw cardiologist in early June and had an echo. Discovered leakage; no treatment at this time. Labs showed dehydration. Pt referred to nephrology; appt in Aug 2024. About 10 days ago, cardiology stopped pt's diuretic and blood pressure meds.  2. Pt has has a rash for almost 2 months. Has been seen by dermatology; a little over a week ago was placed on Prednisone 20mg daily. Pt going for 2nd dermatology opinion on 7/18/24 due to unresolved rash though improved.  3. Last 3-4 days ankles and feet swelling, Kianna describes pitting edema. Inquired if she notified cardiology due to pt's CHF. She states she called today and plan is Kianna is to follow up with cardiology after derm appt 7/18.   4. Kianna asking if Prednisone could be causing edema. Informed her that fluid retention is a possible side effect. Also discussed that pt has CHF and diuretic was stopped very near time pt placed on Prednisone 20mg. Kianna asking if Dr Lopez  is okay with pt being on Prednisone 20mg.    Consulted with Dr Lopez regarding the above; she states Prednisone 20mg ok. Will discuss more with pt and family at appt on 7/19/2024.

## 2024-07-17 NOTE — TELEPHONE ENCOUNTER
Patient's daughter called to advise patient was on Prednisone 2.5mg, however developed a rash and went to a skin Dr. Who prescribed patient Prednisone 20 mg.    Patient went to see Cardiologist who advised her to stop taking water pill and blood pressure medication because he advised kidneys are dehydrated, however, patient's legs ankle and feet are very swollen.     Patient has an appointment with a skin  Tomorrow for a 2nd opinion.     Patient's daughter will like to know if this is due to her RA or a side effect of the prednisone.

## 2024-07-18 ENCOUNTER — OFFICE VISIT (OUTPATIENT)
Dept: DERMATOLOGY CLINIC | Facility: CLINIC | Age: 79
End: 2024-07-18
Payer: MEDICARE

## 2024-07-18 ENCOUNTER — LAB ENCOUNTER (OUTPATIENT)
Dept: LAB | Age: 79
End: 2024-07-18
Attending: STUDENT IN AN ORGANIZED HEALTH CARE EDUCATION/TRAINING PROGRAM
Payer: MEDICARE

## 2024-07-18 DIAGNOSIS — R21 RASH AND OTHER NONSPECIFIC SKIN ERUPTION: ICD-10-CM

## 2024-07-18 DIAGNOSIS — R73.03 PREDIABETES: ICD-10-CM

## 2024-07-18 DIAGNOSIS — L12.0 BULLOUS PEMPHIGOID (HCC): Primary | ICD-10-CM

## 2024-07-18 DIAGNOSIS — Z51.81 MEDICATION MONITORING ENCOUNTER: ICD-10-CM

## 2024-07-18 DIAGNOSIS — Z51.81 ENCOUNTER FOR THERAPEUTIC DRUG MONITORING: ICD-10-CM

## 2024-07-18 DIAGNOSIS — G71.038 LIMB-GIRDLE MUSCULAR DYSTROPHY R21 ASSOCIATED WITH MUTATION IN POGLUT1 GENE (HCC): Primary | ICD-10-CM

## 2024-07-18 LAB
ALBUMIN SERPL-MCNC: 4 G/DL (ref 3.2–4.8)
ALBUMIN/GLOB SERPL: 2.1 {RATIO} (ref 1–2)
ALP LIVER SERPL-CCNC: 66 U/L
ALT SERPL-CCNC: 16 U/L
ANION GAP SERPL CALC-SCNC: 9 MMOL/L (ref 0–18)
AST SERPL-CCNC: 18 U/L (ref ?–34)
BASOPHILS # BLD AUTO: 0.04 X10(3) UL (ref 0–0.2)
BASOPHILS NFR BLD AUTO: 0.4 %
BILIRUB SERPL-MCNC: 0.6 MG/DL (ref 0.2–1.1)
BUN BLD-MCNC: 45 MG/DL (ref 9–23)
BUN/CREAT SERPL: 30.6 (ref 10–20)
CALCIUM BLD-MCNC: 9.3 MG/DL (ref 8.7–10.4)
CHLORIDE SERPL-SCNC: 111 MMOL/L (ref 98–112)
CO2 SERPL-SCNC: 23 MMOL/L (ref 21–32)
CREAT BLD-MCNC: 1.47 MG/DL
DEPRECATED RDW RBC AUTO: 70.6 FL (ref 35.1–46.3)
EGFRCR SERPLBLD CKD-EPI 2021: 36 ML/MIN/1.73M2 (ref 60–?)
EOSINOPHIL # BLD AUTO: 0.29 X10(3) UL (ref 0–0.7)
EOSINOPHIL NFR BLD AUTO: 2.7 %
ERYTHROCYTE [DISTWIDTH] IN BLOOD BY AUTOMATED COUNT: 20.8 % (ref 11–15)
FASTING STATUS PATIENT QL REPORTED: NO
GLOBULIN PLAS-MCNC: 1.9 G/DL (ref 2–3.5)
GLUCOSE BLD-MCNC: 122 MG/DL (ref 70–99)
HCT VFR BLD AUTO: 24.5 %
HGB BLD-MCNC: 7.8 G/DL
IMM GRANULOCYTES # BLD AUTO: 0.06 X10(3) UL (ref 0–1)
IMM GRANULOCYTES NFR BLD: 0.6 %
LYMPHOCYTES # BLD AUTO: 1.27 X10(3) UL (ref 1–4)
LYMPHOCYTES NFR BLD AUTO: 11.8 %
MCH RBC QN AUTO: 30.1 PG (ref 26–34)
MCHC RBC AUTO-ENTMCNC: 31.8 G/DL (ref 31–37)
MCV RBC AUTO: 94.6 FL
MONOCYTES # BLD AUTO: 0.53 X10(3) UL (ref 0.1–1)
MONOCYTES NFR BLD AUTO: 4.9 %
NEUTROPHILS # BLD AUTO: 8.57 X10 (3) UL (ref 1.5–7.7)
NEUTROPHILS # BLD AUTO: 8.57 X10(3) UL (ref 1.5–7.7)
NEUTROPHILS NFR BLD AUTO: 79.6 %
OSMOLALITY SERPL CALC.SUM OF ELEC: 309 MOSM/KG (ref 275–295)
PLATELET # BLD AUTO: 284 10(3)UL (ref 150–450)
PLATELET MORPHOLOGY: NORMAL
POTASSIUM SERPL-SCNC: 3.8 MMOL/L (ref 3.5–5.1)
PROT SERPL-MCNC: 5.9 G/DL (ref 5.7–8.2)
RBC # BLD AUTO: 2.59 X10(6)UL
SODIUM SERPL-SCNC: 143 MMOL/L (ref 136–145)
TSI SER-ACNC: 0.78 MIU/ML (ref 0.55–4.78)
WBC # BLD AUTO: 10.8 X10(3) UL (ref 4–11)

## 2024-07-18 PROCEDURE — 80053 COMPREHEN METABOLIC PANEL: CPT

## 2024-07-18 PROCEDURE — 84443 ASSAY THYROID STIM HORMONE: CPT

## 2024-07-18 PROCEDURE — 83516 IMMUNOASSAY NONANTIBODY: CPT

## 2024-07-18 PROCEDURE — 36415 COLL VENOUS BLD VENIPUNCTURE: CPT

## 2024-07-18 PROCEDURE — 85025 COMPLETE CBC W/AUTO DIFF WBC: CPT

## 2024-07-18 PROCEDURE — 99204 OFFICE O/P NEW MOD 45 MIN: CPT | Performed by: STUDENT IN AN ORGANIZED HEALTH CARE EDUCATION/TRAINING PROGRAM

## 2024-07-18 RX ORDER — GABAPENTIN 100 MG/1
100 CAPSULE ORAL 3 TIMES DAILY
COMMUNITY
Start: 2024-06-22

## 2024-07-18 RX ORDER — PREDNISONE 10 MG/1
20 TABLET ORAL
COMMUNITY
Start: 2024-07-02

## 2024-07-18 RX ORDER — MYCOPHENOLATE MOFETIL 500 MG/1
500 TABLET ORAL 2 TIMES DAILY
COMMUNITY
Start: 2024-07-02

## 2024-07-18 RX ORDER — ALBUTEROL SULFATE 90 UG/1
AEROSOL, METERED RESPIRATORY (INHALATION)
COMMUNITY
Start: 2022-07-15

## 2024-07-18 RX ORDER — DOXYCYCLINE HYCLATE 100 MG/1
100 CAPSULE ORAL 2 TIMES DAILY
COMMUNITY
Start: 2024-07-06

## 2024-07-18 RX ORDER — FERROUS SULFATE 325(65) MG
1 TABLET, DELAYED RELEASE (ENTERIC COATED) ORAL
COMMUNITY
Start: 2024-06-22

## 2024-07-18 NOTE — PROGRESS NOTES
July 18, 2024    New patient     Referred by:   No referring provider defined for this encounter.      CHIEF COMPLAINT: Rash     HISTORY OF PRESENT ILLNESS: Armida Hobbs is a 78 year old female here for evaluation of rash.    Location: All over body  Duration: 2.5 mths  Signs and symptoms: Itching, burning sometimes, bleeding from scratching  Current treatment: Castor oil, Doxycycline 100MG BID, Mycophenolate mofetil 500 mg per day.      Personal Dermatologic History  History of chronic skin disease: No; but pt has RA    Family History  History of chronic skin disease: No  History autoimmune diseases:  No    Past Medical History  Past Medical History:    Anxiety and depression    AS (aortic stenosis)    s/p TAVR    Asthma (HCC)    CAD (coronary artery disease)    CHF (congestive heart failure) ([facility])    Diastolic    Chronic atrial fibrillation (HCC)    CKD (chronic kidney disease)    Congestive heart disease (HCC)    Depression    Essential hypertension    Hearing impairment    bilateral hearing aids    High cholesterol    Hyperlipidemia    PONV (postoperative nausea and vomiting)    RA (rheumatoid arthritis) ([facility])       REVIEW OF SYSTEMS:  Constitutional: Denies fever, chills, unintentional weight loss.   Skin as per HPI    Medications  Current Outpatient Medications   Medication Sig Dispense Refill    Potassium Chloride ER 20 MEQ Oral Tab CR Take 1 tablet by mouth in the morning and 1 tablet before bedtime. 90 tablet 3    pantoprazole 20 MG Oral Tab EC Take 1 tablet (20 mg total) by mouth every morning before breakfast. TO PREVENT ACID REFLUX. 90 tablet 3    bumetanide (BUMEX) 1 MG Oral Tab Take 1 tablet (1 mg total) by mouth daily. STOP FUROSEMIDE. 90 tablet 3    mupirocin 2 % External Ointment Apply 1 Application topically 3 (three) times daily. TO RIGHT WRIST AND LEFT CHEEK. 22 g 0    triamcinolone 0.1 % External Cream Apply topically 2 (two) times daily. 15 g 0    leflunomide 10 MG Oral Tab Take 1 tablet (10  mg total) by mouth daily. 90 tablet 0    pregabalin (LYRICA) 50 MG Oral Cap Take 1 capsule (50 mg total) by mouth at bedtime. FOR NERVE/BACK PAIN. STOP GABAPENTIN. 90 capsule 2    acetaminophen 500 MG Oral Tab Take 1 tablet (500 mg total) by mouth every 4 (four) hours as needed.      clopidogrel 75 MG Oral Tab Take 1 tablet (75 mg total) by mouth daily. FOR HEART STENTS. 90 tablet 9    aspirin 81 MG Oral Tab EC Take 1 tablet (81 mg total) by mouth daily. FOR HEART DISEASE. 90 tablet 9    lisinopril 20 MG Oral Tab Take 1 tablet (20 mg total) by mouth daily. FOR BLOOD PRESSURE. (Patient taking differently: Take 1 tablet (20 mg total) by mouth every evening. FOR BLOOD PRESSURE.) 90 tablet 9    folic acid 1 MG Oral Tab Take 1 tablet (1 mg total) by mouth daily. (Patient taking differently: Take 1 tablet (1 mg total) by mouth every morning.) 90 tablet 3    cholecalciferol 25 MCG (1000 UT) Oral Tab Take 1 tablet (1,000 Units total) by mouth daily. 90 tablet 3       PHYSICAL EXAM:  General: awake, alert, no acute distress  Skin: Skin exam was performed today including the following: Trunk and extremities. Pertinent findings include:   -With urticarial papules and plaques with overlying excoriation    ASSESSMENT & PLAN:  Pathophysiology of diagnoses discussed with patient.  Therapeutic options reviewed. Risks, benefits, and alternatives discussed with patient. Instructions reviewed at length.    # Bullous pemphigoid  - Continue doxycycline with niacinamide  - Increase dose of mycophenolate to 1000 mg twice daily CellCept pending labs.  Labs to be checked today including  and 230 antigens.  On review of prior pathology H&E consistent with bullous pemphigoid however immunofluorescence negative.  - Recommended CeraVe itch today  - Patient to see rheumatology tomorrow.  From dermatology perspective okay to decrease from 20 mg of prednisone down to 10 mg given we are increasing the dose of CellCept.        Return to  clinic:  2 months  or sooner if something concerning arises    Buddy Montejo MD

## 2024-07-23 LAB
BP180 IGG: <2 RU/ML
BP180 IGG: <2 RU/ML
BP230 IGG: 2 RU/ML
BP230 IGG: 2 RU/ML

## 2024-08-27 ENCOUNTER — OFFICE VISIT (OUTPATIENT)
Dept: RHEUMATOLOGY | Facility: CLINIC | Age: 79
End: 2024-08-27
Payer: MEDICARE

## 2024-08-27 ENCOUNTER — TELEPHONE (OUTPATIENT)
Dept: DERMATOLOGY CLINIC | Facility: CLINIC | Age: 79
End: 2024-08-27

## 2024-08-27 VITALS
DIASTOLIC BLOOD PRESSURE: 58 MMHG | RESPIRATION RATE: 16 BRPM | HEART RATE: 91 BPM | HEIGHT: 63 IN | SYSTOLIC BLOOD PRESSURE: 108 MMHG | BODY MASS INDEX: 22.44 KG/M2 | WEIGHT: 126.63 LBS

## 2024-08-27 DIAGNOSIS — Z51.81 THERAPEUTIC DRUG MONITORING: ICD-10-CM

## 2024-08-27 DIAGNOSIS — R21 RASH: ICD-10-CM

## 2024-08-27 DIAGNOSIS — M06.9 RHEUMATOID ARTHRITIS, INVOLVING UNSPECIFIED SITE, UNSPECIFIED WHETHER RHEUMATOID FACTOR PRESENT (HCC): Primary | ICD-10-CM

## 2024-08-27 PROCEDURE — G2211 COMPLEX E/M VISIT ADD ON: HCPCS | Performed by: INTERNAL MEDICINE

## 2024-08-27 PROCEDURE — 99214 OFFICE O/P EST MOD 30 MIN: CPT | Performed by: INTERNAL MEDICINE

## 2024-08-27 RX ORDER — MYCOPHENOLATE MOFETIL 500 MG/1
500 TABLET ORAL 2 TIMES DAILY
Refills: 0 | OUTPATIENT
Start: 2024-08-27

## 2024-08-27 RX ORDER — LEFLUNOMIDE 10 MG/1
10 TABLET ORAL DAILY
Qty: 90 TABLET | Refills: 1 | Status: SHIPPED | OUTPATIENT
Start: 2024-08-27

## 2024-08-27 RX ORDER — DOXYCYCLINE 100 MG/1
100 CAPSULE ORAL 2 TIMES DAILY
Refills: 0 | OUTPATIENT
Start: 2024-08-27

## 2024-08-27 RX ORDER — PREDNISONE 2.5 MG/1
5 TABLET ORAL DAILY
Qty: 180 TABLET | Refills: 0 | Status: SHIPPED | OUTPATIENT
Start: 2024-08-27

## 2024-08-27 NOTE — TELEPHONE ENCOUNTER
LOV 7/18/24 - Please see message from pt's daughter.  It looks like mycophenolate was a possibility depending on pt's lab results.  Please advise.  Thank you.

## 2024-08-27 NOTE — PROGRESS NOTES
Armida Hobbs is a 78 year old female who presents for   Chief Complaint   Patient presents with    Rheumatoid Arthritis    Rash     Arm, Back and trunk area    Medication Follow-Up     Discuss Prednisone     Lab Results    Swelling     B/L Feet    .   HPI:     I had the pleasure of seeing Armida Hobbs on 12/9/2020 for evaluation.     She just moved her in the last year. She was moved from [facility].   She was diagnosed with rheumatoid arthritis. She was diagnosed 5 years ago.    It affects her knee and ankles. Her hands hurt   She was put on medication once a long time ago.   Then seh just started using nautural supplements - like food and fish oil and turmeric.   She was seeing Dr. Galeana and was getting a lot of cortisone injections.     She was taking fish oil - this was helping a lot.   She used to get cortisone injections for her knees for osteoarthritis - and did get gel injections. It has been helping. She got it for the first time 2-3 months ago.     She also getting varcisoe vein shots.   She also had a pulled tendon of left knee and saw an orthopedic. And got an injectiosn 2 weeks ago and given diclofenac gel 1 % topical.   She got an xray at that time.     She has 7/10 pain - samara in left knee. It hurts and swollen at rest. It's beter than 1 month ago.     The pain travels and sometimes it's in her ankles and toes.   She was told not to take tyelnol.     She went last year for 2 session - for her knee.   She has not seen rheumatologist in 1 year.     12/23/2020  She still has left knee pain samara when she sleeps. Her left ankle and toe - hurts. And she has a hard time getting rid of them.   She is getting cramps in her legs. . She has trouble going up the steps. She went to physical therapy a  Couple of years ago.   One time she hurt her right hip in physical therapy  And got an MRI  And was told there was a lot swelling - she had injectiosn done at that time.     She has b/l knee pain and now her  ankles.   She has a lot of pain in the mroning.   No foot or hand pain.   Now it's her hips pain.     Her knees are on and off   Better in he rknees about 50% better. She still has left knee . She had the injectiosn 4 weekss ago.   About 2 weeks later she had so much pain and she couldn't bend her knee.     She has onoging ankle pain at times.     2/18/2021  Some days every part of her body hurts. Domonique her righ tknee - it is also her right hip and both feet . Hurtts.   reveiwed records from 's office - from 6/2019 -   She had mri of her right hand and right ankle in 2016 - showed fluid in her righ thand and also tendonitis in right ankle -  He had dw her arava and biologics in the past but she declined.   He has summary of all her mris.   She had a lot of pain last week and got a right knee injeciton methryl pred 20mg - and she feels ian.r   She hasn't felt better on plaquenil 200mg bid.   She is going to get a hyaluronic acid injecito in 3/2021  [facility]   It travels from her knees to her right ankle and toes.     4/22/2021  She is on leflunomide 10mg a day - and prednisone 5mg ad ay.   She was getting hair loss and feeling tired with the lelfunomide.   She stopped the medication.   She felt a little better with   She had bad palpitations - every 6 months - she is getting echo - this was nromal.   Her cardiologist told her not to take - lasix, amlodipine and atoravastatin. She was started on pravastatin.   She was on plaquenil 200mg bid.   She still has bad back pain .     She has left ankle and left hip pain.     7/1/2021  She feels her legs are heavy that is newer.   She feels more ankle pain and swelling.   She feels a little better on leflunomide 20mg a day.   seh's had 2 weeks of sciatica.   She broke her left 2nd toe and had to see orthopedcs. At the time her feet were hurting so bad. So when she moves the wrong way and she felt a right sided sciatica pain. She was given ice and  was put in ice bad and water and alcohol 3-4 times a day. It only helps a little bit.   She bumped her toe on a post - had a fragility fracture - her DEXA in 2/2021 and is osteopenia -  fragility fracture on 4/2021 /   Reviewed the xray of foot on 6/16/ and it showed no fracture.   She feels she has plantar fascitis.   She feels less pain in her legs but she still has issues in her legs.   She still can't stand long doing her activities.   She has to sit down. She has 8/10 pain.     7/26/2022:   Presents for follow-up of seropositive RA (+RF 54) and osteoarthritis  She follows with Dr. Lopez, last seen in July 2021  Currently on prednisone 5 mg daily  Recent blood work shows creatinine of 1.43.  Normal LFTs  She was discharged from the hospital on 7/10/2022 for acute on chronic congestive heart failure.  MRI of the lower spine was done recently showing severe narrowing of the right neural foramen and disc protrusion at L3 and L4  She also had a recent x-ray of the neck by Dr. Behar showing multilevel degenerative disc changes mostly at C5-C6 and C6-C7, recommended PT and if not improvement then MRI cervical spine  Also on lyrica for pain  Has some hand pain mosty in the 1st CMC joints and the mcps  Most of the pain is in the neck and upper back    11/14/2022:   Presents for follow-up of seropositive RA (+RF 54) and osteoarthritis  She follows with Dr. Lopez, last seen in July 2021  Was recently discharged from the hospital for severe symptomatic aortic stenosis s/p aortic valve replacement.  Reports joint pain involving her left fourth finger with swelling.  Also has some pain in her knees.  Reports of burning in her feet  She was on prednisone 5 mg daily, it was discontinued.  She has had gel injections in the past which helped her knees.    1/21/2023  Since last visit - he has stents, AVR and then had pneumoina.   Had pneumonon on 12/11-12/20/2022 - was given solumedrol and cont. On arav - was doing  better with abx and solumedrol.   She had low blood pressure and kidney failure.   She has a nurse coming in once a week and phsyical therapy.   She was told she had low bp - and was in the hospital for 10 days to get the meds right.   She was admitted on 1/6/2023 - 1/13/2023 for FRANKO -creatine baseling was  1.3--> 3.06 on admit - now back to baseline - thought to be from overdiuresis  She was put on pred 40mg a day and tapered now to 20mg a day   She is on prednisone 20mg a day for the last 2 days.   She was given prednisoen burst last time for 10 days and daughter feels everytime she goes off prednsone - she has more flare and pain.   She's shaky as she taper this prednisone.   She is confused right now post hospitalization - it's slowly getting better after tapering the prednisone.     Her knees are hurting right now. Her knees are not swollen. -   There are slight ankle swelling.   Some days she is a lot of pain and sometiems not.   She's on icy patches and put on neck and back and that helps.   Sometimes her feet are burning.       2/9/2023 -   Her o2 is 96 % NR is 96 -   She is recovering from her recent admission on 1/6-1/13 - she is overall slowly recovering.   Her daughter feels she has more weakness and memory issues - CT head was done in patient and was normal.   She's having increasing sob - just yesterday and today since her last visit.   She's down to pred 5mg ad ay.   She is still shaking a little and she still feels palpiatiotns.     She's not in any pain.   She's still shaking in her hands on this dose of her prednisone.   She has palpiataitons. - this is not worse - but it's still intermittent -   If she rests, it's better.     No swelling in her legs -she si 142 lbs. -   She notices palpiations with going up the staris - 6 stairs.   seh is getting winded really fast.   She sees Cardiology next week.     Her memory is really bad - sometiems she forgets things - she cant' remember her meds.      4/13/2023  She's had her metoprolol and lisinopril cut back yesterday - and her blood pressure is better. - it was too low before.   She is in a lot of pain.   She is shaking and on prednisone -   She is in a lot fo pain   Reviewed labs -   Cr is 2.10.   She is taking tyelnol arthritis   She has 8-9/10 pain.   She has been drinking more juices.   She is feeling winded and sob  Her pain in her hips is really bad   The botton of her feet are burning. But no numbness.   She has no dysuria - her ua is showsing elevated white cells.     6/8/2023  She overall has been doing better.   She was having a lto fo swellign in her legs for the last week.   She went to the cardiologist andtold it  was not the heart.   She had us lf legs last week - and it was negative.   The swelling went down on it's own since then.   She feels a cardboard on the bottom of her feet.   She feels brunign and numbness - like plastric and she feels her toes go red.   The prednisone was making her shaky.   So she went to pred 2.5mg a day so she had more pain.   She is back on pred 5m ga dya.   Her pain is more manageable  Than last visit.   She was just swollen . She does have inflammation in her toes - she feels it's her RA.   She was not able to walk before so now she is able to walk.   No back and hips pain now.   She's walking around the house now and a nurse is walking around her.e     She' s getting parastehsias in both her feet.   It's stayin in both feet.   This bothers her.   The pain she has she can tolerated it.     She feels her pain si 4/10 pain.     4/18/2024  Not doing good. She still has inflammation.   Before it ranse she knows.   She has right ankle pain and it's swelling.   She is on gabapentin.   More trouble with her right ankle and leg - she almost fell with it give out.   If she tries to cook and if she tries to go graocery shopping. Then the next day she's wiped out.   Has neck pain.   She gets regular massages - and that  helps the muscle pain.   She went to [place] last month and felt worse - with swelling when she was there.       She's been off the prendisone 2.5mg very other day - - this last month. Tapered off.   She is on leflunomide 10mg ad ay     Admitted in 12/2023 -for covid 19 and acute on chronic CHF    Shew as on pregabilin and then switched back to gabpaentin.     8/27/2024  She was dx with bullous pemphigoid dx in 5/2024 on her right arm and left arm - saw derm twice - it was all over her body  The first derm put her on pred 20mg a day - and mycohpenolate 500mg bid and it started helping the rash but she didn't tolerated it - stopped eating and was loopy on it. She's on doxycychline as well.   So saw second derm and dropped her pred to 10mg ad ay -and increased mycophenolate to 1000mg bid  and improved with the skin and got her appetite back and now she is swollen in her feet and ankles.   She just saw dr. Mayes - last week, cardiologist - and took her off the water pills b/c she was getting dehyrated.   She did get a SHAWNA on 6/2024 - and dx with mod MR  So she was off her diuretic while she got the rash and prednisone starting.   She's back on one daily diuretic but that didn't help and last week increased to twice a day .       She got second opinion rheum 7/30/2024 - dr. Geno Darling at [facility] - agrees with traetment of bullous pemphigoid with the RA      Wt Readings from Last 2 Encounters:   08/27/24 126 lb 9.6 oz (57.4 kg)   06/05/24 135 lb (61.2 kg)     Body mass index is 22.43 kg/m².      Current Outpatient Medications   Medication Sig Dispense Refill    doxycycline 100 MG Oral Cap Take 1 capsule (100 mg total) by mouth 2 (two) times daily.      ferrous sulfate 325 (65 FE) MG Oral Tab EC Take 1 tablet (325 mg total) by mouth daily with breakfast.      gabapentin 100 MG Oral Cap Take 1 capsule (100 mg total) by mouth 3 (three) times daily.      Mycophenolate Mofetil 500 MG Oral Tab Take 1 tablet (500 mg total) by  mouth 2 (two) times daily.      predniSONE 10 MG Oral Tab Take 1 tablet (10 mg total) by mouth daily with breakfast.      Potassium Chloride ER 20 MEQ Oral Tab CR Take 1 tablet by mouth in the morning and 1 tablet before bedtime. 90 tablet 3    pantoprazole 20 MG Oral Tab EC Take 1 tablet (20 mg total) by mouth every morning before breakfast. TO PREVENT ACID REFLUX. 90 tablet 3    bumetanide (BUMEX) 1 MG Oral Tab Take 1 tablet (1 mg total) by mouth daily. STOP FUROSEMIDE. 90 tablet 3    leflunomide 10 MG Oral Tab Take 1 tablet (10 mg total) by mouth daily. 90 tablet 0    pregabalin (LYRICA) 50 MG Oral Cap Take 1 capsule (50 mg total) by mouth at bedtime. FOR NERVE/BACK PAIN. STOP GABAPENTIN. 90 capsule 2    acetaminophen 500 MG Oral Tab Take 1 tablet (500 mg total) by mouth every 4 (four) hours as needed.      clopidogrel 75 MG Oral Tab Take 1 tablet (75 mg total) by mouth daily. FOR HEART STENTS. 90 tablet 9    aspirin 81 MG Oral Tab EC Take 1 tablet (81 mg total) by mouth daily. FOR HEART DISEASE. 90 tablet 9    lisinopril 20 MG Oral Tab Take 1 tablet (20 mg total) by mouth daily. FOR BLOOD PRESSURE. (Patient taking differently: Take 1 tablet (20 mg total) by mouth every evening. FOR BLOOD PRESSURE.) 90 tablet 9    folic acid 1 MG Oral Tab Take 1 tablet (1 mg total) by mouth daily. (Patient taking differently: Take 1 tablet (1 mg total) by mouth every morning.) 90 tablet 3    cholecalciferol 25 MCG (1000 UT) Oral Tab Take 1 tablet (1,000 Units total) by mouth daily. 90 tablet 3      Past Medical History:    Anxiety and depression    AS (aortic stenosis)    s/p TAVR    Asthma (HCC)    CAD (coronary artery disease)    CHF (congestive heart failure) (HCC)    Diastolic    Chronic atrial fibrillation (HCC)    CKD (chronic kidney disease)    Congestive heart disease (HCC)    Depression    Essential hypertension    Hearing impairment    bilateral hearing aids    High cholesterol    Hyperlipidemia    PONV (postoperative  nausea and vomiting)    RA (rheumatoid arthritis) (HCC)      Past Surgical History:   Procedure Laterality Date    Appendectomy      Cath bare metal stent (bms)  2022    Cardiac Stents Placement x4    Other surgical history Bilateral     Gel injections to bilateral knees    Repair ing hernia,5+y/o,reducibl      Valve repair  11/03/2022      Family History   Problem Relation Age of Onset    Other (emphysema) Father     Dementia Mother     No Known Problems Daughter     No Known Problems Daughter     No Known Problems Son     No Known Problems Son     Other (Osteoarthritis) Sister     Heart Attack Brother     Cancer Brother     Heart Disorder Brother       Mom had back pain     Social History:  Social History     Socioeconomic History    Marital status:    Tobacco Use    Smoking status: Never     Passive exposure: Never    Smokeless tobacco: Never   Vaping Use    Vaping status: Never Used   Substance and Sexual Activity    Alcohol use: Never    Drug use: Never   Other Topics Concern    Caffeine Concern No    Seat Belt Yes    Special Diet No    Breast feeding No    Reaction to local anesthetic No    Pt has a pacemaker No    Pt has a defibrillator No   Social History Narrative    ** Merged History Encounter **          Social Determinants of Health     Financial Resource Strain: Low Risk  (12/26/2023)    Financial Resource Strain     Difficulty of Paying Living Expenses: Not very hard     Med Affordability: No   Food Insecurity: No Food Insecurity (12/14/2023)    Food Insecurity     Food Insecurity: Never true   Transportation Needs: No Transportation Needs (12/26/2023)    Transportation Needs     Lack of Transportation: No   Housing Stability: Low Risk  (12/14/2023)    Housing Stability     Housing Instability: No      Living with daughter,        REVIEW OF SYSTEMS:   Review Of Systems:  Fatigue  Constitutional:No fever, no change in weight or appetitie  Derm: No rashes, no oral ulcers, no alopecia, no  photosensitivity, no psoriasis  HEENT: No dry eyes, no dry mouth, no Raynaud's, no nasal ulcers, no parotid swelling, no neck pain, no jaw pain, no temple pain  Eyes: No visual changes,   CVS: No chest pain, no heart disease  RS: No SOB, no Cough, No Pleurtic pain,   GI: No nausea, no vomiiting, no abominal pain, no hx of ulcer, no gastritis, no heartburn, no dyshpagia, no BRBPR or melena    : gets UTIs -  no hx of miscarriages, no DVT Hx, no hx of OCP,   Neuro: No numbness or tingling, no headache, no hx of seizures,   Psych: no hx of anxiety or depression  ENDO: no hx of thyroid disease, no hx of DM  Joint/Muscluskeltal: see HPI,   All other ROS are negative.     EXAM:   /58   Pulse 91   Resp 16   Ht 5' 3\" (1.6 m)   Wt 126 lb 9.6 oz (57.4 kg)   BMI 22.43 kg/m²   HEENT: Clear oropharynx, no oral ulcers, EOM intact, clear sclear, PERRLA, pleasant, no acute distress, no CAD, no neck tendnerness, good ROM,   Healed rashes over forearms  CVS: RRR, no murmurs  RS: CTAB, no crackles, no rhonchi  ABD: Soft Non tender,   Joint exam:   Mild  tender in shoulders , able to raise up - improved  nmild Tender in right 2nd dip - with severe oa changes   Mild Tender in right elbows   Mild tender in 1-5th mcps bilate - ok clsoing her hands   heb and bouchrd notdes  Mild  tender in b/l knees with swelling   Mild Tender in b/l hips , shooting down   No edema in legs    tender in b/l ankles.   parastehsias in b/l feet to the ankles -   1-5th dips tender to her.   Squeeze test positive     Component      Latest Ref Rng 6/26/2024   WBC      4.0 - 11.0 x10(3) uL 9.5    RBC      3.80 - 5.30 x10(6)uL 3.18 (L)    Hemoglobin      12.0 - 16.0 g/dL 9.0 (L)    Hematocrit      35.0 - 48.0 % 27.8 (L)    MCV      80.0 - 100.0 fL 87.4    MCH      26.0 - 34.0 pg 28.3    MCHC      31.0 - 37.0 g/dL 32.4    RDW-SD      35.1 - 46.3 fL 53.3 (H)    RDW      11.0 - 15.0 % 16.6 (H)    Platelet Count      150.0 - 450.0 10(3)uL 232.0    Prelim  Neutrophil Abs      1.50 - 7.70 x10 (3) uL 7.14    Neutrophils Absolute      1.50 - 7.70 x10(3) uL 7.14    Lymphocytes Absolute      1.00 - 4.00 x10(3) uL 1.25    Monocytes Absolute      0.10 - 1.00 x10(3) uL 0.69    Eosinophils Absolute      0.00 - 0.70 x10(3) uL 0.33    Basophils Absolute      0.00 - 0.20 x10(3) uL 0.04    Immature Granulocyte Absolute      0.00 - 1.00 x10(3) uL 0.03    Neutrophils %      % 75.3    Lymphocytes %      % 13.2    Monocytes %      % 7.3    Eosinophils %      % 3.5    Basophils %      % 0.4    Immature Granulocyte %      % 0.3    Glucose      70 - 99 mg/dL 119 (H)    Sodium      136 - 145 mmol/L 137    Potassium      3.5 - 5.1 mmol/L 4.8    Chloride      98 - 112 mmol/L 109    Carbon Dioxide, Total      21.0 - 32.0 mmol/L 19.0 (L)    ANION GAP      0 - 18 mmol/L 9    BUN      9 - 23 mg/dL 28 (H)    CREATININE      0.55 - 1.02 mg/dL 1.49 (H)    BUN/CREATININE RATIO      10.0 - 20.0  18.8    CALCIUM      8.7 - 10.4 mg/dL 9.4    CALCULATED OSMOLALITY      275 - 295 mOsm/kg 291    EGFR      >=60 mL/min/1.73m2 35 (L)    Patient Fasting for BMP? No    Iron, Serum      50 - 170 ug/dL 45 (L)    Transferrin      250 - 380 mg/dL 117 (L)    Iron Bind.Cap.(TIBC)      250 - 425 ug/dL 174 (L)    Iron Saturation      15 - 50 % 26    SED RATE      0 - 30 mm/Hr 18    C-REACTIVE PROTEIN      <1.00 mg/dL 2.00 (H)    AST (SGOT)      <=34 U/L 19    ALT (SGPT)      10 - 49 U/L 20    Albumin      3.2 - 4.8 g/dL 3.8    TSH      0.550 - 4.780 mIU/mL 0.611    FERRITIN      18.0 - 340.0 ng/mL 367.0 (H)       Legend:  (L) Low  (H) High  7/30/24  3:04 PM    Glucose  60 - 99 mg/dL 89   Comment: Reference range for fasting plasma glucose only.   Sodium  135 - 145 mmol/L 135   Potassium  3.5 - 5.0 mmol/L 3.9   Chloride  98 - 108 mmol/L 102   Carbon Dioxide  23 - 30 mmol/L 23   Anion Gap  6 - 15 mmol/L 10   Blood Urea Nitrogen  7 - 20 mg/dL 25 High    Creatinine  0.50 - 1.40 mg/dL 1.32   eGFR Estimate, All  >=90  mL/min/1.73 sq. m 41 Low    Comment: eGFR calculated using the 2021 CKD-EPI non-race equation.    Chronic Kidney Disease: <60 mL/min/1.73 sq. m  Stage 3a: 45-59 mL/min/1.73 sq. m  Stage 3b: 30-44 mL/min/1.73 sq. m  Stage 4: 15-29 mL/min/1.73 sq. m  Stage 5: <15 mL/min/1.73 sq. m   Calcium  8.4 - 10.2 mg/dL 9.1   Bilirubin Total  0.1 - 1.0 mg/dL 0.5   Protein, Total  6.0 - 8.3 g/dL 5.6 Low    Albumin  3.5 - 5.0 g/dL 3.6   Alkaline phosphatase  50 - 150 U/L 98   AST (SGOT)  8 - 37 U/L 16   ALT (SGPT)  8 - 35 U/L 18       12/2/2020 - left hip xray   =====  CONCLUSION: Mild left hip osteoarthritis without acute fracture or dislocation.    12/2/2020 - left knee xray   =====  CONCLUSION:      Moderate left knee tricompartmental osteoarthritis.     Small suprapatellar joint effusion.     Chondrocalcinosis    10/15/2018   BONE DENSITY MEASUREMENTS   TEST DATE BMD gm/cm2   T-SCORE  %REF   Z-SCORE %AGE MATCH  BODY PART  10/15/2018      0.724     -1.10 85.0%     +0.90     117.0%  left femur neck  08/16/2016      0.720     -1.20 85.0%     +0.80       0.0%  left femur neck  02/10/2014      0.724     -1.10 85.0%     +0.70       0.0%  left femur neck  4.7 years      0.0% change in BMD for left femur neck    10/15/2018      0.862     -0.70 92.0%     +1.10     119.0%  left hip  08/16/2016      0.834     -0.90 89.0%     +0.80       0.0%  left hip  02/10/2014      0.851     -0.70 90.0%     +0.80       0.0%  left hip  4.7 years      1.3% change in BMD for left hip    08/16/2016      0.875     -1.60 84.0%     +0.70       0.0%  AP L1-L4 region of spine  02/10/2014      0.868     -1.60 83.0%     +0.50       0.0%  AP L1-L4 region of spine  2.5 years      0.8% change in BMD for AP L1-L4 region of spine    08/16/2016      0.867     -1.90 80.0%     +0.40       0.0%  AP L2-L4 region of spine  No comparisons available for AP L2-L4 region of spine    10/15/2018      0.873     -2.10 79.0%     +0.50     106.0%  AP L3-L4 region of spine  No  comparisons available for AP L3-L4 region of spine    2/15/2021 - DEXA  LEFT FEMORAL NECK               BMD:    0.728 gm/sq. cm.            T SCORE:         -1.1       Z SCORE:         1.1     LEFT TOTAL HIP               BMD:    0.891 gm/sq. cm.            T SCORE:         -0.4       Z SCORE:         1.5     PA LUMBAR SPINE (L1 - L4)               BMD:    0.946 gm/sq. cm.            T SCORE:         -0.9       Z SCORE:         1.6    ASSESSMENT AND PLAN:   Armida Hobbs is a 78 year old female who presents for   Chief Complaint   Patient presents with    Rheumatoid Arthritis    Rash     Arm, Back and trunk area    Medication Follow-Up     Discuss Prednisone     Lab Results    Swelling     B/L Feet      1. Seropositive RA (+ RF)- active  Generalized osteoarthritis  Chronic neck and lower back pain  -  admission for PNA in 12/2022 - again on 1/5/2023 - 1/13/2023 -   Recent admisstion in 12/2023 - PNA -   - been on prednisone 20mg a day  - and pain is ok - having side effects on high dose prendisone.- now tapered to 5mg a da y -    -    Now she prednisone 5mg ad ay  caused b/c of palpitations and excessive shaking -on higher doses - now weendd off.   - cont.  leflunomide again 10mg a day - -  - she has increasing foot pain - this could be from her neuropathy or her RA -   Since the parasethesia sensation is worse - stopped gabpaentin 100mg at night , now on pregabalin 50mg a   - if this doesn' thelp on next visit - consider adding hcq 200mg daily   Discussed risks and benefits of medication with patient , including retinal toxicity risk and importance of regular monitoring on the medication.   rtc in 3 months.     - was restarted on  leflunomide 10 mg daily in 11/2022 - but  to help her RA symptoms but this was stopped in the hospital on 1/5 - she   .  She does have CKD with creatinine 1.5. - now 1.8 so this was stopped on 1/6/2023 - admission to hospital   -  If kidney function is better in the future can consider  increasing to leflunomdie 10/20mg atlerning       2. Hx of Recent Aortic valve replacement , CHF   -receent hosptialization for FRANKO, hypotenstion - from overdiuress   - cr now 1.8-2.1 - ---> 1.3   With MR  New edema - likely exacerabated from the steroids - cont. Tot aper the steroids   Diuretics per dr. parisi    3. PNA in 12/2022 -     4. incresaing neuropathy in feet - and intermittent swelling in the feet   - was on  gabapnetin 100mg at night - now on pregabilin 50mg at night   - consider EMG  Recently had us of legs - negative   - swelling - not related to the neuroapthy -     5.  Bullous pemphigoid - dx in 5/2024 - treated with pred 20mg ad ay - and mycohpneolate - now on pred 10mg a day mycohpenolate 1000mg bid    Ok to taper pred to 5mg a day - to help with LE edema - will let dr. Carranza now and f/u with derm     6. Anemia - on iron pills     Summary:  Decrease  prednisone  to 5mg as needed.   2 .cont. leflunomide 10mg a day   3. Cont. Mycophenolate 1000mg twice a day .   4. Check labs every 3 months.   5. Cont. Pregabalin 50mg at night.   6. Return to clinic in 3-4 months.           - the plan would be to increase the leflunomide if her kidney function improves     Colin Lopez MD  8/27/2024   10:06 AM       is applicable because the patient's medical record notes reflects the ongoing nature of the continuous longitudinal relationship of care, and the medical record indicates that there is ongoing treatment of a serious/complex medical condition.

## 2024-08-27 NOTE — PATIENT INSTRUCTIONS
Decrease  prednisone  to 5mg as needed.   2 .cont. leflunomide 10mg a day   3. Cont. Mycophenolate 1000mg twice a day .   4. Check labs every 3 months.   5. Cont. Pregabalin 50mg at night.   6. Return to clinic in 3-4 months.

## 2024-08-27 NOTE — TELEPHONE ENCOUNTER
Seems like the patient was supposed to be taking 1000 mg 2 times daily. How much is she taking now? She should be on doxycycline as well. Can we confirm what medications for the BP she is taking and what dose?

## 2024-08-27 NOTE — TELEPHONE ENCOUNTER
S/w pt's daughter Kianna, she confirmed the following:    Mycophenolate 1000mg BID  Doxycycline 100mg BID  Prednisone 5 mg daily (RA)    They request RX for mycophenolate only, they have doxy on hand and prednisone is from rheumo.

## 2024-08-27 NOTE — TELEPHONE ENCOUNTER
Patients daughter Kianna called    Asking to speak to Rn about a medication that was supposed to be sent in but pharmacy says they never received it. Please call

## 2024-08-28 RX ORDER — MYCOPHENOLATE MOFETIL 500 MG/1
1000 TABLET ORAL 2 TIMES DAILY
Qty: 120 TABLET | Refills: 0 | Status: SHIPPED | OUTPATIENT
Start: 2024-08-28

## 2024-09-20 RX ORDER — GABAPENTIN 100 MG/1
100 CAPSULE ORAL 3 TIMES DAILY
Qty: 90 CAPSULE | Refills: 0 | Status: SHIPPED | OUTPATIENT
Start: 2024-09-20

## 2024-09-20 NOTE — TELEPHONE ENCOUNTER
Refill Request    Medication request: gabapentin 100 MG Oral Cap Take 1 capsule (100 mg total) by mouth 3 (three) times daily.     LOV:10/3/2023 Behar, Alex, MD   Due back to clinic per last office note:  RTC in 6 weeks   NOV: Visit date not found      [place]: 8/13/2024 #90    Urine drug screen (if applicable): n/a  Pain contract: n/a    LOV plan (if weaning or changing medications): no mention of gabapentin at LOV           NOTE: Will give 30 day supply refill- patient needs follow up appt.-ok per Dr. Behar-then schedule for follow up.      Pregabalin/Gabapentin notice came up. Patient has had no dispenses of Pregabalin in last 6 months.     MCM message sent to patient with courtesy reminder to schedule a follow up.    Routed to PSR pool to follow up with patient to schedule appt if needed.

## 2024-09-24 ENCOUNTER — HOSPITAL ENCOUNTER (INPATIENT)
Facility: HOSPITAL | Age: 79
LOS: 4 days | Discharge: HOME HEALTH | End: 2024-09-28
Attending: EMERGENCY MEDICINE | Admitting: INTERNAL MEDICINE
Payer: MEDICARE

## 2024-09-24 ENCOUNTER — APPOINTMENT (OUTPATIENT)
Dept: GENERAL RADIOLOGY | Facility: HOSPITAL | Age: 79
End: 2024-09-24
Attending: EMERGENCY MEDICINE
Payer: MEDICARE

## 2024-09-24 ENCOUNTER — APPOINTMENT (OUTPATIENT)
Dept: ULTRASOUND IMAGING | Facility: HOSPITAL | Age: 79
End: 2024-09-24
Attending: EMERGENCY MEDICINE
Payer: MEDICARE

## 2024-09-24 DIAGNOSIS — R09.02 HYPOXIA: ICD-10-CM

## 2024-09-24 DIAGNOSIS — N18.4 CKD (CHRONIC KIDNEY DISEASE) STAGE 4, GFR 15-29 ML/MIN (HCC): ICD-10-CM

## 2024-09-24 DIAGNOSIS — J81.0 ACUTE PULMONARY EDEMA (HCC): Primary | ICD-10-CM

## 2024-09-24 LAB
ANION GAP SERPL CALC-SCNC: 10 MMOL/L (ref 0–18)
BASE EXCESS BLD CALC-SCNC: 0.2 MMOL/L (ref ?–2)
BASOPHILS # BLD AUTO: 0.01 X10(3) UL (ref 0–0.2)
BASOPHILS NFR BLD AUTO: 0.1 %
BUN BLD-MCNC: 35 MG/DL (ref 9–23)
BUN/CREAT SERPL: 20.7 (ref 10–20)
CALCIUM BLD-MCNC: 9.5 MG/DL (ref 8.7–10.4)
CHLORIDE SERPL-SCNC: 96 MMOL/L (ref 98–112)
CHOLEST SERPL-MCNC: 263 MG/DL (ref ?–200)
CO2 SERPL-SCNC: 26 MMOL/L (ref 21–32)
CREAT BLD-MCNC: 1.69 MG/DL
DEPRECATED RDW RBC AUTO: 56.7 FL (ref 35.1–46.3)
EGFRCR SERPLBLD CKD-EPI 2021: 30 ML/MIN/1.73M2 (ref 60–?)
EOSINOPHIL # BLD AUTO: 0 X10(3) UL (ref 0–0.7)
EOSINOPHIL NFR BLD AUTO: 0 %
ERYTHROCYTE [DISTWIDTH] IN BLOOD BY AUTOMATED COUNT: 17.2 % (ref 11–15)
GLUCOSE BLD-MCNC: 154 MG/DL (ref 70–99)
HCO3 BLDA-SCNC: 25 MEQ/L (ref 21–27)
HCT VFR BLD AUTO: 37.3 %
HDLC SERPL-MCNC: 58 MG/DL (ref 40–59)
HGB BLD-MCNC: 12.1 G/DL
IMM GRANULOCYTES # BLD AUTO: 0.03 X10(3) UL (ref 0–1)
IMM GRANULOCYTES NFR BLD: 0.4 %
LDLC SERPL CALC-MCNC: 175 MG/DL (ref ?–100)
LYMPHOCYTES # BLD AUTO: 2.4 X10(3) UL (ref 1–4)
LYMPHOCYTES NFR BLD AUTO: 28.1 %
MCH RBC QN AUTO: 29.9 PG (ref 26–34)
MCHC RBC AUTO-ENTMCNC: 32.4 G/DL (ref 31–37)
MCV RBC AUTO: 92.1 FL
MODIFIED ALLEN TEST: POSITIVE
MONOCYTES # BLD AUTO: 0.8 X10(3) UL (ref 0.1–1)
MONOCYTES NFR BLD AUTO: 9.4 %
NEUTROPHILS # BLD AUTO: 5.29 X10 (3) UL (ref 1.5–7.7)
NEUTROPHILS # BLD AUTO: 5.29 X10(3) UL (ref 1.5–7.7)
NEUTROPHILS NFR BLD AUTO: 62 %
NONHDLC SERPL-MCNC: 205 MG/DL (ref ?–130)
NT-PROBNP SERPL-MCNC: ABNORMAL PG/ML (ref ?–450)
O2 CT BLD-SCNC: 16.2 VOL% (ref 15–23)
OSMOLALITY SERPL CALC.SUM OF ELEC: 285 MOSM/KG (ref 275–295)
PCO2 BLDA: 33 MM HG (ref 35–45)
PH BLDA: 7.46 [PH] (ref 7.35–7.45)
PLATELET # BLD AUTO: 216 10(3)UL (ref 150–450)
PO2 BLDA: 69 MM HG (ref 80–100)
POTASSIUM SERPL-SCNC: 3.6 MMOL/L (ref 3.5–5.1)
PUNCTURE CHARGE: YES
RBC # BLD AUTO: 4.05 X10(6)UL
SAO2 % BLDA: 96.1 % (ref 94–100)
SODIUM SERPL-SCNC: 132 MMOL/L (ref 136–145)
TRIGL SERPL-MCNC: 164 MG/DL (ref 30–149)
TROPONIN I SERPL HS-MCNC: 87 NG/L
VLDLC SERPL CALC-MCNC: 33 MG/DL (ref 0–30)
WBC # BLD AUTO: 8.5 X10(3) UL (ref 4–11)

## 2024-09-24 PROCEDURE — 71045 X-RAY EXAM CHEST 1 VIEW: CPT | Performed by: EMERGENCY MEDICINE

## 2024-09-24 PROCEDURE — 99223 1ST HOSP IP/OBS HIGH 75: CPT | Performed by: INTERNAL MEDICINE

## 2024-09-24 PROCEDURE — 93970 EXTREMITY STUDY: CPT | Performed by: EMERGENCY MEDICINE

## 2024-09-24 RX ORDER — CLOPIDOGREL BISULFATE 75 MG/1
75 TABLET ORAL DAILY
Status: DISCONTINUED | OUTPATIENT
Start: 2024-09-25 | End: 2024-09-28

## 2024-09-24 RX ORDER — FUROSEMIDE 10 MG/ML
40 INJECTION INTRAMUSCULAR; INTRAVENOUS ONCE
Status: COMPLETED | OUTPATIENT
Start: 2024-09-24 | End: 2024-09-24

## 2024-09-24 RX ORDER — PANTOPRAZOLE SODIUM 20 MG/1
20 TABLET, DELAYED RELEASE ORAL
Status: DISCONTINUED | OUTPATIENT
Start: 2024-09-25 | End: 2024-09-28

## 2024-09-24 RX ORDER — HEPARIN SODIUM 5000 [USP'U]/ML
5000 INJECTION, SOLUTION INTRAVENOUS; SUBCUTANEOUS EVERY 12 HOURS SCHEDULED
Status: DISCONTINUED | OUTPATIENT
Start: 2024-09-24 | End: 2024-09-28

## 2024-09-24 RX ORDER — MYCOPHENOLATE MOFETIL 250 MG/1
1000 CAPSULE ORAL 2 TIMES DAILY
Status: DISCONTINUED | OUTPATIENT
Start: 2024-09-24 | End: 2024-09-28

## 2024-09-24 RX ORDER — GABAPENTIN 100 MG/1
100 CAPSULE ORAL 3 TIMES DAILY
Status: DISCONTINUED | OUTPATIENT
Start: 2024-09-24 | End: 2024-09-28

## 2024-09-24 RX ORDER — PREDNISONE 2.5 MG/1
2.5 TABLET ORAL 2 TIMES DAILY
Status: DISCONTINUED | OUTPATIENT
Start: 2024-09-24 | End: 2024-09-28

## 2024-09-24 RX ORDER — POTASSIUM CHLORIDE 1500 MG/1
40 TABLET, EXTENDED RELEASE ORAL EVERY 4 HOURS
Status: COMPLETED | OUTPATIENT
Start: 2024-09-24 | End: 2024-09-25

## 2024-09-24 RX ORDER — ASPIRIN 81 MG/1
81 TABLET ORAL DAILY
Status: DISCONTINUED | OUTPATIENT
Start: 2024-09-25 | End: 2024-09-28

## 2024-09-24 NOTE — ED INITIAL ASSESSMENT (HPI)
Pt arrives via wheelchair for BLE swelling, cough, wheezing, and SOB. Hx CHF. +CP. Sob worse with exertion. AOX4, speaking in full sentences.

## 2024-09-25 LAB
ALBUMIN SERPL-MCNC: 3.6 G/DL (ref 3.2–4.8)
ALBUMIN/GLOB SERPL: 1.7 {RATIO} (ref 1–2)
ALP LIVER SERPL-CCNC: 360 U/L
ALT SERPL-CCNC: 63 U/L
ANION GAP SERPL CALC-SCNC: 10 MMOL/L (ref 0–18)
AST SERPL-CCNC: 39 U/L (ref ?–34)
ATRIAL RATE: 92 BPM
BASOPHILS # BLD AUTO: 0 X10(3) UL (ref 0–0.2)
BASOPHILS NFR BLD AUTO: 0 %
BILIRUB SERPL-MCNC: 4.1 MG/DL (ref 0.2–1.1)
BUN BLD-MCNC: 31 MG/DL (ref 9–23)
BUN/CREAT SERPL: 22.1 (ref 10–20)
CALCIUM BLD-MCNC: 9.3 MG/DL (ref 8.7–10.4)
CHLORIDE SERPL-SCNC: 101 MMOL/L (ref 98–112)
CO2 SERPL-SCNC: 22 MMOL/L (ref 21–32)
CREAT BLD-MCNC: 1.4 MG/DL
DEPRECATED RDW RBC AUTO: 56.8 FL (ref 35.1–46.3)
EGFRCR SERPLBLD CKD-EPI 2021: 38 ML/MIN/1.73M2 (ref 60–?)
EOSINOPHIL # BLD AUTO: 0.01 X10(3) UL (ref 0–0.7)
EOSINOPHIL NFR BLD AUTO: 0.1 %
ERYTHROCYTE [DISTWIDTH] IN BLOOD BY AUTOMATED COUNT: 17.3 % (ref 11–15)
GLOBULIN PLAS-MCNC: 2.1 G/DL (ref 2–3.5)
GLUCOSE BLD-MCNC: 132 MG/DL (ref 70–99)
HCT VFR BLD AUTO: 37.5 %
HGB BLD-MCNC: 12.5 G/DL
IMM GRANULOCYTES # BLD AUTO: 0.02 X10(3) UL (ref 0–1)
IMM GRANULOCYTES NFR BLD: 0.2 %
LYMPHOCYTES # BLD AUTO: 1.9 X10(3) UL (ref 1–4)
LYMPHOCYTES NFR BLD AUTO: 23.7 %
MCH RBC QN AUTO: 30.4 PG (ref 26–34)
MCHC RBC AUTO-ENTMCNC: 33.3 G/DL (ref 31–37)
MCV RBC AUTO: 91.2 FL
MONOCYTES # BLD AUTO: 0.64 X10(3) UL (ref 0.1–1)
MONOCYTES NFR BLD AUTO: 8 %
NEUTROPHILS # BLD AUTO: 5.45 X10 (3) UL (ref 1.5–7.7)
NEUTROPHILS # BLD AUTO: 5.45 X10(3) UL (ref 1.5–7.7)
NEUTROPHILS NFR BLD AUTO: 68 %
OSMOLALITY SERPL CALC.SUM OF ELEC: 284 MOSM/KG (ref 275–295)
P AXIS: 70 DEGREES
P-R INTERVAL: 138 MS
PLATELET # BLD AUTO: 195 10(3)UL (ref 150–450)
POTASSIUM SERPL-SCNC: 4 MMOL/L (ref 3.5–5.1)
POTASSIUM SERPL-SCNC: 4 MMOL/L (ref 3.5–5.1)
PROT SERPL-MCNC: 5.7 G/DL (ref 5.7–8.2)
Q-T INTERVAL: 382 MS
QRS DURATION: 142 MS
QTC CALCULATION (BEZET): 472 MS
R AXIS: -58 DEGREES
RBC # BLD AUTO: 4.11 X10(6)UL
SODIUM SERPL-SCNC: 133 MMOL/L (ref 136–145)
T AXIS: 118 DEGREES
TROPONIN I SERPL HS-MCNC: 74 NG/L
VENTRICULAR RATE: 92 BPM
WBC # BLD AUTO: 8 X10(3) UL (ref 4–11)

## 2024-09-25 PROCEDURE — 99233 SBSQ HOSP IP/OBS HIGH 50: CPT | Performed by: HOSPITALIST

## 2024-09-25 RX ORDER — FUROSEMIDE 10 MG/ML
40 INJECTION INTRAMUSCULAR; INTRAVENOUS
Status: DISCONTINUED | OUTPATIENT
Start: 2024-09-25 | End: 2024-09-26

## 2024-09-25 RX ORDER — LISINOPRIL 20 MG/1
20 TABLET ORAL EVERY EVENING
Status: DISCONTINUED | OUTPATIENT
Start: 2024-09-25 | End: 2024-09-25

## 2024-09-25 RX ORDER — LOSARTAN POTASSIUM 25 MG/1
25 TABLET ORAL DAILY
Status: DISCONTINUED | OUTPATIENT
Start: 2024-09-25 | End: 2024-09-28

## 2024-09-25 RX ORDER — CARVEDILOL 3.12 MG/1
3.12 TABLET ORAL 2 TIMES DAILY WITH MEALS
Status: DISCONTINUED | OUTPATIENT
Start: 2024-09-25 | End: 2024-09-28

## 2024-09-25 NOTE — ED QUICK NOTES
Orders for admission, patient is aware of plan and ready to go upstairs. Any questions, please call ED DAVID Sharpe  at extension [phone].   Type of COVID test sent:  COVID Suspicion level: Low/High    Titratable drug(s) infusing:  Rate:    LOC at time of transport:a.ox4    Other pertinent information    CIWA score=  NIH score=

## 2024-09-25 NOTE — PLAN OF CARE
Patient admitted overnight, vital signs stable, no complaints of pain. Plan Cardiology consult today.    Problem: Patient Centered Care  Goal: Patient preferences are identified and integrated in the patient's plan of care  Description: Interventions:  - What would you like us to know as we care for you? I'm from home with .  - Provide timely, complete, and accurate information to patient/family  - Incorporate patient and family knowledge, values, beliefs, and cultural backgrounds into the planning and delivery of care  - Encourage patient/family to participate in care and decision-making at the level they choose  - Honor patient and family perspectives and choices  Outcome: Progressing     Problem: Patient/Family Goals  Goal: Patient/Family Long Term Goal  Description: Patient's Long Term Goal: Feel better    Interventions:  - Cards cx  - See additional Care Plan goals for specific interventions  Outcome: Progressing  Goal: Patient/Family Short Term Goal  Description: Patient's Short Term Goal: Go home    Interventions:   - Cards cx  - See additional Care Plan goals for specific interventions  Outcome: Progressing     Problem: CARDIOVASCULAR - ADULT  Goal: Maintains optimal cardiac output and hemodynamic stability  Description: INTERVENTIONS:  - Monitor vital signs, rhythm, and trends  - Monitor for bleeding, hypotension and signs of decreased cardiac output  - Evaluate effectiveness of vasoactive medications to optimize hemodynamic stability  - Monitor arterial and/or venous puncture sites for bleeding and/or hematoma  - Assess quality of pulses, skin color and temperature  - Assess for signs of decreased coronary artery perfusion - ex. Angina  - Evaluate fluid balance, assess for edema, trend weights  Outcome: Progressing  Goal: Absence of cardiac arrhythmias or at baseline  Description: INTERVENTIONS:  - Continuous cardiac monitoring, monitor vital signs, obtain 12 lead EKG if indicated  - Evaluate  effectiveness of antiarrhythmic and heart rate control medications as ordered  - Initiate emergency measures for life threatening arrhythmias  - Monitor electrolytes and administer replacement therapy as ordered  Outcome: Progressing     Problem: SAFETY ADULT - FALL  Goal: Free from fall injury  Description: INTERVENTIONS:  - Assess pt frequently for physical needs  - Identify cognitive and physical deficits and behaviors that affect risk of falls.  - [place] fall precautions as indicated by assessment.  - Educate pt/family on patient safety including physical limitations  - Instruct pt to call for assistance with activity based on assessment  - Modify environment to reduce risk of injury  - Provide assistive devices as appropriate  - Consider OT/PT consult to assist with strengthening/mobility  - Encourage toileting schedule  Outcome: Progressing     Problem: Altered Communication/Language Barrier  Goal: Patient/Family is able to understand and participate in their care  Description: Interventions:  - Assess communication ability and preferred communication style  - Implement communication aides and strategies  - Use visual cues when possible  - Listen attentively, be patient, do not interrupt  - Minimize distractions  - Allow time for understanding and response  - Establish method for patient to ask for assistance (call light)  - Provide an  as needed  - Communicate barriers and strategies to overcome with those who interact with patient  Outcome: Progressing

## 2024-09-25 NOTE — DISCHARGE INSTRUCTIONS
Going Home Instructions  In this section you will find the tools which will guide you through the first few days after you leave the hospital. Continued use of these tools will help you develop the skills necessary to keep your heart failure under control.     Home Care Instructions Following Heart Failure - the most important things to do every day include:   Weigh yourself and review the “Self-Check Plan” sheet every morning.   Call your cardiologist office if you are in the “Pay Attention-Use Caution” (yellow zone) or “Medical Alert-Warning!” (red zone) as outlined in the Self-Check Plan sheet.  Take your medicines as prescribed.  Limit your sodium (salt) intake.  Know when to call your cardiologist, primary doctor, or nurse.  Know when to seek emergency care.      Things for You to Remember:   1. See your doctor or healthcare provider as written on your discharge instructions.  It is important that you attend this appointment to make sure your symptoms are under control.     2. Your recommended sodium intake is 4001-9378 mg daily.    3.  Weigh yourself every day.    4. Some exercise and activity is important to help keep your heart functioning and strong. Unless instructed not to exercise, you may walk at a slow to moderate pace for 10-15 minutes 2-3 days per week to start. Pace your activity to prevent shortness of breath or fatigue. Stop exercising if you develop chest pain, lightheadedness, or significant shortness of breath.       Call Your Cardiologist If:   You gain 2-3 pounds in one day or 5 pounds in one week.  You have more difficulty breathing.  You are getting more tired with normal activity.  You are more short of breath lying down, or awaken at night short of breath.  You have swelling of your feet or legs.  You urinate less often during the day and more often at night.  You have cramps in your legs.  You have blurred vision or see yellowish-green halos around objects of lights.    Go to the  Emergency Room If:   You have pain or tightness in your chest  You are extremely short of breath  You are coughing up pink-frothy mucus  You are traveling and develop symptoms of worsening heart failure      ** Please follow up with your cardiologist or Advanced Practice Provider as written on your discharge instructions. If you are not provided with an appointment, let your nurse know so you can get an appointment**

## 2024-09-25 NOTE — ED PROVIDER NOTES
Patient Seen in: [facility] Emergency Department      History     Chief Complaint   Patient presents with    Swelling Edema    Congestive Heart Failure     Stated Complaint: BLE swelling, cough, ELEAZAR    Subjective:   HPI    Patient is an 78-year-old female who arrives with daughter for worsening shortness of breath x 1 week.  No PND or orthopnea.  No fevers but patient has productive cough despite using over-the-counter medications.  Positive leg swelling that is chronic but not improved on Bumex twice a day.  Daughter states that she also seems to be disoriented recently and she is concerned about her potassium, iron or oxygen levels.    Objective:   Past Medical History:    Anxiety and depression    AS (aortic stenosis)    s/p TAVR    Asthma (HCC)    CAD (coronary artery disease)    CHF (congestive heart failure) (formerly [facility])    Diastolic    Chronic atrial fibrillation (HCC)    CKD (chronic kidney disease)    Congestive heart disease (HCC)    Depression    Essential hypertension    Hearing impairment    bilateral hearing aids    High cholesterol    Hyperlipidemia    PONV (postoperative nausea and vomiting)    RA (rheumatoid arthritis) (formerly [facility])              Past Surgical History:   Procedure Laterality Date    Appendectomy      Cath bare metal stent (bms)  2022    Cardiac Stents Placement x4    Other surgical history Bilateral     Gel injections to bilateral knees    Repair ing hernia,5+y/o,reducibl      Valve repair  11/03/2022                Social History     Socioeconomic History    Marital status:    Tobacco Use    Smoking status: Never     Passive exposure: Never    Smokeless tobacco: Never   Vaping Use    Vaping status: Never Used   Substance and Sexual Activity    Alcohol use: Never    Drug use: Never   Other Topics Concern    Caffeine Concern No    Seat Belt Yes    Special Diet No    Breast feeding No    Reaction to local anesthetic No    Pt has a pacemaker No    Pt has a defibrillator No   Social  History Narrative    ** Merged History Encounter **          Social Determinants of Health     Financial Resource Strain: Low Risk  (12/26/2023)    Financial Resource Strain     Difficulty of Paying Living Expenses: Not very hard     Med Affordability: No   Food Insecurity: No Food Insecurity (12/14/2023)    Food Insecurity     Food Insecurity: Never true   Transportation Needs: No Transportation Needs (12/26/2023)    Transportation Needs     Lack of Transportation: No   Housing Stability: Low Risk  (12/14/2023)    Housing Stability     Housing Instability: No              Review of Systems    Positive for stated Chief Complaint: Swelling Edema and Congestive Heart Failure    Other systems are as noted in HPI.  Constitutional and vital signs reviewed.      All other systems reviewed and negative except as noted above.    Physical Exam     ED Triage Vitals [09/24/24 1851]   /90   Pulse 115   Resp 22   Temp 97.7 °F (36.5 °C)   Temp src Oral   SpO2 96 %   O2 Device None (Room air)       Current Vitals:   Vital Signs  BP: 118/88  Pulse: 105  Resp: 19  Temp: 97.7 °F (36.5 °C)  Temp src: Oral  MAP (mmHg): 96    Oxygen Therapy  SpO2: 96 %  O2 Device: None (Room air)            Physical Exam    GENERAL: No acute distress, awake and alert  HEENT: EOMI, PERRL  Neck: supple, +JVD  CV: RRR, +CORINNE  Resp: crackles b/l, no retractions  Ab: soft, nontender, no distension  Extremities: FROM of all extremities, 2+ pitting edema BLE  Neuro: CN intact, normal speech, 5/5 motor strength in all extremities, no focal deficits  SKIN: warm, dry, no rashes      ED Course     Labs Reviewed   BASIC METABOLIC PANEL (8) - Abnormal; Notable for the following components:       Result Value    Glucose 154 (*)     Sodium 132 (*)     Chloride 96 (*)     BUN 35 (*)     Creatinine 1.69 (*)     BUN/CREA Ratio 20.7 (*)     eGFR-Cr 30 (*)     All other components within normal limits   CBC WITH DIFFERENTIAL WITH PLATELET - Abnormal; Notable for the  following components:    RDW-SD 56.7 (*)     RDW 17.2 (*)     All other components within normal limits   TROPONIN I HIGH SENSITIVITY - Abnormal; Notable for the following components:    Troponin I (High Sensitivity) 87 (*)     All other components within normal limits   PRO BETA NATRIURETIC PEPTIDE - Abnormal; Notable for the following components:    Pro-Beta Natriuretic Peptide 22,911 (*)     All other components within normal limits   ARTERIAL BLOOD GAS - Abnormal; Notable for the following components:    ABG pH 7.46 (*)     ABG pCO2 33 (*)     ABG PO2 69 (*)     All other components within normal limits   LIPID PANEL - Abnormal; Notable for the following components:    Cholesterol, Total 263 (*)     Triglycerides 164 (*)     LDL Cholesterol 175 (*)     VLDL 33 (*)     Non HDL Chol 205 (*)     All other components within normal limits   ARTERIAL BLOOD GAS     EKG    Rate, intervals and axes as noted on EKG Report.  Rate: 92  Rhythm: Sinus Rhythm  Reading: LBBB; unchanged from prior EKG         MDM         Medical Decision Making  Ddx: pneumonia, bronchitis, CHF    Labs remarkable for low O2 on ABG, high BNP, high troponin although chronically high in prior lab review.  Pt stable on nasal cannula O2  Given IV lasix-suspect CHF given clinical exam. Advised  on admission.     Amount and/or Complexity of Data Reviewed  Independent Historian:      Details: Family present for history  External Data Reviewed: labs and radiology.     Details: Labs 2024 reviewed  Echo 6/2024 reviewed  Labs: ordered.  Radiology: ordered and independent interpretation performed.     Details: Cxr reviewed by me as +edema  US VENOUS DOPPLER LEG BILAT - DIAG IMG (CPT=93970)    Result Date: 9/24/2024  CONCLUSION:  1. Negative for sonographic evidence of deep venous thrombosis in either lower extremity.  2. Spectral Doppler waveforms suggest venous reflux.    Dictated by (CST): Chivo Rodriguez MD on 9/24/2024 at 9:22 PM     Finalized by (CST):  Chivo Rodriguez MD on 9/24/2024 at 9:23 PM          XR CHEST AP PORTABLE  (CPT=71045)    Result Date: 9/24/2024  CONCLUSION:  1. Cardiomegaly and postprocedural changes of TAVR with diffuse pulmonary vascular congestion and mild, diffuse pulmonary interstitial edema.  2. Small pleural effusions and basilar atelectasis, with or without superimposed pneumonia.    Dictated by (CST): Chivo Rodriguez MD on 9/24/2024 at 8:08 PM     Finalized by (CST): Chivo Rodriguez MD on 9/24/2024 at 8:11 PM            Discussion of management or test interpretation with external provider(s): D/w dr Gwendolyn ALBERT [facility] notified    Risk  Decision regarding hospitalization.        Disposition and Plan     Clinical Impression:  1. Acute pulmonary edema (HCC)    2. Hypoxia         Disposition:  Admit  9/24/2024  9:31 pm    Follow-up:  No follow-up provider specified.  We recommend that you schedule follow up care with a primary care provider within the next three months to obtain basic health screening including reassessment of your blood pressure.      Medications Prescribed:  Current Discharge Medication List                            Hospital Problems       Present on Admission  Date Reviewed: 8/27/2024            ICD-10-CM Noted POA    * (Principal) Acute pulmonary edema (HCC) J81.0 9/24/2024 Unknown

## 2024-09-25 NOTE — PROGRESS NOTES
Attempted to see patient but she is confused and could not tell me the date. Patient reports her family is not coming in. Will follow patient while here. Will attempt to see if family is coming in.

## 2024-09-25 NOTE — CONSULTS
Cardiology Consult Note    Armida Hobbs Patient Status:  Inpatient    1945 MRN [id number]   Location [facility] 3W/SW Attending Preeti John MD   Hosp Day # 1 PCP Presley Strange MD     HPI.  Armida Hobbs is a 78 year old female with a history of TAVR, severe mitral regurgitation/mitral valve endocarditis, CAD with circumflex, D1, mid LAD PCI , CHF, atrial fibrillation, CKD, hypertension, hyperlipidemia, rheumatoid arthritis who presents to the hospital with chest pain, shortness of breath with activity.  Triage vitals pertinent for /90 mmHg, pulse 115, respiratory 22, SpO2 96%.  Labs with creatinine 1.69, sodium 132, troponin 87, NT proBNP 22,000.  She patient was noted to be edematous and hypoxic in the ED.  Patient was administered Lasix 40 mL IV push in the ER and admitted to the hospital for further management.    Patient does have history of COVID infection, mitral valve endocarditis with severe MR-she was treated conservatively with IV antibiotics, no surgical intervention and repeat SHAWNA after resolution of infection showed no endocarditis, vegetation and improvement to moderate MR.    Current cardiac medications include Bumex 1 mg daily, aspirin, Plavix, lisinopril 20 mg daily.      Prior cardiac workup  [facility] echocardiogram 2024  LVEF 50 to 55%, grade 2 diastolic dysfunction, moderate LVH  TAVR noted with  normal function  Severe MR        --------------------------------------------------------------------------------------------------------------------------------  ROS 10 systems reviewed, pertinent findings above.  ROS    History:  Past Medical History:    Anxiety and depression    AS (aortic stenosis)    s/p TAVR    Asthma (HCC)    CAD (coronary artery disease)    CHF (congestive heart failure) (HCC)    Diastolic    Chronic atrial fibrillation (HCC)    CKD (chronic kidney disease)    Congestive heart disease (HCC)    Depression    Essential hypertension    Hearing  impairment    bilateral hearing aids    High cholesterol    Hyperlipidemia    PONV (postoperative nausea and vomiting)    RA (rheumatoid arthritis) (HCC)     Past Surgical History:   Procedure Laterality Date    Appendectomy      Cath bare metal stent (bms)  2022    Cardiac Stents Placement x4    Other surgical history Bilateral     Gel injections to bilateral knees    Repair ing hernia,5+y/o,reducibl      Valve repair  11/03/2022     Family History   Problem Relation Age of Onset    Other (emphysema) Father     Dementia Mother     No Known Problems Daughter     No Known Problems Daughter     No Known Problems Son     No Known Problems Son     Other (Osteoarthritis) Sister     Heart Attack Brother     Cancer Brother     Heart Disorder Brother       reports that she has never smoked. She has never been exposed to tobacco smoke. She has never used smokeless tobacco. She reports that she does not drink alcohol and does not use drugs.    Objective:   Temp: 97.8 °F (36.6 °C)  Pulse: 95  Resp: 20  BP: 155/78    Intake/Output:     Intake/Output Summary (Last 24 hours) at 9/25/2024 0940  Last data filed at 9/25/2024 0909  Gross per 24 hour   Intake 700 ml   Output 1100 ml   Net -400 ml       Physical Exam:     General: Alert and oriented x 3  HEENT: Normocephalic, anicteric sclera, neck supple.  Neck: No JVD, carotids 2+, no bruits.  Cardiac: Regular rate and rhythm. S1, S2 normal. No murmur, pericardial rub, S3.  Lungs: Clear without wheezes, rales, rhonchi or dullness.  Normal excursions and effort.  Abdomen: Soft, non-tender. BS-present.  Extremities: Without clubbing, cyanosis or edema.  Peripheral pulses are 2+.  Neurologic: Non-focal  Skin: Warm and dry.       Assessment:    Acutely decompensated heart failure  Moderate to severe mitral regurgitation  History of mitral valve endocarditis  History of TAVR  Atrial fibrillation  CKD  Hypertension  Hyperlipidemia  Rheumatoid arthritis      Plan:  78-year-old female with  above history presenting with acutely decompensated heart failure.  Patient has started diuresed well, currently -400 cc for volume status since admission, 700 cc of urine output with single dose of Lasix  Recommend continuing Lasix 40 mg IV twice daily  Consider repeat echo once euvolemic, prior to discharge to reassess MR    Thank you for allowing me to take part in the care of Armida Hobbs. Please call with any questions of concerns.    Level of care: C5    Dr. Khanh Gentile,   September 25, 2024  9:40 AM

## 2024-09-25 NOTE — H&P
[facility]  part of [facility]    History and Physical    Armida Hobbs Patient Status:  Inpatient    1945 MRN [id number]   Location [facility] 3W/SW Attending Diane Snow MD   Hosp Day # 0 PCP Presley Strange MD     Date:  2024  Date of Admission:  2024    History provided by:patient  HPI:     Chief Complaint   Patient presents with    Swelling Edema    Congestive Heart Failure     HPI    78 year old with hx of CAD s/p multivessel PCI, CHF, CKD IV, Severe aoritic stenosis s/p TAVR, paroxysmal A fib, HTN, hx of mitral valve endocarditis with severe mitral regurgitation who present for SOB.  Symptoms have progressed over the past 1 week.  LE swelling reported.  Is on bumex 1 mg BID but symptoms have note improved.     In the ED blood pressure 129/78, pulse 97 afebrile.  Chest x-ray revealed cardiomegaly and postprocedural changes of TAVR with diffuse pulmonary vascular congestion and mild diffuse pulmonary interstitial edema.  Small pleural effusions.  Creatinine 1.69.  proBNP 22,911.  Ultrasound completed and negative for DVT.    Patient was administered IV Lasix 40 mg.  Cardiology consulted.    History     Past Medical History:    Anxiety and depression    AS (aortic stenosis)    s/p TAVR    Asthma (HCC)    CAD (coronary artery disease)    CHF (congestive heart failure) (HCC)    Diastolic    Chronic atrial fibrillation (HCC)    CKD (chronic kidney disease)    Congestive heart disease (HCC)    Depression    Essential hypertension    Hearing impairment    bilateral hearing aids    High cholesterol    Hyperlipidemia    PONV (postoperative nausea and vomiting)    RA (rheumatoid arthritis) (HCC)     Past Surgical History:   Procedure Laterality Date    Appendectomy      Cath bare metal stent (bms)      Cardiac Stents Placement x4    Other surgical history Bilateral     Gel injections to bilateral knees    Repair ing hernia,5+y/o,reducibl      Valve repair   11/03/2022     Family History   Problem Relation Age of Onset    Other (emphysema) Father     Dementia Mother     No Known Problems Daughter     No Known Problems Daughter     No Known Problems Son     No Known Problems Son     Other (Osteoarthritis) Sister     Heart Attack Brother     Cancer Brother     Heart Disorder Brother      Social History:  Social History     Socioeconomic History    Marital status:    Tobacco Use    Smoking status: Never     Passive exposure: Never    Smokeless tobacco: Never   Vaping Use    Vaping status: Never Used   Substance and Sexual Activity    Alcohol use: Never    Drug use: Never   Other Topics Concern    Caffeine Concern No    Seat Belt Yes    Special Diet No    Breast feeding No    Reaction to local anesthetic No    Pt has a pacemaker No    Pt has a defibrillator No   Social History Narrative    ** Merged History Encounter **          Social Determinants of Health     Financial Resource Strain: Low Risk  (12/26/2023)    Financial Resource Strain     Difficulty of Paying Living Expenses: Not very hard     Med Affordability: No   Food Insecurity: No Food Insecurity (12/14/2023)    Food Insecurity     Food Insecurity: Never true   Transportation Needs: No Transportation Needs (9/24/2024)    Transportation Needs     Lack of Transportation: No   Housing Stability: Low Risk  (9/24/2024)    Housing Stability     Housing Instability: No     Allergies/Medications:   Allergies:   Allergies   Allergen Reactions    Codeine PAIN     Chest pain     Sulfa Antibiotics HIVES    Amlodipine OTHER (SEE COMMENTS)     Swelling and leg cramps     Medications Prior to Admission   Medication Sig    GABAPENTIN 100 MG Oral Cap Take 1 capsule by mouth 3 times daily.    Mycophenolate Mofetil 500 MG Oral Tab Take 2 tablets (1,000 mg total) by mouth 2 (two) times daily.    predniSONE 2.5 MG Oral Tab Take 2 tablets (5 mg total) by mouth daily. (Patient taking differently: Take 1 tablet (2.5 mg total) by  mouth 2 (two) times daily.)    leflunomide 10 MG Oral Tab Take 1 tablet (10 mg total) by mouth daily.    doxycycline 100 MG Oral Cap Take 1 capsule (100 mg total) by mouth 2 (two) times daily.    ferrous sulfate 325 (65 FE) MG Oral Tab EC Take 1 tablet (325 mg total) by mouth daily with breakfast.    Potassium Chloride ER 20 MEQ Oral Tab CR Take 1 tablet by mouth in the morning and 1 tablet before bedtime.    pantoprazole 20 MG Oral Tab EC Take 1 tablet (20 mg total) by mouth every morning before breakfast. TO PREVENT ACID REFLUX.    bumetanide (BUMEX) 1 MG Oral Tab Take 1 tablet (1 mg total) by mouth daily. STOP FUROSEMIDE. (Patient taking differently: Take 1 tablet (1 mg total) by mouth BID (Diuretic). STOP FUROSEMIDE.)    clopidogrel 75 MG Oral Tab Take 1 tablet (75 mg total) by mouth daily. FOR HEART STENTS.    aspirin 81 MG Oral Tab EC Take 1 tablet (81 mg total) by mouth daily. FOR HEART DISEASE.    cholecalciferol 25 MCG (1000 UT) Oral Tab Take 1 tablet (1,000 Units total) by mouth daily.    predniSONE 10 MG Oral Tab Take 1 tablet (10 mg total) by mouth daily with breakfast.    acetaminophen 500 MG Oral Tab Take 1 tablet (500 mg total) by mouth every 4 (four) hours as needed.    lisinopril 20 MG Oral Tab Take 1 tablet (20 mg total) by mouth daily. FOR BLOOD PRESSURE. (Patient taking differently: Take 1 tablet (20 mg total) by mouth every evening. FOR BLOOD PRESSURE.)    folic acid 1 MG Oral Tab Take 1 tablet (1 mg total) by mouth daily. (Patient taking differently: Take 1 tablet (1 mg total) by mouth every morning.)       Review of Systems:     Constitutional: Negative.    HENT: Negative.     Eyes: Negative.    Respiratory:  Positive for shortness of breath.    Cardiovascular:  Positive for leg swelling.   Gastrointestinal: Negative.    Endocrine: Negative.    Genitourinary: Negative.    Musculoskeletal: Negative.    Skin: Negative.    Allergic/Immunologic: Negative.    Hematological: Negative.     Psychiatric/Behavioral: Negative.         Physical Exam:   Vital Signs:  Blood pressure 129/78, pulse 97, temperature 97.5 °F (36.4 °C), temperature source Axillary, resp. rate 20, height 5' (1.524 m), weight 129 lb 1.6 oz (58.6 kg), SpO2 97%.  Physical Exam  Constitutional:       General: She is in acute distress.   HENT:      Head: Normocephalic.      Nose: Nose normal.      Mouth/Throat:      Mouth: Mucous membranes are moist.   Eyes:      Pupils: Pupils are equal, round, and reactive to light.   Cardiovascular:      Rate and Rhythm: Normal rate and regular rhythm.      Pulses: Normal pulses.      Heart sounds: Normal heart sounds.   Pulmonary:      Effort: Pulmonary effort is normal.      Breath sounds: Normal breath sounds.   Abdominal:      General: Abdomen is flat.      Palpations: Abdomen is soft.   Musculoskeletal:      Right lower leg: Edema present.      Left lower leg: Edema present.   Skin:     General: Skin is warm.   Neurological:      Mental Status: She is alert.           Results:     Lab Results   Component Value Date    WBC 8.5 09/24/2024    HGB 12.1 09/24/2024    HCT 37.3 09/24/2024    .0 09/24/2024    CREATSERUM 1.69 (H) 09/24/2024    BUN 35 (H) 09/24/2024     (L) 09/24/2024    K 3.6 09/24/2024    CL 96 (L) 09/24/2024    CO2 26.0 09/24/2024     (H) 09/24/2024    CA 9.5 09/24/2024    ALB 4.0 07/18/2024    ALKPHO 66 07/18/2024    BILT 0.6 07/18/2024    TP 5.9 07/18/2024    AST 18 07/18/2024    ALT 16 07/18/2024    PTT 65.3 (H) 11/03/2022    INR 1.39 (H) 01/06/2023    T4F 0.8 01/07/2023    TSH 0.776 07/18/2024    LIP 39 06/27/2023    DDIMER 0.45 07/06/2022    ESRML 18 06/26/2024    CRP 2.00 (H) 06/26/2024    MG 2.5 12/22/2023    PHOS 4.7 12/22/2023    TROPHS 87 (HH) 09/24/2024    CK 29 (L) 12/18/2023    B12 1,735 (H) 04/12/2023     US VENOUS DOPPLER LEG BILAT - DIAG IMG (CPT=93970)    Result Date: 9/24/2024  CONCLUSION:  1. Negative for sonographic evidence of deep venous  thrombosis in either lower extremity.  2. Spectral Doppler waveforms suggest venous reflux.    Dictated by (CST): Chivo Rodriguez MD on 9/24/2024 at 9:22 PM     Finalized by (CST): Chivo Rodriguez MD on 9/24/2024 at 9:23 PM          XR CHEST AP PORTABLE  (CPT=71045)    Result Date: 9/24/2024  CONCLUSION:  1. Cardiomegaly and postprocedural changes of TAVR with diffuse pulmonary vascular congestion and mild, diffuse pulmonary interstitial edema.  2. Small pleural effusions and basilar atelectasis, with or without superimposed pneumonia.    Dictated by (CST): Chivo Rodriguez MD on 9/24/2024 at 8:08 PM     Finalized by (CST): Chivo Rodriguez MD on 9/24/2024 at 8:11 PM         EKG 12 Lead    Result Date: 9/24/2024  Sinus rhythm with Premature atrial complexes Possible Left atrial enlargement Left axis deviation Left bundle branch block Abnormal ECG When compared with ECG of 14-DEC-2023 04:58, No significant change was found     Assessment/Plan:       Acute pulmonary edema (HCC)  Acute on Chronic CHF  - Hx of diastolic and systolic CHF.   Last visit with her cardiology bumix increased to 1 mg BID.    - BNP >22,00  - Administered 40 mg IV lasix in ED.  Monitor strict I&Os.   - Cardiology consulted.  Await further recs.      CAD s/p multivessel PCI  - On asa 81 and DAPT (asa and plavix)  -  On rosuvastatin 20.      Hypertension  - On Bumex, Lisinopril 20.  Hold and monitor response to IV lasix.      CKD IV  - Baseline Creatinine 1.4-1.6    Medical history considered stable  - Iron deficiency anemia on ferrous sulfate  - Rheumatoid arthritis follows with rheumatology.    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   - Hx of aortic stenosis sp AVR in 2022    diet: general  ivf: none  dvt prophylaxis: heparin subq  antibiotics: none  tubes: none  central lines: none  code status: full code  dispo: home upon medical clearance    Greater than 70 Minutes spent, >50% time spent counseling and coordinating care regarding  treatment and workup.            **Certification      PHYSICIAN Certification of Need for Inpatient Hospitalization - Initial Certification    Patient will require inpatient services that will reasonably be expected to span two midnight's based on the clinical documentation in H+P.   Based on patients current state of illness, I anticipate that, after discharge, patient will require TBD.        Carl Kidd MD  9/24/2024

## 2024-09-25 NOTE — CDS QUERY
.How to answer this Query:     1.) DON'T CLICK COSIGN BUTTON FIRST  2.) Click \"3 dots...\" to the right of cosign button and click EDIT on the toolbar.  2.) Type an \"X\" in the bracket for the diagnosis that applies. (You may also add additional clinical details as you feel necessary to substantiate your response).   3.) Finally click \"Sign\" to complete response.  Thank You        CLINICAL DOCUMENTATION CLARIFICATION     Dear Doctor Alvaro,   Clinical information (provided below) includes a diagnosis of Heart Failure. Please specify type  .  PLEASE (X) DIAGNOSIS THAT APPLIES.          ( x   ) Acute on Chronic Combined Diastolic and Systolic Heart Failure    (    ) Acute on Chronic Diastolic Heart Failure    (    ) Other - please specify: _______________________________          Clinical Indicators: Echo 6/13/24 - ejection fraction was 55-60%, BNP 22,911  CXR 9/24/24 - diffuse pulmonary interstitial edema.     H&P  A/P - Acute on Chronic CHF - Hx of diastolic and systolic CHF.    Cardiology Consult - Acutely decompensated heart failure Prior cardiac workup  [facility] echocardiogram 1/2024 LVEF 50 to 55%, grade 2 diastolic dysfunction, moderate LVH  TAVR noted with  normal function Severe MR     Risk Factor: 78 year old with history CAD s/p multivessel PCI, CHF, CKD IV, Severe aoritic stenosis s/p TAVR, paroxysmal A fib, HTN, hx of mitral valve endocarditis with severe mitral regurgitation who present for SOB      Treatment: Cardiology consult, IV Lasix          If you have any questions, please contact Clinical :  Cassidy MIGUEL at [phone]     Thank You!     THIS FORM IS A PERMANENT PART OF THE MEDICAL RECORD

## 2024-09-25 NOTE — HISTORICAL OFFICE NOTE
Continuity of Care Document  8/13/2024  Armida Hobbs - 78 y.o. Female; born [birth year]Summary of episode note, generated on Aug. 27, 2024August 27, 2024   Additional Documents     ClinicalDocument.xml - Referral Note (Last Received: 8/13/2024 12:37 PM)   Plain Text Document (Last Received: 8/13/2024 12:37 PM)   Continuity of Care Document (Last Received: 8/27/2024  9:33 AM) - Currently Viewing  CHIEF COMPLAINT    CHIEF COMPLAINT  Reason for Visit/Chief Complaint   Follow Up  some palpitations, swelling in both feet   Patient is here for follow-up appointment. She has history of severe coronary artery disease status post multivessel PCI in middle of last week, severe aortic stenosis s/p TAVR. Since her last visit she was in the hospital with COVID infection. 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 She is finished with IV antibiotics. Repeat SHAWNA showed moderate mitral regurgitation without any signs of vegetation. She has developed rash all over the body since then which was diagnosed with bullous pemphigus. She has been on chronic steroid which has caused her to retain fluid and has caused her to have lower extremity edema. She has been off diuretic and lisinopril due to worsening kidney functions.     PROBLEMS  Reconcile with Patient's ChartPROBLEMS  Problem Effective Dates Date resolved Problem Status   Pre-op testing, [SNOMED-CT: 761745798] 5/17/2024 - Active   Chronic mitral valve regurgitation, [SNOMED-CT: 95562387] 5/17/2024 - Active   Endocarditis of mitral valve, [SNOMED-CT: 62050581] 1/5/2024 - Active   S/P TAVR (transcatheter aortic replacement of valve), [SNOMED-CT: 127766955] 11/11/2022 - Active   History of aortic stenosis, [SNOMED-CT: 360311157] 7/15/2022 11/11/2022 Resolved   Hypertension (HTN), primary, [SNOMED-CT: 42618979] 7/15/2022 - Active   Acute on chronic diastolic  congestive heart failure, [SNOMED-CT: 802449461] 7/15/2022 - Active   Presence of drug coated stent in LAD coronary artery, [SNOMED-CT: 306234961] 7/15/2022 - Active   Presence of drug-eluting stent in left circumflex coronary artery, [SNOMED-CT: 791543721431070] 7/15/2022 - Active   CAD native (coronary artery disease), [SNOMED-CT: 49798325] 7/15/2022 - Active   PAF (paroxysmal atrial fibrillation), [SNOMED-CT: 400328214] 7/15/2022 - Active   Varicose veins of bilateral lower extremities with pain, [SNOMED-CT: 03452607] 7/15/2022 - Active   Hyperlipidemia, mixed, [SNOMED-CT: 021992722] 7/15/2022 - Active     ENCOUNTER    ENCOUNTER  Problem Effective Dates Date resolved Problem Status   Chronic mitral valve regurgitation, [SNOMED-CT: 97314796] 5/17/2024 - Active   Endocarditis of mitral valve, [SNOMED-CT: 54832179] 1/5/2024 - Active   S/P TAVR (transcatheter aortic replacement of valve), [SNOMED-CT: 449759953] 11/11/2022 - Active   History of aortic stenosis, [SNOMED-CT: 404919146] 7/15/2022 11/11/2022 Resolved   Hypertension (HTN), primary, [SNOMED-CT: 92977243] 7/15/2022 - Active   Acute on chronic diastolic congestive heart failure, [SNOMED-CT: 444059296] 7/15/2022 - Active   Presence of drug coated stent in LAD coronary artery, [SNOMED-CT: 505597227] 7/15/2022 - Active   Presence of drug-eluting stent in left circumflex coronary artery, [SNOMED-CT: 667714677581647] 7/15/2022 - Active   CAD native (coronary artery disease), [SNOMED-CT: 08642723] 7/15/2022 - Active   Varicose veins of bilateral lower extremities with pain, [SNOMED-CT: 83666715] 7/15/2022 - Active   Hyperlipidemia, mixed, [SNOMED-CT: 501824270] 7/15/2022 - Active     VITAL SIGNS    VITAL SIGNS  Date / Time: 8/13/2024   BP Systolic 102 mmHg   BP Diastolic 44 mmHg   Height 63 inches   Weight 123 lbs   Pulse Rate 94 bpm   BSA (Body Surface Area) 1.6 cc/m2   BMI (Body Mass Index) 21.8 cc/m2   Blood Pressure 102 / 44 mmHg     PHYSICAL  EXAMINATION    PHYSICAL EXAMINATION  Header Details   Constitutional 98%o2   Vitals Right Arm Sitting  / 44 mmHg, Pulse rate 94 bpm, Regular, Height in 5' 3\", BMI: 21.8, Weight in 123.46 lbs (or) 56 kgs, BSA : 1.58 cc/m²   Head/Eyes/Ears/Nose/Mouth/Throat EOMI, PERRLA   Neck No carotid bruits, No JVD   Respiratory Unlabored, Lungs clear with normal breath sounds, Equal bilaterally   Cardiovascular High-pitched blowing holosystolic murmur is present at the apex.   Gastrointestinal Abdomen soft, Non-tender   Gait Normal gait   Upper Extremities No clubbing, No cyanosis, No edema   Lower Extremities Edema 2+   Skin Warm and dry     ALLERGIES, ADVERSE REACTIONS, ALERTS  Reconcile with Patient's ChartALLERGIES, ADVERSE REACTIONS, ALERTS  Type Substance Reaction Severity Status   Allergies amlodipine - Ingredient, [RxNorm: 51156] unknown Unknown Active   Allergies codeine - Ingredient, [RxNorm: 2670] pain Unknown Active   Allergies furosemide - Ingredient, [RxNorm: 4603] myalgia Unknown Active   Allergies Sulfa (Sulfonamide Antibiotics) - Allergen hives Unknown Active     MEDICATIONS ADMINISTERED DURING VISIT    No data available    MEDICATIONS  Reconcile with Patient's ChartMEDICATIONS  Medication Start Date Route/Frequency Status   albuterol sulfate HFA 90 mcg/actuation aerosol inhaler, [RxNorm: 5081468] 7/15/2022 Inhale 2 puffs by mouth every 4-6 hours as needed. Active   aspirin 81 mg chewable tablet, [RxNorm: 028907] 7/15/2022 Take 1 tablet orally once a day. Active   benzonatate 100 mg capsule, [RxNorm: 555008] 7/15/2022 Take 1 capsule orally 3 times a day as needed. Active   clopidogreL 75 mg tablet, [RxNorm: 527437] 7/15/2022 Take 1 tablet orally once a day. Active   doxycycline hyclate 100 mg tablet, [RxNorm: 7537164] 8/13/2024 Take 1 tablet orally 2 times a day. Active   ferrous sulfate 325 mg (65 mg iron) tablet, [RxNorm: 679625] 8/13/2024 Take 1 tablet orally once a day. Active   fluticasone propionate  50 mcg/actuation nasal spray,suspension, [RxNorm: 5191344] 2/15/2023 Take 5 mL (intranasal) once a day as needed. Active   folic acid 1 mg tablet, [RxNorm: 262850] 11/11/2022 Take 1 tablet as needed Active   gabapentin 100 mg capsule, [RxNorm: 435244] 8/13/2024 Take 1 capsule orally 3 times a day. Active   mycophenolate mofetiL 500 mg tablet, [RxNorm: 656094] 8/13/2024 Take 1 tablet orally 2 times a day. Active   rosuvastatin 20 mg tablet, [RxNorm: 000468] 7/15/2022 Take 1 tablet orally once a day. Active   pantoprazole 20 mg tablet,delayed release, [RxNorm: 060613] 7/15/2022 Take 1 tablet orally once a day. Active   potassium chloride ER 20 mEq tablet,extended release, [RxNorm: 260279] 2/15/2023 Take 1 tablet orally 2 times a day. Active   Vitamin D3 25 mcg (1,000 unit) capsule, [RxNorm: 578335] 2/15/2023 Take 1 capsule orally once a day. Active   predniSONE 10 mg tablet, [RxNorm: 894350] 8/13/2024 Take 1 tablet orally 2 times a day. Active   niacinamide 500 mg tablet, [RxNorm: 518865] 8/13/2024 Take 1 tablet orally once a day. Active   bumetanide 1 mg tablet, [RxNorm: 053767] 8/13/2024 Take 1 tablet orally 2 times a day. Active     ASSESSMENT    Increase Bumex to 1 mg twice a day  BMP next week  Wean prednisone as tolerated after discussing with dermatology  Follow-up with me in 4 weeks or sooner if needed     FAMILY HISTORY    No data available    GENERAL STATUS    No data available    PAST MEDICAL HISTORY    PAST MEDICAL HISTORY  Problem Date diagonsed Date resolved Status   Pre-op testing, [SNOMED-CT: 356256202] 5/17/2024 - Active   Chronic mitral valve regurgitation, [SNOMED-CT: 68414486] 5/17/2024 - Active   Endocarditis of mitral valve, [SNOMED-CT: 29965150] 1/5/2024 - Active   S/P TAVR (transcatheter aortic replacement of valve), [SNOMED-CT: 087826379] 11/11/2022 - Active   History of aortic stenosis, [SNOMED-CT: 429717143] 7/15/2022 11/11/2022 Resolved   Hypertension (HTN), primary, [SNOMED-CT: 28228204]  7/15/2022 - Active   Acute on chronic diastolic congestive heart failure, [SNOMED-CT: 752064744] 7/15/2022 - Active   Presence of drug coated stent in LAD coronary artery, [SNOMED-CT: 363398901] 7/15/2022 - Active   Presence of drug-eluting stent in left circumflex coronary artery, [SNOMED-CT: 342694114912570] 7/15/2022 - Active   CAD native (coronary artery disease), [SNOMED-CT: 69410158] 7/15/2022 - Active   PAF (paroxysmal atrial fibrillation), [SNOMED-CT: 142599456] 7/15/2022 - Active   Varicose veins of bilateral lower extremities with pain, [SNOMED-CT: 12639080] 7/15/2022 - Active   Hyperlipidemia, mixed, [SNOMED-CT: 232321551] 7/15/2022 - Active     HISTORY OF PRESENT ILLNESS    Patient is here for follow-up appointment. She has history of severe coronary artery disease status post multivessel PCI in middle of last week, severe aortic stenosis s/p TAVR. Since her last visit she was in the hospital with COVID infection. 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 She is finished with IV antibiotics. Repeat SHAWNA showed moderate mitral regurgitation without any signs of vegetation. She has developed rash all over the body since then which was diagnosed with bullous pemphigus. She has been on chronic steroid which has caused her to retain fluid and has caused her to have lower extremity edema. She has been off diuretic and lisinopril due to worsening kidney functions.     IMMUNIZATIONS  Reconcile with Patient's ChartNo data available    PLAN OF CARE    PLAN OF CARE  Planned Care Date   BMP (Basic Metabolic Panel) 1/1/1900   Take 1 tablet orally 2 times a day.-bumetanide 1 mg tablet 8/13/2024   Referral Visit - Presley Strange ([email or web address]) : 1/1/1900   Follow up visit - Jose Alberto Mayes MD 1/1/1900     PROCEDURES    No data available    RESULTS    RESULTS  Name Result Date Location - Ordered By   GLUCOSE  [LOINC: 2339-0] 119 mg/dL [High] 06/26/2024 03:25:00 PM [facility] ([facility])  Address: [address]  [facility]  [address]  tel:   SODIUM [LOINC: 2951-2] 137 mmol/L 06/26/2024 03:25:00 PM [facility] ([facility])  Address: [address]  [facility]  [address]  tel:   POTASSIUM [LOINC: 2823-3] 4.8 mmol/L 06/26/2024 03:25:00 PM [facility] ([facility])  Address: [address]  [facility]  [address]  tel:   CHLORIDE [LOINC: 2075-0] 109 mmol/L 06/26/2024 03:25:00 PM [facility] ([facility])  Address: [address]  [facility]  [address]  tel:   CO2 [LOINC: 2028-9] 19.0 mmol/L [Low] 06/26/2024 03:25:00 PM [facility] ([facility])  Address: [address]  [facility]  [address]  tel:   ANION GAP [LOINC: 33037-3] 9 mmol/L 06/26/2024 03:25:00 PM [facility] ([facility])  Address: [address]  [facility]  [address]  tel:   BUN [LOINC: 6299-2] 28 mg/dL [High] 06/26/2024 03:25:00 PM [facility] ([facility])  Address: [address]  [facility]  [address]  tel:   CREATININE [LOINC: 34114-4] 1.49 mg/dL [High] 06/26/2024 03:25:00 PM [facility] ([facility])  Address: [address]  [facility]  [address]  tel:   BUN/ CREAT RATIO [LOINC: 3097-3] 18.8 06/26/2024 03:25:00 PM [facility] ([facility])  Address: [address]  [facility]  [address]  tel:   CALCIUM [LOINC: 45762-6] 9.4 mg/dL 06/26/2024 03:25:00 PM [facility] ([facility])  Address: [address]  [facility]  [address]  tel:   OSMOLALITY CALCULATED [LOINC: 30612-7] 291 mOsm/kg 06/26/2024 03:25:00 PM [facility] ([facility])  Address: [address]  [facility]  [address]  tel:   E GFR CR [LOINC: 99585-3] 35 mL/min/1.73m2 [Low] 06/26/2024 03:25:00 PM [facility]  ([facility])  Address: [address]  [facility]  [address]  tel:   FASTING PATIENT BMP ANSWER [LOINC: 84638-2] No 06/26/2024 03:25:00 PM [facility] ([facility])  Address: [address]  [facility]  [address]  tel:   BMP (Basic Metabolic Panel) [LOINC: 08879-9] Pending 1 Week     Lower Extremity Venous Ultrasound 1.The study quality is average. Some limitations secondary to patient discomfort, postioning.2.Normal compressibility, augmentation and phasic flow in bilateral lower extremity venous system.3.No evidence of deep venous thrombus or superficial venous thrombus in the bilateral lower extremity venous system.4.Exam performed with the patient in reverse Trendelenburg position.5.Patient states history of vein ablation bilaterally.6.The right GSV appears to be surgically absent/ablated.7.There is reflux noted in the left GSV. Given patient's history of ablation, this vessel may be a branch or recanlization.8.The SSV's appear to originate from outside of the fascia bilaterally.9.There is no reflux in the right SSV.10.There is reflux in the left SSV at prox only.11.There is a tortuous vessel within the fascia in the area of the right prox thigh, possible a  or recanalization: 2.8 mm with 2.5 sec of reflux.12.This vessel branches out of the fascia and forms a tortuous vessel by mid thigh: 2.4 mm with 1.9 sec of reflux.13.This vessel continues as a varicosity down the medial right lower extremity.14.There is a  in the right distal calf: 2.2 mm with no observed reflux.15.There is a  in the right mid calf: 2.9 mm with no observed reflux.16.There is a  in the right prox calf: 2.7 mm with no observed reflux.17.There is a small varicosity in the right anterior mid calf: 2.1 mm, however secondary to the small diameter and the superficial positioning of the vessel, reflux was not well documented. 18.There is a varicosity in the left prox  calf: 3.5 mm with 2.1 sec of reflux. This varicosity continues down the left medial calf. 5/23/2023 3:00:00 PM Jose Alberto Mayes MD   Trans Thoracic Echocardiogram 1.The left ventricle is normal in size. Moderate asymmetric septal left ventricular hypertrophy. Global left ventricular systolic function is borderline normal. The left ventricular ejection fraction is 45-50%. The left ventricle diastolic function is impaired (Grade I) with normal left atrial pressure. 2.A bio prosthetic valve is noted at the level of the aortic valve. The trans-aortic mean gradient is 18.0 mmHg. Mild aortic regurgitation.3.Mild calcification of the mitral valve. Mobility of the anterior and posterior mitral leaflet is mildly decreased. Mitral stenosis is mild. The mean trans mitral gradient is 5.0 mmHg. Moderate to severe mitral regurgitation.4.Moderate tricuspid regurgitation.5.The estimated pulmonary artery systolic pressure is 36 mmHg assuming a right atrial pressure of 3 mmHg. 6.The left atrial diameter is moderately increased. 7.The study quality is good. 8.SHAWNA to further evalaute MR 2/16/2024 12:30:00 PM Jose Alberto Mayes MD   Ambulatory Telemetry Monitor 1.This is a good quality study. 2.Predominant rhythm is normal sinus rhythm. 3.The minimum heart rate recorded was 50 beats / minute. The maximum heart rate is 146 beats / minute. The mean heart rate is 79 beats / minute. 4.No evidence of AV block is noted. 5.Rare premature atrial contractions noted. 6.Rare premature ventricular contractions noted. 7.No evidence of ventricular tachycardia is noted.8.No pauses were noted. 9.Afib 0% burden 10.Unremarkable except couple of runs of AT/SVT 12/22/2022 10:15:00 AM Jose Alberto Mayes MD     REVIEW OF SYSTEMS    REVIEW OF SYSTEMS  Header Details   Cardiovascular No history of Chest pain, Palpitations, Edema   Respiratory No history of SOB   Neurological No history of Numbness     SOCIAL HISTORY    SOCIAL HISTORY  Social History Element Description  Effective Dates   Smoking status Never smoked -     FUNCTIONAL STATUS    No data available    MEDICAL EQUIPMENT    No data available    Goals Sections    No data available    REASON FOR REFERRAL                       Health Concerns Section    No data available    COGNITIVE/MENTAL STATUS    No data available    Patient Demographics    Patient Demographics  Patient Address Patient Name Communication   [address] [place] [address] Armida Hobbs [phone] (Mobile)     Patient Demographics  Language Race / Ethnicity Marital Status   English - Spoken (Preferred) White / Not  or       Document Information    Primary Care Provider Other Service Providers Document Coverage Dates   Jose Alberto Mayes  NPI: [id number]  [phone] (Work)  [address] [facility], [address] [place] [address]  [place] [address]  Interpreting Physicians  [facility]  [phone] (Work)  [address] [facility] [place] [address] Michell Carter  NPI: [id number]  [phone] (Work)  [address] [facility], [address] [place] [address]  [place] [address]  Nurses     Marbella Hernandez  NPI: [id number]  [phone] (Work)  [address] [facility], [address] [place] [address]  [place] [address]  Nurses Aug. 13, 2024August 13, 2024      Organization   [facility]  [phone] (Work)  [address] [facility], [address] [place] [address]  [place] [address]     Encounter Providers Encounter Date    Aug. 13, 2024August 13, 2024     Legal Authenticator    Jose Alberto Mayes  NPI: [id number]  [phone] (Work)  [address] [facility], [address] [place] [address]  [place] [address]

## 2024-09-25 NOTE — PROGRESS NOTES
[facility]  part of [facility]    Progress Note    Armida Hobbs Patient Status:  Inpatient    1945 MRN [id number]   Location [facility] 3W/SW Attending Preeti John MD   Hosp Day # 1 PCP Presley Strange MD     Chief Complaint:     Acute Pulmonary Edema.    Subjective:   Subjective:    Patient seen and examined this afternoon  Appears comfortable  Hypertensive      Objective:   Blood pressure 155/78, pulse 95, temperature 97.8 °F (36.6 °C), temperature source Rectal, resp. rate 20, height 5' (1.524 m), weight 129 lb 8 oz (58.7 kg), SpO2 98%.  Physical Exam    General: Patient is alert answering questions  HEENT: EOMI PERRLA, atraumatic normocephalic  Cardiac: S1-S2 appreciated  Lungs: Good air entry bilaterally clear to auscultation  Abdomen: Soft nontender nondistended positive bowel sounds  Ext: Peripheral pulses are positive, b/l LE edema  Neuro: No focal deficits noted  Psych: Normal mood  Skin: No rashes noted  MSK: Full range of motion intact      Results:   Lab Results   Component Value Date    WBC 8.0 2024    HGB 12.5 2024    HCT 37.5 2024    .0 2024    CREATSERUM 1.40 (H) 2024    BUN 31 (H) 2024     (L) 2024    K 4.0 2024    K 4.0 2024     2024    CO2 22.0 2024     (H) 2024    CA 9.3 2024    ALB 3.6 2024    ALKPHO 360 (H) 2024    BILT 4.1 (H) 2024    TP 5.7 2024    AST 39 (H) 2024    ALT 63 (H) 2024    PTT 65.3 (H) 2022    INR 1.39 (H) 2023    T4F 0.8 2023    TSH 0.776 2024    LIP 39 2023    DDIMER 0.45 2022    ESRML 18 2024    CRP 2.00 (H) 2024    MG 2.5 2023    PHOS 4.7 2023    TROPHS 74 (HH) 2024    CK 29 (L) 2023    B12 1,735 (H) 2023       US VENOUS DOPPLER LEG BILAT - DIAG IMG (CPT=93970)    Result Date: 2024  CONCLUSION:  1. Negative for  sonographic evidence of deep venous thrombosis in either lower extremity.  2. Spectral Doppler waveforms suggest venous reflux.    Dictated by (CST): Chivo Rodriguez MD on 9/24/2024 at 9:22 PM     Finalized by (CST): Chivo Rodriguez MD on 9/24/2024 at 9:23 PM          XR CHEST AP PORTABLE  (CPT=71045)    Result Date: 9/24/2024  CONCLUSION:  1. Cardiomegaly and postprocedural changes of TAVR with diffuse pulmonary vascular congestion and mild, diffuse pulmonary interstitial edema.  2. Small pleural effusions and basilar atelectasis, with or without superimposed pneumonia.    Dictated by (CST): Chivo Rodriguez MD on 9/24/2024 at 8:08 PM     Finalized by (CST): Chivo Rodriguez MD on 9/24/2024 at 8:11 PM         EKG 12 Lead    Result Date: 9/25/2024  Sinus rhythm with Premature atrial complexes Possible Left atrial enlargement Left axis deviation Left bundle branch block Abnormal ECG When compared with ECG of 14-DEC-2023 04:58, No significant change was found     Assessment & Plan:       Acute pulmonary edema (HCC)  Acute on Chronic CHF  - Hx of diastolic and systolic CHF.   Last visit with her cardiology bumix increased to 1 mg BID.    - BNP >22,00  - Administered 40 mg IV lasix in ED.  Monitor strict I&Os.   - Cardiology consulted.  Await further recs.       CAD s/p multivessel PCI  - On asa 81 and DAPT (asa and plavix)  -  On rosuvastatin 20.       Hypertension  - On Bumex, Lisinopril 20.  Hold and monitor response to IV lasix.       CKD IV  - Baseline Creatinine 1.4-1.6     Medical history considered stable  - Iron deficiency anemia on ferrous sulfate  - Rheumatoid arthritis follows with rheumatology.    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   - Hx of aortic stenosis sp AVR in 2022     diet: general  dvt prophylaxis: heparin subq  code status: full code  dispo: home upon medical clearance    Global A/P  -appreciate Cardiology recs, continue IV diuresis.  -monitor strict Is and Os  -hyponatremia -  monitor.   -renal failure improved.   -BNP significantly elevated  -Reviewed previous consultant notes  -Reviewed CBC, BMP, Mag, and Phos  -Reviewed tests ordered  -Repeat labs in am  -MDM: High, severe exacerbation of chronic illness posing a threat to life. IV medications requiring close inpatient monitoring.           Preeti John MD  9/25/2024

## 2024-09-26 LAB
ANION GAP SERPL CALC-SCNC: 7 MMOL/L (ref 0–18)
BASOPHILS # BLD AUTO: 0.01 X10(3) UL (ref 0–0.2)
BASOPHILS NFR BLD AUTO: 0.2 %
BUN BLD-MCNC: 28 MG/DL (ref 9–23)
BUN/CREAT SERPL: 19.2 (ref 10–20)
CALCIUM BLD-MCNC: 9.4 MG/DL (ref 8.7–10.4)
CHLORIDE SERPL-SCNC: 102 MMOL/L (ref 98–112)
CO2 SERPL-SCNC: 28 MMOL/L (ref 21–32)
CREAT BLD-MCNC: 1.46 MG/DL
DEPRECATED RDW RBC AUTO: 58.3 FL (ref 35.1–46.3)
EGFRCR SERPLBLD CKD-EPI 2021: 36 ML/MIN/1.73M2 (ref 60–?)
EOSINOPHIL # BLD AUTO: 0.03 X10(3) UL (ref 0–0.7)
EOSINOPHIL NFR BLD AUTO: 0.5 %
ERYTHROCYTE [DISTWIDTH] IN BLOOD BY AUTOMATED COUNT: 17.7 % (ref 11–15)
GLUCOSE BLD-MCNC: 94 MG/DL (ref 70–99)
HCT VFR BLD AUTO: 38.9 %
HGB BLD-MCNC: 12.4 G/DL
IMM GRANULOCYTES # BLD AUTO: 0.01 X10(3) UL (ref 0–1)
IMM GRANULOCYTES NFR BLD: 0.2 %
LYMPHOCYTES # BLD AUTO: 1.35 X10(3) UL (ref 1–4)
LYMPHOCYTES NFR BLD AUTO: 20.9 %
MAGNESIUM SERPL-MCNC: 2 MG/DL (ref 1.6–2.6)
MCH RBC QN AUTO: 29.7 PG (ref 26–34)
MCHC RBC AUTO-ENTMCNC: 31.9 G/DL (ref 31–37)
MCV RBC AUTO: 93.3 FL
MONOCYTES # BLD AUTO: 0.46 X10(3) UL (ref 0.1–1)
MONOCYTES NFR BLD AUTO: 7.1 %
NEUTROPHILS # BLD AUTO: 4.61 X10 (3) UL (ref 1.5–7.7)
NEUTROPHILS # BLD AUTO: 4.61 X10(3) UL (ref 1.5–7.7)
NEUTROPHILS NFR BLD AUTO: 71.1 %
OSMOLALITY SERPL CALC.SUM OF ELEC: 289 MOSM/KG (ref 275–295)
PHOSPHATE SERPL-MCNC: 3.4 MG/DL (ref 2.4–5.1)
PLATELET # BLD AUTO: 172 10(3)UL (ref 150–450)
POTASSIUM SERPL-SCNC: 3.3 MMOL/L (ref 3.5–5.1)
POTASSIUM SERPL-SCNC: 4.3 MMOL/L (ref 3.5–5.1)
RBC # BLD AUTO: 4.17 X10(6)UL
SODIUM SERPL-SCNC: 137 MMOL/L (ref 136–145)
WBC # BLD AUTO: 6.5 X10(3) UL (ref 4–11)

## 2024-09-26 PROCEDURE — 99233 SBSQ HOSP IP/OBS HIGH 50: CPT | Performed by: HOSPITALIST

## 2024-09-26 RX ORDER — FUROSEMIDE 10 MG/ML
20 INJECTION INTRAMUSCULAR; INTRAVENOUS
Status: DISCONTINUED | OUTPATIENT
Start: 2024-09-26 | End: 2024-09-28

## 2024-09-26 RX ORDER — ATORVASTATIN CALCIUM 40 MG/1
40 TABLET, FILM COATED ORAL NIGHTLY
Status: DISCONTINUED | OUTPATIENT
Start: 2024-09-26 | End: 2024-09-28

## 2024-09-26 RX ORDER — POTASSIUM CHLORIDE 1500 MG/1
40 TABLET, EXTENDED RELEASE ORAL EVERY 4 HOURS
Status: COMPLETED | OUTPATIENT
Start: 2024-09-26 | End: 2024-09-26

## 2024-09-26 NOTE — PLAN OF CARE
Vital signs stable overnight, no complaints of pain. Patient had great urine output overnight but difficult to accurately measure due to patient's movement/purwick not catching. Continue to diurese. Plan PT/OT.    Problem: Patient Centered Care  Goal: Patient preferences are identified and integrated in the patient's plan of care  Description: Interventions:  - What would you like us to know as we care for you? I'm from home with   - Provide timely, complete, and accurate information to patient/family  - Incorporate patient and family knowledge, values, beliefs, and cultural backgrounds into the planning and delivery of care  - Encourage patient/family to participate in care and decision-making at the level they choose  - Honor patient and family perspectives and choices  Outcome: Progressing     Problem: Patient/Family Goals  Goal: Patient/Family Long Term Goal  Description: Patient's Long Term Goal: Feel better    Interventions:  - IV Lasix  - See additional Care Plan goals for specific interventions  Outcome: Progressing  Goal: Patient/Family Short Term Goal  Description: Patient's Short Term Goal: Go home    Interventions:   - IV Lasix  - See additional Care Plan goals for specific interventions  Outcome: Progressing     Problem: CARDIOVASCULAR - ADULT  Goal: Maintains optimal cardiac output and hemodynamic stability  Description: INTERVENTIONS:  - Monitor vital signs, rhythm, and trends  - Monitor for bleeding, hypotension and signs of decreased cardiac output  - Evaluate effectiveness of vasoactive medications to optimize hemodynamic stability  - Monitor arterial and/or venous puncture sites for bleeding and/or hematoma  - Assess quality of pulses, skin color and temperature  - Assess for signs of decreased coronary artery perfusion - ex. Angina  - Evaluate fluid balance, assess for edema, trend weights  Outcome: Progressing  Goal: Absence of cardiac arrhythmias or at baseline  Description:  INTERVENTIONS:  - Continuous cardiac monitoring, monitor vital signs, obtain 12 lead EKG if indicated  - Evaluate effectiveness of antiarrhythmic and heart rate control medications as ordered  - Initiate emergency measures for life threatening arrhythmias  - Monitor electrolytes and administer replacement therapy as ordered  Outcome: Progressing     Problem: SAFETY ADULT - FALL  Goal: Free from fall injury  Description: INTERVENTIONS:  - Assess pt frequently for physical needs  - Identify cognitive and physical deficits and behaviors that affect risk of falls.  - [place] fall precautions as indicated by assessment.  - Educate pt/family on patient safety including physical limitations  - Instruct pt to call for assistance with activity based on assessment  - Modify environment to reduce risk of injury  - Provide assistive devices as appropriate  - Consider OT/PT consult to assist with strengthening/mobility  - Encourage toileting schedule  Outcome: Progressing     Problem: Altered Communication/Language Barrier  Goal: Patient/Family is able to understand and participate in their care  Description: Interventions:  - Assess communication ability and preferred communication style  - Implement communication aides and strategies  - Use visual cues when possible  - Listen attentively, be patient, do not interrupt  - Minimize distractions  - Allow time for understanding and response  - Establish method for patient to ask for assistance (call light)  - Provide an  as needed  - Communicate barriers and strategies to overcome with those who interact with patient  Outcome: Progressing

## 2024-09-26 NOTE — CM/SW NOTE
09/26/24 1200   CM/SW Referral Data   Referral Source Social Work (self-referral)   Reason for Referral Discharge planning   Informant Daughter   Medical Hx   Does patient have an established PCP? Yes  (Presley Strange)   Patient Info   Patient's Current Mental Status at Time of Assessment Alert;Oriented   Patient's Home Environment House   Number of Levels in Home 3   Number of Stair in Home 8 steps between levels, 1 to enter   Patient lives with Spouse/Significant other;Sibling   Patient Status Prior to Admission   Independent with ADLs and Mobility No   Pt. requires assistance with Ambulating   Services in place prior to admission DME/Supplies at home   Type of DME/Supplies Straight Cane;Standard Walker;Wheelchair;Grab Bars   Discharge Needs   Anticipated D/C needs Home health care   Choice of Post-Acute Provider   Informed patient of right to choose their preferred provider Yes   List of appropriate post-acute services provided to patient/family with quality data Yes   Patient/family choice pending   Information given to Daughter   Residential HHC/Hospice financial disclosure given Yes       SW self referred pt for DC Planning after being informed by PT Itzel of Anticipated therapy need: Home with Home Healthcare    HH referrals sent in Aidin. F2F entered/sent.    SLY met w/ pt at bedside. Pt requesting SW speak to her dtr Kianna.    SW spoke to pt's dtr Kianna via phone. Above assessment completed.  Pt lives w/ her  and sibling in a home w/ 3 levels. Pt has approx 8 steps between each level and 1 step to enter.    Pt has above DME and primarily uses a cane for ambulation. Per pt's dtr, they have railings/grab bars along the stairs in the home.    Pt has hx w/ Residential HH RN services. Pt's dtr is agreeable to  services again. SLY discussed referral process, HH RN/PT services, and next steps.    Per request,  list of quality data emailed to pt's dtr at: [email or web address] F/[email or web address] instructions once  choice is selected provided to pt's dtr along w/ SW's name and direct phone #.      PLAN: Home w/ HH - pending choice & med clear      SW/CM to remain available for support and/or discharge planning.       MS ZoyaW, LSW [id number]

## 2024-09-26 NOTE — PLAN OF CARE
Patient alert and oriented x 2 on 2LNC. Patient denies pain. Plan to continue diuresing, strict I/Os, and monitoring kidney function. Call light within reach.    Problem: Patient Centered Care  Goal: Patient preferences are identified and integrated in the patient's plan of care  Description: Interventions:  - What would you like us to know as we care for you? Patient is from home with .  - Provide timely, complete, and accurate information to patient/family  - Incorporate patient and family knowledge, values, beliefs, and cultural backgrounds into the planning and delivery of care  - Encourage patient/family to participate in care and decision-making at the level they choose  - Honor patient and family perspectives and choices  Outcome: Progressing     Problem: Patient/Family Goals  Goal: Patient/Family Long Term Goal  Description: Patient's Long Term Goal: To be discharged.     Interventions:  - Follow medical plan of care.   - See additional Care Plan goals for specific interventions  Outcome: Progressing  Goal: Patient/Family Short Term Goal  Description: Patient's Short Term Goal: To reduce edema.     Interventions:   - Medication administration, monitoring intake and output.  - See additional Care Plan goals for specific interventions  Outcome: Progressing     Problem: CARDIOVASCULAR - ADULT  Goal: Maintains optimal cardiac output and hemodynamic stability  Description: INTERVENTIONS:  - Monitor vital signs, rhythm, and trends  - Monitor for bleeding, hypotension and signs of decreased cardiac output  - Evaluate effectiveness of vasoactive medications to optimize hemodynamic stability  - Monitor arterial and/or venous puncture sites for bleeding and/or hematoma  - Assess quality of pulses, skin color and temperature  - Assess for signs of decreased coronary artery perfusion - ex. Angina  - Evaluate fluid balance, assess for edema, trend weights  Outcome: Progressing  Goal: Absence of cardiac arrhythmias  or at baseline  Description: INTERVENTIONS:  - Continuous cardiac monitoring, monitor vital signs, obtain 12 lead EKG if indicated  - Evaluate effectiveness of antiarrhythmic and heart rate control medications as ordered  - Initiate emergency measures for life threatening arrhythmias  - Monitor electrolytes and administer replacement therapy as ordered  Outcome: Progressing     Problem: SAFETY ADULT - FALL  Goal: Free from fall injury  Description: INTERVENTIONS:  - Assess pt frequently for physical needs  - Identify cognitive and physical deficits and behaviors that affect risk of falls.  - [place] fall precautions as indicated by assessment.  - Educate pt/family on patient safety including physical limitations  - Instruct pt to call for assistance with activity based on assessment  - Modify environment to reduce risk of injury  - Provide assistive devices as appropriate  - Consider OT/PT consult to assist with strengthening/mobility  - Encourage toileting schedule  Outcome: Progressing     Problem: Altered Communication/Language Barrier  Goal: Patient/Family is able to understand and participate in their care  Description: Interventions:  - Assess communication ability and preferred communication style  - Implement communication aides and strategies  - Use visual cues when possible  - Listen attentively, be patient, do not interrupt  - Minimize distractions  - Allow time for understanding and response  - Establish method for patient to ask for assistance (call light)  - Provide an  as needed  - Communicate barriers and strategies to overcome with those who interact with patient  Outcome: Progressing

## 2024-09-26 NOTE — PHYSICAL THERAPY NOTE
PHYSICAL THERAPY EVALUATION - INPATIENT     Room Number: 310/310-A  Evaluation Date: 9/26/2024  Type of Evaluation: Initial   Physician Order: PT Eval and Treat    Presenting Problem: acute pulmonary edema, BLE edema, wheezing and hypoxia  Co-Morbidities : cad,chf,ckd,aortic stenosis s/p TAVR,afib,htn  Reason for Therapy: Mobility Dysfunction and Discharge Planning    PHYSICAL THERAPY ASSESSMENT   Patient is a 78 year old female admitted 9/24/2024 for SOB and BLE edema, wheezing and hypoxia.  Prior to admission, patient's baseline is independent with no device at home, cane or RW in community. She lives with her  in a mulit-level home and has 8 steps between levels. She does some cooking and cleaning..  Patient is currently functioning near baseline with bed mobility, transfers, gait, stair negotiation, maintaining seated position, and standing prolonged periods.  Patient is requiring minimal assist as a result of the following impairments: decreased endurance/aerobic capacity, decreased muscular endurance, and medical status.  Physical Therapy will continue to follow for duration of hospitalization.    Patient will benefit from continued skilled PT Services at discharge to promote prior level of function and safety with additional support and return home with home health PT.    PLAN  PT Treatment Plan: Bed mobility;Endurance;Energy conservation;Patient education;Family education;Gait training;Stair training;Transfer training  Rehab Potential : Good  Frequency (Obs): 5x/week    PHYSICAL THERAPY MEDICAL/SOCIAL HISTORY   History related to current admission: 78 year old with hx of CAD s/p multivessel PCI, CHF, CKD IV, Severe aoritic stenosis s/p TAVR, paroxysmal A fib, HTN, hx of mitral valve endocarditis with severe mitral regurgitation who present for SOB.  Symptoms have progressed over the past 1 week.  LE swelling reported.  Is on bumex 1 mg BID but symptoms have note improved.      In the ED blood pressure  129/78, pulse 97 afebrile.  Chest x-ray revealed cardiomegaly and postprocedural changes of TAVR with diffuse pulmonary vascular congestion and mild diffuse pulmonary interstitial edema.  Small pleural effusions.  Creatinine 1.69.  proBNP 22,911.  Ultrasound completed and negative for DVT.     Problem List  Principal Problem:    Acute pulmonary edema (HCC)  Active Problems:    Hypoxia      HOME SITUATION  Home Situation  Type of Home: House  Home Layout: Two level (8 stairs between floors)  Stairs to Enter : 4  Railing: Yes  Stairs to Bedroom: 8  Railing: Yes  Lives With: Spouse (supportivel daughter in the area)  Drives: No  Patient Owned Equipment: Rolling walker;Cane (no device used at home, uses cane or walker when out of home)  Patient Regularly Uses: Glasses     Prior Level of [place]: Patient lives in a mulit level home with her spouse. She is normally independent at home without a device but does use a cane prn, uses cane or walker in the community.     SUBJECTIVE  \"I can walk\"    PHYSICAL THERAPY EXAMINATION   OBJECTIVE  Precautions: Bed/chair alarm  Fall Risk: Standard fall risk    WEIGHT BEARING RESTRICTION  Weight Bearing Restriction: None                PAIN ASSESSMENT  Ratin  Location: no complaints of pain  Management Techniques: Activity promotion    COGNITION  Overall Cognitive Status:  A&Ox2-3, forgetful    RANGE OF MOTION AND STRENGTH ASSESSMENT  Upper extremity ROM and strength are within functional limits   Lower extremity ROM is within functional limits   Lower extremity strength is within functional limits     BALANCE  Static Sitting: Normal  Dynamic Sitting: Good  Static Standing: Fair +  Dynamic Standing: Poor +    ACTIVITY TOLERANCE  Pulse: 81  Heart Rate Source: Monitor     BP: (!) 110/92  BP Location: Right arm  BP Method: Automatic  Patient Position: Semi-[place]    O2 WALK  Oxygen Therapy  SPO2% on Room Air at Rest: 97  SPO2% Ambulation on Room Air: 98    AM-PAC '6-Clicks'  INPATIENT SHORT FORM - BASIC MOBILITY  How much difficulty does the patient currently have...  Patient Difficulty: Turning over in bed (including adjusting bedclothes, sheets and blankets)?: A Little   Patient Difficulty: Sitting down on and standing up from a chair with arms (e.g., wheelchair, bedside commode, etc.): A Little   Patient Difficulty: Moving from lying on back to sitting on the side of the bed?: A Little   How much help from another person does the patient currently need...   Help from Another: Moving to and from a bed to a chair (including a wheelchair)?: A Little   Help from Another: Need to walk in hospital room?: A Little   Help from Another: Climbing 3-5 steps with a railing?: A Little     AM-PAC Score:  Raw Score: 18   Approx Degree of Impairment: 46.58%   Standardized Score (AM-PAC Scale): 43.63   CMS Modifier (G-Code): CK    FUNCTIONAL ABILITY STATUS  Functional Mobility/Gait Assessment  Gait Assistance: Contact guard assist;Supervision  Distance (ft): 125'x2  Assistive Device: Rolling walker  Pattern: Within Functional Limits  Stairs: Stairs  How Many Stairs: 4  Device: 1 Rail  Assist: Minimal assist  Pattern: Ascend and Descend  Ascend and Descend : Step to  Rolling: supervision  Supine to Sit: supervision  Sit to Supine: supervision  Sit to Stand: supervision    Exercise/Education Provided:  Bed mobility  Energy conservation  Functional activity tolerated  Gait training  Posture  Strengthening  Transfer training  Stair training    Skilled Therapy Provided: RN approves participation in therapy session. Patient presents in bed and agreeable to therapy. She is alert and oritented, motivated to get moving but is forgetful during session and needing cues. She is needing hands on assist for stair navigation but otherwise with RW is supervision level. She is encouraged to be up moving with staff to bathroom and up to chair during the day as tolerated but needs to elevate BLE. She is agreeable, RN  is aware.    The patient's Approx Degree of Impairment: 46.58% has been calculated based on documentation in the [facility] '6 clicks' Inpatient Basic Mobility Short Form.  Research supports that patients with this level of impairment may benefit from home with HHPT and increased assistance and this is the recommendation..  Final disposition will be made by interdisciplinary medical team.    Patient End of Session: Up in chair;With  staff;Needs met;Call light within reach;RN aware of session/findings;All patient questions and concerns addressed;Alarm set;Discussed recommendations with /    CURRENT GOALS  Goals to be met by: 10/3/24  Patient Goal Patient's self-stated goal is: to go home   Goal #1 Patient is able to demonstrate supine - sit EOB @ level: modified independent     Goal #1   Current Status    Goal #2 Patient is able to demonstrate transfers Sit to/from Stand at assistance level: modified independent with none     Goal #2  Current Status    Goal #3 Patient is able to ambulate 150 feet with assist device: cane - straight at assistance level: modified independent   Goal #3   Current Status    Goal #4 Patient will negotiate 8 stairs/one curb w/ assistive device and supervision   Goal #4   Current Status    Goal #5 Patient to demonstrate independence with home activity/exercise instructions provided to patient in preparation for discharge.   Goal #5   Current Status    Goal #6    Goal #6  Current Status      Patient Evaluation Complexity Level:  History High - 3 or more personal factors and/or co-morbidities   Examination of body systems High - addressing a total of 4 or more elements   Clinical Presentation  Moderate - Evolving   Clinical Decision Making  Moderate Complexity     Therapeutic Activity:  28 minutes

## 2024-09-26 NOTE — DIETARY NOTE
DIETITIAN NOTE     Received consult for \"HF diet.\" Pt A&Ox2. Diet education not appropriate at this time. RD will clear consult and remain available for further nutrition interventions as needed. To note, pt/ has been educated by ELAINA here at [facility] x2 last year with handouts provided. At that time,  was preparing pt's meals at home.       Tori Nye RD, LDN  Clinical Dietitian  P: [phone]

## 2024-09-26 NOTE — PLAN OF CARE
Pt. Armida is A&Ox 4. Lives at home with  . Patient is ambulating using SBA. Patient isn't experiencing pain. Continent of bowel and incontinent of bladder. Tita. Seen by PT today, note in chart. IV lasix given. Call light within reach, fall precautions maintained.    Plan is IV lasix BID and OT eval.    Problem: Patient Centered Care  Goal: Patient preferences are identified and integrated in the patient's plan of care  Description: Interventions:  - What would you like us to know as we care for you? I live at home with my   - Provide timely, complete, and accurate information to patient/family  - Incorporate patient and family knowledge, values, beliefs, and cultural backgrounds into the planning and delivery of care  - Encourage patient/family to participate in care and decision-making at the level they choose  - Honor patient and family perspectives and choices  Outcome: Progressing     Problem: CARDIOVASCULAR - ADULT  Goal: Maintains optimal cardiac output and hemodynamic stability  Description: INTERVENTIONS:  - Monitor vital signs, rhythm, and trends  - Monitor for bleeding, hypotension and signs of decreased cardiac output  - Evaluate effectiveness of vasoactive medications to optimize hemodynamic stability  - Monitor arterial and/or venous puncture sites for bleeding and/or hematoma  - Assess quality of pulses, skin color and temperature  - Assess for signs of decreased coronary artery perfusion - ex. Angina  - Evaluate fluid balance, assess for edema, trend weights  Outcome: Progressing  Goal: Absence of cardiac arrhythmias or at baseline  Description: INTERVENTIONS:  - Continuous cardiac monitoring, monitor vital signs, obtain 12 lead EKG if indicated  - Evaluate effectiveness of antiarrhythmic and heart rate control medications as ordered  - Initiate emergency measures for life threatening arrhythmias  - Monitor electrolytes and administer replacement therapy as ordered  Outcome:  Progressing     Problem: SAFETY ADULT - FALL  Goal: Free from fall injury  Description: INTERVENTIONS:  - Assess pt frequently for physical needs  - Identify cognitive and physical deficits and behaviors that affect risk of falls.  - [place] fall precautions as indicated by assessment.  - Educate pt/family on patient safety including physical limitations  - Instruct pt to call for assistance with activity based on assessment  - Modify environment to reduce risk of injury  - Provide assistive devices as appropriate  - Consider OT/PT consult to assist with strengthening/mobility  - Encourage toileting schedule  Outcome: Progressing     Problem: Altered Communication/Language Barrier  Goal: Patient/Family is able to understand and participate in their care  Description: Interventions:  - Assess communication ability and preferred communication style  - Implement communication aides and strategies  - Use visual cues when possible  - Listen attentively, be patient, do not interrupt  - Minimize distractions  - Allow time for understanding and response  - Establish method for patient to ask for assistance (call light)  - Provide an  as needed  - Communicate barriers and strategies to overcome with those who interact with patient  Outcome: Progressing

## 2024-09-26 NOTE — PROGRESS NOTES
[facility]  part of [facility]    Progress Note    Armida Hobbs Patient Status:  Inpatient    1945 MRN [id number]   Location [facility] 3W/SW Attending Preeti John MD   Hosp Day # 2 PCP Presley Strange MD     Chief Complaint:     Acute Pulmonary Edema.    Subjective:   Subjective:    Patient seen and examined this afternoon  Appears comfortable  BP improved  No acute complaints      Objective:   Blood pressure (!) 110/92, pulse 81, temperature 97.5 °F (36.4 °C), temperature source Oral, resp. rate 18, height 5' (1.524 m), weight 123 lb 1.6 oz (55.8 kg), SpO2 97%.  Physical Exam    General: Patient is alert answering questions  HEENT: EOMI PERRLA, atraumatic normocephalic  Cardiac: S1-S2 appreciated  Lungs: Good air entry bilaterally clear to auscultation  Abdomen: Soft nontender nondistended positive bowel sounds  Ext: Peripheral pulses are positive, b/l LE edema  Neuro: No focal deficits noted  Psych: Normal mood  Skin: No rashes noted  MSK: Full range of motion intact      Results:   Lab Results   Component Value Date    WBC 6.5 2024    HGB 12.4 2024    HCT 38.9 2024    .0 2024    CREATSERUM 1.46 (H) 2024    BUN 28 (H) 2024     2024    K 3.3 (L) 2024     2024    CO2 28.0 2024    GLU 94 2024    CA 9.4 2024    ALB 3.6 2024    ALKPHO 360 (H) 2024    BILT 4.1 (H) 2024    TP 5.7 2024    AST 39 (H) 2024    ALT 63 (H) 2024    PTT 65.3 (H) 2022    INR 1.39 (H) 2023    T4F 0.8 2023    TSH 0.776 2024    LIP 39 2023    DDIMER 0.45 2022    ESRML 18 2024    CRP 2.00 (H) 2024    MG 2.0 2024    PHOS 3.4 2024    TROPHS 74 (HH) 2024    CK 29 (L) 2023    B12 1,735 (H) 2023       US VENOUS DOPPLER LEG BILAT - DIAG IMG (CPT=93970)    Result Date: 2024  CONCLUSION:  1. Negative for  sonographic evidence of deep venous thrombosis in either lower extremity.  2. Spectral Doppler waveforms suggest venous reflux.    Dictated by (CST): Chivo Rodriguez MD on 9/24/2024 at 9:22 PM     Finalized by (CST): Chivo Rodriguez MD on 9/24/2024 at 9:23 PM          XR CHEST AP PORTABLE  (CPT=71045)    Result Date: 9/24/2024  CONCLUSION:  1. Cardiomegaly and postprocedural changes of TAVR with diffuse pulmonary vascular congestion and mild, diffuse pulmonary interstitial edema.  2. Small pleural effusions and basilar atelectasis, with or without superimposed pneumonia.    Dictated by (CST): Chivo Rodriguez MD on 9/24/2024 at 8:08 PM     Finalized by (CST): Chivo Rodriguez MD on 9/24/2024 at 8:11 PM         EKG 12 Lead    Result Date: 9/25/2024  Sinus rhythm with Premature atrial complexes Possible Left atrial enlargement Left axis deviation Left bundle branch block Abnormal ECG When compared with ECG of 14-DEC-2023 04:58, No significant change was found Confirmed by DALLAS HERNANDEZ, MARY ([id number]) on 9/25/2024 4:47:46 PM     Assessment & Plan:       Acute pulmonary edema (HCC)  Acute on Chronic CHF  - Hx of diastolic and systolic CHF.   Last visit with her cardiology bumix increased to 1 mg BID.    - BNP >22,00  - Administered 40 mg IV lasix in ED.  Monitor strict I&Os.   - Cardiology consulted.  Await further recs.       CAD s/p multivessel PCI  - On asa 81 and DAPT (asa and plavix)  -  On rosuvastatin 20.       Hypertension  - On Bumex, Lisinopril 20.  Hold and monitor response to IV lasix.       CKD IV  - Baseline Creatinine 1.4-1.6     Medical history considered stable  - Iron deficiency anemia on ferrous sulfate  - Rheumatoid arthritis follows with rheumatology.    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   - Hx of aortic stenosis sp AVR in 2022     diet: general  dvt prophylaxis: heparin subq  code status: full code  dispo: home upon medical clearance    Global A/P  -appreciate Cardiology recs, continue  IV diuresis.  -monitor strict Is and Os  -hyponatremia -improved.   -hypokalemia continue with current replacement protocol  -BNP significantly elevated  -Reviewed previous consultant notes  -Reviewed CBC, BMP, Mag, and Phos  -Reviewed tests ordered  -Repeat labs in am  -MDM: High, severe exacerbation of chronic illness posing a threat to life. IV medications requiring close inpatient monitoring.           Preeti John MD

## 2024-09-26 NOTE — PROGRESS NOTES
Progress Note  Armida Hobbs Patient Status:  Inpatient    1945 MRN [id number]   Location [facility] 3W/SW Attending Preeti John MD   Hosp Day # 2 PCP Presley Strange MD     Primary Cardiologist: Sugey     SUBJECTIVE:    Reports that dyspnea is improved. Denies dyspnea on exertion. LE swelling is more than baseline. Orthopnea slightly improved last night.     VITALS:  BP 95/63 (BP Location: Left arm)   Pulse 79   Temp 98.5 °F (36.9 °C) (Oral)   Resp 23   Ht 5' (1.524 m)   Wt 123 lb 1.6 oz (55.8 kg)   SpO2 96%   BMI 24.04 kg/m²     INTAKE/OUTPUT:    Intake/Output Summary (Last 24 hours) at 2024 181  Last data filed at 2024 1639  Gross per 24 hour   Intake 1230 ml   Output 700 ml   Net 530 ml     Last 3 Weights   24 0644 123 lb 1.6 oz (55.8 kg)   24 0328 129 lb 8 oz (58.7 kg)   24 2217 129 lb 1.6 oz (58.6 kg)   24 1851 120 lb (54.4 kg)   24 0938 126 lb 9.6 oz (57.4 kg)   24 1651 135 lb (61.2 kg)     LABS:  Recent Labs   Lab 2444 24  0703 24  1553   * 132* 94  --    BUN 35* 31* 28*  --    CREATSERUM 1.69* 1.40* 1.46*  --    EGFRCR 30* 38* 36*  --    CA 9.5 9.3 9.4  --    * 133* 137  --    K 3.6 4.0  4.0 3.3* 4.3   CL 96* 101 102  --    CO2 26.0 22.0 28.0  --      Recent Labs   Lab 24  0744 24  0703   RBC 4.05 4.11 4.17   HGB 12.1 12.5 12.4   HCT 37.3 37.5 38.9   MCV 92.1 91.2 93.3   MCH 29.9 30.4 29.7   MCHC 32.4 33.3 31.9   RDW 17.2* 17.3* 17.7*   NEPRELIM 5.29 5.45 4.61   WBC 8.5 8.0 6.5   .0 195.0 172.0     No results for input(s): \"TROP\", \"CK\" in the last 168 hours.    DIAGNOSTICS:    TELEMETRY: SR    SHAWNA 24:   Conclusions:   1. Left ventricle: Systolic function was normal. The estimated ejection      fraction was 55-60%, by visual assessment.   2. Aortic valve: A bioprosthetic valve is present. The leaflets were normal      thickness. There was no  evidence of a vegetation.   3. Mitral valve: The annulus was moderately calcified. There was no evidence      of vegetation. There was moderate regurgitation, directed centrally.      There was no pulmonary vein systolic flow blunting or reversal.   4. Left atrium: The atrium was dilated. There is no evidence of a thrombus      in the atrial cavity or appendage.   5. Tricuspid valve: There was no evidence of a vegetation. There was      mild-moderate regurgitation.   6. Pulmonic valve: There was no evidence of a vegetation.     ROS: Negative unless noted above     PHYSICAL EXAM:  General: Alert and oriented x 3. No apparent distress.  HEENT: Normocephalic, sclera are nonicteric. Hearing appropriate bilaterally.  Neck: No JVD or Carotid bruits. Trachea midline.   Cardiac: Regular rate and rhythm. S1, S2 auscultated. No murmurs, rubs, or gallops appreciated.   Lungs: Clear anteriorly. + bi-basilar crackles. Chest expansion symmetrical. Regular effort.  Abdomen: Soft, non-tender, +BS. No hepatosplenomegaly or appreciable masses.   Extremities: Without clubbing, cyanosis. +1 pitting edema BLE that extends from feet to mid shin. Peripheral pulses are 2+.  Neurologic: Motor and sensory nerves grossly intact.   Psych: Appropriate affect   Skin: Warm and dry. No obvious lesions, wounds, or ulcerations.     MEDICATIONS:   furosemide  20 mg Intravenous BID (Diuretic)    losartan  25 mg Oral Daily    carvedilol  3.125 mg Oral BID with meals    heparin  5,000 Units Subcutaneous 2 times per day    aspirin  81 mg Oral Daily    clopidogrel  75 mg Oral Daily    mycophenolate mofetil  1,000 mg Oral BID    pantoprazole  20 mg Oral QAM AC    predniSONE  2.5 mg Oral BID    gabapentin  100 mg Oral TID     ASSESSMENT:    Acute on Chronic HFpEF  - proBNP 22,911, CXR mild edema  - IV lasix yesterday, none today with borderline Bps, o/p diuretics were held for some time with worsening renal function   - Appears volume expanded     CKD  III  - Renal function near baseline     S/p TAVR  Hx Mitral Valve Endocarditis with Severe MR  - s/p ABX in the past, repeat SHAWNA June 2024 with no endocarditis, vegetation, and improved to moderate MR     HLD- , not historically on statin, enzymes stable   LE Edema- US without DVT  Elevated Troponin- EKG stable, no ischemic symptoms, Flat trend, like type II demand ischemia  Hypothyroidism- TSH in July unremarkable, Synthroid   Type II DM- A1c 6.5%   Bullous Pemphigus     PLAN:  - Did not receive Lasix today, give 20 mg IVP now, if pressures do not improve will consider midodrine vs pressors in the interm  - Per attending will wait until diuresis more to complete ECHO to re-evaluate LVEF, wall motion, and valvular functioning   - Start Lipitor 40 mg   - Will need close follow up with [facility]     Plan of care discussed with patient and RN.     Lashaun Meyers, APRN  9/26/2024  6:11 PM  [phone] ([place])  [phone] ([place])

## 2024-09-26 NOTE — OCCUPATIONAL THERAPY NOTE
OCCUPATIONAL THERAPY EVALUATION - INPATIENT     Room Number: 310/310-A  Evaluation Date: 9/26/2024  Type of Evaluation: Initial  Presenting Problem: acute pulmonary edema    Physician Order: IP Consult to Occupational Therapy  Reason for Therapy: ADL/IADL Dysfunction and Discharge Planning    OCCUPATIONAL THERAPY ASSESSMENT   Patient is a 78 year old female admitted 9/24/2024 for sob,ble swelling.  Prior to admission, patient was living w/ her  in a multi level home;1 stair to enter and 8 stairs between floors. Pt reports being mod I for adls and ambulation w/ a cane.  Patient is currently functioning near baseline with adls and functional mobility.  Patient is requiring min/contact guard assist as a result of the following impairments: decreased endurance and cognitive deficits (forgetfulness). Occupational Therapy will continue to follow for duration of hospitalization.    Patient will benefit from continued skilled OT Services at discharge to promote prior level of function and safety with additional support and return home with home health OT    PLAN  OT Treatment Plan: Patient/Family training;Patient/Family education;Endurance training;Functional transfer training;ADL training  OT Device Recommendations: TBD    OCCUPATIONAL THERAPY MEDICAL/SOCIAL HISTORY   Problem List  Principal Problem:    Acute pulmonary edema (HCC)  Active Problems:    Hypoxia    HOME SITUATION  Type of Home: House  Home Layout: Two level (8 stairs between floors)  Lives With: Spouse (supportivel daughter in the area)  Shower/Tub and Equipment: Walk-in shower  Other Equipment: -- (cane)  Drives: No  Patient Regularly Uses: Glasses    Stairs in Home: 1 stair to enter;8 between floors  Use of Assistive Device(s): cane    Prior Level of [place]:  Prior to admission, patient was living w/ her  in a multi level home;1 stair to enter and 8 stairs between floors. Pt reports being mod I for adls and ambulation w/ a  cane.    SUBJECTIVE  \"I used to cook everyday, no my son says to just order out\"    OCCUPATIONAL THERAPY EXAMINATION    OBJECTIVE  Precautions: Bed/chair alarm  Fall Risk: Standard fall risk    PAIN ASSESSMENT  Ratin    ACTIVITY TOLERANCE  Good  /92 supine; 100/72 w/ activity  HR 83    O2 SATURATIONS  Activity on room air    Behavioral/Emotional/Social: pleasant,cooperative,forgetful    RANGE OF MOTION   Upper extremity ROM is within functional limits     STRENGTH ASSESSMENT  Upper extremity strength is within functional limits     ACTIVITIES OF DAILY LIVING ASSESSMENT  AM-PAC ‘6-Clicks’ Inpatient Daily Activity Short Form  How much help from another person does the patient currently need…  -   Putting on and taking off regular lower body clothing?: A Lot  -   Bathing (including washing, rinsing, drying)?: A Lot  -   Toileting, which includes using toilet, bedpan or urinal? : A Little  -   Putting on and taking off regular upper body clothing?: A Little  -   Taking care of personal grooming such as brushing teeth?: A Little  -   Eating meals?: None    AM-PAC Score:  Score: 17  Approx Degree of Impairment: 50.11%  Standardized Score (AM-PAC Scale): 37.26  CMS Modifier (G-Code): CK    FUNCTIONAL ADL ASSESSMENT  Eating: independent  Grooming: setup assist  UB Dressing: min assist  LB Dressing: min assist  Toileting: setup assist    Skilled Therapy Provided: RN contacted prior to start of care. Treatment coordinated w/ PT. Pt agreeable to participation in therapy. Gait belt used during dynamic activity. Pt received in bed, alert and oriented, but forgetful. On initial contact pt transferred supine<>sit at eob w/ cga. Pt performing sit<>stand bed/toilet/chair w/ cga. Pt completed toleting task w/ set up. Pt required min a to manage le clothing. Pt maintained static standing at sink level w/ supervision to complete hand hygiene. Pt ambulating in the noel w/ rw and cga;vc for walker management. Pt ambulating in  the room w/ hha.     At end of session pt remaining up in chair w/ all needs in reach and alarm on. RN aware of pt's status and performance in therapy      EDUCATION PROVIDED  Patient: Role of Occupational Therapy; Plan of Care; Functional Transfer Techniques; Fall Prevention  Patient's Response to Education: Verbalized Understanding; Requires Further Education    The patient's Approx Degree of Impairment: 50.11% has been calculated based on documentation in the [facility] '6 clicks' Inpatient Daily Activity Short Form.  Research supports that patients with this level of impairment may benefit from return to home w/ HH.  Final disposition will be made by interdisciplinary medical team.     Patient End of Session: Up in chair;Needs met;Call light within reach;RN aware of session/findings;Alarm set;All patient questions and concerns addressed    OT Goals  Patients self stated goal is: unstated     Patient will complete functional transfer with supervision  Comment:     Patient will complete dressing task with supervision  Comment:     Patient will tolerate standing for 5 minutes in prep for adls with supervision   Comment:    Patient will complete item retrieval with supervision  Comment:          Goals  on: 10/3/24   Frequency: 3-5x/week      Patient Evaluation Complexity Level:   Occupational Profile/Medical History MODERATE - Expanded review of history including review of medical or therapy record   Specific performance deficits impacting engagement in ADL/IADL LOW  1 - 3 performance deficits    Client Assessment/Performance Deficits MODERATE - Comorbidities and min to mod modifications of tasks    Clinical Decision Making MODERATE - Analysis of occupational profile, detailed assessments, several treatment options    Overall Complexity MODERATE     Self-Care Home Management: 10 minutes  Therapeutic Activity: 13 minutes

## 2024-09-27 LAB
ANION GAP SERPL CALC-SCNC: 8 MMOL/L (ref 0–18)
BASOPHILS # BLD AUTO: 0.01 X10(3) UL (ref 0–0.2)
BASOPHILS NFR BLD AUTO: 0.1 %
BUN BLD-MCNC: 30 MG/DL (ref 9–23)
BUN/CREAT SERPL: 21.3 (ref 10–20)
CALCIUM BLD-MCNC: 9.7 MG/DL (ref 8.7–10.4)
CHLORIDE SERPL-SCNC: 105 MMOL/L (ref 98–112)
CO2 SERPL-SCNC: 24 MMOL/L (ref 21–32)
CREAT BLD-MCNC: 1.41 MG/DL
DEPRECATED RDW RBC AUTO: 60.8 FL (ref 35.1–46.3)
EGFRCR SERPLBLD CKD-EPI 2021: 38 ML/MIN/1.73M2 (ref 60–?)
EOSINOPHIL # BLD AUTO: 0.05 X10(3) UL (ref 0–0.7)
EOSINOPHIL NFR BLD AUTO: 0.7 %
ERYTHROCYTE [DISTWIDTH] IN BLOOD BY AUTOMATED COUNT: 17.9 % (ref 11–15)
GLUCOSE BLD-MCNC: 102 MG/DL (ref 70–99)
HCT VFR BLD AUTO: 36.5 %
HGB BLD-MCNC: 11.7 G/DL
IMM GRANULOCYTES # BLD AUTO: 0.03 X10(3) UL (ref 0–1)
IMM GRANULOCYTES NFR BLD: 0.4 %
LYMPHOCYTES # BLD AUTO: 1.47 X10(3) UL (ref 1–4)
LYMPHOCYTES NFR BLD AUTO: 19.2 %
MAGNESIUM SERPL-MCNC: 2.1 MG/DL (ref 1.6–2.6)
MCH RBC QN AUTO: 30.4 PG (ref 26–34)
MCHC RBC AUTO-ENTMCNC: 32.1 G/DL (ref 31–37)
MCV RBC AUTO: 94.8 FL
MONOCYTES # BLD AUTO: 0.93 X10(3) UL (ref 0.1–1)
MONOCYTES NFR BLD AUTO: 12.1 %
NEUTROPHILS # BLD AUTO: 5.18 X10 (3) UL (ref 1.5–7.7)
NEUTROPHILS # BLD AUTO: 5.18 X10(3) UL (ref 1.5–7.7)
NEUTROPHILS NFR BLD AUTO: 67.5 %
OSMOLALITY SERPL CALC.SUM OF ELEC: 290 MOSM/KG (ref 275–295)
PHOSPHATE SERPL-MCNC: 3.1 MG/DL (ref 2.4–5.1)
PLATELET # BLD AUTO: 169 10(3)UL (ref 150–450)
POTASSIUM SERPL-SCNC: 4.5 MMOL/L (ref 3.5–5.1)
RBC # BLD AUTO: 3.85 X10(6)UL
SODIUM SERPL-SCNC: 137 MMOL/L (ref 136–145)
WBC # BLD AUTO: 7.7 X10(3) UL (ref 4–11)

## 2024-09-27 PROCEDURE — 99233 SBSQ HOSP IP/OBS HIGH 50: CPT | Performed by: HOSPITALIST

## 2024-09-27 NOTE — PROGRESS NOTES
Called daughter and verified appt with [facility] with [facility]. Instructed Kaylene were to park and were to get labs 15-30 minutes prior to the appt.[facility] appt cancelled.

## 2024-09-27 NOTE — PLAN OF CARE
Pt. Armida is A&Ox 4. Lives at home with . Patient is ambulating using SBA. Patient isn't experiencing pain. Continent of bowel and incontinent of bladder. Purewick. Call light within reach, fall precautions maintained.    Plan is IV lasix and repeat echo once done diuresing.    Problem: Patient Centered Care  Goal: Patient preferences are identified and integrated in the patient's plan of care  Description: Interventions:  - What would you like us to know as we care for you? I live at home with my   - Provide timely, complete, and accurate information to patient/family  - Incorporate patient and family knowledge, values, beliefs, and cultural backgrounds into the planning and delivery of care  - Encourage patient/family to participate in care and decision-making at the level they choose  - Honor patient and family perspectives and choices  Outcome: Progressing     Problem: CARDIOVASCULAR - ADULT  Goal: Maintains optimal cardiac output and hemodynamic stability  Description: INTERVENTIONS:  - Monitor vital signs, rhythm, and trends  - Monitor for bleeding, hypotension and signs of decreased cardiac output  - Evaluate effectiveness of vasoactive medications to optimize hemodynamic stability  - Monitor arterial and/or venous puncture sites for bleeding and/or hematoma  - Assess quality of pulses, skin color and temperature  - Assess for signs of decreased coronary artery perfusion - ex. Angina  - Evaluate fluid balance, assess for edema, trend weights  Outcome: Progressing  Goal: Absence of cardiac arrhythmias or at baseline  Description: INTERVENTIONS:  - Continuous cardiac monitoring, monitor vital signs, obtain 12 lead EKG if indicated  - Evaluate effectiveness of antiarrhythmic and heart rate control medications as ordered  - Initiate emergency measures for life threatening arrhythmias  - Monitor electrolytes and administer replacement therapy as ordered  Outcome: Progressing     Problem: SAFETY ADULT  - FALL  Goal: Free from fall injury  Description: INTERVENTIONS:  - Assess pt frequently for physical needs  - Identify cognitive and physical deficits and behaviors that affect risk of falls.  - [place] fall precautions as indicated by assessment.  - Educate pt/family on patient safety including physical limitations  - Instruct pt to call for assistance with activity based on assessment  - Modify environment to reduce risk of injury  - Provide assistive devices as appropriate  - Consider OT/PT consult to assist with strengthening/mobility  - Encourage toileting schedule  Outcome: Progressing     Problem: Altered Communication/Language Barrier  Goal: Patient/Family is able to understand and participate in their care  Description: Interventions:  - Assess communication ability and preferred communication style  - Implement communication aides and strategies  - Use visual cues when possible  - Listen attentively, be patient, do not interrupt  - Minimize distractions  - Allow time for understanding and response  - Establish method for patient to ask for assistance (call light)  - Provide an  as needed  - Communicate barriers and strategies to overcome with those who interact with patient  Outcome: Progressing

## 2024-09-27 NOTE — PLAN OF CARE
Problem: Patient Centered Care  Goal: Patient preferences are identified and integrated in the patient's plan of care  Description: Interventions:  - What would you like us to know as we care for you? From home with   - Provide timely, complete, and accurate information to patient/family  - Incorporate patient and family knowledge, values, beliefs, and cultural backgrounds into the planning and delivery of care  - Encourage patient/family to participate in care and decision-making at the level they choose  - Honor patient and family perspectives and choices  Outcome: Progressing     Problem: CARDIOVASCULAR - ADULT  Goal: Maintains optimal cardiac output and hemodynamic stability  Description: INTERVENTIONS:  - Monitor vital signs, rhythm, and trends  - Monitor for bleeding, hypotension and signs of decreased cardiac output  - Evaluate effectiveness of vasoactive medications to optimize hemodynamic stability  - Monitor arterial and/or venous puncture sites for bleeding and/or hematoma  - Assess quality of pulses, skin color and temperature  - Assess for signs of decreased coronary artery perfusion - ex. Angina  - Evaluate fluid balance, assess for edema, trend weights  Outcome: Progressing  Goal: Absence of cardiac arrhythmias or at baseline  Description: INTERVENTIONS:  - Continuous cardiac monitoring, monitor vital signs, obtain 12 lead EKG if indicated  - Evaluate effectiveness of antiarrhythmic and heart rate control medications as ordered  - Initiate emergency measures for life threatening arrhythmias  - Monitor electrolytes and administer replacement therapy as ordered  Outcome: Progressing     Problem: SAFETY ADULT - FALL  Goal: Free from fall injury  Description: INTERVENTIONS:  - Assess pt frequently for physical needs  - Identify cognitive and physical deficits and behaviors that affect risk of falls.  - [place] fall precautions as indicated by assessment.  - Educate pt/family on patient safety  including physical limitations  - Instruct pt to call for assistance with activity based on assessment  - Modify environment to reduce risk of injury  - Provide assistive devices as appropriate  - Consider OT/PT consult to assist with strengthening/mobility  - Encourage toileting schedule  Outcome: Progressing     Problem: Altered Communication/Language Barrier  Goal: Patient/Family is able to understand and participate in their care  Description: Interventions:  - Assess communication ability and preferred communication style  - Implement communication aides and strategies  - Use visual cues when possible  - Listen attentively, be patient, do not interrupt  - Minimize distractions  - Allow time for understanding and response  - Establish method for patient to ask for assistance (call light)  - Provide an  as needed  - Communicate barriers and strategies to overcome with those who interact with patient  Outcome: Progressing

## 2024-09-27 NOTE — CM/SW NOTE
SW attempted pt's dtr Kianna as f/up for HH discussion and choice. There was no answer. SW left Vmail requesting call back to confirm receipt of HH list via email yesterday and to inquire if they have selected a HH agency for services upon DC.    PLAN: Home w/ HH - pending choice & med clear      SW/CM to remain available for support and/or discharge planning.       Zoya, MSW, LSW [id number]

## 2024-09-27 NOTE — PROGRESS NOTES
[facility]  part of [facility]    Progress Note    Armida Hobbs Patient Status:  Inpatient    1945 MRN [id number]   Location [facility] 3W/SW Attending Preeti John MD   Hosp Day # 3 PCP Presley Strange MD     Chief Complaint:     Acute Pulmonary Edema.    Subjective:   Subjective:    Patient seen and examined this afternoon  Appears comfortable  BP improved  No acute complaints      Objective:   Blood pressure 105/72, pulse 85, temperature 98.2 °F (36.8 °C), temperature source Oral, resp. rate 17, height 5' (1.524 m), weight 124 lb 6.4 oz (56.4 kg), SpO2 99%.  Physical Exam    General: Patient is alert answering questions  HEENT: EOMI PERRLA, atraumatic normocephalic  Cardiac: S1-S2 appreciated  Lungs: Good air entry bilaterally clear to auscultation  Abdomen: Soft nontender nondistended positive bowel sounds  Ext: Peripheral pulses are positive, b/l LE edema  Neuro: No focal deficits noted  Psych: Normal mood  Skin: No rashes noted  MSK: Full range of motion intact      Results:   Lab Results   Component Value Date    WBC 7.7 2024    HGB 11.7 (L) 2024    HCT 36.5 2024    .0 2024    CREATSERUM 1.41 (H) 2024    BUN 30 (H) 2024     2024    K 4.5 2024     2024    CO2 24.0 2024     (H) 2024    CA 9.7 2024    ALB 3.6 2024    ALKPHO 360 (H) 2024    BILT 4.1 (H) 2024    TP 5.7 2024    AST 39 (H) 2024    ALT 63 (H) 2024    PTT 65.3 (H) 2022    INR 1.39 (H) 2023    T4F 0.8 2023    TSH 0.776 2024    LIP 39 2023    DDIMER 0.45 2022    ESRML 18 2024    CRP 2.00 (H) 2024    MG 2.1 2024    PHOS 3.1 2024    TROPHS 74 (HH) 2024    CK 29 (L) 2023    B12 1,735 (H) 2023       No results found.        Assessment & Plan:       Acute pulmonary edema (HCC)  Acute on Chronic CHF  - Hx  of diastolic and systolic CHF.   Last visit with her cardiology bumix increased to 1 mg BID.    - BNP >22,00  - Administered 40 mg IV lasix in ED.  Monitor strict I&Os.   - Cardiology consulted.  Await further recs.       CAD s/p multivessel PCI  - On asa 81 and DAPT (asa and plavix)  -  On rosuvastatin 20.       Hypertension  - On Bumex, Lisinopril 20.  Hold and monitor response to IV lasix.       CKD IV  - Baseline Creatinine 1.4-1.6     Medical history considered stable  - Iron deficiency anemia on ferrous sulfate  - Rheumatoid arthritis follows with rheumatology.    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   - Hx of aortic stenosis sp AVR in 2022     diet: general  dvt prophylaxis: heparin subq  code status: full code  dispo: home upon medical clearance    Global A/P  -appreciate Cardiology recs, continue IV diuresis.  -Will await further recommendations regarding length of diuresis.  -monitor strict Is and Os  -hyponatremia -improved.   -hypokalemia continue with current replacement protocol  -BNP significantly elevated  -Reviewed previous consultant notes  -Reviewed CBC, BMP, Mag, and Phos  -Reviewed tests ordered  -Repeat labs in am  -MDM: High, severe exacerbation of chronic illness posing a threat to life. IV medications requiring close inpatient monitoring.           Preeti John MD

## 2024-09-27 NOTE — PROGRESS NOTES
Progress Note  Armida Hobbs Patient Status:  Inpatient    1945 MRN [id number]   Location [facility] 3W/SW Attending Preeti John MD   Hosp Day # 3 PCP Presley Strange MD     Subjective:  No acute events overnight.  Feels tired this morning, wants to rest, denies any CP, SOB, palpitations or dizziness at this time.  Still with slight LE edema.      Objective:  /72 (BP Location: Left arm)   Pulse 85   Temp 98.2 °F (36.8 °C) (Oral)   Resp 17   Ht 5' (1.524 m)   Wt 124 lb 6.4 oz (56.4 kg)   SpO2 99%   BMI 24.30 kg/m²     Telemetry: SR, HR 80s      Intake/Output:    Intake/Output Summary (Last 24 hours) at 2024 1048  Last data filed at 2024 0834  Gross per 24 hour   Intake 1080 ml   Output 400 ml   Net 680 ml       Last 3 Weights   24 0415 124 lb 6.4 oz (56.4 kg)   24 0644 123 lb 1.6 oz (55.8 kg)   24 0328 129 lb 8 oz (58.7 kg)   24 2217 129 lb 1.6 oz (58.6 kg)   24 1851 120 lb (54.4 kg)   24 0938 126 lb 9.6 oz (57.4 kg)   24 1651 135 lb (61.2 kg)       Labs:  Recent Labs   Lab 24  0744 24  0703 24  1553 24  0719   * 94  --  102*   BUN 31* 28*  --  30*   CREATSERUM 1.40* 1.46*  --  1.41*   EGFRCR 38* 36*  --  38*   CA 9.3 9.4  --  9.7   * 137  --  137   K 4.0  4.0 3.3* 4.3 4.5    102  --  105   CO2 22.0 28.0  --  24.0     Recent Labs   Lab 24  0744 24  0703 24  0719   RBC 4.11 4.17 3.85   HGB 12.5 12.4 11.7*   HCT 37.5 38.9 36.5   MCV 91.2 93.3 94.8   MCH 30.4 29.7 30.4   MCHC 33.3 31.9 32.1   RDW 17.3* 17.7* 17.9*   NEPRELIM 5.45 4.61 5.18   WBC 8.0 6.5 7.7   .0 172.0 169.0         Recent Labs   Lab 248 24  0744   TROPHS 87* 74*       Review of Systems   Constitutional: Negative.   Cardiovascular:  Positive for leg swelling.   Respiratory: Negative.         Physical Exam:    Gen: alert, oriented x 3, NAD  Heent: pupils equal, reactive. Mucous  membranes moist.   Neck: no jvd  Cardiac: regular rate and rhythm, normal S1,S2, no murmur, gallop or rub   Lungs: CTA, diminished at the basis   Abd: soft, NT/ND +bs  Ext: +1 lower extremities edema  Skin: Warm, dry  Neuro: No focal deficits        Medications:     furosemide  20 mg Intravenous BID (Diuretic)    atorvastatin  40 mg Oral Nightly    losartan  25 mg Oral Daily    carvedilol  3.125 mg Oral BID with meals    heparin  5,000 Units Subcutaneous 2 times per day    aspirin  81 mg Oral Daily    clopidogrel  75 mg Oral Daily    mycophenolate mofetil  1,000 mg Oral BID    pantoprazole  20 mg Oral QAM AC    predniSONE  2.5 mg Oral BID    gabapentin  100 mg Oral TID       Assessment:  Acute on chronic HFpEF-presented with SOB  CXR with mild pulmonary edema   proBNP 22,911  Echo 6/13/24 LVEF 55-60%, mod MR,   Diuresing with IV lasix 40 mg bid, still positive fluid balance ~1L?accuracy of I/Os, weight up by 1lb   Mild troponin elevation likely d/t demand ischemia  Trop 87>74  EKG,SR, LBBB   Hx of sev aortic stenosis s/p TAVR with normal function by echo 6/2024   Hx of mitral valve endocarditis with sev MR  S/P IV abx   Recent echo with no s/s endocarditis or vegetation, MR improved and now moderate   CAD s/p multivessel PCI  On asa, plavix, statin, bb  Chronic LBBB  CKD3-near baseline (1.4-1.6)  DM2, A1C 6.5%  LE edema, VD neg for DVT  Hypothyroidism-on replacement     Plan:  Still volume positive-continue IV lasix, BP remains stable.   Strict I/Os and daily weights.  Continue current cardiac meds.  Plan to repeat echo once dry.  PT/OT, increased activity as able.     Plan of care discussed with patient, RN.    Halima Figueroa, SANIA  9/27/2024  10:48 AM  [phone]      L3  Seen and examined bedside. Agree with the present management, will follow with you.      Acute on chronic HFpEF-presented with SOB CXR with mild pulmonary congestion  Echo 6/13/24 LVEF 55-60%, mod MR, cont to diuresed with IV lasix 40 mg  bid, still positive fluid balance  Thank you for allowing me to participate in the care of your patient, feel free to contact me if you have any questions.    Yobani Finnegan MD  [facility]  Interventional Cardiology.

## 2024-09-28 VITALS
SYSTOLIC BLOOD PRESSURE: 104 MMHG | TEMPERATURE: 98 F | DIASTOLIC BLOOD PRESSURE: 54 MMHG | OXYGEN SATURATION: 98 % | HEIGHT: 60 IN | RESPIRATION RATE: 18 BRPM | BODY MASS INDEX: 25 KG/M2 | WEIGHT: 127.31 LBS | HEART RATE: 89 BPM

## 2024-09-28 LAB
ANION GAP SERPL CALC-SCNC: 10 MMOL/L (ref 0–18)
BASOPHILS # BLD AUTO: 0.01 X10(3) UL (ref 0–0.2)
BASOPHILS NFR BLD AUTO: 0.1 %
BUN BLD-MCNC: 41 MG/DL (ref 9–23)
BUN/CREAT SERPL: 29.1 (ref 10–20)
CALCIUM BLD-MCNC: 9.2 MG/DL (ref 8.7–10.4)
CHLORIDE SERPL-SCNC: 102 MMOL/L (ref 98–112)
CO2 SERPL-SCNC: 22 MMOL/L (ref 21–32)
CREAT BLD-MCNC: 1.41 MG/DL
DEPRECATED RDW RBC AUTO: 59.6 FL (ref 35.1–46.3)
EGFRCR SERPLBLD CKD-EPI 2021: 38 ML/MIN/1.73M2 (ref 60–?)
EOSINOPHIL # BLD AUTO: 0.06 X10(3) UL (ref 0–0.7)
EOSINOPHIL NFR BLD AUTO: 0.7 %
ERYTHROCYTE [DISTWIDTH] IN BLOOD BY AUTOMATED COUNT: 17.6 % (ref 11–15)
GLUCOSE BLD-MCNC: 136 MG/DL (ref 70–99)
HCT VFR BLD AUTO: 34.4 %
HGB BLD-MCNC: 11.3 G/DL
IMM GRANULOCYTES # BLD AUTO: 0.03 X10(3) UL (ref 0–1)
IMM GRANULOCYTES NFR BLD: 0.4 %
LYMPHOCYTES # BLD AUTO: 1.47 X10(3) UL (ref 1–4)
LYMPHOCYTES NFR BLD AUTO: 18 %
MCH RBC QN AUTO: 30.8 PG (ref 26–34)
MCHC RBC AUTO-ENTMCNC: 32.8 G/DL (ref 31–37)
MCV RBC AUTO: 93.7 FL
MONOCYTES # BLD AUTO: 0.84 X10(3) UL (ref 0.1–1)
MONOCYTES NFR BLD AUTO: 10.3 %
NEUTROPHILS # BLD AUTO: 5.74 X10 (3) UL (ref 1.5–7.7)
NEUTROPHILS # BLD AUTO: 5.74 X10(3) UL (ref 1.5–7.7)
NEUTROPHILS NFR BLD AUTO: 70.5 %
NT-PROBNP SERPL-MCNC: 8560 PG/ML (ref ?–450)
OSMOLALITY SERPL CALC.SUM OF ELEC: 290 MOSM/KG (ref 275–295)
PHOSPHATE SERPL-MCNC: 3.4 MG/DL (ref 2.4–5.1)
PLATELET # BLD AUTO: 171 10(3)UL (ref 150–450)
POTASSIUM SERPL-SCNC: 4.7 MMOL/L (ref 3.5–5.1)
RBC # BLD AUTO: 3.67 X10(6)UL
SODIUM SERPL-SCNC: 134 MMOL/L (ref 136–145)
WBC # BLD AUTO: 8.2 X10(3) UL (ref 4–11)

## 2024-09-28 PROCEDURE — 99239 HOSP IP/OBS DSCHRG MGMT >30: CPT | Performed by: HOSPITALIST

## 2024-09-28 RX ORDER — BUMETANIDE 1 MG/1
1 TABLET ORAL DAILY
Status: DISCONTINUED | OUTPATIENT
Start: 2024-09-28 | End: 2024-09-28

## 2024-09-28 RX ORDER — ATORVASTATIN CALCIUM 40 MG/1
40 TABLET, FILM COATED ORAL NIGHTLY
Qty: 30 TABLET | Refills: 3 | Status: SHIPPED | OUTPATIENT
Start: 2024-09-28

## 2024-09-28 RX ORDER — ACETAMINOPHEN 325 MG/1
650 TABLET ORAL EVERY 6 HOURS PRN
Status: DISCONTINUED | OUTPATIENT
Start: 2024-09-28 | End: 2024-09-28

## 2024-09-28 RX ORDER — LOSARTAN POTASSIUM 25 MG/1
25 TABLET ORAL DAILY
Qty: 30 TABLET | Refills: 3 | Status: SHIPPED | OUTPATIENT
Start: 2024-09-28

## 2024-09-28 RX ORDER — METOPROLOL SUCCINATE 25 MG/1
12.5 TABLET, EXTENDED RELEASE ORAL
Qty: 30 TABLET | Refills: 3 | Status: SHIPPED | OUTPATIENT
Start: 2024-09-28

## 2024-09-28 NOTE — CM/SW NOTE
Call received by Rn stating patient has a dc order but family asking about home health care. Referred RN to  note from earlier   will follow up with family on 9/29 regarding home health  choice    Patient ok to dc home per care management    Bozena DEMARCO, CCM, MSN    Emergency Room  [facility]  Clinical Transitions Leader  [phone]

## 2024-09-28 NOTE — PLAN OF CARE
Patient alert and oriented x 2-3. Patient forgetful. Patient on room air. Patient denies pain. Patient cleared for discharge home with outpatient follow-up. AVS completed and discussed with patient and her daughter. There were no further questions. Patient to transport home by daughter.    Problem: Patient Centered Care  Goal: Patient preferences are identified and integrated in the patient's plan of care  Description: Interventions:  - What would you like us to know as we care for you? From home with   - Provide timely, complete, and accurate information to patient/family  - Incorporate patient and family knowledge, values, beliefs, and cultural backgrounds into the planning and delivery of care  - Encourage patient/family to participate in care and decision-making at the level they choose  - Honor patient and family perspectives and choices  Outcome: Adequate for Discharge     Problem: Patient/Family Goals  Goal: Patient/Family Long Term Goal  Description: Patient's Long Term Goal: Be able to discharge    Interventions:  - Medication administration  - Diurese  - See additional Care Plan goals for specific interventions  Outcome: Adequate for Discharge  Goal: Patient/Family Short Term Goal  Description: Patient's Short Term Goal: Resolve swelling    Interventions:   - Diuretics.  - Monitor kidney values.  - See additional Care Plan goals for specific interventions  Outcome: Adequate for Discharge     Problem: CARDIOVASCULAR - ADULT  Goal: Maintains optimal cardiac output and hemodynamic stability  Description: INTERVENTIONS:  - Monitor vital signs, rhythm, and trends  - Monitor for bleeding, hypotension and signs of decreased cardiac output  - Evaluate effectiveness of vasoactive medications to optimize hemodynamic stability  - Monitor arterial and/or venous puncture sites for bleeding and/or hematoma  - Assess quality of pulses, skin color and temperature  - Assess for signs of decreased coronary artery  perfusion - ex. Angina  - Evaluate fluid balance, assess for edema, trend weights  Outcome: Adequate for Discharge  Goal: Absence of cardiac arrhythmias or at baseline  Description: INTERVENTIONS:  - Continuous cardiac monitoring, monitor vital signs, obtain 12 lead EKG if indicated  - Evaluate effectiveness of antiarrhythmic and heart rate control medications as ordered  - Initiate emergency measures for life threatening arrhythmias  - Monitor electrolytes and administer replacement therapy as ordered  Outcome: Adequate for Discharge     Problem: SAFETY ADULT - FALL  Goal: Free from fall injury  Description: INTERVENTIONS:  - Assess pt frequently for physical needs  - Identify cognitive and physical deficits and behaviors that affect risk of falls.  - [place] fall precautions as indicated by assessment.  - Educate pt/family on patient safety including physical limitations  - Instruct pt to call for assistance with activity based on assessment  - Modify environment to reduce risk of injury  - Provide assistive devices as appropriate  - Consider OT/PT consult to assist with strengthening/mobility  - Encourage toileting schedule  Outcome: Adequate for Discharge     Problem: Altered Communication/Language Barrier  Goal: Patient/Family is able to understand and participate in their care  Description: Interventions:  - Assess communication ability and preferred communication style  - Implement communication aides and strategies  - Use visual cues when possible  - Listen attentively, be patient, do not interrupt  - Minimize distractions  - Allow time for understanding and response  - Establish method for patient to ask for assistance (call light)  - Provide an  as needed  - Communicate barriers and strategies to overcome with those who interact with patient  Outcome: Adequate for Discharge

## 2024-09-28 NOTE — CM/SW NOTE
09/28/24 1300   Discharge disposition   Expected discharge disposition Home-Health   Post Acute Care Provider   (choice pending - dtr to call SLY Sunday AM)   Discharge transportation Private car       Per chart, pt has DC order for today.    SLY spoke to pt's dtr Kianna via phone as f/up for HH choice. Per Kianna, she wanted to discuss w/ pt before deciding. SLY informed Kianna pt can still DC as planned today and they can call SLY by Sunday at 12PM w/ HH choice for arrangements to be made. Kianna is agreeable to this plan and confirmed she has SW's direct phone #.    DAVID Harp and DC DAVID King updated on above.    PLAN: Home w/ HH choice pending f/up from dtr on Sunday AM        MARIAM Kenny, LSW [id number]

## 2024-09-28 NOTE — DISCHARGE SUMMARY
[facility]  part of [facility]     Discharge Summary    Armida Hobbs Patient Status:  Inpatient    1945 MRN [id number]   Location [facility] 3W/SW Attending Preeti John MD   Hosp Day # 4 PCP Presley Strange MD     Date of Admission: 2024    Date of Discharge: 2024.    Admitting Diagnosis: Acute pulmonary edema (HCC) [J81.0]  Hypoxia [R09.02]    Discharge Diagnosis:   Patient Active Problem List   Diagnosis    Mixed hyperlipidemia    Gastroesophageal reflux disease without esophagitis    Varicose veins of both lower extremities with pain    Paroxysmal atrial fibrillation (HCC)    Coronary artery disease involving native coronary artery of native heart without angina pectoris    S/P drug eluting coronary stent placement    Chronic diastolic congestive heart failure (HCC)    Stage 3a chronic kidney disease (HCC)    Hypertension goal BP (blood pressure) < 130/80    S/P TAVR (transcatheter aortic valve replacement)    Neutropenia, unspecified (HCC)    Anxiety and depression    Poor short term memory    Bilateral carotid bruits    Bilateral lower extremity edema    Postmenopausal atrophic vaginitis    Rheumatoid arthritis involving both ankles with positive rheumatoid factor (HCC)    Sensorineural hearing loss, bilateral    Lung granuloma (HCC)    Senile purpura (HCC)    CKD (chronic kidney disease) stage 4, GFR 15-29 ml/min (HCC)    Lumbar radiculopathy    COVID-19 virus infection    Bullous pemphigoid (HCC)    Acute pulmonary edema (HCC)    Hypoxia       Reason for Admission:     Acute pulmonary Edema    Physical Exam:     General: Patient is alert and oriented x3 does not appear to be in acute distress at this time  HEENT: EOMI PERRLA, atraumatic normocephalic  Cardiac: S1-S2 appreciated  Lungs: Good air entry bilaterally clear to auscultation  Abdomen: Soft nontender nondistended positive bowel sounds  Ext: Peripheral pulses are positive  Neuro: No focal deficits  noted  Psych: Normal mood  Skin: No rashes noted  MSK: Full range of motion intact        Hospital Course:     Acute pulmonary edema (HCC)  Acute on Chronic CHF  - Hx of diastolic and systolic CHF.   Last visit with her cardiology bumix increased to 1 mg BID.    - BNP >22,00  - Administered 40 mg IV lasix in ED.  Monitor strict I&Os.   - Cardiology consulted.  Await further recs.       CAD s/p multivessel PCI  - On asa 81 and DAPT (asa and plavix)  -  On rosuvastatin 20.       Hypertension  - On Bumex, Lisinopril 20.  Hold and monitor response to IV lasix.       CKD IV  - Baseline Creatinine 1.4-1.6     Medical history considered stable  - Iron deficiency anemia on ferrous sulfate  - Rheumatoid arthritis follows with rheumatology.    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   - Hx of aortic stenosis sp AVR in      diet: general  dvt prophylaxis: heparin subq  code status: full code  dispo: home upon medical clearance    History of Present Illness:     Per admittin year old with hx of CAD s/p multivessel PCI, CHF, CKD IV, Severe aoritic stenosis s/p TAVR, paroxysmal A fib, HTN, hx of mitral valve endocarditis with severe mitral regurgitation who present for SOB.  Symptoms have progressed over the past 1 week.  LE swelling reported.  Is on bumex 1 mg BID but symptoms have note improved.      In the ED blood pressure 129/78, pulse 97 afebrile.  Chest x-ray revealed cardiomegaly and postprocedural changes of TAVR with diffuse pulmonary vascular congestion and mild diffuse pulmonary interstitial edema.  Small pleural effusions.  Creatinine 1.69.  proBNP 22,911.  Ultrasound completed and negative for DVT.     Patient was administered IV Lasix 40 mg.  Cardiology consulted.    Disposition: Home or Self Care    Discharge Condition: Good    Discharge Medications:   Current Discharge Medication List        START taking these medications    Details   atorvastatin 40 MG Oral Tab Take 1 tablet (40 mg total)  by mouth nightly.  Qty: 30 tablet, Refills: 3      losartan 25 MG Oral Tab Take 1 tablet (25 mg total) by mouth daily.  Qty: 30 tablet, Refills: 3      metoprolol succinate ER 25 MG Oral Tablet 24 Hr Take 0.5 tablets (12.5 mg total) by mouth Daily Beta Blocker.  Qty: 30 tablet, Refills: 3           CONTINUE these medications which have NOT CHANGED    Details   GABAPENTIN 100 MG Oral Cap Take 1 capsule by mouth 3 times daily.  Qty: 90 capsule, Refills: 0      Mycophenolate Mofetil 500 MG Oral Tab Take 2 tablets (1,000 mg total) by mouth 2 (two) times daily.  Qty: 120 tablet, Refills: 0      !! predniSONE 2.5 MG Oral Tab Take 2 tablets (5 mg total) by mouth daily.  Qty: 180 tablet, Refills: 0      leflunomide 10 MG Oral Tab Take 1 tablet (10 mg total) by mouth daily.  Qty: 90 tablet, Refills: 1      doxycycline 100 MG Oral Cap Take 1 capsule (100 mg total) by mouth 2 (two) times daily.      ferrous sulfate 325 (65 FE) MG Oral Tab EC Take 1 tablet (325 mg total) by mouth daily with breakfast.      Potassium Chloride ER 20 MEQ Oral Tab CR Take 1 tablet by mouth in the morning and 1 tablet before bedtime.  Qty: 90 tablet, Refills: 3    Associated Diagnoses: Hypokalemia      pantoprazole 20 MG Oral Tab EC Take 1 tablet (20 mg total) by mouth every morning before breakfast. TO PREVENT ACID REFLUX.  Qty: 90 tablet, Refills: 3    Associated Diagnoses: Antiplatelet or antithrombotic long-term use; Gastroesophageal reflux disease with esophagitis without hemorrhage      bumetanide (BUMEX) 1 MG Oral Tab Take 1 tablet (1 mg total) by mouth daily. STOP FUROSEMIDE.  Qty: 90 tablet, Refills: 3    Associated Diagnoses: Hypertension goal BP (blood pressure) < 130/80; Chronic diastolic congestive heart failure (HCC); Leg swelling      clopidogrel 75 MG Oral Tab Take 1 tablet (75 mg total) by mouth daily. FOR HEART STENTS.  Qty: 90 tablet, Refills: 9    Associated Diagnoses: S/P drug eluting coronary stent placement; Coronary artery  disease involving native coronary artery of native heart without angina pectoris; Moderate aortic stenosis by prior echocardiogram; Acute on chronic diastolic (congestive) heart failure (HCC)      aspirin 81 MG Oral Tab EC Take 1 tablet (81 mg total) by mouth daily. FOR HEART DISEASE.  Qty: 90 tablet, Refills: 9    Associated Diagnoses: S/P drug eluting coronary stent placement; Coronary artery disease involving native coronary artery of native heart without angina pectoris; Moderate aortic stenosis by prior echocardiogram      cholecalciferol 25 MCG (1000 UT) Oral Tab Take 1 tablet (1,000 Units total) by mouth daily.  Qty: 90 tablet, Refills: 3    Associated Diagnoses: Age-related osteoporosis without current pathological fracture      !! predniSONE 10 MG Oral Tab Take 1 tablet (10 mg total) by mouth daily with breakfast.      acetaminophen 500 MG Oral Tab Take 1 tablet (500 mg total) by mouth every 4 (four) hours as needed.      folic acid 1 MG Oral Tab Take 1 tablet (1 mg total) by mouth daily.  Qty: 90 tablet, Refills: 3       !! - Potential duplicate medications found. Please discuss with provider.        STOP taking these medications       lisinopril 20 MG Oral Tab              Total dc time > 30 min    Preeti John MD  9/28/2024  1:39 PM     Hospital Discharge Diagnoses:  Acute pulmonary edema    Lace+ Score: 74  59-90 High Risk  29-58 Medium Risk  0-28   Low Risk.    TCM Follow-Up Recommendation:  LACE > 58: High Risk of readmission after discharge from the hospital.

## 2024-09-28 NOTE — PROGRESS NOTES
Progress Note  Armida Hobbs Patient Status:  Inpatient    1945 MRN [id number]   Location [facility] 3W/SW Attending Preeti John MD   Hosp Day # 4 PCP Presley Strange MD     Subjective:  No acute events overnight.  Resting in bed, laying flat on room air, denies any CP, SOB , palpitations or dizziness at this time. Speaks [place]    Objective:  /84 (BP Location: Right arm)   Pulse 90   Temp 97.8 °F (36.6 °C) (Oral)   Resp 18   Ht 5' (1.524 m)   Wt 127 lb 4.8 oz (57.7 kg)   SpO2 98%   BMI 24.86 kg/m²     Telemetry: SR, HR 80s,       Intake/Output:    Intake/Output Summary (Last 24 hours) at 2024 0636  Last data filed at 2024 0533  Gross per 24 hour   Intake 667 ml   Output --   Net 667 ml       Last 3 Weights   24 0517 127 lb 4.8 oz (57.7 kg)   24 0415 124 lb 6.4 oz (56.4 kg)   24 0644 123 lb 1.6 oz (55.8 kg)   24 0328 129 lb 8 oz (58.7 kg)   24 2217 129 lb 1.6 oz (58.6 kg)   24 1851 120 lb (54.4 kg)   24 0938 126 lb 9.6 oz (57.4 kg)   24 1651 135 lb (61.2 kg)       Labs:  Recent Labs   Lab 24  0744 24  0703 24  1553 24  0719   * 94  --  102*   BUN 31* 28*  --  30*   CREATSERUM 1.40* 1.46*  --  1.41*   EGFRCR 38* 36*  --  38*   CA 9.3 9.4  --  9.7   * 137  --  137   K 4.0  4.0 3.3* 4.3 4.5    102  --  105   CO2 22.0 28.0  --  24.0     Recent Labs   Lab 24  0744 24  0703 24  0719   RBC 4.11 4.17 3.85   HGB 12.5 12.4 11.7*   HCT 37.5 38.9 36.5   MCV 91.2 93.3 94.8   MCH 30.4 29.7 30.4   MCHC 33.3 31.9 32.1   RDW 17.3* 17.7* 17.9*   NEPRELIM 5.45 4.61 5.18   WBC 8.0 6.5 7.7   .0 172.0 169.0         Recent Labs   Lab 24  1938 24  0744   TROPHS 87* 74*       Review of Systems   Constitutional: Positive for malaise/fatigue.   Cardiovascular:  Positive for leg swelling.   Respiratory: Negative.         Physical Exam:    Gen: alert, oriented x 3,  NAD  Heent: pupils equal, reactive. Mucous membranes moist.   Neck: no jvd  Cardiac: regular rate and rhythm, normal S1,S2, no murmur, gallop or rub   Lungs: CTA  Abd: soft, NT/ND +bs  Ext: +1 lower extremities edema  Skin: Warm, dry  Neuro: No focal deficits        Medications:     furosemide  20 mg Intravenous BID (Diuretic)    atorvastatin  40 mg Oral Nightly    losartan  25 mg Oral Daily    [Held by provider] carvedilol  3.125 mg Oral BID with meals    heparin  5,000 Units Subcutaneous 2 times per day    aspirin  81 mg Oral Daily    clopidogrel  75 mg Oral Daily    mycophenolate mofetil  1,000 mg Oral BID    pantoprazole  20 mg Oral QAM AC    predniSONE  2.5 mg Oral BID    gabapentin  100 mg Oral TID       Assessment:  Acute on chronic HFpEF-presented with SOB  CXR with mild pulmonary edema   proBNP 22,911  Echo 6/13/24 LVEF 55-60%, mod MR,   Limited on diuresis with low BP, still positive fluid balance ~1.6 L?accuracy of I/Os, weight up by 3lb   Mild troponin elevation likely d/t demand ischemia  Trop 87>74  EKG,SR, LBBB   Hx of sev aortic stenosis s/p TAVR with normal function by echo 6/2024   Hx of mitral valve endocarditis with sev MR  S/P IV abx   Recent echo with no s/s endocarditis or vegetation, MR improved and now moderate   CAD s/p multivessel PCI  On asa, plavix, statin, bb  Chronic LBBB  CKD3-near baseline (1.4-1.6)  DM2, A1C 6.5%  LE edema, VD neg for DVT  Hypothyroidism-on replacement     Plan:  Denies cardiac symptoms currently.  Will switch to oral Bumex today.  Strict I/Os and daily weights.  Monitor electrolytes and renal function.  PT/OT, ambulate in the hallway if able.  Tentative plan to dc home later today if feels good, will recheck echo as op. Will schedule op follow up with Dr Mayes next week.     Plan of care discussed with patient, RN.    Halima Figueroa, SANIA  9/28/2024  6:36 AM  [phone]      Cardiology Attending:  Note reviewed and Independent exam, assessment and plan  developed. Labs and tests reviewed.  Note is my recommendations with assessment and plan as I have made changes and additions within note and merged.    S/no change in symptoms  Physical Exam:  General: Alert and oriented. No apparent distress. No respiratory or constitutional distress.  HEENT: Normocephalic, anicteric sclera, neck supple  Neck: elevated JVD, carotids 2+,  Cardiac: irregular/Regular rate and rhythm. No pathologic murmur.  Lungs: Rales with normal effort.  Normal excursions and effort.  Abdomen: Soft, non-tender. BS-present.  Extremities: Without clubbing, cyanosis.  Edema, Peripheral pulses present.  Neurologic: Alert and oriented, normal affect. Motor ok.  Skin: Warm and dry.   A/P:f/u labs  Agree plan above  Cheo Samuel MD  [facility]  Cardiac Electrophysiology  9/28/2024  2v

## 2024-09-28 NOTE — PLAN OF CARE
Patient alert and oriented x2-3. Denies chest pain/SOB. On RA. Satting well in the 90s. BP soft low in the 90s. Fall precautions in place. Plan for home with HH upon discharge pending medial clearance.     Problem: Patient Centered Care  Goal: Patient preferences are identified and integrated in the patient's plan of care  Description: Interventions:  - What would you like us to know as we care for you?   - Provide timely, complete, and accurate information to patient/family  - Incorporate patient and family knowledge, values, beliefs, and cultural backgrounds into the planning and delivery of care  - Encourage patient/family to participate in care and decision-making at the level they choose  - Honor patient and family perspectives and choices  Outcome: Progressing     Problem: CARDIOVASCULAR - ADULT  Goal: Maintains optimal cardiac output and hemodynamic stability  Description: INTERVENTIONS:  - Monitor vital signs, rhythm, and trends  - Monitor for bleeding, hypotension and signs of decreased cardiac output  - Evaluate effectiveness of vasoactive medications to optimize hemodynamic stability  - Monitor arterial and/or venous puncture sites for bleeding and/or hematoma  - Assess quality of pulses, skin color and temperature  - Assess for signs of decreased coronary artery perfusion - ex. Angina  - Evaluate fluid balance, assess for edema, trend weights  Outcome: Progressing  Goal: Absence of cardiac arrhythmias or at baseline  Description: INTERVENTIONS:  - Continuous cardiac monitoring, monitor vital signs, obtain 12 lead EKG if indicated  - Evaluate effectiveness of antiarrhythmic and heart rate control medications as ordered  - Initiate emergency measures for life threatening arrhythmias  - Monitor electrolytes and administer replacement therapy as ordered  Outcome: Progressing     Problem: SAFETY ADULT - FALL  Goal: Free from fall injury  Description: INTERVENTIONS:  - Assess pt frequently for physical  needs  - Identify cognitive and physical deficits and behaviors that affect risk of falls.  - Edwards fall precautions as indicated by assessment.  - Educate pt/family on patient safety including physical limitations  - Instruct pt to call for assistance with activity based on assessment  - Modify environment to reduce risk of injury  - Provide assistive devices as appropriate  - Consider OT/PT consult to assist with strengthening/mobility  - Encourage toileting schedule  Outcome: Progressing     Problem: Altered Communication/Language Barrier  Goal: Patient/Family is able to understand and participate in their care  Description: Interventions:  - Assess communication ability and preferred communication style  - Implement communication aides and strategies  - Use visual cues when possible  - Listen attentively, be patient, do not interrupt  - Minimize distractions  - Allow time for understanding and response  - Establish method for patient to ask for assistance (call light)  - Provide an  as needed  - Communicate barriers and strategies to overcome with those who interact with patient  Outcome: Progressing

## 2024-09-29 NOTE — CM/SW NOTE
SW never received call from pt's dtr Kianna as indicated yesterday afternoon.    SW attempted pt's dtr via phone. There was no answer. SW left [facility] requesting a return call by 4PM today 9/29. In the Vmail, SW informed pt's dtr that if SW does not receive a call by 4pm today, then pt's family will need to f/up w/ pt's PCP for assistance arranging HH services.    SLY provided update to ER BHAVIN Arteaga and SW Supervisor Yara due to Jenni receiving call from pt's RN yesterday stating family was asking about HH services.    SLY has attempted to obtain choice from pt's family multiple times w/out success.      Zoya, MSW, LSW [id number]

## 2024-10-01 ENCOUNTER — PATIENT MESSAGE (OUTPATIENT)
Facility: LOCATION | Age: 79
End: 2024-10-01

## 2024-10-02 ENCOUNTER — TELEPHONE (OUTPATIENT)
Dept: CARDIOLOGY CLINIC | Facility: HOSPITAL | Age: 79
End: 2024-10-02

## 2024-10-02 ENCOUNTER — PATIENT OUTREACH (OUTPATIENT)
Dept: CASE MANAGEMENT | Age: 79
End: 2024-10-02

## 2024-10-02 NOTE — PROGRESS NOTES
Please contact patient for assistance with scheduling a follow-up appointment for  PCP .  Thank you!

## 2024-10-02 NOTE — PROGRESS NOTES
[facility]    Armida Hobbs Patient Status:  Outpatient    1945 MRN [id number]   Location [facility] MD Dr. Jose Alberto William       Armida Hobbs is a 78 year old female who presents to clinic for assessment and management for hospitalization for acute on chronic heart failure.     Admitted -24 with worsening whitman and increased abhijit LE edema with ARF with hypoxemia due to acute pulmonary edema. Pro bnp 22,911. Venous dopplers of LE negative for DVT. Home on bumex 1 mg bid. EF normal on last echo in 2024, with mod MR.     Problem List:  Acute on chronic HFpEF, NYHA class III, Stage C, EF 55-60%  CAD with PCI of   TAVR   Hx mitral valve endocarditis  Mod MR  Paroxysmal atrial fib  HTN  HLD  CKD stage 4  DM type 2  PATRICK  RA  GERD    Subjective:  Here with family. Lives with .   Family helps with medication  Pt admits to forgetting details at home.   Mild whitman walking into clinic, 100 feet, increased abhijit LE edema since discharge.   Whitman walking up flight of stairs.   Taking bumex 1 mg bid.   No orthopnea, sleeps on side, bathroom 1-2 x     Review of Systems:  Constitutional: negative for chills or fever  Respiratory:  negative for cough, hemoptysis and wheezing  Cardiovascular: negative for chest pain, exertional chest pressure/discomfort, near-syncope, orthopnea and palpitations  Gastrointestinal: negative for abdominal pain, diarrhea, melena, nausea and vomiting  Hematologic/lymphatic: negative  Musculoskeletal: negative for muscle weakness and myalgias    Objective:  Lab Results   Component Value Date/Time    WBC 8.2 2024 06:10 AM    HGB 11.3 (L) 2024 06:10 AM    HCT 34.4 (L) 2024 06:10 AM    .0 2024 06:10 AM    CREATSERUM 1.08 (H) 10/03/2024 11:24 AM    BUN  10/03/2024 11:24 AM      Comment:      Test not reported due to hemolysis.  If you would like to repeat this test at no charge,  physician must contact Client Services at [phone].         10/03/2024 11:24 AM    K  10/03/2024 11:24 AM      Comment:      Test not reported due to hemolysis.  If you would like to repeat this test at no charge, physician must contact Client Services at [phone].         10/03/2024 11:24 AM    CO2 24.0 10/03/2024 11:24 AM     (H) 10/03/2024 11:24 AM    CA 8.7 10/03/2024 11:24 AM    ALB 3.6 09/25/2024 07:44 AM    ALKPHO 360 (H) 09/25/2024 07:44 AM    BILT 4.1 (H) 09/25/2024 07:44 AM    TP 5.7 09/25/2024 07:44 AM    AST 39 (H) 09/25/2024 07:44 AM    ALT 63 (H) 09/25/2024 07:44 AM    PTT 65.3 (H) 11/03/2022 11:10 AM    INR 1.39 (H) 01/06/2023 03:10 PM    PTP 16.8 (H) 01/06/2023 03:10 PM    T4F 0.8 01/07/2023 05:37 AM    TSH 0.776 07/18/2024 11:34 AM    LIP 39 06/27/2023 01:46 PM    DDIMER 0.45 07/06/2022 03:28 PM    ESRML 18 06/26/2024 03:25 PM    CRP 2.00 (H) 06/26/2024 03:25 PM    MG 2.1 09/27/2024 07:19 AM    PHOS 3.4 09/28/2024 06:10 AM    CK 29 (L) 12/18/2023 02:33 PM    B12 1,735 (H) 04/12/2023 10:51 AM    PGLU 141 (H) 01/06/2023 03:08 PM       Labs drawn by RN: bmp, bnp,  results reviewed with patient.  Personally interpreted results     /61 (BP Location: Right arm, Patient Position: Sitting)   Pulse 82   Resp 16   Wt 130 lb 6.4 oz (59.1 kg)   SpO2 100%   BMI 25.47 kg/m²       Date  Clinic wt Home wt MCI wt DC wt IV med  PO med   9/28/24    127     10/3/24 130 --                             General appearance: alert, appears stated age and cooperative  Neck: + JVD > 90 degrees , mid neck  Lungs: clear to auscultation bilaterally, diminished L base  Heart: S1, S2 normal, + murmur, no click, rub or gallop, regular rate and rhythm  Abdomen: soft, non-tender; bowel sounds normal; no masses,  no abdominal distension, lower abdominal ecchymosis with tiny hematoma RLQ, + point tenderness  Extremities:  no cyanosis, +2 abhijit LE edema from below knees to pedals, no open wounds or  blisters  Pulses: 2+ and symmetric  Neurologic: Grossly normal  Measurements:   10/3/24: RLE calf- 13.5 \" ankle- 10.5\"     LLE calf- 13.\" Ankle- 10.2\"     Diagnostics:  EC24 SR 92 bpm, with pac's. LBBB    SHAWNA: 24, EF 55-60%, no wma, grade 1 diastolic dysfunction, Left  atrial dilation,  mild-mod MR, bioprosthetic aortic valve, no vegetation on mitral valve or tricuspid valve.     Echo: 23  D 5-60%, grade 2 DD, moderate LVH with septal knob.  No WMA.  Moderate LA dilation.  Normal functioning bioprosthetic valve/TAVR noted.  Severe MR, moderate mitral stenosis.  Mod TR.  PAS 44 mmHg, RAP 3 mmHg.    Nuclear stress test: 3/31/22  No fixed perfusion defects. No reversing perfusion defect.     Heart cath: , PCI of LAD    Assessment:  Acute on chronic HF p EF, NYHA class III, stage C  -EF 55-60 %  -hx TAVR , mild mod MR , normal bioprosthetic aortic valve   -CAD with PCI of LAD   -diuretics bumex 1 mg bid    -GDMT : losartan 25 mg daily and toprol 12.5 mg daily   -weight is up 3 lbs in last 5 days with increased abhijit LE edema and harris   - BP low normal   -Renal function stable, bun/cr: 1.08<41/1.41 , BUN and K  (hemolyzed)   - today   -pro bnp 23,561 today <-8560 (24)  -hypervolemic  -bumex 1 mg IV push given  - continue bumex 1 mg bid  - reinforced to keep fluids a 48-64 oz/day and limit sodium to 2000mg /day   -will repeat bmp with home health RN tomorrow   - tubigrip compression stockings donned  - [facility] with [facility] on 10[facility]10/24.   -she would not be a good candidate for adding SGLT2i with hx of MV endocarditis  -consider switching to entresto if renal function and BP remain stable.     CKD stage IIIb -IV  -cr 1.08 today   -creatinine has improved on higher dose bumex  -cr baseline 1.4- 1.6    pAF  -RRR, SR on low dose toprol   -on asa 81 mg daily     Plan:     Patient Instructions   Continue all your medications as prescribed     Follow up with Dr. Mayes in 2-3  weeks, the office will call to set up a follow-up appointment.  [phone]    Follow up with the specialty care clinic on 10/10/24    Wear knee high Tubigrip stockings during the day and remove at night.      Call if you are having shortness of breath, cough, wheezing, chest pain, dizziness, lightheadedness, heart racing, palpitations, swelling, rapid weight gain, fatigue, weakness, fever or chills.  Call 911 with severe shortness of breath, chest pain or chest pressure not improved after 15 minutes of rest or if feeling faint,  passing out or having a fall     Weigh yourself daily in the morning before breakfast, call if gaining 3 lbs or more overnight or more than 5 lbs in one week.    Keep daily fluids at 48-64 ounces per day (1.5-2 liters of liquid or 6-8 , 8 oz glasses of liquid)    Heart healthy diet. Limit sodium to  2000 mg daily. Common high sodium foods include frozen dinners, soups (not homemade), some cereal, vegetable juice, canned vegetables, lunch meats, processed meats like hotdogs, sausage, hernandez, pepperoni, soy sauce, pre-packaged rice or potatoes. Please remember to read nutrition labels for sodium content.   www.healthyeating.nhlbi.nih.gov    Exercise daily as tolerated, with goal of doing moderate aerobic exercise like walking for about 30 minutes 5 days per week. Start by walking at a slow to moderate pace for 3-5 minutes 2-3 times a day. Pace your activity to prevent shortness of breath or fatigue. Stop exercise if you develop chest pain, lightheadedness, or significant shortness of breath    Always carry a copy of your current medication list with you          Current Outpatient Medications:     bumetanide (BUMEX) 1 MG Oral Tab, Take 1 tablet (1 mg total) by mouth BID (Diuretic)., Disp: 60 tablet, Rfl: 5    atorvastatin 40 MG Oral Tab, Take 1 tablet (40 mg total) by mouth nightly., Disp: 30 tablet, Rfl: 3    losartan 25 MG Oral Tab, Take 1 tablet (25 mg total) by mouth daily., Disp: 30  tablet, Rfl: 3    metoprolol succinate ER 25 MG Oral Tablet 24 Hr, Take 0.5 tablets (12.5 mg total) by mouth Daily Beta Blocker., Disp: 30 tablet, Rfl: 3    GABAPENTIN 100 MG Oral Cap, Take 1 capsule by mouth 3 times daily., Disp: 90 capsule, Rfl: 0    Mycophenolate Mofetil 500 MG Oral Tab, Take 2 tablets (1,000 mg total) by mouth 2 (two) times daily., Disp: 120 tablet, Rfl: 0    predniSONE 2.5 MG Oral Tab, Take 2 tablets (5 mg total) by mouth daily. (Patient taking differently: Take 1 tablet (2.5 mg total) by mouth 2 (two) times daily.), Disp: 180 tablet, Rfl: 0    leflunomide 10 MG Oral Tab, Take 1 tablet (10 mg total) by mouth daily., Disp: 90 tablet, Rfl: 1    doxycycline 100 MG Oral Cap, Take 1 capsule (100 mg total) by mouth 2 (two) times daily., Disp: , Rfl:     ferrous sulfate 325 (65 FE) MG Oral Tab EC, Take 1 tablet (325 mg total) by mouth daily with breakfast., Disp: , Rfl:     Potassium Chloride ER 20 MEQ Oral Tab CR, Take 1 tablet by mouth in the morning and 1 tablet before bedtime., Disp: 90 tablet, Rfl: 3    pantoprazole 20 MG Oral Tab EC, Take 1 tablet (20 mg total) by mouth every morning before breakfast. TO PREVENT ACID REFLUX., Disp: 90 tablet, Rfl: 3    clopidogrel 75 MG Oral Tab, Take 1 tablet (75 mg total) by mouth daily. FOR HEART STENTS., Disp: 90 tablet, Rfl: 9    aspirin 81 MG Oral Tab EC, Take 1 tablet (81 mg total) by mouth daily. FOR HEART DISEASE., Disp: 90 tablet, Rfl: 9    folic acid 1 MG Oral Tab, Take 1 tablet (1 mg total) by mouth daily. (Patient taking differently: Take 1 tablet (1 mg total) by mouth every morning.), Disp: 90 tablet, Rfl: 3    cholecalciferol 25 MCG (1000 UT) Oral Tab, Take 1 tablet (1,000 Units total) by mouth daily., Disp: 90 tablet, Rfl: 3    acetaminophen 500 MG Oral Tab, Take 1 tablet (500 mg total) by mouth every 4 (four) hours as needed., Disp: , Rfl:     HASMUKH HUNT NP  10/3/24       60 minutes spent on patient education, chart review and  documentation:  Patient instructed regarding clinic procedures, hours, purpose of clinic visits, sodium restricted diet, low sodium foods, fluid restrictions, daily weights, medication regimen s/s of heart failure exacerbation and when to call APN/clinic. Provided patient with  counseling, coordination of care and education given. Patient receptive.

## 2024-10-02 NOTE — PROGRESS NOTES
TCM reached out for scheduling assistance.    Presley Strange MD  Internal Medicine  [address] [place] [address]  [phone]    Patient's daughter will schedule via [employer].  Confirmed with patient's daughter.    Closing encounter.

## 2024-10-03 ENCOUNTER — OFFICE VISIT (OUTPATIENT)
Dept: CARDIOLOGY CLINIC | Facility: HOSPITAL | Age: 79
End: 2024-10-03
Attending: NURSE PRACTITIONER
Payer: MEDICARE

## 2024-10-03 VITALS
BODY MASS INDEX: 25 KG/M2 | RESPIRATION RATE: 16 BRPM | HEART RATE: 82 BPM | DIASTOLIC BLOOD PRESSURE: 61 MMHG | SYSTOLIC BLOOD PRESSURE: 101 MMHG | WEIGHT: 130.38 LBS | OXYGEN SATURATION: 100 %

## 2024-10-03 DIAGNOSIS — R60.0 BILATERAL LOWER EXTREMITY EDEMA: ICD-10-CM

## 2024-10-03 DIAGNOSIS — N18.31 STAGE 3A CHRONIC KIDNEY DISEASE (HCC): ICD-10-CM

## 2024-10-03 DIAGNOSIS — I50.32 CHRONIC DIASTOLIC CONGESTIVE HEART FAILURE (HCC): ICD-10-CM

## 2024-10-03 DIAGNOSIS — I50.9 ACUTE ON CHRONIC HEART FAILURE, UNSPECIFIED HEART FAILURE TYPE (HCC): ICD-10-CM

## 2024-10-03 DIAGNOSIS — Z95.2 S/P TAVR (TRANSCATHETER AORTIC VALVE REPLACEMENT): ICD-10-CM

## 2024-10-03 DIAGNOSIS — I50.33 ACUTE ON CHRONIC HEART FAILURE WITH PRESERVED EJECTION FRACTION (HFPEF) (HCC): Primary | ICD-10-CM

## 2024-10-03 DIAGNOSIS — I50.9 ACUTE ON CHRONIC HEART FAILURE, UNSPECIFIED HEART FAILURE TYPE (HCC): Primary | ICD-10-CM

## 2024-10-03 DIAGNOSIS — M79.89 LEG SWELLING: ICD-10-CM

## 2024-10-03 DIAGNOSIS — I25.10 CORONARY ARTERY DISEASE INVOLVING NATIVE CORONARY ARTERY OF NATIVE HEART WITHOUT ANGINA PECTORIS: ICD-10-CM

## 2024-10-03 PROBLEM — N18.4 CKD (CHRONIC KIDNEY DISEASE) STAGE 4, GFR 15-29 ML/MIN (HCC): Status: RESOLVED | Noted: 2023-10-10 | Resolved: 2024-10-03

## 2024-10-03 PROBLEM — I48.0 PAF (PAROXYSMAL ATRIAL FIBRILLATION) (HCC): Status: ACTIVE | Noted: 2022-02-01

## 2024-10-03 PROBLEM — J81.0 ACUTE PULMONARY EDEMA (HCC): Status: RESOLVED | Noted: 2024-09-24 | Resolved: 2024-10-03

## 2024-10-03 LAB
ANION GAP SERPL CALC-SCNC: 8 MMOL/L (ref 0–18)
BNP SERPL-MCNC: 895 PG/ML
CALCIUM BLD-MCNC: 8.7 MG/DL (ref 8.7–10.4)
CHLORIDE SERPL-SCNC: 104 MMOL/L (ref 98–112)
CO2 SERPL-SCNC: 24 MMOL/L (ref 21–32)
CREAT BLD-MCNC: 1.08 MG/DL
EGFRCR SERPLBLD CKD-EPI 2021: 52 ML/MIN/1.73M2 (ref 60–?)
FASTING STATUS PATIENT QL REPORTED: NO
GLUCOSE BLD-MCNC: 109 MG/DL (ref 70–99)
NT-PROBNP SERPL-MCNC: ABNORMAL PG/ML (ref ?–450)
SODIUM SERPL-SCNC: 136 MMOL/L (ref 136–145)

## 2024-10-03 PROCEDURE — 99215 OFFICE O/P EST HI 40 MIN: CPT | Performed by: NURSE PRACTITIONER

## 2024-10-03 RX ORDER — BUMETANIDE 0.25 MG/ML
INJECTION INTRAMUSCULAR; INTRAVENOUS
Status: COMPLETED
Start: 2024-10-03 | End: 2024-10-03

## 2024-10-03 RX ORDER — BUMETANIDE 1 MG/1
1 TABLET ORAL
Qty: 60 TABLET | Refills: 5 | Status: SHIPPED | OUTPATIENT
Start: 2024-10-03

## 2024-10-03 RX ADMIN — BUMETANIDE 1 MG: 0.25 INJECTION INTRAMUSCULAR; INTRAVENOUS at 12:46:00

## 2024-10-03 NOTE — PROGRESS NOTES
Subjective:  Armida is here today, using her cane, noticeable NEGRON upon walking in. Accompanied by her Dtr, Jeanie and then son. States increased swelling in lower legs.     Weight:  Today 130.4#    Home - no  Last visit 127# (hosp)  Labs completed :  by RN - pro bnp. Bmp   Device:  CardioMEMS Interrogation  n/a   IV placed:  no   Measurements :  RLE calf- 13.5 \" ankle- 10.5\"     LLE calf- 13.\" Ankle- 10.2\"   Bumex 1 mg IVP given    Tubigrips size E applied to BLE      Patient and medication assessed. Discussed with APN. Treatments completed- venipuncture, IVP med, compression applied, leg measurements/assessments. .  Medications given- Bumex 1 mg ivp . Extensive education of disease management including- daily weight, salt restriction, s/s to report. .  Reviewed AVS instructions. Patient verbalized understanding.

## 2024-10-03 NOTE — PATIENT INSTRUCTIONS
Continue all your medications as prescribed     Follow up with Dr. Mayes in 2-3 weeks, the office will call to set up a follow-up appointment.  [phone]    Follow up with the specialty care clinic on 10/10/24    Wear knee high Tubigrip stockings during the day and remove at night.      Call if you are having shortness of breath, cough, wheezing, chest pain, dizziness, lightheadedness, heart racing, palpitations, swelling, rapid weight gain, fatigue, weakness, fever or chills.  Call 911 with severe shortness of breath, chest pain or chest pressure not improved after 15 minutes of rest or if feeling faint,  passing out or having a fall     Weigh yourself daily in the morning before breakfast, call if gaining 3 lbs or more overnight or more than 5 lbs in one week.    Keep daily fluids at 48-64 ounces per day (1.5-2 liters of liquid or 6-8 , 8 oz glasses of liquid)    Heart healthy diet. Limit sodium to  2000 mg daily. Common high sodium foods include frozen dinners, soups (not homemade), some cereal, vegetable juice, canned vegetables, lunch meats, processed meats like hotdogs, sausage, hernandez, pepperoni, soy sauce, pre-packaged rice or potatoes. Please remember to read nutrition labels for sodium content.   www.healthyeating.nhlbi.nih.gov    Exercise daily as tolerated, with goal of doing moderate aerobic exercise like walking for about 30 minutes 5 days per week. Start by walking at a slow to moderate pace for 3-5 minutes 2-3 times a day. Pace your activity to prevent shortness of breath or fatigue. Stop exercise if you develop chest pain, lightheadedness, or significant shortness of breath    Always carry a copy of your current medication list with you

## 2024-10-07 ENCOUNTER — LAB REQUISITION (OUTPATIENT)
Dept: LAB | Facility: HOSPITAL | Age: 79
End: 2024-10-07
Payer: MEDICARE

## 2024-10-07 DIAGNOSIS — L13.0 DERMATITIS HERPETIFORMIS: ICD-10-CM

## 2024-10-07 LAB
ANION GAP SERPL CALC-SCNC: 9 MMOL/L (ref 0–18)
BUN BLD-MCNC: 32 MG/DL (ref 9–23)
BUN/CREAT SERPL: 23.7 (ref 10–20)
CALCIUM BLD-MCNC: 9.2 MG/DL (ref 8.7–10.4)
CHLORIDE SERPL-SCNC: 105 MMOL/L (ref 98–112)
CO2 SERPL-SCNC: 26 MMOL/L (ref 21–32)
CREAT BLD-MCNC: 1.35 MG/DL
EGFRCR SERPLBLD CKD-EPI 2021: 40 ML/MIN/1.73M2 (ref 60–?)
GLUCOSE BLD-MCNC: 151 MG/DL (ref 70–99)
OSMOLALITY SERPL CALC.SUM OF ELEC: 300 MOSM/KG (ref 275–295)
POTASSIUM SERPL-SCNC: 4.2 MMOL/L (ref 3.5–5.1)
SODIUM SERPL-SCNC: 140 MMOL/L (ref 136–145)

## 2024-10-07 PROCEDURE — 80048 BASIC METABOLIC PNL TOTAL CA: CPT | Performed by: INTERNAL MEDICINE

## 2024-10-14 ENCOUNTER — TELEPHONE (OUTPATIENT)
Facility: LOCATION | Age: 79
End: 2024-10-14

## 2024-10-14 NOTE — TELEPHONE ENCOUNTER
[facility] Order #[id number] Received and Placed in Dr. Strange Folder for Review and Sign

## 2024-10-21 ENCOUNTER — TELEMEDICINE (OUTPATIENT)
Facility: LOCATION | Age: 79
End: 2024-10-21

## 2024-10-21 ENCOUNTER — NURSE TRIAGE (OUTPATIENT)
Facility: LOCATION | Age: 79
End: 2024-10-21

## 2024-10-21 ENCOUNTER — TELEPHONE (OUTPATIENT)
Dept: CARDIOLOGY CLINIC | Facility: HOSPITAL | Age: 79
End: 2024-10-21

## 2024-10-21 DIAGNOSIS — E87.6 HYPOKALEMIA: ICD-10-CM

## 2024-10-21 DIAGNOSIS — I50.9 ACUTE ON CHRONIC HEART FAILURE, UNSPECIFIED HEART FAILURE TYPE (HCC): Primary | ICD-10-CM

## 2024-10-21 DIAGNOSIS — I50.33 ACUTE ON CHRONIC HEART FAILURE WITH PRESERVED EJECTION FRACTION (HFPEF) (HCC): ICD-10-CM

## 2024-10-21 DIAGNOSIS — N18.31 STAGE 3A CHRONIC KIDNEY DISEASE (HCC): ICD-10-CM

## 2024-10-21 DIAGNOSIS — R60.0 BILATERAL LOWER EXTREMITY EDEMA: Primary | ICD-10-CM

## 2024-10-21 NOTE — TELEPHONE ENCOUNTER
Action Requested: Summary for Provider     []  Critical Lab, Recommendations Needed  [] Need Additional Advice  []   FYI    []   Need Orders  [] Need Medications Sent to Pharmacy  []  Other     SUMMARY: Waqar, home health RN from residential home health calling to report Patient experiencing increased leg swelling of bilateral leg and ankles, 2-3+ pitting and weeping edema. Denies shortness of breath. Pain when walking only. Patient currently on Bumex 1mg twice a day. Patient has a history of CHF.  Advised appointment for evaluation. Daughter and Patient present during the call.  Prefers video appointment. States has done video appointments in the past.  Scheduled with SANIA Heredia today.  Future Appointments   Date Time Provider Department Center   10/21/2024  3:00 PM Ida Moreno APRN [facility]       Reason for call: Edema (+3 bilateral lower ext edema and ankle )  Onset: Data Unavailable                     Reason for Disposition   MODERATE swelling of both ankles (e.g., swelling extends up to the knees) AND new-onset or worsening    Protocols used: Leg Swelling and Edema-A-OH

## 2024-10-21 NOTE — PROGRESS NOTES
INTERNAL MEDICINE VIDEO VISIT NOTE     Patient ID: Armida Hobbs is a 78 year old female.  Today's Date: 10/21/24  HPI  Pleasant 78 yr old female with concerns today for leg swelling.  Her daughter is present with her during this visit and states that bilateral leg and ankle swelling has not improved since her hospital stay.  They state the swelling is not worse however it has not gotten any better either.  She reports sometimes she has weeping to the lower part of her legs.  She has pain in legs with ambulating. She denies any shortness of breath, difficulty breathing, chest pain, dizziness.  The family has a wedding planned in the next 2 weeks and is wondering about travel for her.    There were no vitals filed for this visit.  body mass index is unknown because there is no height or weight on file.  BP Readings from Last 3 Encounters:   10/03/24 101/61   09/28/24 104/54   08/27/24 108/58     The ASCVD Risk score (Chelsea SWANN, et al., 2019) failed to calculate for the following reasons:    The 2019 ASCVD risk score is only valid for ages 40 to 79  Medications reviewed:  Current Outpatient Medications   Medication Sig Dispense Refill    bumetanide (BUMEX) 1 MG Oral Tab Take 1 tablet (1 mg total) by mouth BID (Diuretic). 60 tablet 5    atorvastatin 40 MG Oral Tab Take 1 tablet (40 mg total) by mouth nightly. 30 tablet 3    losartan 25 MG Oral Tab Take 1 tablet (25 mg total) by mouth daily. 30 tablet 3    metoprolol succinate ER 25 MG Oral Tablet 24 Hr Take 0.5 tablets (12.5 mg total) by mouth Daily Beta Blocker. 30 tablet 3    GABAPENTIN 100 MG Oral Cap Take 1 capsule by mouth 3 times daily. 90 capsule 0    Mycophenolate Mofetil 500 MG Oral Tab Take 2 tablets (1,000 mg total) by mouth 2 (two) times daily. 120 tablet 0    predniSONE 2.5 MG Oral Tab Take 2 tablets (5 mg total) by mouth daily. (Patient taking differently: Take 1 tablet (2.5 mg total) by mouth 2 (two) times daily.) 180 tablet 0     leflunomide 10 MG Oral Tab Take 1 tablet (10 mg total) by mouth daily. 90 tablet 1    doxycycline 100 MG Oral Cap Take 1 capsule (100 mg total) by mouth 2 (two) times daily.      ferrous sulfate 325 (65 FE) MG Oral Tab EC Take 1 tablet (325 mg total) by mouth daily with breakfast.      Potassium Chloride ER 20 MEQ Oral Tab CR Take 1 tablet by mouth in the morning and 1 tablet before bedtime. 90 tablet 3    pantoprazole 20 MG Oral Tab EC Take 1 tablet (20 mg total) by mouth every morning before breakfast. TO PREVENT ACID REFLUX. 90 tablet 3    acetaminophen 500 MG Oral Tab Take 1 tablet (500 mg total) by mouth every 4 (four) hours as needed.      clopidogrel 75 MG Oral Tab Take 1 tablet (75 mg total) by mouth daily. FOR HEART STENTS. 90 tablet 9    aspirin 81 MG Oral Tab EC Take 1 tablet (81 mg total) by mouth daily. FOR HEART DISEASE. 90 tablet 9    folic acid 1 MG Oral Tab Take 1 tablet (1 mg total) by mouth daily. (Patient taking differently: Take 1 tablet (1 mg total) by mouth every morning.) 90 tablet 3    cholecalciferol 25 MCG (1000 UT) Oral Tab Take 1 tablet (1,000 Units total) by mouth daily. 90 tablet 3         Assessment & Plan    1. Bilateral lower extremity edema (Primary)  Overview:  Formatting of this note might be different from the original. Multifactorial including dependent and venous insufficiency. Compression stockings and leg elevation.. Lasix 20 mg as directed.    2. Acute on chronic heart failure with preserved ejection fraction (HFpEF) ([facility])  3. Stage 3a chronic kidney disease ([facility])    Plan  Visit today for continued leg swelling, legs weeping at times, pain with ambulation.  This is not worsened over the past 2 weeks, however the swelling has not improved since her last hospital visit.  She has no shortness of breath, chest pain, difficulty breathing, dizziness, or any other severe symptoms. We discussed her current medications including the Bumex which she takes 1 mg twice a day currently,  and GFR is 40.  We will not make any changes to her medications at this time.  She has a follow-up appointment tomorrow with CHF specialty clinic.  We have a low threshold for her to return to the ER if symptoms are to worsen or become severe including any shortness of breath difficulty breathing chest pain etc.    Follow Up: Return keep scheduled appointment tomorrow with CHF speciaty clinic..        Objective: Results:   Physical Exam  Nursing note reviewed.   Constitutional:       General: She is not in acute distress.     Appearance: Normal appearance.   HENT:      Head: Normocephalic and atraumatic.      Right Ear: External ear normal.      Left Ear: External ear normal.      Nose: Nose normal.   Eyes:      Conjunctiva/sclera: Conjunctivae normal.   Pulmonary:      Effort: Pulmonary effort is normal. No respiratory distress.   Musculoskeletal:      Cervical back: Normal range of motion and neck supple.   Skin:     Coloration: Skin is not jaundiced.   Neurological:      General: No focal deficit present.      Mental Status: She is alert and oriented to person, place, and time. Mental status is at baseline.   Psychiatric:         Mood and Affect: Mood normal.         Thought Content: Thought content normal.        Reviewed:  Patient Active Problem List    Diagnosis    Hypoxia    Bullous pemphigoid ([facility])    COVID-19 virus infection    Acute on chronic heart failure ([facility])    Lumbar radiculopathy    Bilateral carotid bruits     Formatting of this note might be different from the original. 12/2020 Mild heterogenous atherosclerotic plaque bilateral with less than 50% stenosis in both right and left internal carotid arteries, based on NASCET criteria. Anterograde flow present in both vertebral arteries      Lung granuloma ([facility])    Senile purpura ([facility])    Anxiety and depression    Poor short term memory    Acute on chronic heart failure with preserved ejection fraction (HFpEF) ([facility])    Neutropenia, unspecified ([facility])     Coronary artery disease involving native coronary artery of native heart without angina pectoris    S/P drug eluting coronary stent placement    Chronic diastolic congestive heart failure ([facility])    Stage 3a chronic kidney disease ([facility])    Hypertension goal BP (blood pressure) < 130/80    S/P TAVR (transcatheter aortic valve replacement)    PAF (paroxysmal atrial fibrillation) ([facility])    Rheumatoid arthritis involving both ankles with positive rheumatoid factor ([facility])     Formatting of this note might be different from the original. Stable on steroids      Bilateral lower extremity edema     Formatting of this note might be different from the original. Multifactorial including dependent and venous insufficiency. Compression stockings and leg elevation.. Lasix 20 mg as directed.      Varicose veins of both lower extremities with pain    Mixed hyperlipidemia    Gastroesophageal reflux disease without esophagitis    Postmenopausal atrophic vaginitis    Sensorineural hearing loss, bilateral      Allergies[1]     Social History     Socioeconomic History    Marital status:    Tobacco Use    Smoking status: Never     Passive exposure: Never    Smokeless tobacco: Never   Vaping Use    Vaping status: Never Used   Substance and Sexual Activity    Alcohol use: Never    Drug use: Never   Other Topics Concern    Caffeine Concern No    Seat Belt Yes    Special Diet No    Breast feeding No    Reaction to local anesthetic No    Pt has a pacemaker No    Pt has a defibrillator No   Social History Narrative    ** Merged History Encounter **          Social Drivers of Health     Financial Resource Strain: Low Risk  (12/26/2023)    Financial Resource Strain     Difficulty of Paying Living Expenses: Not very hard     Med Affordability: No   Food Insecurity: No Food Insecurity (9/25/2024)    Food Insecurity     Food Insecurity: Never true   Transportation Needs: No Transportation Needs (9/24/2024)    Transportation Needs     Lack of  Transportation: No   Housing Stability: Low Risk  (9/24/2024)    Housing Stability     Housing Instability: No      Review of Systems   Constitutional: Negative.    Respiratory: Negative.     Cardiovascular:  Positive for leg swelling.   Gastrointestinal: Negative.    Skin: Negative.    Neurological: Negative.      All other systems negative unless otherwise stated in ROS or HPI above.       SANIA Rodriguez  Internal Medicine       Call office with any questions or seek emergency care if necessary.   Patient understands and agrees to follow directions and advice.    Telehealth Verbal Consent   I conducted a telehealth visit with Armida Hobbs today, 10/21/24, which was completed using two-way, real-time interactive audio and video communication. This has been done in good awa to provide continuity of care in the best interest of the provider-patient relationship, due to the COVID -19 public health crisis/national emergency where restrictions of face-to-face office visits are ongoing. Every conscious effort was taken to allow for sufficient and adequate time to complete the visit.The patient was made aware of the limitations of the telehealth visit, including treatment limitations as no physical exam could be performed.  The patient was advised to call 911 or to go to the ER in case there was an emergency.  The patient was also advised of the potential privacy & security concerns related to the telehealth platform. The patient was made aware of where to find [facility]'s notice of privacy practices, telehealth consent form and other related consent forms and documents.  which are located on the [facility] website. The patient verbally agreed to telehealth consent form, related consents and the risks discussed.  Lastly, the patient confirmed that they were in [place]. Included in this visit, time may have been spent reviewing labs, medications, radiology tests and decision making. Appropriate medical decision-making and tests are  ordered as detailed in the plan of care above.  Coding/billing information is submitted for this visit based on complexity of care and/or time spent for the visit.  ----------------------------------------- PATIENT INSTRUCTIONS-----------------------------------------     Patient Instructions   Continue current medications as prescribed.  Continue to keep daily fluid intake to 6-8, 8 ounce glasses of water fluids daily.  Elevate legs at home.  Continue to monitor symptoms.  Have a low threshold to go to the ER for worsening or severe symptoms. If you are having shortness of breath, cough, wheezing, chest pain, dizziness, lightheadedness, heart racing, palpitations, swelling, rapid weight gain, fatigue, weakness, fever or chills.  Call 911 with severe shortness of breath, chest pain or chest pressure not improved after 15 minutes of rest or if feeling faint,  passing out or having a fall.  Follow-up with CHF specialty clinic tomorrow at 2:30 in the afternoon as scheduled.           [1]   Allergies  Allergen Reactions    Codeine PAIN     Chest pain     Sulfa Antibiotics HIVES    Amlodipine OTHER (SEE COMMENTS)     Swelling and leg cramps

## 2024-10-21 NOTE — PATIENT INSTRUCTIONS
Continue current medications as prescribed.  Continue to keep daily fluid intake to 6-8, 8 ounce glasses of water fluids daily.  Elevate legs at home.  Continue to monitor symptoms.  Have a low threshold to go to the ER for worsening or severe symptoms. If you are having shortness of breath, cough, wheezing, chest pain, dizziness, lightheadedness, heart racing, palpitations, swelling, rapid weight gain, fatigue, weakness, fever or chills.  Call 911 with severe shortness of breath, chest pain or chest pressure not improved after 15 minutes of rest or if feeling faint,  passing out or having a fall.  Follow-up with CHF specialty clinic tomorrow at 2:30 in the afternoon as scheduled.

## 2024-10-21 NOTE — TELEPHONE ENCOUNTER
Daughter called to reschedule missed appointment. Offered Tuesday am or afternoon, she requested later in the afternoon. Assisted to schedule for 2 pm. BMP order entered and scheduled.

## 2024-10-22 ENCOUNTER — LAB ENCOUNTER (OUTPATIENT)
Dept: LAB | Facility: HOSPITAL | Age: 79
End: 2024-10-22
Attending: NURSE PRACTITIONER
Payer: MEDICARE

## 2024-10-22 ENCOUNTER — OFFICE VISIT (OUTPATIENT)
Dept: CARDIOLOGY CLINIC | Facility: HOSPITAL | Age: 79
End: 2024-10-22
Attending: NURSE PRACTITIONER
Payer: MEDICARE

## 2024-10-22 VITALS
OXYGEN SATURATION: 98 % | WEIGHT: 129.63 LBS | BODY MASS INDEX: 25 KG/M2 | DIASTOLIC BLOOD PRESSURE: 74 MMHG | HEART RATE: 95 BPM | SYSTOLIC BLOOD PRESSURE: 133 MMHG

## 2024-10-22 DIAGNOSIS — Z51.81 THERAPEUTIC DRUG MONITORING: ICD-10-CM

## 2024-10-22 DIAGNOSIS — Z95.2 S/P TAVR (TRANSCATHETER AORTIC VALVE REPLACEMENT): ICD-10-CM

## 2024-10-22 DIAGNOSIS — I48.0 PAF (PAROXYSMAL ATRIAL FIBRILLATION) (HCC): ICD-10-CM

## 2024-10-22 DIAGNOSIS — N18.31 STAGE 3A CHRONIC KIDNEY DISEASE (HCC): ICD-10-CM

## 2024-10-22 DIAGNOSIS — R60.0 BILATERAL LOWER EXTREMITY EDEMA: ICD-10-CM

## 2024-10-22 DIAGNOSIS — I50.9 ACUTE ON CHRONIC HEART FAILURE, UNSPECIFIED HEART FAILURE TYPE (HCC): ICD-10-CM

## 2024-10-22 DIAGNOSIS — I50.33 ACUTE ON CHRONIC HEART FAILURE WITH PRESERVED EJECTION FRACTION (HFPEF) (HCC): Primary | ICD-10-CM

## 2024-10-22 DIAGNOSIS — E87.6 HYPOKALEMIA: ICD-10-CM

## 2024-10-22 DIAGNOSIS — M06.9 RHEUMATOID ARTHRITIS, INVOLVING UNSPECIFIED SITE, UNSPECIFIED WHETHER RHEUMATOID FACTOR PRESENT (HCC): ICD-10-CM

## 2024-10-22 PROBLEM — U07.1 COVID-19 VIRUS INFECTION: Status: RESOLVED | Noted: 2023-12-14 | Resolved: 2024-10-22

## 2024-10-22 PROBLEM — R09.02 HYPOXIA: Status: RESOLVED | Noted: 2024-09-24 | Resolved: 2024-10-22

## 2024-10-22 LAB
ALBUMIN SERPL-MCNC: 3.9 G/DL (ref 3.2–4.8)
ALT SERPL-CCNC: 10 U/L
ANION GAP SERPL CALC-SCNC: 8 MMOL/L (ref 0–18)
AST SERPL-CCNC: 12 U/L (ref ?–34)
BASOPHILS # BLD AUTO: 0.04 X10(3) UL (ref 0–0.2)
BASOPHILS NFR BLD AUTO: 0.5 %
BUN BLD-MCNC: 32 MG/DL (ref 9–23)
BUN/CREAT SERPL: 27.6 (ref 10–20)
CALCIUM BLD-MCNC: 9.1 MG/DL (ref 8.7–10.4)
CHLORIDE SERPL-SCNC: 109 MMOL/L (ref 98–112)
CO2 SERPL-SCNC: 23 MMOL/L (ref 21–32)
CREAT BLD-MCNC: 1.16 MG/DL
CREAT BLD-MCNC: 1.16 MG/DL
CRP SERPL-MCNC: 2.2 MG/DL (ref ?–1)
DEPRECATED RDW RBC AUTO: 51.1 FL (ref 35.1–46.3)
EGFRCR SERPLBLD CKD-EPI 2021: 48 ML/MIN/1.73M2 (ref 60–?)
EGFRCR SERPLBLD CKD-EPI 2021: 48 ML/MIN/1.73M2 (ref 60–?)
EOSINOPHIL # BLD AUTO: 0.32 X10(3) UL (ref 0–0.7)
EOSINOPHIL NFR BLD AUTO: 3.8 %
ERYTHROCYTE [DISTWIDTH] IN BLOOD BY AUTOMATED COUNT: 15.2 % (ref 11–15)
ERYTHROCYTE [SEDIMENTATION RATE] IN BLOOD: 41 MM/HR
FASTING STATUS PATIENT QL REPORTED: NO
GLUCOSE BLD-MCNC: 106 MG/DL (ref 70–99)
HCT VFR BLD AUTO: 28.4 %
HGB BLD-MCNC: 9.6 G/DL
IMM GRANULOCYTES # BLD AUTO: 0.03 X10(3) UL (ref 0–1)
IMM GRANULOCYTES NFR BLD: 0.4 %
LYMPHOCYTES # BLD AUTO: 1.91 X10(3) UL (ref 1–4)
LYMPHOCYTES NFR BLD AUTO: 22.8 %
MCH RBC QN AUTO: 31.4 PG (ref 26–34)
MCHC RBC AUTO-ENTMCNC: 33.8 G/DL (ref 31–37)
MCV RBC AUTO: 92.8 FL
MONOCYTES # BLD AUTO: 0.68 X10(3) UL (ref 0.1–1)
MONOCYTES NFR BLD AUTO: 8.1 %
NEUTROPHILS # BLD AUTO: 5.38 X10 (3) UL (ref 1.5–7.7)
NEUTROPHILS # BLD AUTO: 5.38 X10(3) UL (ref 1.5–7.7)
NEUTROPHILS NFR BLD AUTO: 64.4 %
OSMOLALITY SERPL CALC.SUM OF ELEC: 297 MOSM/KG (ref 275–295)
PLATELET # BLD AUTO: 251 10(3)UL (ref 150–450)
POTASSIUM SERPL-SCNC: 3.5 MMOL/L (ref 3.5–5.1)
RBC # BLD AUTO: 3.06 X10(6)UL
SODIUM SERPL-SCNC: 140 MMOL/L (ref 136–145)
WBC # BLD AUTO: 8.4 X10(3) UL (ref 4–11)

## 2024-10-22 PROCEDURE — 99215 OFFICE O/P EST HI 40 MIN: CPT | Performed by: NURSE PRACTITIONER

## 2024-10-22 PROCEDURE — 36415 COLL VENOUS BLD VENIPUNCTURE: CPT

## 2024-10-22 PROCEDURE — 82040 ASSAY OF SERUM ALBUMIN: CPT

## 2024-10-22 PROCEDURE — 85652 RBC SED RATE AUTOMATED: CPT

## 2024-10-22 PROCEDURE — 84450 TRANSFERASE (AST) (SGOT): CPT

## 2024-10-22 PROCEDURE — 80048 BASIC METABOLIC PNL TOTAL CA: CPT

## 2024-10-22 PROCEDURE — 84460 ALANINE AMINO (ALT) (SGPT): CPT

## 2024-10-22 PROCEDURE — 82565 ASSAY OF CREATININE: CPT

## 2024-10-22 PROCEDURE — 85025 COMPLETE CBC W/AUTO DIFF WBC: CPT

## 2024-10-22 PROCEDURE — 86140 C-REACTIVE PROTEIN: CPT

## 2024-10-22 RX ORDER — POTASSIUM CHLORIDE 1500 MG/1
TABLET, EXTENDED RELEASE ORAL
Status: COMPLETED
Start: 2024-10-22 | End: 2024-10-22

## 2024-10-22 RX ORDER — POTASSIUM CHLORIDE 1500 MG/1
1 TABLET, EXTENDED RELEASE ORAL DAILY
Qty: 90 TABLET | Refills: 3 | Status: SHIPPED | OUTPATIENT
Start: 2024-10-22

## 2024-10-22 RX ORDER — BUMETANIDE 0.25 MG/ML
INJECTION, SOLUTION INTRAMUSCULAR; INTRAVENOUS
Status: COMPLETED
Start: 2024-10-22 | End: 2024-10-22

## 2024-10-22 RX ORDER — SPIRONOLACTONE 25 MG/1
12.5 TABLET ORAL DAILY
Qty: 15 TABLET | Refills: 3 | Status: SHIPPED | OUTPATIENT
Start: 2024-10-22

## 2024-10-22 RX ADMIN — POTASSIUM CHLORIDE 20 MEQ: 1500 TABLET, EXTENDED RELEASE ORAL at 16:29:00

## 2024-10-22 RX ADMIN — BUMETANIDE 1 MG: 0.25 INJECTION, SOLUTION INTRAMUSCULAR; INTRAVENOUS at 16:15:00

## 2024-10-22 NOTE — PATIENT INSTRUCTIONS
Begin spironolactone 12.5 mg daily     Decrease potassium chloride to 20 meq daily     Continue all your medications as prescribed     Follow up with Dr. Mayes in 3-4 weeks, call to make an appointment at [phone]     Follow up with the specialty care clinic in 1 week -10/30/24 with blood test a the lab prior to visit       Call if you are having shortness of breath, cough, wheezing, chest pain, dizziness, lightheadedness, heart racing, palpitations, swelling, rapid weight gain, fatigue, weakness, fever or chills.  Call 911 with severe shortness of breath, chest pain or chest pressure not improved after 15 minutes of rest or if feeling faint,  passing out or having a fall     Weigh yourself daily in the morning before breakfast, call if gaining 3 lbs or more overnight or more than 5 lbs in one week.    Keep daily fluids at 48-64 ounces per day (1.5-2 liters of liquid or 6-8 , 8 oz glasses of liquid)    Heart healthy diet. Limit sodium to  2000 mg daily. Common high sodium foods include frozen dinners, soups (not homemade), some cereal, vegetable juice, canned vegetables, lunch meats, processed meats like hotdogs, sausage, hernandez, pepperoni, soy sauce, pre-packaged rice or potatoes. Please remember to read nutrition labels for sodium content.   www.healthyeating.nhlbi.nih.gov    Exercise daily as tolerated, with goal of doing moderate aerobic exercise like walking for about 30 minutes 5 days per week. Start by walking at a slow to moderate pace for 3-5 minutes 2-3 times a day. Pace your activity to prevent shortness of breath or fatigue. Stop exercise if you develop chest pain, lightheadedness, or significant shortness of breath    Always carry a copy of your current medication list with you

## 2024-10-22 NOTE — PROGRESS NOTES
[facility]    Armida Hobbs Patient Status:  Outpatient    1945 MRN [id number]   Location [facility] MD Dr. Jose Alberto William       Armida Hobbs is a 78 year old female who presents to clinic for assessment and management for hospitalization for acute on chronic heart failure.     Admitted -24 with worsening whitman and increased abhijit LE edema with ARF with hypoxemia due to acute pulmonary edema. Pro bnp 22,911. Venous dopplers of LE negative for DVT. Home on bumex 1 mg bid. EF normal on last echo in 2024, with mod MR.     Problem List:  Acute on chronic HFpEF, NYHA class III, Stage C, EF 55-60%  CAD with PCI of  -  TAVR   Hx mitral valve endocarditis  Mod MR  Paroxysmal atrial fib  HTN  HLD  CKD stage 4  DM type 2  PATRICK  RA  GERD    Subjective:  Here with daughter.     No whitman walking 100-200 feet at home, using walker  Persistent abhijit LE edema, unchanged, difficult wearing tubi , hard to put on   Whitman walking up flight of stairs.   No orthopnea with 2-3 pillows, sleeps on side,   Up to bathroom 1 x  night. Good appetite   Home wt stable, cannot remember #,   Home SBP nml, none less than 100   Mechanical fall over a week ago, tripped on family's foot, fell to L knee, soft bruising /swelling.     Planning on going to son's wedding in [place] in 3 weeks.     Review of Systems:  Constitutional: negative for chills or fever  Respiratory:  negative for cough, hemoptysis and wheezing  Cardiovascular: negative for chest pain, exertional chest pressure/discomfort, near-syncope, orthopnea and palpitations  Gastrointestinal: negative for abdominal pain, diarrhea, melena, nausea and vomiting  Hematologic/lymphatic: negative  Musculoskeletal: negative for muscle weakness and myalgias    Objective:  Lab Results   Component Value Date/Time    WBC 8.4 10/22/2024 03:04 PM    HGB 9.6 (L) 10/22/2024 03:04 PM    HCT 28.4 (L) 10/22/2024 03:04 PM     .0 10/22/2024 03:04 PM    CREATSERUM 1.16 (H) 10/22/2024 03:04 PM    CREATSERUM 1.16 (H) 10/22/2024 03:04 PM    BUN 32 (H) 10/22/2024 03:04 PM     10/22/2024 03:04 PM    K 3.5 10/22/2024 03:04 PM     10/22/2024 03:04 PM    CO2 23.0 10/22/2024 03:04 PM     (H) 10/22/2024 03:04 PM    CA 9.1 10/22/2024 03:04 PM    ALB 3.9 10/22/2024 03:04 PM    ALKPHO 360 (H) 09/25/2024 07:44 AM    BILT 4.1 (H) 09/25/2024 07:44 AM    TP 5.7 09/25/2024 07:44 AM    AST 12 10/22/2024 03:04 PM    ALT 10 10/22/2024 03:04 PM    PTT 65.3 (H) 11/03/2022 11:10 AM    INR 1.39 (H) 01/06/2023 03:10 PM    PTP 16.8 (H) 01/06/2023 03:10 PM    T4F 0.8 01/07/2023 05:37 AM    TSH 0.776 07/18/2024 11:34 AM    LIP 39 06/27/2023 01:46 PM    DDIMER 0.45 07/06/2022 03:28 PM    ESRML 41 (H) 10/22/2024 03:04 PM    CRP 2.20 (H) 10/22/2024 03:04 PM    MG 2.1 09/27/2024 07:19 AM    PHOS 3.4 09/28/2024 06:10 AM    CK 29 (L) 12/18/2023 02:33 PM    B12 1,735 (H) 04/12/2023 10:51 AM    PGLU 141 (H) 01/06/2023 03:08 PM       Labs drawn by RN: yasmani, results reviewed with patient.  Personally interpreted results     /74   Pulse 95   Wt 129 lb 9.6 oz (58.8 kg)   SpO2 98%   BMI 25.31 kg/m²       Date  Clinic wt Home wt MCI wt DC wt IV med  PO med   8/13/24   123      9/28/24    127     10/3/24 130 --       10/22/24 129    Bumex 1 mg IV push Kdur 20 meq po x1                General appearance: alert, appears stated age and cooperative  Neck: + JVD > 90 degrees, to mid neck  Lungs: clear to auscultation bilaterally  Heart: S1, S2 normal, + murmur, no click, rub or gallop, regular rate and rhythm  Abdomen: soft, non-tender; bowel sounds normal; no masses,  no abdominal distension  Extremities:  no cyanosis, +2 abhijit LE edema from below knees to pedals, no open wounds or blisters, soft ecchymosis on L lateral knee to posterior knee, no hematoma.  Pulses: 2+ and symmetric  Neurologic: Grossly normal  Measurements:   10/22/24:RLE calf- 13.5 \"  ankle- 10.5\"     LLE calf- 13.\" Ankle- 10.2\"   10/3/24: RLE calf- 13.5 \" ankle- 10.5\"     LLE calf- 13.\" Ankle- 10.2\"     Diagnostics:  EC24 SR 92 bpm, with pac's. LBBB    SHAWNA: 24, EF 55-60%, no wma, grade 1 diastolic dysfunction, Left  atrial dilation,  mild-mod MR, bioprosthetic aortic valve, no vegetation on mitral valve or tricuspid valve.     Echo: 23  D 5-60%, grade 2 DD, moderate LVH with septal knob.  No WMA.  Moderate LA dilation.  Normal functioning bioprosthetic valve/TAVR noted.  Severe MR, moderate mitral stenosis.  Mod TR.  PAS 44 mmHg, RAP 3 mmHg.    Nuclear stress test: 3/31/22  No fixed perfusion defects. No reversing perfusion defect.     Heart cath: , PCI of LAD    Assessment:  Acute on chronic HF p EF, NYHA class III, stage C  -EF 55-60 %  -hx TAVR , mild mod MR , normal bioprosthetic aortic valve   -CAD with PCI of LAD   -diuretics bumex 1 mg bid    -GDMT : losartan 25 mg daily , toprol 12.5 mg daily   -weight stable, persistent abhijit LE edema   - BP  normal   -Renal function stable/improved, bun/cr: 32/1.16,  K 3.5  -pro bnp 23,561 (10/3/24) <-8560 (24)  -hypervolemic with persistent abhijit LE edema, mild harris with stairs. Renal function stable.   -bumex 1 mg IV push   -kdur 20 meq po x1 given  -begin spironolactone 12.5 mg daily   - continue bumex 1 mg bid  -decrease potassium chloride to 20 meq daily   - tubigrip compression stockings donned  - [facility] in 1 week with repeat bmp   -she would not be a good candidate for adding SGLT2i with hx of MV endocarditis    CKD stage IIIb   -cr 1.16  -creatinine stable  -cr baseline 1.4- 1.6    pAF  -RRR, SR on low dose toprol   -on asa 81 mg daily     Plan:     Patient Instructions   Begin spironolactone 12.5 mg daily     Decrease potassium chloride to 20 meq daily     Continue all your medications as prescribed     Follow up with Dr. Mayes in 3-4 weeks, call to make an appointment at  [phone]     Follow up with the specialty care clinic in 1 week -10/30/24 with blood test a the lab prior to visit       Call if you are having shortness of breath, cough, wheezing, chest pain, dizziness, lightheadedness, heart racing, palpitations, swelling, rapid weight gain, fatigue, weakness, fever or chills.  Call 911 with severe shortness of breath, chest pain or chest pressure not improved after 15 minutes of rest or if feeling faint,  passing out or having a fall     Weigh yourself daily in the morning before breakfast, call if gaining 3 lbs or more overnight or more than 5 lbs in one week.    Keep daily fluids at 48-64 ounces per day (1.5-2 liters of liquid or 6-8 , 8 oz glasses of liquid)    Heart healthy diet. Limit sodium to  2000 mg daily. Common high sodium foods include frozen dinners, soups (not homemade), some cereal, vegetable juice, canned vegetables, lunch meats, processed meats like hotdogs, sausage, hernandez, pepperoni, soy sauce, pre-packaged rice or potatoes. Please remember to read nutrition labels for sodium content.   www.healthyeating.nhlbi.nih.gov    Exercise daily as tolerated, with goal of doing moderate aerobic exercise like walking for about 30 minutes 5 days per week. Start by walking at a slow to moderate pace for 3-5 minutes 2-3 times a day. Pace your activity to prevent shortness of breath or fatigue. Stop exercise if you develop chest pain, lightheadedness, or significant shortness of breath    Always carry a copy of your current medication list with you          Current Outpatient Medications:     NIACIN CR OR, Take 1 tablet by mouth daily., Disp: , Rfl:     spironolactone 25 MG Oral Tab, Take 0.5 tablets (12.5 mg total) by mouth daily., Disp: 15 tablet, Rfl: 3    Potassium Chloride ER 20 MEQ Oral Tab CR, Take 1 tablet by mouth daily., Disp: 90 tablet, Rfl: 3    bumetanide (BUMEX) 1 MG Oral Tab, Take 1 tablet (1 mg total) by mouth BID (Diuretic)., Disp: 60 tablet, Rfl: 5     atorvastatin 40 MG Oral Tab, Take 1 tablet (40 mg total) by mouth nightly., Disp: 30 tablet, Rfl: 3    losartan 25 MG Oral Tab, Take 1 tablet (25 mg total) by mouth daily., Disp: 30 tablet, Rfl: 3    metoprolol succinate ER 25 MG Oral Tablet 24 Hr, Take 0.5 tablets (12.5 mg total) by mouth Daily Beta Blocker., Disp: 30 tablet, Rfl: 3    GABAPENTIN 100 MG Oral Cap, Take 1 capsule by mouth 3 times daily., Disp: 90 capsule, Rfl: 0    Mycophenolate Mofetil 500 MG Oral Tab, Take 2 tablets (1,000 mg total) by mouth 2 (two) times daily., Disp: 120 tablet, Rfl: 0    predniSONE 2.5 MG Oral Tab, Take 2 tablets (5 mg total) by mouth daily. (Patient taking differently: Take 1 tablet (2.5 mg total) by mouth 2 (two) times daily.), Disp: 180 tablet, Rfl: 0    leflunomide 10 MG Oral Tab, Take 1 tablet (10 mg total) by mouth daily., Disp: 90 tablet, Rfl: 1    doxycycline 100 MG Oral Cap, Take 1 capsule (100 mg total) by mouth 2 (two) times daily., Disp: , Rfl:     ferrous sulfate 325 (65 FE) MG Oral Tab EC, Take 1 tablet (325 mg total) by mouth daily with breakfast., Disp: , Rfl:     pantoprazole 20 MG Oral Tab EC, Take 1 tablet (20 mg total) by mouth every morning before breakfast. TO PREVENT ACID REFLUX., Disp: 90 tablet, Rfl: 3    acetaminophen 500 MG Oral Tab, Take 1 tablet (500 mg total) by mouth every 4 (four) hours as needed., Disp: , Rfl:     clopidogrel 75 MG Oral Tab, Take 1 tablet (75 mg total) by mouth daily. FOR HEART STENTS., Disp: 90 tablet, Rfl: 9    aspirin 81 MG Oral Tab EC, Take 1 tablet (81 mg total) by mouth daily. FOR HEART DISEASE., Disp: 90 tablet, Rfl: 9    cholecalciferol 25 MCG (1000 UT) Oral Tab, Take 1 tablet (1,000 Units total) by mouth daily., Disp: 90 tablet, Rfl: 3    folic acid 1 MG Oral Tab, Take 1 tablet (1 mg total) by mouth daily. (Patient not taking: Reported on 10/22/2024), Disp: 90 tablet, Rfl: 3    HASMUKH HUNT NP  10/22/24       45 minutes spent on patient education, chart review and  documentation:  Patient instructed regarding clinic procedures, hours, purpose of clinic visits, sodium restricted diet, low sodium foods, fluid restrictions, daily weights, medication regimen s/s of heart failure exacerbation and CKD, and  when to call APN/clinic. Provided patient with  counseling, coordination of care and education given. Patient receptive.

## 2024-10-22 NOTE — PROGRESS NOTES
Subjective: Denies lightheadedness, or dizziness, chest pain or palpitations or shortness of breath unless doing stairs (has 8 to go up to 2nd floor and 8 to lower level). Reports a wheeze and cough. Uses 2 or 3 pillows when sleeping. Reports feeling a little congesting in am.     Not wearing compressions today. Family states she usually has some on. They have been using ace wraps due to difficulty to applying spandigrip size E.    Weight:  Today 129.6 lb  Home NONE Last visit 130.4 LB  Labs completed: at lab BMP@lab  Device:  CardioMEMS Interrogation N/A  IV placed:  NO  Measurements:  RLE Calf: 15.5\"Ankle: 11.5\"  LLE Calf: 14\" Ankle: 11.25\"  Medication given bumex 1 mg IV push, potassium 20 meq.    Patient and medication assessed. Discussed with APN. Treatments completed legs measured, spandigrip compressions size F applied bilaterally, oral and IV push medication administration. Repeat blood pressure stable.  Medication given bumex 1 mg IV push, potassium 20 meq.  Extensive education on disease management including limiting sodium in diet and wearing compressions on plane.  Reviewed AVS instructions. Patient verbalized understanding.

## 2024-10-23 ENCOUNTER — TELEPHONE (OUTPATIENT)
Dept: CARDIOLOGY CLINIC | Facility: HOSPITAL | Age: 79
End: 2024-10-23

## 2024-10-23 RX ORDER — POTASSIUM CHLORIDE 1500 MG/1
1 TABLET, EXTENDED RELEASE ORAL 2 TIMES DAILY
Qty: 90 TABLET | Refills: 3 | OUTPATIENT
Start: 2024-10-23

## 2024-10-23 NOTE — TELEPHONE ENCOUNTER
Disp Refills Start End    Potassium Chloride ER 20 MEQ Oral Tab CR 90 tablet 3 10/22/2024 --    Sig - Route: Take 1 tablet by mouth daily. - Oral    Sent to pharmacy as: Potassium Chloride ER 20 MEQ Oral Tablet Extended Release    Notes to Pharmacy: NEW DOSE    E-Prescribing Status: Receipt confirmed by pharmacy (10/22/2024  4:15 PM CDT)      Associated Diagnoses    Hypokalemia        Pharmacy    [id number] - [place], [place] - [address] [place] [address] [phone], [phone]

## 2024-10-24 ENCOUNTER — MED REC SCAN ONLY (OUTPATIENT)
Facility: LOCATION | Age: 79
End: 2024-10-24

## 2024-10-25 ENCOUNTER — NURSE TRIAGE (OUTPATIENT)
Facility: LOCATION | Age: 79
End: 2024-10-25

## 2024-10-25 DIAGNOSIS — M81.0 AGE-RELATED OSTEOPOROSIS WITHOUT CURRENT PATHOLOGICAL FRACTURE: ICD-10-CM

## 2024-10-25 NOTE — TELEPHONE ENCOUNTER
Action Requested: Summary for Provider     []  Critical Lab, Recommendations Needed  [] Need Additional Advice  []   FYI    []   Need Orders  [] Need Medications Sent to Pharmacy  []  Other     SUMMARY: Recomemnded visit tofay or tomorrow. Home health will notify patients family to call back. Currently appointment tomorrow at Dr. Kingston, if this does not work recommendation is for immediate care.     Reason for call: Shoulder Injury  Onset: 2-3 weeks   [facility] called in.     Right shoulder pain and effusion after fall a few weeks ago. Pain is 8/10 with elevation of arm.   Reason for Disposition   MODERATE pain (e.g., interferes with normal activities) and high-risk adult (e.g., age > 60 years, osteoporosis, chronic steroid use)    Protocols used: Shoulder Injury-A-OH

## 2024-10-26 ENCOUNTER — APPOINTMENT (OUTPATIENT)
Dept: GENERAL RADIOLOGY | Age: 79
End: 2024-10-26
Attending: NURSE PRACTITIONER
Payer: MEDICARE

## 2024-10-26 ENCOUNTER — HOSPITAL ENCOUNTER (OUTPATIENT)
Age: 79
Discharge: HOME | End: 2024-10-26
Payer: MEDICARE

## 2024-10-26 VITALS
HEART RATE: 104 BPM | OXYGEN SATURATION: 99 % | RESPIRATION RATE: 26 BRPM | TEMPERATURE: 97 F | DIASTOLIC BLOOD PRESSURE: 65 MMHG | SYSTOLIC BLOOD PRESSURE: 112 MMHG

## 2024-10-26 DIAGNOSIS — S49.91XA INJURY OF RIGHT SHOULDER, INITIAL ENCOUNTER: Primary | ICD-10-CM

## 2024-10-26 PROCEDURE — 73030 X-RAY EXAM OF SHOULDER: CPT | Performed by: NURSE PRACTITIONER

## 2024-10-26 PROCEDURE — 99213 OFFICE O/P EST LOW 20 MIN: CPT | Performed by: NURSE PRACTITIONER

## 2024-10-26 NOTE — ED INITIAL ASSESSMENT (HPI)
Per daughter, pt fell 2 weeks ago and has pain to her R shoulder. Pt reports that she tripped and fell. Pt stated that R shoulder pain radiates down her arm. Pt denied hitting head and denied LOC.

## 2024-10-26 NOTE — ED PROVIDER NOTES
Patient Seen in: [facility]    History   CC: shoulder injury  HPI: Armida Hobbs 78 year old female  who presents with her daughter status post injury 3 weeks ago in which daughter states patient fell at home however patient did not tell anybody right away and has had continued right shoulder pain since.  Patient states she tripped and denies hitting her head or loss of consciousness.  Denies neck pain, headaches, dizziness, vision changes or vomiting.  Daughter states patient is oriented to her baseline.  Patient advises pain is worse with movement at the level of the right shoulder.  Denies numbness, tingling or weakness associated.  Denies open wounds.    Past Medical History:    Anxiety and depression    AS (aortic stenosis)    s/p TAVR    Asthma (HCC)    CAD (coronary artery disease)    CHF (congestive heart failure) (HCC)    Diastolic    Chronic atrial fibrillation (HCC)    CKD (chronic kidney disease)    Congestive heart disease (HCC)    Depression    Essential hypertension    Hearing impairment    bilateral hearing aids    High cholesterol    Hyperlipidemia    PONV (postoperative nausea and vomiting)    RA (rheumatoid arthritis) (HCC)       Past Surgical History:   Procedure Laterality Date    Appendectomy      Cath bare metal stent (bms)  2022    Cardiac Stents Placement x4    Other surgical history Bilateral     Gel injections to bilateral knees    Repair ing hernia,3+y/o,reducibl      Valve repair  11/03/2022       Family History   Problem Relation Age of Onset    Other (emphysema) Father     Dementia Mother     No Known Problems Daughter     No Known Problems Daughter     No Known Problems Son     No Known Problems Son     Other (Osteoarthritis) Sister     Heart Attack Brother     Cancer Brother     Heart Disorder Brother        Social History     Socioeconomic History    Marital status:    Tobacco Use    Smoking status: Never     Passive exposure: Never    Smokeless tobacco: Never    Vaping Use    Vaping status: Never Used   Substance and Sexual Activity    Alcohol use: Never    Drug use: Never   Other Topics Concern    Caffeine Concern No    Seat Belt Yes    Special Diet No    Breast feeding No    Reaction to local anesthetic No    Pt has a pacemaker No    Pt has a defibrillator No   Social History Narrative    ** Merged History Encounter **          Social Drivers of Health     Financial Resource Strain: Low Risk  (12/26/2023)    Financial Resource Strain     Difficulty of Paying Living Expenses: Not very hard     Med Affordability: No   Food Insecurity: No Food Insecurity (9/25/2024)    Food Insecurity     Food Insecurity: Never true   Transportation Needs: No Transportation Needs (9/24/2024)    Transportation Needs     Lack of Transportation: No   Housing Stability: Low Risk  (9/24/2024)    Housing Stability     Housing Instability: No       ROS:  Systems reviewed: All pertinent positives noted in HPI. Unless otherwise noted, additional systems reviewed are negative.   Vital signs reviewed.    Positive for stated complaint: R- SHOULD/ARM PAIN DUE TO FALL  Other systems are as noted in HPI.  Constitutional and vital signs reviewed.      All other systems reviewed and negative except as noted above.    PSFH elements reviewed from today and agreed except as otherwise stated in HPI.             Constitutional and vital signs reviewed.        Physical Exam     ED Triage Vitals [10/26/24 1335]   /65   Pulse 104   Resp 26   Temp 97.3 °F (36.3 °C)   Temp src Temporal   SpO2 99 %   O2 Device None (Room air)       Current:/65   Pulse 104   Temp 97.3 °F (36.3 °C) (Temporal)   Resp 26   SpO2 99%         PE:  General - Appears well, non-toxic and in NAD  Head - Appears symmetrical without deformity/swelling cranium, scalp, or facial bones  Eyes - sclera not injected, no discharge noted, no periorbital edema  Neck - supple with trachea midline, no tenderness upon palpation to posterior  c-spine, no obvious sign of trauma/swelling/step-off, full ROM with strong motor strength against resistance  Skin - pink warm and dry throughout, mmm, no obvious signs of swelling/trauma/deformity, cap refill <2seconds distal UE  Neuro - A&O x4, sensation equal to both medial and lateral aspects of upper extremities, steady gait  MSK - makes purposeful movements of upper extremities - However with limited right arm abduction at the level of the shoulder due to discomfort, hand grasp strength equal bilat, radial pulses 2+ bilat.  Psych - Interactive and appropriate      ED Course   Labs Reviewed - No data to display    MDM     XR SHOULDER, COMPLETE (MIN 2 VIEWS), RIGHT (CPT=73030)   Final Result   PROCEDURE: XR SHOULDER, COMPLETE (MIN 2 VIEWS), RIGHT (CPT=73030)       COMPARISON: [facility], XR SHOULDER, COMPLETE (MIN 2    VIEWS), RIGHT (CPT=73030), 12/23/2021, 9:21 AM.       INDICATIONS: Right shoulder pain radiating down arm post fall x 3 weeks.       TECHNIQUE: 3 views were obtained.         FINDINGS:    BONES: No acute fracture or dislocation is apparent.  Demineralization.     Mild glenohumeral and acromioclavicular joint osteoarthritis.  Probable    degenerative spurring at the lateral aspect of the acromion.   SOFT TISSUES: Negative for visible soft tissue swelling or radiopaque    foreign body.     EFFUSION: None visible.     OTHER: Partially imaged aortic valve repair.  Stable right upper lobe    calcified granuloma.                   =====   CONCLUSION:        No radiographically visible acute osseous injury of the right shoulder.             elm-remote       Dictated by (CST): David Joy MD on 10/26/2024 at 2:34 PM        Finalized by (CST): David Joy MD on 10/26/2024 at 2:35 PM                 DDx: Fracture versus dislocation versus sprain versus contusion    X-ray results as noted above and independently reviewed by this provider without acute osseous abnormality.  Discussed  with patient and daughter who is also present as well as general sprain/strain instructions, rest, ice, follow-up and return/ED precautions reviewed.  Daughter/patient is historian and demonstrates understanding of all instruction and agrees with plan of care.      Disposition and Plan     Clinical Impression:  1. Injury of right shoulder, initial encounter        Disposition:  Discharge    Follow-up:  Sunshine Goldman PA  [address] [place] [id number]  [phone]    Go in 2 days        Medications Prescribed:  Discharge Medication List as of 10/26/2024  2:39 PM

## 2024-10-26 NOTE — DISCHARGE INSTRUCTIONS
Rest from exacerbating activities for the next week. Apply ice/cold compress for 20min at a time 4-6x daily for the next 2-3 days. Follow up with your primary provider or the orthopedic specialist provided in your discharge instructions in the next 2-3 days. Seek additional care in the ER for new or worsening symptoms, fever or increasing pain.

## 2024-10-28 NOTE — TELEPHONE ENCOUNTER
Please review. This medication has no protocol attached.    Vitamin D 25-Hydroxy   Component  Ref Range & Units 5/21/24  3:01 PM   Vitamin D, 25-Hydroxy, Total  30.0 - 100.0 ng/mL 40.8     No future appointments with family medicine/internal medicine.  Last office visit: 6/27/2024 (telemedicine)    Requested Prescriptions   Pending Prescriptions Disp Refills    CHOLECALCIFEROL 25 MCG (1000 UT) Oral Tab [Pharmacy Med Name: Vitamin D3 25 Mcg Tab Rugb] 90 tablet 0     Sig: TAKE ONE TABLET BY MOUTH ONE TIME DAILY       There is no refill protocol information for this order          Future Appointments         Provider Department Appt Notes    In 2 days Ene Moreno NP [facility] Complete MSPQ qa dd BMP@lab    In 1 month Afshan Catalan MD [facility] CKD          Recent Outpatient Visits              6 days ago Acute on chronic heart failure with preserved ejection fraction (HFpEF) ([facility])    [facility] Ene Moreno NP    Office Visit    1 week ago Bilateral lower extremity edema    [facility] Ida Moreno, SANIA    Telemedicine    3 weeks ago Acute on chronic heart failure with preserved ejection fraction (HFpEF) ([facility])    [facility] Ene Moreno NP    Office Visit    2 months ago Rheumatoid arthritis, involving unspecified site, unspecified whether rheumatoid factor present ([facility])    [facility] Colin Lopez MD    Office Visit    3 months ago Bullous pemphigoid ([facility])    [facility] Buddy Montejo MD    Office Visit

## 2024-10-29 DIAGNOSIS — I50.33 ACUTE ON CHRONIC HEART FAILURE WITH PRESERVED EJECTION FRACTION (HFPEF) (HCC): ICD-10-CM

## 2024-10-29 DIAGNOSIS — I48.0 PAF (PAROXYSMAL ATRIAL FIBRILLATION) (HCC): Primary | ICD-10-CM

## 2024-10-29 RX ORDER — CHOLECALCIFEROL (VITAMIN D3) 25 MCG
1000 TABLET ORAL DAILY
Qty: 90 TABLET | Refills: 3 | Status: SHIPPED | OUTPATIENT
Start: 2024-10-29

## 2024-10-30 ENCOUNTER — LAB ENCOUNTER (OUTPATIENT)
Dept: LAB | Age: 79
End: 2024-10-30
Attending: NURSE PRACTITIONER
Payer: MEDICARE

## 2024-10-30 ENCOUNTER — OFFICE VISIT (OUTPATIENT)
Dept: CARDIOLOGY CLINIC | Facility: HOSPITAL | Age: 79
End: 2024-10-30
Attending: NURSE PRACTITIONER
Payer: MEDICARE

## 2024-10-30 VITALS
SYSTOLIC BLOOD PRESSURE: 101 MMHG | BODY MASS INDEX: 23 KG/M2 | DIASTOLIC BLOOD PRESSURE: 60 MMHG | HEART RATE: 84 BPM | RESPIRATION RATE: 16 BRPM | WEIGHT: 118.19 LBS | OXYGEN SATURATION: 97 %

## 2024-10-30 DIAGNOSIS — R60.0 BILATERAL LOWER EXTREMITY EDEMA: ICD-10-CM

## 2024-10-30 DIAGNOSIS — I50.33 ACUTE ON CHRONIC HEART FAILURE WITH PRESERVED EJECTION FRACTION (HFPEF) (HCC): Primary | ICD-10-CM

## 2024-10-30 DIAGNOSIS — N18.31 STAGE 3A CHRONIC KIDNEY DISEASE (HCC): ICD-10-CM

## 2024-10-30 DIAGNOSIS — R21 RASH: ICD-10-CM

## 2024-10-30 DIAGNOSIS — M06.9 RHEUMATOID ARTHRITIS, INVOLVING UNSPECIFIED SITE, UNSPECIFIED WHETHER RHEUMATOID FACTOR PRESENT (HCC): ICD-10-CM

## 2024-10-30 DIAGNOSIS — I50.9 ACUTE ON CHRONIC HEART FAILURE, UNSPECIFIED HEART FAILURE TYPE (HCC): ICD-10-CM

## 2024-10-30 DIAGNOSIS — I48.0 PAF (PAROXYSMAL ATRIAL FIBRILLATION) (HCC): ICD-10-CM

## 2024-10-30 DIAGNOSIS — Z95.2 S/P TAVR (TRANSCATHETER AORTIC VALVE REPLACEMENT): ICD-10-CM

## 2024-10-30 DIAGNOSIS — N18.4 CKD (CHRONIC KIDNEY DISEASE) STAGE 4, GFR 15-29 ML/MIN (HCC): ICD-10-CM

## 2024-10-30 LAB
ANION GAP SERPL CALC-SCNC: 8 MMOL/L (ref 0–18)
BUN BLD-MCNC: 42 MG/DL (ref 9–23)
BUN/CREAT SERPL: 27.3 (ref 10–20)
CALCIUM BLD-MCNC: 9.8 MG/DL (ref 8.7–10.4)
CHLORIDE SERPL-SCNC: 104 MMOL/L (ref 98–112)
CO2 SERPL-SCNC: 26 MMOL/L (ref 21–32)
CREAT BLD-MCNC: 1.54 MG/DL
EGFRCR SERPLBLD CKD-EPI 2021: 34 ML/MIN/1.73M2 (ref 60–?)
GLUCOSE BLD-MCNC: 118 MG/DL (ref 70–99)
OSMOLALITY SERPL CALC.SUM OF ELEC: 298 MOSM/KG (ref 275–295)
POTASSIUM SERPL-SCNC: 4.2 MMOL/L (ref 3.5–5.1)
SODIUM SERPL-SCNC: 138 MMOL/L (ref 136–145)

## 2024-10-30 PROCEDURE — 86235 NUCLEAR ANTIGEN ANTIBODY: CPT

## 2024-10-30 PROCEDURE — 86038 ANTINUCLEAR ANTIBODIES: CPT

## 2024-10-30 PROCEDURE — 86225 DNA ANTIBODY NATIVE: CPT

## 2024-10-30 PROCEDURE — 99215 OFFICE O/P EST HI 40 MIN: CPT | Performed by: NURSE PRACTITIONER

## 2024-10-30 PROCEDURE — 36415 COLL VENOUS BLD VENIPUNCTURE: CPT

## 2024-10-30 PROCEDURE — 80048 BASIC METABOLIC PNL TOTAL CA: CPT

## 2024-10-30 NOTE — PROGRESS NOTES
Subjective:  Armida is here today and is feeling good. 10# weight loss noted, swelling down in her BLE. Denies any dizziness or lightheadedness.     Weight:  Today - 118.2lb    Home - no  Last visit - 129#  Labs completed : at lab - BMP    Device:  CardioMEMS Interrogation  n/a    IV placed:   no   Measurements :  RLE calf- 12.25\",  ankle- 10.25\"     LLE - 13\" ankle- 10.25\"      Patient and medication assessed. Discussed with APN. Treatments completed- leag measurements/assessments. Medication given- none .  Extensive education on disease management .  Reviewed AVS instructions. Patient verbalized understanding.

## 2024-10-30 NOTE — PROGRESS NOTES
[facility]    Armida Hobbs Patient Status:  Outpatient    1945 MRN [id number]   Location [facility]INIC MD Dr. Jose Alberto William       Armida Hobbs is a 78 year old female who presents to clinic for assessment and management for hospitalization for acute on chronic heart failure.     Admitted -24 with worsening whitman and increased abhijit LE edema with ARF with hypoxemia due to acute pulmonary edema. Pro bnp 22,911. Venous dopplers of LE negative for DVT. Home on bumex 1 mg bid. EF normal on last echo in 2024, with mod MR.     Problem List:  Acute on chronic HFpEF, NYHA class III, Stage C, EF 55-60%  CAD with PCI of  -  TAVR   Hx mitral valve endocarditis  Mod MR  Paroxysmal atrial fib  HTN  HLD  CKD stage 4  DM type 2  PATRICK  RA  GERD    Subjective:  Here with daughter. To clinic in wheelchair    No whitman walking 200 feet at home, using walker  Whitman walking up flight of 8 stairs.   No orthopnea with 2-3 pillows, sleeps on side  Decreased abhijit LE edema, no open wounds or blisters, L foot blister healed  wearing compression stockings   Denies cp, palpitations or dizziness  Appetite improved, denies bloating  Home SBP nml,  100   No falls in the last 3 weeks.     Planning on going to son's wedding in [place] in 2 weeks. (11/10)    Review of Systems:  Constitutional: negative for chills or fever  Respiratory:  negative for cough, hemoptysis and wheezing  Cardiovascular: negative for chest pain, exertional chest pressure/discomfort, near-syncope, orthopnea and palpitations  Gastrointestinal: negative for abdominal pain, diarrhea, melena, nausea and vomiting  Hematologic/lymphatic: negative  Musculoskeletal: negative for muscle weakness , + R shoulder pain with lifting or pulling    Objective:  Lab Results   Component Value Date/Time    WBC 8.4 10/22/2024 03:04 PM    HGB 9.6 (L) 10/22/2024 03:04 PM    HCT 28.4 (L) 10/22/2024 03:04 PM    PLT  251.0 10/22/2024 03:04 PM    CREATSERUM 1.16 (H) 10/22/2024 03:04 PM    CREATSERUM 1.16 (H) 10/22/2024 03:04 PM    BUN 32 (H) 10/22/2024 03:04 PM     10/22/2024 03:04 PM    K 3.5 10/22/2024 03:04 PM     10/22/2024 03:04 PM    CO2 23.0 10/22/2024 03:04 PM     (H) 10/22/2024 03:04 PM    CA 9.1 10/22/2024 03:04 PM    ALB 3.9 10/22/2024 03:04 PM    ALKPHO 360 (H) 09/25/2024 07:44 AM    BILT 4.1 (H) 09/25/2024 07:44 AM    TP 5.7 09/25/2024 07:44 AM    AST 12 10/22/2024 03:04 PM    ALT 10 10/22/2024 03:04 PM    PTT 65.3 (H) 11/03/2022 11:10 AM    INR 1.39 (H) 01/06/2023 03:10 PM    PTP 16.8 (H) 01/06/2023 03:10 PM    T4F 0.8 01/07/2023 05:37 AM    TSH 0.776 07/18/2024 11:34 AM    LIP 39 06/27/2023 01:46 PM    DDIMER 0.45 07/06/2022 03:28 PM    ESRML 41 (H) 10/22/2024 03:04 PM    CRP 2.20 (H) 10/22/2024 03:04 PM    MG 2.1 09/27/2024 07:19 AM    PHOS 3.4 09/28/2024 06:10 AM    CK 29 (L) 12/18/2023 02:33 PM    B12 1,735 (H) 04/12/2023 10:51 AM    PGLU 141 (H) 01/06/2023 03:08 PM       Labs drawn by RN: yasmani, results reviewed with patient.  Personally interpreted results     /60 (BP Location: Right arm)   Pulse 84   Resp 16   Wt 118 lb 3.2 oz (53.6 kg)   SpO2 97%   BMI 23.08 kg/m²       Date  Clinic wt Home wt MCI wt DC wt IV med  PO med   8/13/24   123      9/28/24    127     10/3/24 130 --       10/22/24 129    Bumex 1 mg IV push Kdur 20 meq po x1   10/3024 118            General appearance: alert, appears stated age and cooperative  Neck: + JVD < 90 degrees  Lungs: clear to auscultation bilaterally  Heart: S1, S2 normal, + murmur, no click, rub or gallop, regular rate and rhythm  Abdomen: soft, non-tender; bowel sounds normal; no masses,  no abdominal distension  Extremities:  no cyanosis, +2 R LE edema from mid tibial to pedals, no open wounds or blisters, +1-2 LLE edema from ankle/pedal.  Pulses: 2+ and symmetric  Neurologic: Grossly normal  Measurements:   10/30/24: RLE calf- 12.25 \" ankle-  10.25\"  LLE calf- 13.\" Ankle- 10.25\"   10/22/24:RLE calf- 15.5 \" ankle- 11.5\"   LLE calf- 14.\" Ankle- 11.2\"   10/3/24: RLE calf- 13.5 \" ankle- 10.5\"    LLE calf- 13.\" Ankle- 10.2\"     Diagnostics:  EC24 SR 92 bpm, with pac's. LBBB    SHAWNA: 24, EF 55-60%, no wma, grade 1 diastolic dysfunction, Left  atrial dilation,  mild-mod MR, bioprosthetic aortic valve, no vegetation on mitral valve or tricuspid valve.     Echo: 23  D 5-60%, grade 2 DD, moderate LVH with septal knob.  No WMA.  Moderate LA dilation.  Normal functioning bioprosthetic valve/TAVR noted.  Severe MR, moderate mitral stenosis.  Mod TR.  PAS 44 mmHg, RAP 3 mmHg.    Nuclear stress test: 3/31/22  No fixed perfusion defects. No reversing perfusion defect.     Heart cath: , PCI of LAD    Assessment:  Acute on chronic HF p EF, NYHA class III, stage C  -EF 55-60 %  -hx TAVR , mild mod MR , normal bioprosthetic aortic valve   -CAD with PCI of LAD   -diuretics bumex 1 mg bid    -GDMT : losartan 25 mg daily , toprol 12.5 mg daily, spironolactone 12.5 mg daily    -s/p IV bumex 1 mg on 10/22/24 and started spironolactone 12.5 mg daily   -weight down 11# with decreased abhijit LE edema   - BP  normal 101/60  -Renal function stable, bun/cr: 32/1.16,  K 3.5  -pro bnp 23,561 (10/3/24) <-8560 (24)  -improved hypervolemic with decreased abhijit LE edema, mild harris with stairs. Renal function stable.   - continue bumex 1 mg bid, spironolactone 12.5 mg daily, potassium chloride to 20 meq daily   -wear compression /knee high tubigrip compression stockings  - [facility] with [facility] in [facility] week with repeat bmp   -she would not be a good candidate for adding SGLT2i with hx of MV endocarditis    CKD stage IIIb   -cr 1.16  -creatinine stable  -cr baseline 1.4- 1.6    pAF  -RRR, SR on low dose toprol   -on asa 81 mg daily     Plan:     Patient Instructions   Continue all your medications as prescribed     Follow up with Dr. Mayes in 1 month ,  [phone]    Follow up with the specialty care clinic in 3 weeks. 11/27/24 with blood test prior to visit at lab    Wear knee high stockings during the day and remove at night      Call if you are having shortness of breath, cough, wheezing, chest pain, dizziness, lightheadedness, heart racing, palpitations, swelling, rapid weight gain, fatigue, weakness, fever or chills.  Call 911 with severe shortness of breath, chest pain or chest pressure not improved after 15 minutes of rest or if feeling faint,  passing out or having a fall     Weigh yourself daily in the morning before breakfast, call if gaining 3 lbs or more overnight or more than 5 lbs in one week.    Keep daily fluids at 48-64 ounces per day (1.5-2 liters of liquid or 6-8 , 8 oz glasses of liquid)    Heart healthy diet. Limit sodium to  2000 mg daily. Common high sodium foods include frozen dinners, soups (not homemade), some cereal, vegetable juice, canned vegetables, lunch meats, processed meats like hotdogs, sausage, hernandez, pepperoni, soy sauce, pre-packaged rice or potatoes. Please remember to read nutrition labels for sodium content.   www.healthyeating.nhlbi.nih.gov    Exercise daily as tolerated, with goal of doing moderate aerobic exercise like walking for about 30 minutes 5 days per week. Start by walking at a slow to moderate pace for 3-5 minutes 2-3 times a day. Pace your activity to prevent shortness of breath or fatigue. Stop exercise if you develop chest pain, lightheadedness, or significant shortness of breath    Always carry a copy of your current medication list with you          Current Outpatient Medications:     cholecalciferol 25 MCG (1000 UT) Oral Tab, Take 1 tablet (1,000 Units total) by mouth daily., Disp: 90 tablet, Rfl: 3    NIACIN CR OR, Take 1 tablet by mouth daily., Disp: , Rfl:     spironolactone 25 MG Oral Tab, Take 0.5 tablets (12.5 mg total) by mouth daily., Disp: 15 tablet, Rfl: 3    Potassium Chloride ER 20 MEQ Oral  Tab CR, Take 1 tablet by mouth daily., Disp: 90 tablet, Rfl: 3    bumetanide (BUMEX) 1 MG Oral Tab, Take 1 tablet (1 mg total) by mouth BID (Diuretic)., Disp: 60 tablet, Rfl: 5    atorvastatin 40 MG Oral Tab, Take 1 tablet (40 mg total) by mouth nightly., Disp: 30 tablet, Rfl: 3    losartan 25 MG Oral Tab, Take 1 tablet (25 mg total) by mouth daily., Disp: 30 tablet, Rfl: 3    metoprolol succinate ER 25 MG Oral Tablet 24 Hr, Take 0.5 tablets (12.5 mg total) by mouth Daily Beta Blocker., Disp: 30 tablet, Rfl: 3    GABAPENTIN 100 MG Oral Cap, Take 1 capsule by mouth 3 times daily., Disp: 90 capsule, Rfl: 0    Mycophenolate Mofetil 500 MG Oral Tab, Take 2 tablets (1,000 mg total) by mouth 2 (two) times daily., Disp: 120 tablet, Rfl: 0    predniSONE 2.5 MG Oral Tab, Take 2 tablets (5 mg total) by mouth daily., Disp: 180 tablet, Rfl: 0    leflunomide 10 MG Oral Tab, Take 1 tablet (10 mg total) by mouth daily., Disp: 90 tablet, Rfl: 1    doxycycline 100 MG Oral Cap, Take 1 capsule (100 mg total) by mouth 2 (two) times daily., Disp: , Rfl:     ferrous sulfate 325 (65 FE) MG Oral Tab EC, Take 1 tablet (325 mg total) by mouth daily with breakfast., Disp: , Rfl:     pantoprazole 20 MG Oral Tab EC, Take 1 tablet (20 mg total) by mouth every morning before breakfast. TO PREVENT ACID REFLUX., Disp: 90 tablet, Rfl: 3    clopidogrel 75 MG Oral Tab, Take 1 tablet (75 mg total) by mouth daily. FOR HEART STENTS., Disp: 90 tablet, Rfl: 9    aspirin 81 MG Oral Tab EC, Take 1 tablet (81 mg total) by mouth daily. FOR HEART DISEASE., Disp: 90 tablet, Rfl: 9    folic acid 1 MG Oral Tab, Take 1 tablet (1 mg total) by mouth daily., Disp: 90 tablet, Rfl: 3    acetaminophen 500 MG Oral Tab, Take 1 tablet (500 mg total) by mouth every 4 (four) hours as needed., Disp: , Rfl:     HASMUKH HUNT NP  10/30/24       40 minutes spent on patient education, chart review and documentation:  Patient instructed regarding clinic procedures, hours, purpose  of clinic visits, sodium restricted diet, low sodium foods, fluid restrictions, daily weights, medication regimen s/s of heart failure exacerbation and CKD, and  when to call APN/clinic. Provided patient with  counseling, coordination of care and education given. Patient receptive.

## 2024-10-30 NOTE — PATIENT INSTRUCTIONS
Continue all your medications as prescribed     Follow up with Dr. Mayes in 1 month , [phone]    Follow up with the specialty care clinic in 3 weeks. 11/27/24 with blood test prior to visit at lab    Wear knee high stockings during the day and remove at night      Call if you are having shortness of breath, cough, wheezing, chest pain, dizziness, lightheadedness, heart racing, palpitations, swelling, rapid weight gain, fatigue, weakness, fever or chills.  Call 911 with severe shortness of breath, chest pain or chest pressure not improved after 15 minutes of rest or if feeling faint,  passing out or having a fall     Weigh yourself daily in the morning before breakfast, call if gaining 3 lbs or more overnight or more than 5 lbs in one week.    Keep daily fluids at 48-64 ounces per day (1.5-2 liters of liquid or 6-8 , 8 oz glasses of liquid)    Heart healthy diet. Limit sodium to  2000 mg daily. Common high sodium foods include frozen dinners, soups (not homemade), some cereal, vegetable juice, canned vegetables, lunch meats, processed meats like hotdogs, sausage, hernandez, pepperoni, soy sauce, pre-packaged rice or potatoes. Please remember to read nutrition labels for sodium content.   www.healthyeating.nhlbi.nih.gov    Exercise daily as tolerated, with goal of doing moderate aerobic exercise like walking for about 30 minutes 5 days per week. Start by walking at a slow to moderate pace for 3-5 minutes 2-3 times a day. Pace your activity to prevent shortness of breath or fatigue. Stop exercise if you develop chest pain, lightheadedness, or significant shortness of breath    Always carry a copy of your current medication list with you

## 2024-11-01 LAB
DSDNA IGG SERPL IA-ACNC: <0.6 IU/ML
ENA AB SER QL IA: <0.09 UG/L
ENA AB SER QL IA: NEGATIVE

## 2024-11-05 ENCOUNTER — MED REC SCAN ONLY (OUTPATIENT)
Facility: LOCATION | Age: 79
End: 2024-11-05

## 2024-11-05 LAB — ANTIHISTONE ABS: 0.4 UNITS

## 2024-11-11 ENCOUNTER — TELEPHONE (OUTPATIENT)
Facility: LOCATION | Age: 79
End: 2024-11-11

## 2024-11-11 NOTE — TELEPHONE ENCOUNTER
[facility] Order #[id number] Received and Placed in Dr. Strange Folder for Review and Sign

## 2024-11-18 ENCOUNTER — TELEPHONE (OUTPATIENT)
Facility: LOCATION | Age: 79
End: 2024-11-18

## 2024-11-18 NOTE — TELEPHONE ENCOUNTER
Daughter calling to update Dr. Strange. Patient currently in [place].  Patient had a fall today and fractured her hip.  Patient is scheduled for surgery today.  Daughter states sister wants to airlift Patient back home to have surgery here instead, but most likely will have surgery in [place] as Patient is in a lot of pain. Advised to stay per recommendations to avoid further injuries during travel.  Daughter wanted to let Dr. Strange aware.

## 2024-11-21 DIAGNOSIS — N18.31 STAGE 3A CHRONIC KIDNEY DISEASE (HCC): ICD-10-CM

## 2024-11-21 DIAGNOSIS — I50.33 ACUTE ON CHRONIC HEART FAILURE WITH PRESERVED EJECTION FRACTION (HFPEF) (HCC): Primary | ICD-10-CM

## 2024-11-21 NOTE — TELEPHONE ENCOUNTER
Dr Strange,  please see informational message below.    Patient's daughter Kianna jay, verified patient name and date of birth.   Patient is still hospitalized in [place].  Family is attempting to transfer patient to a hospital in [place].

## 2024-11-21 NOTE — TELEPHONE ENCOUNTER
India from Home Health OT calling to update Dr. Strange that she was supposed to have a visit with patient today but patient is currently in the hospital in [place]. Advised India that daughter also updated Dr. Strange of patient's condition.

## 2024-11-26 ENCOUNTER — TELEPHONE (OUTPATIENT)
Dept: CARDIOLOGY CLINIC | Facility: HOSPITAL | Age: 79
End: 2024-11-26

## 2024-11-26 NOTE — TELEPHONE ENCOUNTER
Patient currently hospitalized in [place].    Appointment canceled for Armida Hobbs ([id number])   Visit type: [facility] F/U VISIT   11/27/2024 3:00 PM (60 minutes) with Wendie Pereira in [facility]      Reason for cancellation: Hospitalized/ER/Illness

## 2024-12-04 ENCOUNTER — PATIENT MESSAGE (OUTPATIENT)
Facility: LOCATION | Age: 79
End: 2024-12-04

## 2024-12-04 ENCOUNTER — TELEPHONE (OUTPATIENT)
Facility: LOCATION | Age: 79
End: 2024-12-04

## 2024-12-04 NOTE — TELEPHONE ENCOUNTER
vd call from patient's daughter regarding Family Medical Leave Act paperwork. Patient had an injury in [place] and is currently recovering in [place]. Patient has not been treated by Dr Strange for the injury and as a result advised patient's daughter unfortunately he would not be able complete her Family Medical Leave Act paperwork. Advised patient's daughter, patient would need to establish care with a physician in [place] since traveling to our provider or a telehealth visit is not currently an option. Patient's daughter stated she will follow up with the surgeon who performed patient hip surgery.

## 2024-12-26 ENCOUNTER — PATIENT MESSAGE (OUTPATIENT)
Facility: LOCATION | Age: 79
End: 2024-12-26

## 2024-12-27 ENCOUNTER — PATIENT MESSAGE (OUTPATIENT)
Facility: LOCATION | Age: 79
End: 2024-12-27

## 2024-12-27 ENCOUNTER — TELEPHONE (OUTPATIENT)
Facility: LOCATION | Age: 79
End: 2024-12-27

## 2024-12-27 NOTE — TELEPHONE ENCOUNTER
Please advise  Advised Dr out of office tool 1/6/25, relayed a couple of Reh[facility], declined [facility] [place], preferred [facility] since they live in [place]  December 27, 2024  Armida Faby to P Em Rn Triage (supporting Presley Strange MD)         12/27/24  3:11 PM  We are unable to bring her into your office for a visit. She is difficult to transport as she cannot use her arms and her legs are very week. We had to be transported by ambulance to the facility that she is at now. They do not have guard rails and she fell out of her bed on the first night. We need to get her transported back to [place] and would like her to go directly to a facility here. Can you please call me at [phone].     December 26, 2024  Armida Larisapromise to P Em Rn Triage (supporting Presley Strange MD)         12/26/24  5:07 PM  My mom broke her hip and shoulder on Nov. 18th in .  She was transferred to [facility] in [place] for surgery and transferred to inpatient rehab  at [facility]. in [place], [place] for 3 weeks. She is now at [facility] in [place], [place]. We are flying her home next week. Dr. Sampson at [facility] said to reach out to you re next steps.  [facility] diagnosed her with vascular dementia.  She cannot use her left arm (It's in a sling) and a torn rotator cup on her right shoulder.  We need outpatient services and hospital bed. she has been trying to get out of bed at both facilities. Can we set up a video conference to discuss her options?

## 2025-01-20 ENCOUNTER — TELEPHONE (OUTPATIENT)
Facility: LOCATION | Age: 80
End: 2025-01-20

## 2025-01-20 NOTE — TELEPHONE ENCOUNTER
Daughter Olivia(on HIPAA) just wanted to verify the name of the home health patient had previously. Advised that patient has residential home health previously. Phone number provided. Daughter did not have any further questions.

## 2025-01-21 ENCOUNTER — TELEPHONE (OUTPATIENT)
Facility: LOCATION | Age: 80
End: 2025-01-21

## 2025-01-21 NOTE — TELEPHONE ENCOUNTER
Jazmín from [facility] rehab called and said patient will be discharged this week and family will want the patient to have home health when patient returns to [place], they will be faxing over recommendations to follow up with the patient.

## 2025-01-21 NOTE — TELEPHONE ENCOUNTER
Noted but they will need a 40 minute hospital follow up with me as I have not seen them for these issues and can not sign orders without a face to face.

## 2025-01-22 NOTE — TELEPHONE ENCOUNTER
We have Received paperwork  and it was placed in Dr. Strange Folder for Review and Sign, Dr. Strange also States that they need a Hospital Follow Up for this before these paperwork gets filled out. Please scheduled Hospital F/U. Left message for Jazmín on 01/22/25

## 2025-01-27 ENCOUNTER — OFFICE VISIT (OUTPATIENT)
Facility: LOCATION | Age: 80
End: 2025-01-27

## 2025-01-27 VITALS
DIASTOLIC BLOOD PRESSURE: 69 MMHG | HEART RATE: 101 BPM | OXYGEN SATURATION: 97 % | BODY MASS INDEX: 18.78 KG/M2 | WEIGHT: 110 LBS | HEIGHT: 64 IN | SYSTOLIC BLOOD PRESSURE: 118 MMHG

## 2025-01-27 DIAGNOSIS — Z95.2 S/P TAVR (TRANSCATHETER AORTIC VALVE REPLACEMENT): ICD-10-CM

## 2025-01-27 DIAGNOSIS — Z09 HOSPITAL DISCHARGE FOLLOW-UP: Primary | ICD-10-CM

## 2025-01-27 DIAGNOSIS — M05.772 RHEUMATOID ARTHRITIS INVOLVING BOTH ANKLES WITH POSITIVE RHEUMATOID FACTOR (HCC): ICD-10-CM

## 2025-01-27 DIAGNOSIS — M54.16 LUMBAR RADICULOPATHY: ICD-10-CM

## 2025-01-27 DIAGNOSIS — K25.4 CHRONIC GASTRIC ULCER WITH HEMORRHAGE: ICD-10-CM

## 2025-01-27 DIAGNOSIS — Z96.642 S/P TOTAL LEFT HIP ARTHROPLASTY: ICD-10-CM

## 2025-01-27 DIAGNOSIS — F32.A ANXIETY AND DEPRESSION: ICD-10-CM

## 2025-01-27 DIAGNOSIS — I50.32 CHRONIC DIASTOLIC (CONGESTIVE) HEART FAILURE (HCC): ICD-10-CM

## 2025-01-27 DIAGNOSIS — I48.0 PAF (PAROXYSMAL ATRIAL FIBRILLATION) (HCC): ICD-10-CM

## 2025-01-27 DIAGNOSIS — R93.1 ELEVATED CORONARY ARTERY CALCIUM SCORE: ICD-10-CM

## 2025-01-27 DIAGNOSIS — Z95.5 S/P DRUG ELUTING CORONARY STENT PLACEMENT: ICD-10-CM

## 2025-01-27 DIAGNOSIS — I25.10 CORONARY ARTERY DISEASE INVOLVING NATIVE CORONARY ARTERY OF NATIVE HEART WITHOUT ANGINA PECTORIS: ICD-10-CM

## 2025-01-27 DIAGNOSIS — L12.0 BULLOUS PEMPHIGOID (HCC): ICD-10-CM

## 2025-01-27 DIAGNOSIS — M05.771 RHEUMATOID ARTHRITIS INVOLVING BOTH ANKLES WITH POSITIVE RHEUMATOID FACTOR (HCC): ICD-10-CM

## 2025-01-27 DIAGNOSIS — I35.0 MODERATE AORTIC STENOSIS BY PRIOR ECHOCARDIOGRAM: ICD-10-CM

## 2025-01-27 DIAGNOSIS — F41.9 ANXIETY AND DEPRESSION: ICD-10-CM

## 2025-01-27 PROCEDURE — 99215 OFFICE O/P EST HI 40 MIN: CPT | Performed by: INTERNAL MEDICINE

## 2025-01-27 PROCEDURE — 99499 UNLISTED E&M SERVICE: CPT | Performed by: INTERNAL MEDICINE

## 2025-01-27 PROCEDURE — G2211 COMPLEX E/M VISIT ADD ON: HCPCS | Performed by: INTERNAL MEDICINE

## 2025-01-27 RX ORDER — FERROUS SULFATE 325(65) MG
1 TABLET, DELAYED RELEASE (ENTERIC COATED) ORAL
Qty: 90 TABLET | Refills: 4 | Status: SHIPPED | OUTPATIENT
Start: 2025-01-27

## 2025-01-27 RX ORDER — LOSARTAN POTASSIUM 25 MG/1
25 TABLET ORAL DAILY
Qty: 180 TABLET | Refills: 3 | Status: SHIPPED | OUTPATIENT
Start: 2025-01-27

## 2025-01-27 RX ORDER — LEFLUNOMIDE 10 MG/1
10 TABLET ORAL DAILY
Qty: 90 TABLET | Refills: 1 | Status: SHIPPED | OUTPATIENT
Start: 2025-01-27

## 2025-01-27 RX ORDER — METOPROLOL TARTRATE 25 MG/1
100 TABLET, FILM COATED ORAL EVERY 8 HOURS
Qty: 270 TABLET | Refills: 3 | Status: SHIPPED | OUTPATIENT
Start: 2025-01-27

## 2025-01-27 RX ORDER — ATORVASTATIN CALCIUM 40 MG/1
40 TABLET, FILM COATED ORAL NIGHTLY
Qty: 30 TABLET | Refills: 3 | Status: SHIPPED | OUTPATIENT
Start: 2025-01-27

## 2025-01-27 RX ORDER — MIRTAZAPINE 15 MG/1
15 TABLET, FILM COATED ORAL NIGHTLY
Qty: 90 TABLET | Refills: 2 | Status: SHIPPED | OUTPATIENT
Start: 2025-01-27

## 2025-01-27 RX ORDER — PANTOPRAZOLE SODIUM 40 MG/1
40 TABLET, DELAYED RELEASE ORAL
Qty: 180 TABLET | Refills: 8 | Status: SHIPPED | OUTPATIENT
Start: 2025-01-27

## 2025-01-27 RX ORDER — CLOPIDOGREL BISULFATE 75 MG/1
75 TABLET ORAL DAILY
Qty: 90 TABLET | Refills: 9 | Status: SHIPPED | OUTPATIENT
Start: 2025-01-27

## 2025-01-27 NOTE — PATIENT INSTRUCTIONS
Please go home and monitor your blood pressure, if you note that your blood pressure is consistently in the 120s over 80s you stay the course but if you are feeling lightheaded or dizzy or your pressures are more in the 110s and lower you may stop the losartan completely and let me know.  I would prefer your blood pressure be in the 130s over 80s.    https://[employer]/products/SanteVet-sleep-to-stand-bed-cvrwstst?utm_source=[employer]+Bed+Shopping&utm_medium=Google+Shopping+Link&utm_campaign=Google+Shopping+Link&utm_id=Google+shopping&tmsrc=International Network [employer] Outcomes Research(INOR)&dxhug=[id number]&tmsid=&tmid=&tmkw=&gad_source=1&gclid=[id number]

## 2025-01-27 NOTE — PROGRESS NOTES
INTERNAL MEDICINE OFFICE NOTE     Patient ID: Armida Hobbs is a 79 year old female.  Today's Date: 01/27/25  Chief Complaint: Hospital F/U    HPI  79-year-old female well-known to me but lost to follow-up over the past year and a half who presents for significant hospital follow-up.  She was in [place] and then suffered a fall, she was given IV ibuprofen, she did not have much improvement therefore she was transferred to [place] where she was found to be in severe kidney failure, she was treated with dialysis.  She was also found to have some rectal bleeding and low hemoglobin transfuse with 2 units PRBCs and on EGD found to have ulcers and start on pantoprazole.  She is status post left hip hemiarthroplasty.  This was apparently complicated by heart failure and A-fib.  We reviewed nearly the 300 pages of documentation both provided by the patient from the 3 hospitals and rehabs along with the information available in care everywhere.  Numerous questions and concerns were addressed in detail.  We reviewed all of her medications and reconciled them in full.  Unsure why the leflunomide was stopped but since being on the steroids her symptoms have worsened and now with the evidence of ulcers.  Her main issue is mobility having to get up at night to use the bathroom, she has a high risk for falls and she would like a bedside commode.      Vitals:    01/27/25 1408   BP: 118/69   Pulse: 101   SpO2: 97%   Weight: 110 lb (49.9 kg)   Height: 5' 4\" (1.626 m)     body mass index is 18.88 kg/m².  BP Readings from Last 3 Encounters:   01/27/25 118/69   10/30/24 101/60   10/26/24 112/65     The ASCVD Risk score (Chelsea SWANN, et al., 2019) failed to calculate for the following reasons:    The 2019 ASCVD risk score is only valid for ages 40 to 79  Medications reviewed:  Current Outpatient Medications   Medication Sig Dispense Refill    apixaban (ELIQUIS) 2.5 MG Oral Tab Take 1 tablet (2.5 mg total) by mouth 2 (two)  times daily. 180 tablet 6    atorvastatin 40 MG Oral Tab Take 1 tablet (40 mg total) by mouth nightly. 30 tablet 3    clopidogrel 75 MG Oral Tab Take 1 tablet (75 mg total) by mouth daily. FOR HEART STENTS. 90 tablet 9    losartan 25 MG Oral Tab Take 1 tablet (25 mg total) by mouth daily. 180 tablet 3    metoprolol tartrate 25 MG Oral Tab Take 4 tablets (100 mg total) by mouth every 8 (eight) hours. 270 tablet 3    pantoprazole 40 MG Oral Tab EC Take 1 tablet (40 mg total) by mouth 2 (two) times daily before meals. 180 tablet 8    mirtazapine 15 MG Oral Tab Take 1 tablet (15 mg total) by mouth nightly. 90 tablet 2    leflunomide 10 MG Oral Tab Take 1 tablet (10 mg total) by mouth daily. Management per rheumatology 90 tablet 1    ferrous sulfate 325 (65 FE) MG Oral Tab EC Take 1 tablet (325 mg total) by mouth daily with breakfast. 90 tablet 4    Mycophenolate Mofetil 500 MG Oral Tab Take 2 tablets (1,000 mg total) by mouth 2 (two) times daily. 120 tablet 0    predniSONE 2.5 MG Oral Tab Take 2 tablets (5 mg total) by mouth daily. 180 tablet 0    folic acid 1 MG Oral Tab Take 1 tablet (1 mg total) by mouth daily. 90 tablet 3         Assessment & Plan    1. Hospital discharge follow-up (Primary)  2. S/P drug eluting coronary stent placement  -     Apixaban; Take 1 tablet (2.5 mg total) by mouth 2 (two) times daily.  Dispense: 180 tablet; Refill: 6  -     Clopidogrel Bisulfate; Take 1 tablet (75 mg total) by mouth daily. FOR HEART STENTS.  Dispense: 90 tablet; Refill: 9  -     [facility]  3. Coronary artery disease involving native coronary artery of native heart without angina pectoris  -     Apixaban; Take 1 tablet (2.5 mg total) by mouth 2 (two) times daily.  Dispense: 180 tablet; Refill: 6  -     Atorvastatin Calcium; Take 1 tablet (40 mg total) by mouth nightly.  Dispense: 30 tablet; Refill: 3  -     Clopidogrel Bisulfate; Take 1 tablet (75 mg total) by mouth daily. FOR HEART STENTS.  Dispense:  90 tablet; Refill: 9  -     Losartan Potassium; Take 1 tablet (25 mg total) by mouth daily.  Dispense: 180 tablet; Refill: 3  -     Metoprolol Tartrate; Take 4 tablets (100 mg total) by mouth every 8 (eight) hours.  Dispense: 270 tablet; Refill: 3  -     [facility]  -     Comp Metabolic Panel (14); Future; Expected date: 01/27/2025  -     CBC With Differential With Platelet; Future; Expected date: 01/27/2025  4. Moderate aortic stenosis by prior echocardiogram  -     Clopidogrel Bisulfate; Take 1 tablet (75 mg total) by mouth daily. FOR HEART STENTS.  Dispense: 90 tablet; Refill: 9  -     [facility] RE[facility]ERR[facility]  5. Chronic diastolic (congestive) heart failure (HCC)  -     Clopidogrel Bisulfate; Take 1 tablet (75 mg total) by mouth daily. FOR HEART STENTS.  Dispense: 90 tablet; Refill: 9  -     [facility]  6. S/P TAVR (transcatheter aortic valve replacement)  -     Apixaban; Take 1 tablet (2.5 mg total) by mouth 2 (two) times daily.  Dispense: 180 tablet; Refill: 6  -     [facility] RE[facility]ERR[facility]  7. Chronic gastric ulcer with hemorrhage  -     Pantoprazole Sodium; Take 1 tablet (40 mg total) by mouth 2 (two) times daily before meals.  Dispense: 180 tablet; Refill: 8  -     Ferrous Sulfate; Take 1 tablet (325 mg total) by mouth daily with breakfast.  Dispense: 90 tablet; Refill: 4  -     [facility] RE[facility]  8. Anxiety and depression  -     Mirtazapine; Take 1 tablet (15 mg total) by mouth nightly.  Dispense: 90 tablet; Refill: 2  -     [facility] RE[facility]  9. Rheumatoid arthritis involving both ankles with positive rheumatoid factor (HCC)  Overview:  Formatting of this note might be different from the original. Stable on steroids    Orders:  -     Leflunomide; Take 1 tablet (10 mg total) by mouth daily. Management per rheumatology  Dispense: 90 tablet; Refill: 1  -     [facility]  -     Rheumatology  Referral  10. PAF (paroxysmal atrial fibrillation) (HCC)  -     [facility]  11. Lumbar radiculopathy  -     [facility] RE[facility]  12. Elevated coronary artery calcium score  -     [facility] CARDIOLOGY EXTERNAL  13. Bullous pemphigoid (HCC)  -     Derm Referral - In Network  14. S/P total left hip arthroplasty  -     DME - External  Plan  I would like her to continue with her current medications however she will start to reestablish with specialist here, namely Derm, rheumatology to discuss the Humira along with cardiology.  We have also ordered home health, she would greatly benefit especially given her left Gabino arthroplasty.    I spent75 minutes obtaining pertitent medical history, reviewing pertinent imaging/labs and specialists notes, evaluating patient, discussing differential diagnosis' and various treatment options, reinforcing importance of compliance with treatment plan, and completing documentation.       Follow Up: No follow-ups on file..         Objective: Results:   Physical Exam  Vitals and nursing note reviewed.   Constitutional:       General: She is not in acute distress.     Appearance: Normal appearance.   HENT:      Head: Normocephalic.      Right Ear: External ear normal.      Left Ear: External ear normal.   Eyes:      Extraocular Movements: Extraocular movements intact.      Conjunctiva/sclera: Conjunctivae normal.   Pulmonary:      Effort: Pulmonary effort is normal.   Musculoskeletal:      Cervical back: Normal range of motion and neck supple.      Comments: Pale, in wheelchair, at baseline mentation but fatigued   Skin:     Coloration: Skin is not jaundiced.   Neurological:      General: No focal deficit present.      Mental Status: She is alert and oriented to person, place, and time. Mental status is at baseline.   Psychiatric:         Mood and Affect: Mood normal.         Behavior: Behavior normal.        Reviewed:    Patient Active Problem  List    Diagnosis    Chronic gastric ulcer with hemorrhage    Bullous pemphigoid ([facility])    Acute on chronic heart failure ([facility])    Lumbar radiculopathy    Bilateral carotid bruits     Formatting of this note might be different from the original. 12/2020 Mild heterogenous atherosclerotic plaque bilateral with less than 50% stenosis in both right and left internal carotid arteries, based on NASCET criteria. Anterograde flow present in both vertebral arteries      Lung granuloma ([facility])    Senile purpura    Anxiety and depression    Poor short term memory    Acute on chronic heart failure with preserved ejection fraction (HFpEF) ([facility])    Neutropenia, unspecified    Coronary artery disease involving native coronary artery of native heart without angina pectoris    S/P drug eluting coronary stent placement    Chronic diastolic (congestive) heart failure ([facility])    Stage 3a chronic kidney disease ([facility])    Hypertension goal BP (blood pressure) < 130/80    S/P TAVR (transcatheter aortic valve replacement)    PAF (paroxysmal atrial fibrillation) ([facility])    Rheumatoid arthritis involving both ankles with positive rheumatoid factor ([facility])     Formatting of this note might be different from the original. Stable on steroids      Bilateral lower extremity edema     Formatting of this note might be different from the original. Multifactorial including dependent and venous insufficiency. Compression stockings and leg elevation.. Lasix 20 mg as directed.      Varicose veins of both lower extremities with pain    Mixed hyperlipidemia    Gastroesophageal reflux disease without esophagitis    Postmenopausal atrophic vaginitis    Sensorineural hearing loss, bilateral      Allergies[1]     Social History     Socioeconomic History    Marital status:    Tobacco Use    Smoking status: Never     Passive exposure: Never    Smokeless tobacco: Never   Vaping Use    Vaping status: Never Used   Substance and Sexual Activity    Alcohol use: Never     Drug use: Never   Other Topics Concern    Caffeine Concern No    Seat Belt Yes    Special Diet No    Breast feeding No    Reaction to local anesthetic No    Pt has a pacemaker No    Pt has a defibrillator No   Social History Narrative    ** Merged History Encounter **          Social Drivers of Health     Financial Resource Strain: Low Risk  (12/26/2023)    Financial Resource Strain     Difficulty of Paying Living Expenses: Not very hard     Med Affordability: No   Food Insecurity: No Food Insecurity (12/28/2024)    Received from [facility] ([facility])    Hunger Vital Sign     Worried About Running Out of Food in the Last Year: Never true     Ran Out of Food in the Last Year: Never true   Transportation Needs: No Transportation Needs (12/28/2024)    Received from [facility] ([facility])    PRAPARE - Transportation     Lack of Transportation (Medical): No     Lack of Transportation (Non-Medical): No   Physical Activity: Patient Declined (12/5/2024)    Received from [facility] ([facility])    Exercise Vital Sign     Days of Exercise per Week: Patient declined     Minutes of Exercise per Session: Patient declined   Social Connections: Moderately Isolated (12/5/2024)    Received from [facility] ([facility])    Social Connection and Isolation Panel [NHANES]     Frequency of Communication with Friends and Family: Three times a week     Frequency of Social Gatherings with Friends and Family: Three times a week     Attends Rastafari Services: Never     Active Member of Clubs or Organizations: No     Attends Club or Organization Meetings: Never     Marital Status:    Housing Stability: Low Risk  (12/28/2024)    Received from [facility] ([facility])    Housing Stability Vital Sign     Unable to Pay for Housing in the Last Year: No     Number of Times Moved in the Last Year: 0     Homeless in the Last Year: No   Recent Concern: Housing Stability - High Risk (12/4/2024)     Received from [email or web address] [facility] ([facility])    Housing Stability Vital Sign     Unable to Pay for Housing in the Last Year: No     Number of Times Moved in the Last Year: 1     Homeless in the Last Year: Yes      Review of Systems  All other systems negative unless otherwise stated in ROS or HPI above.       Presley Strange MD  Internal Medicine       Call office with any questions or seek emergency care if necessary.   Patient understands and agrees to follow directions and advice.      ----------------------------------------- PATIENT INSTRUCTIONS-----------------------------------------   .    Patient Instructions   Please go home and monitor your blood pressure, if you note that your blood pressure is consistently in the 120s over 80s you stay the course but if you are feeling lightheaded or dizzy or your pressures are more in the 110s and lower you may stop the losartan completely and let me know.  I would prefer your blood pressure be in the 130s over 80s.    [email or web address]-sleep-[email or web address]-stand-[email or web address]?utm_source=[employer]+Bed+Shopping&utm_medium=Google+Shopping+Link&utm_campaign=Google+Shopping+Link&utm_id=Google+shopping&tmsrc=[employer]ad&vvqae=[id number]&tmsid=&tmid=&tmkw=&gad_source=1&gclid=[id number]                 [1]   Allergies  Allergen Reactions    Codeine PAIN     Chest pain     Sulfa Antibiotics HIVES    Amlodipine OTHER (SEE COMMENTS)     Swelling and leg cramps

## 2025-01-28 ENCOUNTER — TELEPHONE (OUTPATIENT)
Facility: LOCATION | Age: 80
End: 2025-01-28

## 2025-01-28 PROBLEM — I50.32 CHRONIC DIASTOLIC (CONGESTIVE) HEART FAILURE (HCC): Status: ACTIVE | Noted: 2022-02-10

## 2025-01-28 PROBLEM — K25.4 CHRONIC GASTRIC ULCER WITH HEMORRHAGE: Status: ACTIVE | Noted: 2025-01-28

## 2025-01-29 NOTE — TELEPHONE ENCOUNTER
I started the parachute process. However, at the end it asked me for a provider signature or to print and fax. I do not have access to print and fax from home. Triage support, may you please complete the rest of the process.

## 2025-01-29 NOTE — TELEPHONE ENCOUNTER
Commode order completed via parachute to [employer] [facility] [employer]. Patient notified via [employer].       Commode  3 in 1 Commode -   Quantity  1  Supplier  [employer]

## 2025-02-01 ENCOUNTER — TELEPHONE (OUTPATIENT)
Facility: LOCATION | Age: 80
End: 2025-02-01

## 2025-02-01 DIAGNOSIS — R30.0 DYSURIA: Primary | ICD-10-CM

## 2025-02-01 NOTE — TELEPHONE ENCOUNTER
Patient dementia is getting worse and can not sleep is wondering if doctor can prescribed a sleeping medication that wont disrupt her sleep. Melatonin is not working.

## 2025-02-01 NOTE — TELEPHONE ENCOUNTER
Condition update per daughter, Olivia.  Verified patient's name/.  Patient saw Dr Strange 2025 for hospital follow up.  Olivia is asking for something to help patient sleep during the night.  Is awake, taking clothes off, trying to walk but she is too weak to walk.  Family is up all night watching her.  Tried melatonin and Tylenol PM, but not helping.  Will be up all night, then sleep 5:30 am to 6:30 am, then be exhausted during the day.  Has been taking prednisone at 9 am and 8 pm.  Daughter denies she has any symptoms of UTI.  Dr Strange, please advise?

## 2025-02-04 ENCOUNTER — TELEPHONE (OUTPATIENT)
Facility: LOCATION | Age: 80
End: 2025-02-04

## 2025-02-04 NOTE — TELEPHONE ENCOUNTER
[facility] Order #[id number] Received and Placed in Dr. Strange folder for Review and Sign

## 2025-02-05 NOTE — TELEPHONE ENCOUNTER
Mychart sent. Repeat urine. Switch prednisone to break/fast lunch. Trial 1.5 tablets of mirtazapine short term.

## 2025-02-06 RX ORDER — MYCOPHENOLATE MOFETIL 500 MG/1
1000 TABLET ORAL 2 TIMES DAILY
Qty: 120 TABLET | Refills: 0 | OUTPATIENT
Start: 2025-02-06

## 2025-02-06 NOTE — TELEPHONE ENCOUNTER
Refill Request for medication(s):     Last Office Visit: 07/18/2024    Last Refill:  8/28/24    Pharmacy, Dosage verified: yes    CMP done 12/22/24, last CBC 1/10/25    Component  Ref Range & Units 12/22/24  7:47 AM   Glucose  74 - 99 mg/dL 198 High    Comment: Note:  The reference range for glucose is for fasting subjects. Fasting is defined as no caloric intake for at least 8 hours. The reference range for a random glucose is less than or equal to 140 mg/dL.     BUN  7 - 17 mg/dL 59 High    Sodium  137 - 145 mmol/L 134 Low    Potassium  3.5 - 5.1 mmol/L 3.8   Chloride  98 - 107 mmol/L 99   CO2  22 - 30 mmol/L 29   Anion gap  5 - 15 mmol/L 6   Creatinine  0.51 - 0.95 mg/dL 1.07 High    Calcium  8.4 - 10.2 mg/dL 9.2   Alkaline phosphatase  38 - 126 Units/L 281 High    Total protein  6.3 - 8.2 g/dL 6.8   Albumin  3.5 - 5.0 g/dL 3.8   ALT (SGPT)  <=35 Units/L 28   AST (SGOT)  15 - 46 Units/L 40   Bilirubin total  0.2 - 1.3 mg/dL 1   RACE NEUTRAL EGFR (CKD-EPI 2021)  mL/min/1.73 m2  mL/min/1.73 m2 52       CBC (without differential)  Order: [id number]  Component  Ref Range & Units 1/10/25  3:45 AM   WBC  3.5 - 10.0 1000/uL 13.9 High    Red blood cell count  4.00 - 5.50 Million/uL 3.74 Low    Hemoglobin  11.4 - 15.4 g/dL 10.3 Low    Hematocrit  32.8 - 45.6 % 33.7   MCV  80.0 - 95.0 fL 90.1   MCH  26.0 - 34.0 pg 27.5   MCHC  32.0 - 35.0 g/dL 30.6 Low    Red cell distribution width  11.5 - 14.5 % 17.7 High    Platelets  150 - 450 1000/uL 427   MPV  9.4 - 12.4 fL 11.9   Auto NRBC %  <=0.0 0

## 2025-02-08 ENCOUNTER — LAB ENCOUNTER (OUTPATIENT)
Dept: LAB | Age: 80
End: 2025-02-08
Attending: INTERNAL MEDICINE
Payer: MEDICARE

## 2025-02-08 DIAGNOSIS — I25.10 CORONARY ARTERY DISEASE INVOLVING NATIVE CORONARY ARTERY OF NATIVE HEART WITHOUT ANGINA PECTORIS: ICD-10-CM

## 2025-02-08 DIAGNOSIS — R30.0 DYSURIA: ICD-10-CM

## 2025-02-08 LAB
ALBUMIN SERPL-MCNC: 4 G/DL (ref 3.2–4.8)
ALBUMIN/GLOB SERPL: 1.7 {RATIO} (ref 1–2)
ALP LIVER SERPL-CCNC: 103 U/L
ALT SERPL-CCNC: 11 U/L
ANION GAP SERPL CALC-SCNC: 8 MMOL/L (ref 0–18)
AST SERPL-CCNC: 13 U/L (ref ?–34)
BASOPHILS # BLD AUTO: 0.05 X10(3) UL (ref 0–0.2)
BASOPHILS NFR BLD AUTO: 0.6 %
BILIRUB SERPL-MCNC: 0.5 MG/DL (ref 0.2–1.1)
BILIRUB UR QL: NEGATIVE
BUN BLD-MCNC: 42 MG/DL (ref 9–23)
BUN/CREAT SERPL: 37.2 (ref 10–20)
CALCIUM BLD-MCNC: 9.1 MG/DL (ref 8.7–10.4)
CHLORIDE SERPL-SCNC: 103 MMOL/L (ref 98–112)
CLARITY UR: CLEAR
CO2 SERPL-SCNC: 27 MMOL/L (ref 21–32)
COLOR UR: YELLOW
CREAT BLD-MCNC: 1.13 MG/DL
DEPRECATED RDW RBC AUTO: 65.3 FL (ref 35.1–46.3)
EGFRCR SERPLBLD CKD-EPI 2021: 49 ML/MIN/1.73M2 (ref 60–?)
EOSINOPHIL # BLD AUTO: 0.33 X10(3) UL (ref 0–0.7)
EOSINOPHIL NFR BLD AUTO: 3.7 %
ERYTHROCYTE [DISTWIDTH] IN BLOOD BY AUTOMATED COUNT: 18.5 % (ref 11–15)
FASTING STATUS PATIENT QL REPORTED: NO
GLOBULIN PLAS-MCNC: 2.4 G/DL (ref 2–3.5)
GLUCOSE BLD-MCNC: 114 MG/DL (ref 70–99)
GLUCOSE UR-MCNC: NORMAL MG/DL
HCT VFR BLD AUTO: 29.8 %
HGB BLD-MCNC: 9.3 G/DL
HGB UR QL STRIP.AUTO: NEGATIVE
IMM GRANULOCYTES # BLD AUTO: 0.03 X10(3) UL (ref 0–1)
IMM GRANULOCYTES NFR BLD: 0.3 %
KETONES UR-MCNC: NEGATIVE MG/DL
LEUKOCYTE ESTERASE UR QL STRIP.AUTO: 75
LYMPHOCYTES # BLD AUTO: 2.19 X10(3) UL (ref 1–4)
LYMPHOCYTES NFR BLD AUTO: 24.6 %
MCH RBC QN AUTO: 30.2 PG (ref 26–34)
MCHC RBC AUTO-ENTMCNC: 31.2 G/DL (ref 31–37)
MCV RBC AUTO: 96.8 FL
MONOCYTES # BLD AUTO: 0.78 X10(3) UL (ref 0.1–1)
MONOCYTES NFR BLD AUTO: 8.8 %
NEUTROPHILS # BLD AUTO: 5.51 X10 (3) UL (ref 1.5–7.7)
NEUTROPHILS # BLD AUTO: 5.51 X10(3) UL (ref 1.5–7.7)
NEUTROPHILS NFR BLD AUTO: 62 %
NITRITE UR QL STRIP.AUTO: NEGATIVE
OSMOLALITY SERPL CALC.SUM OF ELEC: 297 MOSM/KG (ref 275–295)
PH UR: 5.5 [PH] (ref 5–8)
PLATELET # BLD AUTO: 368 10(3)UL (ref 150–450)
PLATELET MORPHOLOGY: NORMAL
POTASSIUM SERPL-SCNC: 3.9 MMOL/L (ref 3.5–5.1)
PROT SERPL-MCNC: 6.4 G/DL (ref 5.7–8.2)
PROT UR-MCNC: NEGATIVE MG/DL
RBC # BLD AUTO: 3.08 X10(6)UL
SODIUM SERPL-SCNC: 138 MMOL/L (ref 136–145)
SP GR UR STRIP: 1.02 (ref 1–1.03)
UROBILINOGEN UR STRIP-ACNC: NORMAL
WBC # BLD AUTO: 8.9 X10(3) UL (ref 4–11)

## 2025-02-08 PROCEDURE — 87086 URINE CULTURE/COLONY COUNT: CPT

## 2025-02-08 PROCEDURE — 36415 COLL VENOUS BLD VENIPUNCTURE: CPT

## 2025-02-08 PROCEDURE — 85025 COMPLETE CBC W/AUTO DIFF WBC: CPT

## 2025-02-08 PROCEDURE — 80053 COMPREHEN METABOLIC PANEL: CPT

## 2025-02-08 PROCEDURE — 81001 URINALYSIS AUTO W/SCOPE: CPT

## 2025-02-08 RX ORDER — MYCOPHENOLATE MOFETIL 500 MG/1
1000 TABLET ORAL 2 TIMES DAILY
Qty: 120 TABLET | Refills: 0 | OUTPATIENT
Start: 2025-02-08

## 2025-02-08 NOTE — TELEPHONE ENCOUNTER
Refill request has failed the Ambulatory Medication Refill Standing Order and is routed to the primary physician to review the following:    Requested Prescriptions     Refused Prescriptions Disp Refills    MYCOPHENOLATE MOFETIL 500 MG Oral Tab [Pharmacy Med Name: Mycophenolate Mofetil 500 Mg Tab Stri] 120 tablet 0     Sig: Take 2 tablets (1,000 mg total) by mouth 2 (two) times daily.     Refused By: ORTIZ SALVADOR     Reason for Refusal: Appt required, please call patient       See prior refill encounter, msg left for pt already that she needs appt

## 2025-02-11 ENCOUNTER — OFFICE VISIT (OUTPATIENT)
Facility: LOCATION | Age: 80
End: 2025-02-11
Payer: MEDICARE

## 2025-02-11 VITALS
OXYGEN SATURATION: 100 % | DIASTOLIC BLOOD PRESSURE: 54 MMHG | SYSTOLIC BLOOD PRESSURE: 95 MMHG | WEIGHT: 112 LBS | BODY MASS INDEX: 19.12 KG/M2 | HEART RATE: 71 BPM | HEIGHT: 64 IN

## 2025-02-11 DIAGNOSIS — M75.01 ADHESIVE CAPSULITIS OF RIGHT SHOULDER: Primary | ICD-10-CM

## 2025-02-11 PROBLEM — J84.10 LUNG GRANULOMA (HCC): Status: RESOLVED | Noted: 2023-06-27 | Resolved: 2025-02-11

## 2025-02-11 PROBLEM — I50.33 ACUTE ON CHRONIC HEART FAILURE WITH PRESERVED EJECTION FRACTION (HFPEF) (HCC): Status: RESOLVED | Noted: 2022-07-06 | Resolved: 2025-02-11

## 2025-02-11 PROCEDURE — 99212 OFFICE O/P EST SF 10 MIN: CPT | Performed by: INTERNAL MEDICINE

## 2025-02-11 NOTE — PROGRESS NOTES
INTERNAL MEDICINE OFFICE NOTE     Patient ID: Armida Hobbs is a 79 year old female.  Today's Date: 02/28/25  Chief Complaint: Arthritis    HPI  Having right shoulder pain. Was in hospital. Lots of movement from bed. Did PT. Had injrection. Improved but now worsening. Wants to see physiatry/rehab for another injections.     Vitals:    02/11/25 1524   BP: 95/54   Pulse: 71   SpO2: 100%   Weight: 112 lb (50.8 kg)   Height: 5' 4\" (1.626 m)     body mass index is 19.22 kg/m².  BP Readings from Last 3 Encounters:   02/11/25 95/54   01/27/25 118/69   10/30/24 101/60     The ASCVD Risk score (Chelsea SWANN, et al., 2019) failed to calculate for the following reasons:    The 2019 ASCVD risk score is only valid for ages 40 to 79  Medications reviewed:  Current Outpatient Medications   Medication Sig Dispense Refill    apixaban (ELIQUIS) 2.5 MG Oral Tab Take 1 tablet (2.5 mg total) by mouth 2 (two) times daily. 180 tablet 6    atorvastatin 40 MG Oral Tab Take 1 tablet (40 mg total) by mouth nightly. 30 tablet 3    clopidogrel 75 MG Oral Tab Take 1 tablet (75 mg total) by mouth daily. FOR HEART STENTS. 90 tablet 9    losartan 25 MG Oral Tab Take 1 tablet (25 mg total) by mouth daily. 180 tablet 3    metoprolol tartrate 25 MG Oral Tab Take 4 tablets (100 mg total) by mouth every 8 (eight) hours. 270 tablet 3    pantoprazole 40 MG Oral Tab EC Take 1 tablet (40 mg total) by mouth 2 (two) times daily before meals. 180 tablet 8    mirtazapine 15 MG Oral Tab Take 1 tablet (15 mg total) by mouth nightly. 90 tablet 2    leflunomide 10 MG Oral Tab Take 1 tablet (10 mg total) by mouth daily. Management per rheumatology 90 tablet 1    ferrous sulfate 325 (65 FE) MG Oral Tab EC Take 1 tablet (325 mg total) by mouth daily with breakfast. 90 tablet 4    predniSONE 2.5 MG Oral Tab Take 2 tablets (5 mg total) by mouth daily. 180 tablet 0    folic acid 1 MG Oral Tab Take 1 tablet (1 mg total) by mouth daily. 90 tablet 3    Melatonin  (MELATONIN MAXIMUM STRENGTH) 5 MG Oral Tab Take 1 tablet (5 mg total) by mouth nightly.      Niacin ER, Antihyperlipidemic, 500 MG Oral Tab CR Take 1 tablet (500 mg total) by mouth nightly.      thiamine 100 MG Oral Tab Take 1 tablet (100 mg total) by mouth daily.      sennosides-docusate 8.6-50 MG Oral Tab Take 1 tablet by mouth nightly.      sodium chloride 0.65 % Nasal Solution 2 sprays by Nasal route.      QUEtiapine 25 MG Oral Tab Take 0.5 tablets (12.5 mg total) by mouth.      Polyethylene Glycol 3350 17 g Oral Powd Pack Take 17 g by mouth daily.      allopurinol 100 MG Oral Tab Take 1 tablet (100 mg total) by mouth daily with breakfast.      Mycophenolate Mofetil 500 MG Oral Tab Take 2 tablets (1,000 mg total) by mouth 2 (two) times daily. 120 tablet 0    Mycophenolate Mofetil 500 MG Oral Tab Take 2 tablets (1,000 mg total) by mouth 2 (two) times daily. 30 tablet 0         Assessment & Plan    1. Adhesive capsulitis of right shoulder (Primary)  -     Ortho Referral - In Network  Plan  As above      Follow Up: No follow-ups on file..         Objective: Results:   Physical Exam  Vitals and nursing note reviewed.   Constitutional:       General: She is not in acute distress.     Appearance: Normal appearance.   HENT:      Head: Normocephalic.      Right Ear: External ear normal.      Left Ear: External ear normal.   Eyes:      Extraocular Movements: Extraocular movements intact.      Conjunctiva/sclera: Conjunctivae normal.   Pulmonary:      Effort: Pulmonary effort is normal.   Musculoskeletal:         General: Signs of injury present. No deformity.      Cervical back: Normal range of motion and neck supple.   Skin:     Coloration: Skin is not jaundiced.   Neurological:      General: No focal deficit present.      Mental Status: She is alert and oriented to person, place, and time. Mental status is at baseline.   Psychiatric:         Mood and Affect: Mood normal.         Behavior: Behavior normal.         Reviewed:    Patient Active Problem List    Diagnosis    Chronic gastric ulcer with hemorrhage    Bullous pemphigoid ([facility])    Acute on chronic heart failure ([facility])    Lumbar radiculopathy    Bilateral carotid bruits     Formatting of this note might be different from the original. 12/2020 Mild heterogenous atherosclerotic plaque bilateral with less than 50% stenosis in both right and left internal carotid arteries, based on NASCET criteria. Anterograde flow present in both vertebral arteries      Senile purpura    Anxiety and depression    Poor short term memory    Neutropenia, unspecified    Coronary artery disease involving native coronary artery of native heart without angina pectoris    S/P drug eluting coronary stent placement    Chronic diastolic (congestive) heart failure ([facility])    Stage 3a chronic kidney disease ([facility])    Hypertension goal BP (blood pressure) < 130/80    S/P TAVR (transcatheter aortic valve replacement)    PAF (paroxysmal atrial fibrillation) ([facility])    Rheumatoid arthritis involving both ankles with positive rheumatoid factor ([facility])     Formatting of this note might be different from the original. Stable on steroids      Bilateral lower extremity edema     Formatting of this note might be different from the original. Multifactorial including dependent and venous insufficiency. Compression stockings and leg elevation.. Lasix 20 mg as directed.      Varicose veins of both lower extremities with pain    Mixed hyperlipidemia    Gastroesophageal reflux disease without esophagitis    Postmenopausal atrophic vaginitis    Sensorineural hearing loss, bilateral      Allergies[1]     Social History     Socioeconomic History    Marital status:    Tobacco Use    Smoking status: Never     Passive exposure: Never    Smokeless tobacco: Never   Vaping Use    Vaping status: Never Used   Substance and Sexual Activity    Alcohol use: Never    Drug use: Never   Other Topics Concern    Caffeine Concern No     Seat Belt Yes    Special Diet No    Breast feeding No    Reaction to local anesthetic No    Pt has a pacemaker No    Pt has a defibrillator No   Social History Narrative    ** Merged History Encounter **          Social Drivers of Health     Food Insecurity: No Food Insecurity (12/28/2024)    Received from [place] ([facility])    Hunger Vital Sign     Worried About Running Out of Food in the Last Year: Never true     Ran Out of Food in the Last Year: Never true   Transportation Needs: No Transportation Needs (12/28/2024)    Received from [place] ([facility])    PRAPARE - Transportation     Lack of Transportation (Medical): No     Lack of Transportation (Non-Medical): No   Housing Stability: Low Risk  (12/28/2024)    Received from [place] ([facility])    Housing Stability Vital Sign     Unable to Pay for Housing in the Last Year: No     Number of Times Moved in the Last Year: 0     Homeless in the Last Year: No   Recent Concern: Housing Stability - High Risk (12/4/2024)    Received from [place] ([facility])    Housing Stability Vital Sign     Unable to Pay for Housing in the Last Year: No     Number of Times Moved in the Last Year: 1     Homeless in the Last Year: Yes      Review of Systems  All other systems negative unless otherwise stated in ROS or HPI above.       Presley Strange MD  Internal Medicine       Call office with any questions or seek emergency care if necessary.   Patient understands and agrees to follow directions and advice.      ----------------------------------------- PATIENT INSTRUCTIONS-----------------------------------------   .    There are no Patient Instructions on file for this visit.             [1]   Allergies  Allergen Reactions    Codeine PAIN     Chest pain     Sulfa Antibiotics HIVES    Amlodipine OTHER (SEE COMMENTS)     Swelling and leg cramps

## 2025-02-12 ENCOUNTER — TELEPHONE (OUTPATIENT)
Dept: DERMATOLOGY CLINIC | Facility: CLINIC | Age: 80
End: 2025-02-12

## 2025-02-14 ENCOUNTER — TELEPHONE (OUTPATIENT)
Facility: LOCATION | Age: 80
End: 2025-02-14

## 2025-02-14 NOTE — TELEPHONE ENCOUNTER
Armida RN from [facility] states she has been trying to get a hold of the patient and the daughter for 1 week without success.    Patient has not been seen this week by home health.She is going to try next week to contact the patient. Please advise if ok to be seen next week.    Message can be left on confidential voicemail.

## 2025-02-16 ENCOUNTER — PATIENT MESSAGE (OUTPATIENT)
Facility: LOCATION | Age: 80
End: 2025-02-16

## 2025-02-19 ENCOUNTER — OFFICE VISIT (OUTPATIENT)
Dept: DERMATOLOGY CLINIC | Facility: CLINIC | Age: 80
End: 2025-02-19
Payer: MEDICARE

## 2025-02-19 DIAGNOSIS — Z51.81 MEDICATION MONITORING ENCOUNTER: ICD-10-CM

## 2025-02-19 DIAGNOSIS — L12.0 BULLOUS PEMPHIGOID (HCC): Primary | ICD-10-CM

## 2025-02-19 PROCEDURE — 99214 OFFICE O/P EST MOD 30 MIN: CPT | Performed by: STUDENT IN AN ORGANIZED HEALTH CARE EDUCATION/TRAINING PROGRAM

## 2025-02-19 RX ORDER — MELATONIN
100 DAILY
COMMUNITY
Start: 2024-12-04

## 2025-02-19 RX ORDER — ALLOPURINOL 100 MG/1
100 TABLET ORAL
COMMUNITY
Start: 2024-12-23

## 2025-02-19 RX ORDER — POLYETHYLENE GLYCOL 3350 17 G/17G
17 POWDER, FOR SOLUTION ORAL DAILY
COMMUNITY
Start: 2024-12-23

## 2025-02-19 RX ORDER — QUETIAPINE FUMARATE 25 MG/1
12.5 TABLET, FILM COATED ORAL
COMMUNITY
Start: 2024-12-22

## 2025-02-19 RX ORDER — AMOXICILLIN 250 MG
1 CAPSULE ORAL NIGHTLY
COMMUNITY
Start: 2024-12-22

## 2025-02-19 RX ORDER — ECHINACEA PURPUREA EXTRACT 125 MG
2 TABLET ORAL
COMMUNITY
Start: 2024-12-22

## 2025-02-19 RX ORDER — NIACIN 500 MG/1
500 TABLET, EXTENDED RELEASE ORAL NIGHTLY
COMMUNITY

## 2025-02-19 RX ORDER — MYCOPHENOLATE MOFETIL 500 MG/1
1000 TABLET ORAL 2 TIMES DAILY
Qty: 120 TABLET | Refills: 0 | Status: SHIPPED | OUTPATIENT
Start: 2025-02-19

## 2025-02-19 NOTE — PROGRESS NOTES
Established Patient     Referred by: Presley Strange MD [NPI: [id number]]      CHIEF COMPLAINT: Bullous Pemphigoid F/U    HISTORY OF PRESENT ILLNESS: Armida Hobbs is a 79 year old female here for Follow Up    Bullous Pemphigoid F/U  Location: All over body  Duration: 10 mths  Signs and symptoms: Itching, burning sometimes, bleeding from scratching  Condition Update: Improved initially but regressed since having a fall in November-in between hospital visits and rehab patient stopped medications. Restarted 1 week ago. Daughter is not seeing any blisters.  Current treatment: doxycycline 100mg with niacinamide, mycophenolate 500mg BID  Past treatment: Castor oil, Doxycycline 100MG BID, Mycophenolate mofetil 500 mg per day.    Personal Dermatologic History  History of chronic skin disease: Yes (B.P)  History autoimmune diseases:  Yes (R.A. and B.P.)    Family History  History of chronic skin disease: No  History autoimmune diseases:  No    Past Medical History  Past Medical History:    Anxiety and depression    AS (aortic stenosis)    s/p TAVR    Asthma (HCC)    CAD (coronary artery disease)    CHF (congestive heart failure) (HCC)    Diastolic    Chronic atrial fibrillation (HCC)    CKD (chronic kidney disease)    Congestive heart disease (HCC)    Depression    Essential hypertension    Hearing impairment    bilateral hearing aids    High cholesterol    Hyperlipidemia    PONV (postoperative nausea and vomiting)    RA (rheumatoid arthritis) ([facility])       REVIEW OF SYSTEMS:  Constitutional: Denies fever, chills, unintentional weight loss.   Skin as per HPI    Medications  Current Outpatient Medications   Medication Sig Dispense Refill    Mycophenolate Mofetil 500 MG Oral Tab Take 2 tablets (1,000 mg total) by mouth 2 (two) times daily. 30 tablet 0    apixaban (ELIQUIS) 2.5 MG Oral Tab Take 1 tablet (2.5 mg total) by mouth 2 (two) times daily. 180 tablet 6    atorvastatin 40 MG Oral Tab Take 1 tablet (40 mg total) by mouth  nightly. 30 tablet 3    clopidogrel 75 MG Oral Tab Take 1 tablet (75 mg total) by mouth daily. FOR HEART STENTS. 90 tablet 9    losartan 25 MG Oral Tab Take 1 tablet (25 mg total) by mouth daily. 180 tablet 3    metoprolol tartrate 25 MG Oral Tab Take 4 tablets (100 mg total) by mouth every 8 (eight) hours. 270 tablet 3    pantoprazole 40 MG Oral Tab EC Take 1 tablet (40 mg total) by mouth 2 (two) times daily before meals. 180 tablet 8    mirtazapine 15 MG Oral Tab Take 1 tablet (15 mg total) by mouth nightly. 90 tablet 2    leflunomide 10 MG Oral Tab Take 1 tablet (10 mg total) by mouth daily. Management per rheumatology 90 tablet 1    ferrous sulfate 325 (65 FE) MG Oral Tab EC Take 1 tablet (325 mg total) by mouth daily with breakfast. 90 tablet 4    predniSONE 2.5 MG Oral Tab Take 2 tablets (5 mg total) by mouth daily. 180 tablet 0    folic acid 1 MG Oral Tab Take 1 tablet (1 mg total) by mouth daily. 90 tablet 3       PHYSICAL EXAM:  General: awake, alert, no acute distress  Skin: Skin exam was performed today including the following: Trunk and extremities. Pertinent findings include:   -With excoriations    ASSESSMENT & PLAN:  Pathophysiology of diagnoses discussed with patient.  Therapeutic options reviewed. Risks, benefits, and alternatives discussed with patient. Instructions reviewed at length.    # Bullous pemphigoid - off of cellcept once in rehab and now flaring pruritus  #Medication monitoring  - Re-start mycophenolate 1000 mg twice daily CellCept pending labs.  Labs to be checked next month   - Recommended CeraVe itch today      Return to clinic:  2 months  or sooner if something concerning arises    Buddy Montejo MD    By signing my name below, Myriam SHEPARD MA,  attest that this documentation has been prepared under the direction and in the presence of Buddy Montejo MD.   Electronically Signed: Myriam JAFFE MA, 2/19/2025, 2:12 PM.    Buddy SHEPARD MD,  personally performed the services  described in this documentation. All medical record entries made by the scribe were at my direction and in my presence.  I have reviewed the chart and agree that the record reflects my personal performance and is accurate and complete.  Buddy Montejo MD, 2/19/2025, 2:49 PM

## 2025-02-20 ENCOUNTER — PATIENT MESSAGE (OUTPATIENT)
Dept: PEDIATRICS CLINIC | Facility: CLINIC | Age: 80
End: 2025-02-20

## 2025-02-27 ENCOUNTER — TELEPHONE (OUTPATIENT)
Facility: LOCATION | Age: 80
End: 2025-02-27

## 2025-02-27 NOTE — TELEPHONE ENCOUNTER
Received call from pedro pablo with [facility]    She states the patient has a rash on her arms back and legs and she has been itching it to the point where the small blisters have broken open and they are worried about infection. The nurse is asking for something to help with her itching, she states it appears to be a contact dermatitis. The patient is already using a steroid cream currently.     The nurse also reports that the daughter has reported the patient has been getting brief nosebleeds every night. She states the tissues become saturated and then the nosebleeds stop. She is using a nasal saline spray and has a humidifier in the home but the patient is n eliquis and plavix.      Please advise.

## 2025-02-27 NOTE — TELEPHONE ENCOUNTER
Chart and notes reviewed. Patient has recently seen and follows Dr Montejo and recent visit for bullous pemphigoid with flaring pruritus. Was to restart mycophenolate and recommend CeraVe itch per visit notes. Recommend follow up with Dr Montejo for worsening rash. I would like her to follow up with Dr Naranjo and establish care since Dr Strange is no longer practicing with [place]. If nose bleeds become severe or do not stop, she will need to go to ER for urgent evaluation.

## 2025-02-28 ENCOUNTER — MED REC SCAN ONLY (OUTPATIENT)
Dept: INTERNAL MEDICINE CLINIC | Facility: CLINIC | Age: 80
End: 2025-02-28

## 2025-02-28 NOTE — TELEPHONE ENCOUNTER
Left message for Armida RN residential home health to call back-transfer to triage.    Advised daughter Kianna(on HIPAA) of Ida Moreno's note. Daughter verbalized understanding and will schedule an appointment through [facility] with another provider to establish care. In the meantime, will continue to monitor nose bleeds.         medications    Medication Sig Start Date End Date Taking? Authorizing Provider   sodium chloride (OCEAN NASAL SPRAY) 0.65 % nasal spray 1 spray by Nasal route as needed for Congestion 10/4/18  Yes Larry Galeano MD       REVIEW OF SYSTEMS    (2-9 systems for level 4, 10 or more for level 5)      Review of Systems   Constitutional: Negative for activity change, appetite change, crying, decreased responsiveness, fever and irritability. HENT: Positive for congestion and rhinorrhea. Negative for ear discharge, nosebleeds and trouble swallowing. Eyes: Negative for discharge and redness. Respiratory: Positive for cough. Negative for wheezing and stridor. Cardiovascular: Negative for leg swelling and cyanosis. Gastrointestinal: Negative for blood in stool, constipation, diarrhea and vomiting. Genitourinary: Negative for decreased urine volume. Musculoskeletal: Negative for joint swelling. Skin: Negative for color change, rash and wound. Neurological: Negative for seizures. PHYSICAL EXAM   (up to 7 for level 4, 8 or more for level 5)      INITIAL VITALS:   Pulse 122   Temp 98.5 °F (36.9 °C)   Resp 24   Wt 22 lb (9.979 kg)   SpO2 98%     Physical Exam   Constitutional: She appears well-developed and well-nourished. She is active. No distress. HENT:   Head: Anterior fontanelle is flat. Right Ear: Tympanic membrane normal.   Left Ear: Tympanic membrane normal.   Nose: Nasal discharge present. Mouth/Throat: Mucous membranes are moist. Oropharynx is clear. Pharynx is normal.   Eyes: Conjunctivae and EOM are normal. Right eye exhibits no discharge. Left eye exhibits no discharge. Neck: Normal range of motion. Neck supple. Cardiovascular: Normal rate, regular rhythm, S1 normal and S2 normal.  Pulses are palpable. No murmur heard. Pulmonary/Chest: Effort normal and breath sounds normal. No stridor. No respiratory distress. She has no wheezes. She has no rhonchi. Abdominal: Soft.  Bowel sounds are normal. She exhibits no distension. There is no tenderness. There is no rebound and no guarding. Musculoskeletal: Normal range of motion. She exhibits no edema, tenderness, deformity or signs of injury. Lymphadenopathy: No occipital adenopathy is present. She has no cervical adenopathy. Neurological: She is alert. She has normal strength. She exhibits normal muscle tone. Skin: Skin is warm and dry. Capillary refill takes less than 3 seconds. Turgor is normal. No rash noted. She is not diaphoretic. No cyanosis. DIFFERENTIAL  DIAGNOSIS     PLAN (LABS / IMAGING / EKG):  No orders of the defined types were placed in this encounter. MEDICATIONS ORDERED:  Orders Placed This Encounter   Medications    DISCONTD: sodium chloride (OCEAN) 0.65 % nasal spray 1 spray    sodium chloride (OCEAN NASAL SPRAY) 0.65 % nasal spray     Si spray by Nasal route as needed for Congestion     Dispense:  1 Bottle     Refill:  3       DDX: viral syndrome, nasal congestion, viral URI    DIAGNOSTIC RESULTS / EMERGENCY DEPARTMENT COURSE / MDM     LABS:  No results found for this visit on 10/04/18. IMPRESSION: well appearing 9 month old female in no acute distress presents with 5 days of nasal congestion, rhinorrhea, intermittent cough. Vital signs showed no concerning abnormalities. No respiratory distress. Impression is nasal congestion    RADIOLOGY:  None    EKG  None    All EKG's are interpreted by the Emergency Department Physician who either signs or Co-signs this chart in the absence of a cardiologist.    EMERGENCY DEPARTMENT COURSE:    6month-old otherwise healthy female presents to the ED accompanied by mother with 5 days of increased nasal congestion, rhinorrhea, intermittent cough. Mother reports no fever, skin changes, diarrhea, constipation, decreased urine output. Physical exam shows alert, active, playful 6month-old female with significant nasal discharge and nasal congestion.

## 2025-02-28 NOTE — TELEPHONE ENCOUNTER
order #[id number] signed by Dr Naranjo and faxed to [facility]. Confirmation received and sent to scan

## 2025-03-05 ENCOUNTER — MED REC SCAN ONLY (OUTPATIENT)
Dept: INTERNAL MEDICINE CLINIC | Facility: CLINIC | Age: 80
End: 2025-03-05

## 2025-03-05 ENCOUNTER — TELEPHONE (OUTPATIENT)
Dept: INTERNAL MEDICINE CLINIC | Facility: CLINIC | Age: 80
End: 2025-03-05

## 2025-03-05 NOTE — TELEPHONE ENCOUNTER
order #[id number] and #[id number] signed and faxed to [facility]. Confirmation received and sent to scan

## 2025-03-06 ENCOUNTER — TELEPHONE (OUTPATIENT)
Facility: LOCATION | Age: 80
End: 2025-03-06

## 2025-03-07 ENCOUNTER — TELEPHONE (OUTPATIENT)
Facility: LOCATION | Age: 80
End: 2025-03-07

## 2025-03-17 ENCOUNTER — TELEPHONE (OUTPATIENT)
Age: 80
End: 2025-03-17

## 2025-03-18 ENCOUNTER — LAB ENCOUNTER (OUTPATIENT)
Dept: LAB | Age: 80
End: 2025-03-18
Attending: STUDENT IN AN ORGANIZED HEALTH CARE EDUCATION/TRAINING PROGRAM
Payer: MEDICARE

## 2025-03-18 ENCOUNTER — TELEPHONE (OUTPATIENT)
Dept: DERMATOLOGY CLINIC | Facility: CLINIC | Age: 80
End: 2025-03-18

## 2025-03-18 ENCOUNTER — PATIENT MESSAGE (OUTPATIENT)
Dept: DERMATOLOGY CLINIC | Facility: CLINIC | Age: 80
End: 2025-03-18

## 2025-03-18 DIAGNOSIS — Z51.81 MEDICATION MONITORING ENCOUNTER: ICD-10-CM

## 2025-03-18 DIAGNOSIS — M06.9 RHEUMATOID ARTHRITIS, INVOLVING UNSPECIFIED SITE, UNSPECIFIED WHETHER RHEUMATOID FACTOR PRESENT (HCC): ICD-10-CM

## 2025-03-18 DIAGNOSIS — D64.9 ANEMIA, UNSPECIFIED TYPE: ICD-10-CM

## 2025-03-18 DIAGNOSIS — R04.0 BLEEDING FROM THE NOSE: ICD-10-CM

## 2025-03-18 DIAGNOSIS — Z51.81 THERAPEUTIC DRUG MONITORING: ICD-10-CM

## 2025-03-18 LAB
ALBUMIN SERPL-MCNC: 3.8 G/DL (ref 3.2–4.8)
ALBUMIN/GLOB SERPL: 1.7 {RATIO} (ref 1–2)
ALP LIVER SERPL-CCNC: 94 U/L
ALT SERPL-CCNC: <7 U/L
ANION GAP SERPL CALC-SCNC: 7 MMOL/L (ref 0–18)
AST SERPL-CCNC: 13 U/L (ref ?–34)
BASOPHILS # BLD AUTO: 0.04 X10(3) UL (ref 0–0.2)
BASOPHILS NFR BLD AUTO: 0.6 %
BILIRUB SERPL-MCNC: 0.4 MG/DL (ref 0.2–1.1)
BUN BLD-MCNC: 23 MG/DL (ref 9–23)
BUN/CREAT SERPL: 22.1 (ref 10–20)
CALCIUM BLD-MCNC: 8.8 MG/DL (ref 8.7–10.4)
CHLORIDE SERPL-SCNC: 110 MMOL/L (ref 98–112)
CO2 SERPL-SCNC: 21 MMOL/L (ref 21–32)
CREAT BLD-MCNC: 1.04 MG/DL
CRP SERPL-MCNC: 1.9 MG/DL (ref ?–1)
DEPRECATED RDW RBC AUTO: 55.9 FL (ref 35.1–46.3)
EGFRCR SERPLBLD CKD-EPI 2021: 54 ML/MIN/1.73M2 (ref 60–?)
EOSINOPHIL # BLD AUTO: 0.34 X10(3) UL (ref 0–0.7)
EOSINOPHIL NFR BLD AUTO: 4.8 %
ERYTHROCYTE [DISTWIDTH] IN BLOOD BY AUTOMATED COUNT: 15.9 % (ref 11–15)
ERYTHROCYTE [SEDIMENTATION RATE] IN BLOOD: 23 MM/HR
FASTING STATUS PATIENT QL REPORTED: NO
GLOBULIN PLAS-MCNC: 2.2 G/DL (ref 2–3.5)
GLUCOSE BLD-MCNC: 121 MG/DL (ref 70–99)
HCT VFR BLD AUTO: 28.3 %
HGB BLD-MCNC: 9.1 G/DL
IMM GRANULOCYTES # BLD AUTO: 0.03 X10(3) UL (ref 0–1)
IMM GRANULOCYTES NFR BLD: 0.4 %
LYMPHOCYTES # BLD AUTO: 1.5 X10(3) UL (ref 1–4)
LYMPHOCYTES NFR BLD AUTO: 21.2 %
MCH RBC QN AUTO: 30.6 PG (ref 26–34)
MCHC RBC AUTO-ENTMCNC: 32.2 G/DL (ref 31–37)
MCV RBC AUTO: 95.3 FL
MONOCYTES # BLD AUTO: 0.53 X10(3) UL (ref 0.1–1)
MONOCYTES NFR BLD AUTO: 7.5 %
NEUTROPHILS # BLD AUTO: 4.62 X10 (3) UL (ref 1.5–7.7)
NEUTROPHILS # BLD AUTO: 4.62 X10(3) UL (ref 1.5–7.7)
NEUTROPHILS NFR BLD AUTO: 65.5 %
OSMOLALITY SERPL CALC.SUM OF ELEC: 291 MOSM/KG (ref 275–295)
PLATELET # BLD AUTO: 294 10(3)UL (ref 150–450)
POTASSIUM SERPL-SCNC: 4 MMOL/L (ref 3.5–5.1)
PROT SERPL-MCNC: 6 G/DL (ref 5.7–8.2)
RBC # BLD AUTO: 2.97 X10(6)UL
SODIUM SERPL-SCNC: 138 MMOL/L (ref 136–145)
WBC # BLD AUTO: 7.1 X10(3) UL (ref 4–11)

## 2025-03-18 PROCEDURE — 86140 C-REACTIVE PROTEIN: CPT

## 2025-03-18 PROCEDURE — 36415 COLL VENOUS BLD VENIPUNCTURE: CPT

## 2025-03-18 PROCEDURE — 82728 ASSAY OF FERRITIN: CPT

## 2025-03-18 PROCEDURE — 85025 COMPLETE CBC W/AUTO DIFF WBC: CPT

## 2025-03-18 PROCEDURE — 80053 COMPREHEN METABOLIC PANEL: CPT

## 2025-03-18 PROCEDURE — 83540 ASSAY OF IRON: CPT

## 2025-03-18 PROCEDURE — 85652 RBC SED RATE AUTOMATED: CPT

## 2025-03-18 RX ORDER — CLOBETASOL PROPIONATE 0.5 MG/G
OINTMENT TOPICAL
Qty: 60 G | Refills: 3 | Status: SHIPPED | OUTPATIENT
Start: 2025-03-18

## 2025-03-18 RX ORDER — MYCOPHENOLATE MOFETIL 500 MG/1
1000 TABLET ORAL 2 TIMES DAILY
Qty: 120 TABLET | Refills: 0 | Status: SHIPPED | OUTPATIENT
Start: 2025-03-18

## 2025-03-18 NOTE — TELEPHONE ENCOUNTER
Refill Request for medication(s): MYCOPHENOLATE MOFETIL 500 MG Oral Tab     Last Office Visit: 02/19/25    Last Refill: 02/19/25    Pharmacy, Dosage verified: [id number] - [address] [phone], [phone]     Condition Update (if applicable): Please see separate TE. Pt needs to schedule f/u visit.    Rx pended and sent to provider for approval, please advise. Thank You!

## 2025-03-18 NOTE — TELEPHONE ENCOUNTER
Pt is scheduled.      Future Appointments   Date Time Provider Department Center   3/21/2025 12:40 PM Colin Lopez MD [facility]

## 2025-03-18 NOTE — TELEPHONE ENCOUNTER
Please have her use clobetasol to hand looks most consistent with eczema. Cannot explain nose size and pain and would discuss this with rheum and she only has erythema of skin and this looks like it is more likely related to inflammation of the underlying cartilage.     Please have her see me the week of April 8.     Buddy Montejo MD

## 2025-03-21 ENCOUNTER — OFFICE VISIT (OUTPATIENT)
Age: 80
End: 2025-03-21
Payer: MEDICARE

## 2025-03-21 VITALS
BODY MASS INDEX: 19.97 KG/M2 | WEIGHT: 117 LBS | HEIGHT: 64 IN | DIASTOLIC BLOOD PRESSURE: 77 MMHG | SYSTOLIC BLOOD PRESSURE: 143 MMHG | HEART RATE: 85 BPM

## 2025-03-21 DIAGNOSIS — D64.9 ANEMIA, UNSPECIFIED TYPE: ICD-10-CM

## 2025-03-21 DIAGNOSIS — M06.9 RHEUMATOID ARTHRITIS, INVOLVING UNSPECIFIED SITE, UNSPECIFIED WHETHER RHEUMATOID FACTOR PRESENT (HCC): Primary | ICD-10-CM

## 2025-03-21 DIAGNOSIS — R04.0 BLEEDING FROM THE NOSE: ICD-10-CM

## 2025-03-21 DIAGNOSIS — Z51.81 THERAPEUTIC DRUG MONITORING: ICD-10-CM

## 2025-03-21 LAB
DEPRECATED HBV CORE AB SER IA-ACNC: 154 NG/ML
IRON SERPL-MCNC: 55 UG/DL

## 2025-03-21 PROCEDURE — G2211 COMPLEX E/M VISIT ADD ON: HCPCS | Performed by: INTERNAL MEDICINE

## 2025-03-21 PROCEDURE — 99214 OFFICE O/P EST MOD 30 MIN: CPT | Performed by: INTERNAL MEDICINE

## 2025-03-21 RX ORDER — PREDNISONE 2.5 MG/1
5 TABLET ORAL DAILY
Qty: 180 TABLET | Refills: 3 | Status: SHIPPED | OUTPATIENT
Start: 2025-03-21

## 2025-03-21 NOTE — PROGRESS NOTES
Armida Hobbs is a 79 year old female who presents for   Chief Complaint   Patient presents with    Rheumatoid Arthritis   .   HPI:     I had the pleasure of seeing Armida Hobbs on 12/9/2020 for evaluation.     She just moved her in the last year. She was moved from [facility].   She was diagnosed with rheumatoid arthritis. She was diagnosed 5 years ago.    It affects her knee and ankles. Her hands hurt   She was put on medication once a long time ago.   Then seh just started using nautural supplements - like food and fish oil and turmeric.   She was seeing Dr. Galeana and was getting a lot of cortisone injections.     She was taking fish oil - this was helping a lot.   She used to get cortisone injections for her knees for osteoarthritis - and did get gel injections. It has been helping. She got it for the first time 2-3 months ago.     She also getting varcisoe vein shots.   She also had a pulled tendon of left knee and saw an orthopedic. And got an injectiosn 2 weeks ago and given diclofenac gel 1 % topical.   She got an xray at that time.     She has 7/10 pain - samara in left knee. It hurts and swollen at rest. It's beter than 1 month ago.     The pain travels and sometimes it's in her ankles and toes.   She was told not to take tyelnol.     She went last year for 2 session - for her knee.   She has not seen rheumatologist in 1 year.     12/23/2020  She still has left knee pain samara when she sleeps. Her left ankle and toe - hurts. And she has a hard time getting rid of them.   She is getting cramps in her legs. . She has trouble going up the steps. She went to physical therapy a  Couple of years ago.   One time she hurt her right hip in physical therapy  And got an MRI  And was told there was a lot swelling - she had injectiosn done at that time.     She has b/l knee pain and now her ankles.   She has a lot of pain in the mroning.   No foot or hand pain.   Now it's her hips pain.     Her knees are on and off    Better in he rknees about 50% better. She still has left knee . She had the injectiosn 4 weekss ago.   About 2 weeks later she had so much pain and she couldn't bend her knee.     She has onoging ankle pain at times.     2/18/2021  Some days every part of her body hurts. Domonique her righ tknee - it is also her right hip and both feet . Hurtts.   reveiwed records from 's office - from 6/2019 -   She had mri of her right hand and right ankle in 2016 - showed fluid in her righ thand and also tendonitis in right ankle -  He had dw her arava and biologics in the past but she declined.   He has summary of all her mris.   She had a lot of pain last week and got a right knee injeciton methryl pred 20mg - and she feels ian.r   She hasn't felt better on plaquenil 200mg bid.   She is going to get a hyaluronic acid injecito in 3/2021  [facility]   It travels from her knees to her right ankle and toes.     4/22/2021  She is on leflunomide 10mg a day - and prednisone 5mg ad ay.   She was getting hair loss and feeling tired with the lelfunomide.   She stopped the medication.   She felt a little better with   She had bad palpitations - every 6 months - she is getting echo - this was nromal.   Her cardiologist told her not to take - lasix, amlodipine and atoravastatin. She was started on pravastatin.   She was on plaquenil 200mg bid.   She still has bad back pain .     She has left ankle and left hip pain.     7/1/2021  She feels her legs are heavy that is newer.   She feels more ankle pain and swelling.   She feels a little better on leflunomide 20mg a day.   seh's had 2 weeks of sciatica.   She broke her left 2nd toe and had to see orthopedcs. At the time her feet were hurting so bad. So when she moves the wrong way and she felt a right sided sciatica pain. She was given ice and was put in ice bad and water and alcohol 3-4 times a day. It only helps a little bit.   She bumped her toe on a post - had a  fragility fracture - her DEXA in 2/2021 and is osteopenia -  fragility fracture on 4/2021 /   Reviewed the xray of foot on 6/16/ and it showed no fracture.   She feels she has plantar fascitis.   She feels less pain in her legs but she still has issues in her legs.   She still can't stand long doing her activities.   She has to sit down. She has 8/10 pain.     7/26/2022:   Presents for follow-up of seropositive RA (+RF 54) and osteoarthritis  She follows with Dr. Lopez, last seen in July 2021  Currently on prednisone 5 mg daily  Recent blood work shows creatinine of 1.43.  Normal LFTs  She was discharged from the hospital on 7/10/2022 for acute on chronic congestive heart failure.  MRI of the lower spine was done recently showing severe narrowing of the right neural foramen and disc protrusion at L3 and L4  She also had a recent x-ray of the neck by Dr. Behar showing multilevel degenerative disc changes mostly at C5-C6 and C6-C7, recommended PT and if not improvement then MRI cervical spine  Also on lyrica for pain  Has some hand pain mosty in the 1st CMC joints and the mcps  Most of the pain is in the neck and upper back    11/14/2022:   Presents for follow-up of seropositive RA (+RF 54) and osteoarthritis  She follows with Dr. Lopez, last seen in July 2021  Was recently discharged from the hospital for severe symptomatic aortic stenosis s/p aortic valve replacement.  Reports joint pain involving her left fourth finger with swelling.  Also has some pain in her knees.  Reports of burning in her feet  She was on prednisone 5 mg daily, it was discontinued.  She has had gel injections in the past which helped her knees.    1/21/2023  Since last visit - he has stents, AVR and then had pneumoina.   Had pneumonon on 12/11-12/20/2022 - was given solumedrol and cont. On arav - was doing better with abx and solumedrol.   She had low blood pressure and kidney failure.   She has a nurse coming in once a week and  phsyical therapy.   She was told she had low bp - and was in the hospital for 10 days to get the meds right.   She was admitted on 1/6/2023 - 1/13/2023 for FRANKO -creatine baseling was  1.3--> 3.06 on admit - now back to baseline - thought to be from overdiuresis  She was put on pred 40mg a day and tapered now to 20mg a day   She is on prednisone 20mg a day for the last 2 days.   She was given prednisoen burst last time for 10 days and daughter feels everytime she goes off prednsone - she has more flare and pain.   She's shaky as she taper this prednisone.   She is confused right now post hospitalization - it's slowly getting better after tapering the prednisone.     Her knees are hurting right now. Her knees are not swollen. -   There are slight ankle swelling.   Some days she is a lot of pain and sometiems not.   She's on icy patches and put on neck and back and that helps.   Sometimes her feet are burning.       2/9/2023 -   Her o2 is 96 % NR is 96 -   She is recovering from her recent admission on 1/6-1/13 - she is overall slowly recovering.   Her daughter feels she has more weakness and memory issues - CT head was done in patient and was normal.   She's having increasing sob - just yesterday and today since her last visit.   She's down to pred 5mg ad ay.   She is still shaking a little and she still feels palpiatiotns.     She's not in any pain.   She's still shaking in her hands on this dose of her prednisone.   She has palpiataitons. - this is not worse - but it's still intermittent -   If she rests, it's better.     No swelling in her legs -she si 142 lbs. -   She notices palpiations with going up the staris - 6 stairs.   seh is getting winded really fast.   She sees Cardiology next week.     Her memory is really bad - sometiems she forgets things - she cant' remember her meds.     4/13/2023  She's had her metoprolol and lisinopril cut back yesterday - and her blood pressure is better. - it was too low before.    She is in a lot of pain.   She is shaking and on prednisone -   She is in a lot fo pain   Reviewed labs -   Cr is 2.10.   She is taking tyelnol arthritis   She has 8-9/10 pain.   She has been drinking more juices.   She is feeling winded and sob  Her pain in her hips is really bad   The botton of her feet are burning. But no numbness.   She has no dysuria - her ua is showsing elevated white cells.     6/8/2023  She overall has been doing better.   She was having a lto fo swellign in her legs for the last week.   She went to the cardiologist andtold it  was not the heart.   She had us lf legs last week - and it was negative.   The swelling went down on it's own since then.   She feels a cardboard on the bottom of her feet.   She feels brunign and numbness - like plastric and she feels her toes go red.   The prednisone was making her shaky.   So she went to pred 2.5mg a day so she had more pain.   She is back on pred 5m ga dya.   Her pain is more manageable  Than last visit.   She was just swollen . She does have inflammation in her toes - she feels it's her RA.   She was not able to walk before so now she is able to walk.   No back and hips pain now.   She's walking around the house now and a nurse is walking around her.e     She' s getting parastehsias in both her feet.   It's stayin in both feet.   This bothers her.   The pain she has she can tolerated it.     She feels her pain si 4/10 pain.     4/18/2024  Not doing good. She still has inflammation.   Before it ranse she knows.   She has right ankle pain and it's swelling.   She is on gabapentin.   More trouble with her right ankle and leg - she almost fell with it give out.   If she tries to cook and if she tries to go [employer] shopping. Then the next day she's wiped out.   Has neck pain.   She gets regular massages - and that helps the muscle pain.   She went to [place] last month and felt worse - with swelling when she was there.       She's been off the  prendisone 2.5mg very other day - - this last month. Tapered off.   She is on leflunomide 10mg ad ay     Admitted in 12/2023 -for covid 19 and acute on chronic CHF    Shew as on pregabilin and then switched back to gabpaentin.     8/27/2024  She was dx with bullous pemphigoid dx in 5/2024 on her right arm and left arm - saw derm twice - it was all over her body  The first derm put her on pred 20mg a day - and mycohpenolate 500mg bid and it started helping the rash but she didn't tolerated it - stopped eating and was loopy on it. She's on doxycychline as well.   So saw second derm and dropped her pred to 10mg ad ay -and increased mycophenolate to 1000mg bid  and improved with the skin and got her appetite back and now she is swollen in her feet and ankles.   She just saw dr. Mayes - last week, cardiologist - and took her off the water pills b/c she was getting dehyrated.   She did get a SHAWNA on 6/2024 - and dx with mod MR  So she was off her diuretic while she got the rash and prednisone starting.   She's back on one daily diuretic but that didn't help and last week increased to twice a day .       She got second opinion rheum 7/30/2024 - dr. Geno Darling at [facility] - agrees with traetment of bullous pemphigoid with the RA    3/21/2025  She broke her hip and shoulder in [place] and air ambulanced her to [place] - and was in rehab there   She went off the mycohpenoalte   She saw dr. Lanier in 2/19/2025 - restarted the mycopneoalte 1000mg bid.   She went off mycophenolate fo rthe surgies.   The pemphigoid flares a few months ago - she   Had a bad right thumb kelsi spot and over her left leg -   She looks the best now -   She uses topical abx ointmnt -   Her nose is bleeding all the time.   Her nose is flare up -   Some days the rheumatoid can flare - on cold days -     Wt Readings from Last 2 Encounters:   03/21/25 117 lb (53.1 kg)   02/11/25 112 lb (50.8 kg)     Body mass index is 20.08 kg/m².      Current Outpatient  Medications   Medication Sig Dispense Refill    clobetasol 0.05 % External Ointment Apply to the affected areas twice daily Monday-Friday. Take weekends off. Advised to AVOID on face, under breasts, underarms and groin. 60 g 3    Melatonin (MELATONIN MAXIMUM STRENGTH) 5 MG Oral Tab Take 1 tablet (5 mg total) by mouth nightly.      Niacin ER, Antihyperlipidemic, 500 MG Oral Tab CR Take 1 tablet (500 mg total) by mouth nightly.      thiamine 100 MG Oral Tab Take 1 tablet (100 mg total) by mouth daily.      sennosides-docusate 8.6-50 MG Oral Tab Take 1 tablet by mouth nightly.      sodium chloride 0.65 % Nasal Solution 2 sprays by Nasal route.      QUEtiapine 25 MG Oral Tab Take 0.5 tablets (12.5 mg total) by mouth.      Polyethylene Glycol 3350 17 g Oral Powd Pack Take 17 g by mouth daily.      allopurinol 100 MG Oral Tab Take 1 tablet (100 mg total) by mouth daily with breakfast.      Mycophenolate Mofetil 500 MG Oral Tab Take 2 tablets (1,000 mg total) by mouth 2 (two) times daily. 30 tablet 0    apixaban (ELIQUIS) 2.5 MG Oral Tab Take 1 tablet (2.5 mg total) by mouth 2 (two) times daily. 180 tablet 6    atorvastatin 40 MG Oral Tab Take 1 tablet (40 mg total) by mouth nightly. 30 tablet 3    clopidogrel 75 MG Oral Tab Take 1 tablet (75 mg total) by mouth daily. FOR HEART STENTS. 90 tablet 9    losartan 25 MG Oral Tab Take 1 tablet (25 mg total) by mouth daily. 180 tablet 3    metoprolol tartrate 25 MG Oral Tab Take 4 tablets (100 mg total) by mouth every 8 (eight) hours. 270 tablet 3    pantoprazole 40 MG Oral Tab EC Take 1 tablet (40 mg total) by mouth 2 (two) times daily before meals. 180 tablet 8    mirtazapine 15 MG Oral Tab Take 1 tablet (15 mg total) by mouth nightly. 90 tablet 2    leflunomide 10 MG Oral Tab Take 1 tablet (10 mg total) by mouth daily. Management per rheumatology 90 tablet 1    ferrous sulfate 325 (65 FE) MG Oral Tab EC Take 1 tablet (325 mg total) by mouth daily with breakfast. 90 tablet 4     predniSONE 2.5 MG Oral Tab Take 2 tablets (5 mg total) by mouth daily. 180 tablet 0    folic acid 1 MG Oral Tab Take 1 tablet (1 mg total) by mouth daily. 90 tablet 3    MYCOPHENOLATE MOFETIL 500 MG Oral Tab Take 2 tablets (1,000 mg total) by mouth 2 (two) times daily. (Patient not taking: Reported on 3/21/2025) 120 tablet 0      Past Medical History:    Anxiety and depression    AS (aortic stenosis)    s/p TAVR    Asthma (HCC)    CAD (coronary artery disease)    CHF (congestive heart failure) (HCC)    Diastolic    Chronic atrial fibrillation (HCC)    CKD (chronic kidney disease)    Congestive heart disease (HCC)    Depression    Essential hypertension    Hearing impairment    bilateral hearing aids    High cholesterol    Hyperlipidemia    PONV (postoperative nausea and vomiting)    RA (rheumatoid arthritis) ([facility])      Past Surgical History:   Procedure Laterality Date    Appendectomy      Cath bare metal stent (bms)  2022    Cardiac Stents Placement x4    Other surgical history Bilateral     Gel injections to bilateral knees    Repair ing hernia,5+y/o,reducibl      Valve repair  11/03/2022      Family History   Problem Relation Age of Onset    Other (emphysema) Father     Dementia Mother     No Known Problems Daughter     No Known Problems Daughter     No Known Problems Son     No Known Problems Son     Other (Osteoarthritis) Sister     Heart Attack Brother     Cancer Brother     Heart Disorder Brother       Mom had back pain     Social History:  Social History     Socioeconomic History    Marital status:    Tobacco Use    Smoking status: Never     Passive exposure: Never    Smokeless tobacco: Never   Vaping Use    Vaping status: Never Used   Substance and Sexual Activity    Alcohol use: Never    Drug use: Never   Other Topics Concern    Caffeine Concern No    Seat Belt Yes    Special Diet No    Breast feeding No    Reaction to local anesthetic No    Pt has a pacemaker No    Pt has a defibrillator No    Social History Narrative    ** Merged History Encounter **          Social Drivers of Health     Food Insecurity: No Food Insecurity (12/28/2024)    Received from [facility] ([facility])    Hunger Vital Sign     Worried About Running Out of Food in the Last Year: Never true     Ran Out of Food in the Last Year: Never true   Transportation Needs: No Transportation Needs (12/28/2024)    Received from [facility] ([facility])    PRAPARE - Transportation     Lack of Transportation (Medical): No     Lack of Transportation (Non-Medical): No   Housing Stability: Low Risk  (12/28/2024)    Received from [facility] ([facility])    Housing Stability Vital Sign     Unable to Pay for Housing in the Last Year: No     Number of Times Moved in the Last Year: 0     Homeless in the Last Year: No   Recent Concern: Housing Stability - High Risk (12/4/2024)    Received from [facility] ([facility])    Housing Stability Vital Sign     Unable to Pay for Housing in the Last Year: No     Number of Times Moved in the Last Year: 1     Homeless in the Last Year: Yes      Living with daughter,        REVIEW OF SYSTEMS:   Review Of Systems:  Fatigue  Constitutional:No fever, no change in weight or appetitie  Derm: No rashes, no oral ulcers, no alopecia, no photosensitivity, no psoriasis  HEENT: No dry eyes, no dry mouth, no Raynaud's, no nasal ulcers, no parotid swelling, no neck pain, no jaw pain, no temple pain  Eyes: No visual changes,   CVS: No chest pain, no heart disease  RS: No SOB, no Cough, No Pleurtic pain,   GI: No nausea, no vomiiting, no abominal pain, no hx of ulcer, no gastritis, no heartburn, no dyshpagia, no BRBPR or melena    : gets UTIs -  no hx of miscarriages, no DVT Hx, no hx of OCP,   Neuro: No numbness or tingling, no headache, no hx of seizures,   Psych: no hx of anxiety or depression  ENDO: no hx of thyroid disease, no hx of DM  Joint/Muscluskeltal: see HPI,   All other ROS are  negative.     EXAM:   /77 (BP Location: Left arm, Patient Position: Sitting, Cuff Size: adult)   Pulse 85   Ht 5' 4\" (1.626 m)   Wt 117 lb (53.1 kg)   BMI 20.08 kg/m²   HEENT: Clear oropharynx, no oral ulcers, EOM intact, clear sclear, PERRLA, pleasant, no acute distress, no CAD, no neck tendnerness, good ROM,   Healed rashes over forearms  CVS: RRR, no murmurs  RS: CTAB, no crackles, no rhonchi  ABD: Soft Non tender,   Joint exam:   Mild  tender in shoulders , able to raise up - improved  nmild Tender in right 2nd dip - with severe oa changes   Mild Tender in right elbows   Mild tender in 1-5th mcps bilate - ok clsoing her hands   heb and bouchrd notdes  Mild  tender in b/l knees with swelling   Mild Tender in b/l hips , shooting down   No edema in legs    tender in b/l ankles.   parastehsias in b/l feet to the ankles -   1-5th dips tender to her.   Squeeze test positive     Component      Latest Ref Rng 3/18/2025   WBC      4.0 - 11.0 x10(3) uL 7.1    RBC      3.80 - 5.30 x10(6)uL 2.97 (L)    Hemoglobin      12.0 - 16.0 g/dL 9.1 (L)    Hematocrit      35.0 - 48.0 % 28.3 (L)    MCV      80.0 - 100.0 fL 95.3    MCH      26.0 - 34.0 pg 30.6    MCHC      31.0 - 37.0 g/dL 32.2    RDW-SD      35.1 - 46.3 fL 55.9 (H)    RDW      11.0 - 15.0 % 15.9 (H)    Platelet Count      150.0 - 450.0 10(3)uL 294.0    Prelim Neutrophil Abs      1.50 - 7.70 x10 (3) uL 4.62    Neutrophils Absolute      1.50 - 7.70 x10(3) uL 4.62    Lymphocytes Absolute      1.00 - 4.00 x10(3) uL 1.50    Monocytes Absolute      0.10 - 1.00 x10(3) uL 0.53    Eosinophils Absolute      0.00 - 0.70 x10(3) uL 0.34    Basophils Absolute      0.00 - 0.20 x10(3) uL 0.04    Immature Granulocyte Absolute      0.00 - 1.00 x10(3) uL 0.03    Neutrophils %      % 65.5    Lymphocytes %      % 21.2    Monocytes %      % 7.5    Eosinophils %      % 4.8    Basophils %      % 0.6    Immature Granulocyte %      % 0.4    Glucose      70 - 99 mg/dL 121 (H)     Sodium      136 - 145 mmol/L 138    Potassium      3.5 - 5.1 mmol/L 4.0    Chloride      98 - 112 mmol/L 110    Carbon Dioxide, Total      21.0 - 32.0 mmol/L 21.0    ANION GAP      0 - 18 mmol/L 7    BUN      9 - 23 mg/dL 23    CREATININE      0.55 - 1.02 mg/dL 1.04 (H)    BUN/CREATININE RATIO      10.0 - 20.0  22.1 (H)    CALCIUM      8.7 - 10.4 mg/dL 8.8    CALCULATED OSMOLALITY      275 - 295 mOsm/kg 291    EGFR      >=60 mL/min/1.73m2 54 (L)    ALT (SGPT)      10 - 49 U/L <7 (L)    AST (SGOT)      <34 U/L 13    ALKALINE PHOSPHATASE      55 - 142 U/L 94    Total Bilirubin      0.2 - 1.1 mg/dL 0.4    PROTEIN, TOTAL      5.7 - 8.2 g/dL 6.0    Albumin      3.2 - 4.8 g/dL 3.8    Globulin      2.0 - 3.5 g/dL 2.2    A/G Ratio      1.0 - 2.0  1.7    Patient Fasting for CMP? No    C-REACTIVE PROTEIN      <1.00 mg/dL 1.90 (H)    SED RATE      0 - 30 mm/Hr 23       Legend:  (L) Low  (H) High  12/2/2020 - left hip xray   =====  CONCLUSION: Mild left hip osteoarthritis without acute fracture or dislocation.    12/2/2020 - left knee xray   =====  CONCLUSION:      Moderate left knee tricompartmental osteoarthritis.     Small suprapatellar joint effusion.     Chondrocalcinosis    10/15/2018   BONE DENSITY MEASUREMENTS   TEST DATE BMD gm/cm2   T-SCORE  %REF   Z-SCORE %AGE MATCH  BODY PART  10/15/2018      0.724     -1.10 85.0%     +0.90     117.0%  left femur neck  08/16/2016      0.720     -1.20 85.0%     +0.80       0.0%  left femur neck  02/10/2014      0.724     -1.10 85.0%     +0.70       0.0%  left femur neck  4.7 years      0.0% change in BMD for left femur neck    10/15/2018      0.862     -0.70 92.0%     +1.10     119.0%  left hip  08/16/2016      0.834     -0.90 89.0%     +0.80       0.0%  left hip  02/10/2014      0.851     -0.70 90.0%     +0.80       0.0%  left hip  4.7 years      1.3% change in BMD for left hip    08/16/2016      0.875     -1.60 84.0%     +0.70       0.0%  AP L1-L4 region of spine  02/10/2014       0.868     -1.60 83.0%     +0.50       0.0%  AP L1-L4 region of spine  2.5 years      0.8% change in BMD for AP L1-L4 region of spine    08/16/2016      0.867     -1.90 80.0%     +0.40       0.0%  AP L2-L4 region of spine  No comparisons available for AP L2-L4 region of spine    10/15/2018      0.873     -2.10 79.0%     +0.50     106.0%  AP L3-L4 region of spine  No comparisons available for AP L3-L4 region of spine    2/15/2021 - DEXA  LEFT FEMORAL NECK               BMD:    0.728 gm/sq. cm.            T SCORE:         -1.1       Z SCORE:         1.1     LEFT TOTAL HIP               BMD:    0.891 gm/sq. cm.            T SCORE:         -0.4       Z SCORE:         1.5     PA LUMBAR SPINE (L1 - L4)               BMD:    0.946 gm/sq. cm.            T SCORE:         -0.9       Z SCORE:         1.6    ASSESSMENT AND PLAN:   Armida Hobbs is a 79 year old female who presents for   Chief Complaint   Patient presents with    Rheumatoid Arthritis     1. Seropositive RA (+ RF)- active  Generalized osteoarthritis  Chronic neck and lower back pain  -  admission for PNA in 12/2022 - again on 1/5/2023 - 1/13/2023 - ,12/2023 - PNA -   -    Now she prednisone 5mg ad ay  caused b/c of palpitations and excessive shaking -on higher doses - now weendd off.   - cont.  leflunomide again 10mg a day - -  Since the parasethesia sensation in left leg  is worse -  now on pregabalin 50mg a   rtc in 3 months.     - was restarted on  leflunomide 10 mg daily in 11/2022 - but  to help her RA symptoms but this was stopped in the hospital on 1/5 - she   .  She does have CKD with creatinine 1.5. - now 1.8 so this was stopped on 1/6/2023 - admission to hospital   -  If kidney function is better in the future can consider increasing to leflunomdie 10/20mg atlerning    gabpaentin 100mg at night ,    2. Hx of Recent Aortic valve replacement , CHF   -receent hosptialization for FRANKO, hypotenstion - from overdiuress   - cr now 1.8-2.1 - ---> 1.3   With  MR  New edema - likely exacerabated from the steroids - cont. Tot aper the steroids   Diuretics per dr. parisi    3. PNA in 12/2022 -     4. incresaing neuropathy in feet - and intermittent swelling in the feet   - was on  gabapnetin 100mg at night - now on pregabilin 50mg at night   - consider EMG  Recently had us of legs - negative   - swelling - not related to the neuroapthy -     5.  Bullous pemphigoid - dx in 5/2024 - treated with pred 20mg ad ay - and mycohpneolate - now on pred 5mg a day mycohpenolate 1000mg bid    Ok to taper pred to 5mg a day - to help with LE edema -   F/u dr. Pelaez= restarted on ycophenolate - had gone off and flared - now back on -     6. Anemia - on iron pills   Hb 9.0    7. Epistaxisis - f/u ENT, check labs in 1 month to eval for anemia and check ANCA, and iron levles     Summary:  Cont. prednisone  5mg as needed.   2 .cont. leflunomide 10mg a day   3. Cont. Mycophenolate 1000mg twice a day .   4. Check labs every 3 months.   5. Cont. Pregabalin 50mg at night.   6. Return to clinic in 3-4 months.     7. Check labs in 1month for nose         - the plan would be to increase the leflunomide if her kidney function improves     Colin Lopez MD  3/21/2025   1:10 PM         is applicable because the patient's medical record notes reflects the ongoing nature of the continuous longitudinal relationship of care, and the medical record indicates that there is ongoing treatment of a serious/complex medical condition.

## 2025-03-21 NOTE — PATIENT INSTRUCTIONS
Cont. prednisone  5mg as needed.   2 .cont. leflunomide 10mg a day   3. Cont. Mycophenolate 1000mg twice a day .   4. Check labs every 3 months.   5. Cont. Pregabalin 50mg at night.   6. Return to clinic in 4-6 months.     7. Check labs in 1month for nose   8. Refer to ENT - [phone] -

## 2025-03-27 ENCOUNTER — TELEPHONE (OUTPATIENT)
Facility: LOCATION | Age: 80
End: 2025-03-27

## 2025-03-27 NOTE — TELEPHONE ENCOUNTER
Please relay to patient or Listed contact if unable to reach:  Blake Strange is unfortunately no longer with our organization,  My name is Dr. Naranjo and I'm located in [facility] and assigned to his former patients thru [facility].   I am reaching out in regards to remind you that  you are due forAnnual Physical Exam, Hypertension follow up, and Establish care visit with New Physician    Please complete this within the next month as Dr. Naranjo values providing high value evidence based medical care and prevention and would like to go over the next steps needed In your wellness endeavor.     However if you have established care with another provider please call our office [phone] or send us a [employer] message and we will remove your name from our list to indicate you have a new provider and will gladly send records to them if requested.      Wish you all the best in your endeavor for better health  -Dr. Migel Naranjo MD, MPH

## 2025-03-31 ENCOUNTER — OFFICE VISIT (OUTPATIENT)
Dept: INTERNAL MEDICINE CLINIC | Facility: CLINIC | Age: 80
End: 2025-03-31

## 2025-03-31 ENCOUNTER — NURSE TRIAGE (OUTPATIENT)
Dept: CASE MANAGEMENT | Age: 80
End: 2025-03-31

## 2025-03-31 ENCOUNTER — HOSPITAL ENCOUNTER (OUTPATIENT)
Dept: GENERAL RADIOLOGY | Facility: HOSPITAL | Age: 80
Discharge: HOME | End: 2025-03-31
Payer: MEDICARE

## 2025-03-31 VITALS
SYSTOLIC BLOOD PRESSURE: 126 MMHG | TEMPERATURE: 97 F | OXYGEN SATURATION: 99 % | HEIGHT: 64 IN | BODY MASS INDEX: 20 KG/M2 | DIASTOLIC BLOOD PRESSURE: 68 MMHG | HEART RATE: 71 BPM | RESPIRATION RATE: 18 BRPM

## 2025-03-31 DIAGNOSIS — L03.012 CELLULITIS OF LEFT RING FINGER: ICD-10-CM

## 2025-03-31 DIAGNOSIS — L03.012 CELLULITIS OF LEFT RING FINGER: Primary | ICD-10-CM

## 2025-03-31 PROCEDURE — 99214 OFFICE O/P EST MOD 30 MIN: CPT

## 2025-03-31 PROCEDURE — 73140 X-RAY EXAM OF FINGER(S): CPT

## 2025-03-31 RX ORDER — CEPHALEXIN 500 MG/1
500 CAPSULE ORAL 4 TIMES DAILY
Qty: 28 CAPSULE | Refills: 0 | Status: SHIPPED | OUTPATIENT
Start: 2025-03-31 | End: 2025-04-07

## 2025-03-31 NOTE — PROGRESS NOTES
Subjective:   Armida Hobbs is a 79 year old female who presents for Infection (Left ring finger, been this way more than a week, no pain, swollen)     Presents with daughter Kianna  Patient believes she bumped her finger but daughter does not think so   Has had bloody drainage from the cuticle of the left ring finger starting about a week ago, now with redness and swelling in the past few days.  Has been applying neosporin ointment   No fevers, chills or body aches     Of note she is on immunosuppressive medications for lupus     RN triage note:  Armida, Home Health RN called and reports patient has an infected left ring finger for a couple of days now. States finger is painful, swollen, red and feels hot to touch. There is also dried blood and pus. Requesting to be seen today. Care advice given per protocol. Patient verbalized understanding and agreed with plan of care.     History/Other:    Chief Complaint Reviewed and Verified  Nursing Notes Reviewed and   Verified  Tobacco Reviewed  Allergies Reviewed  Medications Reviewed    Medical History Reviewed  Surgical History Reviewed  OB Status Reviewed    Family History Reviewed  Social History Reviewed         Tobacco:  She has never smoked tobacco.    Current Outpatient Medications   Medication Sig Dispense Refill    cephALEXin 500 MG Oral Cap Take 1 capsule (500 mg total) by mouth 4 (four) times daily for 7 days. 28 capsule 0    predniSONE 2.5 MG Oral Tab Take 2 tablets (5 mg total) by mouth daily. 180 tablet 3    clobetasol 0.05 % External Ointment Apply to the affected areas twice daily Monday-Friday. Take weekends off. Advised to AVOID on face, under breasts, underarms and groin. 60 g 3    Melatonin (MELATONIN MAXIMUM STRENGTH) 5 MG Oral Tab Take 1 tablet (5 mg total) by mouth nightly.      Niacin ER, Antihyperlipidemic, 500 MG Oral Tab CR Take 1 tablet (500 mg total) by mouth nightly.      thiamine 100 MG Oral Tab Take 1 tablet (100 mg total) by  mouth daily.      sennosides-docusate 8.6-50 MG Oral Tab Take 1 tablet by mouth nightly.      sodium chloride 0.65 % Nasal Solution 2 sprays by Nasal route.      QUEtiapine 25 MG Oral Tab Take 0.5 tablets (12.5 mg total) by mouth.      Polyethylene Glycol 3350 17 g Oral Powd Pack Take 17 g by mouth daily.      allopurinol 100 MG Oral Tab Take 1 tablet (100 mg total) by mouth daily with breakfast.      Mycophenolate Mofetil 500 MG Oral Tab Take 2 tablets (1,000 mg total) by mouth 2 (two) times daily. 30 tablet 0    apixaban (ELIQUIS) 2.5 MG Oral Tab Take 1 tablet (2.5 mg total) by mouth 2 (two) times daily. 180 tablet 6    atorvastatin 40 MG Oral Tab Take 1 tablet (40 mg total) by mouth nightly. 30 tablet 3    clopidogrel 75 MG Oral Tab Take 1 tablet (75 mg total) by mouth daily. FOR HEART STENTS. 90 tablet 9    losartan 25 MG Oral Tab Take 1 tablet (25 mg total) by mouth daily. 180 tablet 3    metoprolol tartrate 25 MG Oral Tab Take 4 tablets (100 mg total) by mouth every 8 (eight) hours. 270 tablet 3    pantoprazole 40 MG Oral Tab EC Take 1 tablet (40 mg total) by mouth 2 (two) times daily before meals. 180 tablet 8    mirtazapine 15 MG Oral Tab Take 1 tablet (15 mg total) by mouth nightly. 90 tablet 2    leflunomide 10 MG Oral Tab Take 1 tablet (10 mg total) by mouth daily. Management per rheumatology 90 tablet 1    ferrous sulfate 325 (65 FE) MG Oral Tab EC Take 1 tablet (325 mg total) by mouth daily with breakfast. 90 tablet 4    folic acid 1 MG Oral Tab Take 1 tablet (1 mg total) by mouth daily. 90 tablet 3    MYCOPHENOLATE MOFETIL 500 MG Oral Tab Take 2 tablets (1,000 mg total) by mouth 2 (two) times daily. (Patient not taking: Reported on 3/31/2025) 120 tablet 0     Review of Systems:  Review of Systems  10 point review of systems otherwise negative with the exception of HPI and assessment and plan    Objective:   /68 (BP Location: Right arm, Patient Position: Sitting, Cuff Size: adult)   Pulse 71    Temp 97.1 °F (36.2 °C) (Temporal)   Resp 18   Ht 5' 4\" (1.626 m)   SpO2 99%   BMI 20.08 kg/m²  Estimated body mass index is 20.08 kg/m² as calculated from the following:    Height as of this encounter: 5' 4\" (1.626 m).    Weight as of 3/21/25: 117 lb (53.1 kg).  Physical Exam  Vitals reviewed.   Constitutional:       General: She is not in acute distress.     Appearance: Normal appearance. She is well-developed.   Cardiovascular:      Rate and Rhythm: Normal rate and regular rhythm.      Heart sounds: Normal heart sounds.   Pulmonary:      Effort: Pulmonary effort is normal.      Breath sounds: Normal breath sounds.   Musculoskeletal:      Comments: Left ring finger with crusted dried blood at the cuticle and possible small dried purulent drainage   Bright red from tip of finger spreading below the DIP joint   Able to bend fingers somewhat, limited by arthritis changes     Left 3rd finger also with slight redness but no drainage   Skin:     General: Skin is warm and dry.   Neurological:      Mental Status: She is alert and oriented to person, place, and time.       Assessment & Plan:   1. Cellulitis of left ring finger (Primary)  -     Cephalexin; Take 1 capsule (500 mg total) by mouth 4 (four) times daily for 7 days.  Dispense: 28 capsule; Refill: 0  -     XR FINGER(S) (MIN 2 VIEWS), LEFT 4TH (CPT=73140); Future; Expected date: 03/31/2025  XR to r/o osteomyelitis, daughter will take her now     SANIA Hills, 3/31/2025, 4:23 PM

## 2025-03-31 NOTE — TELEPHONE ENCOUNTER
Action Requested: Summary for Provider     []  Critical Lab, Recommendations Needed  [] Need Additional Advice  [x]   FYI    []   Need Orders  [] Need Medications Sent to Pharmacy  []  Other     SUMMARY: Per protocol, patient should be seen in the office today. Appointment scheduled.    Future Appointments   Date Time Provider Department Center   3/31/2025  4:00 PM aMrisa Haywood APRN [facility]     Reason for call: Finger Pain  Onset: Couple Of Days Ago    [facility] called and reports patient has an infected left ring finger for a couple of days now. States finger is painful, swollen, red and feels hot to touch. There is also dried blood and pus. Requesting to be seen today. Care advice given per protocol. Patient verbalized understanding and agreed with plan of care.     Reason for Disposition   Looks infected (e.g., spreading redness, red streak, pus)    Protocols used: Finger Pain-A-[facility]

## 2025-04-07 ENCOUNTER — TELEPHONE (OUTPATIENT)
Dept: RHEUMATOLOGY | Facility: CLINIC | Age: 80
End: 2025-04-07

## 2025-04-07 NOTE — TELEPHONE ENCOUNTER
RN Armida called to advise patient is having an RA flare up on her right hand mainly on the thumb and index finger, skin is hot and a little swollen. Patient is taking medication as prescribed but RN is wondering if there is anything additional they can do or ask if there is any medication patient can be prescribed. Patient is complaining of pain.

## 2025-04-08 NOTE — TELEPHONE ENCOUNTER
Reviewed notes - pt. Given abx on 3/31 -   She needs to see derm to eval for bullous pemphigoid flare up since this is mainly skin   Please ask pt. To take [id number] x a day x 1 week, then go back to 5mg a dayx 1 week, then off   To see if this helps.   Ask her to make an appt. With derm or send message to dr. abdul

## 2025-04-08 NOTE — TELEPHONE ENCOUNTER
Spoke with Armida nurse form home health and given provider's message below. She stated pt's infection has cleared up but she has swelling in right thumb joint and index finger. She will past along provider's directions.

## 2025-04-10 ENCOUNTER — TELEPHONE (OUTPATIENT)
Facility: LOCATION | Age: 80
End: 2025-04-10

## 2025-04-10 ENCOUNTER — MED REC SCAN ONLY (OUTPATIENT)
Facility: LOCATION | Age: 80
End: 2025-04-10

## 2025-04-10 NOTE — TELEPHONE ENCOUNTER
Home health order #[id number] signed and faxed to [facility] . Confirmation received and sent to scan

## 2025-04-16 ENCOUNTER — TELEPHONE (OUTPATIENT)
Dept: INTERNAL MEDICINE CLINIC | Facility: CLINIC | Age: 80
End: 2025-04-16

## 2025-04-16 NOTE — TELEPHONE ENCOUNTER
Armida from [facility] is calling for patient with the c/o chronic right shoulder pain and asking for a referral for ortho. Noted in the chart a referral was written on 02/11/25, information was provided. Verbalized understanding.

## 2025-04-21 ENCOUNTER — TELEPHONE (OUTPATIENT)
Dept: INTERNAL MEDICINE CLINIC | Facility: CLINIC | Age: 80
End: 2025-04-21

## 2025-04-21 NOTE — TELEPHONE ENCOUNTER
Call from Armida HERNANDEZ for [facility].  Patient's family requested them to delay visit until next week.  This is FYI to Dr Naranjo.  No callback needed.

## 2025-04-22 ENCOUNTER — TELEPHONE (OUTPATIENT)
Dept: DERMATOLOGY CLINIC | Facility: CLINIC | Age: 80
End: 2025-04-22

## 2025-04-22 NOTE — TELEPHONE ENCOUNTER
Patients daughter called    Asking for refills before scheduled appointment tomorrow 4/23/25. Wants them today. Please call

## 2025-04-22 NOTE — TELEPHONE ENCOUNTER
Home health order #[id number] signed and faxed to [facility] confirmation received and sent to scan

## 2025-04-22 NOTE — TELEPHONE ENCOUNTER
Refill Request for medication(s):     Last Office Visit: 02/19/2025    Last Refill: 03/18/2025    Pharmacy, Dosage verified: yes      LMTCB - pt needs to be seen prior to refill, please add her in, ty

## 2025-04-23 ENCOUNTER — OFFICE VISIT (OUTPATIENT)
Dept: DERMATOLOGY CLINIC | Facility: CLINIC | Age: 80
End: 2025-04-23

## 2025-04-23 ENCOUNTER — LAB ENCOUNTER (OUTPATIENT)
Dept: LAB | Age: 80
End: 2025-04-23
Attending: STUDENT IN AN ORGANIZED HEALTH CARE EDUCATION/TRAINING PROGRAM
Payer: MEDICARE

## 2025-04-23 DIAGNOSIS — Z51.81 MEDICATION MONITORING ENCOUNTER: Primary | ICD-10-CM

## 2025-04-23 DIAGNOSIS — D64.9 ANEMIA, UNSPECIFIED TYPE: ICD-10-CM

## 2025-04-23 DIAGNOSIS — L12.0 BULLOUS PEMPHIGOID (HCC): ICD-10-CM

## 2025-04-23 DIAGNOSIS — R04.0 BLEEDING FROM THE NOSE: ICD-10-CM

## 2025-04-23 DIAGNOSIS — Z51.81 MEDICATION MONITORING ENCOUNTER: ICD-10-CM

## 2025-04-23 LAB
ALBUMIN SERPL-MCNC: 4.6 G/DL (ref 3.2–4.8)
ALBUMIN/GLOB SERPL: 1.8 {RATIO} (ref 1–2)
ALP LIVER SERPL-CCNC: 118 U/L (ref 55–142)
ALT SERPL-CCNC: 11 U/L (ref 10–49)
ANION GAP SERPL CALC-SCNC: 7 MMOL/L (ref 0–18)
AST SERPL-CCNC: 14 U/L (ref ?–34)
BASOPHILS # BLD AUTO: 0.08 X10(3) UL (ref 0–0.2)
BASOPHILS NFR BLD AUTO: 0.7 %
BILIRUB SERPL-MCNC: 0.5 MG/DL (ref 0.2–1.1)
BUN BLD-MCNC: 49 MG/DL (ref 9–23)
BUN/CREAT SERPL: 29.7 (ref 10–20)
CALCIUM BLD-MCNC: 9.3 MG/DL (ref 8.7–10.4)
CHLORIDE SERPL-SCNC: 105 MMOL/L (ref 98–112)
CO2 SERPL-SCNC: 26 MMOL/L (ref 21–32)
CREAT BLD-MCNC: 1.65 MG/DL (ref 0.55–1.02)
DEPRECATED RDW RBC AUTO: 50.2 FL (ref 35.1–46.3)
EGFRCR SERPLBLD CKD-EPI 2021: 31 ML/MIN/1.73M2 (ref 60–?)
EOSINOPHIL # BLD AUTO: 0.15 X10(3) UL (ref 0–0.7)
EOSINOPHIL NFR BLD AUTO: 1.4 %
ERYTHROCYTE [DISTWIDTH] IN BLOOD BY AUTOMATED COUNT: 14.7 % (ref 11–15)
FASTING STATUS PATIENT QL REPORTED: NO
GLOBULIN PLAS-MCNC: 2.5 G/DL (ref 2–3.5)
GLUCOSE BLD-MCNC: 127 MG/DL (ref 70–99)
HCT VFR BLD AUTO: 34.9 % (ref 35–48)
HGB BLD-MCNC: 11 G/DL (ref 12–16)
IMM GRANULOCYTES # BLD AUTO: 0.03 X10(3) UL (ref 0–1)
IMM GRANULOCYTES NFR BLD: 0.3 %
LYMPHOCYTES # BLD AUTO: 1.92 X10(3) UL (ref 1–4)
LYMPHOCYTES NFR BLD AUTO: 17.8 %
MCH RBC QN AUTO: 29.3 PG (ref 26–34)
MCHC RBC AUTO-ENTMCNC: 31.5 G/DL (ref 31–37)
MCV RBC AUTO: 93.1 FL (ref 80–100)
MONOCYTES # BLD AUTO: 0.82 X10(3) UL (ref 0.1–1)
MONOCYTES NFR BLD AUTO: 7.6 %
NEUTROPHILS # BLD AUTO: 7.8 X10 (3) UL (ref 1.5–7.7)
NEUTROPHILS # BLD AUTO: 7.8 X10(3) UL (ref 1.5–7.7)
NEUTROPHILS NFR BLD AUTO: 72.2 %
OSMOLALITY SERPL CALC.SUM OF ELEC: 301 MOSM/KG (ref 275–295)
PLATELET # BLD AUTO: 319 10(3)UL (ref 150–450)
POTASSIUM SERPL-SCNC: 4.6 MMOL/L (ref 3.5–5.1)
PROT SERPL-MCNC: 7.1 G/DL (ref 5.7–8.2)
RBC # BLD AUTO: 3.75 X10(6)UL (ref 3.8–5.3)
SODIUM SERPL-SCNC: 138 MMOL/L (ref 136–145)
WBC # BLD AUTO: 10.8 X10(3) UL (ref 4–11)

## 2025-04-23 PROCEDURE — 36415 COLL VENOUS BLD VENIPUNCTURE: CPT

## 2025-04-23 PROCEDURE — 86037 ANCA TITER EACH ANTIBODY: CPT

## 2025-04-23 PROCEDURE — 80053 COMPREHEN METABOLIC PANEL: CPT

## 2025-04-23 PROCEDURE — 83516 IMMUNOASSAY NONANTIBODY: CPT

## 2025-04-23 PROCEDURE — 99214 OFFICE O/P EST MOD 30 MIN: CPT | Performed by: STUDENT IN AN ORGANIZED HEALTH CARE EDUCATION/TRAINING PROGRAM

## 2025-04-23 PROCEDURE — 85025 COMPLETE CBC W/AUTO DIFF WBC: CPT

## 2025-04-23 NOTE — PROGRESS NOTES
Established Patient     Referred by: Presley Strange MD [NPI: [id number]]      CHIEF COMPLAINT: Bullous Pemphigoid F/U    HISTORY OF PRESENT ILLNESS: Armida Hobbs is a 79 year old female here for Follow Up    Bullous Pemphigoid F/U  Location: All over body  Duration: 1 year  Signs and symptoms: Itching, burning sometimes, bleeding from scratching  Condition Update: Improved but patient does still itch sometimes. No open wounds. Pt does still experience breakthrough flaring.   Current treatment: mycophenolate 1000 mg BID, Clobetasol ointment as needed  Past treatment: Castor oil, Doxycycline 100MG BID, Mycophenolate mofetil 500 mg per day.    Personal Dermatologic History  History of chronic skin disease: Yes (B.P)  History autoimmune diseases:  Yes (R.A. and B.P.)    Family History  History of chronic skin disease: No  History autoimmune diseases:  No    Past Medical History  Past Medical History:    Anxiety and depression    AS (aortic stenosis)    s/p TAVR    Asthma (HCC)    CAD (coronary artery disease)    CHF (congestive heart failure) (HCC)    Diastolic    Chronic atrial fibrillation (HCC)    CKD (chronic kidney disease)    Congestive heart disease (HCC)    Depression    Essential hypertension    Hearing impairment    bilateral hearing aids    High cholesterol    Hyperlipidemia    PONV (postoperative nausea and vomiting)    RA (rheumatoid arthritis) (HCC)       REVIEW OF SYSTEMS:  Constitutional: Denies fever, chills, unintentional weight loss.   Skin as per HPI    Medications  Current Outpatient Medications   Medication Sig Dispense Refill    predniSONE 2.5 MG Oral Tab Take 2 tablets (5 mg total) by mouth daily. 180 tablet 3    clobetasol 0.05 % External Ointment Apply to the affected areas twice daily Monday-Friday. Take weekends off. Advised to AVOID on face, under breasts, underarms and groin. 60 g 3    MYCOPHENOLATE MOFETIL 500 MG Oral Tab Take 2 tablets (1,000 mg total) by mouth 2 (two) times daily.  (Patient not taking: Reported on 3/31/2025) 120 tablet 0    Melatonin (MELATONIN MAXIMUM STRENGTH) 5 MG Oral Tab Take 1 tablet (5 mg total) by mouth nightly.      Niacin ER, Antihyperlipidemic, 500 MG Oral Tab CR Take 1 tablet (500 mg total) by mouth nightly.      thiamine 100 MG Oral Tab Take 1 tablet (100 mg total) by mouth daily.      sennosides-docusate 8.6-50 MG Oral Tab Take 1 tablet by mouth nightly.      sodium chloride 0.65 % Nasal Solution 2 sprays by Nasal route.      QUEtiapine 25 MG Oral Tab Take 0.5 tablets (12.5 mg total) by mouth.      Polyethylene Glycol 3350 17 g Oral Powd Pack Take 17 g by mouth daily.      allopurinol 100 MG Oral Tab Take 1 tablet (100 mg total) by mouth daily with breakfast.      Mycophenolate Mofetil 500 MG Oral Tab Take 2 tablets (1,000 mg total) by mouth 2 (two) times daily. 30 tablet 0    apixaban (ELIQUIS) 2.5 MG Oral Tab Take 1 tablet (2.5 mg total) by mouth 2 (two) times daily. 180 tablet 6    atorvastatin 40 MG Oral Tab Take 1 tablet (40 mg total) by mouth nightly. 30 tablet 3    clopidogrel 75 MG Oral Tab Take 1 tablet (75 mg total) by mouth daily. FOR HEART STENTS. 90 tablet 9    losartan 25 MG Oral Tab Take 1 tablet (25 mg total) by mouth daily. 180 tablet 3    metoprolol tartrate 25 MG Oral Tab Take 4 tablets (100 mg total) by mouth every 8 (eight) hours. 270 tablet 3    pantoprazole 40 MG Oral Tab EC Take 1 tablet (40 mg total) by mouth 2 (two) times daily before meals. 180 tablet 8    mirtazapine 15 MG Oral Tab Take 1 tablet (15 mg total) by mouth nightly. 90 tablet 2    leflunomide 10 MG Oral Tab Take 1 tablet (10 mg total) by mouth daily. Management per rheumatology 90 tablet 1    ferrous sulfate 325 (65 FE) MG Oral Tab EC Take 1 tablet (325 mg total) by mouth daily with breakfast. 90 tablet 4    folic acid 1 MG Oral Tab Take 1 tablet (1 mg total) by mouth daily. 90 tablet 3       PHYSICAL EXAM:  General: awake, alert, no acute distress  Skin: Skin exam was  performed today including the following: Trunk and extremities. Pertinent findings include:   -With excoriations    ASSESSMENT & PLAN:  Pathophysiology of diagnoses discussed with patient.  Therapeutic options reviewed. Risks, benefits, and alternatives discussed with patient. Instructions reviewed at length.    # Bullous pemphigoid - off of cellcept once in rehab and now flaring pruritus  #Medication monitoring  - Continue mycophenolate 1000 mg twice daily CellCept pending labs.  Labs to be checked next month   - Will plan to decrease to 500mg twice daily in 3 months      Return to clinic:  3 months  or sooner if something concerning arises    Buddy Montejo MD    By signing my name below, IMyriam MA,  attest that this documentation has been prepared under the direction and in the presence of Buddy Montejo MD.   Electronically Signed: Myriam JAFFE MA, 4/23/2025, 4:47 PM.    I, Buddy Montejo MD,  personally performed the services described in this documentation. All medical record entries made by the scribe were at my direction and in my presence.  I have reviewed the chart and agree that the record reflects my personal performance and is accurate and complete.  Buddy Montejo MD, 4/23/2025, 5:15 PM

## 2025-04-24 RX ORDER — MYCOPHENOLATE MOFETIL 500 MG/1
1000 TABLET ORAL 2 TIMES DAILY
Qty: 120 TABLET | Refills: 0 | Status: SHIPPED | OUTPATIENT
Start: 2025-04-24

## 2025-04-28 LAB
ANTI-MPO ANTIBODIES: <0.2 UNITS
ANTI-PR3 ANTIBODIES: <0.2 UNITS

## 2025-04-30 ENCOUNTER — OFFICE VISIT (OUTPATIENT)
Dept: ORTHOPEDICS CLINIC | Facility: CLINIC | Age: 80
End: 2025-04-30
Payer: MEDICARE

## 2025-04-30 VITALS — DIASTOLIC BLOOD PRESSURE: 62 MMHG | HEART RATE: 59 BPM | SYSTOLIC BLOOD PRESSURE: 111 MMHG

## 2025-04-30 DIAGNOSIS — M75.121 NONTRAUMATIC COMPLETE TEAR OF RIGHT ROTATOR CUFF: Primary | ICD-10-CM

## 2025-04-30 RX ORDER — METHYLPREDNISOLONE ACETATE 40 MG/ML
40 INJECTION, SUSPENSION INTRA-ARTICULAR; INTRALESIONAL; INTRAMUSCULAR; SOFT TISSUE ONCE
Status: COMPLETED | OUTPATIENT
Start: 2025-04-30 | End: 2025-04-30

## 2025-04-30 RX ORDER — KETOROLAC TROMETHAMINE 30 MG/ML
30 INJECTION, SOLUTION INTRAMUSCULAR; INTRAVENOUS ONCE
Status: COMPLETED | OUTPATIENT
Start: 2025-04-30 | End: 2025-04-30

## 2025-04-30 RX ADMIN — METHYLPREDNISOLONE ACETATE 40 MG: 40 INJECTION, SUSPENSION INTRA-ARTICULAR; INTRALESIONAL; INTRAMUSCULAR; SOFT TISSUE at 16:59:00

## 2025-04-30 RX ADMIN — KETOROLAC TROMETHAMINE 30 MG: 30 INJECTION, SOLUTION INTRAMUSCULAR; INTRAVENOUS at 16:58:00

## 2025-04-30 NOTE — PROGRESS NOTES
Per  request was draw 1 syringe with 2 ml Lidocaine 2% ; 2 ml Marcaine 0.5% ; 1 ml Toradol ( Ketorolac) 60 mg/2 ml , and 1 ml Depo-Medrol 40 mg for right  shoulder. Dilma CLARK MA

## 2025-05-02 NOTE — PROGRESS NOTES
[place] - ORTHOPEDICS  [address] [facility] [address]  [phone]     NURSING INTAKE COMMENTS:   Chief Complaint   Patient presents with    Shoulder Pain     New pt- here w/ daughter-  R shoulder- onset 6 mos- denies injury- rates pain 4-5/10 depends w/ certain movement- stated limited ROM- has xray in the system            The following individual(s) verbally consented to be recorded using ambient AI listening technology and understand that they can each withdraw their consent to this listening technology at any point by asking the clinician to turn off or pause the recording:    Patient name: Armida Hobbs          HPI:   History of Present Illness  Armida Hobbs is an 79 year old female with a torn rotator cuff who presents with persistent shoulder pain. She was referred by Dr. Presley Strange, her previous primary care physician.    She has been experiencing significant shoulder pain due to a torn rotator cuff, which occurred approximately six months ago. The pain is persistent and affects her ability to move her arm. It does not wake her up at night but is exacerbated by certain movements, requiring assistance to lift her arm.    She received an injection for pain relief around the time of her brother's wedding in November, but she fell two days later, fracturing her hip and arm. Despite these injuries, she does not complain of pain in those areas, only in her shoulder.    She has been hospitalized and in various rehabilitation facilities over the past six months, and it is likely that imaging studies such as an MRI or X-ray have been performed, although she is unsure of the details.    She is currently taking Tylenol for pain and medication for rheumatoid arthritis, but it is unclear if these are effective for her shoulder pain. She is also on blood thinners.        Past Medical History[1]  Past Surgical History[2]  Current Medications[3]  Allergies[4]  Family History[5]    Social History     Occupational  History    Not on file   Tobacco Use    Smoking status: Never     Passive exposure: Never    Smokeless tobacco: Never   Vaping Use    Vaping status: Never Used   Substance and Sexual Activity    Alcohol use: Never    Drug use: Never    Sexual activity: Not on file        Review of Systems:  GENERAL: denies fevers, chills, night sweats, fatigue, unintentional weight loss/gain  SKIN: denies skin lesions, open sores, rash  HEENT:denies recent vision change, new nasal congestion,hearing loss, tinnitus, sore throat, headaches  RESPIRATORY: denies new shortness of breath, cough, asthma, wheezing  CARDIOVASCULAR: denies chest pain, leg cramps with exertion, palpitations, leg swelling  GI: denies abdominal pain, nausea, vomiting, diarrhea, constipation, hematochezia, worsening heartburn or stomach ulcers  : denies dysuria, hematuria, incontinence, increased frequency, urgency, difficulty urinating  MUSCULOSKELETAL: denies musculoskeletal complaints other than in HPI  NEURO: denies numbness, tingling, weakness, balance issues, dizziness, memory loss  PSYCHIATRIC: denies Hx of depression, anxiety, other psychiatric disorders  HEMATOLOGIC: denies blood clots, anemia, blood clotting disorders, blood transfusion  ENDOCRINE: denies autoimmune disease, thyroid issues, or diabetes  ALLERGY: denies asthma, seasonal allergies    Physical Examination:    /62   Pulse 59     Physical Exam        Constitutional: appears well hydrated, alert and responsive, no acute distress noted    Right Shoulder    Forward Elevation 120, abduction 110, positive empty can, Pain with [employer] testing      Imaging:     Results  RADIOLOGY  Shoulder X-ray: Reviewed from 10/2025      Imaging was independently reviewed and interpreted by attending physician  No results found.     Labs:  Lab Results   Component Value Date    WBC 10.8 04/23/2025    HGB 11.0 (L) 04/23/2025    .0 04/23/2025      Lab Results   Component Value Date     (H)  04/23/2025    BUN 49 (H) 04/23/2025    CREATSERUM 1.65 (H) 04/23/2025    GFRNAA 35 (L) 07/09/2022    GFRAA 40 (L) 07/09/2022        Assessment and Plan:  Assessment & Plan  Right Rotator cuff tear  Chronic tear causing significant pain with movement. Nonoperative treatment preferred due to age and anesthesia concerns. Discussed surgical options: arthroscopic procedure with balloon and shoulder replacement. Informed consent obtained for injection and physical therapy.  - Administer shoulder injection today.  - Prescribe physical therapy three times a week.  - Consider meloxicam for pain management if needed.  - Schedule follow-up injection in three months if necessary.    Rheumatoid arthritis  Well-managed with current medications.  - Continue current rheumatoid arthritis medications.    Procedure Note Corticosteroid Injection  _______________________________________________________________________________________________________________  Patient Name: Armida Hobbs   Date: 5/2/2025     Based on history, physical exam, and available diagnostic testing, the patient diagnosis appears consistent with intraarticular pathology. We discussed the use of a corticosteroid injection for therapeutic/confirmatory diagnostic purposes. The risks, benefits, and potential complications of corticosteroid injection(s) were discussed in detail. The risks include, but are not limited to: pain, infection, bleeding/hematoma, swelling, flare response, incomplete resolution of symptoms, possible need for further injections or subsequent surgical intervention, subcutaneous fat atrophy, skin pigmentation changes, and elevation of blood sugar. The patient verbalized an understanding and agrees to the injection(s).     Under sterile prep, approximately:  1 mL DepoMedrol 40mg/ml, 2 mL marcaine plain, 2 mL lidocaine plain, 1mL Toradol 30mg/mL    was injected into: Right shoulder joint subacromial space    This procedure was performed without  ultrasound guidance    This procedure was well tolerated. The patient understands that the injection could take several days to take effect.    The patient was not sedated and fully conscious for the injection.  Prior to the injection, verification followed the Universal Protocol in the following manner:  [X] A \"Time Out\" was performed prior to the procedure to confirm patient identification, injection site, and preparation of equipment.  [X] The patient was identified using two patient identifiers.  [X] The procedure site was appropriately prepped    Ridge Long MD  Orthopaedic Surgery  5/2/2025     Diagnoses and all orders for this visit:    Nontraumatic complete tear of right rotator cuff  -     Physical Therapy Referral - External  -     Large joint - right aspiration/injection  -     ketorolac (Toradol) 60 MG/2ML IM injection 30 mg  -     methylPREDNISolone acetate (DEPO-medrol) 40 MG/ML injection 40 mg          Follow Up: No follow-ups on file.          Ridge Long MD Orthopedic Surgery / Sports Medicine Specialist  [facility]  [facility] [place] [address]  [email or web address]    t: [phone] f: [phone]    This note was dictated using Dragon software.  While it was briefly proofread prior to completion, some grammatical, spelling, and word choice errors due to dictation may still occur.       [1]   Past Medical History:   Anxiety and depression    AS (aortic stenosis)    s/p TAVR    Asthma (HCC)    CAD (coronary artery disease)    CHF (congestive heart failure) (HCC)    Diastolic    Chronic atrial fibrillation (HCC)    CKD (chronic kidney disease)    Congestive heart disease (HCC)    Depression    Essential hypertension    Hearing impairment    bilateral hearing aids    High cholesterol    Hyperlipidemia    PONV (postoperative nausea and vomiting)    RA (rheumatoid arthritis) ([facility])   [2]   Past Surgical History:  Procedure Laterality Date    Appendectomy       Cath bare metal stent (bms)  2022    Cardiac Stents Placement x4    Other surgical history Bilateral     Gel injections to bilateral knees    Repair ing hernia,5+y/o,reducibl      Valve repair  11/03/2022   [3]   Current Outpatient Medications   Medication Sig Dispense Refill    MYCOPHENOLATE MOFETIL 500 MG Oral Tab TAKE TWO TABLETS BY MOUTH TWICE DAILY 120 tablet 0    predniSONE 2.5 MG Oral Tab Take 2 tablets (5 mg total) by mouth daily. 180 tablet 3    clobetasol 0.05 % External Ointment Apply to the affected areas twice daily Monday-Friday. Take weekends off. Advised to AVOID on face, under breasts, underarms and groin. 60 g 3    Melatonin (MELATONIN MAXIMUM STRENGTH) 5 MG Oral Tab Take 1 tablet (5 mg total) by mouth nightly.      Niacin ER, Antihyperlipidemic, 500 MG Oral Tab CR Take 1 tablet (500 mg total) by mouth nightly.      thiamine 100 MG Oral Tab Take 1 tablet (100 mg total) by mouth daily.      sennosides-docusate 8.6-50 MG Oral Tab Take 1 tablet by mouth nightly.      sodium chloride 0.65 % Nasal Solution 2 sprays by Nasal route.      QUEtiapine 25 MG Oral Tab Take 0.5 tablets (12.5 mg total) by mouth.      Polyethylene Glycol 3350 17 g Oral Powd Pack Take 17 g by mouth daily.      allopurinol 100 MG Oral Tab Take 1 tablet (100 mg total) by mouth daily with breakfast.      Mycophenolate Mofetil 500 MG Oral Tab Take 2 tablets (1,000 mg total) by mouth 2 (two) times daily. (Patient not taking: Reported on 4/23/2025) 30 tablet 0    apixaban (ELIQUIS) 2.5 MG Oral Tab Take 1 tablet (2.5 mg total) by mouth 2 (two) times daily. 180 tablet 6    atorvastatin 40 MG Oral Tab Take 1 tablet (40 mg total) by mouth nightly. 30 tablet 3    clopidogrel 75 MG Oral Tab Take 1 tablet (75 mg total) by mouth daily. FOR HEART STENTS. 90 tablet 9    losartan 25 MG Oral Tab Take 1 tablet (25 mg total) by mouth daily. 180 tablet 3    metoprolol tartrate 25 MG Oral Tab Take 4 tablets (100 mg total) by mouth every 8 (eight)  hours. 270 tablet 3    pantoprazole 40 MG Oral Tab EC Take 1 tablet (40 mg total) by mouth 2 (two) times daily before meals. 180 tablet 8    mirtazapine 15 MG Oral Tab Take 1 tablet (15 mg total) by mouth nightly. 90 tablet 2    leflunomide 10 MG Oral Tab Take 1 tablet (10 mg total) by mouth daily. Management per rheumatology 90 tablet 1    ferrous sulfate 325 (65 FE) MG Oral Tab EC Take 1 tablet (325 mg total) by mouth daily with breakfast. 90 tablet 4    folic acid 1 MG Oral Tab Take 1 tablet (1 mg total) by mouth daily. 90 tablet 3   [4]   Allergies  Allergen Reactions    Codeine PAIN     Chest pain     Sulfa Antibiotics HIVES    Amlodipine OTHER (SEE COMMENTS)     Swelling and leg cramps   [5]   Family History  Problem Relation Age of Onset    Other (emphysema) Father     Dementia Mother     No Known Problems Daughter     No Known Problems Daughter     No Known Problems Son     No Known Problems Son     Other (Osteoarthritis) Sister     Heart Attack Brother     Cancer Brother     Heart Disorder Brother

## 2025-05-27 RX ORDER — MYCOPHENOLATE MOFETIL 500 MG/1
1000 TABLET ORAL 2 TIMES DAILY
Qty: 120 TABLET | Refills: 0 | OUTPATIENT
Start: 2025-05-27

## 2025-05-27 RX ORDER — MYCOPHENOLATE MOFETIL 500 MG/1
1000 TABLET ORAL 2 TIMES DAILY
Qty: 120 TABLET | Refills: 0 | Status: SHIPPED | OUTPATIENT
Start: 2025-05-27

## 2025-05-27 NOTE — TELEPHONE ENCOUNTER
Refill Request for medication(s): Mycophenolate Mofetil 500 MG Oral Tab     Last Office Visit: 04/23/25    Last Refill: 04/24/25    Pharmacy, Dosage verified: [id number] - [place], [address] [phone], [phone]    Condition Update (if applicable): mychart msg sent,    Rx pended and sent to provider for approval, please advise. Thank You!

## 2025-05-28 ENCOUNTER — MED REC SCAN ONLY (OUTPATIENT)
Dept: INTERNAL MEDICINE CLINIC | Facility: CLINIC | Age: 80
End: 2025-05-28

## 2025-05-28 ENCOUNTER — TELEPHONE (OUTPATIENT)
Dept: INTERNAL MEDICINE CLINIC | Facility: CLINIC | Age: 80
End: 2025-05-28

## 2025-05-28 NOTE — TELEPHONE ENCOUNTER
order #[id number] signed and faxed to [facility]. Confirmation received and sent to scan

## 2025-06-04 ENCOUNTER — TELEMEDICINE (OUTPATIENT)
Facility: LOCATION | Age: 80
End: 2025-06-04

## 2025-06-04 DIAGNOSIS — M25.561 ARTHRALGIA OF BOTH KNEES: ICD-10-CM

## 2025-06-04 DIAGNOSIS — R19.7 VOMITING AND DIARRHEA: Primary | ICD-10-CM

## 2025-06-04 DIAGNOSIS — R11.10 VOMITING AND DIARRHEA: Primary | ICD-10-CM

## 2025-06-04 DIAGNOSIS — R11.0 NAUSEA: ICD-10-CM

## 2025-06-04 DIAGNOSIS — R53.1 WEAKNESS: ICD-10-CM

## 2025-06-04 DIAGNOSIS — M25.562 ARTHRALGIA OF BOTH KNEES: ICD-10-CM

## 2025-06-04 RX ORDER — ONDANSETRON 4 MG/1
4 TABLET, FILM COATED ORAL EVERY 8 HOURS PRN
Qty: 15 TABLET | Refills: 0 | Status: SHIPPED | OUTPATIENT
Start: 2025-06-04

## 2025-06-04 NOTE — PROGRESS NOTES
INTERNAL MEDICINE VIDEO VISIT NOTE         The following individual(s) verbally consented to be recorded using ambient AI listening technology and understand that they can each withdraw their consent to this listening technology at any point by asking the clinician to turn off or pause the recording:    Patient name: Armida Hobbs  Additional names:  Daughter,     Patient ID: Armida Hobbs is a 79 year old female.  Today's Date: 06/04/25  HPI  History of Present Illness  Armida Hobbs is an 79 year old female with rheumatoid arthritis who presents with severe knee pain and gastrointestinal symptoms. She is accompanied by her daughter, who is also her primary caregiver.    She experiences severe knee pain, which she attributes to her rheumatoid arthritis and the current weather conditions. Her daughter notes significant inflammation in the knees. Aleve cream and Tylenol have been used to manage the inflammation and pain.    Over the past three days, she has experienced diarrhea and vomited after consuming tea, light rice, and her medications. Her daughter mentions that she vomited her pills, including prednisone. She has been given a protein shake similar to Ensure and Pepto Bismol, which she has managed to keep down so far. There is concern about her hydration and electrolyte balance due to ongoing diarrhea and vomiting.    Her stools have been black, which her daughter attributes to the Pepto Bismol she has been taking since yesterday. There is no presence of red blood in the stool.    Her daughter expresses concern about her energy levels, noting significant fatigue and weakness. She recently completed physical therapy and home health visits following a hip and shoulder fracture.        There were no vitals filed for this visit.  body mass index is unknown because there is no height or weight on file.  BP Readings from Last 3 Encounters:   04/30/25 111/62   03/31/25 126/68   03/21/25 143/77     The  ASCVD Risk score (Chelsea SWANN, et al., 2019) failed to calculate for the following reasons:    The 2019 ASCVD risk score is only valid for ages 40 to 79  Medications reviewed:  Current Medications[1]      Assessment & Plan    1. Vomiting and diarrhea (Primary)  2. Arthralgia of both knees  3. Weakness  4. Nausea  -     Ondansetron HCl; Take 1 tablet (4 mg total) by mouth every 8 (eight) hours as needed for Nausea.  Dispense: 15 tablet; Refill: 0      Assessment & Plan  Rheumatoid arthritis  Exacerbation with knee pain and inflammation, likely due to weather changes and vomiting.  - Continue acetaminophen for pain.  - Continue topical naproxen cream for inflammation.    Nausea and vomiting  Acute symptoms affecting medication retention and exacerbating arthritis.  - Prescribed ondansetron for nausea, up to three times daily as needed.  - Encouraged fluid intake to prevent dehydration.    Diarrhea  Three-day history with black stools due to bismuth subsalicylate. Risk of dehydration and electrolyte imbalance.  - Discontinue bismuth subsalicylate once ondansetron starts.  - Monitor for red blood in stool due to anticoagulant use.  - Encourage fluid intake, including oral rehydration solutions.    Electrolyte imbalance risk  Risk from diarrhea and potential dehydration, concern for potassium loss and kidney function.  - Advise ER visit if symptoms persist or worsen for blood work and rehydration.    Hip and shoulder fracture  Recent fractures with increased fatigue and weakness affecting mobility.  - Consider ER visit for assessment if severe weakness persists.    I spent 20 minutes obtaining pertitent medical history, reviewing pertinent imaging/labs and specialists notes, evaluating patient, discussing differential diagnosis' and various treatment options, reinforcing importance of compliance with treatment plan, and completing documentation.     Follow Up: Return for AS NEEDED/IF SYMPTOMS WORSEN, GO TO ER FOR SEVERE  SYMPTOMS..         Objective: Results:   Physical Exam  Constitutional:       General: She is not in acute distress.     Appearance: She is ill-appearing.   HENT:      Nose: No congestion.   Pulmonary:      Effort: No respiratory distress.        Reviewed:  Patient Active Problem List    Diagnosis    Chronic gastric ulcer with hemorrhage    Bullous pemphigoid ([facility])    Acute on chronic heart failure ([facility])    Lumbar radiculopathy    Bilateral carotid bruits     Formatting of this note might be different from the original. 12/2020 Mild heterogenous atherosclerotic plaque bilateral with less than 50% stenosis in both right and left internal carotid arteries, based on NASCET criteria. Anterograde flow present in both vertebral arteries      Senile purpura    Anxiety and depression    Poor short term memory    Neutropenia, unspecified    Coronary artery disease involving native coronary artery of native heart without angina pectoris    S/P drug eluting coronary stent placement    Chronic diastolic (congestive) heart failure ([facility])    Stage 3a chronic kidney disease ([facility])    Hypertension goal BP (blood pressure) < 130/80    S/P TAVR (transcatheter aortic valve replacement)    PAF (paroxysmal atrial fibrillation) ([facility])    Rheumatoid arthritis involving both ankles with positive rheumatoid factor ([facility])     Formatting of this note might be different from the original. Stable on steroids      Bilateral lower extremity edema     Formatting of this note might be different from the original. Multifactorial including dependent and venous insufficiency. Compression stockings and leg elevation.. Lasix 20 mg as directed.      Varicose veins of both lower extremities with pain    Mixed hyperlipidemia    Gastroesophageal reflux disease without esophagitis    Postmenopausal atrophic vaginitis    Sensorineural hearing loss, bilateral      Allergies[2]   Short Social Hx on File[3]   Review of Systems   Constitutional:  Positive for  activity change, appetite change and fatigue. Negative for fever.   HENT: Negative.     Respiratory: Negative.     Cardiovascular: Negative.    Gastrointestinal:  Positive for diarrhea, nausea and vomiting.   Neurological:  Positive for weakness.     All other systems negative unless otherwise stated in ROS or HPI above.       SANIA Rodriguez  Internal Medicine       Call office with any questions or seek emergency care if necessary.   Patient understands and agrees to follow directions and advice.    Telehealth Verbal Consent   I conducted a telehealth visit with Armida Hobbs today, 06/04/25, which was completed using two-way, real-time interactive audio and video communication. This has been done in good awa to provide continuity of care in the best interest of the provider-patient relationship, due to the COVID -19 public health crisis/national emergency where restrictions of face-to-face office visits are ongoing. Every conscious effort was taken to allow for sufficient and adequate time to complete the visit.The patient was made aware of the limitations of the telehealth visit, including treatment limitations as no physical exam could be performed.  The patient was advised to call 911 or to go to the ER in case there was an emergency.  The patient was also advised of the potential privacy & security concerns related to the telehealth platform. The patient was made aware of where to find [facility]'s notice of privacy practices, telehealth consent form and other related consent forms and documents.  which are located on the [facility] website. The patient verbally agreed to telehealth consent form, related consents and the risks discussed.  Lastly, the patient confirmed that they were in [place]. Included in this visit, time may have been spent reviewing labs, medications, radiology tests and decision making. Appropriate medical decision-making and tests are ordered as detailed in the plan of care above.  Coding/billing  information is submitted for this visit based on complexity of care and/or time spent for the visit.  ----------------------------------------- PATIENT INSTRUCTIONS-----------------------------------------     Patient Instructions   VISIT SUMMARY:  Today, we discussed your severe knee pain and gastrointestinal symptoms, including nausea, vomiting, and diarrhea. We also reviewed your recent hip and shoulder fractures and the associated fatigue and weakness.    YOUR PLAN:  -RHEUMATOID ARTHRITIS: Rheumatoid arthritis is a chronic inflammatory disorder affecting your joints. Your knee pain and inflammation are likely worsened by the weather and vomiting. Continue using acetaminophen for pain relief and topical naproxen cream for inflammation.    -NAUSEA AND VOMITING: Nausea and vomiting can prevent you from keeping your medications down and worsen your arthritis symptoms. You have been prescribed ondansetron to help control the nausea, which you can take up to three times daily as needed. Make sure to drink plenty of fluids to stay hydrated.    -DIARRHEA: Diarrhea can lead to dehydration and an imbalance of electrolytes in your body. Your black stools are likely due to the Pepto Bismol. Stop taking Pepto Bismol once you start ondansetron. Keep an eye out for any red blood in your stool and continue to drink fluids, including oral rehydration solutions.    -ELECTROLYTE IMBALANCE RISK: Diarrhea and vomiting can cause a loss of important electrolytes like potassium, which are crucial for your body's functions. If your symptoms persist or worsen, you should visit the ER for blood work and rehydration.    -HIP AND SHOULDER FRACTURE: Your recent fractures have contributed to your fatigue and weakness, affecting your mobility. If your weakness becomes severe, consider visiting the ER for further assessment.    INSTRUCTIONS:  If your symptoms persist or worsen, especially if you experience severe weakness, dehydration, or see  red blood in your stool, please visit the ER for further evaluation and treatment.    Contains text generated by Patt             [1]   Current Outpatient Medications   Medication Sig Dispense Refill    ondansetron (ZOFRAN) 4 mg tablet Take 1 tablet (4 mg total) by mouth every 8 (eight) hours as needed for Nausea. 15 tablet 0    Mycophenolate Mofetil 500 MG Oral Tab Take 2 tablets (1,000 mg total) by mouth 2 (two) times daily. 120 tablet 0    predniSONE 2.5 MG Oral Tab Take 2 tablets (5 mg total) by mouth daily. 180 tablet 3    clobetasol 0.05 % External Ointment Apply to the affected areas twice daily Monday-Friday. Take weekends off. Advised to AVOID on face, under breasts, underarms and groin. 60 g 3    Melatonin (MELATONIN MAXIMUM STRENGTH) 5 MG Oral Tab Take 1 tablet (5 mg total) by mouth nightly.      Niacin ER, Antihyperlipidemic, 500 MG Oral Tab CR Take 1 tablet (500 mg total) by mouth nightly.      thiamine 100 MG Oral Tab Take 1 tablet (100 mg total) by mouth daily.      sennosides-docusate 8.6-50 MG Oral Tab Take 1 tablet by mouth nightly.      sodium chloride 0.65 % Nasal Solution 2 sprays by Nasal route.      QUEtiapine 25 MG Oral Tab Take 0.5 tablets (12.5 mg total) by mouth.      Polyethylene Glycol 3350 17 g Oral Powd Pack Take 17 g by mouth daily.      allopurinol 100 MG Oral Tab Take 1 tablet (100 mg total) by mouth daily with breakfast.      Mycophenolate Mofetil 500 MG Oral Tab Take 2 tablets (1,000 mg total) by mouth 2 (two) times daily. (Patient not taking: Reported on 4/23/2025) 30 tablet 0    apixaban (ELIQUIS) 2.5 MG Oral Tab Take 1 tablet (2.5 mg total) by mouth 2 (two) times daily. 180 tablet 6    atorvastatin 40 MG Oral Tab Take 1 tablet (40 mg total) by mouth nightly. 30 tablet 3    clopidogrel 75 MG Oral Tab Take 1 tablet (75 mg total) by mouth daily. FOR HEART STENTS. 90 tablet 9    losartan 25 MG Oral Tab Take 1 tablet (25 mg total) by mouth daily. 180 tablet 3    metoprolol  tartrate 25 MG Oral Tab Take 4 tablets (100 mg total) by mouth every 8 (eight) hours. 270 tablet 3    pantoprazole 40 MG Oral Tab EC Take 1 tablet (40 mg total) by mouth 2 (two) times daily before meals. 180 tablet 8    mirtazapine 15 MG Oral Tab Take 1 tablet (15 mg total) by mouth nightly. 90 tablet 2    leflunomide 10 MG Oral Tab Take 1 tablet (10 mg total) by mouth daily. Management per rheumatology 90 tablet 1    ferrous sulfate 325 (65 FE) MG Oral Tab EC Take 1 tablet (325 mg total) by mouth daily with breakfast. 90 tablet 4    folic acid 1 MG Oral Tab Take 1 tablet (1 mg total) by mouth daily. 90 tablet 3   [2]   Allergies  Allergen Reactions    Codeine PAIN     Chest pain     Sulfa Antibiotics HIVES    Amlodipine OTHER (SEE COMMENTS)     Swelling and leg cramps   [3]   Social History  Socioeconomic History    Marital status:    Tobacco Use    Smoking status: Never     Passive exposure: Never    Smokeless tobacco: Never   Vaping Use    Vaping status: Never Used   Substance and Sexual Activity    Alcohol use: Never    Drug use: Never   Other Topics Concern    Caffeine Concern No    Seat Belt Yes    Special Diet No    Breast feeding No    Reaction to local anesthetic No    Pt has a pacemaker No    Pt has a defibrillator No   Social History Narrative    ** Merged History Encounter **          Social Drivers of Health     Food Insecurity: No Food Insecurity (12/28/2024)    Received from [facility] ([facility])    Hunger Vital Sign     Worried About Running Out of Food in the Last Year: Never true     Ran Out of Food in the Last Year: Never true   Transportation Needs: No Transportation Needs (12/28/2024)    Received from [facility] ([facility])    PRAPARE - Transportation     Lack of Transportation (Medical): No     Lack of Transportation (Non-Medical): No   Housing Stability: Low Risk  (12/28/2024)    Received from [facility] ([facility])    Housing Stability Vital Sign      Unable to Pay for Housing in the Last Year: No     Number of Times Moved in the Last Year: 0     Homeless in the Last Year: No   Recent Concern: Housing Stability - High Risk (12/4/2024)    Received from [facility] ([facility])    Housing Stability Vital Sign     Unable to Pay for Housing in the Last Year: No     Number of Times Moved in the Last Year: 1     Homeless in the Last Year: Yes

## 2025-06-04 NOTE — PATIENT INSTRUCTIONS
VISIT SUMMARY:  Today, we discussed your severe knee pain and gastrointestinal symptoms, including nausea, vomiting, and diarrhea. We also reviewed your recent hip and shoulder fractures and the associated fatigue and weakness.    YOUR PLAN:  -RHEUMATOID ARTHRITIS: Rheumatoid arthritis is a chronic inflammatory disorder affecting your joints. Your knee pain and inflammation are likely worsened by the weather and vomiting. Continue using acetaminophen for pain relief and topical naproxen cream for inflammation.    -NAUSEA AND VOMITING: Nausea and vomiting can prevent you from keeping your medications down and worsen your arthritis symptoms. You have been prescribed ondansetron to help control the nausea, which you can take up to three times daily as needed. Make sure to drink plenty of fluids to stay hydrated.    -DIARRHEA: Diarrhea can lead to dehydration and an imbalance of electrolytes in your body. Your black stools are likely due to the Pepto Bismol. Stop taking Pepto Bismol once you start ondansetron. Keep an eye out for any red blood in your stool and continue to drink fluids, including oral rehydration solutions.    -ELECTROLYTE IMBALANCE RISK: Diarrhea and vomiting can cause a loss of important electrolytes like potassium, which are crucial for your body's functions. If your symptoms persist or worsen, you should visit the ER for blood work and rehydration.    -HIP AND SHOULDER FRACTURE: Your recent fractures have contributed to your fatigue and weakness, affecting your mobility. If your weakness becomes severe, consider visiting the ER for further assessment.    INSTRUCTIONS:  If your symptoms persist or worsen, especially if you experience severe weakness, dehydration, or see red blood in your stool, please visit the ER for further evaluation and treatment.    Contains text generated by Patt

## 2025-06-05 ENCOUNTER — MED REC SCAN ONLY (OUTPATIENT)
Dept: INTERNAL MEDICINE CLINIC | Facility: CLINIC | Age: 80
End: 2025-06-05

## 2025-06-06 ENCOUNTER — HOSPITAL ENCOUNTER (EMERGENCY)
Facility: HOSPITAL | Age: 80
Discharge: HOME | End: 2025-06-06
Attending: EMERGENCY MEDICINE
Payer: MEDICARE

## 2025-06-06 VITALS
TEMPERATURE: 98 F | SYSTOLIC BLOOD PRESSURE: 132 MMHG | RESPIRATION RATE: 22 BRPM | OXYGEN SATURATION: 95 % | DIASTOLIC BLOOD PRESSURE: 62 MMHG | HEART RATE: 86 BPM

## 2025-06-06 DIAGNOSIS — R10.13 EPIGASTRIC PAIN: Primary | ICD-10-CM

## 2025-06-06 LAB
ALBUMIN SERPL-MCNC: 4.2 G/DL (ref 3.2–4.8)
ALBUMIN/GLOB SERPL: 1.8 {RATIO} (ref 1–2)
ALP LIVER SERPL-CCNC: 91 U/L (ref 55–142)
ALT SERPL-CCNC: 10 U/L (ref 10–49)
ANION GAP SERPL CALC-SCNC: 13 MMOL/L (ref 0–18)
ANTIBODY SCREEN: NEGATIVE
AST SERPL-CCNC: 22 U/L (ref ?–34)
BASOPHILS # BLD AUTO: 0.03 X10(3) UL (ref 0–0.2)
BASOPHILS NFR BLD AUTO: 0.3 %
BILIRUB SERPL-MCNC: 0.3 MG/DL (ref 0.2–1.1)
BUN BLD-MCNC: 37 MG/DL (ref 9–23)
BUN/CREAT SERPL: 22 (ref 10–20)
CALCIUM BLD-MCNC: 7.6 MG/DL (ref 8.7–10.4)
CHLORIDE SERPL-SCNC: 103 MMOL/L (ref 98–112)
CO2 SERPL-SCNC: 18 MMOL/L (ref 21–32)
CREAT BLD-MCNC: 1.68 MG/DL (ref 0.55–1.02)
DEPRECATED RDW RBC AUTO: 43.6 FL (ref 35.1–46.3)
EGFRCR SERPLBLD CKD-EPI 2021: 30 ML/MIN/1.73M2 (ref 60–?)
EOSINOPHIL # BLD AUTO: 0.08 X10(3) UL (ref 0–0.7)
EOSINOPHIL NFR BLD AUTO: 0.8 %
ERYTHROCYTE [DISTWIDTH] IN BLOOD BY AUTOMATED COUNT: 14.3 % (ref 11–15)
GLOBULIN PLAS-MCNC: 2.3 G/DL (ref 2–3.5)
GLUCOSE BLD-MCNC: 112 MG/DL (ref 70–99)
HCT VFR BLD AUTO: 24.7 % (ref 35–48)
HGB BLD-MCNC: 8.2 G/DL (ref 12–16)
IMM GRANULOCYTES # BLD AUTO: 0.04 X10(3) UL (ref 0–1)
IMM GRANULOCYTES NFR BLD: 0.4 %
LIPASE SERPL-CCNC: 26 U/L (ref 12–53)
LYMPHOCYTES # BLD AUTO: 1.17 X10(3) UL (ref 1–4)
LYMPHOCYTES NFR BLD AUTO: 12.2 %
MCH RBC QN AUTO: 27.9 PG (ref 26–34)
MCHC RBC AUTO-ENTMCNC: 33.2 G/DL (ref 31–37)
MCV RBC AUTO: 84 FL (ref 80–100)
MONOCYTES # BLD AUTO: 0.74 X10(3) UL (ref 0.1–1)
MONOCYTES NFR BLD AUTO: 7.7 %
NEUTROPHILS # BLD AUTO: 7.5 X10 (3) UL (ref 1.5–7.7)
NEUTROPHILS # BLD AUTO: 7.5 X10(3) UL (ref 1.5–7.7)
NEUTROPHILS NFR BLD AUTO: 78.6 %
OSMOLALITY SERPL CALC.SUM OF ELEC: 287 MOSM/KG (ref 275–295)
PLATELET # BLD AUTO: 255 10(3)UL (ref 150–450)
POTASSIUM SERPL-SCNC: 2.8 MMOL/L (ref 3.5–5.1)
PROT SERPL-MCNC: 6.5 G/DL (ref 5.7–8.2)
RBC # BLD AUTO: 2.94 X10(6)UL (ref 3.8–5.3)
RH BLOOD TYPE: POSITIVE
SODIUM SERPL-SCNC: 134 MMOL/L (ref 136–145)
WBC # BLD AUTO: 9.6 X10(3) UL (ref 4–11)

## 2025-06-06 PROCEDURE — 85025 COMPLETE CBC W/AUTO DIFF WBC: CPT | Performed by: EMERGENCY MEDICINE

## 2025-06-06 PROCEDURE — 82272 OCCULT BLD FECES 1-3 TESTS: CPT

## 2025-06-06 PROCEDURE — 83690 ASSAY OF LIPASE: CPT | Performed by: EMERGENCY MEDICINE

## 2025-06-06 PROCEDURE — 96374 THER/PROPH/DIAG INJ IV PUSH: CPT

## 2025-06-06 PROCEDURE — 99284 EMERGENCY DEPT VISIT MOD MDM: CPT

## 2025-06-06 PROCEDURE — 80053 COMPREHEN METABOLIC PANEL: CPT | Performed by: EMERGENCY MEDICINE

## 2025-06-06 PROCEDURE — 86850 RBC ANTIBODY SCREEN: CPT | Performed by: EMERGENCY MEDICINE

## 2025-06-06 PROCEDURE — 86901 BLOOD TYPING SEROLOGIC RH(D): CPT | Performed by: EMERGENCY MEDICINE

## 2025-06-06 PROCEDURE — 86900 BLOOD TYPING SEROLOGIC ABO: CPT | Performed by: EMERGENCY MEDICINE

## 2025-06-06 RX ORDER — MAGNESIUM HYDROXIDE/ALUMINUM HYDROXICE/SIMETHICONE 120; 1200; 1200 MG/30ML; MG/30ML; MG/30ML
10 SUSPENSION ORAL 4 TIMES DAILY PRN
Qty: 710 ML | Refills: 0 | Status: SHIPPED | OUTPATIENT
Start: 2025-06-06

## 2025-06-06 RX ORDER — PANTOPRAZOLE SODIUM 40 MG/1
40 TABLET, DELAYED RELEASE ORAL DAILY
Qty: 30 TABLET | Refills: 0 | Status: SHIPPED | OUTPATIENT
Start: 2025-06-06 | End: 2025-07-06

## 2025-06-06 RX ORDER — POTASSIUM CHLORIDE 1500 MG/1
20 TABLET, EXTENDED RELEASE ORAL DAILY
Qty: 7 TABLET | Refills: 0 | Status: SHIPPED | OUTPATIENT
Start: 2025-06-06 | End: 2025-06-13

## 2025-06-06 RX ORDER — FAMOTIDINE 10 MG/ML
20 INJECTION, SOLUTION INTRAVENOUS ONCE
Status: COMPLETED | OUTPATIENT
Start: 2025-06-06 | End: 2025-06-06

## 2025-06-06 RX ORDER — MAGNESIUM HYDROXIDE/ALUMINUM HYDROXICE/SIMETHICONE 120; 1200; 1200 MG/30ML; MG/30ML; MG/30ML
30 SUSPENSION ORAL ONCE
Status: COMPLETED | OUTPATIENT
Start: 2025-06-06 | End: 2025-06-06

## 2025-06-06 RX ORDER — POTASSIUM CHLORIDE 1500 MG/1
40 TABLET, EXTENDED RELEASE ORAL ONCE
Status: COMPLETED | OUTPATIENT
Start: 2025-06-06 | End: 2025-06-06

## 2025-06-06 NOTE — ED INITIAL ASSESSMENT (HPI)
Pt c/o of black stools since yesterday and abdominal pain and nauseas. BP 85/52 in triage . Pt on eliquis

## 2025-06-06 NOTE — ED PROVIDER NOTES
Patient Seen in: [facility] Emergency Department    History     Chief Complaint   Patient presents with    GI Bleeding    Hypotension       HPI    Patient presents to the ED complaining of epigastric abdominal pain starting last night.  Associated nausea but no vomiting.  She notes some black stool several days ago but none today.  Son states that he gave her several days of Pepto-Bismol prior to the dark stool.  Patient is on Eliquis.  She states no other complaints.  Abdominal pain now better.    History reviewed. Past Medical History[1]    History reviewed. Past Surgical History[2]      Medications :  Prescriptions Prior to Admission[3]     Family History[4]    Smoking Status: Social Hx on file[5]    Constitutional and vital signs reviewed.      Social History and Family History elements reviewed from today, pertinent positives to the presenting problem noted.    Physical Exam     ED Triage Vitals [06/06/25 1234]   BP (!) 85/52   Pulse 97   Resp 22   Temp 97.8 °F (36.6 °C)   Temp src    SpO2 99 %   O2 Device None (Room air)       All measures to prevent infection transmission during my interaction with the patient were taken. Handwashing was performed prior to and after the exam.  Stethoscope and any equipment used during my examination was cleaned with super sani-cloth germicidal wipes following the exam.     Physical Exam  Vitals and nursing note reviewed.   Constitutional:       General: She is not in acute distress.     Appearance: She is well-developed. She is not ill-appearing or toxic-appearing.   HENT:      Head: Normocephalic and atraumatic.   Eyes:      General:         Right eye: No discharge.         Left eye: No discharge.      Conjunctiva/sclera: Conjunctivae normal.   Neck:      Trachea: No tracheal deviation.   Cardiovascular:      Rate and Rhythm: Normal rate.   Pulmonary:      Effort: Pulmonary effort is normal. No respiratory distress.      Breath sounds: No stridor.   Abdominal:       General: There is no distension.      Palpations: Abdomen is soft.      Tenderness: There is abdominal tenderness.      Comments: Mild epigastric tenderness without rebound or guarding   Genitourinary:     Rectum: Guaiac result negative.      Comments: Brown stool negative guaiac  Musculoskeletal:         General: No deformity.   Skin:     General: Skin is warm and dry.   Neurological:      Mental Status: She is alert and oriented to person, place, and time.   Psychiatric:         Behavior: Behavior normal.      Comments: Anxious mood         ED Course        Labs Reviewed   CBC WITH DIFFERENTIAL WITH PLATELET - Abnormal; Notable for the following components:       Result Value    RBC 2.94 (*)     HGB 8.2 (*)     HCT 24.7 (*)     All other components within normal limits   COMP METABOLIC PANEL (14) - Abnormal; Notable for the following components:    Glucose 112 (*)     Sodium 134 (*)     Potassium 2.8 (*)     CO2 18.0 (*)     BUN 37 (*)     Creatinine 1.68 (*)     BUN/CREA Ratio 22.0 (*)     Calcium, Total 7.6 (*)     eGFR-Cr 30 (*)     All other components within normal limits   LIPASE - Normal   TYPE AND SCREEN    Narrative:     The following orders were created for panel order Type and screen.                  Procedure                               Abnormality         Status                                     ---------                               -----------         ------                                     ABORH (Blood Type)[[id number]]                               Final result                               Antibody Screen[[id number]]                                  Final result                                                 Please view results for these tests on the individual orders.   ABORH (BLOOD TYPE)   ANTIBODY SCREEN   RAINBOW DRAW LAVENDER   RAINBOW DRAW LIGHT GREEN   RAINBOW DRAW BLUE       As Interpreted by me    Imaging Results Available and Reviewed while in ED: No results found.  ED  Medications Administered:   Medications   alum-mag hydroxide-simethicone (Maalox) 200-200-20 MG/5ML oral suspension 30 mL (30 mL Oral Given 6/6/25 1434)   famotidine (Pepcid) 20 mg/2mL injection 20 mg (20 mg Intravenous Given 6/6/25 1435)   potassium chloride (Klor-Con M20) tab 40 mEq (40 mEq Oral Given 6/6/25 1432)         MDM     Vitals:    06/06/25 1246 06/06/25 1304 06/06/25 1315 06/06/25 1445   BP: 128/65 103/59 112/62 132/62   Pulse: 91 85 83 86   Resp: 20 22 19 22   Temp:       SpO2: 99% 96% 100% 95%     *I personally reviewed and interpreted all ED vitals.    Pulse Ox: 95%, Room air, Normal     Monitor Interpretation:   normal sinus rhythm as interpreted by me.  The cardiac monitor was ordered to monitor heart rate.    Differential Diagnosis/ Diagnostic Considerations: GERD, gastritis, PUD, pancreatitis, intra-abdominal infection, GI bleeding, other    Complicating Factors: The patient already has does not have any pertinent problems on file. to contribute to the complexity of this ED evaluation.    Medical Decision Making  Patient presents to the ED with epigastric pain starting last night.  Now improved in the ED.  Recent dark stools as well as oral Pepto-Bismol.  Coag testing negative in the ED.  Laboratory testing at baseline other than hypokalemia.  Potassium replaced in the ED.  Mild anemia although history of this in the past, chronic renal insufficiency.  Patient complained of mild recurrence of epigastric pain in the ED which resolved with Maalox and Pepcid.  No further complaints.  Patient would like to go home and son would like to take the patient home.  Return precautions discussed.  Outpatient GI follow-up recommended.  Will discharge with pantoprazole, Maalox and potassium.    Problems Addressed:  Epigastric pain: acute illness or injury    Amount and/or Complexity of Data Reviewed  Independent Historian:      Details: Patient's son provides history details  Labs: ordered. Decision-making  details documented in ED Course.    Risk  Prescription drug management.        Condition upon leaving the department: Stable    Disposition and Plan     Clinical Impression:  1. Epigastric pain        Disposition:  Discharge    Follow-up:  Jose Chopra MD  [address] [facility] [address]  [facility] [address]  [phone]    Schedule an appointment as soon as possible for a visit in 1 week(s)        Medications Prescribed:  Current Discharge Medication List        START taking these medications    Details   !! pantoprazole 40 MG Oral Tab EC Take 1 tablet (40 mg total) by mouth daily for 30 doses.  Qty: 30 tablet, Refills: 0      alum-mag hydroxide-simethicone 200-200-20 MG/5ML Oral Suspension Take 10 mL by mouth 4 (four) times daily as needed.  Qty: 710 mL, Refills: 0       !! - Potential duplicate medications found. Please discuss with provider.                           [1]   Past Medical History:   Anxiety and depression    AS (aortic stenosis)    s/p TAVR    Asthma (HCC)    CAD (coronary artery disease)    CHF (congestive heart failure) (HCC)    Diastolic    Chronic atrial fibrillation (HCC)    CKD (chronic kidney disease)    Congestive heart disease (HCC)    Depression    Essential hypertension    Hearing impairment    bilateral hearing aids    High cholesterol    Hyperlipidemia    PONV (postoperative nausea and vomiting)    RA (rheumatoid arthritis) (HCC)   [2]   Past Surgical History:  Procedure Laterality Date    Appendectomy      Cath bare metal stent (bms)  2022    Cardiac Stents Placement x4    Other surgical history Bilateral     Gel injections to bilateral knees    Repair ing hernia,5+y/o,reducibl      Valve repair  11/03/2022   [3] (Not in a hospital admission)  [4]   Family History  Problem Relation Age of Onset    Other (emphysema) Father     Dementia Mother     No Known Problems Daughter     No Known Problems Daughter     No Known Problems Son     No Known Problems Son     Other (Osteoarthritis) Sister      Heart Attack Brother     Cancer Brother     Heart Disorder Brother    [5]   Social History  Socioeconomic History    Marital status:    Tobacco Use    Smoking status: Never     Passive exposure: Never    Smokeless tobacco: Never   Vaping Use    Vaping status: Never Used   Substance and Sexual Activity    Alcohol use: Never    Drug use: Never   Other Topics Concern    Caffeine Concern No    Seat Belt Yes    Special Diet No    Breast feeding No    Reaction to local anesthetic No    Pt has a pacemaker No    Pt has a defibrillator No

## 2025-06-12 ENCOUNTER — TELEPHONE (OUTPATIENT)
Dept: RHEUMATOLOGY | Facility: CLINIC | Age: 80
End: 2025-06-12

## 2025-06-12 NOTE — TELEPHONE ENCOUNTER
Patient's daughter Kianna called to speak to a nurse from Dr. Peck's office in regards to the medication Gabapentin for the patient.     Please call Kianna for any questions.

## 2025-06-16 ENCOUNTER — OFFICE VISIT (OUTPATIENT)
Dept: ORTHOPEDICS CLINIC | Facility: CLINIC | Age: 80
End: 2025-06-16

## 2025-06-16 DIAGNOSIS — M75.121 NONTRAUMATIC COMPLETE TEAR OF RIGHT ROTATOR CUFF: Primary | ICD-10-CM

## 2025-06-16 PROCEDURE — 99213 OFFICE O/P EST LOW 20 MIN: CPT | Performed by: STUDENT IN AN ORGANIZED HEALTH CARE EDUCATION/TRAINING PROGRAM

## 2025-06-17 RX ORDER — METHYLPREDNISOLONE 4 MG/1
TABLET ORAL
Qty: 1 EACH | Refills: 0 | Status: SHIPPED | OUTPATIENT
Start: 2025-06-17

## 2025-06-17 NOTE — TELEPHONE ENCOUNTER
Name and  verified with daughter. Chart reviewed and it looks like patient is to be taking pregabalin 50 mg at night. It looks like it was prescribed by Dr. Strange, primary care provider, and Dr. Lopez advised to continue at last office visit. [place] find information on [place]  when medication was last dispensed. Daughter stated patient does have an upcoming appointment with Dr. Strange on Monday. Please advise. Daughter stated patient needs something and she has been giving her some prednisone.

## 2025-06-17 NOTE — TELEPHONE ENCOUNTER
I sent a Medrol Dosepak for some her pain right now  She can discuss with her PCP on Monday regarding Lyrica

## (undated) DIAGNOSIS — Z02.9 ENCOUNTERS FOR UNSPECIFIED ADMINISTRATIVE PURPOSE: ICD-10-CM

## (undated) DIAGNOSIS — M25.511 CHRONIC RIGHT SHOULDER PAIN: ICD-10-CM

## (undated) DIAGNOSIS — G89.29 CHRONIC RIGHT SHOULDER PAIN: ICD-10-CM

## (undated) DIAGNOSIS — M06.9 RHEUMATOID ARTHRITIS, INVOLVING UNSPECIFIED SITE, UNSPECIFIED WHETHER RHEUMATOID FACTOR PRESENT (HCC): ICD-10-CM

## (undated) DEVICE — SOLUTION SURG DURAPREP 26ML

## (undated) DEVICE — ENSEAL X1 TISSUE SEALER, CURVED JAW, 37 CM SHAFT LENGTH: Brand: ENSEAL

## (undated) DEVICE — SOLUTION IRRIG 1000ML 0.9% NACL USP BTL

## (undated) DEVICE — ADHESIVE LIQ 2/3ML VI MASTISOL

## (undated) DEVICE — UNDYED BRAIDED (POLYGLACTIN 910), SYNTHETIC ABSORBABLE SUTURE: Brand: COATED VICRYL

## (undated) DEVICE — PINNACLE INTRODUCER SHEATH: Brand: PINNACLE

## (undated) DEVICE — X-RAY DETECTABLE SPONGES,16 PLY: Brand: VISTEC

## (undated) DEVICE — SHEATH MICROPUNCTURE STIFF

## (undated) DEVICE — COVER,TABLE,44X90,STERILE: Brand: MEDLINE

## (undated) DEVICE — BLADELESS OBTURATOR: Brand: WECK VISTA

## (undated) DEVICE — ROBOTIC: Brand: MEDLINE INDUSTRIES, INC.

## (undated) DEVICE — GAMMEX® PI HYBRID SIZE 7.5, STERILE POWDER-FREE SURGICAL GLOVE, POLYISOPRENE AND NEOPRENE BLEND: Brand: GAMMEX

## (undated) DEVICE — CSTM UNIVERSAL DRAPE PK: Brand: MEDLINE INDUSTRIES, INC.

## (undated) DEVICE — GOWN,SIRUS,FABRIC-REINFORCED,X-LARGE: Brand: MEDLINE

## (undated) DEVICE — SYRINGE 30ML LL TIP

## (undated) DEVICE — 1010 S-DRAPE TOWEL DRAPE 10/BX: Brand: STERI-DRAPE™

## (undated) DEVICE — WIRE AMPLATZ SUPER STIFF 035X1

## (undated) DEVICE — ARM DRAPE

## (undated) DEVICE — TOWEL SURG OR 17X30IN BLUE

## (undated) DEVICE — FIXED CORE WIRE GUIDE SAFE-T-J, CURVED: Brand: COOK

## (undated) DEVICE — Device

## (undated) DEVICE — CATH ANGIO 6FR PIG ANGLED

## (undated) DEVICE — PERCLOSE PROGLIDE™ SUTURE-MEDIATED CLOSURE SYSTEM: Brand: PERCLOSE PROGLIDE™

## (undated) DEVICE — TROCAR: Brand: KII FIOS FIRST ENTRY

## (undated) DEVICE — MONOPOLAR CURVED SCISSORS: Brand: ENDOWRIST

## (undated) DEVICE — ADULT, RADIOTRANSPARENT ELEMENT, COMPATIBLE W/ GRAY FLAT CONNECTOR: Brand: DEFIBRILLATION ELECTRODES

## (undated) DEVICE — TIP COVER ACCESSORY

## (undated) DEVICE — WIRE J .035X260

## (undated) DEVICE — GAUZE SPONGES,12 PLY: Brand: CURITY

## (undated) DEVICE — LAPAROVUE VISIBILITY SYSTEM LAPAROSCOPIC SOLUTIONS: Brand: LAPAROVUE

## (undated) DEVICE — TUBING MEGADYNE LAPAROSCOPIC

## (undated) DEVICE — ABSORBABLE WOUND CLOSURE DEVICE: Brand: V-LOC 90

## (undated) DEVICE — WIRE NAMIC STR 035X150

## (undated) DEVICE — ANGIO-SEAL VIP VASCULAR CLOSURE DEVICE: Brand: ANGIO-SEAL

## (undated) DEVICE — MEGA SUTURECUT ND: Brand: ENDOWRIST

## (undated) DEVICE — 3M™ IOBAN™ 2 ANTIMICROBIAL INCISE DRAPE 6651EZ: Brand: IOBAN™ 2

## (undated) DEVICE — ANGIO PACK-LF: Brand: MEDLINE INDUSTRIES, INC.

## (undated) DEVICE — TIBURON DRAPE TOWELS: Brand: CONVERTORS

## (undated) DEVICE — INSUFFLATION NEEDLE TO ESTABLISH PNEUMOPERITONEUM.: Brand: INSUFFLATION NEEDLE

## (undated) DEVICE — TRAY CATH 16FR F INCLUDE BARDX IC COMPLT CARE

## (undated) DEVICE — C-ARM: Brand: UNBRANDED

## (undated) DEVICE — CANNULA SEAL

## (undated) DEVICE — COLUMN DRAPE

## (undated) DEVICE — 3M™ BAIR HUGGER® UNDERBODY BLANKET, FULL ACCESS, 10 PER CASE 63500: Brand: BAIR HUGGER™

## (undated) DEVICE — 3M™ IOBAN™ 2 ANTIMICROBIAL INCISE DRAPE 6650EZ: Brand: IOBAN™ 2

## (undated) DEVICE — DRAPE SHEET LAPCHOLE 124X100X7

## (undated) DEVICE — PAD POS 36IN DISP SURGYPAD

## (undated) DEVICE — CATH ANGIO 6F AL1

## (undated) DEVICE — 3M™ TEGADERM™ TRANSPARENT FILM DRESSING, 1626W, 4 IN X 4-3/4 IN (10 CM X 12 CM), 50 EACH/CARTON, 4 CARTON/CASE: Brand: 3M™ TEGADERM™

## (undated) DEVICE — SPONGE PREMIERPRO 7X18X18

## (undated) NOTE — LETTER
03/18/21        Candance Ochoa  [address]      Dear Cornell Desir records indicate that you have outstanding lab work and or testing that was ordered for you and has not yet been completed:  Orders Placed This Encounter

## (undated) NOTE — LETTER
02/09/21        Shruthi Avalos  [address] [address]      Dear Adriano Baeza records indicate that you have outstanding lab work and or testing that was ordered for you and has not yet been completed:  Orders Placed This Encounter

## (undated) NOTE — Clinical Note
Spoke with dtr Lord Looney today for TCM--sent office staff TE for appt clarification and f/u--thank you.

## (undated) NOTE — LETTER
09/18/21        Emily Ramirez  [address] [place] [address]      Dear Toñito Sandoval records indicate that you have outstanding lab work and or testing that was ordered for you and has not yet been completed:  Orders Placed This Encounter

## (undated) NOTE — LETTER
12/30/21        Shruthi Avalos  [address] [address]      Dear Edgar Torres,    [facility] records indicate that you have outstanding lab work and or testing that was ordered for you and has not yet been completed:  Orders Placed This Encounter

## (undated) NOTE — LETTER
[address], [place]  Authorization for Invasive Procedures  Date: 12/19/2023           Time: 7909    I hereby authorize ________________--, my physician and his/her assistants (if applicable), which may include medical students, residents, and/or fellows, to perform the following surgical operation/ procedure and administer such anesthesia as may be determined necessary by my physician: Transesophageal Echocardiogram  on [facility]  2. I recognize that during the surgical operation/procedure, unforeseen conditions may necessitate additional or different procedures than those listed above. I, therefore, further authorize and request that the above-named surgeon, assistants, or designees perform such procedures as are, in their judgment, necessary and desirable. 3.   My surgeon/physician has discussed prior to my surgery the potential benefits, risks and side effects of this procedure; the likelihood of achieving goals; and potential problems that might occur during recuperation. They also discussed reasonable alternatives to the procedure, including risks, benefits, and side effects related to the alternatives and risks related to not receiving this procedure. I have had all my questions answered and I acknowledge that no guarantee has been made as to the result that may be obtained. 4.   Should the need arise during my operation/procedure, which includes change of level of care prior to discharge, I also consent to the administration of blood and/or blood products. Further, I understand that despite careful testing and screening of blood or blood products by collecting agencies, I may still be subject to ill effects as a result of receiving a blood transfusion and/or blood products.   The following are some, but not all, of the potential risks that can occur: fever and allergic reactions, hemolytic reactions, transmission of diseases such as Hepatitis, AIDS and Cytomegalovirus (CMV) and fluid overload. In the event that I wish to have an autologous transfusion of my own blood, or a directed donor transfusion, I will discuss this with my physician. Check only if Refusing Blood or Blood Products  I understand refusal of blood or blood products as deemed necessary by my physician may have serious consequences to my condition to include possible death. I hereby assume responsibility for my refusal and release the hospital, its personnel, and my physicians from any responsibility for the consequences of my refusal.         o  Refuse         5. I authorize the use of any specimen, organs, tissues, body parts or foreign objects that may be removed from my body during the operation/procedure for diagnosis, research or teaching purposes and their subsequent disposal by hospital authorities. I also authorize the release of specimen test results and/or written reports to my treating physician on the hospital medical staff or other referring or consulting physicians involved in my care, at the discretion of the Pathologist or my treating physician. 6.   I consent to the photographing or videotaping of the operations or procedures to be performed, including appropriate portions of my body for medical, scientific, or educational purposes, provided my identity is not revealed by the pictures or by descriptive texts accompanying them. If the procedure has been photographed/videotaped, the surgeon will obtain the original picture, image, videotape or CD. The hospital will not be responsible for storage, release or maintenance of the picture, image, tape or CD.    7.   I consent to the presence of a  or observers in the operating room as deemed necessary by my physician or their designees. 8.   I recognize that in the event my procedure results in extended X-Ray/fluoroscopy time, I may develop a skin reaction. 9.  If I have a Do Not Attempt Resuscitation (DNAR) order in place, that status will be suspended while in the operating room, procedural suite, and during the recovery period unless otherwise explicitly stated by me (or a person authorized to consent on my behalf). The surgeon or my attending physician will determine when the applicable recovery period ends for purposes of reinstating the DNAR order. 10. Patients having a sterilization procedure: I understand that if the procedure is successful the results will be permanent and it will therefore be impossible for me to inseminate, conceive, or bear children. I also understand that the procedure is intended to result in sterility, although the result has not been guaranteed. 11. I acknowledge that my physician has explained sedation/analgesia administration to me including the risk and benefits I consent to the administration of sedation/analgesia as may be necessary or desirable in the judgment of my physician.     I CERTIFY THAT I HAVE READ AND FULLY UNDERSTAND THE ABOVE CONSENT TO OPERATION and/or OTHER PROCEDURE.        ____________________________________       _________________________________      ______________________________  Signature of Patient         Signature of Responsible Person        Printed Name of Responsible Person        ____________________________________      _________________________________      ______________________________       Signature of Witness          Relationship to Patient                       Date                                       Time    Patient Name: Kelly Sibley     : 1945                 Printed: 2023      Medical Record #: [id number]                      Page 1 of 2          [place] My signature below affirms that prior to the time of the procedure; I have explained to the patient and/or his/her legal representative, the risks and benefits involved in the proposed treatment and any reasonable alternative to the proposed treatment. I have also explained the risks and benefits involved in refusal of the proposed treatment and alternatives to the proposed treatment and have answered the patient's questions.  If I have a significant financial interest in a co-management agreement or a significant financial interest in any product or implant, or other significant relationship used in this procedure/surgery, I have disclosed this and had a discussion with my patient.     _______________________________________________________________ _____________________________  Jeraline Starch of Physician)                                                                                         (Date)                                   (Time)    Patient Name: Susana Zacarias     : 1945                 Printed: 2023      Medical Record #: [id number]                      Page 2 of 2

## (undated) NOTE — LETTER
AUTHORIZATION FOR SURGICAL OPERATION OR OTHER PROCEDURE    1. I hereby authorize Dr. Abad Park, and [facility] staff assigned to my case to perform the following operation and/or procedure at the [facility] site:    _______________________________________________________________________________________________    Cortisone injection to Right Shoulder  _______________________________________________________________________________________________    2.  My physician has explained the nature and purpose of the operation or other procedure, possible alternative methods of treatment, the risks involved, and the possibility of complication to me.  I acknowledge that no guarantee has been made as to the result that may be obtained.  3.  I recognize that, during the course of this operation, or other procedure, unforseen conditions may necessitate additional or different procedure than those listed above.  I, therefore, further authorize and request that the above named physician, his/her physician assistants or designees perform such procedures as are, in his/her professional opinion, necessary and desirable.  4.  Any tissue or organs removed in the operation or other procedure may be disposed of by and at the discretion of the [facility].  5.  I understand that in the event of a medical emergency, I will be transported by local paramedics to [facility] or other hospital emergency department.  6.  I certify that I have read and fully understand the above consent to operation and/or other procedure.    7.  I acknowledge that my physician has explained sedation/analgesia administration to me including the risks and benefits.  I consent to the administration of sedation/analgesia as may be necessary or desirable in the judgement of my physician.    Witness signature: ___________________________________________________ Date:   ______/______/_____                    Time:  ________ A.M.  P.M.       Patient Name:  ______________________________________________________  (please print)      Patient signature:  ___________________________________________________             Relationship to Patient:           []  Parent    Responsible person                          []  Spouse  In case of minor or                    [] Other  _____________   Incompetent name:  __________________________________________________                               (please print)      _____________      Responsible person  In case of minor or  Incompetent signature:  _______________________________________________    Statement of Physician  My signature below affirms that prior to the time of the procedure, I have explained to the patient and/or his/her guardian, the risks and benefits involved in the proposed treatment and any reasonable alternative to the proposed treatment.  I have also explained the risks and benefits involved in the refusal of the proposed treatment and have answered the patient's questions.                        Date:  ______/______/_______  Provider                      Signature:  __________________________________________________________       Time:  ___________ A.M    P.M.

## (undated) NOTE — LETTER
06/18/21        Dante Soulier  [address] [address]      Dear [facility],    [address] [place] [address] records indicate that you have outstanding lab work and or testing that was ordered for you and has not yet been completed:  Orders Placed This Encounter

## (undated) NOTE — LETTER
08/05/21        [address] [place] [address]      Dear Jaspreet Moser records indicate that you have outstanding lab work and or testing that was ordered for you and has not yet been completed:  Orders Placed This Encounter

## (undated) NOTE — LETTER
03/29/21        [employer]  [address] [address]      Dear Duran Espinosa records indicate that you have outstanding lab work and or testing that was ordered for you and has not yet been completed:  Orders Placed This Encounter

## (undated) NOTE — Clinical Note
Spoke with dtr Umesh Ramires today--confirms 1/19/2023 TCM/HFU-will repeat renal function panel at this appt-now also has CHF clinic f/u appt. CCM referral placed--thank you.   Future Appointments 1/19/2023  2:00 PM    Ashly Hendricks MD          [facility]             [facility] 1/21/2023  10:20 AM   Ramy Keen MD    [address] [facility] 1/25/2023  12:30 PM   Zulma Flanagan APRN     On license of [facility] SPCIAL[facility]        EM On license of [facility] 3/21/2023  11:30 AM   Sandeep Irene      DE [facility] 5/2/2023   12:45 PM   Ricardo, Structural Heart      [address] [facility]

## (undated) NOTE — Clinical Note
Spoke with buster Turner today for TCM--in office TCM appt made for 12/28/2023 at [facility] office--thank you.   Future Appointments 12/28/2023 3:20 PM    Loi Temple MD          [facility] [place]

## (undated) NOTE — LETTER
12/30/21        [place] [address] [address]      Dear Kayley Ramirez,    [address] records indicate that you have outstanding lab work and or testing that was ordered for you and has not yet been completed:     [address], EIA